# Patient Record
Sex: MALE | Race: WHITE | NOT HISPANIC OR LATINO | Employment: OTHER | URBAN - METROPOLITAN AREA
[De-identification: names, ages, dates, MRNs, and addresses within clinical notes are randomized per-mention and may not be internally consistent; named-entity substitution may affect disease eponyms.]

---

## 2017-01-12 ENCOUNTER — OFFICE VISIT - HEALTHEAST (OUTPATIENT)
Dept: VASCULAR SURGERY | Facility: CLINIC | Age: 82
End: 2017-01-12

## 2017-01-12 DIAGNOSIS — I89.0 LYMPHEDEMA: ICD-10-CM

## 2017-01-12 DIAGNOSIS — I87.303 VENOUS HYPERTENSION, CHRONIC, BILATERAL: ICD-10-CM

## 2017-01-12 ASSESSMENT — MIFFLIN-ST. JEOR: SCORE: 1656.9

## 2017-04-11 ENCOUNTER — OFFICE VISIT - HEALTHEAST (OUTPATIENT)
Dept: VASCULAR SURGERY | Facility: CLINIC | Age: 82
End: 2017-04-11

## 2017-04-11 ENCOUNTER — RECORDS - HEALTHEAST (OUTPATIENT)
Dept: ADMINISTRATIVE | Facility: OTHER | Age: 82
End: 2017-04-11

## 2017-04-11 DIAGNOSIS — I87.303 VENOUS HYPERTENSION, CHRONIC, BILATERAL: ICD-10-CM

## 2017-04-11 DIAGNOSIS — L98.411 NON-PRESSURE CHRONIC ULCER OF BUTTOCK LIMITED TO BREAKDOWN OF SKIN (H): ICD-10-CM

## 2017-04-11 DIAGNOSIS — I89.0 LYMPHEDEMA: ICD-10-CM

## 2017-04-12 ENCOUNTER — AMBULATORY - HEALTHEAST (OUTPATIENT)
Dept: SURGERY | Facility: CLINIC | Age: 82
End: 2017-04-12

## 2017-04-12 DIAGNOSIS — K40.90 RIGHT GROIN HERNIA: ICD-10-CM

## 2017-04-19 ENCOUNTER — RECORDS - HEALTHEAST (OUTPATIENT)
Dept: ADMINISTRATIVE | Facility: OTHER | Age: 82
End: 2017-04-19

## 2017-04-20 ENCOUNTER — RECORDS - HEALTHEAST (OUTPATIENT)
Dept: ADMINISTRATIVE | Facility: OTHER | Age: 82
End: 2017-04-20

## 2017-04-21 ENCOUNTER — HOSPITAL ENCOUNTER (OUTPATIENT)
Dept: CT IMAGING | Facility: HOSPITAL | Age: 82
Discharge: HOME | End: 2017-04-21
Attending: UROLOGY

## 2017-04-21 DIAGNOSIS — R31.0 GROSS HEMATURIA: ICD-10-CM

## 2017-04-25 ENCOUNTER — OFFICE VISIT - HEALTHEAST (OUTPATIENT)
Dept: VASCULAR SURGERY | Facility: CLINIC | Age: 82
End: 2017-04-25

## 2017-04-25 DIAGNOSIS — I89.0 LYMPHEDEMA: ICD-10-CM

## 2017-04-25 DIAGNOSIS — L98.411 NON-PRESSURE CHRONIC ULCER OF BUTTOCK LIMITED TO BREAKDOWN OF SKIN (H): ICD-10-CM

## 2017-04-25 DIAGNOSIS — Z79.4 TYPE 2 DIABETES MELLITUS WITHOUT COMPLICATION, WITH LONG-TERM CURRENT USE OF INSULIN (H): ICD-10-CM

## 2017-04-25 DIAGNOSIS — E11.9 TYPE 2 DIABETES MELLITUS WITHOUT COMPLICATION, WITH LONG-TERM CURRENT USE OF INSULIN (H): ICD-10-CM

## 2017-04-25 DIAGNOSIS — I87.303 VENOUS HYPERTENSION, CHRONIC, BILATERAL: ICD-10-CM

## 2017-04-26 ENCOUNTER — RECORDS - HEALTHEAST (OUTPATIENT)
Dept: ADMINISTRATIVE | Facility: OTHER | Age: 82
End: 2017-04-26

## 2017-05-02 ENCOUNTER — OFFICE VISIT - HEALTHEAST (OUTPATIENT)
Dept: SURGERY | Facility: CLINIC | Age: 82
End: 2017-05-02

## 2017-05-02 DIAGNOSIS — K40.90 UNILATERAL INGUINAL HERNIA WITHOUT OBSTRUCTION OR GANGRENE, RECURRENCE NOT SPECIFIED: ICD-10-CM

## 2017-05-02 ASSESSMENT — MIFFLIN-ST. JEOR: SCORE: 1696.81

## 2017-08-07 ENCOUNTER — OFFICE VISIT - HEALTHEAST (OUTPATIENT)
Dept: VASCULAR SURGERY | Facility: CLINIC | Age: 82
End: 2017-08-07

## 2017-08-07 DIAGNOSIS — I87.303 VENOUS HYPERTENSION, CHRONIC, BILATERAL: ICD-10-CM

## 2017-08-07 DIAGNOSIS — L97.911 LEG ULCER, RIGHT, LIMITED TO BREAKDOWN OF SKIN (H): ICD-10-CM

## 2017-08-07 DIAGNOSIS — I89.0 LYMPHEDEMA: ICD-10-CM

## 2017-08-07 DIAGNOSIS — E11.9 TYPE 2 DIABETES MELLITUS WITHOUT COMPLICATION, UNSPECIFIED LONG TERM INSULIN USE STATUS: ICD-10-CM

## 2017-08-14 ENCOUNTER — RECORDS - HEALTHEAST (OUTPATIENT)
Dept: LAB | Facility: CLINIC | Age: 82
End: 2017-08-14

## 2017-08-14 LAB
CHOLEST SERPL-MCNC: 124 MG/DL
FASTING STATUS PATIENT QL REPORTED: NORMAL
HDLC SERPL-MCNC: 46 MG/DL
LDLC SERPL CALC-MCNC: 64 MG/DL
TRIGL SERPL-MCNC: 70 MG/DL

## 2017-11-06 ENCOUNTER — OFFICE VISIT - HEALTHEAST (OUTPATIENT)
Dept: VASCULAR SURGERY | Facility: CLINIC | Age: 82
End: 2017-11-06

## 2017-11-06 DIAGNOSIS — E11.9 TYPE 2 DIABETES MELLITUS (H): ICD-10-CM

## 2017-11-06 DIAGNOSIS — D64.9 ANEMIA, UNSPECIFIED TYPE: ICD-10-CM

## 2017-11-06 DIAGNOSIS — I87.303 VENOUS HYPERTENSION, CHRONIC, BILATERAL: ICD-10-CM

## 2017-11-06 DIAGNOSIS — I89.0 LYMPHEDEMA: ICD-10-CM

## 2017-11-06 DIAGNOSIS — I87.2 VENOUS STASIS DERMATITIS OF BOTH LOWER EXTREMITIES: ICD-10-CM

## 2018-02-12 ENCOUNTER — OFFICE VISIT - HEALTHEAST (OUTPATIENT)
Dept: GERIATRICS | Facility: CLINIC | Age: 83
End: 2018-02-12

## 2018-02-12 DIAGNOSIS — A48.1 LEGIONELLA PNEUMONIA (H): ICD-10-CM

## 2018-02-12 DIAGNOSIS — E11.9 TYPE 2 DIABETES MELLITUS (H): ICD-10-CM

## 2018-02-12 DIAGNOSIS — Z51.81 ANTICOAGULATION MANAGEMENT ENCOUNTER: ICD-10-CM

## 2018-02-12 DIAGNOSIS — N39.0 URINARY TRACT INFECTION: ICD-10-CM

## 2018-02-12 DIAGNOSIS — I48.91 ATRIAL FIBRILLATION (H): ICD-10-CM

## 2018-02-12 DIAGNOSIS — R33.9 URINARY RETENTION: ICD-10-CM

## 2018-02-12 DIAGNOSIS — Z79.01 ANTICOAGULATION MANAGEMENT ENCOUNTER: ICD-10-CM

## 2018-02-12 DIAGNOSIS — J18.9 PLEURAL EFFUSION ASSOCIATED WITH PULMONARY INFECTION: ICD-10-CM

## 2018-02-12 DIAGNOSIS — N17.9 ACUTE KIDNEY INJURY (H): ICD-10-CM

## 2018-02-12 DIAGNOSIS — J91.8 PLEURAL EFFUSION ASSOCIATED WITH PULMONARY INFECTION: ICD-10-CM

## 2018-02-12 DIAGNOSIS — I50.32 CHRONIC DIASTOLIC HEART FAILURE (H): ICD-10-CM

## 2018-02-12 DIAGNOSIS — Z98.890 STATUS POST THORACENTESIS: ICD-10-CM

## 2018-02-13 ENCOUNTER — OFFICE VISIT - HEALTHEAST (OUTPATIENT)
Dept: GERIATRICS | Facility: CLINIC | Age: 83
End: 2018-02-13

## 2018-02-13 ENCOUNTER — RECORDS - HEALTHEAST (OUTPATIENT)
Dept: LAB | Facility: CLINIC | Age: 83
End: 2018-02-13

## 2018-02-13 DIAGNOSIS — R94.31 QT PROLONGATION: ICD-10-CM

## 2018-02-13 DIAGNOSIS — I48.91 ATRIAL FIBRILLATION (H): ICD-10-CM

## 2018-02-13 DIAGNOSIS — J18.9 PLEURAL EFFUSION ASSOCIATED WITH PULMONARY INFECTION: ICD-10-CM

## 2018-02-13 DIAGNOSIS — R33.9 URINARY RETENTION: ICD-10-CM

## 2018-02-13 DIAGNOSIS — I89.0 LYMPHEDEMA: ICD-10-CM

## 2018-02-13 DIAGNOSIS — Z98.890 STATUS POST THORACENTESIS: ICD-10-CM

## 2018-02-13 DIAGNOSIS — N17.9 ACUTE KIDNEY INJURY (H): ICD-10-CM

## 2018-02-13 DIAGNOSIS — J91.8 PLEURAL EFFUSION ASSOCIATED WITH PULMONARY INFECTION: ICD-10-CM

## 2018-02-13 DIAGNOSIS — A48.1 LEGIONELLA PNEUMONIA (H): ICD-10-CM

## 2018-02-13 DIAGNOSIS — Z51.81 ANTICOAGULATION MANAGEMENT ENCOUNTER: ICD-10-CM

## 2018-02-13 DIAGNOSIS — I50.32 CHRONIC DIASTOLIC HEART FAILURE (H): ICD-10-CM

## 2018-02-13 DIAGNOSIS — E11.9 TYPE 2 DIABETES MELLITUS (H): ICD-10-CM

## 2018-02-13 DIAGNOSIS — Z79.01 ANTICOAGULATION MANAGEMENT ENCOUNTER: ICD-10-CM

## 2018-02-13 LAB
ANION GAP SERPL CALCULATED.3IONS-SCNC: 5 MMOL/L (ref 5–18)
BUN SERPL-MCNC: 20 MG/DL (ref 8–28)
CALCIUM SERPL-MCNC: 8.6 MG/DL (ref 8.5–10.5)
CHLORIDE BLD-SCNC: 107 MMOL/L (ref 98–107)
CO2 SERPL-SCNC: 25 MMOL/L (ref 22–31)
CREAT SERPL-MCNC: 1 MG/DL (ref 0.7–1.3)
GFR SERPL CREATININE-BSD FRML MDRD: >60 ML/MIN/1.73M2
GLUCOSE BLD-MCNC: 189 MG/DL (ref 70–125)
POTASSIUM BLD-SCNC: 4.2 MMOL/L (ref 3.5–5)
SODIUM SERPL-SCNC: 137 MMOL/L (ref 136–145)

## 2018-02-15 ENCOUNTER — OFFICE VISIT - HEALTHEAST (OUTPATIENT)
Dept: GERIATRICS | Facility: CLINIC | Age: 83
End: 2018-02-15

## 2018-02-15 DIAGNOSIS — J91.8 PLEURAL EFFUSION ASSOCIATED WITH PULMONARY INFECTION: ICD-10-CM

## 2018-02-15 DIAGNOSIS — J18.9 PLEURAL EFFUSION ASSOCIATED WITH PULMONARY INFECTION: ICD-10-CM

## 2018-02-15 DIAGNOSIS — R94.31 QT PROLONGATION: ICD-10-CM

## 2018-02-15 DIAGNOSIS — I50.32 CHRONIC DIASTOLIC HEART FAILURE (H): ICD-10-CM

## 2018-02-15 DIAGNOSIS — Z98.890 STATUS POST THORACENTESIS: ICD-10-CM

## 2018-02-15 DIAGNOSIS — R33.9 URINARY RETENTION: ICD-10-CM

## 2018-02-15 DIAGNOSIS — A48.1 LEGIONELLA PNEUMONIA (H): ICD-10-CM

## 2018-02-15 DIAGNOSIS — Z79.01 ANTICOAGULATION MANAGEMENT ENCOUNTER: ICD-10-CM

## 2018-02-15 DIAGNOSIS — I48.91 ATRIAL FIBRILLATION (H): ICD-10-CM

## 2018-02-15 DIAGNOSIS — Z51.81 ANTICOAGULATION MANAGEMENT ENCOUNTER: ICD-10-CM

## 2018-02-16 ENCOUNTER — OFFICE VISIT - HEALTHEAST (OUTPATIENT)
Dept: GERIATRICS | Facility: CLINIC | Age: 83
End: 2018-02-16

## 2018-02-16 ENCOUNTER — RECORDS - HEALTHEAST (OUTPATIENT)
Dept: LAB | Facility: CLINIC | Age: 83
End: 2018-02-16

## 2018-02-16 DIAGNOSIS — A48.1 LEGIONELLA PNEUMONIA (H): ICD-10-CM

## 2018-02-16 DIAGNOSIS — I95.9 HYPOTENSION: ICD-10-CM

## 2018-02-16 DIAGNOSIS — R00.1 BRADYCARDIA: ICD-10-CM

## 2018-02-16 DIAGNOSIS — R06.02 SHORTNESS OF BREATH: ICD-10-CM

## 2018-02-16 DIAGNOSIS — R53.1 WEAKNESS: ICD-10-CM

## 2018-02-16 LAB
ANION GAP SERPL CALCULATED.3IONS-SCNC: 9 MMOL/L (ref 5–18)
BASOPHILS # BLD AUTO: 0 THOU/UL (ref 0–0.2)
BASOPHILS NFR BLD AUTO: 0 % (ref 0–2)
BNP SERPL-MCNC: 489 PG/ML (ref 0–93)
BUN SERPL-MCNC: 19 MG/DL (ref 8–28)
CALCIUM SERPL-MCNC: 8.5 MG/DL (ref 8.5–10.5)
CHLORIDE BLD-SCNC: 104 MMOL/L (ref 98–107)
CO2 SERPL-SCNC: 22 MMOL/L (ref 22–31)
CREAT SERPL-MCNC: 1.41 MG/DL (ref 0.7–1.3)
EOSINOPHIL # BLD AUTO: 0 THOU/UL (ref 0–0.4)
EOSINOPHIL NFR BLD AUTO: 0 % (ref 0–6)
ERYTHROCYTE [DISTWIDTH] IN BLOOD BY AUTOMATED COUNT: 14.3 % (ref 11–14.5)
GFR SERPL CREATININE-BSD FRML MDRD: 47 ML/MIN/1.73M2
GLUCOSE BLD-MCNC: 129 MG/DL (ref 70–125)
HCT VFR BLD AUTO: 43.2 % (ref 40–54)
HGB BLD-MCNC: 14 G/DL (ref 14–18)
LYMPHOCYTES # BLD AUTO: 0.7 THOU/UL (ref 0.8–4.4)
LYMPHOCYTES NFR BLD AUTO: 6 % (ref 20–40)
MCH RBC QN AUTO: 30.5 PG (ref 27–34)
MCHC RBC AUTO-ENTMCNC: 32.4 G/DL (ref 32–36)
MCV RBC AUTO: 94 FL (ref 80–100)
MONOCYTES # BLD AUTO: 0.9 THOU/UL (ref 0–0.9)
MONOCYTES NFR BLD AUTO: 8 % (ref 2–10)
NEUTROPHILS # BLD AUTO: 9.3 THOU/UL (ref 2–7.7)
NEUTROPHILS NFR BLD AUTO: 86 % (ref 50–70)
PLATELET # BLD AUTO: 266 THOU/UL (ref 140–440)
PMV BLD AUTO: 8.9 FL (ref 8.5–12.5)
POTASSIUM BLD-SCNC: 4.3 MMOL/L (ref 3.5–5)
RBC # BLD AUTO: 4.59 MILL/UL (ref 4.4–6.2)
SODIUM SERPL-SCNC: 135 MMOL/L (ref 136–145)
WBC: 10.9 THOU/UL (ref 4–11)

## 2018-02-19 ENCOUNTER — RECORDS - HEALTHEAST (OUTPATIENT)
Dept: LAB | Facility: CLINIC | Age: 83
End: 2018-02-19

## 2018-02-20 ENCOUNTER — OFFICE VISIT - HEALTHEAST (OUTPATIENT)
Dept: GERIATRICS | Facility: CLINIC | Age: 83
End: 2018-02-20

## 2018-02-20 DIAGNOSIS — E87.70 FLUID OVERLOAD: ICD-10-CM

## 2018-02-20 DIAGNOSIS — Z51.81 ANTICOAGULATION MANAGEMENT ENCOUNTER: ICD-10-CM

## 2018-02-20 DIAGNOSIS — Z79.01 ANTICOAGULATION MANAGEMENT ENCOUNTER: ICD-10-CM

## 2018-02-20 DIAGNOSIS — J91.8 PLEURAL EFFUSION ASSOCIATED WITH PULMONARY INFECTION: ICD-10-CM

## 2018-02-20 DIAGNOSIS — I48.91 ATRIAL FIBRILLATION (H): ICD-10-CM

## 2018-02-20 DIAGNOSIS — A48.1 LEGIONELLA PNEUMONIA (H): ICD-10-CM

## 2018-02-20 DIAGNOSIS — J18.9 PLEURAL EFFUSION ASSOCIATED WITH PULMONARY INFECTION: ICD-10-CM

## 2018-02-20 DIAGNOSIS — S01.01XA SCALP LACERATION: ICD-10-CM

## 2018-02-20 DIAGNOSIS — I50.32 CHRONIC DIASTOLIC HEART FAILURE (H): ICD-10-CM

## 2018-02-20 LAB
ANION GAP SERPL CALCULATED.3IONS-SCNC: 6 MMOL/L (ref 5–18)
BUN SERPL-MCNC: 16 MG/DL (ref 8–28)
CALCIUM SERPL-MCNC: 8.4 MG/DL (ref 8.5–10.5)
CHLORIDE BLD-SCNC: 108 MMOL/L (ref 98–107)
CO2 SERPL-SCNC: 24 MMOL/L (ref 22–31)
CREAT SERPL-MCNC: 1.07 MG/DL (ref 0.7–1.3)
ERYTHROCYTE [DISTWIDTH] IN BLOOD BY AUTOMATED COUNT: 14.5 % (ref 11–14.5)
GFR SERPL CREATININE-BSD FRML MDRD: >60 ML/MIN/1.73M2
GLUCOSE BLD-MCNC: 62 MG/DL (ref 70–125)
HCT VFR BLD AUTO: 37.4 % (ref 40–54)
HGB BLD-MCNC: 12.2 G/DL (ref 14–18)
MCH RBC QN AUTO: 30.7 PG (ref 27–34)
MCHC RBC AUTO-ENTMCNC: 32.6 G/DL (ref 32–36)
MCV RBC AUTO: 94 FL (ref 80–100)
PLATELET # BLD AUTO: 177 THOU/UL (ref 140–440)
PMV BLD AUTO: 9.7 FL (ref 8.5–12.5)
POTASSIUM BLD-SCNC: 4.4 MMOL/L (ref 3.5–5)
RBC # BLD AUTO: 3.97 MILL/UL (ref 4.4–6.2)
SODIUM SERPL-SCNC: 138 MMOL/L (ref 136–145)
WBC: 6.8 THOU/UL (ref 4–11)

## 2018-02-22 ENCOUNTER — OFFICE VISIT - HEALTHEAST (OUTPATIENT)
Dept: GERIATRICS | Facility: CLINIC | Age: 83
End: 2018-02-22

## 2018-02-22 ENCOUNTER — RECORDS - HEALTHEAST (OUTPATIENT)
Dept: LAB | Facility: CLINIC | Age: 83
End: 2018-02-22

## 2018-02-22 DIAGNOSIS — S01.01XA SCALP LACERATION: ICD-10-CM

## 2018-02-22 DIAGNOSIS — E87.70 FLUID OVERLOAD: ICD-10-CM

## 2018-02-22 DIAGNOSIS — A48.1 LEGIONELLA PNEUMONIA (H): ICD-10-CM

## 2018-02-22 DIAGNOSIS — J91.8 PLEURAL EFFUSION ASSOCIATED WITH PULMONARY INFECTION: ICD-10-CM

## 2018-02-22 DIAGNOSIS — J18.9 PLEURAL EFFUSION ASSOCIATED WITH PULMONARY INFECTION: ICD-10-CM

## 2018-02-22 LAB
ANION GAP SERPL CALCULATED.3IONS-SCNC: 7 MMOL/L (ref 5–18)
BNP SERPL-MCNC: 460 PG/ML (ref 0–93)
BUN SERPL-MCNC: 19 MG/DL (ref 8–28)
CALCIUM SERPL-MCNC: 8.4 MG/DL (ref 8.5–10.5)
CHLORIDE BLD-SCNC: 104 MMOL/L (ref 98–107)
CO2 SERPL-SCNC: 27 MMOL/L (ref 22–31)
CREAT SERPL-MCNC: 1.77 MG/DL (ref 0.7–1.3)
ERYTHROCYTE [DISTWIDTH] IN BLOOD BY AUTOMATED COUNT: 14.9 % (ref 11–14.5)
GFR SERPL CREATININE-BSD FRML MDRD: 36 ML/MIN/1.73M2
GLUCOSE BLD-MCNC: 137 MG/DL (ref 70–125)
HCT VFR BLD AUTO: 38.7 % (ref 40–54)
HGB BLD-MCNC: 12.4 G/DL (ref 14–18)
MCH RBC QN AUTO: 30.5 PG (ref 27–34)
MCHC RBC AUTO-ENTMCNC: 32 G/DL (ref 32–36)
MCV RBC AUTO: 95 FL (ref 80–100)
PLATELET # BLD AUTO: 178 THOU/UL (ref 140–440)
PMV BLD AUTO: 9.8 FL (ref 8.5–12.5)
POTASSIUM BLD-SCNC: 4 MMOL/L (ref 3.5–5)
RBC # BLD AUTO: 4.06 MILL/UL (ref 4.4–6.2)
SODIUM SERPL-SCNC: 138 MMOL/L (ref 136–145)
WBC: 6.6 THOU/UL (ref 4–11)

## 2018-02-26 ENCOUNTER — RECORDS - HEALTHEAST (OUTPATIENT)
Dept: LAB | Facility: CLINIC | Age: 83
End: 2018-02-26

## 2018-02-26 ENCOUNTER — OFFICE VISIT - HEALTHEAST (OUTPATIENT)
Dept: GERIATRICS | Facility: CLINIC | Age: 83
End: 2018-02-26

## 2018-02-26 DIAGNOSIS — I50.32 CHRONIC DIASTOLIC HEART FAILURE (H): ICD-10-CM

## 2018-02-26 DIAGNOSIS — I48.91 ATRIAL FIBRILLATION (H): ICD-10-CM

## 2018-02-26 DIAGNOSIS — S01.01XA SCALP LACERATION: ICD-10-CM

## 2018-02-26 DIAGNOSIS — E87.70 FLUID OVERLOAD: ICD-10-CM

## 2018-02-26 DIAGNOSIS — Z79.01 ANTICOAGULATION MANAGEMENT ENCOUNTER: ICD-10-CM

## 2018-02-26 DIAGNOSIS — Z51.81 ANTICOAGULATION MANAGEMENT ENCOUNTER: ICD-10-CM

## 2018-02-26 DIAGNOSIS — A48.1 LEGIONELLA PNEUMONIA (H): ICD-10-CM

## 2018-02-26 LAB
ANION GAP SERPL CALCULATED.3IONS-SCNC: 8 MMOL/L (ref 5–18)
BNP SERPL-MCNC: 425 PG/ML (ref 0–93)
BUN SERPL-MCNC: 15 MG/DL (ref 8–28)
CALCIUM SERPL-MCNC: 8.1 MG/DL (ref 8.5–10.5)
CHLORIDE BLD-SCNC: 102 MMOL/L (ref 98–107)
CO2 SERPL-SCNC: 29 MMOL/L (ref 22–31)
CREAT SERPL-MCNC: 1.12 MG/DL (ref 0.7–1.3)
GFR SERPL CREATININE-BSD FRML MDRD: >60 ML/MIN/1.73M2
GLUCOSE BLD-MCNC: 191 MG/DL (ref 70–125)
POTASSIUM BLD-SCNC: 3.5 MMOL/L (ref 3.5–5)
SODIUM SERPL-SCNC: 139 MMOL/L (ref 136–145)

## 2018-02-28 ENCOUNTER — OFFICE VISIT - HEALTHEAST (OUTPATIENT)
Dept: GERIATRICS | Facility: CLINIC | Age: 83
End: 2018-02-28

## 2018-02-28 ENCOUNTER — RECORDS - HEALTHEAST (OUTPATIENT)
Dept: LAB | Facility: CLINIC | Age: 83
End: 2018-02-28

## 2018-02-28 DIAGNOSIS — I50.32 CHRONIC DIASTOLIC HEART FAILURE (H): ICD-10-CM

## 2018-02-28 DIAGNOSIS — Z51.81 ANTICOAGULATION MANAGEMENT ENCOUNTER: ICD-10-CM

## 2018-02-28 DIAGNOSIS — S01.01XA SCALP LACERATION: ICD-10-CM

## 2018-02-28 DIAGNOSIS — Z79.01 ANTICOAGULATION MANAGEMENT ENCOUNTER: ICD-10-CM

## 2018-02-28 DIAGNOSIS — I48.91 ATRIAL FIBRILLATION (H): ICD-10-CM

## 2018-02-28 DIAGNOSIS — A48.1 LEGIONELLA PNEUMONIA (H): ICD-10-CM

## 2018-03-01 LAB
ANION GAP SERPL CALCULATED.3IONS-SCNC: 7 MMOL/L (ref 5–18)
BNP SERPL-MCNC: 363 PG/ML (ref 0–93)
BUN SERPL-MCNC: 11 MG/DL (ref 8–28)
CALCIUM SERPL-MCNC: 8.9 MG/DL (ref 8.5–10.5)
CHLORIDE BLD-SCNC: 98 MMOL/L (ref 98–107)
CO2 SERPL-SCNC: 35 MMOL/L (ref 22–31)
CREAT SERPL-MCNC: 0.93 MG/DL (ref 0.7–1.3)
GFR SERPL CREATININE-BSD FRML MDRD: >60 ML/MIN/1.73M2
GLUCOSE BLD-MCNC: 113 MG/DL (ref 70–125)
POTASSIUM BLD-SCNC: 3.2 MMOL/L (ref 3.5–5)
SODIUM SERPL-SCNC: 140 MMOL/L (ref 136–145)

## 2018-03-05 ENCOUNTER — RECORDS - HEALTHEAST (OUTPATIENT)
Dept: LAB | Facility: CLINIC | Age: 83
End: 2018-03-05

## 2018-03-06 ENCOUNTER — OFFICE VISIT - HEALTHEAST (OUTPATIENT)
Dept: GERIATRICS | Facility: CLINIC | Age: 83
End: 2018-03-06

## 2018-03-06 DIAGNOSIS — I89.0 LYMPHEDEMA: ICD-10-CM

## 2018-03-06 DIAGNOSIS — Z79.01 ANTICOAGULATION MANAGEMENT ENCOUNTER: ICD-10-CM

## 2018-03-06 DIAGNOSIS — S01.01XA SCALP LACERATION: ICD-10-CM

## 2018-03-06 DIAGNOSIS — I48.91 ATRIAL FIBRILLATION (H): ICD-10-CM

## 2018-03-06 DIAGNOSIS — Z51.81 ANTICOAGULATION MANAGEMENT ENCOUNTER: ICD-10-CM

## 2018-03-06 DIAGNOSIS — R94.31 QT PROLONGATION: ICD-10-CM

## 2018-03-06 DIAGNOSIS — I50.32 CHRONIC DIASTOLIC HEART FAILURE (H): ICD-10-CM

## 2018-03-06 DIAGNOSIS — A48.1 LEGIONELLA PNEUMONIA (H): ICD-10-CM

## 2018-03-06 DIAGNOSIS — E11.9 TYPE 2 DIABETES MELLITUS (H): ICD-10-CM

## 2018-03-06 LAB
ANION GAP SERPL CALCULATED.3IONS-SCNC: 7 MMOL/L (ref 5–18)
BUN SERPL-MCNC: 15 MG/DL (ref 8–28)
CALCIUM SERPL-MCNC: 8.8 MG/DL (ref 8.5–10.5)
CHLORIDE BLD-SCNC: 99 MMOL/L (ref 98–107)
CO2 SERPL-SCNC: 35 MMOL/L (ref 22–31)
CREAT SERPL-MCNC: 0.88 MG/DL (ref 0.7–1.3)
GFR SERPL CREATININE-BSD FRML MDRD: >60 ML/MIN/1.73M2
GLUCOSE BLD-MCNC: 141 MG/DL (ref 70–125)
POTASSIUM BLD-SCNC: 3.1 MMOL/L (ref 3.5–5)
SODIUM SERPL-SCNC: 141 MMOL/L (ref 136–145)

## 2018-03-06 RX ORDER — FUROSEMIDE 40 MG
80 TABLET ORAL DAILY
Status: ON HOLD | COMMUNITY
Start: 2018-03-06 | End: 2022-01-01

## 2018-03-07 ENCOUNTER — AMBULATORY - HEALTHEAST (OUTPATIENT)
Dept: GERIATRICS | Facility: CLINIC | Age: 83
End: 2018-03-07

## 2018-03-09 ENCOUNTER — RECORDS - HEALTHEAST (OUTPATIENT)
Dept: LAB | Facility: CLINIC | Age: 83
End: 2018-03-09

## 2018-03-09 LAB
ANION GAP SERPL CALCULATED.3IONS-SCNC: 8 MMOL/L (ref 5–18)
BUN SERPL-MCNC: 23 MG/DL (ref 8–28)
CALCIUM SERPL-MCNC: 9 MG/DL (ref 8.5–10.5)
CHLORIDE BLD-SCNC: 101 MMOL/L (ref 98–107)
CO2 SERPL-SCNC: 30 MMOL/L (ref 22–31)
CREAT SERPL-MCNC: 0.92 MG/DL (ref 0.7–1.3)
GFR SERPL CREATININE-BSD FRML MDRD: >60 ML/MIN/1.73M2
GLUCOSE BLD-MCNC: 164 MG/DL (ref 70–125)
POTASSIUM BLD-SCNC: 3.9 MMOL/L (ref 3.5–5)
SODIUM SERPL-SCNC: 139 MMOL/L (ref 136–145)

## 2018-05-14 ENCOUNTER — RECORDS - HEALTHEAST (OUTPATIENT)
Dept: ADMINISTRATIVE | Facility: OTHER | Age: 83
End: 2018-05-14

## 2018-05-15 ENCOUNTER — RECORDS - HEALTHEAST (OUTPATIENT)
Dept: ADMINISTRATIVE | Facility: OTHER | Age: 83
End: 2018-05-15

## 2018-05-15 ENCOUNTER — AMBULATORY - HEALTHEAST (OUTPATIENT)
Dept: VASCULAR SURGERY | Facility: CLINIC | Age: 83
End: 2018-05-15

## 2018-05-15 DIAGNOSIS — L98.499 SKIN ULCER (H): ICD-10-CM

## 2018-05-17 ENCOUNTER — OFFICE VISIT - HEALTHEAST (OUTPATIENT)
Dept: VASCULAR SURGERY | Facility: CLINIC | Age: 83
End: 2018-05-17

## 2018-05-17 DIAGNOSIS — L97.911 ULCER OF RIGHT LOWER EXTREMITY, LIMITED TO BREAKDOWN OF SKIN (H): ICD-10-CM

## 2018-05-17 DIAGNOSIS — I87.303 VENOUS HYPERTENSION, CHRONIC, BILATERAL: ICD-10-CM

## 2018-05-17 DIAGNOSIS — L97.921 ULCER OF LEFT LOWER EXTREMITY, LIMITED TO BREAKDOWN OF SKIN (H): ICD-10-CM

## 2018-05-17 DIAGNOSIS — D64.9 ANEMIA, UNSPECIFIED TYPE: ICD-10-CM

## 2018-05-17 DIAGNOSIS — I89.0 LYMPHEDEMA: ICD-10-CM

## 2018-05-17 DIAGNOSIS — E11.9 TYPE 2 DIABETES MELLITUS (H): ICD-10-CM

## 2018-05-22 ENCOUNTER — AMBULATORY - HEALTHEAST (OUTPATIENT)
Dept: VASCULAR SURGERY | Facility: CLINIC | Age: 83
End: 2018-05-22

## 2018-05-31 ENCOUNTER — OFFICE VISIT - HEALTHEAST (OUTPATIENT)
Dept: VASCULAR SURGERY | Facility: CLINIC | Age: 83
End: 2018-05-31

## 2018-05-31 DIAGNOSIS — I89.0 LYMPHEDEMA: ICD-10-CM

## 2018-05-31 DIAGNOSIS — I87.303 VENOUS HYPERTENSION, CHRONIC, BILATERAL: ICD-10-CM

## 2018-05-31 DIAGNOSIS — L97.921 ULCER OF LEFT LOWER EXTREMITY, LIMITED TO BREAKDOWN OF SKIN (H): ICD-10-CM

## 2018-05-31 DIAGNOSIS — E11.9 TYPE 2 DIABETES MELLITUS (H): ICD-10-CM

## 2018-05-31 DIAGNOSIS — L97.911 ULCER OF RIGHT LOWER EXTREMITY, LIMITED TO BREAKDOWN OF SKIN (H): ICD-10-CM

## 2018-06-08 ENCOUNTER — OFFICE VISIT - HEALTHEAST (OUTPATIENT)
Dept: VASCULAR SURGERY | Facility: CLINIC | Age: 83
End: 2018-06-08

## 2018-06-08 DIAGNOSIS — I89.0 LYMPHEDEMA: ICD-10-CM

## 2018-06-08 DIAGNOSIS — E11.9 TYPE 2 DIABETES MELLITUS (H): ICD-10-CM

## 2018-06-08 DIAGNOSIS — L97.911 ULCER OF RIGHT LOWER EXTREMITY, LIMITED TO BREAKDOWN OF SKIN (H): ICD-10-CM

## 2018-06-08 DIAGNOSIS — I87.303 VENOUS HYPERTENSION, CHRONIC, BILATERAL: ICD-10-CM

## 2018-06-08 DIAGNOSIS — L97.921 ULCER OF LEFT LOWER EXTREMITY, LIMITED TO BREAKDOWN OF SKIN (H): ICD-10-CM

## 2018-06-15 ENCOUNTER — AMBULATORY - HEALTHEAST (OUTPATIENT)
Dept: VASCULAR SURGERY | Facility: CLINIC | Age: 83
End: 2018-06-15

## 2018-06-22 ENCOUNTER — OFFICE VISIT - HEALTHEAST (OUTPATIENT)
Dept: VASCULAR SURGERY | Facility: CLINIC | Age: 83
End: 2018-06-22

## 2018-06-22 DIAGNOSIS — I87.303 VENOUS HYPERTENSION, CHRONIC, BILATERAL: ICD-10-CM

## 2018-06-22 DIAGNOSIS — D64.9 ANEMIA: ICD-10-CM

## 2018-06-22 DIAGNOSIS — I89.0 LYMPHEDEMA: ICD-10-CM

## 2018-06-22 DIAGNOSIS — L97.921 ULCER OF LEFT LOWER EXTREMITY, LIMITED TO BREAKDOWN OF SKIN (H): ICD-10-CM

## 2018-08-09 ENCOUNTER — OFFICE VISIT - HEALTHEAST (OUTPATIENT)
Dept: VASCULAR SURGERY | Facility: CLINIC | Age: 83
End: 2018-08-09

## 2018-08-09 DIAGNOSIS — L97.921 ULCER OF LEFT LOWER EXTREMITY, LIMITED TO BREAKDOWN OF SKIN (H): ICD-10-CM

## 2018-08-09 DIAGNOSIS — I89.0 LYMPHEDEMA: ICD-10-CM

## 2018-08-09 DIAGNOSIS — L97.909 LEG ULCER (H): ICD-10-CM

## 2018-08-09 DIAGNOSIS — I87.2 VENOUS STASIS DERMATITIS OF BOTH LOWER EXTREMITIES: ICD-10-CM

## 2018-08-13 ENCOUNTER — RECORDS - HEALTHEAST (OUTPATIENT)
Dept: ADMINISTRATIVE | Facility: OTHER | Age: 83
End: 2018-08-13

## 2018-08-13 ENCOUNTER — COMMUNICATION - HEALTHEAST (OUTPATIENT)
Dept: VASCULAR SURGERY | Facility: CLINIC | Age: 83
End: 2018-08-13

## 2018-08-13 ENCOUNTER — AMBULATORY - HEALTHEAST (OUTPATIENT)
Dept: VASCULAR SURGERY | Facility: CLINIC | Age: 83
End: 2018-08-13

## 2018-08-13 DIAGNOSIS — L97.921 ULCER OF LEFT LOWER EXTREMITY, LIMITED TO BREAKDOWN OF SKIN (H): ICD-10-CM

## 2018-08-15 ENCOUNTER — RECORDS - HEALTHEAST (OUTPATIENT)
Dept: LAB | Facility: CLINIC | Age: 83
End: 2018-08-15

## 2018-08-15 LAB
CHOLEST SERPL-MCNC: 99 MG/DL
FASTING STATUS PATIENT QL REPORTED: NORMAL
HDLC SERPL-MCNC: 44 MG/DL
LDLC SERPL CALC-MCNC: 40 MG/DL
TRIGL SERPL-MCNC: 74 MG/DL

## 2018-08-16 ENCOUNTER — RECORDS - HEALTHEAST (OUTPATIENT)
Dept: ADMINISTRATIVE | Facility: OTHER | Age: 83
End: 2018-08-16

## 2018-08-16 ENCOUNTER — AMBULATORY - HEALTHEAST (OUTPATIENT)
Dept: VASCULAR SURGERY | Facility: CLINIC | Age: 83
End: 2018-08-16

## 2018-08-16 DIAGNOSIS — L97.921 ULCER OF LEFT LOWER EXTREMITY, LIMITED TO BREAKDOWN OF SKIN (H): ICD-10-CM

## 2018-08-23 ENCOUNTER — OFFICE VISIT - HEALTHEAST (OUTPATIENT)
Dept: VASCULAR SURGERY | Facility: CLINIC | Age: 83
End: 2018-08-23

## 2018-08-23 DIAGNOSIS — L97.921 ULCER OF LEFT LOWER EXTREMITY, LIMITED TO BREAKDOWN OF SKIN (H): ICD-10-CM

## 2018-08-23 DIAGNOSIS — I87.2 VENOUS STASIS DERMATITIS OF BOTH LOWER EXTREMITIES: ICD-10-CM

## 2018-08-23 DIAGNOSIS — I89.0 LYMPHEDEMA: ICD-10-CM

## 2018-08-23 DIAGNOSIS — D64.9 ANEMIA, UNSPECIFIED TYPE: ICD-10-CM

## 2018-08-29 ENCOUNTER — AMBULATORY - HEALTHEAST (OUTPATIENT)
Dept: VASCULAR SURGERY | Facility: CLINIC | Age: 83
End: 2018-08-29

## 2018-09-13 ENCOUNTER — OFFICE VISIT - HEALTHEAST (OUTPATIENT)
Dept: VASCULAR SURGERY | Facility: CLINIC | Age: 83
End: 2018-09-13

## 2018-09-13 DIAGNOSIS — I89.0 LYMPHEDEMA: ICD-10-CM

## 2018-09-13 DIAGNOSIS — I87.2 VENOUS STASIS DERMATITIS OF BOTH LOWER EXTREMITIES: ICD-10-CM

## 2018-09-13 DIAGNOSIS — L97.921 ULCER OF LEFT LOWER EXTREMITY, LIMITED TO BREAKDOWN OF SKIN (H): ICD-10-CM

## 2018-09-13 DIAGNOSIS — D64.9 ANEMIA, UNSPECIFIED TYPE: ICD-10-CM

## 2018-09-24 ENCOUNTER — OFFICE VISIT - HEALTHEAST (OUTPATIENT)
Dept: VASCULAR SURGERY | Facility: CLINIC | Age: 83
End: 2018-09-24

## 2018-09-24 ENCOUNTER — AMBULATORY - HEALTHEAST (OUTPATIENT)
Dept: VASCULAR SURGERY | Facility: CLINIC | Age: 83
End: 2018-09-24

## 2018-09-24 DIAGNOSIS — L08.9 LOCAL INFECTION OF WOUND: ICD-10-CM

## 2018-09-24 DIAGNOSIS — L97.921 ULCER OF LEFT LOWER EXTREMITY, LIMITED TO BREAKDOWN OF SKIN (H): ICD-10-CM

## 2018-09-24 DIAGNOSIS — T14.8XXA LOCAL INFECTION OF WOUND: ICD-10-CM

## 2018-09-24 DIAGNOSIS — I87.2 VENOUS STASIS DERMATITIS OF BOTH LOWER EXTREMITIES: ICD-10-CM

## 2018-09-24 DIAGNOSIS — I87.303 VENOUS HYPERTENSION, CHRONIC, BILATERAL: ICD-10-CM

## 2018-09-27 ENCOUNTER — RECORDS - HEALTHEAST (OUTPATIENT)
Dept: ADMINISTRATIVE | Facility: OTHER | Age: 83
End: 2018-09-27

## 2018-09-27 ENCOUNTER — AMBULATORY - HEALTHEAST (OUTPATIENT)
Dept: VASCULAR SURGERY | Facility: CLINIC | Age: 83
End: 2018-09-27

## 2018-09-27 DIAGNOSIS — L98.499 CHRONIC SKIN ULCER (H): ICD-10-CM

## 2018-09-27 DIAGNOSIS — I87.303 VENOUS HYPERTENSION, CHRONIC, BILATERAL: ICD-10-CM

## 2018-09-28 LAB
BACTERIA SPEC CULT: ABNORMAL
GRAM STAIN RESULT: ABNORMAL
GRAM STAIN RESULT: ABNORMAL

## 2018-10-03 ENCOUNTER — COMMUNICATION - HEALTHEAST (OUTPATIENT)
Dept: VASCULAR SURGERY | Facility: CLINIC | Age: 83
End: 2018-10-03

## 2018-10-03 ENCOUNTER — AMBULATORY - HEALTHEAST (OUTPATIENT)
Dept: VASCULAR SURGERY | Facility: CLINIC | Age: 83
End: 2018-10-03

## 2018-10-03 DIAGNOSIS — T14.8XXA LOCAL INFECTION OF WOUND: ICD-10-CM

## 2018-10-03 DIAGNOSIS — L08.9 LOCAL INFECTION OF WOUND: ICD-10-CM

## 2018-10-04 ENCOUNTER — RECORDS - HEALTHEAST (OUTPATIENT)
Dept: ADMINISTRATIVE | Facility: OTHER | Age: 83
End: 2018-10-04

## 2018-10-04 ENCOUNTER — COMMUNICATION - HEALTHEAST (OUTPATIENT)
Dept: VASCULAR SURGERY | Facility: CLINIC | Age: 83
End: 2018-10-04

## 2018-10-04 ENCOUNTER — AMBULATORY - HEALTHEAST (OUTPATIENT)
Dept: VASCULAR SURGERY | Facility: CLINIC | Age: 83
End: 2018-10-04

## 2018-10-04 DIAGNOSIS — I87.303 VENOUS HYPERTENSION, CHRONIC, BILATERAL: ICD-10-CM

## 2018-10-04 DIAGNOSIS — L08.9 LOCAL INFECTION OF WOUND: ICD-10-CM

## 2018-10-04 DIAGNOSIS — L98.499 CHRONIC SKIN ULCER (H): ICD-10-CM

## 2018-10-04 DIAGNOSIS — T14.8XXA LOCAL INFECTION OF WOUND: ICD-10-CM

## 2018-10-04 ASSESSMENT — MIFFLIN-ST. JEOR: SCORE: 1724.94

## 2018-10-11 ENCOUNTER — AMBULATORY - HEALTHEAST (OUTPATIENT)
Dept: VASCULAR SURGERY | Facility: CLINIC | Age: 83
End: 2018-10-11

## 2018-10-11 DIAGNOSIS — I87.2 VENOUS STASIS DERMATITIS OF BOTH LOWER EXTREMITIES: ICD-10-CM

## 2018-10-11 DIAGNOSIS — L97.911 LEG ULCER, RIGHT, LIMITED TO BREAKDOWN OF SKIN (H): ICD-10-CM

## 2018-10-11 ASSESSMENT — MIFFLIN-ST. JEOR: SCORE: 1724.94

## 2018-10-15 ENCOUNTER — OFFICE VISIT - HEALTHEAST (OUTPATIENT)
Dept: VASCULAR SURGERY | Facility: CLINIC | Age: 83
End: 2018-10-15

## 2018-10-15 DIAGNOSIS — L97.911 LEG ULCER, RIGHT, LIMITED TO BREAKDOWN OF SKIN (H): ICD-10-CM

## 2018-10-15 DIAGNOSIS — I87.303 VENOUS HYPERTENSION, CHRONIC, BILATERAL: ICD-10-CM

## 2018-10-15 DIAGNOSIS — I87.2 VENOUS STASIS DERMATITIS OF BOTH LOWER EXTREMITIES: ICD-10-CM

## 2018-10-15 RX ORDER — ATENOLOL 50 MG/1
50 TABLET ORAL DAILY
Status: ON HOLD | COMMUNITY
Start: 2018-10-09 | End: 2022-01-01

## 2018-11-21 ENCOUNTER — RECORDS - HEALTHEAST (OUTPATIENT)
Dept: LAB | Facility: CLINIC | Age: 83
End: 2018-11-21

## 2018-11-21 LAB
ALBUMIN SERPL-MCNC: 3.4 G/DL (ref 3.5–5)
ALP SERPL-CCNC: 82 U/L (ref 45–120)
ALT SERPL W P-5'-P-CCNC: 62 U/L (ref 0–45)
ANION GAP SERPL CALCULATED.3IONS-SCNC: 8 MMOL/L (ref 5–18)
AST SERPL W P-5'-P-CCNC: 40 U/L (ref 0–40)
BILIRUB SERPL-MCNC: 1.3 MG/DL (ref 0–1)
BNP SERPL-MCNC: 285 PG/ML (ref 0–93)
BUN SERPL-MCNC: 17 MG/DL (ref 8–28)
CALCIUM SERPL-MCNC: 9.2 MG/DL (ref 8.5–10.5)
CHLORIDE BLD-SCNC: 106 MMOL/L (ref 98–107)
CO2 SERPL-SCNC: 29 MMOL/L (ref 22–31)
CREAT SERPL-MCNC: 1 MG/DL (ref 0.7–1.3)
GFR SERPL CREATININE-BSD FRML MDRD: >60 ML/MIN/1.73M2
GLUCOSE BLD-MCNC: 128 MG/DL (ref 70–125)
POTASSIUM BLD-SCNC: 4.6 MMOL/L (ref 3.5–5)
PROT SERPL-MCNC: 6.1 G/DL (ref 6–8)
SODIUM SERPL-SCNC: 143 MMOL/L (ref 136–145)

## 2018-11-28 ENCOUNTER — AMBULATORY - HEALTHEAST (OUTPATIENT)
Dept: VASCULAR SURGERY | Facility: CLINIC | Age: 83
End: 2018-11-28

## 2018-11-28 RX ORDER — WARFARIN SODIUM 2 MG/1
5 TABLET ORAL
Status: SHIPPED | COMMUNITY
Start: 2018-11-28 | End: 2022-01-01

## 2018-11-29 ENCOUNTER — RECORDS - HEALTHEAST (OUTPATIENT)
Dept: LAB | Facility: CLINIC | Age: 83
End: 2018-11-29

## 2018-11-29 ENCOUNTER — OFFICE VISIT - HEALTHEAST (OUTPATIENT)
Dept: VASCULAR SURGERY | Facility: CLINIC | Age: 83
End: 2018-11-29

## 2018-11-29 DIAGNOSIS — I89.0 LYMPHEDEMA: ICD-10-CM

## 2018-11-29 DIAGNOSIS — I87.2 VENOUS STASIS DERMATITIS OF BOTH LOWER EXTREMITIES: ICD-10-CM

## 2018-11-29 DIAGNOSIS — D64.9 ANEMIA, UNSPECIFIED TYPE: ICD-10-CM

## 2018-11-29 LAB — BNP SERPL-MCNC: 389 PG/ML (ref 0–93)

## 2018-11-29 ASSESSMENT — MIFFLIN-ST. JEOR: SCORE: 1734.01

## 2018-11-30 ENCOUNTER — AMBULATORY - HEALTHEAST (OUTPATIENT)
Dept: VASCULAR SURGERY | Facility: CLINIC | Age: 83
End: 2018-11-30

## 2018-11-30 DIAGNOSIS — A49.8 PSEUDOMONAS AERUGINOSA INFECTION: ICD-10-CM

## 2018-11-30 DIAGNOSIS — I89.0 LYMPHEDEMA: ICD-10-CM

## 2018-11-30 DIAGNOSIS — I87.2 VENOUS STASIS DERMATITIS OF BOTH LOWER EXTREMITIES: ICD-10-CM

## 2018-11-30 DIAGNOSIS — L97.921 ULCER OF LEFT LOWER EXTREMITY, LIMITED TO BREAKDOWN OF SKIN (H): ICD-10-CM

## 2018-11-30 ASSESSMENT — MIFFLIN-ST. JEOR: SCORE: 1734.01

## 2018-12-04 ENCOUNTER — AMBULATORY - HEALTHEAST (OUTPATIENT)
Dept: VASCULAR SURGERY | Facility: CLINIC | Age: 83
End: 2018-12-04

## 2018-12-06 ENCOUNTER — OFFICE VISIT - HEALTHEAST (OUTPATIENT)
Dept: VASCULAR SURGERY | Facility: CLINIC | Age: 83
End: 2018-12-06

## 2018-12-06 DIAGNOSIS — I87.2 VENOUS STASIS DERMATITIS OF BOTH LOWER EXTREMITIES: ICD-10-CM

## 2018-12-06 DIAGNOSIS — I89.0 LYMPHEDEMA: ICD-10-CM

## 2018-12-06 DIAGNOSIS — L97.921 ULCER OF LEFT LOWER EXTREMITY, LIMITED TO BREAKDOWN OF SKIN (H): ICD-10-CM

## 2018-12-13 ENCOUNTER — RECORDS - HEALTHEAST (OUTPATIENT)
Dept: ADMINISTRATIVE | Facility: OTHER | Age: 83
End: 2018-12-13

## 2018-12-13 ENCOUNTER — RECORDS - HEALTHEAST (OUTPATIENT)
Dept: LAB | Facility: CLINIC | Age: 83
End: 2018-12-13

## 2018-12-13 ENCOUNTER — OFFICE VISIT - HEALTHEAST (OUTPATIENT)
Dept: VASCULAR SURGERY | Facility: CLINIC | Age: 83
End: 2018-12-13

## 2018-12-13 DIAGNOSIS — I87.2 VENOUS STASIS DERMATITIS OF BOTH LOWER EXTREMITIES: ICD-10-CM

## 2018-12-13 DIAGNOSIS — I89.0 LYMPHEDEMA: ICD-10-CM

## 2018-12-13 DIAGNOSIS — E11.8 TYPE 2 DIABETES MELLITUS WITH COMPLICATION, UNSPECIFIED WHETHER LONG TERM INSULIN USE: ICD-10-CM

## 2018-12-13 DIAGNOSIS — L97.921 ULCER OF LEFT LOWER EXTREMITY, LIMITED TO BREAKDOWN OF SKIN (H): ICD-10-CM

## 2018-12-13 LAB — BNP SERPL-MCNC: 308 PG/ML (ref 0–93)

## 2018-12-19 ENCOUNTER — RECORDS - HEALTHEAST (OUTPATIENT)
Dept: LAB | Facility: CLINIC | Age: 83
End: 2018-12-19

## 2018-12-19 LAB
ANION GAP SERPL CALCULATED.3IONS-SCNC: 8 MMOL/L (ref 5–18)
BUN SERPL-MCNC: 21 MG/DL (ref 8–28)
CALCIUM SERPL-MCNC: 9.2 MG/DL (ref 8.5–10.5)
CHLORIDE BLD-SCNC: 104 MMOL/L (ref 98–107)
CO2 SERPL-SCNC: 29 MMOL/L (ref 22–31)
CREAT SERPL-MCNC: 1.08 MG/DL (ref 0.7–1.3)
GFR SERPL CREATININE-BSD FRML MDRD: >60 ML/MIN/1.73M2
GLUCOSE BLD-MCNC: 195 MG/DL (ref 70–125)
POTASSIUM BLD-SCNC: 4.6 MMOL/L (ref 3.5–5)
SODIUM SERPL-SCNC: 141 MMOL/L (ref 136–145)

## 2018-12-20 ENCOUNTER — AMBULATORY - HEALTHEAST (OUTPATIENT)
Dept: VASCULAR SURGERY | Facility: CLINIC | Age: 83
End: 2018-12-20

## 2018-12-20 DIAGNOSIS — I89.0 LYMPHEDEMA: ICD-10-CM

## 2018-12-20 DIAGNOSIS — I87.2 VENOUS STASIS DERMATITIS OF BOTH LOWER EXTREMITIES: ICD-10-CM

## 2018-12-20 DIAGNOSIS — L97.921 ULCER OF LEFT LOWER EXTREMITY, LIMITED TO BREAKDOWN OF SKIN (H): ICD-10-CM

## 2018-12-20 ASSESSMENT — MIFFLIN-ST. JEOR: SCORE: 1720.4

## 2018-12-27 ENCOUNTER — AMBULATORY - HEALTHEAST (OUTPATIENT)
Dept: VASCULAR SURGERY | Facility: CLINIC | Age: 83
End: 2018-12-27

## 2018-12-27 DIAGNOSIS — I89.0 LYMPHEDEMA: ICD-10-CM

## 2018-12-27 DIAGNOSIS — L97.921 ULCER OF LEFT LOWER EXTREMITY, LIMITED TO BREAKDOWN OF SKIN (H): ICD-10-CM

## 2019-01-03 ENCOUNTER — AMBULATORY - HEALTHEAST (OUTPATIENT)
Dept: VASCULAR SURGERY | Facility: CLINIC | Age: 84
End: 2019-01-03

## 2019-01-10 ENCOUNTER — OFFICE VISIT - HEALTHEAST (OUTPATIENT)
Dept: VASCULAR SURGERY | Facility: CLINIC | Age: 84
End: 2019-01-10

## 2019-01-10 DIAGNOSIS — E11.8 TYPE 2 DIABETES MELLITUS WITH COMPLICATION, UNSPECIFIED WHETHER LONG TERM INSULIN USE: ICD-10-CM

## 2019-01-10 DIAGNOSIS — I87.2 VENOUS STASIS DERMATITIS OF BOTH LOWER EXTREMITIES: ICD-10-CM

## 2019-01-10 DIAGNOSIS — L97.921 ULCER OF LEFT LOWER EXTREMITY, LIMITED TO BREAKDOWN OF SKIN (H): ICD-10-CM

## 2019-01-10 DIAGNOSIS — I89.0 LYMPHEDEMA: ICD-10-CM

## 2019-01-16 ENCOUNTER — RECORDS - HEALTHEAST (OUTPATIENT)
Dept: LAB | Facility: CLINIC | Age: 84
End: 2019-01-16

## 2019-01-16 LAB
ANION GAP SERPL CALCULATED.3IONS-SCNC: 9 MMOL/L (ref 5–18)
BUN SERPL-MCNC: 25 MG/DL (ref 8–28)
CALCIUM SERPL-MCNC: 9.4 MG/DL (ref 8.5–10.5)
CHLORIDE BLD-SCNC: 102 MMOL/L (ref 98–107)
CO2 SERPL-SCNC: 30 MMOL/L (ref 22–31)
CREAT SERPL-MCNC: 1.24 MG/DL (ref 0.7–1.3)
GFR SERPL CREATININE-BSD FRML MDRD: 55 ML/MIN/1.73M2
GLUCOSE BLD-MCNC: 210 MG/DL (ref 70–125)
POTASSIUM BLD-SCNC: 5.1 MMOL/L (ref 3.5–5)
SODIUM SERPL-SCNC: 141 MMOL/L (ref 136–145)

## 2019-02-14 ENCOUNTER — RECORDS - HEALTHEAST (OUTPATIENT)
Dept: LAB | Facility: CLINIC | Age: 84
End: 2019-02-14

## 2019-02-14 LAB — BNP SERPL-MCNC: 309 PG/ML (ref 0–93)

## 2019-03-06 ENCOUNTER — RECORDS - HEALTHEAST (OUTPATIENT)
Dept: ADMINISTRATIVE | Facility: OTHER | Age: 84
End: 2019-03-06

## 2019-03-08 ENCOUNTER — OFFICE VISIT - HEALTHEAST (OUTPATIENT)
Dept: VASCULAR SURGERY | Facility: CLINIC | Age: 84
End: 2019-03-08

## 2019-03-08 DIAGNOSIS — D64.9 ANEMIA, UNSPECIFIED TYPE: ICD-10-CM

## 2019-03-08 DIAGNOSIS — E11.8 TYPE 2 DIABETES MELLITUS WITH COMPLICATION, UNSPECIFIED WHETHER LONG TERM INSULIN USE: ICD-10-CM

## 2019-03-08 DIAGNOSIS — I87.2 VENOUS STASIS DERMATITIS OF BOTH LOWER EXTREMITIES: ICD-10-CM

## 2019-03-08 DIAGNOSIS — I87.303 VENOUS HYPERTENSION, CHRONIC, BILATERAL: ICD-10-CM

## 2019-03-08 DIAGNOSIS — L97.911 LEG ULCER, RIGHT, LIMITED TO BREAKDOWN OF SKIN (H): ICD-10-CM

## 2019-03-08 DIAGNOSIS — L97.921 ULCER OF LEFT LOWER EXTREMITY, LIMITED TO BREAKDOWN OF SKIN (H): ICD-10-CM

## 2019-03-08 DIAGNOSIS — L98.491 CHRONIC SKIN ULCER, LIMITED TO BREAKDOWN OF SKIN (H): ICD-10-CM

## 2019-03-08 DIAGNOSIS — I89.0 LYMPHEDEMA: ICD-10-CM

## 2019-03-08 ASSESSMENT — MIFFLIN-ST. JEOR: SCORE: 1711.33

## 2019-03-12 ENCOUNTER — AMBULATORY - HEALTHEAST (OUTPATIENT)
Dept: VASCULAR SURGERY | Facility: CLINIC | Age: 84
End: 2019-03-12

## 2019-03-15 ENCOUNTER — RECORDS - HEALTHEAST (OUTPATIENT)
Dept: ADMINISTRATIVE | Facility: OTHER | Age: 84
End: 2019-03-15

## 2019-03-15 ENCOUNTER — AMBULATORY - HEALTHEAST (OUTPATIENT)
Dept: VASCULAR SURGERY | Facility: CLINIC | Age: 84
End: 2019-03-15

## 2019-03-15 ASSESSMENT — MIFFLIN-ST. JEOR: SCORE: 1711.33

## 2019-03-18 ENCOUNTER — AMBULATORY - HEALTHEAST (OUTPATIENT)
Dept: VASCULAR SURGERY | Facility: CLINIC | Age: 84
End: 2019-03-18

## 2019-03-18 DIAGNOSIS — L97.921 ULCER OF LEFT LOWER EXTREMITY, LIMITED TO BREAKDOWN OF SKIN (H): ICD-10-CM

## 2019-03-18 DIAGNOSIS — L97.911 LEG ULCER, RIGHT, LIMITED TO BREAKDOWN OF SKIN (H): ICD-10-CM

## 2019-03-18 DIAGNOSIS — I87.2 VENOUS STASIS DERMATITIS OF BOTH LOWER EXTREMITIES: ICD-10-CM

## 2019-03-22 ENCOUNTER — OFFICE VISIT - HEALTHEAST (OUTPATIENT)
Dept: VASCULAR SURGERY | Facility: CLINIC | Age: 84
End: 2019-03-22

## 2019-03-22 DIAGNOSIS — E11.8 TYPE 2 DIABETES MELLITUS WITH COMPLICATION, UNSPECIFIED WHETHER LONG TERM INSULIN USE: ICD-10-CM

## 2019-03-22 DIAGNOSIS — L97.911 LEG ULCER, RIGHT, LIMITED TO BREAKDOWN OF SKIN (H): ICD-10-CM

## 2019-03-22 DIAGNOSIS — I89.0 LYMPHEDEMA: ICD-10-CM

## 2019-03-22 DIAGNOSIS — I87.2 VENOUS STASIS DERMATITIS OF BOTH LOWER EXTREMITIES: ICD-10-CM

## 2019-03-22 DIAGNOSIS — D64.9 ANEMIA, UNSPECIFIED TYPE: ICD-10-CM

## 2019-03-22 ASSESSMENT — MIFFLIN-ST. JEOR: SCORE: 1711.33

## 2019-03-26 ENCOUNTER — AMBULATORY - HEALTHEAST (OUTPATIENT)
Dept: VASCULAR SURGERY | Facility: CLINIC | Age: 84
End: 2019-03-26

## 2019-03-26 DIAGNOSIS — I87.2 VENOUS STASIS DERMATITIS OF BOTH LOWER EXTREMITIES: ICD-10-CM

## 2019-03-26 DIAGNOSIS — L97.911 LEG ULCER, RIGHT, LIMITED TO BREAKDOWN OF SKIN (H): ICD-10-CM

## 2019-03-29 ENCOUNTER — AMBULATORY - HEALTHEAST (OUTPATIENT)
Dept: VASCULAR SURGERY | Facility: CLINIC | Age: 84
End: 2019-03-29

## 2019-03-29 DIAGNOSIS — I87.303 VENOUS HYPERTENSION, CHRONIC, BILATERAL: ICD-10-CM

## 2019-03-29 DIAGNOSIS — I87.2 VENOUS STASIS DERMATITIS OF BOTH LOWER EXTREMITIES: ICD-10-CM

## 2019-03-29 RX ORDER — METOLAZONE 5 MG/1
5 TABLET ORAL DAILY
Refills: 0 | Status: SHIPPED | COMMUNITY
Start: 2019-03-13 | End: 2022-01-24

## 2019-03-29 ASSESSMENT — MIFFLIN-ST. JEOR: SCORE: 1647.83

## 2019-04-03 ENCOUNTER — RECORDS - HEALTHEAST (OUTPATIENT)
Dept: LAB | Facility: CLINIC | Age: 84
End: 2019-04-03

## 2019-04-03 LAB
ANION GAP SERPL CALCULATED.3IONS-SCNC: 10 MMOL/L (ref 5–18)
BUN SERPL-MCNC: 40 MG/DL (ref 8–28)
CALCIUM SERPL-MCNC: 8.9 MG/DL (ref 8.5–10.5)
CHLORIDE BLD-SCNC: 95 MMOL/L (ref 98–107)
CO2 SERPL-SCNC: 35 MMOL/L (ref 22–31)
CREAT SERPL-MCNC: 1.19 MG/DL (ref 0.7–1.3)
GFR SERPL CREATININE-BSD FRML MDRD: 58 ML/MIN/1.73M2
GLUCOSE BLD-MCNC: 183 MG/DL (ref 70–125)
POTASSIUM BLD-SCNC: 4 MMOL/L (ref 3.5–5)
SODIUM SERPL-SCNC: 140 MMOL/L (ref 136–145)

## 2019-04-12 ENCOUNTER — OFFICE VISIT - HEALTHEAST (OUTPATIENT)
Dept: VASCULAR SURGERY | Facility: CLINIC | Age: 84
End: 2019-04-12

## 2019-04-12 DIAGNOSIS — I87.303 VENOUS HYPERTENSION, CHRONIC, BILATERAL: ICD-10-CM

## 2019-04-12 DIAGNOSIS — I87.2 VENOUS STASIS DERMATITIS OF BOTH LOWER EXTREMITIES: ICD-10-CM

## 2019-04-12 DIAGNOSIS — B35.1 NAIL FUNGUS: ICD-10-CM

## 2019-04-12 DIAGNOSIS — E11.8 TYPE 2 DIABETES MELLITUS WITH COMPLICATION, UNSPECIFIED WHETHER LONG TERM INSULIN USE: ICD-10-CM

## 2019-04-12 DIAGNOSIS — B36.9 FUNGAL INFECTION OF SKIN: ICD-10-CM

## 2019-04-12 DIAGNOSIS — L97.911 LEG ULCER, RIGHT, LIMITED TO BREAKDOWN OF SKIN (H): ICD-10-CM

## 2019-04-12 DIAGNOSIS — D64.9 ANEMIA, UNSPECIFIED TYPE: ICD-10-CM

## 2019-04-12 DIAGNOSIS — G60.9 IDIOPATHIC PERIPHERAL NEUROPATHY: ICD-10-CM

## 2019-04-12 DIAGNOSIS — I89.0 LYMPHEDEMA: ICD-10-CM

## 2019-04-12 RX ORDER — NYSTATIN 100000 U/G
CREAM TOPICAL
Qty: 60 G | Refills: 1 | Status: SHIPPED | OUTPATIENT
Start: 2019-04-12 | End: 2022-01-01

## 2019-04-12 ASSESSMENT — MIFFLIN-ST. JEOR: SCORE: 1647.83

## 2019-04-24 ENCOUNTER — RECORDS - HEALTHEAST (OUTPATIENT)
Dept: LAB | Facility: CLINIC | Age: 84
End: 2019-04-24

## 2019-04-24 LAB
ANION GAP SERPL CALCULATED.3IONS-SCNC: 12 MMOL/L (ref 5–18)
BUN SERPL-MCNC: 22 MG/DL (ref 8–28)
CALCIUM SERPL-MCNC: 9.6 MG/DL (ref 8.5–10.5)
CHLORIDE BLD-SCNC: 100 MMOL/L (ref 98–107)
CO2 SERPL-SCNC: 29 MMOL/L (ref 22–31)
CREAT SERPL-MCNC: 1.07 MG/DL (ref 0.7–1.3)
GFR SERPL CREATININE-BSD FRML MDRD: >60 ML/MIN/1.73M2
GLUCOSE BLD-MCNC: 95 MG/DL (ref 70–125)
POTASSIUM BLD-SCNC: 3.5 MMOL/L (ref 3.5–5)
SODIUM SERPL-SCNC: 141 MMOL/L (ref 136–145)

## 2019-05-14 ENCOUNTER — OFFICE VISIT - HEALTHEAST (OUTPATIENT)
Dept: VASCULAR SURGERY | Facility: CLINIC | Age: 84
End: 2019-05-14

## 2019-05-14 DIAGNOSIS — I89.0 LYMPHEDEMA: ICD-10-CM

## 2019-05-14 DIAGNOSIS — I87.2 VENOUS STASIS DERMATITIS OF BOTH LOWER EXTREMITIES: ICD-10-CM

## 2019-05-14 DIAGNOSIS — I87.303 VENOUS HYPERTENSION, CHRONIC, BILATERAL: ICD-10-CM

## 2019-05-14 DIAGNOSIS — E11.8 TYPE 2 DIABETES MELLITUS WITH COMPLICATION, UNSPECIFIED WHETHER LONG TERM INSULIN USE: ICD-10-CM

## 2019-05-14 ASSESSMENT — MIFFLIN-ST. JEOR: SCORE: 1647.83

## 2019-05-21 ENCOUNTER — AMBULATORY - HEALTHEAST (OUTPATIENT)
Dept: VASCULAR SURGERY | Facility: CLINIC | Age: 84
End: 2019-05-21

## 2019-05-21 DIAGNOSIS — I87.303 VENOUS HYPERTENSION, CHRONIC, BILATERAL: ICD-10-CM

## 2019-05-29 ENCOUNTER — AMBULATORY - HEALTHEAST (OUTPATIENT)
Dept: VASCULAR SURGERY | Facility: CLINIC | Age: 84
End: 2019-05-29

## 2019-06-05 ENCOUNTER — AMBULATORY - HEALTHEAST (OUTPATIENT)
Dept: VASCULAR SURGERY | Facility: CLINIC | Age: 84
End: 2019-06-05

## 2019-06-12 ENCOUNTER — OFFICE VISIT - HEALTHEAST (OUTPATIENT)
Dept: VASCULAR SURGERY | Facility: CLINIC | Age: 84
End: 2019-06-12

## 2019-06-12 DIAGNOSIS — E11.8 TYPE 2 DIABETES MELLITUS WITH COMPLICATION, UNSPECIFIED WHETHER LONG TERM INSULIN USE: ICD-10-CM

## 2019-06-12 DIAGNOSIS — L97.921 ULCER OF LEFT LOWER EXTREMITY, LIMITED TO BREAKDOWN OF SKIN (H): ICD-10-CM

## 2019-06-12 DIAGNOSIS — L97.911 LEG ULCER, RIGHT, LIMITED TO BREAKDOWN OF SKIN (H): ICD-10-CM

## 2019-06-12 DIAGNOSIS — I87.2 VENOUS STASIS DERMATITIS OF BOTH LOWER EXTREMITIES: ICD-10-CM

## 2019-06-12 DIAGNOSIS — I89.0 LYMPHEDEMA: ICD-10-CM

## 2019-06-12 DIAGNOSIS — I87.303 VENOUS HYPERTENSION, CHRONIC, BILATERAL: ICD-10-CM

## 2019-06-12 DIAGNOSIS — G60.9 IDIOPATHIC PERIPHERAL NEUROPATHY: ICD-10-CM

## 2019-06-12 ASSESSMENT — MIFFLIN-ST. JEOR: SCORE: 1744.44

## 2019-07-16 ENCOUNTER — AMBULATORY - HEALTHEAST (OUTPATIENT)
Dept: VASCULAR SURGERY | Facility: CLINIC | Age: 84
End: 2019-07-16

## 2019-07-16 DIAGNOSIS — I87.303 VENOUS HYPERTENSION, CHRONIC, BILATERAL: ICD-10-CM

## 2019-07-16 DIAGNOSIS — L97.911 ULCER OF RIGHT LOWER EXTREMITY, LIMITED TO BREAKDOWN OF SKIN (H): ICD-10-CM

## 2019-07-17 ENCOUNTER — AMBULATORY - HEALTHEAST (OUTPATIENT)
Dept: VASCULAR SURGERY | Facility: CLINIC | Age: 84
End: 2019-07-17

## 2019-07-17 DIAGNOSIS — L97.911 ULCER OF RIGHT LOWER EXTREMITY, LIMITED TO BREAKDOWN OF SKIN (H): ICD-10-CM

## 2019-07-24 ENCOUNTER — AMBULATORY - HEALTHEAST (OUTPATIENT)
Dept: VASCULAR SURGERY | Facility: CLINIC | Age: 84
End: 2019-07-24

## 2019-07-24 ENCOUNTER — RECORDS - HEALTHEAST (OUTPATIENT)
Dept: ADMINISTRATIVE | Facility: OTHER | Age: 84
End: 2019-07-24

## 2019-07-24 DIAGNOSIS — L97.911 ULCER OF RIGHT LOWER EXTREMITY, LIMITED TO BREAKDOWN OF SKIN (H): ICD-10-CM

## 2019-08-08 ENCOUNTER — OFFICE VISIT - HEALTHEAST (OUTPATIENT)
Dept: VASCULAR SURGERY | Facility: CLINIC | Age: 84
End: 2019-08-08

## 2019-08-08 DIAGNOSIS — I87.2 VENOUS STASIS DERMATITIS OF BOTH LOWER EXTREMITIES: ICD-10-CM

## 2019-08-08 DIAGNOSIS — E11.8 TYPE 2 DIABETES MELLITUS WITH COMPLICATION, UNSPECIFIED WHETHER LONG TERM INSULIN USE: ICD-10-CM

## 2019-08-08 DIAGNOSIS — I87.303 VENOUS HYPERTENSION, CHRONIC, BILATERAL: ICD-10-CM

## 2019-08-08 DIAGNOSIS — G60.9 IDIOPATHIC PERIPHERAL NEUROPATHY: ICD-10-CM

## 2019-08-08 DIAGNOSIS — B35.1 ONYCHOMYCOSIS: ICD-10-CM

## 2019-08-08 DIAGNOSIS — I89.0 LYMPHEDEMA: ICD-10-CM

## 2019-08-08 ASSESSMENT — MIFFLIN-ST. JEOR: SCORE: 1729.93

## 2019-09-25 ENCOUNTER — AMBULATORY - HEALTHEAST (OUTPATIENT)
Dept: VASCULAR SURGERY | Facility: CLINIC | Age: 84
End: 2019-09-25

## 2019-09-25 DIAGNOSIS — I87.2 VENOUS STASIS DERMATITIS OF BOTH LOWER EXTREMITIES: ICD-10-CM

## 2019-09-25 DIAGNOSIS — L97.911 ULCER OF RIGHT LOWER EXTREMITY, LIMITED TO BREAKDOWN OF SKIN (H): ICD-10-CM

## 2019-09-25 DIAGNOSIS — I89.0 LYMPHEDEMA: ICD-10-CM

## 2019-09-25 ASSESSMENT — MIFFLIN-ST. JEOR: SCORE: 1740.81

## 2019-09-27 ENCOUNTER — AMBULATORY - HEALTHEAST (OUTPATIENT)
Dept: VASCULAR SURGERY | Facility: CLINIC | Age: 84
End: 2019-09-27

## 2019-09-27 DIAGNOSIS — L97.911 ULCER OF RIGHT LOWER EXTREMITY, LIMITED TO BREAKDOWN OF SKIN (H): ICD-10-CM

## 2019-10-01 ENCOUNTER — AMBULATORY - HEALTHEAST (OUTPATIENT)
Dept: VASCULAR SURGERY | Facility: CLINIC | Age: 84
End: 2019-10-01

## 2019-10-01 DIAGNOSIS — L97.921 ULCER OF LEFT LOWER EXTREMITY, LIMITED TO BREAKDOWN OF SKIN (H): ICD-10-CM

## 2019-10-01 DIAGNOSIS — I87.2 VENOUS STASIS DERMATITIS OF BOTH LOWER EXTREMITIES: ICD-10-CM

## 2019-10-01 DIAGNOSIS — L97.911 ULCER OF RIGHT LOWER EXTREMITY, LIMITED TO BREAKDOWN OF SKIN (H): ICD-10-CM

## 2019-10-04 ENCOUNTER — AMBULATORY - HEALTHEAST (OUTPATIENT)
Dept: VASCULAR SURGERY | Facility: CLINIC | Age: 84
End: 2019-10-04

## 2019-10-04 DIAGNOSIS — I87.2 VENOUS STASIS DERMATITIS OF BOTH LOWER EXTREMITIES: ICD-10-CM

## 2019-10-04 DIAGNOSIS — I87.303 VENOUS HYPERTENSION, CHRONIC, BILATERAL: ICD-10-CM

## 2019-10-04 ASSESSMENT — MIFFLIN-ST. JEOR: SCORE: 1738.55

## 2019-10-08 ENCOUNTER — OFFICE VISIT - HEALTHEAST (OUTPATIENT)
Dept: VASCULAR SURGERY | Facility: CLINIC | Age: 84
End: 2019-10-08

## 2019-10-08 DIAGNOSIS — L97.911 ULCER OF RIGHT LOWER EXTREMITY, LIMITED TO BREAKDOWN OF SKIN (H): ICD-10-CM

## 2019-10-08 DIAGNOSIS — I87.303 VENOUS HYPERTENSION, CHRONIC, BILATERAL: ICD-10-CM

## 2019-10-08 DIAGNOSIS — L97.921 ULCER OF LEFT LOWER EXTREMITY, LIMITED TO BREAKDOWN OF SKIN (H): ICD-10-CM

## 2019-10-08 DIAGNOSIS — B35.1 ONYCHOMYCOSIS: ICD-10-CM

## 2019-10-08 DIAGNOSIS — I89.0 LYMPHEDEMA: ICD-10-CM

## 2019-10-08 DIAGNOSIS — I87.2 VENOUS STASIS DERMATITIS OF BOTH LOWER EXTREMITIES: ICD-10-CM

## 2019-10-08 DIAGNOSIS — G60.9 IDIOPATHIC PERIPHERAL NEUROPATHY: ICD-10-CM

## 2019-10-08 ASSESSMENT — MIFFLIN-ST. JEOR: SCORE: 1745.8

## 2019-10-15 ENCOUNTER — COMMUNICATION - HEALTHEAST (OUTPATIENT)
Dept: VASCULAR SURGERY | Facility: CLINIC | Age: 84
End: 2019-10-15

## 2019-10-16 ENCOUNTER — AMBULATORY - HEALTHEAST (OUTPATIENT)
Dept: VASCULAR SURGERY | Facility: CLINIC | Age: 84
End: 2019-10-16

## 2019-10-16 DIAGNOSIS — L97.911 ULCER OF RIGHT LOWER EXTREMITY, LIMITED TO BREAKDOWN OF SKIN (H): ICD-10-CM

## 2019-10-16 DIAGNOSIS — I87.2 VENOUS STASIS DERMATITIS OF BOTH LOWER EXTREMITIES: ICD-10-CM

## 2019-10-23 ENCOUNTER — AMBULATORY - HEALTHEAST (OUTPATIENT)
Dept: VASCULAR SURGERY | Facility: CLINIC | Age: 84
End: 2019-10-23

## 2019-10-23 DIAGNOSIS — I89.0 LYMPHEDEMA: ICD-10-CM

## 2019-10-23 DIAGNOSIS — L97.911 ULCER OF RIGHT LOWER EXTREMITY, LIMITED TO BREAKDOWN OF SKIN (H): ICD-10-CM

## 2019-10-29 ENCOUNTER — RECORDS - HEALTHEAST (OUTPATIENT)
Dept: LAB | Facility: CLINIC | Age: 84
End: 2019-10-29

## 2019-10-29 LAB
ANION GAP SERPL CALCULATED.3IONS-SCNC: 13 MMOL/L (ref 5–18)
BUN SERPL-MCNC: 34 MG/DL (ref 8–28)
CALCIUM SERPL-MCNC: 9.4 MG/DL (ref 8.5–10.5)
CHLORIDE BLD-SCNC: 96 MMOL/L (ref 98–107)
CHOLEST SERPL-MCNC: 105 MG/DL
CHOLEST SERPL-MCNC: 105 MG/DL
CO2 SERPL-SCNC: 30 MMOL/L (ref 22–31)
CREAT SERPL-MCNC: 1.4 MG/DL (ref 0.7–1.3)
FASTING STATUS PATIENT QL REPORTED: NORMAL
FASTING STATUS PATIENT QL REPORTED: NORMAL
GFR SERPL CREATININE-BSD FRML MDRD: 48 ML/MIN/1.73M2
GLUCOSE BLD-MCNC: 194 MG/DL (ref 70–125)
HDLC SERPL-MCNC: 46 MG/DL
HDLC SERPL-MCNC: 46 MG/DL
LDLC SERPL CALC-MCNC: 46 MG/DL
LDLC SERPL CALC-MCNC: 46 MG/DL
POTASSIUM BLD-SCNC: 3.9 MMOL/L (ref 3.5–5)
SODIUM SERPL-SCNC: 139 MMOL/L (ref 136–145)
TRIGL SERPL-MCNC: 63 MG/DL
TRIGL SERPL-MCNC: 63 MG/DL

## 2019-11-20 ENCOUNTER — OFFICE VISIT - HEALTHEAST (OUTPATIENT)
Dept: VASCULAR SURGERY | Facility: CLINIC | Age: 84
End: 2019-11-20

## 2019-11-20 ENCOUNTER — COMMUNICATION - HEALTHEAST (OUTPATIENT)
Dept: VASCULAR SURGERY | Facility: CLINIC | Age: 84
End: 2019-11-20

## 2019-11-20 DIAGNOSIS — I89.0 ACQUIRED LYMPHEDEMA OF LEG: ICD-10-CM

## 2019-11-20 DIAGNOSIS — M79.89 SWELLING OF LIMB: ICD-10-CM

## 2019-11-20 DIAGNOSIS — I73.9 PAD (PERIPHERAL ARTERY DISEASE) (H): ICD-10-CM

## 2019-11-20 DIAGNOSIS — I87.2 VENOUS STASIS DERMATITIS OF BOTH LOWER EXTREMITIES: ICD-10-CM

## 2019-11-20 ASSESSMENT — MIFFLIN-ST. JEOR: SCORE: 1764.85

## 2019-11-22 ENCOUNTER — AMBULATORY - HEALTHEAST (OUTPATIENT)
Dept: VASCULAR SURGERY | Facility: CLINIC | Age: 84
End: 2019-11-22

## 2019-11-22 ENCOUNTER — COMMUNICATION - HEALTHEAST (OUTPATIENT)
Dept: VASCULAR SURGERY | Facility: CLINIC | Age: 84
End: 2019-11-22

## 2019-11-22 RX ORDER — METOLAZONE 5 MG/1
2.5 TABLET ORAL
Status: ON HOLD | COMMUNITY
Start: 2019-11-20 | End: 2022-01-01

## 2019-11-26 ENCOUNTER — OFFICE VISIT - HEALTHEAST (OUTPATIENT)
Dept: VASCULAR SURGERY | Facility: CLINIC | Age: 84
End: 2019-11-26

## 2019-11-26 DIAGNOSIS — I89.0 ACQUIRED LYMPHEDEMA OF LEG: ICD-10-CM

## 2019-11-26 DIAGNOSIS — L97.911 ULCER OF RIGHT LOWER EXTREMITY, LIMITED TO BREAKDOWN OF SKIN (H): ICD-10-CM

## 2019-11-26 DIAGNOSIS — I73.9 PAD (PERIPHERAL ARTERY DISEASE) (H): ICD-10-CM

## 2019-11-26 DIAGNOSIS — I87.2 VENOUS STASIS DERMATITIS OF BOTH LOWER EXTREMITIES: ICD-10-CM

## 2019-11-26 DIAGNOSIS — I87.303 VENOUS HYPERTENSION, CHRONIC, BILATERAL: ICD-10-CM

## 2019-11-26 DIAGNOSIS — L97.921 ULCER OF LEFT LOWER EXTREMITY, LIMITED TO BREAKDOWN OF SKIN (H): ICD-10-CM

## 2019-11-26 DIAGNOSIS — I89.0 LYMPHEDEMA: ICD-10-CM

## 2019-11-29 ENCOUNTER — AMBULATORY - HEALTHEAST (OUTPATIENT)
Dept: VASCULAR SURGERY | Facility: CLINIC | Age: 84
End: 2019-11-29

## 2019-11-29 DIAGNOSIS — L97.911 ULCER OF RIGHT LOWER EXTREMITY, LIMITED TO BREAKDOWN OF SKIN (H): ICD-10-CM

## 2019-11-29 DIAGNOSIS — L97.921 ULCER OF LEFT LOWER EXTREMITY, LIMITED TO BREAKDOWN OF SKIN (H): ICD-10-CM

## 2019-11-29 DIAGNOSIS — I87.2 VENOUS STASIS DERMATITIS OF BOTH LOWER EXTREMITIES: ICD-10-CM

## 2019-12-03 ENCOUNTER — OFFICE VISIT - HEALTHEAST (OUTPATIENT)
Dept: VASCULAR SURGERY | Facility: CLINIC | Age: 84
End: 2019-12-03

## 2019-12-03 DIAGNOSIS — E11.42 DIABETIC PERIPHERAL NEUROPATHY ASSOCIATED WITH TYPE 2 DIABETES MELLITUS (H): ICD-10-CM

## 2019-12-03 DIAGNOSIS — L90.5 SCAR CONDITION AND FIBROSIS OF SKIN: ICD-10-CM

## 2019-12-03 DIAGNOSIS — I87.2 VENOUS STASIS DERMATITIS OF BOTH LOWER EXTREMITIES: ICD-10-CM

## 2019-12-03 DIAGNOSIS — I89.0 ACQUIRED LYMPHEDEMA OF LEG: ICD-10-CM

## 2019-12-03 DIAGNOSIS — M79.89 SWELLING OF LIMB: ICD-10-CM

## 2019-12-03 DIAGNOSIS — L97.909 ULCER OF LOWER EXTREMITY, UNSPECIFIED LATERALITY, UNSPECIFIED ULCER STAGE (H): ICD-10-CM

## 2019-12-03 RX ORDER — MULTIVIT-MIN/IRON FUM/FOLIC AC 7.5 MG-4
1 TABLET ORAL DAILY
Status: SHIPPED | COMMUNITY
Start: 2019-12-03

## 2020-04-30 ENCOUNTER — COMMUNICATION - HEALTHEAST (OUTPATIENT)
Dept: VASCULAR SURGERY | Facility: CLINIC | Age: 85
End: 2020-04-30

## 2020-05-01 ENCOUNTER — OFFICE VISIT - HEALTHEAST (OUTPATIENT)
Dept: VASCULAR SURGERY | Facility: CLINIC | Age: 85
End: 2020-05-01

## 2020-05-01 DIAGNOSIS — I87.2 VENOUS STASIS DERMATITIS OF BOTH LOWER EXTREMITIES: ICD-10-CM

## 2020-05-01 DIAGNOSIS — E11.42 DIABETIC PERIPHERAL NEUROPATHY ASSOCIATED WITH TYPE 2 DIABETES MELLITUS (H): ICD-10-CM

## 2020-05-01 DIAGNOSIS — I87.303 VENOUS HYPERTENSION, CHRONIC, BILATERAL: ICD-10-CM

## 2020-05-01 DIAGNOSIS — I89.0 ACQUIRED LYMPHEDEMA OF LEG: ICD-10-CM

## 2020-05-01 DIAGNOSIS — L97.921 ULCER OF LEFT LOWER EXTREMITY, LIMITED TO BREAKDOWN OF SKIN (H): ICD-10-CM

## 2020-05-01 DIAGNOSIS — L97.911 ULCER OF RIGHT LOWER EXTREMITY, LIMITED TO BREAKDOWN OF SKIN (H): ICD-10-CM

## 2020-05-01 DIAGNOSIS — M79.89 SWELLING OF LIMB: ICD-10-CM

## 2020-05-01 DIAGNOSIS — L90.5 SCAR CONDITION AND FIBROSIS OF SKIN: ICD-10-CM

## 2020-05-04 ENCOUNTER — COMMUNICATION - HEALTHEAST (OUTPATIENT)
Dept: VASCULAR SURGERY | Facility: CLINIC | Age: 85
End: 2020-05-04

## 2020-05-22 ENCOUNTER — COMMUNICATION - HEALTHEAST (OUTPATIENT)
Dept: VASCULAR SURGERY | Facility: CLINIC | Age: 85
End: 2020-05-22

## 2020-05-22 ENCOUNTER — OFFICE VISIT - HEALTHEAST (OUTPATIENT)
Dept: VASCULAR SURGERY | Facility: CLINIC | Age: 85
End: 2020-05-22

## 2020-05-22 DIAGNOSIS — M79.89 SWELLING OF LIMB: ICD-10-CM

## 2020-05-22 DIAGNOSIS — I87.303 VENOUS HYPERTENSION, CHRONIC, BILATERAL: ICD-10-CM

## 2020-05-22 DIAGNOSIS — L97.909 ULCER OF LOWER EXTREMITY, UNSPECIFIED LATERALITY, UNSPECIFIED ULCER STAGE (H): ICD-10-CM

## 2020-05-22 DIAGNOSIS — I87.2 VENOUS STASIS DERMATITIS OF BOTH LOWER EXTREMITIES: ICD-10-CM

## 2020-05-22 DIAGNOSIS — L90.5 SCAR CONDITION AND FIBROSIS OF SKIN: ICD-10-CM

## 2020-05-22 DIAGNOSIS — E11.42 DIABETIC PERIPHERAL NEUROPATHY ASSOCIATED WITH TYPE 2 DIABETES MELLITUS (H): ICD-10-CM

## 2020-05-22 DIAGNOSIS — I89.0 ACQUIRED LYMPHEDEMA OF LEG: ICD-10-CM

## 2020-05-22 DIAGNOSIS — L97.911 ULCER OF RIGHT LOWER EXTREMITY, LIMITED TO BREAKDOWN OF SKIN (H): ICD-10-CM

## 2020-05-22 DIAGNOSIS — L97.921 ULCER OF LEFT LOWER EXTREMITY, LIMITED TO BREAKDOWN OF SKIN (H): ICD-10-CM

## 2020-06-01 ENCOUNTER — COMMUNICATION - HEALTHEAST (OUTPATIENT)
Dept: CARE COORDINATION | Facility: CLINIC | Age: 85
End: 2020-06-01

## 2020-11-23 ENCOUNTER — RECORDS - HEALTHEAST (OUTPATIENT)
Dept: LAB | Facility: CLINIC | Age: 85
End: 2020-11-23

## 2020-11-23 LAB
ANION GAP SERPL CALCULATED.3IONS-SCNC: 10 MMOL/L (ref 5–18)
BUN SERPL-MCNC: 22 MG/DL (ref 8–28)
CALCIUM SERPL-MCNC: 9.2 MG/DL (ref 8.5–10.5)
CHLORIDE BLD-SCNC: 104 MMOL/L (ref 98–107)
CHOLEST SERPL-MCNC: 94 MG/DL
CO2 SERPL-SCNC: 28 MMOL/L (ref 22–31)
CREAT SERPL-MCNC: 1.21 MG/DL (ref 0.7–1.3)
FASTING STATUS PATIENT QL REPORTED: NORMAL
GFR SERPL CREATININE-BSD FRML MDRD: 56 ML/MIN/1.73M2
GLUCOSE BLD-MCNC: 163 MG/DL (ref 70–125)
HDLC SERPL-MCNC: 44 MG/DL
LDLC SERPL CALC-MCNC: 37 MG/DL
POTASSIUM BLD-SCNC: 5.2 MMOL/L (ref 3.5–5)
SODIUM SERPL-SCNC: 142 MMOL/L (ref 136–145)
TRIGL SERPL-MCNC: 64 MG/DL

## 2020-11-30 ENCOUNTER — OFFICE VISIT - HEALTHEAST (OUTPATIENT)
Dept: VASCULAR SURGERY | Facility: CLINIC | Age: 85
End: 2020-11-30

## 2020-11-30 DIAGNOSIS — L97.909 ULCER OF LOWER EXTREMITY, UNSPECIFIED LATERALITY, UNSPECIFIED ULCER STAGE (H): ICD-10-CM

## 2020-11-30 DIAGNOSIS — I89.0 ACQUIRED LYMPHEDEMA OF LEG: ICD-10-CM

## 2020-11-30 DIAGNOSIS — M79.89 SWELLING OF LIMB: ICD-10-CM

## 2020-11-30 DIAGNOSIS — I87.2 VENOUS STASIS DERMATITIS OF BOTH LOWER EXTREMITIES: ICD-10-CM

## 2020-11-30 DIAGNOSIS — L90.5 SCAR CONDITION AND FIBROSIS OF SKIN: ICD-10-CM

## 2020-11-30 DIAGNOSIS — I87.303 VENOUS HYPERTENSION, CHRONIC, BILATERAL: ICD-10-CM

## 2020-11-30 DIAGNOSIS — L97.211 VENOUS STASIS ULCER OF RIGHT CALF LIMITED TO BREAKDOWN OF SKIN WITHOUT VARICOSE VEINS (H): ICD-10-CM

## 2020-11-30 DIAGNOSIS — L97.911 ULCER OF RIGHT LOWER EXTREMITY, LIMITED TO BREAKDOWN OF SKIN (H): ICD-10-CM

## 2020-11-30 DIAGNOSIS — L97.921 ULCER OF LEFT LOWER EXTREMITY, LIMITED TO BREAKDOWN OF SKIN (H): ICD-10-CM

## 2020-11-30 DIAGNOSIS — E11.42 DIABETIC PERIPHERAL NEUROPATHY ASSOCIATED WITH TYPE 2 DIABETES MELLITUS (H): ICD-10-CM

## 2020-11-30 DIAGNOSIS — I89.0 LYMPHEDEMA: ICD-10-CM

## 2020-11-30 DIAGNOSIS — I87.2 VENOUS STASIS ULCER OF RIGHT CALF LIMITED TO BREAKDOWN OF SKIN WITHOUT VARICOSE VEINS (H): ICD-10-CM

## 2020-12-15 ENCOUNTER — OFFICE VISIT - HEALTHEAST (OUTPATIENT)
Dept: VASCULAR SURGERY | Facility: CLINIC | Age: 85
End: 2020-12-15

## 2020-12-15 ENCOUNTER — COMMUNICATION - HEALTHEAST (OUTPATIENT)
Dept: VASCULAR SURGERY | Facility: CLINIC | Age: 85
End: 2020-12-15

## 2020-12-15 DIAGNOSIS — I89.0 LYMPHEDEMA: ICD-10-CM

## 2020-12-15 DIAGNOSIS — I89.0 ACQUIRED LYMPHEDEMA OF LEG: ICD-10-CM

## 2020-12-15 DIAGNOSIS — I87.303 VENOUS HYPERTENSION, CHRONIC, BILATERAL: ICD-10-CM

## 2020-12-15 DIAGNOSIS — M79.89 SWELLING OF LIMB: ICD-10-CM

## 2020-12-15 DIAGNOSIS — L97.909 ULCER OF LOWER EXTREMITY, UNSPECIFIED LATERALITY, UNSPECIFIED ULCER STAGE (H): ICD-10-CM

## 2020-12-15 DIAGNOSIS — L97.911 ULCER OF RIGHT LOWER EXTREMITY, LIMITED TO BREAKDOWN OF SKIN (H): ICD-10-CM

## 2020-12-15 DIAGNOSIS — I87.2 VENOUS STASIS DERMATITIS OF BOTH LOWER EXTREMITIES: ICD-10-CM

## 2020-12-15 DIAGNOSIS — L90.5 SCAR CONDITION AND FIBROSIS OF SKIN: ICD-10-CM

## 2020-12-15 DIAGNOSIS — L97.921 ULCER OF LEFT LOWER EXTREMITY, LIMITED TO BREAKDOWN OF SKIN (H): ICD-10-CM

## 2020-12-15 DIAGNOSIS — E11.42 DIABETIC PERIPHERAL NEUROPATHY ASSOCIATED WITH TYPE 2 DIABETES MELLITUS (H): ICD-10-CM

## 2020-12-30 ENCOUNTER — RECORDS - HEALTHEAST (OUTPATIENT)
Dept: LAB | Facility: CLINIC | Age: 85
End: 2020-12-30

## 2020-12-30 LAB
ANION GAP SERPL CALCULATED.3IONS-SCNC: 11 MMOL/L (ref 5–18)
BUN SERPL-MCNC: 29 MG/DL (ref 8–28)
CALCIUM SERPL-MCNC: 9.1 MG/DL (ref 8.5–10.5)
CHLORIDE BLD-SCNC: 97 MMOL/L (ref 98–107)
CO2 SERPL-SCNC: 34 MMOL/L (ref 22–31)
CREAT SERPL-MCNC: 1.22 MG/DL (ref 0.7–1.3)
GFR SERPL CREATININE-BSD FRML MDRD: 56 ML/MIN/1.73M2
GLUCOSE BLD-MCNC: 111 MG/DL (ref 70–125)
POTASSIUM BLD-SCNC: 3.7 MMOL/L (ref 3.5–5)
SODIUM SERPL-SCNC: 142 MMOL/L (ref 136–145)

## 2021-05-26 VITALS
HEART RATE: 64 BPM | SYSTOLIC BLOOD PRESSURE: 110 MMHG | TEMPERATURE: 97.7 F | RESPIRATION RATE: 20 BRPM | DIASTOLIC BLOOD PRESSURE: 60 MMHG

## 2021-05-26 VITALS
HEART RATE: 68 BPM | SYSTOLIC BLOOD PRESSURE: 100 MMHG | TEMPERATURE: 98.6 F | RESPIRATION RATE: 16 BRPM | DIASTOLIC BLOOD PRESSURE: 70 MMHG

## 2021-05-26 VITALS
TEMPERATURE: 97.6 F | HEART RATE: 68 BPM | RESPIRATION RATE: 15 BRPM | OXYGEN SATURATION: 96 % | DIASTOLIC BLOOD PRESSURE: 78 MMHG | SYSTOLIC BLOOD PRESSURE: 100 MMHG

## 2021-05-26 VITALS
SYSTOLIC BLOOD PRESSURE: 124 MMHG | RESPIRATION RATE: 20 BRPM | DIASTOLIC BLOOD PRESSURE: 72 MMHG | HEART RATE: 64 BPM | TEMPERATURE: 97.7 F

## 2021-05-26 VITALS
TEMPERATURE: 97.9 F | HEART RATE: 78 BPM | DIASTOLIC BLOOD PRESSURE: 82 MMHG | SYSTOLIC BLOOD PRESSURE: 136 MMHG | RESPIRATION RATE: 16 BRPM

## 2021-05-26 VITALS
DIASTOLIC BLOOD PRESSURE: 78 MMHG | SYSTOLIC BLOOD PRESSURE: 110 MMHG | TEMPERATURE: 98.2 F | RESPIRATION RATE: 16 BRPM | HEART RATE: 72 BPM

## 2021-05-26 VITALS — SYSTOLIC BLOOD PRESSURE: 100 MMHG | RESPIRATION RATE: 20 BRPM | HEART RATE: 64 BPM | DIASTOLIC BLOOD PRESSURE: 60 MMHG

## 2021-05-26 NOTE — PATIENT INSTRUCTIONS - HE
Apply Endoform over the wound and cover with xerform and an ABD.  Apply a 2-layer wrap     On your left leg, start wearing your velcro compression wraps everyday.

## 2021-05-26 NOTE — PROGRESS NOTES
Date of Service: 9/24/2018    Provider's initial consult visit:  5/17/2018    Chief Complaint: leg wound    History:   Patient presents to clinic in regards to his swelling and open areas on his bilateral legs.  He was last seen on 1/10/2019 by Mei Sampson during my absence because his legs blistered and he developed new ulcers.  Currently xerform and an ABD with a 2-layer wrap are being applied to his legs bilaterally.  His swelling has greatly decreased and the ulcer on his left leg has resolved.    Current Outpatient Medications   Medication Sig Dispense Refill     acetaminophen (TYLENOL) 325 MG tablet Take 2 tablets (650 mg total) by mouth every 4 (four) hours as needed.  0     atenolol (TENORMIN) 50 MG tablet Take 50 mg by mouth.       atorvastatin (LIPITOR) 10 MG tablet Take 10 mg by mouth every morning.        benzonatate (TESSALON) 100 MG capsule Take 1 capsule (100 mg total) by mouth 3 (three) times a day as needed for cough.  0     ferrous gluconate 325 mg (36 mg iron) Tab Take 325 mg by mouth daily.        furosemide (LASIX) 40 MG tablet Take 40 mg by mouth 2 (two) times a day.       glipiZIDE (GLUCOTROL XL) 5 MG 24 hr tablet Take 5 mg by mouth daily with breakfast.        LANTUS SOLOSTAR 100 unit/mL (3 mL) pen Inject 20 Units under the skin at bedtime. (Patient taking differently: Inject 24 Units under the skin at bedtime .      ) 3 mL PRN     omeprazole (PRILOSEC) 20 MG capsule Take 20 mg by mouth daily.        potassium chloride (KAYCIEL) 20 mEq/15 mL solution Take 20 mEq by mouth daily.        vitamin A-vitamin C-vit E-min (OCUVITE) Tab tablet Take 1 tablet by mouth daily.        warfarin (COUMADIN/JANTOVEN) 2 MG tablet Take 5 mg by mouth .             Current Facility-Administered Medications   Medication Dose Route Frequency Provider Last Rate Last Dose     lidocaine 2 % jelly (XYLOCAINE)   Topical PRN Autumn Burnett, CNP   1 application at 11/29/18 0818       Allergies   Allergen  Reactions     Aspirin Nausea And Vomiting     GI upset       Physical Exam:    Vitals:    03/22/19 0814   Resp: 18    Body mass index is 29.16 kg/m .    General:  89 y.o. male in no apparent distress.    Head:  Normocephalic atraumatic  Psychiatric: Cooperative.   Respiratory: unlabored breathing.  Cardiovascular-Lower extremity: Edema: none    Vasc Edema 12/20/2018 1/3/2019 1/10/2019 3/8/2019 3/18/2019   Right just above MTP 26.5 25.4 26 27 26.8   Right Ankle 26 23.2 23.5 27.5 26.5   Right Widest Calf 34.5 34.4 35.2 47 44.5   Right Thigh Up 10cm 50 49.5 49 - -   Left - just above MTP 25 25.3 25.7 26.5 24.5   Left Ankle 26 22.5 22.5 26,5 24.7   Left Widest Calf 30.4 33.2 34.5 43 39   Left Thigh Up 10cm 48.5 48.5 49 - -       Integumentary:         Left Leg Ulceration(s): Resolved    Assessment:    Images:   Study Result   BILATERAL LOWER EXTREMITY VENOUS DUPLEX ULTRASOUND WITH INSUFFICIENCY STUDY  4/8/2016 2:50 PM     INDICATIONS: Bilateral leg pain and swelling. Lower leg ulcers.     TECHNIQUE: Venous duplex ultrasound with gray-scale images, graded compression, and color-flow, Doppler, and spectral analysis. Abnormal reflux is defined as 500 msec for superficial veins and 1 sec for deep veins.  COMPARISONS: None.     FINDINGS: The right greater saphenous vein is incompetent, with abnormal sustained reflux from the saphenofemoral junction to the mid calf. This vein measures between 12 mm in diameter at saphenofemoral junction and 3 mm in the distal calf. No abnormal   reflux is detected in the right lesser saphenous vein.     The left greater saphenous vein is incompetent, with abnormal sustained reflux from the saphenofemoral junction to the distal calf. This vein measures between 9 mm in diameter at saphenofemoral junction and 5 mm in the distal calf. No abnormal reflux is   detected in the left lesser saphenous vein.     There is abnormal deep venous reflux bilaterally, involving the right common femoral and  popliteal veins and the left common femoral and superficial femoral veins. No incompetent perforating veins are identified.     There is no evidence for deep vein thrombosis. There is normal flow and compressibility in the femoral, popliteal, and posterior tibial veins.     CONCLUSIONS:   1.  Incompetent right greater saphenous vein.  2.  Incompetent left greater saphenous vein.       Labs:    The following labs were reviewed by me today.    Lab Results   Component Value Date    WBC 6.6 02/22/2018    HGB 12.4 (L) 02/22/2018    HCT 38.7 (L) 02/22/2018    MCV 95 02/22/2018     02/22/2018               Invalid input(s): EOSPC    Lab Results   Component Value Date    CREATININE 1.24 01/16/2019         Lab Results   Component Value Date    BUN 25 01/16/2019     No results found for: PREALBUMINESUFAST(LABPROT,LABALBU,albumin)@  Invalid input(s): LABALBU  No results found for: PREALBUMIN    No results found for: CRP  No results found for: SEDRATE    Lab Results   Component Value Date    HGBA1C 7.7 (H) 02/02/2018     Lab Results   Component Value Date    TSH 2.67 06/22/2016           No results found for: [id number]  Lab Results   Component Value Date     (H) 02/14/2019        1. Leg ulcer, right, limited to breakdown of skin (H)     2. Venous stasis dermatitis of both lower extremities     3. Lymphedema     4. Anemia, unspecified type     5. Type 2 diabetes mellitus with complication, unspecified whether long term insulin use (H)         A new wound was identified today: no    Plan:    1.  Debridement of the right leg ulcer was recommended.  After consent was obtained and topical 2% Xylocaine was applied under clean conditions, and using a #15 blade,the devitalized dermal tissue was debrided for a total square centimeters of 1.6.  Following debridement, there was a decrease in the nonviable tissue. The patient tolerated the procedure without any difficulty.     1.  Right leg ulcer, signs of improvement. No  signs of infection.    2.  Left leg ulcer, resolved    3.  Lymphedema, minimal edema noted today.    5.  Heart failure, per primary provider    6.  Diabetes, A1c, 7.7 on 2/2/2018.  Managed by primary    7.  Treatment:  Will discontinue the 2-layer wrap on his left leg and transition into the velcro compression wraps daily.  No bandage is needed. For the right leg, Endoform, xeroform, and an ABD will be applied to his ulcer.  A 2-layer wrap will be applied for compression.  The nurse will change twice per week in clinic.  If he develops blistering again, I will consider a Biotab venous/lymphedema pump.    8.   Patient will follow up with a nurse twice per week and me in three weeks.    BRENDON Le, CNP,  [facility]  [phone]

## 2021-05-27 ENCOUNTER — RECORDS - HEALTHEAST (OUTPATIENT)
Dept: ADMINISTRATIVE | Facility: CLINIC | Age: 86
End: 2021-05-27

## 2021-05-27 NOTE — PATIENT INSTRUCTIONS - HE
Thank you for choosing [facility] as partners in your care.  Please read the following information about your treatment.    Treatment:  Layered Compression Bandaging (Two-layer)    What is it?  The layered compression bandaging has a layer of absorbent material that will soak up drainage.     Why we do it.   This is done to treat swelling, wounds, or both.  This will in turn help circulation and healing.    How to care for your bandages.  The wraps need to be kept dry. If  the wraps become wet, remove them and call the clinic to have another wrap applied.    What to expect.  It is common for the wraps to be uncomfortable at the beginning. The first two days are usually the hardest; then they will become more comfortable.       Elevating your legs will help the discomfort. Try to elevate your legs as much as possible.    If rest and elevation does not help your discomfort, call your provider.  If your provider is not available you can remove the wrap and leave a message for further instructions.      If you have any questions, please contact [facility] at [phone].

## 2021-05-27 NOTE — PATIENT INSTRUCTIONS - HE
Apply the antifungal cream on your feet daily  x1 month    Continue to wear your velcro compression wraps daily    Someone from [facility] will call you regarding your pump

## 2021-05-27 NOTE — PROGRESS NOTES
Date of Service: 9/24/2018    Provider's initial consult visit:  5/17/2018    Chief Complaint: leg wound    History:   Patient presents to clinic in regards to his swelling and open areas on his bilateral legs.  He was last seen on 3/22/2019 when Endoform and Xeroform was started and a 2-layer wrap. His ulcers healed so he was transitioned into his velcro compression wraps at one of the nursing visits.      Current Outpatient Medications   Medication Sig Dispense Refill     acetaminophen (TYLENOL) 325 MG tablet Take 2 tablets (650 mg total) by mouth every 4 (four) hours as needed.  0     atenolol (TENORMIN) 50 MG tablet Take 50 mg by mouth.       atorvastatin (LIPITOR) 10 MG tablet Take 10 mg by mouth every morning.        benzonatate (TESSALON) 100 MG capsule Take 1 capsule (100 mg total) by mouth 3 (three) times a day as needed for cough.  0     ferrous gluconate 325 mg (36 mg iron) Tab Take 325 mg by mouth daily.        furosemide (LASIX) 40 MG tablet Take 40 mg by mouth 2 (two) times a day.       glipiZIDE (GLUCOTROL XL) 5 MG 24 hr tablet Take 5 mg by mouth daily with breakfast.        LANTUS SOLOSTAR 100 unit/mL (3 mL) pen Inject 20 Units under the skin at bedtime. (Patient taking differently: Inject 30 Units under the skin at bedtime.       ) 3 mL PRN     metOLazone (ZAROXOLYN) 5 MG tablet Take 5 mg by mouth daily.  0     omeprazole (PRILOSEC) 20 MG capsule Take 20 mg by mouth daily.        potassium chloride (KAYCIEL) 20 mEq/15 mL solution Take 20 mEq by mouth daily.        vitamin A-vitamin C-vit E-min (OCUVITE) Tab tablet Take 1 tablet by mouth daily.        warfarin (COUMADIN/JANTOVEN) 2 MG tablet Take 5 mg by mouth .             nystatin (MYCOSTATIN) cream Apply to irritated areas of skin twice a day until healed.  Do not apply to open ulcer(s). 60 g 1     Current Facility-Administered Medications   Medication Dose Route Frequency Provider Last Rate Last Dose     lidocaine 2 % jelly (XYLOCAINE)    Topical PRN Lex, Autumn Blackwood, CNP   1 application at 11/29/18 0818       Allergies   Allergen Reactions     Aspirin Nausea And Vomiting     GI upset       Physical Exam:    Vitals:    04/12/19 0806   BP: 130/78   Pulse: 76   Temp: 98.6  F (37  C)    Body mass index is 27.31 kg/m .    General:  89 y.o. male in no apparent distress.    Head:  Normocephalic atraumatic  Psychiatric: Cooperative.   Respiratory: unlabored breathing.  Cardiovascular-Lower extremity: Edema: none.  Using a monofilament, he has 0/10 sensation bilaterally.      Vasc Edema 1/3/2019 1/10/2019 3/8/2019 3/18/2019 3/29/2019   Right just above MTP 25.4 26 27 26.8 26   Right Ankle 23.2 23.5 27.5 26.5 22.8   Right Widest Calf 34.4 35.2 47 44.5 32.5   Right Thigh Up 10cm 49.5 49 - - 48.5   Left - just above MTP 25.3 25.7 26.5 24.5 25.2   Left Ankle 22.5 22.5 26,5 24.7 23.5   Left Widest Calf 33.2 34.5 43 39 32.3   Left Thigh Up 10cm 48.5 49 - - 49.5       Integumentary:         Left Leg Ulceration(s): Resolved    Assessment:    Images:   Study Result   BILATERAL LOWER EXTREMITY VENOUS DUPLEX ULTRASOUND WITH INSUFFICIENCY STUDY  4/8/2016 2:50 PM     INDICATIONS: Bilateral leg pain and swelling. Lower leg ulcers.     TECHNIQUE: Venous duplex ultrasound with gray-scale images, graded compression, and color-flow, Doppler, and spectral analysis. Abnormal reflux is defined as 500 msec for superficial veins and 1 sec for deep veins.  COMPARISONS: None.     FINDINGS: The right greater saphenous vein is incompetent, with abnormal sustained reflux from the saphenofemoral junction to the mid calf. This vein measures between 12 mm in diameter at saphenofemoral junction and 3 mm in the distal calf. No abnormal   reflux is detected in the right lesser saphenous vein.     The left greater saphenous vein is incompetent, with abnormal sustained reflux from the saphenofemoral junction to the distal calf. This vein measures between 9 mm in diameter at saphenofemoral  junction and 5 mm in the distal calf. No abnormal reflux is   detected in the left lesser saphenous vein.     There is abnormal deep venous reflux bilaterally, involving the right common femoral and popliteal veins and the left common femoral and superficial femoral veins. No incompetent perforating veins are identified.     There is no evidence for deep vein thrombosis. There is normal flow and compressibility in the femoral, popliteal, and posterior tibial veins.     CONCLUSIONS:   1.  Incompetent right greater saphenous vein.  2.  Incompetent left greater saphenous vein.       Labs:    The following labs were reviewed by me today.    Lab Results   Component Value Date    WBC 6.6 02/22/2018    HGB 12.4 (L) 02/22/2018    HCT 38.7 (L) 02/22/2018    MCV 95 02/22/2018     02/22/2018               Invalid input(s): EOSPC    Lab Results   Component Value Date    CREATININE 1.19 04/03/2019         Lab Results   Component Value Date    BUN 40 (H) 04/03/2019     No results found for: PREALBUMINESUFAST(LABPROT,LABALBU,albumin)@  Invalid input(s): LABALBU  No results found for: PREALBUMIN    No results found for: CRP  No results found for: SEDRATE    Lab Results   Component Value Date    HGBA1C 7.7 (H) 02/02/2018     Lab Results   Component Value Date    TSH 2.67 06/22/2016           No results found for: [id number]  Lab Results   Component Value Date     (H) 02/14/2019        1. Leg ulcer, right, limited to breakdown of skin (H)     2. Venous stasis dermatitis of both lower extremities     3. Venous hypertension, chronic, bilateral     4. Lymphedema  Supply - Other   5. Anemia, unspecified type     6. Type 2 diabetes mellitus with complication, unspecified whether long term insulin use (H)     7. Nail fungus     8. Fungal infection of skin  nystatin (MYCOSTATIN) cream   9. Idiopathic peripheral neuropathy         A new wound was identified today: no    Plan:    1.  Right leg ulcer, resolved    2.   Lymphedema, improved, but persists.   Exercise, leg elevation, and compression has been used longer than a month without significant improvement.     3.  Heart failure, per primary provider    4.  Diabetes, A1c, 7.7 on 2/2/2018.  Managed by primary    5.  Fungal infection.  Clinical signs on his feet.  Wrote a prescription for nystatin cream to be applied daily x1 month.    6.  Fungal nails.  Debrided 10 toe nails with nail nippers.  Patient tolerated the procedure with out difficulty.       Right Lower Extremity  Left Lower Extremity    Class A Findings - Q7      Non-Traumatic amputation of foot or integral skeletal portion thereof           Class B Findings - Q8 with 2 Class B findings      Absent PT pulse x x   Absent DP pulse  x x   Advanced Trophic Changes (Three are Required  x x   Absent or decreased hair growth  x x   Nail Changes  x x   Pigment Changes  x x   Skin Texture Changes   x x   Skin Color Changes           Class C Findings Q9 with 1 class B and 2 Class C Findings      Claudication      Temperature Changes      Edema      Paresthesias      Burning          5.  Treatment: No bandages are needed.  He will continue to wear his velcro compression wraps.  I wrote a prescription for a Biotab pump to help prevent recurrences of his cellulitis.    6.   Patient will follow up with me in one month.    Autumn Burnett, APRN, CNP,  [facility]  [phone]

## 2021-05-28 NOTE — PROGRESS NOTES
Date of Service: 9/24/2018    Provider's initial consult visit:  5/17/2018    Chief Complaint: leg wound    History:   Patient presents to clinic in regards to his swelling and open areas on his bilateral legs. He is wearing his velcro compression wraps, but his swelling has increased in his right leg.  He is not having any problems with cellulitis or ulcers.  He received the Biotab pump a few days ago, but is not sure how helpful it is at reducing the swelling.     Current Outpatient Medications   Medication Sig Dispense Refill     acetaminophen (TYLENOL) 325 MG tablet Take 2 tablets (650 mg total) by mouth every 4 (four) hours as needed.  0     atenolol (TENORMIN) 50 MG tablet Take 50 mg by mouth.       atorvastatin (LIPITOR) 10 MG tablet Take 10 mg by mouth every morning.        benzonatate (TESSALON) 100 MG capsule Take 1 capsule (100 mg total) by mouth 3 (three) times a day as needed for cough.  0     ferrous gluconate 325 mg (36 mg iron) Tab Take 325 mg by mouth daily.        furosemide (LASIX) 40 MG tablet Take 40 mg by mouth 2 (two) times a day.       glipiZIDE (GLUCOTROL XL) 5 MG 24 hr tablet Take 5 mg by mouth daily with breakfast.        LANTUS SOLOSTAR 100 unit/mL (3 mL) pen Inject 20 Units under the skin at bedtime. (Patient taking differently: Inject 30 Units under the skin at bedtime.       ) 3 mL PRN     metFORMIN (GLUCOPHAGE) 500 MG tablet Take 500 mg by mouth daily.  0     metOLazone (ZAROXOLYN) 5 MG tablet Take 5 mg by mouth daily.  0     nystatin (MYCOSTATIN) cream Apply to irritated areas of skin twice a day until healed.  Do not apply to open ulcer(s). 60 g 1     omeprazole (PRILOSEC) 20 MG capsule Take 20 mg by mouth daily.        potassium chloride (KAYCIEL) 20 mEq/15 mL solution Take 20 mEq by mouth daily.        vitamin A-vitamin C-vit E-min (OCUVITE) Tab tablet Take 1 tablet by mouth daily.        warfarin (COUMADIN/JANTOVEN) 2 MG tablet Take 5 mg by mouth .             Current  Facility-Administered Medications   Medication Dose Route Frequency Provider Last Rate Last Dose     lidocaine 2 % jelly (XYLOCAINE)   Topical PRN Lex, Autumn Blackwood, CNP   1 application at 11/29/18 0818       Allergies   Allergen Reactions     Aspirin Nausea And Vomiting     GI upset       Physical Exam:    Vitals:    05/14/19 1355   BP: 128/74   Pulse: 76   Resp: 18   Temp: 97.6  F (36.4  C)    Body mass index is 27.31 kg/m .    General:  89 y.o. male in no apparent distress.    Head:  Normocephalic atraumatic  Psychiatric: Cooperative.   Respiratory: unlabored breathing.  Cardiovascular-Lower extremity: Edema: Right leg.  Using a monofilament, he has 0/10 sensation bilaterally.      Vasc Edema 1/10/2019 3/8/2019 3/18/2019 3/29/2019 5/14/2019   Right just above MTP 26 27 26.8 26 27.2   Right Ankle 23.5 27.5 26.5 22.8 24.5   Right Widest Calf 35.2 47 44.5 32.5 42.4   Right Thigh Up 10cm 49 - - 48.5 -   Left - just above MTP 25.7 26.5 24.5 25.2 26.2   Left Ankle 22.5 26,5 24.7 23.5 25.2   Left Widest Calf 34.5 43 39 32.3 35.1   Left Thigh Up 10cm 49 - - 49.5 -       Integumentary:         Left Leg Ulceration(s): Resolved    Assessment:    Images:   Study Result   BILATERAL LOWER EXTREMITY VENOUS DUPLEX ULTRASOUND WITH INSUFFICIENCY STUDY  4/8/2016 2:50 PM     INDICATIONS: Bilateral leg pain and swelling. Lower leg ulcers.     TECHNIQUE: Venous duplex ultrasound with gray-scale images, graded compression, and color-flow, Doppler, and spectral analysis. Abnormal reflux is defined as 500 msec for superficial veins and 1 sec for deep veins.  COMPARISONS: None.     FINDINGS: The right greater saphenous vein is incompetent, with abnormal sustained reflux from the saphenofemoral junction to the mid calf. This vein measures between 12 mm in diameter at saphenofemoral junction and 3 mm in the distal calf. No abnormal   reflux is detected in the right lesser saphenous vein.     The left greater saphenous vein is  incompetent, with abnormal sustained reflux from the saphenofemoral junction to the distal calf. This vein measures between 9 mm in diameter at saphenofemoral junction and 5 mm in the distal calf. No abnormal reflux is   detected in the left lesser saphenous vein.     There is abnormal deep venous reflux bilaterally, involving the right common femoral and popliteal veins and the left common femoral and superficial femoral veins. No incompetent perforating veins are identified.     There is no evidence for deep vein thrombosis. There is normal flow and compressibility in the femoral, popliteal, and posterior tibial veins.     CONCLUSIONS:   1.  Incompetent right greater saphenous vein.  2.  Incompetent left greater saphenous vein.       Labs:    The following labs were reviewed by me today.    Lab Results   Component Value Date    WBC 6.6 02/22/2018    HGB 12.4 (L) 02/22/2018    HCT 38.7 (L) 02/22/2018    MCV 95 02/22/2018     02/22/2018               Invalid input(s): EOSPC    Lab Results   Component Value Date    CREATININE 1.07 04/24/2019         Lab Results   Component Value Date    BUN 22 04/24/2019     No results found for: PREALBUMINESUFAST(LABPROT,LABALBU,albumin)@  Invalid input(s): LABALBU  No results found for: PREALBUMIN    No results found for: CRP  No results found for: SEDRATE    Lab Results   Component Value Date    HGBA1C 7.7 (H) 02/02/2018     Lab Results   Component Value Date    TSH 2.67 06/22/2016           No results found for: [id number]  Lab Results   Component Value Date     (H) 02/14/2019        1. Venous stasis dermatitis of both lower extremities     2. Venous hypertension, chronic, bilateral     3. Lymphedema     4. Type 2 diabetes mellitus with complication, unspecified whether long term insulin use (H)         A new wound was identified today: no    Plan:    1.  Lymphedema, worse in the right leg.  He will continue to use the Biotab pump daily.    3.  Heart failure, per  primary provider    4.  Diabetes, A1c, 7.7 on 2/2/2018.  Managed by primary    5.  Treatment: A 2-layer wrap will be applied to the right leg and he will continue with the velcro compression wraps on the left leg.  He will also continue to use the pump     6.   Patient will follow up weekly with a nurse visit to change his 2-layer wrap until his swelling is down.  Then he can be transitioned into his velcro compression wraps.   I will transfer care to Mei Sampson, VLADIMIR, CNP, CWOCN following my resignation from [facility].  He will follow up with her in three to four weeks.    Autumn Burnett, APRN, CNP,  CWCN  [facility]  [phone]

## 2021-05-28 NOTE — PATIENT INSTRUCTIONS - HE
In clinic, we applied a 2-layer wrap to your right leg to help reduce the swelling.  Continue to use the Biotab pump twice per day.  You can use it with the wrap on.    Follow up with a nurse visit weekly until your swelling is reduced.  Bring your velcro compression wraps with and they can transition you into velcro compression wraps.your swelling is down.a

## 2021-05-29 NOTE — PATIENT INSTRUCTIONS - HE
Continue wearing velcro compression on left leg and 2 layer compression on right.    - Please call us if your compression wraps fall more than 1-2 inches below the bend of the knee. Call if they are too painful. Call if they get wet. If it is a weekend and the wraps fall down, are too painful, or get wet take the wraps off and put on another form of compression. Compression such as velcro wraps, compression stockings, short stretch bandages, or tubular compression. Apply one of these until you can be seen in clinic. Please call us if you have any questions [phone].    - Treatment:  Layered Compression Bandaging (2-layer)    What is it?  The layered compression bandaging has a layer of absorbent material that will soak up drainage.     Why we do it.   This is done to treat swelling, wounds, or both.  This will in turn help circulation and healing.    How to care for your bandages.  The wraps need to be kept dry. If  the wraps become wet, remove them and call the clinic to have another wrap applied.    What to expect.  It is common for the wraps to be uncomfortable at the beginning. The first two days are usually the hardest; then they will become more comfortable.       Elevating your legs will help the discomfort. Try to elevate your legs as much as possible.    If rest and elevation does not help your discomfort, call your provider.  If your provider is not available you can remove the wrap and leave a message for further instructions.    - Swelling and Compression Therapy    Swelling in the legs can be caused by many reasons. No matter what the reason, treatment usually includes some type of compression. This may be done with a support sock, dressing, ace wrap, or layered wraps.     It is important to treat the swelling for many reasons. If the swelling is not treated you may develop blisters that can lead to ulcerations. This is caused when extra fluid goes into tissue causing damage and blocking blood flow  to the tissue.     It is important that you wear your compression every day, including days that you will be seen in clinic.     Compression is often the most important part of treating leg wounds. Without controlling the swelling it is often not possible to heal wounds.     Going without compression for even brief periods of time can be damaging to your legs and your health.  Your compression should be put on first thing in the morning. Take the compression off at night only when instructed by your care provider to do so. Sometimes wearing compression 24 hours a day will be recommended.       If you are having difficulty wearing your compression it is important to notify your care provider so that other options may be reviewed.    Thank you for choosing [facility]. Please call us if you have any questions [phone].

## 2021-05-29 NOTE — PROGRESS NOTES
Nurse Visit    Chief Complaint: Patient presents to clinic for assement, and treatment of their swelling    Dressing on Arrival: 2 layer compression wrap    Allergies:   Allergies   Allergen Reactions     Aspirin Nausea And Vomiting     GI upset       Medications:   Current Outpatient Medications:      acetaminophen (TYLENOL) 325 MG tablet, Take 2 tablets (650 mg total) by mouth every 4 (four) hours as needed., Disp: , Rfl: 0     atenolol (TENORMIN) 50 MG tablet, Take 50 mg by mouth., Disp: , Rfl:      atorvastatin (LIPITOR) 10 MG tablet, Take 10 mg by mouth every morning. , Disp: , Rfl:      benzonatate (TESSALON) 100 MG capsule, Take 1 capsule (100 mg total) by mouth 3 (three) times a day as needed for cough., Disp: , Rfl: 0     ferrous gluconate 325 mg (36 mg iron) Tab, Take 325 mg by mouth daily. , Disp: , Rfl:      furosemide (LASIX) 40 MG tablet, Take 40 mg by mouth 2 (two) times a day., Disp: , Rfl:      glipiZIDE (GLUCOTROL XL) 5 MG 24 hr tablet, Take 5 mg by mouth daily with breakfast. , Disp: , Rfl:      LANTUS SOLOSTAR 100 unit/mL (3 mL) pen, Inject 20 Units under the skin at bedtime. (Patient taking differently: Inject 30 Units under the skin at bedtime.    ), Disp: 3 mL, Rfl: PRN     metFORMIN (GLUCOPHAGE) 500 MG tablet, Take 500 mg by mouth daily., Disp: , Rfl: 0     metOLazone (ZAROXOLYN) 5 MG tablet, Take 5 mg by mouth daily., Disp: , Rfl: 0     nystatin (MYCOSTATIN) cream, Apply to irritated areas of skin twice a day until healed.  Do not apply to open ulcer(s)., Disp: 60 g, Rfl: 1     omeprazole (PRILOSEC) 20 MG capsule, Take 20 mg by mouth daily. , Disp: , Rfl:      potassium chloride (KAYCIEL) 20 mEq/15 mL solution, Take 20 mEq by mouth daily. , Disp: , Rfl:      vitamin A-vitamin C-vit E-min (OCUVITE) Tab tablet, Take 1 tablet by mouth daily. , Disp: , Rfl:      warfarin (COUMADIN/JANTOVEN) 2 MG tablet, Take 5 mg by mouth .   , Disp: , Rfl:     Current Facility-Administered  Medications:      lidocaine 2 % jelly (XYLOCAINE), , Topical, PRN, O'Govind, Autumn Blackwood, CNP, 1 application at 11/29/18 0818    Vital Signs: /78 (Patient Site: Left Arm, Patient Position: Sitting, Cuff Size: Adult Regular)   Pulse 66   Temp 97.5  F (36.4  C) (Oral)       Assessment:    General:  Patient presents to clinic in no apparent distress.  Psychiatric:  Alert and oriented x3.   Lower extremity:  edema is present.    Integumentary:  Skin is uniformly warm, dry and pink.    Wound size:   Urethral Catheter 16 Fr. (Active)       VASC Wound 03/08/19 right lateral leg (Active)   Pre Size Length 2 3/8/2019 10:00 AM   Pre Size Width 4.6 3/8/2019 10:00 AM   Pre Size Depth 0.1 3/8/2019 10:00 AM   Pre Total Sq cm 9.2 3/8/2019 10:00 AM   Undermined No 3/12/2019  9:00 AM   Tunneling No 3/12/2019  9:00 AM   Description beefy red wound bed, pink periwound 3/12/2019  9:00 AM   Prodcut Used ABD Pad 3/12/2019  9:00 AM       VASC Wound 03/12/19 Left lateral superior (lower) lower leg (Active)   Pre Size Length 2.2 3/15/2019  9:00 AM   Pre Size Width 3.8 3/15/2019  9:00 AM   Pre Size Depth 0.1 3/15/2019  9:00 AM   Undermined no 3/15/2019  9:00 AM   Tunneling no 3/15/2019  9:00 AM   Description healed 4/12/2019  8:00 AM   Prodcut Used ABD Pad 3/29/2019  9:00 AM       VASC Wound 03/12/19 Left lateral superior (upper) lower leg (Active)   Pre Size Length 0.8 3/15/2019  9:00 AM   Pre Size Width 0.7 3/15/2019  9:00 AM   Pre Size Depth 0.1 3/15/2019  9:00 AM   Pre Total Sq cm 0.56 3/15/2019  9:00 AM   Undermined no 3/15/2019  9:00 AM   Tunneling no 3/15/2019  9:00 AM   Description healed 4/12/2019  8:00 AM   Prodcut Used ABD Pad 3/12/2019  9:00 AM       VASC Wound 03/12/19 Right medial lower leg (Active)   Pre Size Length 4.4 3/15/2019  9:00 AM   Pre Size Width 8 3/15/2019  9:00 AM   Pre Size Depth 0.1 3/15/2019  9:00 AM   Pre Total Sq cm 35.2 3/15/2019  9:00 AM   Undermined no 3/15/2019  9:00 AM   Tunneling no 3/15/2019   9:00 AM   Description healed 2019  8:00 AM   Prodcut Used ABD Pad;Gauze 3/15/2019  9:00 AM       VASC Wound 19 Right lateral lower leg (Active)   Pre Size Length 1 3/22/2019  8:00 AM   Pre Size Width 1.6 3/22/2019  8:00 AM   Pre Size Depth 0.1 3/22/2019  8:00 AM   Pre Total Sq cm 1.6 3/22/2019  8:00 AM   Post Size Length 0.9 3/22/2019  8:00 AM   Post Size Width 1.2 3/22/2019  8:00 AM   Undermined No 3/12/2019  9:00 AM   Tunneling No 3/12/2019  9:00 AM   Description healed 2019  8:00 AM   Prodcut Used ABD Pad 3/12/2019  9:00 AM       Pressure Ulcer Leg Left;Right (Active)       Pressure Ulcer 16 Buttocks Left Stage II (Active)       Wound 16 Non-pressure related ulcer Leg Left (Active)       Incision 16 Abdomen (Active)       Incision 16 Lap site Abdomen Mid (Active)       Incision 16 Surgical Abdomen Right (Active)      Undermining is not present.    The periwoundskin is no wound      Plan:         1. Patient will follow up in 1 week         2. Treatment provided will include irrigation and dressings to promote autolytic debridement and will be as listed below     Cleansed with: NA    Protected skin with: Remedy Skin Repair    Dressings Applied: NA    Compression Applied to the Left Leg: Velcro compression wrap    Compression Applied to the Right Le-Layer Coban    Offloading applied: Cane    Trial Products: no  Provider notified regarding concerns: no  Treatment Changes: no    Educational Barriers: none  Taught Regarding: Acitivity, Compliance and Compression  Teaching Method: Exaplanation and DC sheet     Ben Mittal LPN [id number]

## 2021-05-29 NOTE — PROGRESS NOTES
Nurse Visit    Chief Complaint: Patient presents to clinic for assement, and treatment of their and swelling    Dressing on Arrival CDI      Allergies:   Allergies   Allergen Reactions     Aspirin Nausea And Vomiting     GI upset       Medications:    Current Outpatient Medications:      acetaminophen (TYLENOL) 325 MG tablet, Take 2 tablets (650 mg total) by mouth every 4 (four) hours as needed., Disp: , Rfl: 0     atenolol (TENORMIN) 50 MG tablet, Take 50 mg by mouth., Disp: , Rfl:      atorvastatin (LIPITOR) 10 MG tablet, Take 10 mg by mouth every morning. , Disp: , Rfl:      benzonatate (TESSALON) 100 MG capsule, Take 1 capsule (100 mg total) by mouth 3 (three) times a day as needed for cough., Disp: , Rfl: 0     ferrous gluconate 325 mg (36 mg iron) Tab, Take 325 mg by mouth daily. , Disp: , Rfl:      furosemide (LASIX) 40 MG tablet, Take 40 mg by mouth 2 (two) times a day., Disp: , Rfl:      glipiZIDE (GLUCOTROL XL) 5 MG 24 hr tablet, Take 5 mg by mouth daily with breakfast. , Disp: , Rfl:      LANTUS SOLOSTAR 100 unit/mL (3 mL) pen, Inject 20 Units under the skin at bedtime. (Patient taking differently: Inject 30 Units under the skin at bedtime.    ), Disp: 3 mL, Rfl: PRN     metFORMIN (GLUCOPHAGE) 500 MG tablet, Take 500 mg by mouth daily., Disp: , Rfl: 0     metOLazone (ZAROXOLYN) 5 MG tablet, Take 5 mg by mouth daily., Disp: , Rfl: 0     nystatin (MYCOSTATIN) cream, Apply to irritated areas of skin twice a day until healed.  Do not apply to open ulcer(s)., Disp: 60 g, Rfl: 1     omeprazole (PRILOSEC) 20 MG capsule, Take 20 mg by mouth daily. , Disp: , Rfl:      potassium chloride (KAYCIEL) 20 mEq/15 mL solution, Take 20 mEq by mouth daily. , Disp: , Rfl:      vitamin A-vitamin C-vit E-min (OCUVITE) Tab tablet, Take 1 tablet by mouth daily. , Disp: , Rfl:      warfarin (COUMADIN/JANTOVEN) 2 MG tablet, Take 5 mg by mouth .   , Disp: , Rfl:     Current Facility-Administered Medications:      lidocaine 2 %  jelly (XYLOCAINE), , Topical, PRN, O'Govind, Autumn Blackwood, CNP, 1 application at 11/29/18 0818    Vital Signs: /76   Pulse (!) 56   Temp 97.5  F (36.4  C) (Oral)   Resp 20     Assessment:    General:  Patient presents to clinic in no apparent distress.  Psychiatric:  Alert and oriented x3.   Lower extremity:  edema is present.    Integumentary:  Skin is uniformly warm, dry and pink.    Wound size:   Urethral Catheter 16 Fr. (Active)       VASC Wound 03/08/19 right lateral leg (Active)   Pre Size Length 2 3/8/2019 10:00 AM   Pre Size Width 4.6 3/8/2019 10:00 AM   Pre Size Depth 0.1 3/8/2019 10:00 AM   Pre Total Sq cm 9.2 3/8/2019 10:00 AM   Undermined No 3/12/2019  9:00 AM   Tunneling No 3/12/2019  9:00 AM   Description beefy red wound bed, pink periwound 3/12/2019  9:00 AM   Prodcut Used ABD Pad 3/12/2019  9:00 AM       VASC Wound 03/12/19 Left lateral superior (lower) lower leg (Active)   Pre Size Length 2.2 3/15/2019  9:00 AM   Pre Size Width 3.8 3/15/2019  9:00 AM   Pre Size Depth 0.1 3/15/2019  9:00 AM   Undermined no 3/15/2019  9:00 AM   Tunneling no 3/15/2019  9:00 AM   Description healed 4/12/2019  8:00 AM   Prodcut Used ABD Pad 3/29/2019  9:00 AM       VASC Wound 03/12/19 Left lateral superior (upper) lower leg (Active)   Pre Size Length 0.8 3/15/2019  9:00 AM   Pre Size Width 0.7 3/15/2019  9:00 AM   Pre Size Depth 0.1 3/15/2019  9:00 AM   Pre Total Sq cm 0.56 3/15/2019  9:00 AM   Undermined no 3/15/2019  9:00 AM   Tunneling no 3/15/2019  9:00 AM   Description healed 4/12/2019  8:00 AM   Prodcut Used ABD Pad 3/12/2019  9:00 AM       VASC Wound 03/12/19 Right medial lower leg (Active)   Pre Size Length 4.4 3/15/2019  9:00 AM   Pre Size Width 8 3/15/2019  9:00 AM   Pre Size Depth 0.1 3/15/2019  9:00 AM   Pre Total Sq cm 35.2 3/15/2019  9:00 AM   Undermined no 3/15/2019  9:00 AM   Tunneling no 3/15/2019  9:00 AM   Description healed 4/12/2019  8:00 AM   Prodcut Used ABD Pad;Gauze 3/15/2019  9:00 AM        VASC Wound 03/12/19 Right lateral lower leg (Active)   Pre Size Length 1 3/22/2019  8:00 AM   Pre Size Width 1.6 3/22/2019  8:00 AM   Pre Size Depth 0.1 3/22/2019  8:00 AM   Pre Total Sq cm 1.6 3/22/2019  8:00 AM   Post Size Length 0.9 3/22/2019  8:00 AM   Post Size Width 1.2 3/22/2019  8:00 AM   Undermined No 3/12/2019  9:00 AM   Tunneling No 3/12/2019  9:00 AM   Description healed 4/12/2019  8:00 AM   Prodcut Used ABD Pad 3/12/2019  9:00 AM       Pressure Ulcer Leg Left;Right (Active)       Pressure Ulcer 06/21/16 Buttocks Left Stage II (Active)       Wound 05/07/16 Non-pressure related ulcer Leg Left (Active)       Incision 06/18/16 Abdomen (Active)       Incision 07/08/16 Lap site Abdomen Mid (Active)       Incision 07/08/16 Surgical Abdomen Right (Active)      Undermining NA.    The periwoundskin is NA      Plan:         1. Patient will follow up in in 1 weeks         2. Treatment provided  to  The right leg swelling     Cleansed with: Skin cleanser    Protected skin with: Remedy Skin Repair    Dressings Applied: NA    Compression Applied: 2-Layer Coban:Applied the inner foam layer with the foot dorsiflexed and starting atthe base of the fifth metatarsal head. Leaving the bottom of the heelexposed, proceed by winding the foam up the leg using minimaloverlap to just below the fibular head. Apply the compression layer with the foot dorsiflexed and startingat the base of the fifth metatarsal head. Apply at full stretch and proceed up the leg using 50% overlap. The bottom of the heel is covered with the compression layer. The end at the fibular head or just below the back of the knee and levelwith the top edge of the foam layer.  Gently pressed and conformed the entire surface of the system to ensurethat the two layers are firmly bound together    Offloading applied: None    Trial Products: no  Provider notified regarding concerns: no  Treatment Changes: no    Educational Barriers: hearing loss  Taught regarding:  Compression  Teaching Method: Exaplanation, Handout and Demo

## 2021-05-29 NOTE — PROGRESS NOTES
Nurse Visit    Chief Complaint: Patient presents to clinic for assement, and treatment of their bilateral lower extremity swelling (right > left)    Dressing on Arrival : velcro left leg; 2-layer right leg    Allergies:   Allergies   Allergen Reactions     Aspirin Nausea And Vomiting     GI upset       Medications:   Current Outpatient Medications:      acetaminophen (TYLENOL) 325 MG tablet, Take 2 tablets (650 mg total) by mouth every 4 (four) hours as needed., Disp: , Rfl: 0     atenolol (TENORMIN) 50 MG tablet, Take 50 mg by mouth., Disp: , Rfl:      atorvastatin (LIPITOR) 10 MG tablet, Take 10 mg by mouth every morning. , Disp: , Rfl:      benzonatate (TESSALON) 100 MG capsule, Take 1 capsule (100 mg total) by mouth 3 (three) times a day as needed for cough., Disp: , Rfl: 0     ferrous gluconate 325 mg (36 mg iron) Tab, Take 325 mg by mouth daily. , Disp: , Rfl:      furosemide (LASIX) 40 MG tablet, Take 40 mg by mouth 2 (two) times a day., Disp: , Rfl:      glipiZIDE (GLUCOTROL XL) 5 MG 24 hr tablet, Take 5 mg by mouth daily with breakfast. , Disp: , Rfl:      LANTUS SOLOSTAR 100 unit/mL (3 mL) pen, Inject 20 Units under the skin at bedtime. (Patient taking differently: Inject 30 Units under the skin at bedtime.    ), Disp: 3 mL, Rfl: PRN     metFORMIN (GLUCOPHAGE) 500 MG tablet, Take 500 mg by mouth daily., Disp: , Rfl: 0     metOLazone (ZAROXOLYN) 5 MG tablet, Take 5 mg by mouth daily., Disp: , Rfl: 0     nystatin (MYCOSTATIN) cream, Apply to irritated areas of skin twice a day until healed.  Do not apply to open ulcer(s)., Disp: 60 g, Rfl: 1     omeprazole (PRILOSEC) 20 MG capsule, Take 20 mg by mouth daily. , Disp: , Rfl:      potassium chloride (KAYCIEL) 20 mEq/15 mL solution, Take 20 mEq by mouth daily. , Disp: , Rfl:      vitamin A-vitamin C-vit E-min (OCUVITE) Tab tablet, Take 1 tablet by mouth daily. , Disp: , Rfl:      warfarin (COUMADIN/JANTOVEN) 2 MG tablet, Take 5 mg by mouth .   , Disp:  , Rfl:     Current Facility-Administered Medications:      lidocaine 2 % jelly (XYLOCAINE), , Topical, PRN, Lex, Autumn Blackwood, CNP, 1 application at 11/29/18 0818    Vital Signs: /78 (Patient Site: Left Arm)   Pulse 82   Temp 98.2  F (36.8  C) (Oral)   Resp 22       Assessment:    General:  Patient presents to clinic in no apparent distress.  Psychiatric:  Alert and oriented x3.   Lower extremity:  edema is present.    Vasc Edema 3/8/2019 3/18/2019 3/29/2019 5/14/2019 5/21/2019   Right just above MTP 27 26.8 26 27.2 26.4   Right Ankle 27.5 26.5 22.8 24.5 25.0   Right Widest Calf 47 44.5 32.5 42.4 36.0   Right Thigh Up 10cm - - 48.5 - 51.4   Left - just above MTP 26.5 24.5 25.2 26.2 26.0   Left Ankle 26,5 24.7 23.5 25.2 23.8   Left Widest Calf 43 39 32.3 35.1 35.0   Left Thigh Up 10cm - - 49.5 - 52.4     Integumentary:  Skin is uniformly warm, dry and pink.    Wound size:   Urethral Catheter 16 Fr. (Active)       VASC Wound 03/08/19 right lateral leg (Active)   Pre Size Length 2 3/8/2019 10:00 AM   Pre Size Width 4.6 3/8/2019 10:00 AM   Pre Size Depth 0.1 3/8/2019 10:00 AM   Pre Total Sq cm 9.2 3/8/2019 10:00 AM   Undermined No 3/12/2019  9:00 AM   Tunneling No 3/12/2019  9:00 AM   Description beefy red wound bed, pink periwound 3/12/2019  9:00 AM   Prodcut Used ABD Pad 3/12/2019  9:00 AM       VASC Wound 03/12/19 Left lateral superior (lower) lower leg (Active)   Pre Size Length 2.2 3/15/2019  9:00 AM   Pre Size Width 3.8 3/15/2019  9:00 AM   Pre Size Depth 0.1 3/15/2019  9:00 AM   Undermined no 3/15/2019  9:00 AM   Tunneling no 3/15/2019  9:00 AM   Description healed 4/12/2019  8:00 AM   Prodcut Used ABD Pad 3/29/2019  9:00 AM       VASC Wound 03/12/19 Left lateral superior (upper) lower leg (Active)   Pre Size Length 0.8 3/15/2019  9:00 AM   Pre Size Width 0.7 3/15/2019  9:00 AM   Pre Size Depth 0.1 3/15/2019  9:00 AM   Pre Total Sq cm 0.56 3/15/2019  9:00 AM   Undermined no 3/15/2019  9:00 AM  "  Tunneling no 3/15/2019  9:00 AM   Description healed 2019  8:00 AM   Prodcut Used ABD Pad 3/12/2019  9:00 AM       VASC Wound 19 Right medial lower leg (Active)   Pre Size Length 4.4 3/15/2019  9:00 AM   Pre Size Width 8 3/15/2019  9:00 AM   Pre Size Depth 0.1 3/15/2019  9:00 AM   Pre Total Sq cm 35.2 3/15/2019  9:00 AM   Undermined no 3/15/2019  9:00 AM   Tunneling no 3/15/2019  9:00 AM   Description healed 2019  8:00 AM   Prodcut Used ABD Pad;Gauze 3/15/2019  9:00 AM       VASC Wound 19 Right lateral lower leg (Active)   Pre Size Length 1 3/22/2019  8:00 AM   Pre Size Width 1.6 3/22/2019  8:00 AM   Pre Size Depth 0.1 3/22/2019  8:00 AM   Pre Total Sq cm 1.6 3/22/2019  8:00 AM   Post Size Length 0.9 3/22/2019  8:00 AM   Post Size Width 1.2 3/22/2019  8:00 AM   Undermined No 3/12/2019  9:00 AM   Tunneling No 3/12/2019  9:00 AM   Description healed 2019  8:00 AM   Prodcut Used ABD Pad 3/12/2019  9:00 AM       Pressure Ulcer Leg Left;Right (Active)       Pressure Ulcer 16 Buttocks Left Stage II (Active)       Wound 16 Non-pressure related ulcer Leg Left (Active)       Incision 16 Abdomen (Active)       Incision 16 Lap site Abdomen Mid (Active)       Incision 16 Surgical Abdomen Right (Active)      Undermining NA    No wounds     Plan:         1. Patient will in 1 weeks         2. Treatment provided will include irrigation and dressings to promote autolytic debridement and will be as listed below     Cleansed with: Remedy skin repair cleanser    Protected skin with: Remedy Skin Repair Cream    Dressings Applied: No dressing    Compression Applied to the Left Leg: Velcro\", Compression Stockings    Compression Applied to the Right Le-Layer Coban    Offloading applied: None    Trial Products: no  Provider notified regarding concerns: no  Treatment Changes: no    Educational Barriers: none  Taught Regarding: Acitivity, Compliance, Compression and " Dressing  Teaching Method: Exaplanation and Handout

## 2021-05-29 NOTE — PROGRESS NOTES
Follow up Vascular Visit       Date of Service:6/12/2019    Date Last Seen: 3/8/2019; 6/5/2019    Chief Complaint: BLE swelling; BLE ulcerations        Pt returns to the [facility] with regards to their BLE swelling and ulcerations. Arrives alone today. Pt is typically seen by Autumn Burnett CNP I am taking over the care of the patient. Currently using 2 layer on the right and velcro on the left; pt reports that he feels the wounds are healed; coming weekly for nurse visits. Denies fevers, chills. Sleeps in a recliner. Breathing is at baseline.       Allergies: Aspirin    Medications:   Current Outpatient Medications:      acetaminophen (TYLENOL) 325 MG tablet, Take 2 tablets (650 mg total) by mouth every 4 (four) hours as needed., Disp: , Rfl: 0     atenolol (TENORMIN) 50 MG tablet, Take 50 mg by mouth., Disp: , Rfl:      atorvastatin (LIPITOR) 10 MG tablet, Take 10 mg by mouth every morning. , Disp: , Rfl:      benzonatate (TESSALON) 100 MG capsule, Take 1 capsule (100 mg total) by mouth 3 (three) times a day as needed for cough., Disp: , Rfl: 0     ferrous gluconate 325 mg (36 mg iron) Tab, Take 325 mg by mouth daily. , Disp: , Rfl:      furosemide (LASIX) 40 MG tablet, Take 40 mg by mouth 2 (two) times a day., Disp: , Rfl:      glipiZIDE (GLUCOTROL XL) 5 MG 24 hr tablet, Take 5 mg by mouth daily with breakfast. , Disp: , Rfl:      LANTUS SOLOSTAR 100 unit/mL (3 mL) pen, Inject 20 Units under the skin at bedtime. (Patient taking differently: Inject 30 Units under the skin at bedtime.    ), Disp: 3 mL, Rfl: PRN     metFORMIN (GLUCOPHAGE) 500 MG tablet, Take 500 mg by mouth daily., Disp: , Rfl: 0     metOLazone (ZAROXOLYN) 5 MG tablet, Take 5 mg by mouth daily., Disp: , Rfl: 0     nystatin (MYCOSTATIN) cream, Apply to irritated areas of skin twice a day until healed.  Do not apply to open ulcer(s)., Disp: 60 g, Rfl: 1     omeprazole (PRILOSEC) 20 MG capsule, Take 20 mg  "by mouth daily. , Disp: , Rfl:      potassium chloride (KAYCIEL) 20 mEq/15 mL solution, Take 20 mEq by mouth daily. , Disp: , Rfl:      vitamin A-vitamin C-vit E-min (OCUVITE) Tab tablet, Take 1 tablet by mouth daily. , Disp: , Rfl:      warfarin (COUMADIN/JANTOVEN) 2 MG tablet, Take 5 mg by mouth .   , Disp: , Rfl:     Current Facility-Administered Medications:      lidocaine 2 % jelly (XYLOCAINE), , Topical, PRN, Lex, Autumn Blackwood, CNP, 1 application at 11/29/18 0818    History:   Past Medical History:   Diagnosis Date     Anemia      Coronary artery disease 9/25/2015    stents placed      Diabetes mellitus (H)      Duodenitis 5/11/2016     Dyslipidemia      Gastroduodenal ulcer 9/24/2015     GERD (gastroesophageal reflux disease)      GI bleed 07/08/2016     Gout      Hematuria      Hyperlipemia      Hypertension      Kidney stone 10/6/2015       Physical Exam:    /66   Pulse 60   Temp 97.7  F (36.5  C)   Resp 18   Ht 6' 1\" (1.854 m)   Wt (!) 228 lb 4.8 oz (103.6 kg)   BMI 30.12 kg/m      General:  Patient presents to clinic in no apparent distress.  Head: normocephalic atraumatic  Psychiatric:  Alert and oriented x3.   Respiratory: unlabored breathing; no cough  Integumentary:  Skin is uniformly warm, dry and pink.    Extremities: BLE with reduced swelling; diminished hair growth below the knee; extensive hemosiderosis; extensive scaling; noted to have intact skin underneath this; no erythema; no pain to palpation; no drainage      Circumferential volume measures:    Vasc Edema 3/18/2019 3/29/2019 5/14/2019 5/21/2019 6/12/2019   Right just above MTP 26.8 26 27.2 26.4 26.1   Right Ankle 26.5 22.8 24.5 25.0 23.5   Right Widest Calf 44.5 32.5 42.4 36.0 33   Right Thigh Up 10cm - 48.5 - 51.4 -   Left - just above MTP 24.5 25.2 26.2 26.0 26.3   Left Ankle 24.7 23.5 25.2 23.8 25.3   Left Widest Calf 39 32.3 35.1 35.0 34   Left Thigh Up 10cm - 49.5 - 52.4 -       Ulceration(s)/Wound(s):     Urethral " Catheter 16 Fr. (Active)       VASC Wound 03/08/19 right lateral leg (Active)   Pre Size Length 2 3/8/2019 10:00 AM   Pre Size Width 4.6 3/8/2019 10:00 AM   Pre Size Depth 0.1 3/8/2019 10:00 AM   Pre Total Sq cm 9.2 3/8/2019 10:00 AM   Undermined No 3/12/2019  9:00 AM   Tunneling No 3/12/2019  9:00 AM   Description beefy red wound bed, pink periwound 3/12/2019  9:00 AM   Prodcut Used ABD Pad 3/12/2019  9:00 AM       VASC Wound 03/12/19 Left lateral superior (lower) lower leg (Active)   Pre Size Length 2.2 3/15/2019  9:00 AM   Pre Size Width 3.8 3/15/2019  9:00 AM   Pre Size Depth 0.1 3/15/2019  9:00 AM   Undermined no 3/15/2019  9:00 AM   Tunneling no 3/15/2019  9:00 AM   Description healed 4/12/2019  8:00 AM   Prodcut Used ABD Pad 3/29/2019  9:00 AM       VASC Wound 03/12/19 Left lateral superior (upper) lower leg (Active)   Pre Size Length 0.8 3/15/2019  9:00 AM   Pre Size Width 0.7 3/15/2019  9:00 AM   Pre Size Depth 0.1 3/15/2019  9:00 AM   Pre Total Sq cm 0.56 3/15/2019  9:00 AM   Undermined no 3/15/2019  9:00 AM   Tunneling no 3/15/2019  9:00 AM   Description healed 4/12/2019  8:00 AM   Prodcut Used ABD Pad 3/12/2019  9:00 AM       VASC Wound 03/12/19 Right medial lower leg (Active)   Pre Size Length 4.4 3/15/2019  9:00 AM   Pre Size Width 8 3/15/2019  9:00 AM   Pre Size Depth 0.1 3/15/2019  9:00 AM   Pre Total Sq cm 35.2 3/15/2019  9:00 AM   Undermined no 3/15/2019  9:00 AM   Tunneling no 3/15/2019  9:00 AM   Description healed 4/12/2019  8:00 AM   Prodcut Used ABD Pad;Gauze 3/15/2019  9:00 AM       VASC Wound 03/12/19 Right lateral lower leg (Active)   Pre Size Length 1 3/22/2019  8:00 AM   Pre Size Width 1.6 3/22/2019  8:00 AM   Pre Size Depth 0.1 3/22/2019  8:00 AM   Pre Total Sq cm 1.6 3/22/2019  8:00 AM   Post Size Length 0.9 3/22/2019  8:00 AM   Post Size Width 1.2 3/22/2019  8:00 AM   Undermined No 3/12/2019  9:00 AM   Tunneling No 3/12/2019  9:00 AM   Description healed 4/12/2019  8:00 AM   Prodcut  Used ABD Pad 3/12/2019  9:00 AM       Pressure Ulcer Leg Left;Right (Active)       Pressure Ulcer 06/21/16 Buttocks Left Stage II (Active)       Wound 05/07/16 Non-pressure related ulcer Leg Left (Active)       Incision 06/18/16 Abdomen (Active)       Incision 07/08/16 Lap site Abdomen Mid (Active)       Incision 07/08/16 Surgical Abdomen Right (Active)        Lab Values    No results found for: SEDRATE  Lab Results   Component Value Date    CREATININE 1.07 04/24/2019     Lab Results   Component Value Date    HGBA1C 7.7 (H) 02/02/2018     Lab Results   Component Value Date    BUN 22 04/24/2019     Lab Results   Component Value Date    ALBUMIN 3.4 (L) 11/21/2018     No results found for: [id number]          Impression:  1. Venous hypertension, chronic, bilateral     2. Venous stasis dermatitis of both lower extremities     3. Lymphedema     4. Type 2 diabetes mellitus with complication, unspecified whether long term insulin use (H)     5. Leg ulcer, right, limited to breakdown of skin (H)     6. Idiopathic peripheral neuropathy     7. Ulcer of left lower extremity, limited to breakdown of skin (H)              Are any of these wounds new today: No; Location: na    Assessment/Plan:          1. Debridement: After discussion of risk factors and verbal consent was obtained 2% Lidocaine HCL jelly was applied, under clean conditions, the bilateral leg scaling was debrided using currette. Devitalized and nonviable tissue, decrease overall bacteria load, disrupt biofilm formation Total excisional debridement was 20 sq cm from the epidermis/dermis area with a depth of 0 cm.   skin intact underneath.  Measures were unchanged after debridement.         2.  Wound treatment: wound treatment will include irrigation and dressings to promote autolytic debridement which will include:healed; lotion daily; no dressing           3. Edema: will go to bilateral velcro; elevation; will see back in 3 months to ensure he is doing ok. The  compression wraps were applied today in clinic.           4. Nutrition: low sodium; focus on protein, diabetic diet           5. Offloading: na     Patient will follow up with me in 3 months for reevaluation. They were instructed to call the clinic sooner with any signs or symptoms of infection or any further questions/concerns. Answered all questions.    Mie Sampson DNP, RN, CNP, [facility]  [phone]        This note was electronically signed by Mei Sampson

## 2021-05-29 NOTE — PATIENT INSTRUCTIONS - HE
- Please call us if your compression wraps fall more than 1-2 inches below the bend of the knee. Call if they are too painful. Call if they get wet. If it is a weekend and the wraps fall down, are too painful, or get wet take the wraps off and put on another form of compression. Compression such as velcro wraps, compression stockings, short stretch bandages, or tubular compression. Apply one of these until you can be seen in clinic. Please call us if you have any questions [phone].    - Treatment:  Layered Compression Bandaging (2-layer)    What is it?  The layered compression bandaging has a layer of absorbent material that will soak up drainage.     Why we do it.   This is done to treat swelling, wounds, or both.  This will in turn help circulation and healing.    How to care for your bandages.  The wraps need to be kept dry. If  the wraps become wet, remove them and call the clinic to have another wrap applied.    What to expect.  It is common for the wraps to be uncomfortable at the beginning. The first two days are usually the hardest; then they will become more comfortable.       Elevating your legs will help the discomfort. Try to elevate your legs as much as possible.    If rest and elevation does not help your discomfort, call your provider.  If your provider is not available you can remove the wrap and leave a message for further instructions.    - Swelling and Compression Therapy    Swelling in the legs can be caused by many reasons. No matter what the reason, treatment usually includes some type of compression. This may be done with a support sock, dressing, ace wrap, or layered wraps.     It is important to treat the swelling for many reasons. If the swelling is not treated you may develop blisters that can lead to ulcerations. This is caused when extra fluid goes into tissue causing damage and blocking blood flow to the tissue.     It is important that you wear your compression every day,  including days that you will be seen in clinic.     Compression is often the most important part of treating leg wounds. Without controlling the swelling it is often not possible to heal wounds.     Going without compression for even brief periods of time can be damaging to your legs and your health.  Your compression should be put on first thing in the morning. Take the compression off at night only when instructed by your care provider to do so. Sometimes wearing compression 24 hours a day will be recommended.       If you are having difficulty wearing your compression it is important to notify your care provider so that other options may be reviewed.    Thank you for choosing [facility]. Please call us if you have any questions [phone].

## 2021-05-29 NOTE — PATIENT INSTRUCTIONS - HE
- Please call us if your compression wraps fall more than 1-2 inches below the bend of the knee. Call if they are too painful. Call if they get wet. If it is a weekend and the wraps fall down, are too painful, or get wet take the wraps off and put on another form of compression. Compression such as velcro wraps, compression stockings, short stretch bandages, or tubular compression. Apply one of these until you can be seen in clinic. Please call us if you have any questions [phone].    - Treatment:  Layered Compression Bandaging (2-layer)    What is it?  The layered compression bandaging has a layer of absorbent material that will soak up drainage.     Why we do it.   This is done to treat swelling, wounds, or both.  This will in turn help circulation and healing.    How to care for your bandages.  The wraps need to be kept dry. If  the wraps become wet, remove them and call the clinic to have another wrap applied.    What to expect.  It is common for the wraps to be uncomfortable at the beginning. The first two days are usually the hardest; then they will become more comfortable.       Elevating your legs will help the discomfort. Try to elevate your legs as much as possible.    If rest and elevation does not help your discomfort, call your provider.  If your provider is not available you can remove the wrap and leave a message for further instructions.    - Swelling and Compression Therapy    Swelling in the legs can be caused by many reasons. No matter what the reason, treatment usually includes some type of compression. This may be done with a support sock, dressing, ace wrap, or layered wraps.     It is important to treat the swelling for many reasons. If the swelling is not treated you may develop blisters that can lead to ulcerations. This is caused when extra fluid goes into tissue causing damage and blocking blood flow to the tissue.     It is important that you wear your compression every day,  including days that you will be seen in clinic.     Compression is often the most important part of treating leg wounds. Without controlling the swelling it is often not possible to heal wounds.     Going without compression for even brief periods of time can be damaging to your legs and your health.  Your compression should be put on first thing in the morning. Take the compression off at night only when instructed by your care provider to do so. Sometimes wearing compression 24 hours a day will be recommended.       If you are having difficulty wearing your compression it is important to notify your care provider so that other options may be reviewed.    Thank you for choosing [facility]. Please call us if you have any questions [phone].

## 2021-05-30 VITALS — HEIGHT: 73 IN | WEIGHT: 217.8 LBS | BODY MASS INDEX: 28.86 KG/M2

## 2021-05-30 VITALS — WEIGHT: 209 LBS | HEIGHT: 73 IN | BODY MASS INDEX: 27.7 KG/M2

## 2021-05-30 NOTE — PATIENT INSTRUCTIONS - HE
Your wound is healed! Continue to monitor area and contact clinic if area shows signs of reopening or you have other concerns related to your wound [phone].

## 2021-05-30 NOTE — PATIENT INSTRUCTIONS - HE
Today silvercel was applied in clinic to your wound. Leave that dressing in place until next week if possible. Continue to wear your velcro daily and follow up next week for a nurse visit.      Swelling and Compression Therapy    Swelling in the legs can be caused by many reasons. No matter what the reason, treatment usually includes some type of compression. This may be done with a support sock, dressing, ace wrap, or layered wraps.     It is important to treat the swelling for many reasons. If the swelling is not treated you may develop blisters that can lead to ulcerations. This is caused when extra fluid goes into tissue causing damage and blocking blood flow to the tissue.     It is important that you wear your compression every day, including days that you will be seen in clinic.     Compression is often the most important part of treating leg wounds. Without controlling the swelling it is often not possible to heal wounds.     Going without compression for even brief periods of time can be damaging to your legs and your health.  Your compression should be put on first thing in the morning. Take the compression off at night only when instructed by your care provider to do so. Sometimes wearing compression 24 hours a day will be recommended.       If you are having difficulty wearing your compression it is important to notify your care provider so that other options may be reviewed.    Please call us if you have any questions [phone].    Thank you for choosing [facility].

## 2021-05-31 ENCOUNTER — RECORDS - HEALTHEAST (OUTPATIENT)
Dept: ADMINISTRATIVE | Facility: CLINIC | Age: 86
End: 2021-05-31

## 2021-05-31 NOTE — PROGRESS NOTES
Follow up Vascular Visit       Date of Service:8/8/2019    Date Last Seen: 3/8/2019; 6/5/2019    Chief Complaint: BLE swelling; nail issues      Pt returns to the [facility] with regards to their BLE swelling and ulcerations. Arrives alone today. Reports a new blister showed up last week; this has since resolved. He is feeling well; no medication changes; no recent hospitalizations. He is wearing his velcro wraps daily. Weight stable; taking medications as prescribed; breathing at baseline. Wife is applying lotion daily. Denies fevers, chills. Sleeps in a recliner.     Allergies: Aspirin    Medications:   Current Outpatient Medications:      acetaminophen (TYLENOL) 325 MG tablet, Take 2 tablets (650 mg total) by mouth every 4 (four) hours as needed., Disp: , Rfl: 0     atenolol (TENORMIN) 50 MG tablet, Take 50 mg by mouth., Disp: , Rfl:      atorvastatin (LIPITOR) 10 MG tablet, Take 10 mg by mouth every morning. , Disp: , Rfl:      benzonatate (TESSALON) 100 MG capsule, Take 1 capsule (100 mg total) by mouth 3 (three) times a day as needed for cough., Disp: , Rfl: 0     ferrous gluconate 325 mg (36 mg iron) Tab, Take 325 mg by mouth daily. , Disp: , Rfl:      furosemide (LASIX) 40 MG tablet, Take 40 mg by mouth 2 (two) times a day., Disp: , Rfl:      glipiZIDE (GLUCOTROL XL) 5 MG 24 hr tablet, Take 5 mg by mouth daily with breakfast. , Disp: , Rfl:      LANTUS SOLOSTAR 100 unit/mL (3 mL) pen, Inject 20 Units under the skin at bedtime. (Patient taking differently: Inject 30 Units under the skin at bedtime.    ), Disp: 3 mL, Rfl: PRN     metFORMIN (GLUCOPHAGE) 500 MG tablet, Take 500 mg by mouth daily., Disp: , Rfl: 0     metOLazone (ZAROXOLYN) 5 MG tablet, Take 5 mg by mouth daily., Disp: , Rfl: 0     nystatin (MYCOSTATIN) cream, Apply to irritated areas of skin twice a day until healed.  Do not apply to open ulcer(s)., Disp: 60 g, Rfl: 1     omeprazole (PRILOSEC) 20 MG  "capsule, Take 20 mg by mouth daily. , Disp: , Rfl:      potassium chloride (KAYCIEL) 20 mEq/15 mL solution, Take 20 mEq by mouth daily. , Disp: , Rfl:      vitamin A-vitamin C-vit E-min (OCUVITE) Tab tablet, Take 1 tablet by mouth daily. , Disp: , Rfl:      warfarin (COUMADIN/JANTOVEN) 2 MG tablet, Take 5 mg by mouth .   , Disp: , Rfl:     Current Facility-Administered Medications:      lidocaine 2 % jelly (XYLOCAINE), , Topical, PRN, Lex, Autumn Blackwood, CNP, 1 application at 11/29/18 0818    History:   Past Medical History:   Diagnosis Date     Anemia      Coronary artery disease 9/25/2015    stents placed      Diabetes mellitus (H)      Duodenitis 5/11/2016     Dyslipidemia      Gastroduodenal ulcer 9/24/2015     GERD (gastroesophageal reflux disease)      GI bleed 07/08/2016     Gout      Hematuria      Hyperlipemia      Hypertension      Kidney stone 10/6/2015       Physical Exam:    /80   Pulse 60   Temp 97.7  F (36.5  C)   Resp 18   Ht 6' 1\" (1.854 m)   Wt (!) 225 lb 1.6 oz (102.1 kg)   BMI 29.70 kg/m      General:  Patient presents to clinic in no apparent distress.  Head: normocephalic atraumatic  Psychiatric:  Alert and oriented x3.   Respiratory: unlabored breathing; no cough  Integumentary:  Skin is uniformly warm, dry and pink.    Extremities: BLE with stable swelling; diminished hair growth below the knee; extensive hemosiderosis; extensive scaling; noted to have intact skin underneath this; no erythema; no pain to palpation; no drainage; nails 1-5 are elongated; thickened and yellowed.      Circumferential volume measures:    Vasc Edema 5/14/2019 5/21/2019 6/12/2019 7/16/2019 8/8/2019   Right just above MTP 27.2 26.4 26.1 28.2 28   Right Ankle 24.5 25.0 23.5 26.8 26.5   Right Widest Calf 42.4 36.0 33 36.8 25.8   Right Thigh Up 10cm - 51.4 - - -   Left - just above MTP 26.2 26.0 26.3 26.4 27   Left Ankle 25.2 23.8 25.3 26.0 24.6   Left Widest Calf 35.1 35.0 34 34.8 35.4   Left Thigh Up " 10cm - 52.4 - - -       Ulceration(s)/Wound(s):     Urethral Catheter 16 Fr. (Active)       VASC Wound 03/08/19 right lateral leg (Active)   Pre Size Length 2 3/8/2019 10:00 AM   Pre Size Width 4.6 3/8/2019 10:00 AM   Pre Size Depth 0.1 3/8/2019 10:00 AM   Pre Total Sq cm 9.2 3/8/2019 10:00 AM   Undermined No 3/12/2019  9:00 AM   Tunneling No 3/12/2019  9:00 AM   Description beefy red wound bed, pink periwound 3/12/2019  9:00 AM   Prodcut Used ABD Pad 3/12/2019  9:00 AM       VASC Wound 03/12/19 Left lateral superior (lower) lower leg (Active)   Pre Size Length 2.2 3/15/2019  9:00 AM   Pre Size Width 3.8 3/15/2019  9:00 AM   Pre Size Depth 0.1 3/15/2019  9:00 AM   Undermined no 3/15/2019  9:00 AM   Tunneling no 3/15/2019  9:00 AM   Description healed 4/12/2019  8:00 AM   Prodcut Used ABD Pad 3/29/2019  9:00 AM       VASC Wound 03/12/19 Left lateral superior (upper) lower leg (Active)   Pre Size Length 0.8 3/15/2019  9:00 AM   Pre Size Width 0.7 3/15/2019  9:00 AM   Pre Size Depth 0.1 3/15/2019  9:00 AM   Pre Total Sq cm 0.56 3/15/2019  9:00 AM   Undermined no 3/15/2019  9:00 AM   Tunneling no 3/15/2019  9:00 AM   Description healed 4/12/2019  8:00 AM   Prodcut Used ABD Pad 3/12/2019  9:00 AM       VASC Wound 03/12/19 Right medial lower leg (Active)   Pre Size Length 4.4 3/15/2019  9:00 AM   Pre Size Width 8 3/15/2019  9:00 AM   Pre Size Depth 0.1 3/15/2019  9:00 AM   Pre Total Sq cm 35.2 3/15/2019  9:00 AM   Undermined no 3/15/2019  9:00 AM   Tunneling no 3/15/2019  9:00 AM   Description healed 4/12/2019  8:00 AM   Prodcut Used ABD Pad;Gauze 3/15/2019  9:00 AM       VASC Wound 03/12/19 Right lateral lower leg (Active)   Pre Size Length 1 3/22/2019  8:00 AM   Pre Size Width 1.6 3/22/2019  8:00 AM   Pre Size Depth 0.1 3/22/2019  8:00 AM   Pre Total Sq cm 1.6 3/22/2019  8:00 AM   Post Size Length 0.9 3/22/2019  8:00 AM   Post Size Width 1.2 3/22/2019  8:00 AM   Undermined No 3/12/2019  9:00 AM   Tunneling No 3/12/2019   9:00 AM   Description healed 4/12/2019  8:00 AM   Prodcut Used ABD Pad 3/12/2019  9:00 AM       VASC Wound 07/16/19 (Active)       VASC Wound 07/16/19 Right lateral lower leg (Active)   Pre Size Length 0.8 7/16/2019  9:00 AM   Pre Size Width 0.8 7/16/2019  9:00 AM   Pre Size Depth 0.1 7/16/2019  9:00 AM   Pre Total Sq cm 0.64 7/16/2019  9:00 AM   Description healed 7/24/2019  8:00 AM       Pressure Ulcer Leg Left;Right (Active)       Pressure Ulcer 06/21/16 Buttocks Left Stage II (Active)       Wound 05/07/16 Non-pressure related ulcer Leg Left (Active)       Incision 06/18/16 Abdomen (Active)       Incision 07/08/16 Lap site Abdomen Mid (Active)       Incision 07/08/16 Surgical Abdomen Right (Active)        Lab Values    No results found for: SEDRATE  Lab Results   Component Value Date    CREATININE 1.07 04/24/2019     Lab Results   Component Value Date    HGBA1C 7.7 (H) 02/02/2018     Lab Results   Component Value Date    BUN 22 04/24/2019     Lab Results   Component Value Date    ALBUMIN 3.4 (L) 11/21/2018     No results found for: [id number]          Impression:  No diagnosis found.         Are any of these wounds new today: No; Location: na    Assessment/Plan:          1. Debridement: After discussion of risk factors and verbal consent was obtained 2% Lidocaine HCL jelly was applied, under clean conditions, the bilateral leg scaling was debrided using currette. Devitalized and nonviable tissue, decrease overall bacteria load, disrupt biofilm formation Total excisional debridement was 20 sq cm from the epidermis/dermis area with a depth of 0 cm.   skin intact underneath.  Measures were unchanged after debridement.         2.  Wound treatment: wound treatment will include irrigation and dressings to promote autolytic debridement which will include:healed; lotion daily; no dressing; sample of lotion provided           3. Edema: continue bilateral velcro; elevation; will see back in 3 months to ensure he is doing  ok. The compression wraps were applied today in clinic.           4. Nutrition: low sodium; focus on protein, diabetic diet           5. Offloading: na          6. Onychomycosis: nails 1-5 bilaterally were debrided and filed smooth; tolerated well; no complications.     Patient will follow up with me in 3 months for reevaluation. They were instructed to call the clinic sooner with any signs or symptoms of infection or any further questions/concerns. Answered all questions.    Mei Sampson DNP, RN, CNP, [facility]  [phone]        This note was electronically signed by Mei Sampson

## 2021-06-01 VITALS — BODY MASS INDEX: 26.97 KG/M2 | WEIGHT: 204.4 LBS

## 2021-06-01 VITALS — BODY MASS INDEX: 28.29 KG/M2 | WEIGHT: 214.4 LBS

## 2021-06-01 VITALS — BODY MASS INDEX: 29.63 KG/M2 | WEIGHT: 224.6 LBS

## 2021-06-01 VITALS — BODY MASS INDEX: 29.16 KG/M2 | WEIGHT: 221 LBS

## 2021-06-01 VITALS — WEIGHT: 215.8 LBS | BODY MASS INDEX: 28.47 KG/M2

## 2021-06-01 VITALS — BODY MASS INDEX: 28.76 KG/M2 | WEIGHT: 218 LBS

## 2021-06-01 NOTE — PATIENT INSTRUCTIONS - HE
- Please call us if your compression wraps fall more than 1-2 inches below the bend of the knee. Remove the wraps if you experience any shortness of breathe or notice your toes turning blue/purple and then give us a call. Call if they are too painful. Call if they get wet. If it is a weekend and the wraps fall down, are too painful, or get wet take the wraps off and put on another form of compression. Compression such as velcro wraps, compression stockings, short stretch bandages, or tubular compression. Apply one of these until you can be seen in clinic. Please call us if you have any questions [phone].    - Treatment:  Layered Compression Bandaging (2-layer)    What is it?  The layered compression bandaging has a layer of absorbent material that will soak up drainage.     Why we do it.   This is done to treat swelling, wounds, or both.  This will in turn help circulation and healing.    How to care for your bandages.  The wraps need to be kept dry. If  the wraps become wet, remove them and call the clinic to have another wrap applied.    What to expect.  It is common for the wraps to be uncomfortable at the beginning. The first two days are usually the hardest; then they will become more comfortable.       Elevating your legs will help the discomfort. Try to elevate your legs as much as possible.    If rest and elevation does not help your discomfort, call your provider.  If your provider is not available you can remove the wrap and leave a message for further instructions.    - Swelling and Compression Therapy    Swelling in the legs can be caused by many reasons. No matter what the reason, treatment usually includes some type of compression. This may be done with a support sock, dressing, ace wrap, or layered wraps.     It is important to treat the swelling for many reasons. If the swelling is not treated you may develop blisters that can lead to ulcerations. This is caused when extra fluid goes into tissue  causing damage and blocking blood flow to the tissue.     It is important that you wear your compression every day, including days that you will be seen in clinic.     Compression is often the most important part of treating leg wounds. Without controlling the swelling it is often not possible to heal wounds.     Going without compression for even brief periods of time can be damaging to your legs and your health.  Your compression should be put on first thing in the morning. Take the compression off at night only when instructed by your care provider to do so. Sometimes wearing compression 24 hours a day will be recommended.       If you are having difficulty wearing your compression it is important to notify your care provider so that other options may be reviewed.    Thank you for choosing [employer]. Please call us if you have any questions [phone].

## 2021-06-01 NOTE — PATIENT INSTRUCTIONS - HE
- Please call us if your compression wraps fall more than 1-2 inches below the bend of the knee. Remove the wraps if you experience any shortness of breathe or notice your toes turning blue/purple and then give us a call. Call if they are too painful. Call if they get wet. If it is a weekend and the wraps fall down, are too painful, or get wet take the wraps off and put on another form of compression. Compression such as velcro wraps, compression stockings, short stretch bandages, or tubular compression. Apply one of these until you can be seen in clinic. Please call us if you have any questions [phone].    - Treatment:  Layered Compression Bandaging (2-layer)    What is it?  The layered compression bandaging has a layer of absorbent material that will soak up drainage.     Why we do it.   This is done to treat swelling, wounds, or both.  This will in turn help circulation and healing.    How to care for your bandages.  The wraps need to be kept dry. If  the wraps become wet, remove them and call the clinic to have another wrap applied.    What to expect.  It is common for the wraps to be uncomfortable at the beginning. The first two days are usually the hardest; then they will become more comfortable.       Elevating your legs will help the discomfort. Try to elevate your legs as much as possible.    If rest and elevation does not help your discomfort, call your provider.  If your provider is not available you can remove the wrap and leave a message for further instructions.    - Swelling and Compression Therapy    Swelling in the legs can be caused by many reasons. No matter what the reason, treatment usually includes some type of compression. This may be done with a support sock, dressing, ace wrap, or layered wraps.     It is important to treat the swelling for many reasons. If the swelling is not treated you may develop blisters that can lead to ulcerations. This is caused when extra fluid goes into tissue  causing damage and blocking blood flow to the tissue.     It is important that you wear your compression every day, including days that you will be seen in clinic.     Compression is often the most important part of treating leg wounds. Without controlling the swelling it is often not possible to heal wounds.     Going without compression for even brief periods of time can be damaging to your legs and your health.  Your compression should be put on first thing in the morning. Take the compression off at night only when instructed by your care provider to do so. Sometimes wearing compression 24 hours a day will be recommended.       If you are having difficulty wearing your compression it is important to notify your care provider so that other options may be reviewed.    Thank you for choosing [employer]. Please call us if you have any questions [phone].

## 2021-06-02 VITALS — BODY MASS INDEX: 29.95 KG/M2 | HEIGHT: 73 IN | WEIGHT: 226 LBS

## 2021-06-02 VITALS — HEIGHT: 73 IN | BODY MASS INDEX: 29.55 KG/M2 | WEIGHT: 223 LBS

## 2021-06-02 VITALS — BODY MASS INDEX: 29.69 KG/M2 | HEIGHT: 73 IN | WEIGHT: 224 LBS

## 2021-06-02 VITALS — BODY MASS INDEX: 29.82 KG/M2 | WEIGHT: 226 LBS

## 2021-06-02 VITALS — WEIGHT: 221 LBS | BODY MASS INDEX: 29.29 KG/M2 | HEIGHT: 73 IN

## 2021-06-02 VITALS — BODY MASS INDEX: 29.29 KG/M2 | WEIGHT: 221 LBS | HEIGHT: 73 IN

## 2021-06-02 VITALS — HEIGHT: 73 IN | WEIGHT: 226 LBS | BODY MASS INDEX: 29.95 KG/M2

## 2021-06-02 VITALS — BODY MASS INDEX: 27.43 KG/M2 | WEIGHT: 207 LBS | HEIGHT: 73 IN

## 2021-06-02 VITALS — WEIGHT: 207 LBS | BODY MASS INDEX: 27.43 KG/M2 | HEIGHT: 73 IN

## 2021-06-02 VITALS — BODY MASS INDEX: 29.69 KG/M2 | WEIGHT: 224 LBS | HEIGHT: 73 IN

## 2021-06-02 NOTE — PATIENT INSTRUCTIONS - HE
- Please call us if your compression wraps fall more than 1-2 inches below the bend of the knee. Remove the wraps if you experience any shortness of breathe or notice your toes turning blue/purple and then give us a call. Call if they are too painful. Call if they get wet. If it is a weekend and the wraps fall down, are too painful, or get wet take the wraps off and put on another form of compression. Compression such as velcro wraps, compression stockings, short stretch bandages, or tubular compression. Apply one of these until you can be seen in clinic. Please call us if you have any questions [phone].    - Treatment:  Layered Compression Bandaging (2-layer)    What is it?  The layered compression bandaging has a layer of absorbent material that will soak up drainage.     Why we do it.   This is done to treat swelling, wounds, or both.  This will in turn help circulation and healing.    How to care for your bandages.  The wraps need to be kept dry. If  the wraps become wet, remove them and call the clinic to have another wrap applied.    What to expect.  It is common for the wraps to be uncomfortable at the beginning. The first two days are usually the hardest; then they will become more comfortable.       Elevating your legs will help the discomfort. Try to elevate your legs as much as possible.    If rest and elevation does not help your discomfort, call your provider.  If your provider is not available you can remove the wrap and leave a message for further instructions.    - Swelling and Compression Therapy    Swelling in the legs can be caused by many reasons. No matter what the reason, treatment usually includes some type of compression. This may be done with a support sock, dressing, ace wrap, or layered wraps.     It is important to treat the swelling for many reasons. If the swelling is not treated you may develop blisters that can lead to ulcerations. This is caused when extra fluid goes into tissue  causing damage and blocking blood flow to the tissue.     It is important that you wear your compression every day, including days that you will be seen in clinic.     Compression is often the most important part of treating leg wounds. Without controlling the swelling it is often not possible to heal wounds.     Going without compression for even brief periods of time can be damaging to your legs and your health.  Your compression should be put on first thing in the morning. Take the compression off at night only when instructed by your care provider to do so. Sometimes wearing compression 24 hours a day will be recommended.       If you are having difficulty wearing your compression it is important to notify your care provider so that other options may be reviewed.    Thank you for choosing [employer]. Please call us if you have any questions [phone].

## 2021-06-02 NOTE — PROGRESS NOTES
Compression Applied to Right  2-Layer [place]: I Applied the inner foam layer with the foot dorsiflexed and started atthe base of the fifth metatarsal head. I left the bottom of the heel exposed, and proceed by winding the foam up the leg using minimal overlap to just below the fibular head. I then applied the compression layer with the foot dorsiflexed and startingat the base of the fifth metatarsal head. I applied at full stretch and proceeded up the leg using 50% overlap. The bottom of the heel is covered with the compression layer up to the end at the fibular head just below the back of the knee and levelwith the top edge of the foam layer.  I gently pressed and conformed the entire surface of the system to ensurethat the two layers are firmly bound together       Compression Applied to Left  Tubigrip Size F

## 2021-06-02 NOTE — PATIENT INSTRUCTIONS - HE
- Please call us if your compression wraps fall more than 1-2 inches below the bend of the knee. Call if they are too painful. Call if they get wet. If it is a weekend and the wraps fall down, are too painful, or get wet take the wraps off and put on another form of compression. Compression such as velcro wraps, compression stockings, short stretch bandages, or tubular compression. Apply one of these until you can be seen in clinic. Please call us if you have any questions [phone].    - Treatment:  Layered Compression Bandaging (2-layer)    What is it?  The layered compression bandaging has a layer of absorbent material that will soak up drainage.     Why we do it.   This is done to treat swelling, wounds, or both.  This will in turn help circulation and healing.    How to care for your bandages.  The wraps need to be kept dry. If  the wraps become wet, remove them and call the clinic to have another wrap applied.    What to expect.  It is common for the wraps to be uncomfortable at the beginning. The first two days are usually the hardest; then they will become more comfortable.       Elevating your legs will help the discomfort. Try to elevate your legs as much as possible.    If rest and elevation does not help your discomfort, call your provider.  If your provider is not available you can remove the wrap and leave a message for further instructions.    - Swelling and Compression Therapy    Swelling in the legs can be caused by many reasons. No matter what the reason, treatment usually includes some type of compression. This may be done with a support sock, dressing, ace wrap, or layered wraps.     It is important to treat the swelling for many reasons. If the swelling is not treated you may develop blisters that can lead to ulcerations. This is caused when extra fluid goes into tissue causing damage and blocking blood flow to the tissue.     It is important that you wear your compression every day,  including days that you will be seen in clinic.     Compression is often the most important part of treating leg wounds. Without controlling the swelling it is often not possible to heal wounds.     Going without compression for even brief periods of time can be damaging to your legs and your health.  Your compression should be put on first thing in the morning. Take the compression off at night only when instructed by your care provider to do so. Sometimes wearing compression 24 hours a day will be recommended.       If you are having difficulty wearing your compression it is important to notify your care provider so that other options may be reviewed.    Thank you for choosing [email or web address]. Please call us if you have any questions [phone].

## 2021-06-02 NOTE — PATIENT INSTRUCTIONS - HE
Left leg  Healed  Lotion daily  We will apply double tubular compression until you can get your velcro wrap on  Ok to wear the velcro 24 hours per day      Right leg  Weekly at the clinic  Cleanse with saline  Pat dry  Lotion to intact skin  Xeroform to open areas  ABD  Rolled gauze  2 layer reg to the right leg      Take Lasix two times a day

## 2021-06-02 NOTE — TELEPHONE ENCOUNTER
Placed call to Robinson regarding missing his nurse visit this morning to have his 2-layer compression wrap changed. Left message due to no answer. Reminded Robinson that he cannot leave the wrap on for more than a week so he should call to schedule a nurse visit. Let him know we have openings tomorrow.

## 2021-06-03 VITALS — BODY MASS INDEX: 29.83 KG/M2 | WEIGHT: 225.1 LBS | HEIGHT: 73 IN

## 2021-06-03 VITALS
TEMPERATURE: 98.6 F | HEART RATE: 72 BPM | HEIGHT: 73 IN | BODY MASS INDEX: 30.09 KG/M2 | WEIGHT: 227 LBS | DIASTOLIC BLOOD PRESSURE: 60 MMHG | RESPIRATION RATE: 12 BRPM | SYSTOLIC BLOOD PRESSURE: 123 MMHG

## 2021-06-03 VITALS — BODY MASS INDEX: 30.26 KG/M2 | WEIGHT: 228.3 LBS | HEIGHT: 73 IN

## 2021-06-03 VITALS
HEART RATE: 64 BPM | RESPIRATION RATE: 16 BRPM | BODY MASS INDEX: 30.15 KG/M2 | WEIGHT: 227.5 LBS | TEMPERATURE: 97.9 F | SYSTOLIC BLOOD PRESSURE: 120 MMHG | DIASTOLIC BLOOD PRESSURE: 82 MMHG | HEIGHT: 73 IN

## 2021-06-03 VITALS
TEMPERATURE: 98.2 F | WEIGHT: 228.6 LBS | RESPIRATION RATE: 20 BRPM | SYSTOLIC BLOOD PRESSURE: 126 MMHG | DIASTOLIC BLOOD PRESSURE: 78 MMHG | HEIGHT: 73 IN | BODY MASS INDEX: 30.3 KG/M2 | HEART RATE: 60 BPM

## 2021-06-03 VITALS — WEIGHT: 207 LBS | HEIGHT: 73 IN | BODY MASS INDEX: 27.43 KG/M2

## 2021-06-03 NOTE — TELEPHONE ENCOUNTER
MD Guallpa called, his clinic has an opening at 9:40am 11/21/19 they will call him to schedule the appt.    ----- Message from Mei Sampson NP sent at 11/20/2019  8:38 AM CST -----  Regarding: weight increase and worsening swelling  Can you contact Dr. Guallpa office with cardiology and let them know that the patient weight is up 25 pounds since May and we are struggling with his leg swelling; pt is a poor historian; he does not know the medications he is taking. He has told me in the past he was just taking lasix once a day and it was ordered twice per day. Is there anyway he could be evaluated by cardiology? Or are they booked out?    LK

## 2021-06-03 NOTE — PATIENT INSTRUCTIONS - HE
We will contact your cardiologist; they will be calling you to set up appt    Continue to take lasix two times a day    Wound Care Instructions    Twice per week at the clinic we will Cleanse your bilateral legs and wound(s) with soap and water; using wash cloth to remove scaling    Pat Dry with non-sterile gauze    Apply Lotion to the intact skin surrounding your wound and other dry skin locations. Some good lotions include: Remedy Skin Repair Cream, Sarna, Vanicream or Cetaphil    Compression: 4 layer bilaterally    It is not ok to get your wound wet in the bath or shower    SEEK MEDICAL CARE IF:    You have an increase in swelling, pain, or redness around the wound.    You have an increase in the amount of pus coming from the wound.    There is a bad smell coming from the wound.    The wound appears to be worsening/enlarging    You have a fever greater than 101.5 F        It is ok to continue current wound care treatment/products for the next 2-3 days until new wound care supplies are ordered and arrive. If longer than this please contact our office at [phone].      Mei Sampson DNP, RN, CNP, [facility]  [phone]

## 2021-06-03 NOTE — PATIENT INSTRUCTIONS - HE
- Please call us if your compression wraps fall more than 1-2 inches below the bend of the knee. Call if they are too painful. Call if they get wet. If it is a weekend and the wraps fall down, are too painful, or get wet take the wraps off and put on another form of compression. Compression such as velcro wraps, compression stockings, short stretch bandages, or tubular compression. Apply one of these until you can be seen in clinic. Please call us if you have any questions [phone].    - Treatment:  Layered Compression Bandaging (4-layer)    What is it?  The layered compression bandaging has a layer of absorbent material that will soak up drainage.     Why we do it.   This is done to treat swelling, wounds, or both.  This will in turn help circulation and healing.    How to care for your bandages.  The wraps need to be kept dry. If  the wraps become wet, remove them and call the clinic to have another wrap applied.    What to expect.  It is common for the wraps to be uncomfortable at the beginning. The first two days are usually the hardest; then they will become more comfortable.       Elevating your legs will help the discomfort. Try to elevate your legs as much as possible.    If rest and elevation does not help your discomfort, call your provider.  If your provider is not available you can remove the wrap and leave a message for further instructions.    - Swelling and Compression Therapy    Swelling in the legs can be caused by many reasons. No matter what the reason, treatment usually includes some type of compression. This may be done with a support sock, dressing, ace wrap, or layered wraps.     It is important to treat the swelling for many reasons. If the swelling is not treated you may develop blisters that can lead to ulcerations. This is caused when extra fluid goes into tissue causing damage and blocking blood flow to the tissue.     It is important that you wear your compression every day,  including days that you will be seen in clinic.     Compression is often the most important part of treating leg wounds. Without controlling the swelling it is often not possible to heal wounds.     Going without compression for even brief periods of time can be damaging to your legs and your health.  Your compression should be put on first thing in the morning. Take the compression off at night only when instructed by your care provider to do so. Sometimes wearing compression 24 hours a day will be recommended.       If you are having difficulty wearing your compression it is important to notify your care provider so that other options may be reviewed.    Thank you for choosing  [facility]. Please call us if you have any questions [phone].

## 2021-06-03 NOTE — PATIENT INSTRUCTIONS - HE
Allevyn Gentle applied today to protect the around the left ankle. You can apply this until closed. Follow up as needed. Continue wearing velcro compression and taking your medications as directed by your cardiologist and primary care doctor.     Wound Care Instructions    Twice per week at the clinic we will Cleanse your bilateral legs and wound(s) with soap and water; using wash cloth to remove scaling    Pat Dry with non-sterile gauze    Apply Lotion to the intact skin surrounding your wound and other dry skin locations. Some good lotions include: Remedy Skin Repair Cream, Sarna, Vanicream or Cetaphil    Compression: velcro compression     SEEK MEDICAL CARE IF:    You have an increase in swelling, pain, or redness around the wound.    You have an increase in the amount of pus coming from the wound.    There is a bad smell coming from the wound.    The wound appears to be worsening/enlarging    You have a fever greater than 101.5 F      Sanaz Mendiola CNP, RN, APRN  [facility]  [phone]

## 2021-06-03 NOTE — TELEPHONE ENCOUNTER
Pt came in for bilatral leg rewrap. Noted new open wound on both leg. NP, corinna Sampson notified , gave order to apply silvercel into wound ,ABD pad, roll gauze then 4 layer wrap bilateral.wound cleansed with normal saline. Pt seemed to be weak,A/O x4. vitlas stable, oxygen was 95RA, then he will drop to 89 %RA. Pt denied feeling different or pain. Moving slowly , and quiet. Write updated Pt s spouse Virginia. Spouse states that he was fine this morning and ate good breakfast; but she has been noticing that he does not eat much. Writer advice Pt and spouse to see primary doctor. Writer notified Pt's primary doctor, left message with Autumn for  Dr Baldomero Johnson.

## 2021-06-03 NOTE — PATIENT INSTRUCTIONS - HE
- Please call us if your compression wraps fall more than 1-2 inches below the bend of the knee. Call if they are too painful. Call if they get wet. If it is a weekend and the wraps fall down, are too painful, or get wet take the wraps off and put on another form of compression. Compression such as velcro wraps, compression stockings, short stretch bandages, or tubular compression. Apply one of these until you can be seen in clinic. Please call us if you have any questions [phone].    - Treatment:  Layered Compression Bandaging (4-layer)    What is it?  The layered compression bandaging has a layer of absorbent material that will soak up drainage.     Why we do it.   This is done to treat swelling, wounds, or both.  This will in turn help circulation and healing.    How to care for your bandages.  The wraps need to be kept dry. If  the wraps become wet, remove them and call the clinic to have another wrap applied.    What to expect.  It is common for the wraps to be uncomfortable at the beginning. The first two days are usually the hardest; then they will become more comfortable.       Elevating your legs will help the discomfort. Try to elevate your legs as much as possible.    If rest and elevation does not help your discomfort, call your provider.  If your provider is not available you can remove the wrap and leave a message for further instructions.    - Swelling and Compression Therapy    Swelling in the legs can be caused by many reasons. No matter what the reason, treatment usually includes some type of compression. This may be done with a support sock, dressing, ace wrap, or layered wraps.     It is important to treat the swelling for many reasons. If the swelling is not treated you may develop blisters that can lead to ulcerations. This is caused when extra fluid goes into tissue causing damage and blocking blood flow to the tissue.     It is important that you wear your compression every day,  including days that you will be seen in clinic.     Compression is often the most important part of treating leg wounds. Without controlling the swelling it is often not possible to heal wounds.     Going without compression for even brief periods of time can be damaging to your legs and your health.  Your compression should be put on first thing in the morning. Take the compression off at night only when instructed by your care provider to do so. Sometimes wearing compression 24 hours a day will be recommended.       If you are having difficulty wearing your compression it is important to notify your care provider so that other options may be reviewed.    Thank you for choosing  [facility]. Please call us if you have any questions [phone].

## 2021-06-03 NOTE — PROGRESS NOTES
Follow up Vascular Visit       Date of Service:11/20/2019    Date Last Seen: 10/8/2019; 10/23/2019    Chief Complaint: BLE swelling    Pt returns to [facility] with regards to BLE swelling.  They arrive today alone. His wounds ave closed, and he was transitioned back into his velcro wraps by the nursing staff; was previously being wrapped with 4 layer. He is using tubular and velcro for compression. He is feeling well today. Denies fevers, chills. No shortness of breath. His weight has gradually increased over time, he states he has not seen his cardiologist (he reports Dr Reed)  for over one year; per chart review was seen last April. His weight has increased 25 pounds since May 2019. Unclear if he takes his Lasix regularly; he is not clear on his medications.     Allergies: Aspirin    Medications:   Current Outpatient Medications:      acetaminophen (TYLENOL) 325 MG tablet, Take 2 tablets (650 mg total) by mouth every 4 (four) hours as needed., Disp: , Rfl: 0     atenolol (TENORMIN) 50 MG tablet, Take 50 mg by mouth., Disp: , Rfl:      atorvastatin (LIPITOR) 10 MG tablet, Take 10 mg by mouth every morning. , Disp: , Rfl:      benzonatate (TESSALON) 100 MG capsule, Take 1 capsule (100 mg total) by mouth 3 (three) times a day as needed for cough., Disp: , Rfl: 0     ferrous gluconate 325 mg (36 mg iron) Tab, Take 325 mg by mouth daily. , Disp: , Rfl:      furosemide (LASIX) 40 MG tablet, Take 40 mg by mouth 2 (two) times a day., Disp: , Rfl:      glipiZIDE (GLUCOTROL XL) 5 MG 24 hr tablet, Take 5 mg by mouth daily with breakfast. , Disp: , Rfl:      LANTUS SOLOSTAR 100 unit/mL (3 mL) pen, Inject 20 Units under the skin at bedtime. (Patient taking differently: Inject 30 Units under the skin at bedtime.    ), Disp: 3 mL, Rfl: PRN     metFORMIN (GLUCOPHAGE) 500 MG tablet, Take 500 mg by mouth daily., Disp: , Rfl: 0     metOLazone (ZAROXOLYN) 5 MG tablet, Take 5 mg by mouth daily., Disp: , Rfl: 0      "nystatin (MYCOSTATIN) cream, Apply to irritated areas of skin twice a day until healed.  Do not apply to open ulcer(s)., Disp: 60 g, Rfl: 1     omeprazole (PRILOSEC) 20 MG capsule, Take 20 mg by mouth daily. , Disp: , Rfl:      potassium chloride (KAYCIEL) 20 mEq/15 mL solution, Take 20 mEq by mouth daily. , Disp: , Rfl:      vitamin A-vitamin C-vit E-min (OCUVITE) Tab tablet, Take 1 tablet by mouth daily. , Disp: , Rfl:      warfarin (COUMADIN/JANTOVEN) 2 MG tablet, Take 5 mg by mouth .   , Disp: , Rfl:     Current Facility-Administered Medications:      lidocaine 2 % jelly (XYLOCAINE), , Topical, PRN, Autumn Burnett, CNP, 1 application at 11/29/18 0818    History:   Past Medical History:   Diagnosis Date     Anemia      Coronary artery disease 9/25/2015    stents placed      Diabetes mellitus (H)      Duodenitis 5/11/2016     Dyslipidemia      Gastroduodenal ulcer 9/24/2015     GERD (gastroesophageal reflux disease)      GI bleed 07/08/2016     Gout      Hematuria      Hyperlipemia      Hypertension      Kidney stone 10/6/2015       Physical Exam:    /76   Pulse (!) 58   Temp 98  F (36.7  C)   Resp 22   Ht 6' 1\" (1.854 m)   Wt (!) 232 lb 12.8 oz (105.6 kg)   BMI 30.71 kg/m      General:  Patient presents to clinic in no apparent distress.  Head: normocephalic atraumatic  Psychiatric:  Alert and oriented x3.   Respiratory: unlabored breathing; no cough  Integumentary:  Skin is uniformly warm, dry and pink.   Extremities: BLE swelling and hemosiderin discoloration throughout  Circumferential volume measures:    Vasc Edema 9/25/2019 10/4/2019 10/8/2019 10/16/2019 11/20/2019   Right just above MTP 27 26.5 27 26.8 28.3   Right Ankle 27.4 27.5 27 27.1 26.5   Right Widest Calf 40 34.8 36.8 36.1 43.8   Right Thigh Up 10cm - - - - -   Left - just above MTP 27.2 25.5 26 27.7 28.4   Left Ankle 26.5 24.5 24 28.8 25.6   Left Widest Calf 38.0 34.2 33.7 35.4 41.5   Left Thigh Up 10cm   - - - -     Measures " increased.     Ulceration(s)/Wound(s):     Urethral Catheter 16 Fr. (Active)       Pressure Ulcer Leg Left;Right (Active)       Pressure Ulcer 06/21/16 Buttocks Left Stage II (Active)       Wound 05/07/16 Non-pressure related ulcer Leg Left (Active)       Incision 06/18/16 Abdomen (Active)       Incision 07/08/16 Lap site Abdomen Mid (Active)       Incision 07/08/16 Surgical Abdomen Right (Active)        Lab Values    No results found for: SEDRATE  Lab Results   Component Value Date    CREATININE 1.40 (H) 10/29/2019     Lab Results   Component Value Date    HGBA1C 7.7 (H) 02/02/2018     Lab Results   Component Value Date    BUN 34 (H) 10/29/2019     Lab Results   Component Value Date    ALBUMIN 3.4 (L) 11/21/2018     No results found for: [id number]      Impression:  1. Swelling of limb     2. Venous stasis dermatitis of both lower extremities     3. Acquired lymphedema of leg     4. PAD (peripheral artery disease) (H)       Are any of these wounds new today: No    Assessment/Plan:          1.   Debridement: na; Wounds are closed.  Improved. Lotion applied to the skin on the legs.           2 . Edema: We will start nurse visits and wrapping of the legs to control swelling with 4 layer. The compression wraps were applied today in clinic. Worsened.            3. Weight increase: Advised to see cardiologist / Brianna, note sent to Dr Reed regarding increase in weight and swelling.           4 . Offloading: n/a    Patient will follow up with Sanaz LARSEN in one week for reevaluation. Ok to follow up with her from her on. He will come to nurse visits for wrapping of the legs twice per week. They were instructed to call the clinic sooner with any signs or symptoms of infection or any further questions/concerns. Answered all questions.     Patient seen together with Sanaz Mendiola CNP.      I have reviewed the above note; pt was seen in conjunction with Sanaz Sampson DNP, RN, CNP, CWOCN, CFCN,  [facility]  [facility]  [phone]      This note was electronically signed by Sanaz Mendiola

## 2021-06-03 NOTE — PROGRESS NOTES
Compression Applied to Bilateral  4-Layer: LAYER 1: ORTHOPAEDIC WOOL The bandage was applied without tension in aloose spiral from base of toes to knee joint with 50% overlap.LAYER 2: LIGHT SUPPORT BANDAGE Then the next layer bandage was applied in spiral toe to knee with 50% overlap with 50% overlap.LAYER 3: LIGHT COMPRESSION BANDAGE Then the elastic conformable. compression bandage was applied at mid stretch (50%) in a figure of eight from toe to knee, with a 50% overlap.  LAYER 4: COHESIVE EXTENSIBLE BANDAGE Finally the lightweight, elastic, cohesive bandage was applied at mid stretch (50%) in a spiral with a 50 % overlap from toe to knee.

## 2021-06-03 NOTE — PATIENT INSTRUCTIONS - HE
Continue to take lasix two times a day  See your cardiologist tomorrow.     Nurse visit this Friday, then I will see you in one week.     Wound Care Instructions    Twice per week at the clinic we will Cleanse your bilateral legs and wound(s) with soap and water; using wash cloth to remove scaling. SIlvercel to wounds, then ABD pad, roll gauze and 4 layer. On Friday, if wounds have closed, ok to dicsontinue wound care, transition to only velcro and lotion application.     Pat Dry with non-sterile gauze    Apply Lotion to the intact skin surrounding your wound and other dry skin locations. Some good lotions include: Remedy Skin Repair Cream, Sarna, Vanicream or Cetaphil    Compression: 4 layer bilaterally    It is not ok to get your wound wet in the bath or shower    SEEK MEDICAL CARE IF:    You have an increase in swelling, pain, or redness around the wound.    You have an increase in the amount of pus coming from the wound.    There is a bad smell coming from the wound.    The wound appears to be worsening/enlarging    You have a fever greater than 101.5 F      It is ok to continue current wound care treatment/products for the next 2-3 days until new wound care supplies are ordered and arrive. If longer than this please contact our office at [phone].       Sanaz Mendiola CNP, RN, APRN  [facility]  [phone]

## 2021-06-04 VITALS
DIASTOLIC BLOOD PRESSURE: 76 MMHG | WEIGHT: 232.8 LBS | SYSTOLIC BLOOD PRESSURE: 116 MMHG | HEIGHT: 73 IN | BODY MASS INDEX: 30.85 KG/M2 | HEART RATE: 58 BPM | RESPIRATION RATE: 22 BRPM | TEMPERATURE: 98 F

## 2021-06-04 VITALS — BODY MASS INDEX: 28.37 KG/M2 | WEIGHT: 215 LBS

## 2021-06-04 VITALS
RESPIRATION RATE: 18 BRPM | BODY MASS INDEX: 29.12 KG/M2 | OXYGEN SATURATION: 94 % | WEIGHT: 220.7 LBS | HEART RATE: 68 BPM | SYSTOLIC BLOOD PRESSURE: 124 MMHG | TEMPERATURE: 97.7 F | DIASTOLIC BLOOD PRESSURE: 70 MMHG

## 2021-06-05 VITALS
TEMPERATURE: 97.6 F | DIASTOLIC BLOOD PRESSURE: 70 MMHG | BODY MASS INDEX: 28.1 KG/M2 | SYSTOLIC BLOOD PRESSURE: 116 MMHG | WEIGHT: 213 LBS | HEART RATE: 84 BPM

## 2021-06-05 VITALS
DIASTOLIC BLOOD PRESSURE: 74 MMHG | TEMPERATURE: 97.7 F | RESPIRATION RATE: 20 BRPM | BODY MASS INDEX: 28.5 KG/M2 | HEART RATE: 64 BPM | SYSTOLIC BLOOD PRESSURE: 110 MMHG | WEIGHT: 216 LBS

## 2021-06-07 NOTE — TELEPHONE ENCOUNTER
Called this morning to Dr Guallpa's office and left message to his nurse regarding the above patient about increasing weight in last 3 weeks, more swollen in bilaterally leg and new wound on legs. Nurse to call us back if they have any question.

## 2021-06-07 NOTE — PROGRESS NOTES
"Robinson Medel is a 89 y.o. male who is being evaluated via a billable video visit.      The patient has been notified of following:     \"This video visit will be conducted via a call between you and your physician/provider. We have found that certain health care needs can be provided without the need for an in-person physical exam.  This service lets us provide the care you need with a video conversation.  If a prescription is necessary we can send it directly to your pharmacy.  If lab work is needed we can place an order for that and you can then stop by our lab to have the test done at a later time.    Video visits are billed at different rates depending on your insurance coverage. Please reach out to your insurance provider with any questions.    If during the course of the call the physician/provider feels a video visit is not appropriate, you will not be charged for this service.\"    Patient has given verbal consent to a Video visit? Yes    Patient would like to receive their AVS by AVS Preference: Mail a copy.    Patient would like the video invitation sent by: Text to cell phone: [phone]    Will anyone else be joining your video visit? No          Additional provider notes: in chart     Video-Visit Details    Type of service:  Video Visit    Originating Location (pt. Location): Home    Distant Location (provider location):  [facility]      [facility] for [facility] Visit[facility]    Pt complaint left leg pain and ulcers. Pt s wife states that he has the wound past two weeks, she is not putting anything on beside gauze. Pt 's bilateral leg are more swollen; he usually were velcro, he has not been able to wear them due to increasing swollen both leg.    "

## 2021-06-07 NOTE — TELEPHONE ENCOUNTER
Pt wife called with concern. Pt has bilateral leg swelling and and a L ankle ulcer. She left a message that she was going to apply an ace wrap due to patients pain. LOV with Sanaz was on 12/3/19. Writer called and scheduled a VV with Mei tomorrow. Informed her to keep the velcros on. She is not applying anything to the L ankle ulcer, it is nickel size. She will email photos to the vascular 1 email today.

## 2021-06-08 NOTE — PROGRESS NOTES
Clinic Care Coordination Contact    Situation: Patient chart reviewed by care coordinator.    Background: Patient discharged from home care on 5-22.  Follow up on how he is doing since then.     Assessment: Called patient who put his wife on the phone. She is on PHI.  His wounds have healed, but she is concerned that they will return as this is an recurring problem. His legs look a lot better and he has PCP appointment on [address] 10.      He does not like staying home during COVID. She is ordering groceries delivered and has health conditions that make her at risk.      Denied need for  support at this time.      Plan/Recommendations:  No further outreach by .     Nandini Patel [facility]  Clinic Care Coordinator  [phone]

## 2021-06-08 NOTE — PROGRESS NOTES
"Robinson Medel is a 89 y.o. male who is being evaluated via a billable video visit.      The patient has been notified of following:     \"This video visit will be conducted via a call between you and your physician/provider. We have found that certain health care needs can be provided without the need for an in-person physical exam.  This service lets us provide the care you need with a video conversation.  If a prescription is necessary we can send it directly to your pharmacy.  If lab work is needed we can place an order for that and you can then stop by our lab to have the test done at a later time.    Video visits are billed at different rates depending on your insurance coverage. Please reach out to your insurance provider with any questions.    If during the course of the call the physician/provider feels a video visit is not appropriate, you will not be charged for this service.\"    Patient has given verbal consent to a Video visit? Yes    Patient would like to receive their AVS by AVS Preference: Mail a copy.    Patient would like the video invitation sent by: Text to cell phone: [phone]    Will anyone else be joining your video visit? No    Video-Visit Details    Type of service:  Video Visit    Originating Location (pt. Location): Home    Distant Location (provider location):  [facility]      Platform used for Video Visit: Chris Sandoval LPN  "

## 2021-06-08 NOTE — PROGRESS NOTES
Date of Service:1/12/2017    Chief Complaint:   Chief Complaint   Patient presents with     Follow-up       History:    Robinson presents to clinic for reevaluation of his lower legs.  He is wearing the velcro compression wraps on his right leg, but not always on his left leg.  He did purchase a [employer] Hybrid Foot Compression Stockings-like that he prefers over the compression foot piece because it helps keep the swelling down in his feet.    Current Outpatient Prescriptions   Medication Sig Dispense Refill     atenolol (TENORMIN) 25 MG tablet Take 25 mg by mouth.       atorvastatin (LIPITOR) 10 MG tablet Take 10 mg by mouth every morning.        digoxin (LANOXIN) 125 mcg tablet Take 1 tablet (125 mcg total) by mouth daily.       ferrous gluconate 325 mg (36 mg iron) Tab Take 325 mg by mouth 3 (three) times a day with meals.        furosemide (LASIX) 40 MG tablet Take 40 mg by mouth daily.       furosemide (LASIX) 40 MG tablet        glipiZIDE (GLUCOTROL) 10 MG 24 hr tablet Take 10 mg by mouth daily.       magnesium oxide (MAG-OX) 400 mg tablet Take 400 mg by mouth.       metFORMIN (GLUCOPHAGE) 500 MG tablet Take 500 mg by mouth 2 (two) times a day with meals.       metoprolol tartrate (LOPRESSOR) 25 MG tablet Take 1 tablet (25 mg total) by mouth every 6 (six) hours.  0     omeprazole (PRILOSEC) 20 MG capsule Take 20 mg by mouth daily.        senna-docusate (SENNOSIDES-DOCUSATE SODIUM) 8.6-50 mg tablet Take 1 tablet by mouth daily as needed for constipation.       sotalol (BETAPACE) 80 MG tablet Take 80 mg by mouth.       warfarin (COUMADIN) 5 MG tablet Take 5mg by mouth daily. Adjust dose based on INR results as directed. (Patient taking differently: Take 7 mg by mouth daily. Takes 5mg in conjunction with 1mg tabs. )  0     white petrolatum (VASELINE) Oint Apply 1 application topically as needed.       No current facility-administered medications for this visit.        No Known Allergies    Physical Exam:    Vitals:     01/12/17 0804   BP: 128/72   Pulse: 62   Resp: 18   Temp: 97.7  F (36.5  C)    Body mass index is 27.57 kg/(m^2).    General:  87 y.o. male in no apparent distress.    Psychiatric:  Alert and oriented x 3.  Cooperative.   Integumentary:  Skin is uniformly warm, dry and pink.  Lower extremity edema: +1  Ulcerations: There is no cyrus wound erythema, induration, maceration, denudement, fluctuance or warmth surrounding the ulcer(s).     Vasc Edema 9/15/2016 10/14/2016 11/18/2016 12/15/2016 1/12/2017   Right just above MTP 25.5 26.6 25 26 25   Right Ankle 27.1 24.0 29 26 24   Right Widest Calf 40 41.1 35.4 37 36.2   Right Thigh Up 10cm 49 - - - -   Left - just above MTP 23.6 25.0 24 24 24   Left Ankle 26.1 23.5 25 27.5 25.5   Left Widest Calf 35.9 35.0 33.5 34.5 33.5   Left Thigh Up 10cm 48 - - - -       Assessment:  1. Venous hypertension, chronic, bilateral  furosemide (LASIX) 40 MG tablet   2. Lymphedema  furosemide (LASIX) 40 MG tablet       A new wound was identified today: no.    Plan:  1.  Venous insufficiency, stable    2.   Lymphedema     3.   Treatment:   Recommended to wear his velcro compression wraps on both legs daily    4.   Patient will follow up with me in 3 months  for reevaluation.      Autumn Burnett, APRN, CNP,  CWCN  [facility]  [phone]

## 2021-06-10 NOTE — PROGRESS NOTES
Date of Service:4/11/2017    Chief Complaint:   Chief Complaint   Patient presents with     Follow-up       History:    Robinson presents to clinic for reevaluation of his lower leg swelling.  He is wearing his velcro compression wraps on his right leg and no compression on his left leg.  His left leg is not swelling.  He did have a sore on hir right ankle, but it resolved.  He is complaining of an ulcer on his coccyx.  He is putting some cream on it that he was given by his dermatologist, but it is not improving. He has had it for about a year.  It is tender when touched.      Current Outpatient Prescriptions   Medication Sig Dispense Refill     atenolol (TENORMIN) 25 MG tablet Take 25 mg by mouth.       atorvastatin (LIPITOR) 10 MG tablet Take 10 mg by mouth every morning.        digoxin (LANOXIN) 125 mcg tablet Take 1 tablet (125 mcg total) by mouth daily.       ferrous gluconate 325 mg (36 mg iron) Tab Take 325 mg by mouth 3 (three) times a day with meals.        furosemide (LASIX) 40 MG tablet Take 40 mg by mouth daily.       furosemide (LASIX) 40 MG tablet        glipiZIDE (GLUCOTROL) 10 MG 24 hr tablet Take 10 mg by mouth daily.       magnesium oxide (MAG-OX) 400 mg tablet Take 400 mg by mouth.       metFORMIN (GLUCOPHAGE) 500 MG tablet Take 500 mg by mouth 2 (two) times a day with meals.       metoprolol tartrate (LOPRESSOR) 25 MG tablet Take 1 tablet (25 mg total) by mouth every 6 (six) hours.  0     omeprazole (PRILOSEC) 20 MG capsule Take 20 mg by mouth daily.        senna-docusate (SENNOSIDES-DOCUSATE SODIUM) 8.6-50 mg tablet Take 1 tablet by mouth daily as needed for constipation.       sotalol (BETAPACE) 80 MG tablet Take 80 mg by mouth.       warfarin (COUMADIN) 5 MG tablet Take 5mg by mouth daily. Adjust dose based on INR results as directed. (Patient taking differently: Take 7 mg by mouth daily. Takes 5mg in conjunction with 1mg tabs. )  0     white petrolatum (VASELINE) Oint Apply 1 application  topically as needed.       No current facility-administered medications for this visit.        No Known Allergies    Physical Exam:    Vitals:    04/11/17 0700   BP: 118/62   Pulse: 60   Resp: 18   Temp: 98.4  F (36.9  C)    There is no height or weight on file to calculate BMI.    General:  87 y.o. male in no apparent distress.    Psychiatric:  Alert and oriented x 3.  Cooperative.   Integumentary:  Skin is uniformly warm, dry and pink.  Lower extremity edema: minimal  Ulcerations:  none     Labs:    No results found for: SEDRATE  No results found for: CRP  No components found for: CREATINE  Lab Results   Component Value Date    BUN 16 10/04/2016     Lab Results   Component Value Date    HGBA1C 6.6 (H) 07/08/2016     Lab Results   Component Value Date    ALBUMIN 2.6 (L) 07/13/2016       Vasc Edema 10/14/2016 11/18/2016 12/15/2016 1/12/2017 4/11/2017   Right just above MTP 26.6 25 26 25 25   Right Ankle 24.0 29 26 24 25   Right Widest Calf 41.1 35.4 37 36.2 36.4   Right Thigh Up 10cm - - - - -   Left - just above MTP 25.0 24 24 24 24   Left Ankle 23.5 25 27.5 25.5 23.9   Left Widest Calf 35.0 33.5 34.5 33.5 34.3   Left Thigh Up 10cm - - - - -       Assessment:  1. Venous hypertension, chronic, bilateral     2. Lymphedema     3. 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    Plan:  1.   Debridement of the buttock ulcer was recommended.  After consent was obtained and topical 2% Xylocaine was applied under clean conditions, and using a #15 blade,the devitalized dermal tissue was debrided for a total square centimeters of .009. Following debridement, there was a decrease in the nonviable tissue. The patient tolerated the procedure without any difficulty.     2.  Right Buttock Ulcer, etiology unclear, no signs of infection.     3.  Lymphedema, stable    4.  Venous insufficiency, stable    3.   Treatment:   He will  continue to wear his velcro compression wraps.  For his buttock, Tegaderm Foam Adhesive will be applied a couple of times per week.    4.   Patient will follow up with me in 2 weeks  for reevaluation.      BRENDON Le, CNP,  [facility] [place]  [phone]

## 2021-06-10 NOTE — PROGRESS NOTES
Date of Service:4/25/2017    Chief Complaint:   Chief Complaint   Patient presents with     Wound Check       History:    Robinson presents to clinic for reevaluation of his buttock ulcer.  He is stilling on a pressure relief device that he made and since then, his ulcer is improving.  He is not having any pain in the ulcer and is not putting any drainage from it.  He is not having any pain in his buttock area.  He continues to wear his compression wraps.  The right leg swells some, but not the left leg.    Current Outpatient Prescriptions   Medication Sig Dispense Refill     atenolol (TENORMIN) 25 MG tablet Take 25 mg by mouth.       atorvastatin (LIPITOR) 10 MG tablet Take 10 mg by mouth every morning.        digoxin (LANOXIN) 125 mcg tablet Take 1 tablet (125 mcg total) by mouth daily.       ferrous gluconate (FERGON) 324 MG tablet TAKE 1 TABLET BY MOUTH ONCE DAILY  1     ferrous gluconate 325 mg (36 mg iron) Tab Take 325 mg by mouth 3 (three) times a day with meals.        furosemide (LASIX) 40 MG tablet Take 40 mg by mouth daily.       furosemide (LASIX) 40 MG tablet        glipiZIDE (GLUCOTROL) 10 MG 24 hr tablet Take 10 mg by mouth daily.       magnesium oxide (MAG-OX) 400 mg tablet Take 400 mg by mouth.       metFORMIN (GLUCOPHAGE) 500 MG tablet Take 500 mg by mouth 2 (two) times a day with meals.       metoprolol tartrate (LOPRESSOR) 25 MG tablet Take 1 tablet (25 mg total) by mouth every 6 (six) hours.  0     omeprazole (PRILOSEC) 20 MG capsule Take 20 mg by mouth daily.        senna-docusate (SENNOSIDES-DOCUSATE SODIUM) 8.6-50 mg tablet Take 1 tablet by mouth daily as needed for constipation.       sotalol (BETAPACE) 80 MG tablet Take 80 mg by mouth.       warfarin (COUMADIN) 5 MG tablet Take 5mg by mouth daily. Adjust dose based on INR results as directed. (Patient taking differently: Take 7 mg by mouth daily. Takes 5mg in conjunction with 1mg tabs. )  0     white petrolatum (VASELINE) Oint Apply 1  application topically as needed.       No current facility-administered medications for this visit.        No Known Allergies    Physical Exam:    Vitals:    04/25/17 0700   BP: 130/68   Pulse: 64   Resp: 18   Temp: 98  F (36.7  C)    There is no height or weight on file to calculate BMI.    General:  87 y.o. male in no apparent distress.    Psychiatric:  Alert and oriented x 3.  Cooperative.   Integumentary:  Skin is uniformly warm, dry and pink.  Lower extremity edema: none  Ulcerations: There is no cyrus wound erythema, induration, maceration, denudement, fluctuance or warmth surrounding the ulcer(s).      Wound 04/11/17 left buttock (Active)   Pre Size Length 0.1 4/25/2017  7:00 AM   Pre Size Width 0.1 4/25/2017  7:00 AM   Pre Total Sq cm 0.01 4/25/2017  7:00 AM         Assessment:  1. 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 Type 2 diabetes mellitus without complication, with long-term current use of insulin     3. Venous hypertension, chronic, bilateral     4. Lymphedema         A new wound was identified today: no.    Plan:  1.  Buttock ulcer, resolved.  No dressings are needed.    2.  Venous hypertension, stable    3.  Lymphedema, stable     4.  Treatment:   No dressings are needed on the buttock.  He will continue to utilized the pressure relief cushion.  He will also continue with the velcro compression wraps     5.   Patient will follow up with me in three months for reevaluation.      Autumn Burnett, APRN, CNP,  [facility] [place]  [phone]

## 2021-06-10 NOTE — PROGRESS NOTES
"This is a 87 y.o. Male  who I'm asked to see by Elizabeth Alexandre MD  for evaluation of a right inguinal hernia.  The patient has noticed a bulge for about 2 months now.  There is no pain.     See chart review for PMH, Med list, allergies, FH and SH.    Review of systems is negative for full 12 point review of systems.    Physical exam:  /86 (Patient Site: Right Arm, Patient Position: Sitting, Cuff Size: Adult Regular)  Pulse 69  Ht 6' 1\" (1.854 m)  Wt 217 lb 12.8 oz (98.8 kg)  SpO2 98%  BMI 28.74 kg/m2  General:alert, appears stated age and cooperative  Lungs: chest clear, no wheezing, rales, normal symmetric air entry, Heart exam - S1, S2 normal, no murmur, no gallop, rate regular  CV: regular rate and rhythm, S1, S2 normal, no murmur, click, rub or gallop  Abdomen: Small to moderate right inguinal hernia.  Easily reducible.  Nothing palpable on the left.      Impression: Small to moderate right inguinal hernia that is completely asymptomatic to the patient other than seeing and feeling the bulge.  I reassured him that not all hernias need to be fixed.  This may get larger and if it does then we could proceed with repair but it may just stay the same size.  It is not causing him any discomfort and I told him if it stays the way it is I would not necessarily recommend repair.  He is happy to hear that.    Plan: Follow-up with me as needed if this gets any larger or more bothersome to him.  I also told him if it becomes painful at all he should give me a call.    "

## 2021-06-12 ENCOUNTER — HEALTH MAINTENANCE LETTER (OUTPATIENT)
Age: 86
End: 2021-06-12

## 2021-06-12 NOTE — PROGRESS NOTES
Date of Service:8/7/2017    Chief Complaint:   Chief Complaint   Patient presents with     Follow-up       History:    Robinson presents to clinic for reevaluation of his Venous insufficiency and lymphedema.  He is wearing his velcro compression wraps and Tubigrip.  When he is not having any edema, he wears just the Tubigrip.  He denies having any concerns with ulcers.      Current Outpatient Prescriptions   Medication Sig Dispense Refill     atenolol (TENORMIN) 25 MG tablet Take 25 mg by mouth.       atorvastatin (LIPITOR) 10 MG tablet Take 10 mg by mouth every morning.        digoxin (LANOXIN) 125 mcg tablet Take 1 tablet (125 mcg total) by mouth daily.       ferrous gluconate (FERGON) 324 MG tablet TAKE 1 TABLET BY MOUTH ONCE DAILY  1     ferrous gluconate 325 mg (36 mg iron) Tab Take 325 mg by mouth 3 (three) times a day with meals.        furosemide (LASIX) 40 MG tablet Take 40 mg by mouth daily.       furosemide (LASIX) 40 MG tablet        glipiZIDE (GLUCOTROL) 10 MG 24 hr tablet Take 10 mg by mouth daily.       magnesium oxide (MAG-OX) 400 mg tablet Take 400 mg by mouth.       metFORMIN (GLUCOPHAGE) 500 MG tablet Take 500 mg by mouth 2 (two) times a day with meals.       metoprolol tartrate (LOPRESSOR) 25 MG tablet Take 1 tablet (25 mg total) by mouth every 6 (six) hours.  0     omeprazole (PRILOSEC) 20 MG capsule Take 20 mg by mouth daily.        senna-docusate (SENNOSIDES-DOCUSATE SODIUM) 8.6-50 mg tablet Take 1 tablet by mouth daily as needed for constipation.       sotalol (BETAPACE) 80 MG tablet Take 80 mg by mouth.       warfarin (COUMADIN) 5 MG tablet Take 5mg by mouth daily. Adjust dose based on INR results as directed. (Patient taking differently: Take 7 mg by mouth daily. Takes 5mg in conjunction with 1mg tabs. )  0     white petrolatum (VASELINE) Oint Apply 1 application topically as needed.       No current facility-administered medications for this visit.        No Known Allergies    Physical Exam:     Vitals:    08/07/17 0839   BP: 122/80   Pulse: 64   Resp: 16   Temp: 98.8  F (37.1  C)    There is no height or weight on file to calculate BMI.    General:  87 y.o. male in no apparent distress.    Psychiatric:  Alert and oriented x 3.  Cooperative.   Integumentary:  Skin is uniformly warm, dry and pink.  Lower extremity edema: none  Ulcerations: none    Vasc Edema 11/18/2016 12/15/2016 1/12/2017 4/11/2017 8/7/2017   Right just above MTP 25 26 25 25 25   Right Ankle 29 26 24 25 24.3   Right Widest Calf 35.4 37 36.2 36.4 38.8   Right Thigh Up 10cm - - - - -   Left - just above MTP 24 24 24 24 24.7   Left Ankle 25 27.5 25.5 23.9 24.3   Left Widest Calf 33.5 34.5 33.5 34.3 34.7   Left Thigh Up 10cm - - - - -         Assessment:    1. Leg ulcer, right, limited to breakdown of skin     2. Venous hypertension, chronic, bilateral     3. Lymphedema     4. Type 2 diabetes mellitus without complication, unspecified long term insulin use status         A new wound was identified today: no.    Plan:  1.   Venous insufficiency, stable    2.    Lymphedema, stable     3.   Treatment:   Will continue with Tubigrip and velcro compression wraps.  He was instructed to replace his Tubigrip every two weeks.  Order information was provided for him.    4.   Patient will follow up with me in three months  for reevaluation.        Autumn Burnett, APRN, CNP,  CWCN  [facility]  [phone]

## 2021-06-13 NOTE — PROGRESS NOTES
Compression Applied to Bilateral  2-Layer [place]: I Applied the inner foam layer with the foot dorsiflexed and started atthe base of the fifth metatarsal head. I left the bottom of the heel exposed, and proceed by winding the foam up the leg using minimal overlap to just below the fibular head. I then applied the compression layer with the foot dorsiflexed and startingat the base of the fifth metatarsal head. I applied at full stretch and proceeded up the leg using 50% overlap. The bottom of the heel is covered with the compression layer up to the end at the fibular head just below the back of the knee and levelwith the top edge of the foam layer.  I gently pressed and conformed the entire surface of the system to ensurethat the two layers are firmly bound together

## 2021-06-13 NOTE — PROGRESS NOTES
Date of Service:11/6/2017    Chief Complaint:   Chief Complaint   Patient presents with     Follow-up       History:    Robinson presents to clinic for reevaluation of his Venous insufficiency and lymphedema.  He is wearing his velcro compression wraps and Tubigrip on his right leg, but just the Tubigrip on his left.  He has not replaced them since his last visit with me on 8/7/2017.   He denies having any concerns with ulcers.      Current Outpatient Prescriptions   Medication Sig Dispense Refill     atenolol (TENORMIN) 25 MG tablet Take 25 mg by mouth.       atorvastatin (LIPITOR) 10 MG tablet Take 10 mg by mouth every morning.        digoxin (LANOXIN) 125 mcg tablet Take 1 tablet (125 mcg total) by mouth daily.       ferrous gluconate (FERGON) 324 MG tablet TAKE 1 TABLET BY MOUTH ONCE DAILY  1     ferrous gluconate 325 mg (36 mg iron) Tab Take 325 mg by mouth 3 (three) times a day with meals.        furosemide (LASIX) 40 MG tablet Take 40 mg by mouth daily.       furosemide (LASIX) 40 MG tablet        glipiZIDE (GLUCOTROL) 10 MG 24 hr tablet Take 10 mg by mouth daily.       magnesium oxide (MAG-OX) 400 mg tablet Take 400 mg by mouth.       metFORMIN (GLUCOPHAGE) 500 MG tablet Take 500 mg by mouth 2 (two) times a day with meals.       metoprolol tartrate (LOPRESSOR) 25 MG tablet Take 1 tablet (25 mg total) by mouth every 6 (six) hours.  0     omeprazole (PRILOSEC) 20 MG capsule Take 20 mg by mouth daily.        senna-docusate (SENNOSIDES-DOCUSATE SODIUM) 8.6-50 mg tablet Take 1 tablet by mouth daily as needed for constipation.       sotalol (BETAPACE) 80 MG tablet Take 80 mg by mouth.       warfarin (COUMADIN) 5 MG tablet Take 5mg by mouth daily. Adjust dose based on INR results as directed. (Patient taking differently: Take 7 mg by mouth daily. Takes 5mg in conjunction with 1mg tabs. )  0     white petrolatum (VASELINE) Oint Apply 1 application topically as needed.       No current facility-administered medications  for this visit.        No Known Allergies    Physical Exam:    Vitals:    11/06/17 0700   BP: (!) 144/92   Pulse: 72   Resp: 16   Temp: 97.7  F (36.5  C)    There is no height or weight on file to calculate BMI.    General:  87 y.o. male in no apparent distress.    Psychiatric:  Alert and oriented x 3.  Cooperative.   Integumentary:  Skin is uniformly warm, dry and pink.  Lower extremity edema: none  Ulcerations: none    Vasc Edema 12/15/2016 1/12/2017 4/11/2017 8/7/2017 11/6/2017   Right just above MTP 26 25 25 25 23.9   Right Ankle 26 24 25 24.3 24.7   Right Widest Calf 37 36.2 36.4 38.8 35.6   Right Thigh Up 10cm - - - - -   Left - just above MTP 24 24 24 24.7 23.5   Left Ankle 27.5 25.5 23.9 24.3 24.4   Left Widest Calf 34.5 33.5 34.3 34.7 35.1   Left Thigh Up 10cm - - - - -         Assessment:    1. Lymphedema     2. Venous hypertension, chronic, bilateral     3. Anemia, unspecified type     4. Type 2 diabetes mellitus     5. Venous stasis dermatitis of both lower extremities         A new wound was identified today: no.    Plan:  1.   Venous insufficiency, stable    2.    Lymphedema, stable     3.   Treatment:   Will continue with Tubigrip and velcro compression wraps.  He was instructed to replace his Tubigrip every month.  He was also instructed to wear his velcro compression wraps on both legs.    4.   Patient will follow up with me in three months  for reevaluation.        Autumn Burnett, APRN, CNP,  CWCN  [facility]  [phone]

## 2021-06-13 NOTE — TELEPHONE ENCOUNTER
Misty from [facility] called to notify Mei Sampson CNP that the home care referral has been transferred to [facility]. They can be reached if needed at [phone].

## 2021-06-16 PROBLEM — I87.303 VENOUS HYPERTENSION, CHRONIC, BILATERAL: Chronic | Status: ACTIVE | Noted: 2017-08-07

## 2021-06-16 PROBLEM — I87.2 PERIPHERAL VENOUS INSUFFICIENCY: Status: ACTIVE | Noted: 2018-12-04

## 2021-06-16 PROBLEM — E78.2 MIXED HYPERLIPIDEMIA: Status: ACTIVE | Noted: 2018-12-04

## 2021-06-16 PROBLEM — J18.9 CAP (COMMUNITY ACQUIRED PNEUMONIA): Status: ACTIVE | Noted: 2018-02-01

## 2021-06-16 PROBLEM — C18.2 MALIGNANT TUMOR OF ASCENDING COLON (H): Status: ACTIVE | Noted: 2018-12-04

## 2021-06-16 PROBLEM — I73.9 PERIPHERAL VASCULAR DISEASE (H): Status: ACTIVE | Noted: 2018-12-04

## 2021-06-16 PROBLEM — R06.02 SHORTNESS OF BREATH: Status: ACTIVE | Noted: 2018-02-01

## 2021-06-16 PROBLEM — L97.921 ULCER OF LEFT LOWER EXTREMITY, LIMITED TO BREAKDOWN OF SKIN (H): Status: ACTIVE | Noted: 2018-05-17

## 2021-06-16 PROBLEM — D64.9 NORMOCYTIC NORMOCHROMIC ANEMIA: Status: ACTIVE | Noted: 2018-12-04

## 2021-06-16 NOTE — PROGRESS NOTES
Code Status:  POLST AVAILABLE  Visit Type: Problem Visit     Facility:  [facility] [[id number]]         Facility Type: SNF (Skilled Nursing Facility, TCU)    History of Present Illness: Robinson Medel is a 88 y.o. male who I am seeing today for follow up at the request the nursing staff regarding increased edema.  Pt originally  hospitalized on 2/1/18 for fatigue, SOB and cough. CXR positive for infiltrates and positive culture for Legionella pneumonia with sepsis .It did appear on x-ray that the right lower lobe effusion did appear to be chronic and was present on prior imaging.  CT scan of the abdomen and pelvis on 4/2017 did reveal this. A diagnostic thoracentesis was done on 2/4/2018 it was exudative in nature by light's criteria.  Cytology was negative for malignant emily  Influenza a and B were negative and blood cultures ×2 were obtained and no growth to date. He completed his  Levaquin. Recent increased shortness of breath and increased lower extremity edema 3-4+. I did order follow-up chest x-ray as well as stat laboratory.  Patient had been seen by cardiology on Thursday a.m. prior to my visit.  Had been restarted on his Lasix. This was discontinued during hospitalization. He was then seen by another NP and sent to ER for evaluation. He received IV lasix and was sent back to the SNF. He wife was retrieving a wheelchair for him and he got out of the car fell and suffered a large laceration to his head. He returned to the ER and received staples.     Fluid overload with increasing lower extremity edema.  Patient denies any shortness of breath or chest pain.  No cough.  Initially he was treated with Lasix 40 mg twice daily.  I did decrease this to once daily on Tuesday and added metolazone.  He is down 4 pounds.  He is receiving lymphedema therapy.  Down 30 cm per the lymphedema therapist.  He does have some chronic swelling and uses compression wraps at home.  He is followed by  [facility] vascular.  Staples removed from scalp.  Area dry and intact.     Active Ambulatory Problems     Diagnosis Date Noted     Venous stasis dermatitis of both lower extremities      CAD (coronary artery disease) 09/28/2015     Essential hypertension 09/24/2015     Gastroduodenal ulcer 09/24/2015     Peripheral blood vessel disorder 09/24/2015     Urinary bladder stone 02/10/2016     Anemia, blood loss      Persistent atrial fibrillation      Lymphedema 05/06/2016     Weakness 05/06/2016     Hypotension due to drugs 05/06/2016     Hypotension, unspecified hypotension type      Duodenitis 05/11/2016     Acute blood loss anemia 05/11/2016     Atrial fibrillation 05/11/2016     Anemia 06/15/2016     LUCY (acute kidney injury) 06/15/2016     Cecal cancer      S/P right hemicolectomy      Gastrointestinal hemorrhage with melena 07/08/2016     Blood in stool      Lactic acid acidosis      Type 2 diabetes mellitus 09/24/2015     Venous hypertension, chronic, bilateral 08/07/2017     Shortness of breath 02/01/2018     CAP (community acquired pneumonia) 02/01/2018     Legionella infection      UTI (urinary tract infection)      Resolved Ambulatory Problems     Diagnosis Date Noted     Hematuria 10/06/2015     Type 2 diabetes mellitus with hyperglycemia 09/24/2015     Gross hematuria 02/22/2016     Symptomatic anemia 05/06/2016     Elevated INR 06/15/2016     Past Medical History:   Diagnosis Date     Anemia      Coronary artery disease 9/25/2015     Diabetes mellitus      Duodenitis 5/11/2016     Dyslipidemia      Gastroduodenal ulcer 9/24/2015     GERD (gastroesophageal reflux disease)      GI bleed 07/08/2016     Gout      Hematuria      Hyperlipemia      Hypertension      Kidney stone 10/6/2015       Current Outpatient Prescriptions   Medication Sig     acetaminophen (TYLENOL) 325 MG tablet Take 2 tablets (650 mg total) by mouth every 4 (four) hours as needed.     atorvastatin (LIPITOR) 10 MG tablet Take 10 mg by  mouth every morning.      benzonatate (TESSALON) 100 MG capsule Take 1 capsule (100 mg total) by mouth 3 (three) times a day as needed for cough.     ferrous gluconate 325 mg (36 mg iron) Tab Take 325 mg by mouth daily.      glipiZIDE (GLUCOTROL XL) 5 MG 24 hr tablet Take 5 mg by mouth daily with breakfast.      Lactobacillus acidoph-L.bulgar (LACTINEX) 100 million cell packet Take 1 packet by mouth 3 (three) times a day with meals.     LANTUS SOLOSTAR 100 unit/mL (3 mL) pen Inject 20 Units under the skin at bedtime.     NOVOLOG 100 unit/mL injection Check blood sugar four (4) times daily. AC/HS     omeprazole (PRILOSEC) 20 MG capsule Take 20 mg by mouth daily.      sotalol (BETAPACE) 80 MG tablet Take 80 mg by mouth 2 (two) times a day.      vitamin A-vitamin C-vit E-min (OCUVITE) Tab tablet Take 1 tablet by mouth daily.      warfarin (COUMADIN) 2 MG tablet Take 1.5 tablets (3 mg total) by mouth daily.       No Known Allergies      Review of Systems   No fevers or chills. No headache, lightheadedness or dizziness. No SOB, chest pains or palpitations. Appetite good.   No nausea, vomiting, constipation or diarrhea. He denies any dysuria frequency burning or pain.   Chronic LE edema with increase. He does wear wraps at home.       Physical Exam   PHYSICAL EXAMINATION:  Vital signs: /79  Pulse 75  Temp 97.6  F (36.4  C)  Resp 16  Wt 214 lb 6.4 oz (97.3 kg)  SpO2 98%  BMI 28.29 kg/m2    General: Awake, Alert, oriented x3, appropriately, follows simple commands, conversant  HEENT:PERRLA, Pink conjunctiva, anicteric sclerae, moist oral mucosa. Very [place] with aides. Large laceration to scalp dry and intact with staples. Dried blood noted.   NECK: Supple, without any lymphadenopathy, or masses  CVS:  S1  S2, without murmur or gallop. Regular rate and rhythm.   LUNG: Clear to auscultation, No wheezes, rales or rhonci. No cough on exam.   BACK: No kyphosis of the thoracic spine  ABDOMEN: Soft, nontender to  palpation, with positive bowel sounds  EXTREMITIES: Moves both upper and lower extremities with generalized weakness, 3+ pedal edema, no calf tenderness. Pupura noted. PVD changes noted.   SKIN: Dry and intact.  No further weeping lower extremities.  NEUROLOGIC: Intact, pulses palpable  PSYCHIATRIC: Mild cognitive impairment.             Labs:    Recent Results (from the past 240 hour(s))   Basic Metabolic Panel   Result Value Ref Range    Sodium 138 136 - 145 mmol/L    Potassium 4.4 3.5 - 5.0 mmol/L    Chloride 108 (H) 98 - 107 mmol/L    CO2 24 22 - 31 mmol/L    Anion Gap, Calculation 6 5 - 18 mmol/L    Glucose 62 (L) 70 - 125 mg/dL    Calcium 8.4 (L) 8.5 - 10.5 mg/dL    BUN 16 8 - 28 mg/dL    Creatinine 1.07 0.70 - 1.30 mg/dL    GFR MDRD Af Amer >60 >60 mL/min/1.73m2    GFR MDRD Non Af Amer >60 >60 mL/min/1.73m2   HM2(CBC w/o Differential)   Result Value Ref Range    WBC 6.8 4.0 - 11.0 thou/uL    RBC 3.97 (L) 4.40 - 6.20 mill/uL    Hemoglobin 12.2 (L) 14.0 - 18.0 g/dL    Hematocrit 37.4 (L) 40.0 - 54.0 %    MCV 94 80 - 100 fL    MCH 30.7 27.0 - 34.0 pg    MCHC 32.6 32.0 - 36.0 g/dL    RDW 14.5 11.0 - 14.5 %    Platelets 177 140 - 440 thou/uL    MPV 9.7 8.5 - 12.5 fL   Basic Metabolic Panel   Result Value Ref Range    Sodium 138 136 - 145 mmol/L    Potassium 4.0 3.5 - 5.0 mmol/L    Chloride 104 98 - 107 mmol/L    CO2 27 22 - 31 mmol/L    Anion Gap, Calculation 7 5 - 18 mmol/L    Glucose 137 (H) 70 - 125 mg/dL    Calcium 8.4 (L) 8.5 - 10.5 mg/dL    BUN 19 8 - 28 mg/dL    Creatinine 1.77 (H) 0.70 - 1.30 mg/dL    GFR MDRD Af Amer 44 (L) >60 mL/min/1.73m2    GFR MDRD Non Af Amer 36 (L) >60 mL/min/1.73m2   BNP(B-type Natriuretic Peptide)   Result Value Ref Range     (H) 0 - 93 pg/mL   HM2(CBC w/o Differential)   Result Value Ref Range    WBC 6.6 4.0 - 11.0 thou/uL    RBC 4.06 (L) 4.40 - 6.20 mill/uL    Hemoglobin 12.4 (L) 14.0 - 18.0 g/dL    Hematocrit 38.7 (L) 40.0 - 54.0 %    MCV 95 80 - 100 fL    MCH 30.5 27.0  - 34.0 pg    MCHC 32.0 32.0 - 36.0 g/dL    RDW 14.9 (H) 11.0 - 14.5 %    Platelets 178 140 - 440 thou/uL    MPV 9.8 8.5 - 12.5 fL   Basic Metabolic Panel   Result Value Ref Range    Sodium 139 136 - 145 mmol/L    Potassium 3.5 3.5 - 5.0 mmol/L    Chloride 102 98 - 107 mmol/L    CO2 29 22 - 31 mmol/L    Anion Gap, Calculation 8 5 - 18 mmol/L    Glucose 191 (H) 70 - 125 mg/dL    Calcium 8.1 (L) 8.5 - 10.5 mg/dL    BUN 15 8 - 28 mg/dL    Creatinine 1.12 0.70 - 1.30 mg/dL    GFR MDRD Af Amer >60 >60 mL/min/1.73m2    GFR MDRD Non Af Amer >60 >60 mL/min/1.73m2   BNP(B-type Natriuretic Peptide)   Result Value Ref Range     (H) 0 - 93 pg/mL   Basic Metabolic Panel   Result Value Ref Range    Sodium 140 136 - 145 mmol/L    Potassium 3.2 (L) 3.5 - 5.0 mmol/L    Chloride 98 98 - 107 mmol/L    CO2 35 (H) 22 - 31 mmol/L    Anion Gap, Calculation 7 5 - 18 mmol/L    Glucose 113 70 - 125 mg/dL    Calcium 8.9 8.5 - 10.5 mg/dL    BUN 11 8 - 28 mg/dL    Creatinine 0.93 0.70 - 1.30 mg/dL    GFR MDRD Af Amer >60 >60 mL/min/1.73m2    GFR MDRD Non Af Amer >60 >60 mL/min/1.73m2   BNP(B-type Natriuretic Peptide)   Result Value Ref Range     (H) 0 - 93 pg/mL         Assessment/Plan:  1. Chronic diastolic heart failure     2. Scalp laceration     3. Atrial fibrillation     4. Anticoagulation management encounter     5. Legionella pneumonia       Patient with a history of chronic diastolic heart failure.  Originally admitted with Legionella pneumonia.  He is completed his antibiotic course.  No further cough.  Lung sounds are clear.  He has had some fluid overload with increased lower extremity edema.  I did increase his Lasix initially to 40 twice daily.  However change this to 40 daily and added metolazone.  Weight is trending downward.  BNP trending downward now in the 300s.  Renal function stable.  Continues on chronic anticoagulation for atrial fib.  INR reviewed.  Coumadin dose.  Is also on sotalol.  Regular rate and rhythm  on exam.  We will follow-up with laboratory next week.  Would hope to continue diuresis.  Patient continues in lymphedema therapy.        Electronically signed by: Jaimie Joe CNP

## 2021-06-16 NOTE — PROGRESS NOTES
Code Status:  POLST AVAILABLE  Visit Type: Discharge Summary     Facility:  [facility] [[id number]]         Facility Type: SNF (Skilled Nursing Facility, TCU)    History of Present Illness: Robinson Medel is a 88 y.o. male who I am seeing today for discharge from the TCU.  Pt originally  hospitalized on 2/1/18 for fatigue, SOB and cough. CXR positive for infiltrates and positive culture for Legionella pneumonia with sepsis .It did appear on x-ray that the right lower lobe effusion did appear to be chronic and was present on prior imaging.  CT scan of the abdomen and pelvis on 4/2017 did reveal this. A diagnostic thoracentesis was done on 2/4/2018 it was exudative in nature by light's criteria.  Cytology was negative for malignant emily  Influenza a and B were negative and blood cultures ×2 were obtained and no growth to date. He completed his  Levaquin. During his TCU stay I did treat him for fluid overload. Edema improved on today's exam. Weight down ~10 lbs. He denies any SOB or CP. He has continued on Lasix and Metolazone. He has been receiving lymphedema therapy at the TCU. During his TCU stay he did have a fall and sustained a large laceration to scalp. Area now healed.        Active Ambulatory Problems     Diagnosis Date Noted     Venous stasis dermatitis of both lower extremities      CAD (coronary artery disease) 09/28/2015     Essential hypertension 09/24/2015     Gastroduodenal ulcer 09/24/2015     Peripheral blood vessel disorder 09/24/2015     Urinary bladder stone 02/10/2016     Anemia, blood loss      Persistent atrial fibrillation      Lymphedema 05/06/2016     Weakness 05/06/2016     Hypotension due to drugs 05/06/2016     Hypotension, unspecified hypotension type      Duodenitis 05/11/2016     Acute blood loss anemia 05/11/2016     Atrial fibrillation 05/11/2016     Anemia 06/15/2016     LUCY (acute kidney injury) 06/15/2016     Cecal cancer      S/P right hemicolectomy       Gastrointestinal hemorrhage with melena 07/08/2016     Blood in stool      Lactic acid acidosis      Type 2 diabetes mellitus 09/24/2015     Venous hypertension, chronic, bilateral 08/07/2017     Shortness of breath 02/01/2018     CAP (community acquired pneumonia) 02/01/2018     Legionella infection      UTI (urinary tract infection)      Resolved Ambulatory Problems     Diagnosis Date Noted     Hematuria 10/06/2015     Type 2 diabetes mellitus with hyperglycemia 09/24/2015     Gross hematuria 02/22/2016     Symptomatic anemia 05/06/2016     Elevated INR 06/15/2016     Past Medical History:   Diagnosis Date     Anemia      Coronary artery disease 9/25/2015     Diabetes mellitus      Duodenitis 5/11/2016     Dyslipidemia      Gastroduodenal ulcer 9/24/2015     GERD (gastroesophageal reflux disease)      GI bleed 07/08/2016     Gout      Hematuria      Hyperlipemia      Hypertension      Kidney stone 10/6/2015     Current Outpatient Prescriptions   Medication Sig     furosemide (LASIX) 40 MG tablet Take 40 mg by mouth 2 (two) times a day.     potassium chloride (KAYCIEL) 20 mEq/15 mL solution Take 20 mEq by mouth daily.      acetaminophen (TYLENOL) 325 MG tablet Take 2 tablets (650 mg total) by mouth every 4 (four) hours as needed.     atorvastatin (LIPITOR) 10 MG tablet Take 10 mg by mouth every morning.      benzonatate (TESSALON) 100 MG capsule Take 1 capsule (100 mg total) by mouth 3 (three) times a day as needed for cough.     ferrous gluconate 325 mg (36 mg iron) Tab Take 325 mg by mouth daily.      glipiZIDE (GLUCOTROL XL) 5 MG 24 hr tablet Take 5 mg by mouth daily with breakfast.      Lactobacillus acidoph-L.bulgar (LACTINEX) 100 million cell packet Take 1 packet by mouth 3 (three) times a day with meals.     LANTUS SOLOSTAR 100 unit/mL (3 mL) pen Inject 20 Units under the skin at bedtime.     omeprazole (PRILOSEC) 20 MG capsule Take 20 mg by mouth daily.      sotalol (BETAPACE) 80 MG tablet Take 80 mg by  mouth 2 (two) times a day.      vitamin A-vitamin C-vit E-min (OCUVITE) Tab tablet Take 1 tablet by mouth daily.      warfarin (COUMADIN) 2 MG tablet Take 1.5 tablets (3 mg total) by mouth daily.         No Known Allergies      Review of Systems   No fevers or chills. No headache, lightheadedness or dizziness. No SOB, chest pains or palpitations.He denies any cough.  Appetite good.   No nausea, vomiting, constipation or diarrhea. He denies any dysuria frequency burning or pain.   Chronic LE edema. Edema improved today. He continues with lymphedema wraps. He wears compression wraps at home.       Physical Exam   PHYSICAL EXAMINATION:  Vital signs: /68  Pulse 68  Temp 97.2  F (36.2  C)  Resp 16  Wt 204 lb 6.4 oz (92.7 kg)  SpO2 98%  BMI 26.97 kg/m2    General: Awake, Alert, oriented x3, appropriately, follows simple commands, conversant  HEENT:PERRLA, Pink conjunctiva, anicteric sclerae, moist oral mucosa. Very [place] with aides. Large laceration to scalp dry and intact.   NECK: Supple, without any lymphadenopathy, or masses  CVS:  S1  S2, without murmur or gallop. Regular rate and rhythm.   LUNG: Clear to auscultation, No wheezes, rales or rhonci. No cough on exam.   BACK: No kyphosis of the thoracic spine  ABDOMEN: Soft, nontender to palpation, with positive bowel sounds  EXTREMITIES: Moves both upper and lower extremities with generalized weakness, 1-2+ pedal edema, no calf tenderness.  Lymphedema wraps to BLE.    SKIN: Dry and intact.   NEUROLOGIC: Intact, pulses palpable  PSYCHIATRIC: Mild cognitive impairment.             Labs:    Recent Results (from the past 240 hour(s))   Basic Metabolic Panel   Result Value Ref Range    Sodium 139 136 - 145 mmol/L    Potassium 3.5 3.5 - 5.0 mmol/L    Chloride 102 98 - 107 mmol/L    CO2 29 22 - 31 mmol/L    Anion Gap, Calculation 8 5 - 18 mmol/L    Glucose 191 (H) 70 - 125 mg/dL    Calcium 8.1 (L) 8.5 - 10.5 mg/dL    BUN 15 8 - 28 mg/dL    Creatinine 1.12 0.70 - 1.30  mg/dL    GFR MDRD Af Amer >60 >60 mL/min/1.73m2    GFR MDRD Non Af Amer >60 >60 mL/min/1.73m2   BNP(B-type Natriuretic Peptide)   Result Value Ref Range     (H) 0 - 93 pg/mL   Basic Metabolic Panel   Result Value Ref Range    Sodium 140 136 - 145 mmol/L    Potassium 3.2 (L) 3.5 - 5.0 mmol/L    Chloride 98 98 - 107 mmol/L    CO2 35 (H) 22 - 31 mmol/L    Anion Gap, Calculation 7 5 - 18 mmol/L    Glucose 113 70 - 125 mg/dL    Calcium 8.9 8.5 - 10.5 mg/dL    BUN 11 8 - 28 mg/dL    Creatinine 0.93 0.70 - 1.30 mg/dL    GFR MDRD Af Amer >60 >60 mL/min/1.73m2    GFR MDRD Non Af Amer >60 >60 mL/min/1.73m2   BNP(B-type Natriuretic Peptide)   Result Value Ref Range     (H) 0 - 93 pg/mL   Basic Metabolic Panel   Result Value Ref Range    Sodium 141 136 - 145 mmol/L    Potassium 3.1 (L) 3.5 - 5.0 mmol/L    Chloride 99 98 - 107 mmol/L    CO2 35 (H) 22 - 31 mmol/L    Anion Gap, Calculation 7 5 - 18 mmol/L    Glucose 141 (H) 70 - 125 mg/dL    Calcium 8.8 8.5 - 10.5 mg/dL    BUN 15 8 - 28 mg/dL    Creatinine 0.88 0.70 - 1.30 mg/dL    GFR MDRD Af Amer >60 >60 mL/min/1.73m2    GFR MDRD Non Af Amer >60 >60 mL/min/1.73m2         Assessment/Plan:  1. Chronic diastolic heart failure     2. Atrial fibrillation     3. Anticoagulation management encounter     4. Legionella pneumonia     5. Scalp laceration     6. QT prolongation     7. Type 2 diabetes mellitus     8. Lymphedema       CHF. He continues on Lasix and Metolazone. His weight is down ~10 lbs. He denies any SOB or CP. His edema is 1+ down from 3+. I will stop his Metolazone. His potassium is slightly low today at 3.1. I will give him additional 20meq of KCL today. He will need a follow up K+ check later in the week. He is on chronic anticoagulation for a-fib. He will need a follow up INR on 3/8. He has hx of chronic lymphedema. He has less edema on today's visit. He continues in lymphedema wraps. He can resume his compression wraps at home. F/u with [facility] upon discharge. He had QT prolongation this improved with repeat EKG once off Levaquin for his pneumonia. Type II DM satisfactory controlled with glipizide and Lantus. SS discontinued.     Pt will d/c home with current meds and treatment. Home PT, OT, HHA and RN. Follow up with PCP in 1 week.     DISCHARGE PLAN/FACE TO FACE:  I certify that this patient is under my care and that I, or a nurse practitioner or physician's assistant working with me, had a face-to-face encounter that meets the physician face-to-face encounter requirements with this patient.       I certify that, based on my findings, the above services are medically necessary home health services.     My clinical findings support the need for the above skilled services.     This patient is homebound because: recent Legionella Pneumonia with fluid overload.     The patient is, or has been, under my care and I have initiated the establishment of the plan of care. This patient will be followed by a physician who will periodically review the plan of care.      Total time spent for this visit was 35 minutes with >50% of time spent face to face with pt and wife reviewing discharge plans.     Electronically signed by: Jaimie Joe, CNP

## 2021-06-16 NOTE — PROGRESS NOTES
Code Status:  POLST AVAILABLE  Visit Type: Problem Visit     Facility:  [facility] [[id number]]         Facility Type: SNF (Skilled Nursing Facility, TCU)    History of Present Illness: Robinson Medel is a 88 y.o. male who I am seeing today for follow up on the TCU. Pt recently hospitalized on 2/1/18 for fatigue, SOB and cough. CXR positive for infiltrates and positive culture for Legionella pneumonia with sepsis .It did appear on x-ray that the right lower lobe effusion did appear to be chronic and was present on prior imaging.  CT scan of the abdomen and pelvis on 4/2017 did reveal this. A diagnostic thoracentesis was done on 2/4/2018 it was exudative in nature by light's criteria.  Cytology was negative for malignant emily  Influenza a and B were negative and blood cultures ×2 were obtained and no growth to date. He continues on Levaquin.  Today he reports some increased coughing.  He is not on meds currently.  Lung sounds are clear. He has had QT prolongation due to Levaquin use. QT interval trending downward. F/u EKG obtained yesterday and reviewed.  He has a repeat due today.  It was 454. It was previously 492. He was seen by cardiology and was thought benefits out weighed risks.  He was seen by cardiology yesterday.  His sotalol was discontinued.  He was started on atenolol.  Was also given Lasix.  He also continues on chronic anticoagulation. He denies any CP or palpitations. Liver enzymes were initially elevated however with holding his statin this did improve. Acute kidney injury. His Actos was stopped and he was treated with lantus, glipizide and SS. He also had urinary retention and solitario catheter was placed.  This was removed on Tuesday.  He is voiding adequately.  He denies any dysuria frequency burning or pain.  He continues in therapy.      Active Ambulatory Problems     Diagnosis Date Noted     Venous stasis dermatitis of both lower extremities      CAD (coronary artery  disease) 09/28/2015     Essential hypertension 09/24/2015     Gastroduodenal ulcer 09/24/2015     Peripheral blood vessel disorder 09/24/2015     Urinary bladder stone 02/10/2016     Anemia, blood loss      Persistent atrial fibrillation      Lymphedema 05/06/2016     Weakness 05/06/2016     Hypotension due to drugs 05/06/2016     Hypotension, unspecified hypotension type      Duodenitis 05/11/2016     Acute blood loss anemia 05/11/2016     Atrial fibrillation 05/11/2016     Anemia 06/15/2016     LUCY (acute kidney injury) 06/15/2016     Cecal cancer      S/P right hemicolectomy      Gastrointestinal hemorrhage with melena 07/08/2016     Blood in stool      Lactic acid acidosis      Type 2 diabetes mellitus 09/24/2015     Venous hypertension, chronic, bilateral 08/07/2017     Shortness of breath 02/01/2018     CAP (community acquired pneumonia) 02/01/2018     Legionella infection      UTI (urinary tract infection)      Resolved Ambulatory Problems     Diagnosis Date Noted     Hematuria 10/06/2015     Type 2 diabetes mellitus with hyperglycemia 09/24/2015     Gross hematuria 02/22/2016     Symptomatic anemia 05/06/2016     Elevated INR 06/15/2016     Past Medical History:   Diagnosis Date     Anemia      Coronary artery disease 9/25/2015     Diabetes mellitus      Duodenitis 5/11/2016     Dyslipidemia      Gastroduodenal ulcer 9/24/2015     GERD (gastroesophageal reflux disease)      GI bleed 07/08/2016     Gout      Hematuria      Hyperlipemia      Hypertension      Kidney stone 10/6/2015       Current Outpatient Prescriptions   Medication Sig     acetaminophen (TYLENOL) 325 MG tablet Take 2 tablets (650 mg total) by mouth every 4 (four) hours as needed.     atorvastatin (LIPITOR) 10 MG tablet Take 10 mg by mouth every morning.      benzonatate (TESSALON) 100 MG capsule Take 1 capsule (100 mg total) by mouth 3 (three) times a day as needed for cough.     ferrous gluconate 325 mg (36 mg iron) Tab Take 325 mg by mouth  daily.      glipiZIDE (GLUCOTROL XL) 5 MG 24 hr tablet Take 5 mg by mouth daily with breakfast.      Lactobacillus acidoph-L.bulgar (LACTINEX) 100 million cell packet Take 1 packet by mouth 3 (three) times a day with meals.     LANTUS SOLOSTAR 100 unit/mL (3 mL) pen Inject 20 Units under the skin at bedtime.     levoFLOXacin (LEVAQUIN) 750 MG tablet Take 1 tablet (750 mg total) by mouth daily for 9 days.     NOVOLOG 100 unit/mL injection Check blood sugar four (4) times daily. AC/HS     omeprazole (PRILOSEC) 20 MG capsule Take 20 mg by mouth daily.      sotalol (BETAPACE) 80 MG tablet Take 80 mg by mouth 2 (two) times a day.      vitamin A-vitamin C-vit E-min (OCUVITE) Tab tablet Take 1 tablet by mouth daily.      warfarin (COUMADIN) 2 MG tablet Take 1.5 tablets (3 mg total) by mouth daily.       No Known Allergies      Review of Systems   No fevers or chills. No headache, lightheadedness or dizziness. No SOB, chest pains or palpitations.  Today he reports some increased coughing.  Appetite is improving.  No nausea, vomiting, constipation or diarrhea.  Perea catheter has been removed.  He is voiding adequately.  He denies any dysuria frequency burning or pain.  He reports generalized fatigue and decline over some time. Chronic LE edema. He does wear wraps at home.       Physical Exam   PHYSICAL EXAMINATION:  Vital signs: Reviewed at the facility.   General: Awake, Alert, oriented x3, appropriately, follows simple commands, conversant  HEENT:PERRLA, Pink conjunctiva, anicteric sclerae, moist oral mucosa. Very [place] with aides.   NECK: Supple, without any lymphadenopathy, or masses  CVS:  S1  S2, without murmur or gallop. Regular rate and rhythm.   LUNG: Clear to auscultation, No wheezes, rales or rhonci. Occasional dry cough on exam.   BACK: No kyphosis of the thoracic spine  ABDOMEN: Soft, nontender to palpation, with positive bowel sounds  EXTREMITIES: Moves both upper and lower extremities with generalized weakness,  2+ pedal edema, no calf tenderness. VS changes to LE. Today he has his wraps on.   SKIN: Warm and dry, no rashes or erythema noted. Dry scaly skin to LE.   NEUROLOGIC: Intact, pulses palpable  PSYCHIATRIC: Mild cognitive impairment.  More talkative on today's visit.            Labs:    Recent Results (from the past 240 hour(s))   POCT Glucose   Result Value Ref Range    Glucose,  mg/dL   POCT Glucose   Result Value Ref Range    Glucose,  mg/dL   POCT Glucose   Result Value Ref Range    Glucose,  mg/dL   INR   Result Value Ref Range    INR 2.27 (H) 0.90 - 1.10   Comprehensive Metabolic Panel   Result Value Ref Range    Sodium 141 136 - 145 mmol/L    Potassium 3.6 3.5 - 5.0 mmol/L    Chloride 110 (H) 98 - 107 mmol/L    CO2 23 22 - 31 mmol/L    Anion Gap, Calculation 8 5 - 18 mmol/L    Glucose 146 (H) 70 - 125 mg/dL    BUN 32 (H) 8 - 28 mg/dL    Creatinine 0.84 0.70 - 1.30 mg/dL    GFR MDRD Af Amer >60 >60 mL/min/1.73m2    GFR MDRD Non Af Amer >60 >60 mL/min/1.73m2    Bilirubin, Total 0.6 0.0 - 1.0 mg/dL    Calcium 8.8 8.5 - 10.5 mg/dL    Protein, Total 5.5 (L) 6.0 - 8.0 g/dL    Albumin 2.0 (L) 3.5 - 5.0 g/dL    Alkaline Phosphatase 61 45 - 120 U/L    AST 72 (H) 0 - 40 U/L     (H) 0 - 45 U/L   POCT Glucose   Result Value Ref Range    Glucose,  mg/dL   ECG 12 lead MUSE   Result Value Ref Range    SYSTOLIC BLOOD PRESSURE  mmHg    DIASTOLIC BLOOD PRESSURE  mmHg    VENTRICULAR RATE 96 BPM    ATRIAL RATE 300 BPM    P-R INTERVAL  ms    QRS DURATION 104 ms    Q-T INTERVAL 390 ms    QTC CALCULATION (BEZET) 492 ms    P Axis  degrees    R AXIS -37 degrees    T AXIS 25 degrees    MUSE DIAGNOSIS       Atrial fibrillation with premature ventricular or aberrantly conducted complexes  Left axis deviation  Low voltage QRS  Inferior infarct , age undetermined  Non-specific intra-ventricular conduction delay  Abnormal ECG  When compared with ECG of 05-FEB-2018 08:06,  Inferior infarct is now  Present  Confirmed by ALEJANDRA FITCH MD LOC:[facility] [id number]) on 2/7/2018 4:13:03 PM     POCT Glucose   Result Value Ref Range    Glucose,  mg/dL   POCT Glucose   Result Value Ref Range    Glucose,  mg/dL   POCT Glucose   Result Value Ref Range    Glucose,  mg/dL   POCT Glucose   Result Value Ref Range    Glucose,  mg/dL   INR   Result Value Ref Range    INR 2.29 (H) 0.90 - 1.10   Potassium - Next AM   Result Value Ref Range    Potassium 4.2 3.5 - 5.0 mmol/L   POCT Glucose   Result Value Ref Range    Glucose,  mg/dL   POCT Glucose   Result Value Ref Range    Glucose,  mg/dL   C. Diff Toxin By PCR   Result Value Ref Range    C.Difficile Toxigenic by PCR Negative Negative   POCT Glucose   Result Value Ref Range    Glucose,  mg/dL   POCT Glucose   Result Value Ref Range    Glucose,  mg/dL   INR   Result Value Ref Range    INR 2.27 (H) 0.90 - 1.10   Comprehensive Metabolic Panel   Result Value Ref Range    Sodium 139 136 - 145 mmol/L    Potassium 4.3 3.5 - 5.0 mmol/L    Chloride 108 (H) 98 - 107 mmol/L    CO2 24 22 - 31 mmol/L    Anion Gap, Calculation 7 5 - 18 mmol/L    Glucose 133 (H) 70 - 125 mg/dL    BUN 27 8 - 28 mg/dL    Creatinine 0.95 0.70 - 1.30 mg/dL    GFR MDRD Af Amer >60 >60 mL/min/1.73m2    GFR MDRD Non Af Amer >60 >60 mL/min/1.73m2    Bilirubin, Total 0.7 0.0 - 1.0 mg/dL    Calcium 8.8 8.5 - 10.5 mg/dL    Protein, Total 5.9 (L) 6.0 - 8.0 g/dL    Albumin 2.3 (L) 3.5 - 5.0 g/dL    Alkaline Phosphatase 70 45 - 120 U/L    AST 34 0 - 40 U/L     (H) 0 - 45 U/L   HM1 (CBC with Diff)   Result Value Ref Range    WBC 10.9 4.0 - 11.0 thou/uL    RBC 4.59 4.40 - 6.20 mill/uL    Hemoglobin 13.8 (L) 14.0 - 18.0 g/dL    Hematocrit 42.1 40.0 - 54.0 %    MCV 92 80 - 100 fL    MCH 30.1 27.0 - 34.0 pg    MCHC 32.8 32.0 - 36.0 g/dL    RDW 13.8 11.0 - 14.5 %    Platelets 334 140 - 440 thou/uL    MPV 9.0 8.5 - 12.5 fL    Neutrophils % 87 (H) 50 - 70 %    Lymphocytes %  6 (L) 20 - 40 %    Monocytes % 5 2 - 10 %    Eosinophils % 2 0 - 6 %    Basophils % 0 0 - 2 %    Neutrophils Absolute 9.1 (H) 2.0 - 7.7 thou/uL    Lymphocytes Absolute 0.6 (L) 0.8 - 4.4 thou/uL    Monocytes Absolute 0.5 0.0 - 0.9 thou/uL    Eosinophils Absolute 0.2 0.0 - 0.4 thou/uL    Basophils Absolute 0.0 0.0 - 0.2 thou/uL   POCT Glucose   Result Value Ref Range    Glucose,  mg/dL   POCT Glucose   Result Value Ref Range    Glucose,  mg/dL   POCT Glucose   Result Value Ref Range    Glucose,  mg/dL   POCT Glucose   Result Value Ref Range    Glucose,  mg/dL   INR   Result Value Ref Range    INR 2.06 (H) 0.90 - 1.10   Comprehensive Metabolic Panel   Result Value Ref Range    Sodium 140 136 - 145 mmol/L    Potassium 4.2 3.5 - 5.0 mmol/L    Chloride 110 (H) 98 - 107 mmol/L    CO2 21 (L) 22 - 31 mmol/L    Anion Gap, Calculation 9 5 - 18 mmol/L    Glucose 108 70 - 125 mg/dL    BUN 30 (H) 8 - 28 mg/dL    Creatinine 0.91 0.70 - 1.30 mg/dL    GFR MDRD Af Amer >60 >60 mL/min/1.73m2    GFR MDRD Non Af Amer >60 >60 mL/min/1.73m2    Bilirubin, Total 0.7 0.0 - 1.0 mg/dL    Calcium 8.7 8.5 - 10.5 mg/dL    Protein, Total 5.8 (L) 6.0 - 8.0 g/dL    Albumin 2.3 (L) 3.5 - 5.0 g/dL    Alkaline Phosphatase 65 45 - 120 U/L    AST 28 0 - 40 U/L    ALT 87 (H) 0 - 45 U/L   POCT Glucose   Result Value Ref Range    Glucose,  mg/dL   POCT Glucose   Result Value Ref Range    Glucose,  mg/dL   Basic Metabolic Panel   Result Value Ref Range    Sodium 137 136 - 145 mmol/L    Potassium 4.2 3.5 - 5.0 mmol/L    Chloride 107 98 - 107 mmol/L    CO2 25 22 - 31 mmol/L    Anion Gap, Calculation 5 5 - 18 mmol/L    Glucose 189 (H) 70 - 125 mg/dL    Calcium 8.6 8.5 - 10.5 mg/dL    BUN 20 8 - 28 mg/dL    Creatinine 1.00 0.70 - 1.30 mg/dL    GFR MDRD Af Amer >60 >60 mL/min/1.73m2    GFR MDRD Non Af Amer >60 >60 mL/min/1.73m2         Assessment/Plan:  1. Legionella pneumonia     2. Pleural effusion associated with  pulmonary infection     3. Status post thoracentesis     4. Atrial fibrillation     5. Anticoagulation management encounter     6. QT prolongation     7. Chronic diastolic heart failure     8. Urinary retention       S/p legionella pneumonia with pleural effusion undergoing thoracentesis.  He continues on oral Levaquin.  Today he reports increased cough.  Will order nebulizer treatments.  He is afebrile.  Lung sounds are clear to auscultate.  Occasional dry cough.  He has had some increasing lower extremity edema.  He was started on Lasix by cardiology.  His sotalol was discontinued.  He was started on atenolol.  Continues on chronic anticoagulation for A. fib.  INR reviewed.  Coumadin dose.  QT prolongation.  QT interval is trending downward.  Currently 454.  He has a repeat EKG due today.  Currently asymptomatic.  Urinary retention.  He did have a Perea catheter that was removed on Tuesday.  He is voiding adequately.  Chronic extremity edema.  His wife to bring his wraps.  Will continue with those.  Follow-up laboratory on Monday including CBC BMP and BNP.      Electronically signed by: Jaimie Joe CNP

## 2021-06-16 NOTE — PROGRESS NOTES
Code Status:  POLST AVAILABLE  Visit Type: Problem Visit     Facility:  [facility] [[id number]]         Facility Type: SNF (Skilled Nursing Facility, TCU)    History of Present Illness: Robinson Medel is a 88 y.o. male who I am seeing today for follow up on the TCU. Pt originally  hospitalized on 2/1/18 for fatigue, SOB and cough. CXR positive for infiltrates and positive culture for Legionella pneumonia with sepsis .It did appear on x-ray that the right lower lobe effusion did appear to be chronic and was present on prior imaging.  CT scan of the abdomen and pelvis on 4/2017 did reveal this. A diagnostic thoracentesis was done on 2/4/2018 it was exudative in nature by light's criteria.  Cytology was negative for malignant emily  Influenza a and B were negative and blood cultures ×2 were obtained and no growth to date. He completed his  Levaquin. I was phoned on Friday a.m. in regards to increased shortness of breath and increased lower extremity edema 3-4+. I did order follow-up chest x-ray as well as stat laboratory patient had been seen by cardiology on Thursday a.m. prior to my visit.  Had been restarted on his Lasix. This was discontinued during hospitalization. He was then seen by another NP and sent to ER for evaluation. He received IV lasix and was sent back to the SNF. He wife was retrieving a wheelchair for him and he got out of the car fell and suffered a large laceration to his head. He returned to the ER and received staples.     Today nursing staff report continued LE edema with weeping. He denies any CP or SOB. Weight is trending upward. Therapy reports irritability.          Active Ambulatory Problems     Diagnosis Date Noted     Venous stasis dermatitis of both lower extremities      CAD (coronary artery disease) 09/28/2015     Essential hypertension 09/24/2015     Gastroduodenal ulcer 09/24/2015     Peripheral blood vessel disorder 09/24/2015     Urinary bladder stone  02/10/2016     Anemia, blood loss      Persistent atrial fibrillation      Lymphedema 05/06/2016     Weakness 05/06/2016     Hypotension due to drugs 05/06/2016     Hypotension, unspecified hypotension type      Duodenitis 05/11/2016     Acute blood loss anemia 05/11/2016     Atrial fibrillation 05/11/2016     Anemia 06/15/2016     LUCY (acute kidney injury) 06/15/2016     Cecal cancer      S/P right hemicolectomy      Gastrointestinal hemorrhage with melena 07/08/2016     Blood in stool      Lactic acid acidosis      Type 2 diabetes mellitus 09/24/2015     Venous hypertension, chronic, bilateral 08/07/2017     Shortness of breath 02/01/2018     CAP (community acquired pneumonia) 02/01/2018     Legionella infection      UTI (urinary tract infection)      Resolved Ambulatory Problems     Diagnosis Date Noted     Hematuria 10/06/2015     Type 2 diabetes mellitus with hyperglycemia 09/24/2015     Gross hematuria 02/22/2016     Symptomatic anemia 05/06/2016     Elevated INR 06/15/2016     Past Medical History:   Diagnosis Date     Anemia      Coronary artery disease 9/25/2015     Diabetes mellitus      Duodenitis 5/11/2016     Dyslipidemia      Gastroduodenal ulcer 9/24/2015     GERD (gastroesophageal reflux disease)      GI bleed 07/08/2016     Gout      Hematuria      Hyperlipemia      Hypertension      Kidney stone 10/6/2015       Current Outpatient Prescriptions   Medication Sig     acetaminophen (TYLENOL) 325 MG tablet Take 2 tablets (650 mg total) by mouth every 4 (four) hours as needed.     atorvastatin (LIPITOR) 10 MG tablet Take 10 mg by mouth every morning.      benzonatate (TESSALON) 100 MG capsule Take 1 capsule (100 mg total) by mouth 3 (three) times a day as needed for cough.     ferrous gluconate 325 mg (36 mg iron) Tab Take 325 mg by mouth daily.      glipiZIDE (GLUCOTROL XL) 5 MG 24 hr tablet Take 5 mg by mouth daily with breakfast.      Lactobacillus acidoph-L.bulgar (LACTINEX) 100 million cell packet  Take 1 packet by mouth 3 (three) times a day with meals.     LANTUS SOLOSTAR 100 unit/mL (3 mL) pen Inject 20 Units under the skin at bedtime.     NOVOLOG 100 unit/mL injection Check blood sugar four (4) times daily. AC/HS     omeprazole (PRILOSEC) 20 MG capsule Take 20 mg by mouth daily.      sotalol (BETAPACE) 80 MG tablet Take 80 mg by mouth 2 (two) times a day.      vitamin A-vitamin C-vit E-min (OCUVITE) Tab tablet Take 1 tablet by mouth daily.      warfarin (COUMADIN) 2 MG tablet Take 1.5 tablets (3 mg total) by mouth daily.       No Known Allergies      Review of Systems   No fevers or chills. No headache, lightheadedness or dizziness. No SOB, chest pains or palpitations. Appetite good.   No nausea, vomiting, constipation or diarrhea. He denies any dysuria frequency burning or pain.   Chronic LE edema with increase and weeping. He does wear wraps at home. He is upset about recent hospital visit.       Physical Exam   PHYSICAL EXAMINATION:  Vital signs: Reviewed at the facility.   General: Awake, Alert, oriented x3, appropriately, follows simple commands, conversant  HEENT:PERRLA, Pink conjunctiva, anicteric sclerae, moist oral mucosa. Very [place] with aides. Large laceration to scalp dry and intact with staples. Dried blood noted.   NECK: Supple, without any lymphadenopathy, or masses  CVS:  S1  S2, without murmur or gallop. Regular rate and rhythm.   LUNG: Clear to auscultation, No wheezes, rales or rhonci. No cough on exam.   BACK: No kyphosis of the thoracic spine  ABDOMEN: Soft, nontender to palpation, with positive bowel sounds  EXTREMITIES: Moves both upper and lower extremities with generalized weakness, 3-4+ pedal edema, no calf tenderness. Dressing intact to LE. Ace wraps in place. SKIN: weeping areas to LE.   NEUROLOGIC: Intact, pulses palpable  PSYCHIATRIC: Mild cognitive impairment.             Labs:    Recent Results (from the past 240 hour(s))   Basic Metabolic Panel   Result Value Ref Range     Sodium 135 (L) 136 - 145 mmol/L    Potassium 4.3 3.5 - 5.0 mmol/L    Chloride 104 98 - 107 mmol/L    CO2 22 22 - 31 mmol/L    Anion Gap, Calculation 9 5 - 18 mmol/L    Glucose 129 (H) 70 - 125 mg/dL    Calcium 8.5 8.5 - 10.5 mg/dL    BUN 19 8 - 28 mg/dL    Creatinine 1.41 (H) 0.70 - 1.30 mg/dL    GFR MDRD Af Amer 57 (L) >60 mL/min/1.73m2    GFR MDRD Non Af Amer 47 (L) >60 mL/min/1.73m2   BNP(B-type Natriuretic Peptide)   Result Value Ref Range     (H) 0 - 93 pg/mL   HM1 (CBC with Diff)   Result Value Ref Range    WBC 10.9 4.0 - 11.0 thou/uL    RBC 4.59 4.40 - 6.20 mill/uL    Hemoglobin 14.0 14.0 - 18.0 g/dL    Hematocrit 43.2 40.0 - 54.0 %    MCV 94 80 - 100 fL    MCH 30.5 27.0 - 34.0 pg    MCHC 32.4 32.0 - 36.0 g/dL    RDW 14.3 11.0 - 14.5 %    Platelets 266 140 - 440 thou/uL    MPV 8.9 8.5 - 12.5 fL    Neutrophils % 86 (H) 50 - 70 %    Lymphocytes % 6 (L) 20 - 40 %    Monocytes % 8 2 - 10 %    Eosinophils % 0 0 - 6 %    Basophils % 0 0 - 2 %    Neutrophils Absolute 9.3 (H) 2.0 - 7.7 thou/uL    Lymphocytes Absolute 0.7 (L) 0.8 - 4.4 thou/uL    Monocytes Absolute 0.9 0.0 - 0.9 thou/uL    Eosinophils Absolute 0.0 0.0 - 0.4 thou/uL    Basophils Absolute 0.0 0.0 - 0.2 thou/uL   Comprehensive Metabolic Panel   Result Value Ref Range    Sodium 139 136 - 145 mmol/L    Potassium 4.0 3.5 - 5.0 mmol/L    Chloride 106 98 - 107 mmol/L    CO2 23 22 - 31 mmol/L    Anion Gap, Calculation 10 5 - 18 mmol/L    Glucose 161 (H) 70 - 125 mg/dL    BUN 19 8 - 28 mg/dL    Creatinine 1.40 (H) 0.70 - 1.30 mg/dL    GFR MDRD Af Amer 58 (L) >60 mL/min/1.73m2    GFR MDRD Non Af Amer 48 (L) >60 mL/min/1.73m2    Bilirubin, Total 0.7 0.0 - 1.0 mg/dL    Calcium 8.8 8.5 - 10.5 mg/dL    Protein, Total 5.9 (L) 6.0 - 8.0 g/dL    Albumin 2.6 (L) 3.5 - 5.0 g/dL    Alkaline Phosphatase 78 45 - 120 U/L    AST 24 0 - 40 U/L    ALT 45 0 - 45 U/L   INR   Result Value Ref Range    INR 2.29 (H) 0.90 - 1.10   Lactic Acid   Result Value Ref Range    Lactic  Acid 1.8 0.5 - 2.2 mmol/L   Type and Screen   Result Value Ref Range    ABORh A POS     Antibody Screen Negative Negative   Troponin I   Result Value Ref Range    Troponin I 0.03 0.00 - 0.29 ng/mL   HM1 (CBC with Diff)   Result Value Ref Range    WBC 8.9 4.0 - 11.0 thou/uL    RBC 4.24 (L) 4.40 - 6.20 mill/uL    Hemoglobin 13.1 (L) 14.0 - 18.0 g/dL    Hematocrit 38.3 (L) 40.0 - 54.0 %    MCV 90 80 - 100 fL    MCH 30.9 27.0 - 34.0 pg    MCHC 34.2 32.0 - 36.0 g/dL    RDW 14.3 11.0 - 14.5 %    Platelets 230 140 - 440 thou/uL    MPV 8.8 8.5 - 12.5 fL    Neutrophils % 83 (H) 50 - 70 %    Lymphocytes % 9 (L) 20 - 40 %    Monocytes % 8 2 - 10 %    Eosinophils % 0 0 - 6 %    Basophils % 0 0 - 2 %    Neutrophils Absolute 7.3 2.0 - 7.7 thou/uL    Lymphocytes Absolute 0.8 0.8 - 4.4 thou/uL    Monocytes Absolute 0.7 0.0 - 0.9 thou/uL    Eosinophils Absolute 0.0 0.0 - 0.4 thou/uL    Basophils Absolute 0.0 0.0 - 0.2 thou/uL   ECG 12 lead nursing unit performed   Result Value Ref Range    SYSTOLIC BLOOD PRESSURE  mmHg    DIASTOLIC BLOOD PRESSURE  mmHg    VENTRICULAR RATE 88 BPM    ATRIAL RATE 277 BPM    P-R INTERVAL  ms    QRS DURATION 110 ms    Q-T INTERVAL 392 ms    QTC CALCULATION (BEZET) 474 ms    P Axis  degrees    R AXIS -39 degrees    T AXIS 24 degrees    MUSE DIAGNOSIS       Atrial fibrillation  Left axis deviation  Incomplete left bundle branch block  Abnormal ECG  When compared with ECG of 07-FEB-2018 11:39,  No significant change was found  Confirmed by GEMMA MEHTA, YOAAV LOC: ([id number]) on 2/17/2018 2:41:23 PM     Urinalysis-UC if Indicated   Result Value Ref Range    Color, UA Yellow Colorless, Yellow, Straw, Light Yellow    Clarity, UA Clear Clear    Glucose, UA Negative Negative    Bilirubin, UA Negative Negative    Ketones, UA Negative Negative, 60 mg/dL    Specific Gravity, UA 1.006 1.001 - 1.030    Blood, UA Moderate (!) Negative    pH, UA 5.0 4.5 - 8.0    Protein, UA Negative Negative mg/dL    Urobilinogen, UA  <2.0 E.U./dL <2.0 E.U./dL, 2.0 E.U./dL    Nitrite, UA Negative Negative    Leukocytes, UA Small (!) Negative    Bacteria, UA Few (!) None Seen hpf    RBC, UA 10-25 (!) None Seen, 0-2 hpf    WBC, UA 0-5 None Seen, 0-5 hpf    Squam Epithel, UA 0-5 None Seen, 0-5 lpf    Mucus, UA Few (!) None Seen lpf    Hyaline Casts, UA 0-5 0-5, None Seen lpf   Culture, Urine   Result Value Ref Range    Culture No Growth    Basic Metabolic Panel   Result Value Ref Range    Sodium 138 136 - 145 mmol/L    Potassium 4.4 3.5 - 5.0 mmol/L    Chloride 108 (H) 98 - 107 mmol/L    CO2 24 22 - 31 mmol/L    Anion Gap, Calculation 6 5 - 18 mmol/L    Glucose 62 (L) 70 - 125 mg/dL    Calcium 8.4 (L) 8.5 - 10.5 mg/dL    BUN 16 8 - 28 mg/dL    Creatinine 1.07 0.70 - 1.30 mg/dL    GFR MDRD Af Amer >60 >60 mL/min/1.73m2    GFR MDRD Non Af Amer >60 >60 mL/min/1.73m2   HM2(CBC w/o Differential)   Result Value Ref Range    WBC 6.8 4.0 - 11.0 thou/uL    RBC 3.97 (L) 4.40 - 6.20 mill/uL    Hemoglobin 12.2 (L) 14.0 - 18.0 g/dL    Hematocrit 37.4 (L) 40.0 - 54.0 %    MCV 94 80 - 100 fL    MCH 30.7 27.0 - 34.0 pg    MCHC 32.6 32.0 - 36.0 g/dL    RDW 14.5 11.0 - 14.5 %    Platelets 177 140 - 440 thou/uL    MPV 9.7 8.5 - 12.5 fL   Basic Metabolic Panel   Result Value Ref Range    Sodium 138 136 - 145 mmol/L    Potassium 4.0 3.5 - 5.0 mmol/L    Chloride 104 98 - 107 mmol/L    CO2 27 22 - 31 mmol/L    Anion Gap, Calculation 7 5 - 18 mmol/L    Glucose 137 (H) 70 - 125 mg/dL    Calcium 8.4 (L) 8.5 - 10.5 mg/dL    BUN 19 8 - 28 mg/dL    Creatinine 1.77 (H) 0.70 - 1.30 mg/dL    GFR MDRD Af Amer 44 (L) >60 mL/min/1.73m2    GFR MDRD Non Af Amer 36 (L) >60 mL/min/1.73m2   BNP(B-type Natriuretic Peptide)   Result Value Ref Range     (H) 0 - 93 pg/mL   HM2(CBC w/o Differential)   Result Value Ref Range    WBC 6.6 4.0 - 11.0 thou/uL    RBC 4.06 (L) 4.40 - 6.20 mill/uL    Hemoglobin 12.4 (L) 14.0 - 18.0 g/dL    Hematocrit 38.7 (L) 40.0 - 54.0 %    MCV 95 80 - 100 fL     MCH 30.5 27.0 - 34.0 pg    MCHC 32.0 32.0 - 36.0 g/dL    RDW 14.9 (H) 11.0 - 14.5 %    Platelets 178 140 - 440 thou/uL    MPV 9.8 8.5 - 12.5 fL         Assessment/Plan:  1. Legionella pneumonia     2. Pleural effusion associated with pulmonary infection     3. Fluid overload     4. Atrial fibrillation     5. Anticoagulation management encounter     6. Chronic diastolic heart failure     7. Scalp laceration       S/p legionella pneumonia with pleural effusion initially underwent thoracentesis. He has completed his antibiotic course. Now with fluid overload. F/u CXR showed pleural effusion. He was sent to ER and received IV lasix and sent back to facility. He continues with increasing LE edema and weight gain. He is currently not on lasix. I will resume his 40mg QD. He has weeping to his legs. We will apply topical treatment. He will need ongoing monitoring and possible increase of his lasix. He continues in therapy. He did have a fall when he returned from the ER in the parking lot and sustained a large laceration to his head. Staples in place. Continues on chronic anticoagulation. INR reviewed and Coumadin dosed. I will follow up with laboratory including CBC, BMP and BNP on Thursday.           Electronically signed by: Jaimie Joe CNP

## 2021-06-16 NOTE — PROGRESS NOTES
Code Status:  POLST AVAILABLE  Visit Type: Problem Visit     Facility:  [facility] [[id number]]         Facility Type: SNF (Skilled Nursing Facility, TCU)    History of Present Illness: Robinson Medel is a 88 y.o. male who I am seeing today for follow up on the TCU. Pt originally  hospitalized on 2/1/18 for fatigue, SOB and cough. CXR positive for infiltrates and positive culture for Legionella pneumonia with sepsis .It did appear on x-ray that the right lower lobe effusion did appear to be chronic and was present on prior imaging.  CT scan of the abdomen and pelvis on 4/2017 did reveal this. A diagnostic thoracentesis was done on 2/4/2018 it was exudative in nature by light's criteria.  Cytology was negative for malignant emily  Influenza a and B were negative and blood cultures ×2 were obtained and no growth to date. He completed his  Levaquin. I was phoned on Friday a.m. in regards to increased shortness of breath and increased lower extremity edema 3-4+. I did order follow-up chest x-ray as well as stat laboratory patient had been seen by cardiology on Thursday a.m. prior to my visit.  Had been restarted on his Lasix. This was discontinued during hospitalization. He was then seen by another NP and sent to ER for evaluation. He received IV lasix and was sent back to the SNF. He wife was retrieving a wheelchair for him and he got out of the car fell and suffered a large laceration to his head. He returned to the ER and received staples.     Pt continues with LE edema 3-4+. Denies any SOB or CP. Weeping from LE. He continues on Lasix and wraps to LE.          Active Ambulatory Problems     Diagnosis Date Noted     Venous stasis dermatitis of both lower extremities      CAD (coronary artery disease) 09/28/2015     Essential hypertension 09/24/2015     Gastroduodenal ulcer 09/24/2015     Peripheral blood vessel disorder 09/24/2015     Urinary bladder stone 02/10/2016     Anemia, blood loss       Persistent atrial fibrillation      Lymphedema 05/06/2016     Weakness 05/06/2016     Hypotension due to drugs 05/06/2016     Hypotension, unspecified hypotension type      Duodenitis 05/11/2016     Acute blood loss anemia 05/11/2016     Atrial fibrillation 05/11/2016     Anemia 06/15/2016     LUCY (acute kidney injury) 06/15/2016     Cecal cancer      S/P right hemicolectomy      Gastrointestinal hemorrhage with melena 07/08/2016     Blood in stool      Lactic acid acidosis      Type 2 diabetes mellitus 09/24/2015     Venous hypertension, chronic, bilateral 08/07/2017     Shortness of breath 02/01/2018     CAP (community acquired pneumonia) 02/01/2018     Legionella infection      UTI (urinary tract infection)      Resolved Ambulatory Problems     Diagnosis Date Noted     Hematuria 10/06/2015     Type 2 diabetes mellitus with hyperglycemia 09/24/2015     Gross hematuria 02/22/2016     Symptomatic anemia 05/06/2016     Elevated INR 06/15/2016     Past Medical History:   Diagnosis Date     Anemia      Coronary artery disease 9/25/2015     Diabetes mellitus      Duodenitis 5/11/2016     Dyslipidemia      Gastroduodenal ulcer 9/24/2015     GERD (gastroesophageal reflux disease)      GI bleed 07/08/2016     Gout      Hematuria      Hyperlipemia      Hypertension      Kidney stone 10/6/2015       Current Outpatient Prescriptions   Medication Sig     acetaminophen (TYLENOL) 325 MG tablet Take 2 tablets (650 mg total) by mouth every 4 (four) hours as needed.     atorvastatin (LIPITOR) 10 MG tablet Take 10 mg by mouth every morning.      benzonatate (TESSALON) 100 MG capsule Take 1 capsule (100 mg total) by mouth 3 (three) times a day as needed for cough.     ferrous gluconate 325 mg (36 mg iron) Tab Take 325 mg by mouth daily.      glipiZIDE (GLUCOTROL XL) 5 MG 24 hr tablet Take 5 mg by mouth daily with breakfast.      Lactobacillus acidoph-L.bulgar (LACTINEX) 100 million cell packet Take 1 packet by mouth 3 (three) times a  day with meals.     LANTUS SOLOSTAR 100 unit/mL (3 mL) pen Inject 20 Units under the skin at bedtime.     NOVOLOG 100 unit/mL injection Check blood sugar four (4) times daily. AC/HS     omeprazole (PRILOSEC) 20 MG capsule Take 20 mg by mouth daily.      sotalol (BETAPACE) 80 MG tablet Take 80 mg by mouth 2 (two) times a day.      vitamin A-vitamin C-vit E-min (OCUVITE) Tab tablet Take 1 tablet by mouth daily.      warfarin (COUMADIN) 2 MG tablet Take 1.5 tablets (3 mg total) by mouth daily.       No Known Allergies      Review of Systems   No fevers or chills. No headache, lightheadedness or dizziness. No SOB, chest pains or palpitations. Appetite good.   No nausea, vomiting, constipation or diarrhea. He denies any dysuria frequency burning or pain.   Chronic LE edema with increase and weeping. He does wear wraps at home. He is upset about recent hospital visit.       Physical Exam   PHYSICAL EXAMINATION:  Vital signs: Reviewed at the facility.   General: Awake, Alert, oriented x3, appropriately, follows simple commands, conversant  HEENT:PERRLA, Pink conjunctiva, anicteric sclerae, moist oral mucosa. Very [place] with aides. Large laceration to scalp dry and intact with staples. Dried blood noted.   NECK: Supple, without any lymphadenopathy, or masses  CVS:  S1  S2, without murmur or gallop. Regular rate and rhythm.   LUNG: Clear to auscultation, No wheezes, rales or rhonci. No cough on exam.   BACK: No kyphosis of the thoracic spine  ABDOMEN: Soft, nontender to palpation, with positive bowel sounds  EXTREMITIES: Moves both upper and lower extremities with generalized weakness, 3-4+ pedal edema, no calf tenderness. Dressing intact to LE. Ace wraps in place. SKIN: weeping areas to LE.   NEUROLOGIC: Intact, pulses palpable  PSYCHIATRIC: Mild cognitive impairment.             Labs:    Recent Results (from the past 240 hour(s))   Basic Metabolic Panel   Result Value Ref Range    Sodium 135 (L) 136 - 145 mmol/L    Potassium  4.3 3.5 - 5.0 mmol/L    Chloride 104 98 - 107 mmol/L    CO2 22 22 - 31 mmol/L    Anion Gap, Calculation 9 5 - 18 mmol/L    Glucose 129 (H) 70 - 125 mg/dL    Calcium 8.5 8.5 - 10.5 mg/dL    BUN 19 8 - 28 mg/dL    Creatinine 1.41 (H) 0.70 - 1.30 mg/dL    GFR MDRD Af Amer 57 (L) >60 mL/min/1.73m2    GFR MDRD Non Af Amer 47 (L) >60 mL/min/1.73m2   BNP(B-type Natriuretic Peptide)   Result Value Ref Range     (H) 0 - 93 pg/mL   HM1 (CBC with Diff)   Result Value Ref Range    WBC 10.9 4.0 - 11.0 thou/uL    RBC 4.59 4.40 - 6.20 mill/uL    Hemoglobin 14.0 14.0 - 18.0 g/dL    Hematocrit 43.2 40.0 - 54.0 %    MCV 94 80 - 100 fL    MCH 30.5 27.0 - 34.0 pg    MCHC 32.4 32.0 - 36.0 g/dL    RDW 14.3 11.0 - 14.5 %    Platelets 266 140 - 440 thou/uL    MPV 8.9 8.5 - 12.5 fL    Neutrophils % 86 (H) 50 - 70 %    Lymphocytes % 6 (L) 20 - 40 %    Monocytes % 8 2 - 10 %    Eosinophils % 0 0 - 6 %    Basophils % 0 0 - 2 %    Neutrophils Absolute 9.3 (H) 2.0 - 7.7 thou/uL    Lymphocytes Absolute 0.7 (L) 0.8 - 4.4 thou/uL    Monocytes Absolute 0.9 0.0 - 0.9 thou/uL    Eosinophils Absolute 0.0 0.0 - 0.4 thou/uL    Basophils Absolute 0.0 0.0 - 0.2 thou/uL   Comprehensive Metabolic Panel   Result Value Ref Range    Sodium 139 136 - 145 mmol/L    Potassium 4.0 3.5 - 5.0 mmol/L    Chloride 106 98 - 107 mmol/L    CO2 23 22 - 31 mmol/L    Anion Gap, Calculation 10 5 - 18 mmol/L    Glucose 161 (H) 70 - 125 mg/dL    BUN 19 8 - 28 mg/dL    Creatinine 1.40 (H) 0.70 - 1.30 mg/dL    GFR MDRD Af Amer 58 (L) >60 mL/min/1.73m2    GFR MDRD Non Af Amer 48 (L) >60 mL/min/1.73m2    Bilirubin, Total 0.7 0.0 - 1.0 mg/dL    Calcium 8.8 8.5 - 10.5 mg/dL    Protein, Total 5.9 (L) 6.0 - 8.0 g/dL    Albumin 2.6 (L) 3.5 - 5.0 g/dL    Alkaline Phosphatase 78 45 - 120 U/L    AST 24 0 - 40 U/L    ALT 45 0 - 45 U/L   INR   Result Value Ref Range    INR 2.29 (H) 0.90 - 1.10   Lactic Acid   Result Value Ref Range    Lactic Acid 1.8 0.5 - 2.2 mmol/L   Type and Screen    Result Value Ref Range    ABORh A POS     Antibody Screen Negative Negative   Troponin I   Result Value Ref Range    Troponin I 0.03 0.00 - 0.29 ng/mL   HM1 (CBC with Diff)   Result Value Ref Range    WBC 8.9 4.0 - 11.0 thou/uL    RBC 4.24 (L) 4.40 - 6.20 mill/uL    Hemoglobin 13.1 (L) 14.0 - 18.0 g/dL    Hematocrit 38.3 (L) 40.0 - 54.0 %    MCV 90 80 - 100 fL    MCH 30.9 27.0 - 34.0 pg    MCHC 34.2 32.0 - 36.0 g/dL    RDW 14.3 11.0 - 14.5 %    Platelets 230 140 - 440 thou/uL    MPV 8.8 8.5 - 12.5 fL    Neutrophils % 83 (H) 50 - 70 %    Lymphocytes % 9 (L) 20 - 40 %    Monocytes % 8 2 - 10 %    Eosinophils % 0 0 - 6 %    Basophils % 0 0 - 2 %    Neutrophils Absolute 7.3 2.0 - 7.7 thou/uL    Lymphocytes Absolute 0.8 0.8 - 4.4 thou/uL    Monocytes Absolute 0.7 0.0 - 0.9 thou/uL    Eosinophils Absolute 0.0 0.0 - 0.4 thou/uL    Basophils Absolute 0.0 0.0 - 0.2 thou/uL   ECG 12 lead nursing unit performed   Result Value Ref Range    SYSTOLIC BLOOD PRESSURE  mmHg    DIASTOLIC BLOOD PRESSURE  mmHg    VENTRICULAR RATE 88 BPM    ATRIAL RATE 277 BPM    P-R INTERVAL  ms    QRS DURATION 110 ms    Q-T INTERVAL 392 ms    QTC CALCULATION (BEZET) 474 ms    P Axis  degrees    R AXIS -39 degrees    T AXIS 24 degrees    MUSE DIAGNOSIS       Atrial fibrillation  Left axis deviation  Incomplete left bundle branch block  Abnormal ECG  When compared with ECG of 07-FEB-2018 11:39,  No significant change was found  Confirmed by GEMMA MEHTA, YOAAV LOC:SJ ([id number]) on 2/17/2018 2:41:23 PM     Urinalysis-UC if Indicated   Result Value Ref Range    Color, UA Yellow Colorless, Yellow, Straw, Light Yellow    Clarity, UA Clear Clear    Glucose, UA Negative Negative    Bilirubin, UA Negative Negative    Ketones, UA Negative Negative, 60 mg/dL    Specific Gravity, UA 1.006 1.001 - 1.030    Blood, UA Moderate (!) Negative    pH, UA 5.0 4.5 - 8.0    Protein, UA Negative Negative mg/dL    Urobilinogen, UA <2.0 E.U./dL <2.0 E.U./dL, 2.0 E.U./dL     Nitrite, UA Negative Negative    Leukocytes, UA Small (!) Negative    Bacteria, UA Few (!) None Seen hpf    RBC, UA 10-25 (!) None Seen, 0-2 hpf    WBC, UA 0-5 None Seen, 0-5 hpf    Squam Epithel, UA 0-5 None Seen, 0-5 lpf    Mucus, UA Few (!) None Seen lpf    Hyaline Casts, UA 0-5 0-5, None Seen lpf   Culture, Urine   Result Value Ref Range    Culture No Growth    Basic Metabolic Panel   Result Value Ref Range    Sodium 138 136 - 145 mmol/L    Potassium 4.4 3.5 - 5.0 mmol/L    Chloride 108 (H) 98 - 107 mmol/L    CO2 24 22 - 31 mmol/L    Anion Gap, Calculation 6 5 - 18 mmol/L    Glucose 62 (L) 70 - 125 mg/dL    Calcium 8.4 (L) 8.5 - 10.5 mg/dL    BUN 16 8 - 28 mg/dL    Creatinine 1.07 0.70 - 1.30 mg/dL    GFR MDRD Af Amer >60 >60 mL/min/1.73m2    GFR MDRD Non Af Amer >60 >60 mL/min/1.73m2   HM2(CBC w/o Differential)   Result Value Ref Range    WBC 6.8 4.0 - 11.0 thou/uL    RBC 3.97 (L) 4.40 - 6.20 mill/uL    Hemoglobin 12.2 (L) 14.0 - 18.0 g/dL    Hematocrit 37.4 (L) 40.0 - 54.0 %    MCV 94 80 - 100 fL    MCH 30.7 27.0 - 34.0 pg    MCHC 32.6 32.0 - 36.0 g/dL    RDW 14.5 11.0 - 14.5 %    Platelets 177 140 - 440 thou/uL    MPV 9.7 8.5 - 12.5 fL   Basic Metabolic Panel   Result Value Ref Range    Sodium 138 136 - 145 mmol/L    Potassium 4.0 3.5 - 5.0 mmol/L    Chloride 104 98 - 107 mmol/L    CO2 27 22 - 31 mmol/L    Anion Gap, Calculation 7 5 - 18 mmol/L    Glucose 137 (H) 70 - 125 mg/dL    Calcium 8.4 (L) 8.5 - 10.5 mg/dL    BUN 19 8 - 28 mg/dL    Creatinine 1.77 (H) 0.70 - 1.30 mg/dL    GFR MDRD Af Amer 44 (L) >60 mL/min/1.73m2    GFR MDRD Non Af Amer 36 (L) >60 mL/min/1.73m2   BNP(B-type Natriuretic Peptide)   Result Value Ref Range     (H) 0 - 93 pg/mL   HM2(CBC w/o Differential)   Result Value Ref Range    WBC 6.6 4.0 - 11.0 thou/uL    RBC 4.06 (L) 4.40 - 6.20 mill/uL    Hemoglobin 12.4 (L) 14.0 - 18.0 g/dL    Hematocrit 38.7 (L) 40.0 - 54.0 %    MCV 95 80 - 100 fL    MCH 30.5 27.0 - 34.0 pg    MCHC 32.0  32.0 - 36.0 g/dL    RDW 14.9 (H) 11.0 - 14.5 %    Platelets 178 140 - 440 thou/uL    MPV 9.8 8.5 - 12.5 fL         Assessment/Plan:  1. Legionella pneumonia     2. Pleural effusion associated with pulmonary infection     3. Fluid overload     4. Scalp laceration       recent Legionella pneumonia.  He has completed his antibiotics.  Fluid overload with increased lower extremity 3-4+ edema.  Chest x-ray confirmed pleural effusion.  He was started back on his Lasix at 40 mg daily.  Weight continues to trend upward.  Will increase this to 40 mg twice daily.  Normal creatinine.  We will follow-up with laboratory.  Laboratory is pending today including BMP and BNP.  Continue daily weights.  Cardiac diet with no added salt.  Scalp laceration with sutures in place.  Will find out if therapy can start lymphedema therapy.  He did have wraps previously was followed by [facility] vascular center.          Electronically signed by: Jaimie Joe, CHAVA

## 2021-06-16 NOTE — PROGRESS NOTES
Name:  Robinson Medel  :  1930  MRN: [id number]  Code Status:  FULL CODE    Visit Type: Problem Visit     Facility:  [facility] [[id number]]  Facility Type: SNF (Skilled Nursing Facility, TCU)    History of Present Illness:      This is an 88 year old male here following hospitalization for Legionella pnuemonia. Staff requested a visit today as this patient is febrile, hypotensive and bradycardic. He was seen by Cardiology yesterday and he was started on 50 mg of atenolol dly- and dc'd sotolol. Since his vital signs were compromised- primary care NP did change the order to 25 bid- and ordered CBC with diff and BMP. Staff reports his B/P and pulse has been fluctuating since admission - but seemingly worse today after the dose of atenolol. Patient was visiting with his wife today and both are confused as to how he contacted this type of bacterial pnuemonia. After reviewing information about Legionella bacteria- I explained that it can be contacted through water- and potting soil. The patient admitted he only drinks cheap bottled water-- but is willing to change to a different brand of water- I suggested Evian ( or home water filter). He and his wife are very concerned as he was told in the hospital that 40% of patients are unable to recover- and there only is about 20,000 people in the [place] in a year that do contact that type of bacteria. I reminded both of them we are certainly willing to send him back to the hospital if necessary- his wife wanted to meet his primary nurse so she would know who to talk to if symptoms worsened.  At this time patient remarks that he is not ready to be readmitted to the hospital. Staff has no other reports of any other concerns. This resident is on Levoquin 750 dly until the - and will be followed up next week by primary care NP.    Past Medical History:   Diagnosis Date     Anemia      Coronary artery disease 2015    stents placed      Diabetes  mellitus      Duodenitis 5/11/2016     Dyslipidemia      Gastroduodenal ulcer 9/24/2015     GERD (gastroesophageal reflux disease)      GI bleed 07/08/2016     Gout      Hematuria      Hyperlipemia      Hypertension      Kidney stone 10/6/2015     Past Surgical History:   Procedure Laterality Date     COLONOSCOPY N/A 6/17/2016    Procedure: COLONOSCOPY with biopsies in cecum using biopsy forcep and a transverse colon polypectomy using a hot snare;  Surgeon: Steve Angel MD;  Location: [facility];  Service:      CORONARY ANGIOPLASTY WITH STENT PLACEMENT  09/2015     CYSTOSCOPY PROSTATE W/ LASER N/A 2/9/2016    Procedure: CYSTOSCOPY TRANSURETHRAL RESECTION PROSTATE;  Surgeon: Chano Smith MD;  Location: [facility] OR;  Service:      CYSTOSCOPY W/ LITHOLAPAXY / EHL N/A 2/9/2016    Procedure: CYSTOLITHOLAPAXY ;  Surgeon: Chano Smith MD;  Location: [facility] OR;  Service:      ESOPHAGOGASTRODUODENOSCOPY N/A 5/8/2016    Procedure: ESOPHAGOGASTRODUODENOSCOPY;  Surgeon: Kieran Beltran MD;  Location: [facility] GI;  Service:      ESOPHAGOGASTRODUODENOSCOPY N/A 7/8/2016    Procedure: ESOPHAGOGASTRODUODENOSCOPY;  Surgeon: Chano Wynn MD;  Location: [facility];  Service:      KIDNEY STONE SURGERY       nose polyps       NM LAP,SURG,COLECTOMY, PARTIAL, W/ANAST N/A 6/18/2016    Procedure: LAPAROSCOPIC ASSISTED RIGHT COLECTOMY ROOM 310;  Surgeon: Radha Bueno MD;  Location: [facility];  Service: General     Family History   Problem Relation Age of Onset     Diabetes Mother      Heart disease Father      Mental illness Daughter      Depression     Social History   Substance Use Topics     Smoking status: Never Smoker     Smokeless tobacco: Never Used      Comment: only smoked a little bit during high school     Alcohol use No       Medications:  Current Outpatient Prescriptions   Medication Sig Dispense Refill     acetaminophen (TYLENOL) 325 MG tablet Take 2 tablets (650 mg  total) by mouth every 4 (four) hours as needed.  0     atorvastatin (LIPITOR) 10 MG tablet Take 10 mg by mouth every morning.        benzonatate (TESSALON) 100 MG capsule Take 1 capsule (100 mg total) by mouth 3 (three) times a day as needed for cough.  0     ferrous gluconate 325 mg (36 mg iron) Tab Take 325 mg by mouth daily.        glipiZIDE (GLUCOTROL XL) 5 MG 24 hr tablet Take 5 mg by mouth daily with breakfast.        Lactobacillus acidoph-L.bulgar (LACTINEX) 100 million cell packet Take 1 packet by mouth 3 (three) times a day with meals.  0     LANTUS SOLOSTAR 100 unit/mL (3 mL) pen Inject 20 Units under the skin at bedtime. 3 mL PRN     levoFLOXacin (LEVAQUIN) 750 MG tablet Take 1 tablet (750 mg total) by mouth daily for 9 days. 9 tablet 0     NOVOLOG 100 unit/mL injection Check blood sugar four (4) times daily. AC/HS 10 mL PRN     omeprazole (PRILOSEC) 20 MG capsule Take 20 mg by mouth daily.        sotalol (BETAPACE) 80 MG tablet Take 80 mg by mouth 2 (two) times a day.        vitamin A-vitamin C-vit E-min (OCUVITE) Tab tablet Take 1 tablet by mouth daily.        warfarin (COUMADIN) 2 MG tablet Take 1.5 tablets (3 mg total) by mouth daily.  0     No current facility-administered medications for this visit.          No Known Allergies    Vital Signs:   B/P 86/57  P  45-60  T  100.1  02 sats WNL    ROS:   Resident denies any uncontrolled pain, nausea, vomiting, dizziness headache, c/o shortness of breath (02 sats WNR), vision bowel or bladder problems, night sweats fever chills chest pain abdominal pain. No dysuria hematuria frequency urgency constipation loose stools. Swallows okay eats okay. Does admit to decreased appetite, weakness that seems to be better this afternoon. Is able to ambulate with walker. Does have nonproductive cough - intermittently. Has not slept well last few nights. Today remarks that he does not want to go back to the hospital. I reminded him and his wife to let the staff know if he  changes his mind based on symptoms. I explained that his B/P was low along with his pulse today- and primary care NP did address his B/P medication - and ordered the 50 mg of atenolol- in divided doses.  He understands he is febrile- and we won't be using tylenol as it does mask his temperature. I did encourage him to continue to drink water throughout the day- which could help to stable his B/P.    PHYSICAL EXAM:  General: This is an alert male up in his room in [facility]- visiting with his wife  HEENT:Normocephalic/atraumatic Conjunctivae without erythema nares are clear of any drainage.  Neck: No adenopathy JVD thyromegaly  Resp: Clear No rhonchi wheezing rales- right lung diminished lung sounds-  Cardio: Regular rate and rhythm No murmurs appreciated  Abdomen: Soft nontender no masses distention bowel sounds are present.  Extremities: 2-3 + lower extremity edema fair peripheral pulses-- legs wrapped with ace wrap- and not elevated today as he sits in his recliner.  Skin: Skin intact No noted skin issues  : N/A  Musculoskeletal: Age-related degenerative joint disease  Psych: oriented times 3    Labs:   Lab Requisition on 02/16/2018   Component Date Value Ref Range Status     WBC 02/16/2018 10.9  4.0 - 11.0 thou/uL Final     RBC 02/16/2018 4.59  4.40 - 6.20 mill/uL Final     Hemoglobin 02/16/2018 14.0  14.0 - 18.0 g/dL Final     Hematocrit 02/16/2018 43.2  40.0 - 54.0 % Final     MCV 02/16/2018 94  80 - 100 fL Final     MCH 02/16/2018 30.5  27.0 - 34.0 pg Final     MCHC 02/16/2018 32.4  32.0 - 36.0 g/dL Final     RDW 02/16/2018 14.3  11.0 - 14.5 % Final     Platelets 02/16/2018 266  140 - 440 thou/uL Final     MPV 02/16/2018 8.9  8.5 - 12.5 fL Final     Neutrophils % 02/16/2018 86* 50 - 70 % Final     Lymphocytes % 02/16/2018 6* 20 - 40 % Final     Monocytes % 02/16/2018 8  2 - 10 % Final     Eosinophils % 02/16/2018 0  0 - 6 % Final     Basophils % 02/16/2018 0  0 - 2 % Final     Neutrophils Absolute 02/16/2018 9.3*  2.0 - 7.7 thou/uL Final     Lymphocytes Absolute 02/16/2018 0.7* 0.8 - 4.4 thou/uL Final     Monocytes Absolute 02/16/2018 0.9  0.0 - 0.9 thou/uL Final     Eosinophils Absolute 02/16/2018 0.0  0.0 - 0.4 thou/uL Final     Basophils Absolute 02/16/2018 0.0  0.0 - 0.2 thou/uL Final   Lab Requisition on 02/13/2018   Component Date Value Ref Range Status     Sodium 02/13/2018 137  136 - 145 mmol/L Final     Potassium 02/13/2018 4.2  3.5 - 5.0 mmol/L Final     Chloride 02/13/2018 107  98 - 107 mmol/L Final     CO2 02/13/2018 25  22 - 31 mmol/L Final     Anion Gap, Calculation 02/13/2018 5  5 - 18 mmol/L Final     Glucose 02/13/2018 189* 70 - 125 mg/dL Final     Calcium 02/13/2018 8.6  8.5 - 10.5 mg/dL Final     BUN 02/13/2018 20  8 - 28 mg/dL Final     Creatinine 02/13/2018 1.00  0.70 - 1.30 mg/dL Final     GFR MDRD Af Amer 02/13/2018 >60  >60 mL/min/1.73m2 Final     GFR MDRD Non Af Amer 02/13/2018 >60  >60 mL/min/1.73m2 Final   Admission on 02/01/2018, Discharged on 02/10/2018   No results displayed because visit has over 200 results.           Diagnoses:    ICD-10-CM    1. Legionella pneumonia A48.1    2. Weakness R53.1    3. Hypotension I95.9    4. Bradycardia R00.1    5. Shortness of breath R06.02        Plan: Check vital signs QID with 02 sats, no tylenol for fever, ok to send to ER if conditions worsens. We will otherwise follow current care plan and continue to monitor. Call with changes.    Electronically signed by: Hailey Sanz, CHAVA

## 2021-06-16 NOTE — PROGRESS NOTES
[facility]      Facility:    [facility] [[id number]]  Code Status: UNKNOWN      Chief Complaint/Reason for Visit:  Chief Complaint   Patient presents with     H & P     Fatigue and shortness of breath, Legionella pneumonia with sepsis, transaminitis, persistent right lower lobe effusion.  Status post thoracentesis, type 2 diabetes, acute kidney injury, urinary retention, atrial fibrillation, coronary artery disease, chronic diastolic heart failure, Proteus UTI with urinary retention, diarrhea likely from antibiotics.       HPI:   Robinson is a 88 y.o. male who was recently admitted to the hospital on 2/1/2018 secondary to signs and symptoms of fatigue shortness of breath and cough.  He did come to the hospital and was admitted to the emergency room.  X-ray did show infiltrates and also he did a urine antigen test came back positive for Legionella pneumonia with sepsis.  Sputum culture and culture from the thoracentesis were however negative and he was placed on antibiotics and then transitioned to oral antibiotics in the form of Levaquin.  It did appear on x-ray that the right lower lobe effusion did appear to be chronic and was present on prior imaging.  CT scan of the abdomen and pelvis on 4/2017 did reveal this.  Influenza a and B were negative and blood cultures ×2 were obtained and no growth to date.    He did have transaminitis with elevation of liver function tests and this was likely secondary to his lesional Sandie and they did hold the statin this did improve and statin was restarted.  A diagnostic thoracentesis was done on 2/4/2018 it was exudative in nature by light's criteria.  Cytology was negative for malignant cells.  His Actos was stopped for his diabetes and he did continue Lantus and glipizide and sliding scale insulin.  He did have acute kidney injury and also had urinary retention with urgency and a Perea catheter was placed for voiding trial in 1 week.  His  atrial fibrillation stayed relatively stable with good rate control on sotalol and he did continue his Coumadin.  QTc interval was 457 on admission and then went down to 409.  It was 492 on repeat.  It was noted in the hospital that because of the QTc interval he was questioned that the Levaquin might not be appropriate.  Given the fact that he does have QT interval less than 500 at this time and we will continue but will monitor QTc interval if he goes over 500 than it will be stopped because of dangers of torsades to point.  Patient also had diarrhea which did improve in the hospital.  He was treated appropriately and transferred here to the TCU at [place] in stable condition.    Patient claims he is not doing very well however he has improved and this is really wiped him out he did walk with physical therapy from physical therapy down to his room and he did pretty well.  His O2 sats have been maintaining on that however he just feels wiped out.  He does feel that he has been declining for some time at this time and is concerned about the Perea catheter.  He does want this out as soon as possible we could possibly do a voiding trial here.  He is having bowel movements but they are very small and he did not do well on the stool softeners according to him.  He had multiple bouts of diarrhea and he is concerned that he does not want to go back on stool softeners.  We will continue to monitor this.    Past Medical History:  Past Medical History:   Diagnosis Date     Anemia      Coronary artery disease 9/25/2015    stents placed      Diabetes mellitus      Duodenitis 5/11/2016     Dyslipidemia      Gastroduodenal ulcer 9/24/2015     GERD (gastroesophageal reflux disease)      GI bleed 07/08/2016     Gout      Hematuria      Hyperlipemia      Hypertension      Kidney stone 10/6/2015           Surgical History:  Past Surgical History:   Procedure Laterality Date     COLONOSCOPY N/A 6/17/2016    Procedure:  COLONOSCOPY with biopsies in cecum using biopsy forcep and a transverse colon polypectomy using a hot snare;  Surgeon: Steve Angel MD;  Location: [facility];  Service:      CORONARY ANGIOPLASTY WITH STENT PLACEMENT  09/2015     CYSTOSCOPY PROSTATE W/ LASER N/A 2/9/2016    Procedure: CYSTOSCOPY TRANSURETHRAL RESECTION PROSTATE;  Surgeon: Chano Smith MD;  Location: [facility] OR;  Service:      CYSTOSCOPY W/ LITHOLAPAXY / EHL N/A 2/9/2016    Procedure: CYSTOLITHOLAPAXY ;  Surgeon: Chano Smith MD;  Location: [facility] OR;  Service:      ESOPHAGOGASTRODUODENOSCOPY N/A 5/8/2016    Procedure: ESOPHAGOGASTRODUODENOSCOPY;  Surgeon: Kieran Beltran MD;  Location: [facility];  Service:      ESOPHAGOGASTRODUODENOSCOPY N/A 7/8/2016    Procedure: ESOPHAGOGASTRODUODENOSCOPY;  Surgeon: Chano Wynn MD;  Location: [facility];  Service:      KIDNEY STONE SURGERY       nose polyps       TX LAP,SURG,COLECTOMY, PARTIAL, W/ANAST N/A 6/18/2016    Procedure: LAPAROSCOPIC ASSISTED RIGHT COLECTOMY ROOM 310;  Surgeon: Radha Bueno MD;  Location: [facility];  Service: General       Family History:   Family History   Problem Relation Age of Onset     Diabetes Mother      Heart disease Father      Mental illness Daughter      Depression       Social History:    Social History     Social History     Marital status:      Spouse name: N/A     Number of children: N/A     Years of education: N/A     Occupational History     RETIRED  Center     Social History Main Topics     Smoking status: Never Smoker     Smokeless tobacco: Never Used      Comment: only smoked a little bit during high school     Alcohol use No     Drug use: No     Sexual activity: Not Asked     Other Topics Concern     None     Social History Narrative    12/10/2015: .  Lives with his wife in a town home          Review of Systems   Constitutional:        Patient does not believe he is tolerating the catheter  well but he did walk the distance of the hallways which is likely about 120 feet.  He does deny any fevers chills nausea vomiting diarrhea change in vision hearing taste or smell weakness one side the other chest pain.  He does get short of breath with exertion but his O2 sats have stayed pretty well and he does have a Perea catheter in.  He denies any pain at this time.  Remainder the review of systems is negative.       Vitals:    02/12/18 1012   BP: (!) 128/97   Pulse: (!) 50   Resp: 16   Temp: 98.1  F (36.7  C)   SpO2: 97%       Physical Exam   Constitutional: No distress.   HENT:   Head: Normocephalic and atraumatic.   Nose: Nose normal.   Mouth/Throat: No oropharyngeal exudate.   Eyes: Right eye exhibits no discharge. Left eye exhibits no discharge. No scleral icterus.   Cardiovascular: Normal rate and regular rhythm.    Pulmonary/Chest: Effort normal.   Has diminished breath sounds on the right with some fine crackles at the right bases.  Left lung also has some slight crackles at the bases there were no rhonchi or wheezes.   Abdominal: Soft. Bowel sounds are normal. He exhibits no distension. There is no tenderness.   Musculoskeletal: He exhibits edema.   Neurological: He is alert. No cranial nerve deficit. He exhibits normal muscle tone.   Skin: Skin is warm and dry. He is not diaphoretic.   Psychiatric: He has a normal mood and affect. His behavior is normal.       Medication List:  Current Outpatient Prescriptions   Medication Sig     acetaminophen (TYLENOL) 325 MG tablet Take 2 tablets (650 mg total) by mouth every 4 (four) hours as needed.     atorvastatin (LIPITOR) 10 MG tablet Take 10 mg by mouth every morning.      benzonatate (TESSALON) 100 MG capsule Take 1 capsule (100 mg total) by mouth 3 (three) times a day as needed for cough.     ferrous gluconate 325 mg (36 mg iron) Tab Take 325 mg by mouth daily.      glipiZIDE (GLUCOTROL XL) 5 MG 24 hr tablet Take 5 mg by mouth daily with breakfast.       Lactobacillus acidoph-L.bulgar (LACTINEX) 100 million cell packet Take 1 packet by mouth 3 (three) times a day with meals.     LANTUS SOLOSTAR 100 unit/mL (3 mL) pen Inject 20 Units under the skin at bedtime.     levoFLOXacin (LEVAQUIN) 750 MG tablet Take 1 tablet (750 mg total) by mouth daily for 9 days.     NOVOLOG 100 unit/mL injection Check blood sugar four (4) times daily. AC/HS     omeprazole (PRILOSEC) 20 MG capsule Take 20 mg by mouth daily.      sotalol (BETAPACE) 80 MG tablet Take 80 mg by mouth 2 (two) times a day.      vitamin A-vitamin C-vit E-min (OCUVITE) Tab tablet Take 1 tablet by mouth daily.      warfarin (COUMADIN) 2 MG tablet Take 1.5 tablets (3 mg total) by mouth daily.       Labs: The labs are as follows on 2/10/2018 sodium was 140, potassium was 4.2, CO2 was low at 21, BUN was 33, creatinine was 0.91, GFR was greater than 60.  Liver function tests were within normal limits however early on in this hospitalization patient had transaminitis.  This seemed to resolve.      Assessment:    ICD-10-CM    1. Legionella pneumonia A48.1    2. Atrial fibrillation I48.91    3. Type 2 diabetes mellitus E11.9    4. Pleural effusion associated with pulmonary infection J18.9     J91.8    5. Status post thoracentesis Z98.890    6. Acute kidney injury N17.9    7. Chronic diastolic heart failure I50.32    8. Urinary tract infection N39.0    9. Urinary retention R33.9    10. Anticoagulation management encounter Z51.81     Z79.01        Plan: Plan at this time will check his blood sugars 4 times daily and will check his weights on a daily basis secondary CHF I will be notified of any 2 pounds weight changes and he will follow-up with urology 1 week for voiding trial we can possibly do a voiding trial here at this time.  His INR is 1.9 today so I am going to increase his Coumadin 3.5 mg daily and check the INR on Wednesday of this week that will be in 2 days.  We will check a basic metabolic profile tomorrow  secondary to acute kidney injury and an EKG will be done today to check the QTc interval which was in the 490 range in the hospital given that he is on Levaquin that he is date for QTc interval lengthening which can progress into torsades.  I will continue to monitor above medical problems and no other changes to care plan at this time.  Greater than 1 hour was spent on this admission with greater than 50% of the time dedicated to coordination of care which included discussion of care plan with patient.        Electronically signed by: Praneeth Marina DO

## 2021-06-16 NOTE — PROGRESS NOTES
Code Status:  POLST AVAILABLE  Visit Type: Problem Visit     Facility:  [facility] [[id number]]         Facility Type: SNF (Skilled Nursing Facility, TCU)    History of Present Illness: Robinson Medel is a 88 y.o. male who I am seeing today for follow up on the TCU. Pt recently hospitalized on 2/1/18 for fatigue, SOB and cough. CXR positive for infiltrates and positive culture for Legionella pneumonia with sepsis .It did appear on x-ray that the right lower lobe effusion did appear to be chronic and was present on prior imaging.  CT scan of the abdomen and pelvis on 4/2017 did reveal this. A diagnostic thoracentesis was done on 2/4/2018 it was exudative in nature by light's criteria.  Cytology was negative for malignant emily  Influenza a and B were negative and blood cultures ×2 were obtained and no growth to date. He continues on Levaquin. He reports cough improving. His wife is present on exam. He has had QT prolongation due to Levaquin use. QT interval trending downward. F/u EKG obtained yesterday and reviewed. It was 454. It was previously 492. He was seen by cardiology and was thought benefits out weighed risks. He is asymptomatic. He denies any CP or palpitations. Liver enzymes were initially elevated however with holding his statin this did improve. Acute kidney injury. His Actos was stopped and he was treated with lantus, glipizide and SS. He also had urinary retention and solitario catheter was placed. He does have underlying BPH. He is requesting his solitario be removed today. He reports discomfort with movement. He does have a follow up with urology. Atrial fib. He continues on Sotalol and Coumadin. Chronic VS with LE edema. His wife tells me he wears compression wraps and that she will bring them in the am.  He continues in therapy.      Active Ambulatory Problems     Diagnosis Date Noted     Venous stasis dermatitis of both lower extremities      CAD (coronary artery disease) 09/28/2015      Essential hypertension 09/24/2015     Gastroduodenal ulcer 09/24/2015     Peripheral blood vessel disorder 09/24/2015     Urinary bladder stone 02/10/2016     Anemia, blood loss      Persistent atrial fibrillation      Lymphedema 05/06/2016     Weakness 05/06/2016     Hypotension due to drugs 05/06/2016     Hypotension, unspecified hypotension type      Duodenitis 05/11/2016     Acute blood loss anemia 05/11/2016     Atrial fibrillation 05/11/2016     Anemia 06/15/2016     LUCY (acute kidney injury) 06/15/2016     Cecal cancer      S/P right hemicolectomy      Gastrointestinal hemorrhage with melena 07/08/2016     Blood in stool      Lactic acid acidosis      Type 2 diabetes mellitus 09/24/2015     Venous hypertension, chronic, bilateral 08/07/2017     Shortness of breath 02/01/2018     CAP (community acquired pneumonia) 02/01/2018     Legionella infection      UTI (urinary tract infection)      Resolved Ambulatory Problems     Diagnosis Date Noted     Hematuria 10/06/2015     Type 2 diabetes mellitus with hyperglycemia 09/24/2015     Gross hematuria 02/22/2016     Symptomatic anemia 05/06/2016     Elevated INR 06/15/2016     Past Medical History:   Diagnosis Date     Anemia      Coronary artery disease 9/25/2015     Diabetes mellitus      Duodenitis 5/11/2016     Dyslipidemia      Gastroduodenal ulcer 9/24/2015     GERD (gastroesophageal reflux disease)      GI bleed 07/08/2016     Gout      Hematuria      Hyperlipemia      Hypertension      Kidney stone 10/6/2015       Current Outpatient Prescriptions   Medication Sig     acetaminophen (TYLENOL) 325 MG tablet Take 2 tablets (650 mg total) by mouth every 4 (four) hours as needed.     atorvastatin (LIPITOR) 10 MG tablet Take 10 mg by mouth every morning.      benzonatate (TESSALON) 100 MG capsule Take 1 capsule (100 mg total) by mouth 3 (three) times a day as needed for cough.     ferrous gluconate 325 mg (36 mg iron) Tab Take 325 mg by mouth daily.       glipiZIDE (GLUCOTROL XL) 5 MG 24 hr tablet Take 5 mg by mouth daily with breakfast.      Lactobacillus acidoph-L.bulgar (LACTINEX) 100 million cell packet Take 1 packet by mouth 3 (three) times a day with meals.     LANTUS SOLOSTAR 100 unit/mL (3 mL) pen Inject 20 Units under the skin at bedtime.     levoFLOXacin (LEVAQUIN) 750 MG tablet Take 1 tablet (750 mg total) by mouth daily for 9 days.     NOVOLOG 100 unit/mL injection Check blood sugar four (4) times daily. AC/HS     omeprazole (PRILOSEC) 20 MG capsule Take 20 mg by mouth daily.      sotalol (BETAPACE) 80 MG tablet Take 80 mg by mouth 2 (two) times a day.      vitamin A-vitamin C-vit E-min (OCUVITE) Tab tablet Take 1 tablet by mouth daily.      warfarin (COUMADIN) 2 MG tablet Take 1.5 tablets (3 mg total) by mouth daily.       No Known Allergies      Review of Systems   No fevers or chills. No headache, lightheadedness or dizziness. No SOB, chest pains or palpitations. He reports very little cough. Appetite is poor. No nausea, vomiting, constipation or diarrhea. Reports discomfort with solitario catheter. Requesting catheter be removed. He reports generalized fatigue and decline over some time. Chronic LE edema. He does wear wraps at home.       Physical Exam   PHYSICAL EXAMINATION:  Vital signs: Reviewed at the facility.   General: Awake, Alert, oriented x3, appropriately, follows simple commands, conversant  HEENT:PERRLA, Pink conjunctiva, anicteric sclerae, moist oral mucosa. Very [place] with aides.   NECK: Supple, without any lymphadenopathy, or masses  CVS:  S1  S2, without murmur or gallop. Regular rate and rhythm.   LUNG: Clear to auscultation, No wheezes, rales or rhonci. Occasional dry cough on exam.   BACK: No kyphosis of the thoracic spine  ABDOMEN: Soft, nontender to palpation, with positive bowel sounds  EXTREMITIES: Moves both upper and lower extremities with generalized weakness, 2+ pedal edema, no calf tenderness. VS changes to LE.   SKIN: Warm and  dry, no rashes or erythema noted. Dry scaly skin to LE.   NEUROLOGIC: Intact, pulses palpable  PSYCHIATRIC: Mild cognitive impairment.             Labs:    Recent Results (from the past 240 hour(s))   POCT Glucose   Result Value Ref Range    Glucose,  mg/dL   INR   Result Value Ref Range    INR 1.47 (H) 0.90 - 1.10   HM2(CBC w/o Differential)   Result Value Ref Range    WBC 11.3 (H) 4.0 - 11.0 thou/uL    RBC 4.29 (L) 4.40 - 6.20 mill/uL    Hemoglobin 13.3 (L) 14.0 - 18.0 g/dL    Hematocrit 38.0 (L) 40.0 - 54.0 %    MCV 89 80 - 100 fL    MCH 31.0 27.0 - 34.0 pg    MCHC 35.0 32.0 - 36.0 g/dL    RDW 13.5 11.0 - 14.5 %    Platelets 189 140 - 440 thou/uL    MPV 9.6 8.5 - 12.5 fL   Comprehensive Metabolic Panel   Result Value Ref Range    Sodium 136 136 - 145 mmol/L    Potassium 3.1 (L) 3.5 - 5.0 mmol/L    Chloride 103 98 - 107 mmol/L    CO2 22 22 - 31 mmol/L    Anion Gap, Calculation 11 5 - 18 mmol/L    Glucose 223 (H) 70 - 125 mg/dL    BUN 41 (H) 8 - 28 mg/dL    Creatinine 1.07 0.70 - 1.30 mg/dL    GFR MDRD Af Amer >60 >60 mL/min/1.73m2    GFR MDRD Non Af Amer >60 >60 mL/min/1.73m2    Bilirubin, Total 1.0 0.0 - 1.0 mg/dL    Calcium 8.9 8.5 - 10.5 mg/dL    Protein, Total 6.0 6.0 - 8.0 g/dL    Albumin 2.0 (L) 3.5 - 5.0 g/dL    Alkaline Phosphatase 62 45 - 120 U/L     (H) 0 - 40 U/L     (H) 0 - 45 U/L   POCT Glucose   Result Value Ref Range    Glucose,  mg/dL   ECG 12 lead MUSE   Result Value Ref Range    SYSTOLIC BLOOD PRESSURE  mmHg    DIASTOLIC BLOOD PRESSURE  mmHg    VENTRICULAR RATE 87 BPM    ATRIAL RATE 88 BPM    P-R INTERVAL  ms    QRS DURATION 124 ms    Q-T INTERVAL 398 ms    QTC CALCULATION (BEZET) 478 ms    P Axis  degrees    R AXIS -42 degrees    T AXIS 37 degrees    MUSE DIAGNOSIS       Atrial fibrillation with premature ventricular or aberrantly conducted complexes  Left axis deviation  Abnormal ECG  When compared with ECG of 03-FEB-2018 08:32,  Premature ventricular complexes are  now Present  Confirmed by YEVGENIY HORTA MD LOC:[facility] ([id number]) on 2/5/2018 3:27:01 PM     POCT Glucose   Result Value Ref Range    Glucose,  mg/dL   POCT Glucose   Result Value Ref Range    Glucose,  mg/dL   POCT Glucose   Result Value Ref Range    Glucose,  mg/dL   INR   Result Value Ref Range    INR 1.86 (H) 0.90 - 1.10   Comprehensive Metabolic Panel   Result Value Ref Range    Sodium 138 136 - 145 mmol/L    Potassium 3.8 3.5 - 5.0 mmol/L    Chloride 105 98 - 107 mmol/L    CO2 22 22 - 31 mmol/L    Anion Gap, Calculation 11 5 - 18 mmol/L    Glucose 269 (H) 70 - 125 mg/dL    BUN 40 (H) 8 - 28 mg/dL    Creatinine 1.16 0.70 - 1.30 mg/dL    GFR MDRD Af Amer >60 >60 mL/min/1.73m2    GFR MDRD Non Af Amer 59 (L) >60 mL/min/1.73m2    Bilirubin, Total 0.9 0.0 - 1.0 mg/dL    Calcium 9.2 8.5 - 10.5 mg/dL    Protein, Total 6.4 6.0 - 8.0 g/dL    Albumin 2.2 (L) 3.5 - 5.0 g/dL    Alkaline Phosphatase 76 45 - 120 U/L     (H) 0 - 40 U/L     (H) 0 - 45 U/L   HM1 (CBC with Diff)   Result Value Ref Range    WBC 10.2 4.0 - 11.0 thou/uL    RBC 4.64 4.40 - 6.20 mill/uL    Hemoglobin 14.3 14.0 - 18.0 g/dL    Hematocrit 40.6 40.0 - 54.0 %    MCV 88 80 - 100 fL    MCH 30.8 27.0 - 34.0 pg    MCHC 35.2 32.0 - 36.0 g/dL    RDW 13.5 11.0 - 14.5 %    Platelets 271 140 - 440 thou/uL    MPV 9.6 8.5 - 12.5 fL    Neutrophils % 91 (H) 50 - 70 %    Lymphocytes % 3 (L) 20 - 40 %    Monocytes % 5 2 - 10 %    Eosinophils % 1 0 - 6 %    Basophils % 0 0 - 2 %    Neutrophils Absolute 9.2 (H) 2.0 - 7.7 thou/uL    Lymphocytes Absolute 0.4 (L) 0.8 - 4.4 thou/uL    Monocytes Absolute 0.5 0.0 - 0.9 thou/uL    Eosinophils Absolute 0.1 0.0 - 0.4 thou/uL    Basophils Absolute 0.0 0.0 - 0.2 thou/uL   POCT Glucose   Result Value Ref Range    Glucose,  mg/dL   POCT Glucose   Result Value Ref Range    Glucose,  mg/dL   POCT Glucose   Result Value Ref Range    Glucose,  mg/dL   POCT Glucose   Result Value Ref Range     Glucose,  mg/dL   INR   Result Value Ref Range    INR 2.27 (H) 0.90 - 1.10   Comprehensive Metabolic Panel   Result Value Ref Range    Sodium 141 136 - 145 mmol/L    Potassium 3.6 3.5 - 5.0 mmol/L    Chloride 110 (H) 98 - 107 mmol/L    CO2 23 22 - 31 mmol/L    Anion Gap, Calculation 8 5 - 18 mmol/L    Glucose 146 (H) 70 - 125 mg/dL    BUN 32 (H) 8 - 28 mg/dL    Creatinine 0.84 0.70 - 1.30 mg/dL    GFR MDRD Af Amer >60 >60 mL/min/1.73m2    GFR MDRD Non Af Amer >60 >60 mL/min/1.73m2    Bilirubin, Total 0.6 0.0 - 1.0 mg/dL    Calcium 8.8 8.5 - 10.5 mg/dL    Protein, Total 5.5 (L) 6.0 - 8.0 g/dL    Albumin 2.0 (L) 3.5 - 5.0 g/dL    Alkaline Phosphatase 61 45 - 120 U/L    AST 72 (H) 0 - 40 U/L     (H) 0 - 45 U/L   POCT Glucose   Result Value Ref Range    Glucose,  mg/dL   ECG 12 lead MUSE   Result Value Ref Range    SYSTOLIC BLOOD PRESSURE  mmHg    DIASTOLIC BLOOD PRESSURE  mmHg    VENTRICULAR RATE 96 BPM    ATRIAL RATE 300 BPM    P-R INTERVAL  ms    QRS DURATION 104 ms    Q-T INTERVAL 390 ms    QTC CALCULATION (BEZET) 492 ms    P Axis  degrees    R AXIS -37 degrees    T AXIS 25 degrees    MUSE DIAGNOSIS       Atrial fibrillation with premature ventricular or aberrantly conducted complexes  Left axis deviation  Low voltage QRS  Inferior infarct , age undetermined  Non-specific intra-ventricular conduction delay  Abnormal ECG  When compared with ECG of 05-FEB-2018 08:06,  Inferior infarct is now Present  Confirmed by ALEJANDRA FITCH MD LOC:[facility] ([id number]) on 2/7/2018 4:13:03 PM     POCT Glucose   Result Value Ref Range    Glucose,  mg/dL   POCT Glucose   Result Value Ref Range    Glucose,  mg/dL   POCT Glucose   Result Value Ref Range    Glucose,  mg/dL   POCT Glucose   Result Value Ref Range    Glucose,  mg/dL   INR   Result Value Ref Range    INR 2.29 (H) 0.90 - 1.10   Potassium - Next AM   Result Value Ref Range    Potassium 4.2 3.5 - 5.0 mmol/L   POCT Glucose   Result  Value Ref Range    Glucose,  mg/dL   POCT Glucose   Result Value Ref Range    Glucose,  mg/dL   C. Diff Toxin By PCR   Result Value Ref Range    C.Difficile Toxigenic by PCR Negative Negative   POCT Glucose   Result Value Ref Range    Glucose,  mg/dL   POCT Glucose   Result Value Ref Range    Glucose,  mg/dL   INR   Result Value Ref Range    INR 2.27 (H) 0.90 - 1.10   Comprehensive Metabolic Panel   Result Value Ref Range    Sodium 139 136 - 145 mmol/L    Potassium 4.3 3.5 - 5.0 mmol/L    Chloride 108 (H) 98 - 107 mmol/L    CO2 24 22 - 31 mmol/L    Anion Gap, Calculation 7 5 - 18 mmol/L    Glucose 133 (H) 70 - 125 mg/dL    BUN 27 8 - 28 mg/dL    Creatinine 0.95 0.70 - 1.30 mg/dL    GFR MDRD Af Amer >60 >60 mL/min/1.73m2    GFR MDRD Non Af Amer >60 >60 mL/min/1.73m2    Bilirubin, Total 0.7 0.0 - 1.0 mg/dL    Calcium 8.8 8.5 - 10.5 mg/dL    Protein, Total 5.9 (L) 6.0 - 8.0 g/dL    Albumin 2.3 (L) 3.5 - 5.0 g/dL    Alkaline Phosphatase 70 45 - 120 U/L    AST 34 0 - 40 U/L     (H) 0 - 45 U/L   HM1 (CBC with Diff)   Result Value Ref Range    WBC 10.9 4.0 - 11.0 thou/uL    RBC 4.59 4.40 - 6.20 mill/uL    Hemoglobin 13.8 (L) 14.0 - 18.0 g/dL    Hematocrit 42.1 40.0 - 54.0 %    MCV 92 80 - 100 fL    MCH 30.1 27.0 - 34.0 pg    MCHC 32.8 32.0 - 36.0 g/dL    RDW 13.8 11.0 - 14.5 %    Platelets 334 140 - 440 thou/uL    MPV 9.0 8.5 - 12.5 fL    Neutrophils % 87 (H) 50 - 70 %    Lymphocytes % 6 (L) 20 - 40 %    Monocytes % 5 2 - 10 %    Eosinophils % 2 0 - 6 %    Basophils % 0 0 - 2 %    Neutrophils Absolute 9.1 (H) 2.0 - 7.7 thou/uL    Lymphocytes Absolute 0.6 (L) 0.8 - 4.4 thou/uL    Monocytes Absolute 0.5 0.0 - 0.9 thou/uL    Eosinophils Absolute 0.2 0.0 - 0.4 thou/uL    Basophils Absolute 0.0 0.0 - 0.2 thou/uL   POCT Glucose   Result Value Ref Range    Glucose,  mg/dL   POCT Glucose   Result Value Ref Range    Glucose,  mg/dL   POCT Glucose   Result Value Ref Range    Glucose,   mg/dL   POCT Glucose   Result Value Ref Range    Glucose,  mg/dL   INR   Result Value Ref Range    INR 2.06 (H) 0.90 - 1.10   Comprehensive Metabolic Panel   Result Value Ref Range    Sodium 140 136 - 145 mmol/L    Potassium 4.2 3.5 - 5.0 mmol/L    Chloride 110 (H) 98 - 107 mmol/L    CO2 21 (L) 22 - 31 mmol/L    Anion Gap, Calculation 9 5 - 18 mmol/L    Glucose 108 70 - 125 mg/dL    BUN 30 (H) 8 - 28 mg/dL    Creatinine 0.91 0.70 - 1.30 mg/dL    GFR MDRD Af Amer >60 >60 mL/min/1.73m2    GFR MDRD Non Af Amer >60 >60 mL/min/1.73m2    Bilirubin, Total 0.7 0.0 - 1.0 mg/dL    Calcium 8.7 8.5 - 10.5 mg/dL    Protein, Total 5.8 (L) 6.0 - 8.0 g/dL    Albumin 2.3 (L) 3.5 - 5.0 g/dL    Alkaline Phosphatase 65 45 - 120 U/L    AST 28 0 - 40 U/L    ALT 87 (H) 0 - 45 U/L   POCT Glucose   Result Value Ref Range    Glucose,  mg/dL   POCT Glucose   Result Value Ref Range    Glucose,  mg/dL   Basic Metabolic Panel   Result Value Ref Range    Sodium 137 136 - 145 mmol/L    Potassium 4.2 3.5 - 5.0 mmol/L    Chloride 107 98 - 107 mmol/L    CO2 25 22 - 31 mmol/L    Anion Gap, Calculation 5 5 - 18 mmol/L    Glucose 189 (H) 70 - 125 mg/dL    Calcium 8.6 8.5 - 10.5 mg/dL    BUN 20 8 - 28 mg/dL    Creatinine 1.00 0.70 - 1.30 mg/dL    GFR MDRD Af Amer >60 >60 mL/min/1.73m2    GFR MDRD Non Af Amer >60 >60 mL/min/1.73m2         Assessment/Plan:  1. Legionella pneumonia     2. Pleural effusion associated with pulmonary infection     3. Status post thoracentesis     4. Atrial fibrillation     5. QT prolongation     6. Chronic diastolic heart failure     7. Anticoagulation management encounter     8. Urinary retention     9. Acute kidney injury     10. Type 2 diabetes mellitus     11. Lymphedema       S/p legionella pneumonia. He continues on Levaquin. He has underlying Atrial fib on chronic anticoagulation. INR reviewed and Coumadin dosed today. He did have QT prolongation. He is asymptomatic. Recent EKG with QT  interval at 454. I will follow up with EKG on Thursday. Urinary retention with solitario. He does have underlying BPH. I will attempt to remove his solitario. Will check PVR and straight cath prn. If voiding trial fails will replace solitario. He has a follow up with urology at the end of the week. Acute kidney injury. Resolved. Creatine 0.95. DM he continues on Lantus and SS. Will continue to monitor BS and make adjustments. Poor oral intake with very little appetite. I will have RD consult. CHF. He does have LE edema 2+. This is chronic. I will have his wife bring in his compression garments.         60 minutes spent of which greater than 50% was face to face communication with the patient and wife about above plan of care    Electronically signed by: Jaimie Joe, CNP

## 2021-06-16 NOTE — PROGRESS NOTES
Code Status:  POLST AVAILABLE  Visit Type: Problem Visit     Facility:  [facility] [[id number]]         Facility Type: SNF (Skilled Nursing Facility, TCU)    History of Present Illness: Robinson Medel is a 88 y.o. male who I am seeing today for follow up at the request the nursing staff regarding increased edema.  Pt originally  hospitalized on 2/1/18 for fatigue, SOB and cough. CXR positive for infiltrates and positive culture for Legionella pneumonia with sepsis .It did appear on x-ray that the right lower lobe effusion did appear to be chronic and was present on prior imaging.  CT scan of the abdomen and pelvis on 4/2017 did reveal this. A diagnostic thoracentesis was done on 2/4/2018 it was exudative in nature by light's criteria.  Cytology was negative for malignant emily  Influenza a and B were negative and blood cultures ×2 were obtained and no growth to date. He completed his  Levaquin. Recent increased shortness of breath and increased lower extremity edema 3-4+. I did order follow-up chest x-ray as well as stat laboratory patient had been seen by cardiology on Thursday a.m. prior to my visit.  Had been restarted on his Lasix. This was discontinued during hospitalization. He was then seen by another NP and sent to ER for evaluation. He received IV lasix and was sent back to the SNF. He wife was retrieving a wheelchair for him and he got out of the car fell and suffered a large laceration to his head. He returned to the ER and received staples.     Patient recently treated for increased lower extremity edema with increased Lasix.  He continues with 3-4+ lower extremity edema.  No further weeping.  Lesions to lower extremities have healed.  His weight is down 7.2 pounds.  His chronic lower extremity edema however much worse since recent hospitalization.  He denies any shortness of breath or chest pain.  Lung sounds are clear.  Recent BNP was 460.  At home he  was wearing compression  wraps however made his feet balloon here.  He was changed to Ace wraps.  I did ask them to initiate lymphedema therapy however this is been held secondary to the open areas.         Active Ambulatory Problems     Diagnosis Date Noted     Venous stasis dermatitis of both lower extremities      CAD (coronary artery disease) 09/28/2015     Essential hypertension 09/24/2015     Gastroduodenal ulcer 09/24/2015     Peripheral blood vessel disorder 09/24/2015     Urinary bladder stone 02/10/2016     Anemia, blood loss      Persistent atrial fibrillation      Lymphedema 05/06/2016     Weakness 05/06/2016     Hypotension due to drugs 05/06/2016     Hypotension, unspecified hypotension type      Duodenitis 05/11/2016     Acute blood loss anemia 05/11/2016     Atrial fibrillation 05/11/2016     Anemia 06/15/2016     LUCY (acute kidney injury) 06/15/2016     Cecal cancer      S/P right hemicolectomy      Gastrointestinal hemorrhage with melena 07/08/2016     Blood in stool      Lactic acid acidosis      Type 2 diabetes mellitus 09/24/2015     Venous hypertension, chronic, bilateral 08/07/2017     Shortness of breath 02/01/2018     CAP (community acquired pneumonia) 02/01/2018     Legionella infection      UTI (urinary tract infection)      Resolved Ambulatory Problems     Diagnosis Date Noted     Hematuria 10/06/2015     Type 2 diabetes mellitus with hyperglycemia 09/24/2015     Gross hematuria 02/22/2016     Symptomatic anemia 05/06/2016     Elevated INR 06/15/2016     Past Medical History:   Diagnosis Date     Anemia      Coronary artery disease 9/25/2015     Diabetes mellitus      Duodenitis 5/11/2016     Dyslipidemia      Gastroduodenal ulcer 9/24/2015     GERD (gastroesophageal reflux disease)      GI bleed 07/08/2016     Gout      Hematuria      Hyperlipemia      Hypertension      Kidney stone 10/6/2015       Current Outpatient Prescriptions   Medication Sig     acetaminophen (TYLENOL) 325 MG tablet Take 2 tablets (650 mg  total) by mouth every 4 (four) hours as needed.     atorvastatin (LIPITOR) 10 MG tablet Take 10 mg by mouth every morning.      benzonatate (TESSALON) 100 MG capsule Take 1 capsule (100 mg total) by mouth 3 (three) times a day as needed for cough.     ferrous gluconate 325 mg (36 mg iron) Tab Take 325 mg by mouth daily.      glipiZIDE (GLUCOTROL XL) 5 MG 24 hr tablet Take 5 mg by mouth daily with breakfast.      Lactobacillus acidoph-L.bulgar (LACTINEX) 100 million cell packet Take 1 packet by mouth 3 (three) times a day with meals.     LANTUS SOLOSTAR 100 unit/mL (3 mL) pen Inject 20 Units under the skin at bedtime.     NOVOLOG 100 unit/mL injection Check blood sugar four (4) times daily. AC/HS     omeprazole (PRILOSEC) 20 MG capsule Take 20 mg by mouth daily.      sotalol (BETAPACE) 80 MG tablet Take 80 mg by mouth 2 (two) times a day.      vitamin A-vitamin C-vit E-min (OCUVITE) Tab tablet Take 1 tablet by mouth daily.      warfarin (COUMADIN) 2 MG tablet Take 1.5 tablets (3 mg total) by mouth daily.       No Known Allergies      Review of Systems   No fevers or chills. No headache, lightheadedness or dizziness. No SOB, chest pains or palpitations. Appetite good.   No nausea, vomiting, constipation or diarrhea. He denies any dysuria frequency burning or pain.   Chronic LE edema with increase. He does wear wraps at home.       Physical Exam   PHYSICAL EXAMINATION:  Vital signs: /78  Pulse 92  Temp 98  F (36.7  C)  Resp 18  Wt 218 lb (98.9 kg)  SpO2 94%  BMI 28.76 kg/m2    General: Awake, Alert, oriented x3, appropriately, follows simple commands, conversant  HEENT:PERRLA, Pink conjunctiva, anicteric sclerae, moist oral mucosa. Very [place] with aides. Large laceration to scalp dry and intact with staples. Dried blood noted.   NECK: Supple, without any lymphadenopathy, or masses  CVS:  S1  S2, without murmur or gallop. Regular rate and rhythm.   LUNG: Clear to auscultation, No wheezes, rales or rhonci. No  cough on exam.   BACK: No kyphosis of the thoracic spine  ABDOMEN: Soft, nontender to palpation, with positive bowel sounds  EXTREMITIES: Moves both upper and lower extremities with generalized weakness, 3-4+ pedal edema, no calf tenderness. Pupura noted. PVD changes noted.   SKIN: Dry and intact.  No further weeping lower extremities.  NEUROLOGIC: Intact, pulses palpable  PSYCHIATRIC: Mild cognitive impairment.             Labs:    Recent Results (from the past 240 hour(s))   Comprehensive Metabolic Panel   Result Value Ref Range    Sodium 139 136 - 145 mmol/L    Potassium 4.0 3.5 - 5.0 mmol/L    Chloride 106 98 - 107 mmol/L    CO2 23 22 - 31 mmol/L    Anion Gap, Calculation 10 5 - 18 mmol/L    Glucose 161 (H) 70 - 125 mg/dL    BUN 19 8 - 28 mg/dL    Creatinine 1.40 (H) 0.70 - 1.30 mg/dL    GFR MDRD Af Amer 58 (L) >60 mL/min/1.73m2    GFR MDRD Non Af Amer 48 (L) >60 mL/min/1.73m2    Bilirubin, Total 0.7 0.0 - 1.0 mg/dL    Calcium 8.8 8.5 - 10.5 mg/dL    Protein, Total 5.9 (L) 6.0 - 8.0 g/dL    Albumin 2.6 (L) 3.5 - 5.0 g/dL    Alkaline Phosphatase 78 45 - 120 U/L    AST 24 0 - 40 U/L    ALT 45 0 - 45 U/L   INR   Result Value Ref Range    INR 2.29 (H) 0.90 - 1.10   Lactic Acid   Result Value Ref Range    Lactic Acid 1.8 0.5 - 2.2 mmol/L   Type and Screen   Result Value Ref Range    ABORh A POS     Antibody Screen Negative Negative   Troponin I   Result Value Ref Range    Troponin I 0.03 0.00 - 0.29 ng/mL   HM1 (CBC with Diff)   Result Value Ref Range    WBC 8.9 4.0 - 11.0 thou/uL    RBC 4.24 (L) 4.40 - 6.20 mill/uL    Hemoglobin 13.1 (L) 14.0 - 18.0 g/dL    Hematocrit 38.3 (L) 40.0 - 54.0 %    MCV 90 80 - 100 fL    MCH 30.9 27.0 - 34.0 pg    MCHC 34.2 32.0 - 36.0 g/dL    RDW 14.3 11.0 - 14.5 %    Platelets 230 140 - 440 thou/uL    MPV 8.8 8.5 - 12.5 fL    Neutrophils % 83 (H) 50 - 70 %    Lymphocytes % 9 (L) 20 - 40 %    Monocytes % 8 2 - 10 %    Eosinophils % 0 0 - 6 %    Basophils % 0 0 - 2 %    Neutrophils  Absolute 7.3 2.0 - 7.7 thou/uL    Lymphocytes Absolute 0.8 0.8 - 4.4 thou/uL    Monocytes Absolute 0.7 0.0 - 0.9 thou/uL    Eosinophils Absolute 0.0 0.0 - 0.4 thou/uL    Basophils Absolute 0.0 0.0 - 0.2 thou/uL   ECG 12 lead nursing unit performed   Result Value Ref Range    SYSTOLIC BLOOD PRESSURE  mmHg    DIASTOLIC BLOOD PRESSURE  mmHg    VENTRICULAR RATE 88 BPM    ATRIAL RATE 277 BPM    P-R INTERVAL  ms    QRS DURATION 110 ms    Q-T INTERVAL 392 ms    QTC CALCULATION (BEZET) 474 ms    P Axis  degrees    R AXIS -39 degrees    T AXIS 24 degrees    MUSE DIAGNOSIS       Atrial fibrillation  Left axis deviation  Incomplete left bundle branch block  Abnormal ECG  When compared with ECG of 07-FEB-2018 11:39,  No significant change was found  Confirmed by GEMMA MEHTA, JOSE LOC: ([id number]) on 2/17/2018 2:41:23 PM     Urinalysis-UC if Indicated   Result Value Ref Range    Color, UA Yellow Colorless, Yellow, Straw, Light Yellow    Clarity, UA Clear Clear    Glucose, UA Negative Negative    Bilirubin, UA Negative Negative    Ketones, UA Negative Negative, 60 mg/dL    Specific Gravity, UA 1.006 1.001 - 1.030    Blood, UA Moderate (!) Negative    pH, UA 5.0 4.5 - 8.0    Protein, UA Negative Negative mg/dL    Urobilinogen, UA <2.0 E.U./dL <2.0 E.U./dL, 2.0 E.U./dL    Nitrite, UA Negative Negative    Leukocytes, UA Small (!) Negative    Bacteria, UA Few (!) None Seen hpf    RBC, UA 10-25 (!) None Seen, 0-2 hpf    WBC, UA 0-5 None Seen, 0-5 hpf    Squam Epithel, UA 0-5 None Seen, 0-5 lpf    Mucus, UA Few (!) None Seen lpf    Hyaline Casts, UA 0-5 0-5, None Seen lpf   Culture, Urine   Result Value Ref Range    Culture No Growth    Basic Metabolic Panel   Result Value Ref Range    Sodium 138 136 - 145 mmol/L    Potassium 4.4 3.5 - 5.0 mmol/L    Chloride 108 (H) 98 - 107 mmol/L    CO2 24 22 - 31 mmol/L    Anion Gap, Calculation 6 5 - 18 mmol/L    Glucose 62 (L) 70 - 125 mg/dL    Calcium 8.4 (L) 8.5 - 10.5 mg/dL    BUN 16 8 - 28  mg/dL    Creatinine 1.07 0.70 - 1.30 mg/dL    GFR MDRD Af Amer >60 >60 mL/min/1.73m2    GFR MDRD Non Af Amer >60 >60 mL/min/1.73m2   HM2(CBC w/o Differential)   Result Value Ref Range    WBC 6.8 4.0 - 11.0 thou/uL    RBC 3.97 (L) 4.40 - 6.20 mill/uL    Hemoglobin 12.2 (L) 14.0 - 18.0 g/dL    Hematocrit 37.4 (L) 40.0 - 54.0 %    MCV 94 80 - 100 fL    MCH 30.7 27.0 - 34.0 pg    MCHC 32.6 32.0 - 36.0 g/dL    RDW 14.5 11.0 - 14.5 %    Platelets 177 140 - 440 thou/uL    MPV 9.7 8.5 - 12.5 fL   Basic Metabolic Panel   Result Value Ref Range    Sodium 138 136 - 145 mmol/L    Potassium 4.0 3.5 - 5.0 mmol/L    Chloride 104 98 - 107 mmol/L    CO2 27 22 - 31 mmol/L    Anion Gap, Calculation 7 5 - 18 mmol/L    Glucose 137 (H) 70 - 125 mg/dL    Calcium 8.4 (L) 8.5 - 10.5 mg/dL    BUN 19 8 - 28 mg/dL    Creatinine 1.77 (H) 0.70 - 1.30 mg/dL    GFR MDRD Af Amer 44 (L) >60 mL/min/1.73m2    GFR MDRD Non Af Amer 36 (L) >60 mL/min/1.73m2   BNP(B-type Natriuretic Peptide)   Result Value Ref Range     (H) 0 - 93 pg/mL   HM2(CBC w/o Differential)   Result Value Ref Range    WBC 6.6 4.0 - 11.0 thou/uL    RBC 4.06 (L) 4.40 - 6.20 mill/uL    Hemoglobin 12.4 (L) 14.0 - 18.0 g/dL    Hematocrit 38.7 (L) 40.0 - 54.0 %    MCV 95 80 - 100 fL    MCH 30.5 27.0 - 34.0 pg    MCHC 32.0 32.0 - 36.0 g/dL    RDW 14.9 (H) 11.0 - 14.5 %    Platelets 178 140 - 440 thou/uL    MPV 9.8 8.5 - 12.5 fL   Basic Metabolic Panel   Result Value Ref Range    Sodium 139 136 - 145 mmol/L    Potassium 3.5 3.5 - 5.0 mmol/L    Chloride 102 98 - 107 mmol/L    CO2 29 22 - 31 mmol/L    Anion Gap, Calculation 8 5 - 18 mmol/L    Glucose 191 (H) 70 - 125 mg/dL    Calcium 8.1 (L) 8.5 - 10.5 mg/dL    BUN 15 8 - 28 mg/dL    Creatinine 1.12 0.70 - 1.30 mg/dL    GFR MDRD Af Amer >60 >60 mL/min/1.73m2    GFR MDRD Non Af Amer >60 >60 mL/min/1.73m2   BNP(B-type Natriuretic Peptide)   Result Value Ref Range     (H) 0 - 93 pg/mL         Assessment/Plan:  1. Fluid overload      2. Chronic diastolic heart failure     3. Legionella pneumonia     4. Scalp laceration     5. Atrial fibrillation     6. Anticoagulation management encounter       Patient with fluid overload with increased lower extremity edema 3-4+.  I did increase his Lasix to 40 twice daily.  Seems to be responding well.  Down 7.2 pounds.  No further weeping in his lower extremities.  He wears compression wraps at home.  He has been an Ace wraps.  I did ask them to do lymphedema therapy.  This was held secondary to open areas to lower extremity.  They are now closed.  We will have them proceed.  Recent .  BMP unremarkable.  Will add metolazone 5 mg daily and decrease Lasix to 40 mg daily.  We will follow-up with laboratory on Thursday.  He does have underlying atrial fib.  Continues on chronic anticoagulation as well as sotalol.  Recent Holter monitor.  Cardiology reported patient continuously in atrial fib.  This may be contributing to his increased lower extremity edema as well.      Electronically signed by: Jaimie Joe, CHAVA

## 2021-06-17 NOTE — TELEPHONE ENCOUNTER
Telephone Encounter by Kristen Sandoval LPN at 5/22/2020  9:24 AM     Author: Kristen Sandoval LPN Service: -- Author Type: Licensed Nurse    Filed: 5/22/2020  9:24 AM Encounter Date: 5/22/2020 Status: Signed    : Kristen Sandoval LPN (Licensed Nurse)

## 2021-06-17 NOTE — PATIENT INSTRUCTIONS - HE
Patient Instructions by Mei Sampson NP at 6/12/2019 11:40 AM     Author: Mei Sampson NP Service: -- Author Type: Nurse Practitioner    Filed: 6/12/2019 11:49 AM Encounter Date: 6/12/2019 Status: Signed    : Mei Sampson NP (Nurse Practitioner)       Wounds are all healed    No dressings    Continue to wear your compression stockings or velcro wraps every day; put them on first thing in the morning and remove at bedtime    Replace your compression stockings every 3-4 months; these garments will lose their elasticity and become ineffective    Replace velcro wraps every 1-2 years    Elevate your legs periodically throughout the day, 30-60 minutes 1-3 times per day    Apply lotion to your legs 1-2 times per day; some good name brands are Cetaphil, Sarna, Aveeno, VaniCream    Continue to walk and exercise    If you are taking a diuretic continue to do so at the direction of your primary care provider    Make an appointment at the vascular clinic again if you have worsening swelling, need a prescription for new compression garments; and/or develop new wounds                    DO NOT STRAP VELCRO TO !  1.2.        3.4.

## 2021-06-17 NOTE — TELEPHONE ENCOUNTER
Telephone Encounter by Kristen Sandoval LPN at 5/22/2020  9:23 AM     Author: Kristen Sandoval LPN Service: -- Author Type: Licensed Nurse    Filed: 5/22/2020  9:24 AM Encounter Date: 5/22/2020 Status: Signed    : Kristen Sandoval LPN (Licensed Nurse)

## 2021-06-17 NOTE — TELEPHONE ENCOUNTER
Telephone Encounter by Alise Bonilla at 5/1/2020  8:58 AM     Author: Alise Bonilla Service: -- Author Type: Patient Access    Filed: 5/1/2020  8:59 AM Encounter Date: 4/30/2020 Status: Signed    : Alise Bonilla (Patient Access)

## 2021-06-17 NOTE — PATIENT INSTRUCTIONS - HE
Patient Instructions by Mei Sampson NP at 8/8/2019  8:00 AM     Author: Mei Sampson NP Service: -- Author Type: Nurse Practitioner    Filed: 8/8/2019  8:31 AM Encounter Date: 8/8/2019 Status: Signed    : Mei Sampson NP (Nurse Practitioner)       Wounds are all healed    No dressings    Continue to wear your compression stockings or velcro wraps every day; put them on first thing in the morning and remove at bedtime    Replace your compression stockings every 3-4 months; these garments will lose their elasticity and become ineffective    Replace velcro wraps every 1-2 years    Elevate your legs periodically throughout the day, 30-60 minutes 1-3 times per day    Apply lotion to your legs 1-2 times per day; some good name brands are Cetaphil, Sarna, Aveeno, VaniCream    Continue to walk and exercise    If you are taking a diuretic continue to do so at the direction of your primary care provider    Make an appointment at the vascular clinic again if you have worsening swelling, need a prescription for new compression garments; and/or develop new wounds                    DO NOT STRAP VELCRO TO !  1.2.        3.4.

## 2021-06-17 NOTE — TELEPHONE ENCOUNTER
Telephone Encounter by Kristen Sandoval LPN at 5/22/2020  9:18 AM     Author: Kristen Sandoval LPN Service: -- Author Type: Licensed Nurse    Filed: 5/22/2020  9:24 AM Encounter Date: 5/22/2020 Status: Signed    : Kristen Sandoval LPN (Licensed Nurse)

## 2021-06-17 NOTE — PATIENT INSTRUCTIONS - HE
"Patient Instructions by Caprice Talamantes LPN at 10/23/2019  8:00 AM     Author: Caprice Talamantes LPN Service: -- Author Type: Licensed Nurse    Filed: 10/23/2019  8:23 AM Encounter Date: 10/23/2019 Status: Addendum    : Caprice Talamantes LPN (Licensed Nurse)    Related Notes: Original Note by Caprice Talamantes LPN (Licensed Nurse) filed at 10/23/2019  8:21 AM       Swelling in the legs can be caused by many reasons. No matter what the reason, treatment usually includes some type of compression. We are prescribing some compression garments for you today. Below are some locations where they can help you get measured and fitting to ensure proper fitting. You should wear your compression socks as much as you can. Your compression should be put on first thing in the morning. You may have to adjust it a couple times throughout the day. Take the compression off at night. It is especially important to wear them with long periods of sitting/standing, long car rides or if you will be flying. Going without compression for even brief periods of time can be damaging to your legs and your health.  Compression Velcro should be replaced ever 9-12 months. They do not need to be worn at night while in bed. We can refill your sock prescription for 1 year otherwise your primary is able to refill them for you. Call us with any problems or questions. If you do a lot of standing, it is good to do calf raises to help keep the blood pumping. If you sit a lot at work, it is good to get up periodically to walk around. Elevation of the foot of your bed 4-6\" helps the blood return back to where it is needed.  Velcro compression should never hurt. If you experience any pain, immediatly remove and consult your physician. It should feel firm but comfortable. If pressure increases or decreases noticeably during wear, loosen or tighten any bands.     Velcro Compression Wraps Instructions    1) Slide leg liner or tubular compression " onto the leg before applying the velcro warp.     2) Apply the velcro wrap over the leg liner. Pull bands to tighten for compression.     3) The top of the velcro warp should start just below the knee crease and the bottom should reach just above the ankle bone.    4) Angle each band individually to achieve a snug and wrinkle-free fit. Do not tuck the bands and the velcro should never touch the skin.    5) Lastly apply the anklet sock over the velcro wrap if instructed to do so. This should be worn over the velcro wrap due to sensitivity from the ribbed edge.    6) To remove the velcro wrap, undo each band and fold it back on itself. If done properly the garment should be ready for easy application.    7) To extend the life of velcro wraps, hand wash and hang dry. You should replace your velcro wraps once a year.         DO NOT STRAP VELCRO TO LEG LINER OR TUBRIGRIP!!!  1.2.        3.4.

## 2021-06-17 NOTE — PATIENT INSTRUCTIONS - HE
Patient Instructions by Mei Sampson NP at 3/8/2019 10:40 AM     Author: Mei Sampson NP Service: -- Author Type: Nurse Practitioner    Filed: 3/8/2019 11:08 AM Encounter Date: 3/8/2019 Status: Signed    : Mei Sampson NP (Nurse Practitioner)       Wound Care Instructions    Twice per week at the clinic we will Cleanse your bilateral legs and leg wound(s) with Normal Saline or Wound Cleanser    Wash the feet and toes with soap and water using wash cloth    Pat Dry    Apply Lotion to the intact skin surrounding your wound and other dry skin locations. Some good lotions include: Remedy Skin Repair Cream or Cetaphil    Apply xeroform into/onto the wounds    Cover with ABD    Secure with roll gauze as needed    Compression: 2 layer regular bilaterally    It is not ok to get your wound wet in the bath or shower    SEEK MEDICAL CARE IF:    You have an increase in swelling, pain, or redness around the wound.    You have an increase in the amount of pus coming from the wound.    There is a bad smell coming from the wound.    The wound appears to be worsening/enlarging    You have a fever greater than 101.5 F      Mei Sampson DNP, RN, CNP, [facility]  [phone]    It is recommended that you do not get your ulcer wet when showering.  Listed below are several ways of keeping it dry when you shower.     1. Wrap it with Press and Seal plastic wrap.  It can be found in the stores where the plastic wraps or tin foil is kept.    2.  Some people take a bath and hang their leg/foot out of the tub.    3  Put your leg in a plastic bag and tape it on.         4. You can purchase a shower cover for casts at some pharmacies and through the Internet.

## 2021-06-17 NOTE — TELEPHONE ENCOUNTER
Telephone Encounter by Alise Bonilla at 4/30/2020  2:03 PM     Author: Alise Bonilla Service: -- Author Type: Patient Access    Filed: 4/30/2020  2:05 PM Encounter Date: 4/30/2020 Status: Signed    : Alise Bonilla (Patient Access)

## 2021-06-18 NOTE — PROGRESS NOTES
Date of Service:6/8/2018    Provider's initial consult visit:  5/17/2018    Chief Complaint:   Chief Complaint   Patient presents with     Edema       History:   Patient presents to clinic in regards to bilateral lower leg ulcers. He was last seen on 5/31/2018 when layers of viscopaste and 2-layer wrap was applied.  He has tolerated the 2-layer wrap without difficulty.  Also, new velcro compression wraps were ordered.  He presents to clinic today without his 2-layer wrap on.  He does have comprilan on that was applied poorly.  He is not sure what happened to his 2-layer wrap.  He denies any pain in his ulcers.    Current Outpatient Prescriptions   Medication Sig Dispense Refill     acetaminophen (TYLENOL) 325 MG tablet Take 2 tablets (650 mg total) by mouth every 4 (four) hours as needed.  0     atorvastatin (LIPITOR) 10 MG tablet Take 10 mg by mouth every morning.        benzonatate (TESSALON) 100 MG capsule Take 1 capsule (100 mg total) by mouth 3 (three) times a day as needed for cough.  0     ferrous gluconate 325 mg (36 mg iron) Tab Take 325 mg by mouth daily.        furosemide (LASIX) 40 MG tablet Take 40 mg by mouth 2 (two) times a day.       glipiZIDE (GLUCOTROL XL) 5 MG 24 hr tablet Take 5 mg by mouth daily with breakfast.        Lactobacillus acidoph-L.bulgar (LACTINEX) 100 million cell packet Take 1 packet by mouth 3 (three) times a day with meals.  0     LANTUS SOLOSTAR 100 unit/mL (3 mL) pen Inject 20 Units under the skin at bedtime. 3 mL PRN     omeprazole (PRILOSEC) 20 MG capsule Take 20 mg by mouth daily.        potassium chloride (KAYCIEL) 20 mEq/15 mL solution Take 20 mEq by mouth daily.        sotalol (BETAPACE) 80 MG tablet Take 80 mg by mouth 2 (two) times a day.        vitamin A-vitamin C-vit E-min (OCUVITE) Tab tablet Take 1 tablet by mouth daily.        warfarin (COUMADIN) 2 MG tablet Take 1.5 tablets (3 mg total) by mouth daily.  0     No current facility-administered medications for this  visit.        No Known Allergies    Physical Exam:    Vitals:    06/08/18 0739   BP: 114/62   Pulse: 100   Resp: 16   Temp: 97.5  F (36.4  C)    There is no height or weight on file to calculate BMI.    General:  88 y.o. male in no apparent distress.    Head:  Normocephalic atraumatic  Psychiatric: Cooperative.   Respiratory: unlabored breathing.  Cardiovascular-Lower extremity: Edema: +3;      Vasc Edema 8/7/2017 11/6/2017 5/17/2018 5/31/2018 6/8/2018   Right just above MTP 25 23.9 27.5 25.5 26.8   Right Ankle 24.3 24.7 28 26 27.8   Right Widest Calf 38.8 35.6 42.5 44 36.5   Right Thigh Up 10cm - - 49.5 51.5 48.2   Left - just above MTP 24.7 23.5 27 25 25.8   Left Ankle 24.3 24.4 28.7 24 26.4   Left Widest Calf 34.7 35.1 38.9 33 46.5   Left Thigh Up 10cm - - 50 53 58       Integumentary:  All ulcers are resolved.     Urethral Catheter 16 Fr. (Active)       Wound 05/17/18 left anterior ankle (Active)   Pre Size Length 0 6/8/2018  7:00 AM   Pre Size Width 0 6/8/2018  7:00 AM   Pre Size Depth 0 6/8/2018  7:00 AM   Pre Total Sq cm 1.26 5/17/2018  1:00 PM       Wound 05/17/18 left medial ankle (Active)   Pre Size Length 0 6/8/2018  7:00 AM   Pre Size Width 0 6/8/2018  7:00 AM   Pre Size Depth 0 6/8/2018  7:00 AM   Pre Total Sq cm 3 5/31/2018  2:00 PM       Pressure Ulcer Leg Left;Right (Active)       Pressure Ulcer 06/21/16 Buttocks Left Stage II (Active)       Wound 05/07/16 Non-pressure related ulcer Leg Left (Active)       Incision 06/18/16 Abdomen (Active)       Incision 07/08/16 Lap site Abdomen Mid (Active)       Incision 07/08/16 Surgical Abdomen Right (Active)       Assessment:    Images:   Study Result   BILATERAL LOWER EXTREMITY VENOUS DUPLEX ULTRASOUND WITH INSUFFICIENCY STUDY  4/8/2016 2:50 PM     INDICATIONS: Bilateral leg pain and swelling. Lower leg ulcers.     TECHNIQUE: Venous duplex ultrasound with gray-scale images, graded compression, and color-flow, Doppler, and spectral analysis. Abnormal reflux is  defined as 500 msec for superficial veins and 1 sec for deep veins.  COMPARISONS: None.     FINDINGS: The right greater saphenous vein is incompetent, with abnormal sustained reflux from the saphenofemoral junction to the mid calf. This vein measures between 12 mm in diameter at saphenofemoral junction and 3 mm in the distal calf. No abnormal   reflux is detected in the right lesser saphenous vein.     The left greater saphenous vein is incompetent, with abnormal sustained reflux from the saphenofemoral junction to the distal calf. This vein measures between 9 mm in diameter at saphenofemoral junction and 5 mm in the distal calf. No abnormal reflux is   detected in the left lesser saphenous vein.     There is abnormal deep venous reflux bilaterally, involving the right common femoral and popliteal veins and the left common femoral and superficial femoral veins. No incompetent perforating veins are identified.     There is no evidence for deep vein thrombosis. There is normal flow and compressibility in the femoral, popliteal, and posterior tibial veins.     CONCLUSIONS:   1.  Incompetent right greater saphenous vein.  2.  Incompetent left greater saphenous vein.       Labs:    The following labs were reviewed by me today.    Lab Results   Component Value Date    WBC 6.6 02/22/2018    HGB 12.4 (L) 02/22/2018    HCT 38.7 (L) 02/22/2018    MCV 95 02/22/2018     02/22/2018               Invalid input(s): EOSPC    Lab Results   Component Value Date    CREATININE 0.92 03/09/2018         Lab Results   Component Value Date    BUN 23 03/09/2018     No results found for: PREALBUMINESUFAST(LABPROT,LABALBU,albumin)@  Invalid input(s): LABALBU  No results found for: PREALBUMIN    No results found for: CRP  No results found for: SEDRATE    Lab Results   Component Value Date    HGBA1C 7.7 (H) 02/02/2018     Lab Results   Component Value Date    TSH 2.67 06/22/2016           No results found for: [id number]  Lab Results    Component Value Date     (H) 03/01/2018        1. Ulcer of left lower extremity, limited to breakdown of skin     2. Ulcer of right lower extremity, limited to breakdown of skin     3. Venous hypertension, chronic, bilateral     4. Type 2 diabetes mellitus     5. Lymphedema         A new wound was identified today: no    Plan:  1.  Left leg ulcer, no signs of infection. resolved    3.  Right leg ulcer, resolved    4.  Lymphedema, improved.     5.  Diabetes, A1c, 7.7 on 2/2/2018.  Managed by primary    6.  Treatment:   Will apply a 2-layer wrap on each leg for compression and reduction of swelling.  At his next visit with me, he will bring his velcro compression wraps    7.   Patient will follow up with a nurse in one week  For reapplication of his 2-layer wrap   compression wraps and see me in two weeks.      Autumn Burnett, APRN, CNP,  [facility]  [phone]

## 2021-06-18 NOTE — PROGRESS NOTES
Date of Service:5/31/2018    Provider's initial consult visit:  5/17/2018    Chief Complaint:   Chief Complaint   Patient presents with     Wound Check       History:   Patient presents to clinic in regards to bilateral lower leg ulcers.  The  patient was last seen by me on 11/6/2018. He was hospitalized on 2/1/18 for fatigue, SOB and cough. CXR positive for infiltrates and positive culture for Legionella pneumonia with sepsis.  He also fell in February and sustained a head laceration requiring 20 sutures.  He was in a TCU for these condition and was discharged on 3/7/2018.  Approximately a month ago, he developed ulcers on his legs bilaterally.  The lateral right leg developed a blister.  He believes that he developed on ulcer on his left leg from his velcro compression wraps. He was last seen on 5/17/2018 when layers of viscopaste and 2-layer wrap was applied.  He has tolerated the 2-layer wrap without difficulty.  He is needing new velcro compression wraps because they are over 2 years old.    Current Outpatient Prescriptions   Medication Sig Dispense Refill     acetaminophen (TYLENOL) 325 MG tablet Take 2 tablets (650 mg total) by mouth every 4 (four) hours as needed.  0     atorvastatin (LIPITOR) 10 MG tablet Take 10 mg by mouth every morning.        benzonatate (TESSALON) 100 MG capsule Take 1 capsule (100 mg total) by mouth 3 (three) times a day as needed for cough.  0     ferrous gluconate 325 mg (36 mg iron) Tab Take 325 mg by mouth daily.        furosemide (LASIX) 40 MG tablet Take 40 mg by mouth 2 (two) times a day.       glipiZIDE (GLUCOTROL XL) 5 MG 24 hr tablet Take 5 mg by mouth daily with breakfast.        Lactobacillus acidoph-L.bulgar (LACTINEX) 100 million cell packet Take 1 packet by mouth 3 (three) times a day with meals.  0     LANTUS SOLOSTAR 100 unit/mL (3 mL) pen Inject 20 Units under the skin at bedtime. 3 mL PRN     omeprazole (PRILOSEC) 20 MG capsule Take 20 mg by mouth daily.         potassium chloride (KAYCIEL) 20 mEq/15 mL solution Take 20 mEq by mouth daily.        sotalol (BETAPACE) 80 MG tablet Take 80 mg by mouth 2 (two) times a day.        vitamin A-vitamin C-vit E-min (OCUVITE) Tab tablet Take 1 tablet by mouth daily.        warfarin (COUMADIN) 2 MG tablet Take 1.5 tablets (3 mg total) by mouth daily.  0     No current facility-administered medications for this visit.        No Known Allergies    Physical Exam:    Vitals:    05/31/18 1411   BP: 130/88   Pulse: 76   Resp: 20   Temp: 98  F (36.7  C)    There is no height or weight on file to calculate BMI.    General:  88 y.o. male in no apparent distress.    Head:  Normocephalic atraumatic  Psychiatric: Cooperative.   Respiratory: unlabored breathing.  Cardiovascular-Lower extremity: Edema: +4;      Vasc Edema 4/11/2017 8/7/2017 11/6/2017 5/17/2018 5/31/2018   Right just above MTP 25 25 23.9 27.5 25.5   Right Ankle 25 24.3 24.7 28 26   Right Widest Calf 36.4 38.8 35.6 42.5 44   Right Thigh Up 10cm - - - 49.5 51.5   Left - just above MTP 24 24.7 23.5 27 25   Left Ankle 23.9 24.3 24.4 28.7 24   Left Widest Calf 34.3 34.7 35.1 38.9 33   Left Thigh Up 10cm - - - 50 53       Integumentary:      Right leg ulcer Ulceration(s):   Wound bed: Prior to debridement: 100% granular Undermining no, Tunneling no   Wound Edge:flush   Periwound:  There is no cyrus wound erythema, warmth  induration, maceration, denudement or fluctuance surrounding the ulcer(s).  Exudate: scant  serous drainage with faint odor.  Wound Shape regular     Left leg Ulceration(s):     Wound bed: 100% granular       Undermining no, Tunneling no   Wound Edge: flush   Periwound:  There is no cyrus wound erythema, induration, denudement fluctuance or warmth surrounding the ulcer(s).  There is maceration   Exudate: sanguineous            Quantity: scant  Odor:  absent   Wound Shape irregular     Urethral Catheter 16 Fr. (Active)       Wound 05/17/18 left anterior ankle (Active)   Pre  Size Length 0.8 5/31/2018  2:00 PM   Pre Size Width 1 5/31/2018  2:00 PM   Pre Size Depth 0 5/22/2018  1:00 PM   Pre Total Sq cm 1.26 5/17/2018  1:00 PM       Wound 05/17/18 left medial ankle (Active)   Pre Size Length 1 5/31/2018  2:00 PM   Pre Size Width 3 5/31/2018  2:00 PM   Pre Size Depth 0 5/31/2018  2:00 PM   Pre Total Sq cm 3 5/31/2018  2:00 PM       Wound 05/17/18 right lateral ankle (Active)   Pre Size Length 0 5/22/2018  1:00 PM   Pre Size Width 0 5/22/2018  1:00 PM   Pre Size Depth 0 5/22/2018  1:00 PM   Pre Total Sq cm 7.26 5/17/2018  1:00 PM       Pressure Ulcer Leg Left;Right (Active)       Pressure Ulcer 06/21/16 Buttocks Left Stage II (Active)       Wound 05/07/16 Non-pressure related ulcer Leg Left (Active)       Incision 06/18/16 Abdomen (Active)       Incision 07/08/16 Lap site Abdomen Mid (Active)       Incision 07/08/16 Surgical Abdomen Right (Active)       Assessment:    Images:   Study Result   BILATERAL LOWER EXTREMITY VENOUS DUPLEX ULTRASOUND WITH INSUFFICIENCY STUDY  4/8/2016 2:50 PM     INDICATIONS: Bilateral leg pain and swelling. Lower leg ulcers.     TECHNIQUE: Venous duplex ultrasound with gray-scale images, graded compression, and color-flow, Doppler, and spectral analysis. Abnormal reflux is defined as 500 msec for superficial veins and 1 sec for deep veins.  COMPARISONS: None.     FINDINGS: The right greater saphenous vein is incompetent, with abnormal sustained reflux from the saphenofemoral junction to the mid calf. This vein measures between 12 mm in diameter at saphenofemoral junction and 3 mm in the distal calf. No abnormal   reflux is detected in the right lesser saphenous vein.     The left greater saphenous vein is incompetent, with abnormal sustained reflux from the saphenofemoral junction to the distal calf. This vein measures between 9 mm in diameter at saphenofemoral junction and 5 mm in the distal calf. No abnormal reflux is   detected in the left lesser saphenous  vein.     There is abnormal deep venous reflux bilaterally, involving the right common femoral and popliteal veins and the left common femoral and superficial femoral veins. No incompetent perforating veins are identified.     There is no evidence for deep vein thrombosis. There is normal flow and compressibility in the femoral, popliteal, and posterior tibial veins.     CONCLUSIONS:   1.  Incompetent right greater saphenous vein.  2.  Incompetent left greater saphenous vein.       Labs:    The following labs were reviewed by me today.    Lab Results   Component Value Date    WBC 6.6 02/22/2018    HGB 12.4 (L) 02/22/2018    HCT 38.7 (L) 02/22/2018    MCV 95 02/22/2018     02/22/2018               Invalid input(s): EOSPC    Lab Results   Component Value Date    CREATININE 0.92 03/09/2018         Lab Results   Component Value Date    BUN 23 03/09/2018     No results found for: PREALBUMINESUFAST(LABPROT,LABALBU,albumin)@  Invalid input(s): LABALBU  No results found for: PREALBUMIN    No results found for: CRP  No results found for: SEDRATE    Lab Results   Component Value Date    HGBA1C 7.7 (H) 02/02/2018     Lab Results   Component Value Date    TSH 2.67 06/22/2016           No results found for: [id number]  Lab Results   Component Value Date     (H) 03/01/2018        1. Ulcer of left lower extremity, limited to breakdown of skin  Compression stockings   2. Ulcer of right lower extremity, limited to breakdown of skin  Compression stockings   3. Venous hypertension, chronic, bilateral  Compression stockings   4. Type 2 diabetes mellitus     5. Lymphedema         A new wound was identified today: no    Plan:  1.  Debridement of the bilateral leg ulcer was recommended.  After consent was obtained and topical 2% Xylocaine was applied under clean conditions, and using a #15 blade,the devitalized dermal tissue was debrided for a total square centimeters of 3.8.  Following debridement, there was a decrease in  the nonviable tissue. The patient tolerated the procedure without any difficulty.     2.  Left leg ulcer, no signs of infection. Improved    3.  Right leg ulcer, possibly secondary to velcro compression wraps. Improved.  A prescription for velcro compression wraps written.     4.  Lymphedema, improved.     5.  Diabetes, A1c, 7.7 on 2/2/2018.  Managed by primary    6.  Treatment:   Will apply viscopaste over the open areas and apply a 2-layer wrap for compression.    7.   Patient will follow up with me in one week  for reevaluation and transition into velcro compression wraps.        Autumn Burnett, APRN, CNP,  [facility]  [phone]

## 2021-06-18 NOTE — PROGRESS NOTES
Date of Service:6/22/2018    Provider's initial consult visit:  5/17/2018    Chief Complaint:   Chief Complaint   Patient presents with     Edema       History:   Patient presents to clinic in regards to bilateral lower leg ulcers. He was last seen on 6/8/2018 when layers of viscopaste and 2-layer wrap was applied. He was seen last week by nursing to have his dressing changed.  He Velcro compression wraps arrived and he brings them with today. He removed his 2-layer wrap a couple of days ago so he could shower.  He is using comprilan since removing them.  He denies any pain in his legs.    Current Outpatient Prescriptions   Medication Sig Dispense Refill     acetaminophen (TYLENOL) 325 MG tablet Take 2 tablets (650 mg total) by mouth every 4 (four) hours as needed.  0     atorvastatin (LIPITOR) 10 MG tablet Take 10 mg by mouth every morning.        benzonatate (TESSALON) 100 MG capsule Take 1 capsule (100 mg total) by mouth 3 (three) times a day as needed for cough.  0     ferrous gluconate 325 mg (36 mg iron) Tab Take 325 mg by mouth daily.        furosemide (LASIX) 40 MG tablet Take 40 mg by mouth 2 (two) times a day.       glipiZIDE (GLUCOTROL XL) 5 MG 24 hr tablet Take 5 mg by mouth daily with breakfast.        Lactobacillus acidoph-L.bulgar (LACTINEX) 100 million cell packet Take 1 packet by mouth 3 (three) times a day with meals.  0     LANTUS SOLOSTAR 100 unit/mL (3 mL) pen Inject 20 Units under the skin at bedtime. 3 mL PRN     omeprazole (PRILOSEC) 20 MG capsule Take 20 mg by mouth daily.        potassium chloride (KAYCIEL) 20 mEq/15 mL solution Take 20 mEq by mouth daily.        sotalol (BETAPACE) 80 MG tablet Take 80 mg by mouth 2 (two) times a day.        vitamin A-vitamin C-vit E-min (OCUVITE) Tab tablet Take 1 tablet by mouth daily.        warfarin (COUMADIN) 2 MG tablet Take 1.5 tablets (3 mg total) by mouth daily.  0     No current facility-administered medications for this visit.        No Known  Allergies    Physical Exam:    Vitals:    06/22/18 0843   BP: 130/82   Pulse: 60   Resp: 18   Temp: 97.6  F (36.4  C)    There is no height or weight on file to calculate BMI.    General:  88 y.o. male in no apparent distress.    Head:  Normocephalic atraumatic  Psychiatric: Cooperative.   Respiratory: unlabored breathing.  Cardiovascular-Lower extremity: Edema: minimal      Vasc Edema 5/17/2018 5/31/2018 6/8/2018 6/15/2018 6/22/2018   Right just above MTP 27.5 25.5 26.8 - 27.7   Right Ankle 28 26 27.8 24.5 27   Right Widest Calf 42.5 44 36.5 32.5 40.5   Right Thigh Up 10cm 49.5 51.5 48.2 - 49.5   Left - just above MTP 27 25 25.8 - 26.5   Left Ankle 28.7 24 26.4 24.5 26.2   Left Widest Calf 38.9 33 46.5 32 35.7   Left Thigh Up 10cm 50 53 58 - 49.7       Integumentary:  All ulcers are resolved.     Urethral Catheter 16 Fr. (Active)       Wound 05/17/18 left anterior ankle (Active)   Pre Size Length 0 6/8/2018  7:00 AM   Pre Size Width 0 6/8/2018  7:00 AM   Pre Size Depth 0 6/8/2018  7:00 AM   Pre Total Sq cm 1.26 5/17/2018  1:00 PM       Wound 05/17/18 left medial ankle (Active)   Pre Size Length 0 6/8/2018  7:00 AM   Pre Size Width 0 6/8/2018  7:00 AM   Pre Size Depth 0 6/8/2018  7:00 AM   Pre Total Sq cm 3 5/31/2018  2:00 PM       Pressure Ulcer Leg Left;Right (Active)       Pressure Ulcer 06/21/16 Buttocks Left Stage II (Active)       Wound 05/07/16 Non-pressure related ulcer Leg Left (Active)       Incision 06/18/16 Abdomen (Active)       Incision 07/08/16 Lap site Abdomen Mid (Active)       Incision 07/08/16 Surgical Abdomen Right (Active)       Assessment:    Images:   Study Result   BILATERAL LOWER EXTREMITY VENOUS DUPLEX ULTRASOUND WITH INSUFFICIENCY STUDY  4/8/2016 2:50 PM     INDICATIONS: Bilateral leg pain and swelling. Lower leg ulcers.     TECHNIQUE: Venous duplex ultrasound with gray-scale images, graded compression, and color-flow, Doppler, and spectral analysis. Abnormal reflux is defined as 500 msec  for superficial veins and 1 sec for deep veins.  COMPARISONS: None.     FINDINGS: The right greater saphenous vein is incompetent, with abnormal sustained reflux from the saphenofemoral junction to the mid calf. This vein measures between 12 mm in diameter at saphenofemoral junction and 3 mm in the distal calf. No abnormal   reflux is detected in the right lesser saphenous vein.     The left greater saphenous vein is incompetent, with abnormal sustained reflux from the saphenofemoral junction to the distal calf. This vein measures between 9 mm in diameter at saphenofemoral junction and 5 mm in the distal calf. No abnormal reflux is   detected in the left lesser saphenous vein.     There is abnormal deep venous reflux bilaterally, involving the right common femoral and popliteal veins and the left common femoral and superficial femoral veins. No incompetent perforating veins are identified.     There is no evidence for deep vein thrombosis. There is normal flow and compressibility in the femoral, popliteal, and posterior tibial veins.     CONCLUSIONS:   1.  Incompetent right greater saphenous vein.  2.  Incompetent left greater saphenous vein.       Labs:    The following labs were reviewed by me today.    Lab Results   Component Value Date    WBC 6.6 02/22/2018    HGB 12.4 (L) 02/22/2018    HCT 38.7 (L) 02/22/2018    MCV 95 02/22/2018     02/22/2018               Invalid input(s): EOSPC    Lab Results   Component Value Date    CREATININE 0.92 03/09/2018         Lab Results   Component Value Date    BUN 23 03/09/2018     No results found for: PREALBUMINESUFAST(LABPROT,LABALBU,albumin)@  Invalid input(s): LABALBU  No results found for: PREALBUMIN    No results found for: CRP  No results found for: SEDRATE    Lab Results   Component Value Date    HGBA1C 7.7 (H) 02/02/2018     Lab Results   Component Value Date    TSH 2.67 06/22/2016           No results found for: [id number]  Lab Results   Component Value Date      (H) 03/01/2018        1. Lymphedema  Change dressing   2. Anemia     3. Venous hypertension, chronic, bilateral         A new wound was identified today: no    Plan:  1.  Left leg ulcer, remains resolved    3.  Right leg ulcer, resolved    4.  Lymphedema, stable.     5.  Diabetes, A1c, 7.7 on 2/2/2018.  Managed by primary    6.  Treatment:   Will transition him into Velcro compression wraps.    7.   Patient will follow up with me in 7-8 weeks.    Autumn Burnett, APRN, CNP,  [facility]  [phone]

## 2021-06-18 NOTE — PATIENT INSTRUCTIONS - HE
Patient Instructions by Mei Sampson NP at 11/30/2020  1:20 PM     Author: Mei Sampson NP Service: -- Author Type: Nurse Practitioner    Filed: 11/30/2020  1:55 PM Encounter Date: 11/30/2020 Status: Signed    : Mei Sampson NP (Nurse Practitioner)       We will be ordering you new velcro wraps from [facility]; these will be delivered to your home    We will start home care again; twice per week        Wound Care Instructions    Twice per week home care will Cleanse your bilateral legs and leg wound(s) with Dilute hibiclens 30cc in 500cc NS    Pat Dry with non-sterile gauze    Apply Lotion to the intact skin surrounding your wound and other dry skin locations. Some good lotions include: Remedy Skin Repair Cream, Sarna, Vanicream or Cetaphil    Apply silvercel into/onto the wounds    Cover with gauze    Secure with non-sterile roll gauze and tape as needed; avoid adhesive directly on the skin    Compression: 4 layer bilaterally    It is not ok to get your wound wet in the bath or shower    SEEK MEDICAL CARE IF:    You have an increase in swelling, pain, or redness around the wound.    You have an increase in the amount of pus coming from the wound.    There is a bad smell coming from the wound.    The wound appears to be worsening/enlarging    You have a fever greater than 101.5 F        It is ok to continue current wound care treatment/products for the next 2-3 days until new wound care supplies are ordered and arrive. If longer than this please contact our office at [phone].      Mei Sampson DNP, RN, CNP, [facility]  [phone]      It is recommended that you do not get your ulcer wet when showering.  Listed below are several ways of keeping it dry when you shower.     1. Wrap it with Press [employer] Seal plastic wrap.  It can be found in the stores where the plastic wraps or tin foil is kept.    2.  Some people take a bath and hang their leg/foot out of the tub.    3   Put your leg in a plastic bag and tape it on.         4. You can purchase a shower cover for casts at some pharmacies and through the Internet.                      DO NOT STRAP VELCRO TO !  1.2.        3.4.

## 2021-06-18 NOTE — PATIENT INSTRUCTIONS - HE
Patient Instructions by Mei Sampson NP at 5/22/2020  9:00 AM     Author: Mei Sampson NP Service: -- Author Type: Nurse Practitioner    Filed: 5/22/2020  9:31 AM Encounter Date: 5/22/2020 Status: Addendum    : Mei Sampson NP (Nurse Practitioner)    Related Notes: Original Note by Mei Sampson NP (Nurse Practitioner) filed at 5/22/2020  9:31 AM       Call cardiology to report the 5 pound weight increase today    Wounds are healed  No dressings needed  Can stop home care    Continue lymphedema pump 1-2 times per day  Continue to wear your compression stockings or velcro wraps every day; put them on first thing in the morning and remove at bedtime    Replace your compression stockings every 3-4 months; these garments will lose their elasticity and become ineffective    Replace velcro wraps every 1-2 years    Elevate your legs periodically throughout the day, 30-60 minutes 1-3 times per day    Apply lotion to your legs 1-2 times per day; some good name brands are Cetaphil, Sarna, Aveeno, VaniCream, CeraVe    Continue to walk and exercise    If you are taking a diuretic continue to do so at the direction of your primary care provider    Make an appointment at the vascular clinic again if you have worsening swelling, need a prescription for new compression garments; and/or develop new wounds                  DO NOT STRAP VELCRO TO !  1.2.        3.4.

## 2021-06-18 NOTE — PROGRESS NOTES
Date of Service:5/17/2018    Provider's initial consult visit:  5/17/2018    Chief Complaint:   Chief Complaint   Patient presents with     Wound Check       History:   Patient presents to clinic in regards to bilateral lower leg ulcers.  The  patient was last seen by me on 11/6/2018. He was hospitalized on 2/1/18 for fatigue, SOB and cough. CXR positive for infiltrates and positive culture for Legionella pneumonia with sepsis.  He also fell in February and sustained a head laceration requiring 20 sutures.  He was in a TCU for these condition and was discharged on 3/7/2018.  Approximately a month ago, he developed ulcers on his legs bilaterally.  The lateral right leg developed a blister.  He believes that he developed on ulcer on his left leg from his velcro compression wraps. He denies pain in the ulcers and his wife is applying antibiotic ointment and a non stick pad to the ulcers.  He is wearing his velcro compression wraps daily, but his legs remain very swollen.  He states that the velcro compression wraps are well over a or two years old.     Current Outpatient Prescriptions   Medication Sig Dispense Refill     atorvastatin (LIPITOR) 10 MG tablet Take 10 mg by mouth every morning.        ferrous gluconate 325 mg (36 mg iron) Tab Take 325 mg by mouth daily.        furosemide (LASIX) 40 MG tablet Take 40 mg by mouth 2 (two) times a day.       glipiZIDE (GLUCOTROL XL) 5 MG 24 hr tablet Take 5 mg by mouth daily with breakfast.        LANTUS SOLOSTAR 100 unit/mL (3 mL) pen Inject 20 Units under the skin at bedtime. 3 mL PRN     omeprazole (PRILOSEC) 20 MG capsule Take 20 mg by mouth daily.        potassium chloride (KAYCIEL) 20 mEq/15 mL solution Take 20 mEq by mouth daily.        sotalol (BETAPACE) 80 MG tablet Take 80 mg by mouth 2 (two) times a day.        vitamin A-vitamin C-vit E-min (OCUVITE) Tab tablet Take 1 tablet by mouth daily.        warfarin (COUMADIN) 2 MG tablet Take 1.5 tablets (3 mg total) by  mouth daily.  0     acetaminophen (TYLENOL) 325 MG tablet Take 2 tablets (650 mg total) by mouth every 4 (four) hours as needed.  0     benzonatate (TESSALON) 100 MG capsule Take 1 capsule (100 mg total) by mouth 3 (three) times a day as needed for cough.  0     Lactobacillus acidoph-L.bulgar (LACTINEX) 100 million cell packet Take 1 packet by mouth 3 (three) times a day with meals.  0     No current facility-administered medications for this visit.        No Known Allergies    Physical Exam:    Vitals:    05/17/18 1350   BP: 114/60   Pulse: 82   Temp: 97.7  F (36.5  C)    There is no height or weight on file to calculate BMI.    General:  88 y.o. male in no apparent distress.    Head:  Normocephalic atraumatic  Psychiatric: Cooperative.   Respiratory: unlabored breathing.  Cardiovascular-Lower extremity: Edema: +4;      Vasc Edema 1/12/2017 4/11/2017 8/7/2017 11/6/2017 5/17/2018   Right just above MTP 25 25 25 23.9 27.5   Right Ankle 24 25 24.3 24.7 28   Right Widest Calf 36.2 36.4 38.8 35.6 42.5   Right Thigh Up 10cm - - - - 49.5   Left - just above MTP 24 24 24.7 23.5 27   Left Ankle 25.5 23.9 24.3 24.4 28.7   Left Widest Calf 33.5 34.3 34.7 35.1 38.9   Left Thigh Up 10cm - - - - 50       Integumentary:      Right leg ulcer Ulceration(s):   Wound bed: Prior to debridement: 100% loose slough Undermining no, Tunneling no   Wound Edge:flush   Periwound:  There is no cyrus wound erythema, warmth  induration, maceration, denudement or fluctuance surrounding the ulcer(s).  Exudate: minimal  serous drainage with faint odor.  Wound Shape regular     Left leg Ulceration(s):     Wound bed: %100 loose slough          Undermining no, Tunneling no   Wound Edge: flush   Periwound:  There is no cyrus wound erythema, induration, denudement fluctuance or warmth surrounding the ulcer(s).  There is maceration   Exudate: sanguineous            Quantity: moderate  Odor:  absent   Wound Shape irregular     Urethral Catheter 16 Fr.  (Active)       Wound 05/17/18 left anterior ankle (Active)   Pre Size Length 0.7 5/17/2018  1:00 PM   Pre Size Width 1.8 5/17/2018  1:00 PM   Pre Size Depth 0 5/17/2018  1:00 PM   Pre Total Sq cm 1.26 5/17/2018  1:00 PM       Wound 05/17/18 left medial ankle (Active)   Pre Size Length 1 5/17/2018  1:00 PM   Pre Size Width 4.9 5/17/2018  1:00 PM   Pre Size Depth 0 5/17/2018  1:00 PM   Pre Total Sq cm 4.9 5/17/2018  1:00 PM       Wound 05/17/18 right lateral ankle (Active)   Pre Size Length 2.2 5/17/2018  1:00 PM   Pre Size Width 3.3 5/17/2018  1:00 PM   Pre Size Depth 0 5/17/2018  1:00 PM   Pre Total Sq cm 7.26 5/17/2018  1:00 PM       Pressure Ulcer Leg Left;Right (Active)       Pressure Ulcer 06/21/16 Buttocks Left Stage II (Active)       Wound 05/07/16 Non-pressure related ulcer Leg Left (Active)       Incision 06/18/16 Abdomen (Active)       Incision 07/08/16 Lap site Abdomen Mid (Active)       Incision 07/08/16 Surgical Abdomen Right (Active)       Assessment:    Images:   Study Result   BILATERAL LOWER EXTREMITY VENOUS DUPLEX ULTRASOUND WITH INSUFFICIENCY STUDY  4/8/2016 2:50 PM     INDICATIONS: Bilateral leg pain and swelling. Lower leg ulcers.     TECHNIQUE: Venous duplex ultrasound with gray-scale images, graded compression, and color-flow, Doppler, and spectral analysis. Abnormal reflux is defined as 500 msec for superficial veins and 1 sec for deep veins.  COMPARISONS: None.     FINDINGS: The right greater saphenous vein is incompetent, with abnormal sustained reflux from the saphenofemoral junction to the mid calf. This vein measures between 12 mm in diameter at saphenofemoral junction and 3 mm in the distal calf. No abnormal   reflux is detected in the right lesser saphenous vein.     The left greater saphenous vein is incompetent, with abnormal sustained reflux from the saphenofemoral junction to the distal calf. This vein measures between 9 mm in diameter at saphenofemoral junction and 5 mm in the  distal calf. No abnormal reflux is   detected in the left lesser saphenous vein.     There is abnormal deep venous reflux bilaterally, involving the right common femoral and popliteal veins and the left common femoral and superficial femoral veins. No incompetent perforating veins are identified.     There is no evidence for deep vein thrombosis. There is normal flow and compressibility in the femoral, popliteal, and posterior tibial veins.     CONCLUSIONS:   1.  Incompetent right greater saphenous vein.  2.  Incompetent left greater saphenous vein.       Labs:    The following labs were reviewed by me today.    Lab Results   Component Value Date    WBC 6.6 02/22/2018    HGB 12.4 (L) 02/22/2018    HCT 38.7 (L) 02/22/2018    MCV 95 02/22/2018     02/22/2018               Invalid input(s): EOSPC    Lab Results   Component Value Date    CREATININE 0.92 03/09/2018         Lab Results   Component Value Date    BUN 23 03/09/2018     No results found for: PREALBUMINESUFAST(LABPROT,LABALBU,albumin)@  Invalid input(s): LABALBU  No results found for: PREALBUMIN    No results found for: CRP  No results found for: SEDRATE    Lab Results   Component Value Date    HGBA1C 7.7 (H) 02/02/2018     Lab Results   Component Value Date    TSH 2.67 06/22/2016           No results found for: LLUXVKJP94KX  Lab Results   Component Value Date     (H) 03/01/2018        1. Lymphedema     2. Anemia, unspecified type     3. Type 2 diabetes mellitus     4. Venous hypertension, chronic, bilateral     5. Ulcer of left lower extremity, limited to breakdown of skin     6. Ulcer of right lower extremity, limited to breakdown of skin         A new wound was identified today: yes,  it is located right and left leg.    Plan:  1.  Debridement of the bilateral leg ulcer was recommended.  After consent was obtained and topical 2% Xylocaine was applied under clean conditions, and using a #15 blade,the devitalized dermal tissue was debrided for a  total square centimeters of 6.15.  Following debridement, there was a decrease in the nonviable tissue. The patient tolerated the procedure without any difficulty.     2.  Left leg ulcer, no signs of infection. He will need compression to reduce the swelling for healing to occur    3.  Right leg ulcer, possibly secondary to velcro compression wraps.  He will need a new pair when his swelling is reduced.  Will need reduction of swelling for the ulcer to heal.    4.  Lymphedema, significantly worse.  Will increase the compression level. If this is ineffective, I will consider lymphedema therapy.    5.  Diabetes, A1c, 7.7 on 2/2/2018.  Managed by primary    6.  Treatment:   Will apply viscopaste over the open areas and apply a 2-layer wrap for compression to reduce the swelling.  At his next appointment, I will consider ordering a velcro compression wrap.    7.   Patient will follow up with a nurse in 3-4 days to re-wrap the 2-layer wrap because I anticipate it to fall.  He will follow up with me in one week  for reevaluation.        Autumn Burnett, APRN, CNP,  Replaced by [facility]  [phone]

## 2021-06-18 NOTE — PATIENT INSTRUCTIONS - HE
Patient Instructions by Mei Sampson NP at 5/1/2020  9:00 AM     Author: Mei Sampson NP Service: -- Author Type: Nurse Practitioner    Filed: 5/1/2020 10:05 AM Encounter Date: 5/1/2020 Status: Addendum    : Theresa Sequeira LPN (Licensed Nurse)    Related Notes: Original Note by Theresa Sequeira LPN (Licensed Nurse) filed at 5/1/2020  9:35 AM       You should expect a call from [facility] within the next 2 business days. They will want to schedule a time with you to come out to your home. If you need to reach [facility] please call them at [phone].      Today we ordered supplies for you from [email or web address]. To reorder supplies or if you have any questions about your order please call [email or web address] Customer Service at [phone]    Wound Care Instructions    Twice per week home care will Cleanse your bilateral leg wound(s) with Dilute hibiclens 30cc in 500cc NS    Pat Dry with non-sterile gauze    Apply Lotion to the intact skin surrounding your wound and other dry skin locations. Some good lotions include: Remedy Skin Repair Cream, Sarna, Vanicream or Cetaphil    Apply silvercel into/onto the wounds    Cover with ABD    Secure with non-sterile roll gauze and tape as needed; avoid adhesive directly on the skin    Compression: 4 layers bilaterally; elevation; use lymphedema pump 1-2 times per day for 30-60 minutes    It is not ok to get your wound wet in the bath or shower    SEEK MEDICAL CARE IF:    You have an increase in swelling, pain, or redness around the wound.    You have an increase in the amount of pus coming from the wound.    There is a bad smell coming from the wound.    The wound appears to be worsening/enlarging    You have a fever greater than 101.5 F        It is ok to continue current wound care treatment/products for the next 2-3 days until new wound care supplies are ordered and arrive. If longer than this please contact our office at [phone].      Mei Sampson  VLADIMIR, RN, CNP, [facility]  [phone]      It is recommended that you do not get your ulcer wet when showering.  Listed below are several ways of keeping it dry when you shower.     1. Wrap it with Press and Seal plastic wrap.  It can be found in the stores where the plastic wraps or tin foil is kept.    2.  Some people take a bath and hang their leg/foot out of the tub.    3  Put your leg in a plastic bag and tape it on.         4. You can purchase a shower cover for casts at some pharmacies and through the Internet.

## 2021-06-18 NOTE — PROGRESS NOTES
Nurse Visit      Date of Service:6/15/2018    Chief Complaint: Patient presents to clinic for evaluation of their and swelling    Dressing on Arrival :2layer compression intact      Allergies: Review of patient's allergies indicates no known allergies.    Assessment:    /72  Pulse 60  Temp 97.5  F (36.4  C)  Resp 18    General:  Patient presents to clinic in no apparent distress.  Psychiatric:  Alert and oriented x3.   Lower extremity:  edema is present.    Integumentary:  Skin is uniformly warm, dry and pink.    Wound size:   Urethral Catheter 16 Fr. (Active)       Wound 05/17/18 left anterior ankle (Active)   Pre Size Length 0 6/8/2018  7:00 AM   Pre Size Width 0 6/8/2018  7:00 AM   Pre Size Depth 0 6/8/2018  7:00 AM   Pre Total Sq cm 1.26 5/17/2018  1:00 PM       Wound 05/17/18 left medial ankle (Active)   Pre Size Length 0 6/8/2018  7:00 AM   Pre Size Width 0 6/8/2018  7:00 AM   Pre Size Depth 0 6/8/2018  7:00 AM   Pre Total Sq cm 3 5/31/2018  2:00 PM       Pressure Ulcer Leg Left;Right (Active)       Pressure Ulcer 06/21/16 Buttocks Left Stage II (Active)       Wound 05/07/16 Non-pressure related ulcer Leg Left (Active)       Incision 06/18/16 Abdomen (Active)       Incision 07/08/16 Lap site Abdomen Mid (Active)       Incision 07/08/16 Surgical Abdomen Right (Active)          Plan:         1. Patient will Follow up with RASHID Leyva on 6/22/18         2. Treatment provided: Removed compression wraps. Cleansed legs and applied barrier ointment. Applied new 2 layer compression wraps to bilateral legs.      Cleansed with: Remedy care  Protected skin with: barrier ointment  Applied: 2 layer bilateral compression  Secured with: tape and stockinette

## 2021-06-18 NOTE — PATIENT INSTRUCTIONS - HE
Patient Instructions by Mei Sampson NP at 12/15/2020  9:40 AM     Author: Mei Sampson NP Service: -- Author Type: Nurse Practitioner    Filed: 12/15/2020 10:15 AM Encounter Date: 12/15/2020 Status: Signed    : Mei Sampson NP (Nurse Practitioner)       We will be ordering you new velcro wraps from [facility]; these will be delivered to your home    We will start home care again; twice per week        Wound Care Instructions    Twice per week home care will Cleanse your bilateral legs and leg wound(s) with Dilute hibiclens 30cc in 500cc NS    Pat Dry with non-sterile gauze    Apply Lotion to the intact skin surrounding your wound and other dry skin locations. Some good lotions include: Remedy Skin Repair Cream, Sarna, Vanicream or Cetaphil    Apply silvercel into/onto the wounds    Cover with gauze    Secure with non-sterile roll gauze and tape as needed; avoid adhesive directly on the skin    Compression: 2 layer bilaterally    It is not ok to get your wound wet in the bath or shower    SEEK MEDICAL CARE IF:    You have an increase in swelling, pain, or redness around the wound.    You have an increase in the amount of pus coming from the wound.    There is a bad smell coming from the wound.    The wound appears to be worsening/enlarging    You have a fever greater than 101.5 F        It is ok to continue current wound care treatment/products for the next 2-3 days until new wound care supplies are ordered and arrive. If longer than this please contact our office at [phone].      Mei Sampson DNP, RN, CNP, [facility]  [phone]      It is recommended that you do not get your ulcer wet when showering.  Listed below are several ways of keeping it dry when you shower.     1. Wrap it with Press and Seal plastic wrap.  It can be found in the stores where the plastic wraps or tin foil is kept.    2.  Some people take a bath and hang their leg/foot out of the tub.    3   Put your leg in a plastic bag and tape it on.         4. You can purchase a shower cover for casts at some pharmacies and through the Internet.                      DO NOT STRAP VELCRO TO !  1.2.        3.4.

## 2021-06-19 NOTE — PROGRESS NOTES
Date of Service:8/9/2018    Provider's initial consult visit:  5/17/2018    Chief Complaint:   Chief Complaint   Patient presents with     Leg Swelling     7 wk f/u velcro's       History:   Patient presents to clinic in regards to bilateral lower leg ulcers. He was last seen on 622/2018 when he was transitioned into velcro compression wraps.  He is wearing them, but is having difficulty with the foot piece being to small.  He has developed a new ulcer and sizable blister from the foot compression.  He denies any pain in the ulcer.  He applied a mepilex border over it.    Current Outpatient Prescriptions   Medication Sig Dispense Refill     acetaminophen (TYLENOL) 325 MG tablet Take 2 tablets (650 mg total) by mouth every 4 (four) hours as needed.  0     atorvastatin (LIPITOR) 10 MG tablet Take 10 mg by mouth every morning.        benzonatate (TESSALON) 100 MG capsule Take 1 capsule (100 mg total) by mouth 3 (three) times a day as needed for cough.  0     ferrous gluconate 325 mg (36 mg iron) Tab Take 325 mg by mouth daily.        furosemide (LASIX) 40 MG tablet Take 40 mg by mouth 2 (two) times a day.       glipiZIDE (GLUCOTROL XL) 5 MG 24 hr tablet Take 5 mg by mouth daily with breakfast.        Lactobacillus acidoph-L.bulgar (LACTINEX) 100 million cell packet Take 1 packet by mouth 3 (three) times a day with meals.  0     LANTUS SOLOSTAR 100 unit/mL (3 mL) pen Inject 20 Units under the skin at bedtime. 3 mL PRN     omeprazole (PRILOSEC) 20 MG capsule Take 20 mg by mouth daily.        potassium chloride (KAYCIEL) 20 mEq/15 mL solution Take 20 mEq by mouth daily.        sotalol (BETAPACE) 80 MG tablet Take 80 mg by mouth 2 (two) times a day.        vitamin A-vitamin C-vit E-min (OCUVITE) Tab tablet Take 1 tablet by mouth daily.        warfarin (COUMADIN) 5 MG tablet 6 MG WED SAT, 5 MG OTHER DAYS ONCE A DAY ORALLY  3     warfarin (COUMADIN) 2 MG tablet Take 1.5 tablets (3 mg total) by mouth daily.  0     No  current facility-administered medications for this visit.        No Known Allergies    Physical Exam:    Vitals:    08/09/18 1243   BP: 132/82   Pulse: 72   Temp: 98.9  F (37.2  C)    There is no height or weight on file to calculate BMI.    General:  88 y.o. male in no apparent distress.    Head:  Normocephalic atraumatic  Psychiatric: Cooperative.   Respiratory: unlabored breathing.  Cardiovascular-Lower extremity: Edema: minimal      Vasc Edema 5/31/2018 6/8/2018 6/15/2018 6/22/2018 8/9/2018   Right just above MTP 25.5 26.8 - 27.7 27.7   Right Ankle 26 27.8 24.5 27 28.9   Right Widest Calf 44 36.5 32.5 40.5 40.5   Right Thigh Up 10cm 51.5 48.2 - 49.5 50.3   Left - just above MTP 25 25.8 - 26.5 26   Left Ankle 24 26.4 24.5 26.2 29   Left Widest Calf 33 46.5 32 35.7 38.2   Left Thigh Up 10cm 53 58 - 49.7 50.5       Integumentary:  All ulcers are resolved.     Urethral Catheter 16 Fr. (Active)       Wound 05/17/18 left anterior ankle (Active)   Pre Size Length 1.7 8/9/2018 12:00 PM   Pre Size Width 6 8/9/2018 12:00 PM   Pre Size Depth 0 8/9/2018 12:00 PM   Pre Total Sq cm 10.2 8/9/2018 12:00 PM       Wound 05/17/18 left medial ankle (Active)   Pre Size Length 2.5 8/9/2018 12:00 PM   Pre Size Width 7 8/9/2018 12:00 PM   Pre Size Depth 0 8/9/2018 12:00 PM   Pre Total Sq cm 17.5 8/9/2018 12:00 PM       Wound 08/09/18 right anterior shin (Active)   Pre Size Length 0.7 8/9/2018 12:00 PM   Pre Size Width 0.6 8/9/2018 12:00 PM   Pre Total Sq cm 0.42 8/9/2018 12:00 PM       Pressure Ulcer Leg Left;Right (Active)       Pressure Ulcer 06/21/16 Buttocks Left Stage II (Active)       Wound 05/07/16 Non-pressure related ulcer Leg Left (Active)       Incision 06/18/16 Abdomen (Active)       Incision 07/08/16 Lap site Abdomen Mid (Active)       Incision 07/08/16 Surgical Abdomen Right (Active)       Assessment:    Images:   Study Result   BILATERAL LOWER EXTREMITY VENOUS DUPLEX ULTRASOUND WITH INSUFFICIENCY STUDY  4/8/2016 2:50  PM     INDICATIONS: Bilateral leg pain and swelling. Lower leg ulcers.     TECHNIQUE: Venous duplex ultrasound with gray-scale images, graded compression, and color-flow, Doppler, and spectral analysis. Abnormal reflux is defined as 500 msec for superficial veins and 1 sec for deep veins.  COMPARISONS: None.     FINDINGS: The right greater saphenous vein is incompetent, with abnormal sustained reflux from the saphenofemoral junction to the mid calf. This vein measures between 12 mm in diameter at saphenofemoral junction and 3 mm in the distal calf. No abnormal   reflux is detected in the right lesser saphenous vein.     The left greater saphenous vein is incompetent, with abnormal sustained reflux from the saphenofemoral junction to the distal calf. This vein measures between 9 mm in diameter at saphenofemoral junction and 5 mm in the distal calf. No abnormal reflux is   detected in the left lesser saphenous vein.     There is abnormal deep venous reflux bilaterally, involving the right common femoral and popliteal veins and the left common femoral and superficial femoral veins. No incompetent perforating veins are identified.     There is no evidence for deep vein thrombosis. There is normal flow and compressibility in the femoral, popliteal, and posterior tibial veins.     CONCLUSIONS:   1.  Incompetent right greater saphenous vein.  2.  Incompetent left greater saphenous vein.       Labs:    The following labs were reviewed by me today.    Lab Results   Component Value Date    WBC 6.6 02/22/2018    HGB 12.4 (L) 02/22/2018    HCT 38.7 (L) 02/22/2018    MCV 95 02/22/2018     02/22/2018               Invalid input(s): EOSPC    Lab Results   Component Value Date    CREATININE 0.92 03/09/2018         Lab Results   Component Value Date    BUN 23 03/09/2018     No results found for: PREALBUMINESUFAST(LABPROT,LABALBU,albumin)@  Invalid input(s): LABALBU  No results found for: PREALBUMIN    No results found for:  CRP  No results found for: SEDRATE    Lab Results   Component Value Date    HGBA1C 7.7 (H) 02/02/2018     Lab Results   Component Value Date    TSH 2.67 06/22/2016           No results found for: [id number]  Lab Results   Component Value Date     (H) 03/01/2018        1. Leg ulcer (H)  Change dressing   2. Venous stasis dermatitis of both lower extremities     3. Lymphedema     4. Ulcer of left lower extremity, limited to breakdown of skin (H)         A new wound was identified today: right leg ulcer    Plan:  1.  Left leg ulcer, remains resolved    2.  Debridement of the right leg ulcer was recommended.  After consent was obtained and topical 2% Xylocaine was applied under clean conditions, and using a #15 blade,the devitalized dermal tissue was debrided for a total square centimeters of 0.42.  Following debridement, there was a decrease in the nonviable tissue. The patient tolerated the procedure without any difficulty.     3.  Right leg ulcer, new wound.  No signs of infection.    4.  Lymphedema, worse.     5.  Diabetes, A1c, 7.7 on 2/2/2018.  Managed by primary    6.  Treatment:   Will order hybrid compression foot stockings so he has a better fit for compression in his foot. Will apply layers of viscopaste over the open areas and a 2-layer wrap on both legs.  These dressings will be changed next week by a nurse in our clinic     7.   Patient will follow up with me in 2 weeks. He will bring his velcro compression wraps to his next appointment with me.    Autumn Burnett, APRN, CNP,  [facility]  [phone]

## 2021-06-20 NOTE — PROGRESS NOTES
Date of Service:9/13/2018    Provider's initial consult visit:  5/17/2018    Chief Complaint:   Chief Complaint   Patient presents with     Follow-up       History:   Patient presents to clinic in regards to bilateral lower leg ulcers. He was last seen on 8/29/2018 by nursing when he was transitioned into his velcro compression wraps.  He is wearing the foot piece, but it is causing him difficulty because it is tight on him.  He did not receive the hybrid stockings that were ordered at his last appointment.       Current Outpatient Prescriptions   Medication Sig Dispense Refill     acetaminophen (TYLENOL) 325 MG tablet Take 2 tablets (650 mg total) by mouth every 4 (four) hours as needed.  0     atorvastatin (LIPITOR) 10 MG tablet Take 10 mg by mouth every morning.        benzonatate (TESSALON) 100 MG capsule Take 1 capsule (100 mg total) by mouth 3 (three) times a day as needed for cough.  0     ferrous gluconate 325 mg (36 mg iron) Tab Take 325 mg by mouth daily.        furosemide (LASIX) 40 MG tablet Take 40 mg by mouth 2 (two) times a day.       glipiZIDE (GLUCOTROL XL) 5 MG 24 hr tablet Take 5 mg by mouth daily with breakfast.        Lactobacillus acidoph-L.bulgar (LACTINEX) 100 million cell packet Take 1 packet by mouth 3 (three) times a day with meals.  0     LANTUS SOLOSTAR 100 unit/mL (3 mL) pen Inject 20 Units under the skin at bedtime. 3 mL PRN     omeprazole (PRILOSEC) 20 MG capsule Take 20 mg by mouth daily.        potassium chloride (KAYCIEL) 20 mEq/15 mL solution Take 20 mEq by mouth daily.        sotalol (BETAPACE) 80 MG tablet Take 80 mg by mouth 2 (two) times a day.        vitamin A-vitamin C-vit E-min (OCUVITE) Tab tablet Take 1 tablet by mouth daily.        warfarin (COUMADIN) 2 MG tablet Take 1.5 tablets (3 mg total) by mouth daily.  0     warfarin (COUMADIN) 5 MG tablet 6 MG WED SAT, 5 MG OTHER DAYS ONCE A DAY ORALLY  3     No current facility-administered medications for this visit.         No Known Allergies    Physical Exam:    Vitals:    09/13/18 1015   BP: 122/84   Pulse: 74   Temp: 98.2  F (36.8  C)    There is no height or weight on file to calculate BMI.    General:  88 y.o. male in no apparent distress.    Head:  Normocephalic atraumatic  Psychiatric: Cooperative.   Respiratory: unlabored breathing.  Cardiovascular-Lower extremity: Edema: minimal      Vasc Edema 6/22/2018 8/9/2018 8/23/2018 8/29/2018 9/13/2018   Right just above MTP 27.7 27.7 26.9 26.6 25   Right Ankle 27 28.9 26.7 24.6 23.5   Right Widest Calf 40.5 40.5 45.8 37.4 40.2   Right Thigh Up 10cm 49.5 50.3 54.4 53.2 52.2   Left - just above MTP 26.5 26 26.7 26.1 24   Left Ankle 26.2 29 25.7 24.5 25.2   Left Widest Calf 35.7 38.2 38.3 35.4 36.2   Left Thigh Up 10cm 49.7 50.5 55.7 53.4 51.5       Integumentary:  All ulcers are resolved.     Urethral Catheter 16 Fr. (Active)       Wound 05/17/18 left medial ankle (Active)   Pre Size Length 0 9/13/2018 10:00 AM   Pre Size Width 0 9/13/2018 10:00 AM   Pre Size Depth 0 9/13/2018 10:00 AM   Pre Total Sq cm 0 9/13/2018 10:00 AM   Post Size Length 1 8/23/2018 10:00 AM   Post Size Width 1.5 8/23/2018 10:00 AM   Undermined n 8/29/2018  9:00 AM   Tunneling n 8/29/2018  9:00 AM   Description red wound bed 8/29/2018  9:00 AM   Prodcut Used Viscopaste 8/29/2018  9:00 AM       Pressure Ulcer Leg Left;Right (Active)       Pressure Ulcer 06/21/16 Buttocks Left Stage II (Active)       Wound 05/07/16 Non-pressure related ulcer Leg Left (Active)       Incision 06/18/16 Abdomen (Active)       Incision 07/08/16 Lap site Abdomen Mid (Active)       Incision 07/08/16 Surgical Abdomen Right (Active)       Assessment:    Images:   Study Result   BILATERAL LOWER EXTREMITY VENOUS DUPLEX ULTRASOUND WITH INSUFFICIENCY STUDY  4/8/2016 2:50 PM     INDICATIONS: Bilateral leg pain and swelling. Lower leg ulcers.     TECHNIQUE: Venous duplex ultrasound with gray-scale images, graded compression, and color-flow,  Doppler, and spectral analysis. Abnormal reflux is defined as 500 msec for superficial veins and 1 sec for deep veins.  COMPARISONS: None.     FINDINGS: The right greater saphenous vein is incompetent, with abnormal sustained reflux from the saphenofemoral junction to the mid calf. This vein measures between 12 mm in diameter at saphenofemoral junction and 3 mm in the distal calf. No abnormal   reflux is detected in the right lesser saphenous vein.     The left greater saphenous vein is incompetent, with abnormal sustained reflux from the saphenofemoral junction to the distal calf. This vein measures between 9 mm in diameter at saphenofemoral junction and 5 mm in the distal calf. No abnormal reflux is   detected in the left lesser saphenous vein.     There is abnormal deep venous reflux bilaterally, involving the right common femoral and popliteal veins and the left common femoral and superficial femoral veins. No incompetent perforating veins are identified.     There is no evidence for deep vein thrombosis. There is normal flow and compressibility in the femoral, popliteal, and posterior tibial veins.     CONCLUSIONS:   1.  Incompetent right greater saphenous vein.  2.  Incompetent left greater saphenous vein.       Labs:    The following labs were reviewed by me today.    Lab Results   Component Value Date    WBC 6.6 02/22/2018    HGB 12.4 (L) 02/22/2018    HCT 38.7 (L) 02/22/2018    MCV 95 02/22/2018     02/22/2018               Invalid input(s): EOSPC    Lab Results   Component Value Date    CREATININE 0.92 03/09/2018         Lab Results   Component Value Date    BUN 23 03/09/2018     No results found for: PREALBUMINESUFAST(LABPROT,LABALBU,albumin)@  Invalid input(s): LABALBU  No results found for: PREALBUMIN    No results found for: CRP  No results found for: SEDRATE    Lab Results   Component Value Date    HGBA1C 7.7 (H) 02/02/2018     Lab Results   Component Value Date    TSH 2.67 06/22/2016            No results found for: [id number]  Lab Results   Component Value Date     (H) 03/01/2018        1. Ulcer of left lower extremity, limited to breakdown of skin (H)     2. Venous stasis dermatitis of both lower extremities     3. Lymphedema     4. Anemia, unspecified type         A new wound was identified today: no    Plan:  1.  Right leg ulcer, resolved    2.  Left leg ulcer,  resolved    3.  Lymphedema, improved with compression, but his foot piece is to tight on the ankle and foot.  Will follow up with [facility] regarding the hybrid compression stockings, which he will wear instead of compression foot piece.    4.  Diabetes, A1c, 7.7 on 2/2/2018.  Managed by primary    5.  Treatment:   Will continue with velcro compression wraps.  See above    6.   Patient will follow up with me in 3 months.     Autumn Burnett, APRN, CNP,  [facility]  [phone]

## 2021-06-20 NOTE — PROGRESS NOTES
Nurse Visit    Chief Complaint: Patient presents to clinic for assement, and treatment of their ulcer and and swelling    Dressing on Arrival right compression had fallen down 3 inches, left intact    Allergies: No Known Allergies    Medications:   Current Outpatient Prescriptions:      acetaminophen (TYLENOL) 325 MG tablet, Take 2 tablets (650 mg total) by mouth every 4 (four) hours as needed., Disp: , Rfl: 0     atorvastatin (LIPITOR) 10 MG tablet, Take 10 mg by mouth every morning. , Disp: , Rfl:      benzonatate (TESSALON) 100 MG capsule, Take 1 capsule (100 mg total) by mouth 3 (three) times a day as needed for cough., Disp: , Rfl: 0     ferrous gluconate 325 mg (36 mg iron) Tab, Take 325 mg by mouth daily. , Disp: , Rfl:      furosemide (LASIX) 40 MG tablet, Take 40 mg by mouth 2 (two) times a day., Disp: , Rfl:      glipiZIDE (GLUCOTROL XL) 5 MG 24 hr tablet, Take 5 mg by mouth daily with breakfast. , Disp: , Rfl:      Lactobacillus acidoph-L.bulgar (LACTINEX) 100 million cell packet, Take 1 packet by mouth 3 (three) times a day with meals., Disp: , Rfl: 0     LANTUS SOLOSTAR 100 unit/mL (3 mL) pen, Inject 20 Units under the skin at bedtime., Disp: 3 mL, Rfl: PRN     omeprazole (PRILOSEC) 20 MG capsule, Take 20 mg by mouth daily. , Disp: , Rfl:      potassium chloride (KAYCIEL) 20 mEq/15 mL solution, Take 20 mEq by mouth daily. , Disp: , Rfl:      sotalol (BETAPACE) 80 MG tablet, Take 80 mg by mouth 2 (two) times a day. , Disp: , Rfl:      vitamin A-vitamin C-vit E-min (OCUVITE) Tab tablet, Take 1 tablet by mouth daily. , Disp: , Rfl:      warfarin (COUMADIN) 2 MG tablet, Take 1.5 tablets (3 mg total) by mouth daily., Disp: , Rfl: 0     warfarin (COUMADIN) 5 MG tablet, 6 MG WED SAT, 5 MG OTHER DAYS ONCE A DAY ORALLY, Disp: , Rfl: 3    Vital Signs: There were no vitals taken for this visit.      Assessment:    General:  Patient presents to clinic in no apparent distress.  Psychiatric:  Alert and  "oriented x3.   Lower extremity:  edema is present.    Integumentary:  Skin is uniformly warm, dry and pink.    Wound size:   Urethral Catheter 16 Fr. (Active)       Wound 05/17/18 left medial ankle (Active)   Pre Size Length 0.3 8/29/2018  9:00 AM   Pre Size Width 0.5 8/29/2018  9:00 AM   Pre Size Depth 0.1 8/29/2018  9:00 AM   Pre Total Sq cm 0.15 8/29/2018  9:00 AM   Post Size Length 1 8/23/2018 10:00 AM   Post Size Width 1.5 8/23/2018 10:00 AM   Undermined n 8/29/2018  9:00 AM   Tunneling n 8/29/2018  9:00 AM   Description red wound bed 8/29/2018  9:00 AM   Prodcut Used Viscopaste 8/29/2018  9:00 AM       Wound 08/09/18 right anterior shin (Active)   Pre Size Length 0.7 8/9/2018 12:00 PM   Pre Size Width 0.6 8/9/2018 12:00 PM   Pre Total Sq cm 0.42 8/9/2018 12:00 PM       Pressure Ulcer Leg Left;Right (Active)       Pressure Ulcer 06/21/16 Buttocks Left Stage II (Active)       Wound 05/07/16 Non-pressure related ulcer Leg Left (Active)       Incision 06/18/16 Abdomen (Active)       Incision 07/08/16 Lap site Abdomen Mid (Active)       Incision 07/08/16 Surgical Abdomen Right (Active)      Undermining is not present.    The periwoundskin is hemosiderin      Plan:         1. Patient will in 2 weeks         2. Treatment provided will include irrigation and dressings to promote autolytic debridement and will be as listed below     Cleansed with: Wound Cleanser    Protected skin with: Remedy Skin Repair    Dressings Applied: viscopaste, rolled gauze    Compression Applied to the Left Leg: Velcro\", Compression Stockings    Compression Applied to the Right Leg: Velcro\", Compression Stockings    Offloading applied: None    Trial Products: no  Provider notified regarding concerns: no  Treatment Changes: no    Educational Barriers: hearing loss  Taught Regarding: Acitivity, Compliance and Compression, dressing    Teaching Method: Exaplanation, Handout and Demo      "

## 2021-06-20 NOTE — PROGRESS NOTES
Date of Service: 9/24/2018    Provider's initial consult visit:  5/17/2018    Chief Complaint: leg wound      History:   Patient presents to clinic in regards to bilateral lower leg ulcers. I last saw him on 9/13/2018 when he was instructed not to use the foot piece from his velcro compression wraps because he was starting to get blisters from it.  As an alternative, he was instructed to get the hybrid stocking.  He presents to clinic today with complaints of significant pain in his ankle area with an large openng and blistering.  He is wearing a Tubigrip from ankle to knee under his velcro compression wraps.    Current Outpatient Prescriptions   Medication Sig Dispense Refill     acetaminophen (TYLENOL) 325 MG tablet Take 2 tablets (650 mg total) by mouth every 4 (four) hours as needed.  0     atorvastatin (LIPITOR) 10 MG tablet Take 10 mg by mouth every morning.        benzonatate (TESSALON) 100 MG capsule Take 1 capsule (100 mg total) by mouth 3 (three) times a day as needed for cough.  0     ferrous gluconate 325 mg (36 mg iron) Tab Take 325 mg by mouth daily.        furosemide (LASIX) 40 MG tablet Take 40 mg by mouth 2 (two) times a day.       glipiZIDE (GLUCOTROL XL) 5 MG 24 hr tablet Take 5 mg by mouth daily with breakfast.        Lactobacillus acidoph-L.bulgar (LACTINEX) 100 million cell packet Take 1 packet by mouth 3 (three) times a day with meals.  0     LANTUS SOLOSTAR 100 unit/mL (3 mL) pen Inject 20 Units under the skin at bedtime. 3 mL PRN     omeprazole (PRILOSEC) 20 MG capsule Take 20 mg by mouth daily.        potassium chloride (KAYCIEL) 20 mEq/15 mL solution Take 20 mEq by mouth daily.        sotalol (BETAPACE) 80 MG tablet Take 80 mg by mouth 2 (two) times a day.        sulfamethoxazole-trimethoprim (SEPTRA DS) 800-160 mg per tablet Take 1 tablet by mouth 2 (two) times a day for 10 days. 20 tablet 0     vitamin A-vitamin C-vit E-min (OCUVITE) Tab tablet Take 1 tablet by mouth daily.         warfarin (COUMADIN) 2 MG tablet Take 1.5 tablets (3 mg total) by mouth daily.  0     warfarin (COUMADIN) 5 MG tablet 6 MG WED SAT, 5 MG OTHER DAYS ONCE A DAY ORALLY  3     No current facility-administered medications for this visit.        No Known Allergies    Physical Exam:    Vitals:    09/24/18 1550   BP: 132/84   Pulse: 84   Temp: 98.4  F (36.9  C)    There is no height or weight on file to calculate BMI.    General:  88 y.o. male in no apparent distress.    Head:  Normocephalic atraumatic  Psychiatric: Cooperative.   Respiratory: unlabored breathing.  Cardiovascular-Lower extremity: Edema: severe in right leg.    Vasc Edema 8/9/2018 8/23/2018 8/29/2018 9/13/2018 9/24/2018   Right just above MTP 27.7 26.9 26.6 25 27.5   Right Ankle 28.9 26.7 24.6 23.5 28.9   Right Widest Calf 40.5 45.8 37.4 40.2 43.6   Right Thigh Up 10cm 50.3 54.4 53.2 52.2 -   Left - just above MTP 26 26.7 26.1 24 26.2   Left Ankle 29 25.7 24.5 25.2 25.6   Left Widest Calf 38.2 38.3 35.4 36.2 40.7   Left Thigh Up 10cm 50.5 55.7 53.4 51.5 -       Integumentary:      Right leg Ulceration(s):     Wound bed: %100 loose slough          Undermining no, Tunneling no   Wound Edge: attached   Periwound:  There is cyrus wound erythema, but no induration, maceration, denudement fluctuance or warmth surrounding the ulcer(s).  Exudate: serosanguaneous            Quantity: moderate  Odor:  absent   Wound Shape irregular        Urethral Catheter 16 Fr. (Active)       Wound 05/17/18 left medial ankle (Active)   Pre Size Length 0 9/13/2018 10:00 AM   Pre Size Width 0 9/13/2018 10:00 AM   Pre Size Depth 0 9/13/2018 10:00 AM   Pre Total Sq cm 0 9/13/2018 10:00 AM   Post Size Length 1 8/23/2018 10:00 AM   Post Size Width 1.5 8/23/2018 10:00 AM   Undermined n 8/29/2018  9:00 AM   Tunneling n 8/29/2018  9:00 AM   Description red wound bed 8/29/2018  9:00 AM   Prodcut Used Viscopaste 8/29/2018  9:00 AM       VASC Wound 09/24/18 right lateral shin (Active)   Pre Size  Length 2 9/27/2018 12:00 PM   Pre Size Width 4.3 9/27/2018 12:00 PM   Pre Size Depth 0.1 9/27/2018 12:00 PM   Pre Total Sq cm 8.6 9/27/2018 12:00 PM   Undermined n 9/24/2018  3:00 PM   Tunneling n 9/24/2018  3:00 PM   Prodcut Used Viscopaste 9/24/2018  3:00 PM       VASC Wound 09/24/18 right lower gator (Active)   Pre Size Length 0.5 9/24/2018  3:00 PM   Pre Size Width 1.2 9/24/2018  3:00 PM   Pre Size Depth 0.2 9/24/2018  3:00 PM   Pre Total Sq cm 0.6 9/24/2018  3:00 PM   Undermined n 9/24/2018  3:00 PM   Tunneling n 9/24/2018  3:00 PM       VASC Wound 09/27/18 Rt anterior lower shin (Active)   Pre Size Length 1.2 9/27/2018 12:00 PM   Pre Size Width 1.8 9/27/2018 12:00 PM   Pre Size Depth 0.1 9/27/2018 12:00 PM   Pre Total Sq cm 2.16 9/27/2018 12:00 PM       VASC Wound 09/27/18 Rt medial lower leg (Active)   Pre Size Length 1.2 9/27/2018 12:00 PM   Pre Size Width 1.3 9/27/2018 12:00 PM   Pre Size Depth 0.1 9/27/2018 12:00 PM   Pre Total Sq cm 1.56 9/27/2018 12:00 PM       Pressure Ulcer Leg Left;Right (Active)       Pressure Ulcer 06/21/16 Buttocks Left Stage II (Active)       Wound 05/07/16 Non-pressure related ulcer Leg Left (Active)       Incision 06/18/16 Abdomen (Active)       Incision 07/08/16 Lap site Abdomen Mid (Active)       Incision 07/08/16 Surgical Abdomen Right (Active)       Assessment:    Images:   Study Result   BILATERAL LOWER EXTREMITY VENOUS DUPLEX ULTRASOUND WITH INSUFFICIENCY STUDY  4/8/2016 2:50 PM     INDICATIONS: Bilateral leg pain and swelling. Lower leg ulcers.     TECHNIQUE: Venous duplex ultrasound with gray-scale images, graded compression, and color-flow, Doppler, and spectral analysis. Abnormal reflux is defined as 500 msec for superficial veins and 1 sec for deep veins.  COMPARISONS: None.     FINDINGS: The right greater saphenous vein is incompetent, with abnormal sustained reflux from the saphenofemoral junction to the mid calf. This vein measures between 12 mm in diameter at  saphenofemoral junction and 3 mm in the distal calf. No abnormal   reflux is detected in the right lesser saphenous vein.     The left greater saphenous vein is incompetent, with abnormal sustained reflux from the saphenofemoral junction to the distal calf. This vein measures between 9 mm in diameter at saphenofemoral junction and 5 mm in the distal calf. No abnormal reflux is   detected in the left lesser saphenous vein.     There is abnormal deep venous reflux bilaterally, involving the right common femoral and popliteal veins and the left common femoral and superficial femoral veins. No incompetent perforating veins are identified.     There is no evidence for deep vein thrombosis. There is normal flow and compressibility in the femoral, popliteal, and posterior tibial veins.     CONCLUSIONS:   1.  Incompetent right greater saphenous vein.  2.  Incompetent left greater saphenous vein.       Labs:    The following labs were reviewed by me today.    Lab Results   Component Value Date    WBC 6.6 02/22/2018    HGB 12.4 (L) 02/22/2018    HCT 38.7 (L) 02/22/2018    MCV 95 02/22/2018     02/22/2018               Invalid input(s): EOSPC    Lab Results   Component Value Date    CREATININE 0.92 03/09/2018         Lab Results   Component Value Date    BUN 23 03/09/2018     No results found for: PREALBUMINESUFAST(LABPROT,LABALBU,albumin)@  Invalid input(s): LABALBU  No results found for: PREALBUMIN    No results found for: CRP  No results found for: SEDRATE    Lab Results   Component Value Date    HGBA1C 7.7 (H) 02/02/2018     Lab Results   Component Value Date    TSH 2.67 06/22/2016           No results found for: [id number]  Lab Results   Component Value Date     (H) 03/01/2018        1. Local infection of wound  Culture/Gram Stain: Wound    DISCONTINUED: doxycycline (VIBRA-TABS) 100 MG tablet   2. Ulcer of left lower extremity, limited to breakdown of skin (H)     3. Venous stasis dermatitis of both lower  extremities     4. Venous hypertension, chronic, bilateral         A new wound was identified today: yes    Plan:    1.  Debridement of the right leg ulcer was recommended.  After consent was obtained and topical 2% Xylocaine was applied under clean conditions, and using a #15 blade,the devitalized dermal tissue was debrided for a total square centimeters of 12.32.  Following debridement, there was a decrease in the nonviable tissue. The patient tolerated the procedure without any difficulty.     2.  Right leg ulcer, signs of infection. Using the Pizarro Technique, I obtained an aerobic culture and sensitivity today.  I also wrote a prescription for doxycycline.     3.  Lymphedema, worse on the right leg.    4.  Diabetes, A1c, 7.7 on 2/2/2018.  Managed by primary    5.  Treatment:  Will discontinue compression wraps.  Will apply viscopaste layers over the open ulcers and apply a Tubigrip with a comprilan on top followed by another Tubigrip.  He will leave this on until 9/27 when he will follow up with a nurse visit to evaluate for infection and transition into viscopaste layers and a 2-layer wrap.  He will have weekly nurse visits until his next appointment with me.    6.   Patient will follow up with me in 3 weeks. When I will evaluate his swelling and determine if he is in need of lympedema therapy.    Autumn Burnett, APRN, CNP,  [facility]  [phone]

## 2021-06-20 NOTE — PROGRESS NOTES
Date of Service:8/23/2018    Provider's initial consult visit:  5/17/2018    Chief Complaint:   Chief Complaint   Patient presents with     Edema       History:   Patient presents to clinic in regards to bilateral lower leg ulcers. He was last seen on 8/16/2018 when a 2-layer wrap was applied secondary to a new wound.  The 2-layer wrap was applied by nursing as schedule last week and earlier this week.  He removed the 2-layer wrap this morning so he could shower.  He denies pain in his legs, but reports a weeping area on his right lateral leg where the wrap fell somewhat.    Current Outpatient Prescriptions   Medication Sig Dispense Refill     acetaminophen (TYLENOL) 325 MG tablet Take 2 tablets (650 mg total) by mouth every 4 (four) hours as needed.  0     atorvastatin (LIPITOR) 10 MG tablet Take 10 mg by mouth every morning.        benzonatate (TESSALON) 100 MG capsule Take 1 capsule (100 mg total) by mouth 3 (three) times a day as needed for cough.  0     ferrous gluconate 325 mg (36 mg iron) Tab Take 325 mg by mouth daily.        furosemide (LASIX) 40 MG tablet Take 40 mg by mouth 2 (two) times a day.       glipiZIDE (GLUCOTROL XL) 5 MG 24 hr tablet Take 5 mg by mouth daily with breakfast.        Lactobacillus acidoph-L.bulgar (LACTINEX) 100 million cell packet Take 1 packet by mouth 3 (three) times a day with meals.  0     LANTUS SOLOSTAR 100 unit/mL (3 mL) pen Inject 20 Units under the skin at bedtime. 3 mL PRN     omeprazole (PRILOSEC) 20 MG capsule Take 20 mg by mouth daily.        potassium chloride (KAYCIEL) 20 mEq/15 mL solution Take 20 mEq by mouth daily.        sotalol (BETAPACE) 80 MG tablet Take 80 mg by mouth 2 (two) times a day.        vitamin A-vitamin C-vit E-min (OCUVITE) Tab tablet Take 1 tablet by mouth daily.        warfarin (COUMADIN) 2 MG tablet Take 1.5 tablets (3 mg total) by mouth daily.  0     warfarin (COUMADIN) 5 MG tablet 6 MG WED SAT, 5 MG OTHER DAYS ONCE A DAY ORALLY  3     No  current facility-administered medications for this visit.        No Known Allergies    Physical Exam:    Vitals:    08/23/18 1026   BP: 126/74   Pulse: 100   Resp: 20   Temp: 97.5  F (36.4  C)    There is no height or weight on file to calculate BMI.    General:  88 y.o. male in no apparent distress.    Head:  Normocephalic atraumatic  Psychiatric: Cooperative.   Respiratory: unlabored breathing.  Cardiovascular-Lower extremity: Edema: minimal      Vasc Edema 6/8/2018 6/15/2018 6/22/2018 8/9/2018 8/23/2018   Right just above MTP 26.8 - 27.7 27.7 26.9   Right Ankle 27.8 24.5 27 28.9 26.7   Right Widest Calf 36.5 32.5 40.5 40.5 45.8   Right Thigh Up 10cm 48.2 - 49.5 50.3 54.4   Left - just above MTP 25.8 - 26.5 26 26.7   Left Ankle 26.4 24.5 26.2 29 25.7   Left Widest Calf 46.5 32 35.7 38.2 38.3   Left Thigh Up 10cm 58 - 49.7 50.5 55.7       Integumentary:  All ulcers are resolved.     Urethral Catheter 16 Fr. (Active)       Wound 05/17/18 left medial ankle (Active)   Pre Size Length 3.5 8/16/2018 12:00 PM   Pre Size Width 2 8/16/2018 12:00 PM   Pre Size Depth 0.1 8/16/2018 12:00 PM   Pre Total Sq cm 7 8/16/2018 12:00 PM   Post Size Length 1 8/23/2018 10:00 AM   Post Size Width 1.5 8/23/2018 10:00 AM       Wound 08/09/18 right anterior shin (Active)   Pre Size Length 0.7 8/9/2018 12:00 PM   Pre Size Width 0.6 8/9/2018 12:00 PM   Pre Total Sq cm 0.42 8/9/2018 12:00 PM       Pressure Ulcer Leg Left;Right (Active)       Pressure Ulcer 06/21/16 Buttocks Left Stage II (Active)       Wound 05/07/16 Non-pressure related ulcer Leg Left (Active)       Incision 06/18/16 Abdomen (Active)       Incision 07/08/16 Lap site Abdomen Mid (Active)       Incision 07/08/16 Surgical Abdomen Right (Active)       Assessment:    Images:   Study Result   BILATERAL LOWER EXTREMITY VENOUS DUPLEX ULTRASOUND WITH INSUFFICIENCY STUDY  4/8/2016 2:50 PM     INDICATIONS: Bilateral leg pain and swelling. Lower leg ulcers.     TECHNIQUE: Venous duplex  ultrasound with gray-scale images, graded compression, and color-flow, Doppler, and spectral analysis. Abnormal reflux is defined as 500 msec for superficial veins and 1 sec for deep veins.  COMPARISONS: None.     FINDINGS: The right greater saphenous vein is incompetent, with abnormal sustained reflux from the saphenofemoral junction to the mid calf. This vein measures between 12 mm in diameter at saphenofemoral junction and 3 mm in the distal calf. No abnormal   reflux is detected in the right lesser saphenous vein.     The left greater saphenous vein is incompetent, with abnormal sustained reflux from the saphenofemoral junction to the distal calf. This vein measures between 9 mm in diameter at saphenofemoral junction and 5 mm in the distal calf. No abnormal reflux is   detected in the left lesser saphenous vein.     There is abnormal deep venous reflux bilaterally, involving the right common femoral and popliteal veins and the left common femoral and superficial femoral veins. No incompetent perforating veins are identified.     There is no evidence for deep vein thrombosis. There is normal flow and compressibility in the femoral, popliteal, and posterior tibial veins.     CONCLUSIONS:   1.  Incompetent right greater saphenous vein.  2.  Incompetent left greater saphenous vein.       Labs:    The following labs were reviewed by me today.    Lab Results   Component Value Date    WBC 6.6 02/22/2018    HGB 12.4 (L) 02/22/2018    HCT 38.7 (L) 02/22/2018    MCV 95 02/22/2018     02/22/2018               Invalid input(s): EOSPC    Lab Results   Component Value Date    CREATININE 0.92 03/09/2018         Lab Results   Component Value Date    BUN 23 03/09/2018     No results found for: PREALBUMINESUFAST(LABPROT,LABALBU,albumin)@  Invalid input(s): LABALBU  No results found for: PREALBUMIN    No results found for: CRP  No results found for: SEDRATE    Lab Results   Component Value Date    HGBA1C 7.7 (H) 02/02/2018      Lab Results   Component Value Date    TSH 2.67 06/22/2016           No results found for: [id number]  Lab Results   Component Value Date     (H) 03/01/2018        1. Ulcer of left lower extremity, limited to breakdown of skin (H)  Change dressing   2. Venous stasis dermatitis of both lower extremities     3. Lymphedema     4. Anemia, unspecified type         A new wound was identified today: no    Plan:  1.  Right leg ulcer, slight weeping and irritated area on his right lateral leg.    2.  No debridement.    3.  Left leg ulcer, improved    4.  Lymphedema, improved with compression    5.  Diabetes, A1c, 7.7 on 2/2/2018.  Managed by primary    6.  Treatment:   Will apply viscopaste layers over the right and left areas and a 2-layer wrap for compression.  He will bring his velcro compression wraps to his nurse appointment where he will be transitioned into them.  I anticipate both areas will be dry and not need a dressing. If not, a foam adhesive can be applied and changed weekly.    7.   Patient will follow up with me in 3 weeks. He will bring his velcro compression wraps to his next appointment with me.    Autumn Burnett, APRN, CNP,  [facility]  [phone]

## 2021-06-20 NOTE — LETTER
Letter by Mei Sampson NP at      Author: Mei Sampson NP Service: -- Author Type: --    Filed:  Encounter Date: 2020 Status: (Other)       2020    [facility]  Fax: [phone] Wound Dressing Rx and Order Form  Customer Service: [phone] Order Status: New Order   Verbal: Theresa Sequeira LPN            Patient Info:  Name: Robinson Medel  : 1930  Address:   [address] [place] [address]   [place] [address]  Phone: [phone]      Insurance Info:  Primary: Payor: UCARE / Plan: ARE MEDICARE / Product Type: MEDICARE ADVANTAGE /    Secondary: N/A N/A  [id number] - ([facility])      Physician Info:   Name:  Mei Sampson NP   Dept Address/Phones:   [address] [place] [address], [address]  [facility] [address] [phone]  Fax: [phone]    Lymphedema circumferential measurements (in cm):      Wound info:  Encounter Diagnoses   Name Primary?   ? Ulcer of left lower extremity, limited to breakdown of skin (H) Yes   ? Venous stasis dermatitis of both lower extremities    ? Scar condition and fibrosis of skin    ? Acquired lymphedema of leg    ? Swelling of limb    ? Diabetic peripheral neuropathy associated with type 2 diabetes mellitus (H)    ? Venous hypertension, chronic, bilateral    ? Ulcer of right lower extremity, limited to breakdown of skin (H)      VASC Wound 19 LEFT LEG (Active)   Pre Size Length 0.5 19 1400   Pre Size Width 0.3 19 1400   Pre Size Depth 0.1 19 1400   Pre Total Sq cm 0.15 19 1400   Undermined N 19 1000   Tunneling N 19 1000   Description intact blisters 19 1000       Pressure Ulcer Leg Left;Right (Active)       Pressure Ulcer 16 Buttocks Left Stage II (Active)       Wound 16 Non-pressure related ulcer Leg Left (Active)       Incision 16 Abdomen (Active)       Incision 16 Lap site Abdomen Mid (Active)       Incision 16 Surgical Abdomen Right (Active)      Drainage: Moderate  Thickness:  Full  Duration of Need: 30  Days Supply: 30  Start Date: 5/1/2020  Starter Kit: Ancillary Kit (saline, gloves, gauze)  Qualifying wound/Debridement Yes      Dressing Type Brand Size Number of pieces Frequency of change   Primary  Normal saline   500 ml  1 bottle twice week     Hibiclens    1 bottle twice week    gauze   4x4  2 loaf twice week   Secondary  Silvercel    max twice week     ABD pad   5x8  max twice week    Roller gauze    max  twice week     4 layer wrap compression  3M 4 layer wrap    max twice week   Tape  Paper tape    rolls Twice week     Patient has been evaluated via virtual visit due to patient's high risk of james COVID-19. New measurements may not be obtained due to this.     The wound(s) that were previously prescribed dressing(s) are still active. There are new wounds.     The patient is at or near exhaustion of supplies.     The wound care instructions are : dilute Hibiclens twice week, pad dry and apply silvercel into wound , covered with ABD pad then roll gauze.   Apply bilaterally 4 layer wrap.        Note: If total out of pocket is more than $50.00 please contact the patient before processing order.     OK to forward to covered supplier.     Electronically Signed Physician: Mei Sampson NP Date: 5/1/2020

## 2021-06-21 NOTE — LETTER
Letter by Mei Sampson NP at      Author: Mei Sampson NP Service: -- Author Type: --    Filed:  Encounter Date: 2020 Status: (Other)       2020    [facility]  Fax: [phone] Wound Dressing Rx and Order Form  Customer Service: [phone] Order Status: New Order   Verbal: Cassia             Patient Info:  Name: Robinson Medel  : 1930  Address:   [address] [place] [address]   [facility] [address]  Phone: [phone]      Insurance Info:  Primary: Payor: UCARE / Plan: UCARE MEDICARE / Product Type: MEDICARE ADVANTAGE /    Secondary: N/A N/A  [id number] - ([facility])      Physician Info:   Name:  Mei Sampson NP   Dept Address/Phones:   [address] [place] [address], [address]  [facility] [address] [phone]  Fax: [phone]    Lymphedema circumferential measurements (in cm):  No data recorded  No data recorded  No data recorded  No data recorded  No data recorded  No data recorded  No data recorded  No data recorded  Right just above MTP: 29.5    Right Ankle: 29    Right Widest Calf: 48.3    Right Thigh Up 10cm: 57  Right leg length: 43.5 cm  Left - just above MTP: 28.5    Left Ankle: 30.3    Left Widest Calf: 41.3    Left Thigh Up 10cm: 52.5  Left leg length: 43.5cm     Wound info:  Encounter Diagnoses   Name Primary?   ? Ulcer of left lower extremity, limited to breakdown of skin (H) Yes   ? Venous stasis dermatitis of both lower extremities    ? Scar condition and fibrosis of skin    ? Acquired lymphedema of leg    ? Swelling of limb    ? Diabetic peripheral neuropathy associated with type 2 diabetes mellitus (H)    ? Venous hypertension, chronic, bilateral    ? Ulcer of right lower extremity, limited to breakdown of skin (H)    ? Ulcer of lower extremity, unspecified laterality, unspecified ulcer stage (H)    ? Lymphedema    ? Venous stasis ulcer of right calf limited to breakdown of skin without varicose veins (H)      VASC Wound 20  left dorsal foot (Active)   Pre Size Length 0.6 11/30/20 1300   Pre Size Width 0.5 11/30/20 1300   Pre Size Depth 0.1 11/30/20 1300   Pre Total Sq cm 0.3 11/30/20 1300       VASC Wound 11/30/20 right shin (Active)   Pre Size Length 1.3 11/30/20 1300   Pre Size Width 1 11/30/20 1300   Pre Size Depth 0.1 11/30/20 1300   Pre Total Sq cm 1.3 11/30/20 1300       VASC Wound 11/30/20 right lateral leg (Active)   Pre Size Length 2.2 11/30/20 1300   Pre Size Width 4.3 11/30/20 1300   Pre Size Depth 0.1 11/30/20 1300   Pre Total Sq cm 9.46 11/30/20 1300       Drainage: Moderate  Thickness:  Full  Duration of Need: 30  Days Supply: 30  Start Date: 11/30/2020  Starter Kit: Ancillary Kit (saline, gloves, gauze)  Qualifying wound/Debridement Yes      Dressing Type Brand Size Number of pieces Frequency of change   Primary Silvercel  4.25''x4.25'' 4 twice a week    Square gauze   4''x4'' 2 loafs  twice a week   Secondary  Sof form roll gauze   4''x75'' 10 twice a week     4 layer compression system    16 boxes  twice a week    Tape Paper tape  1'' 2 rolls  twice a week   Secondary  Saline bottle   500mL  1 bottle twice weekly    Hibiclens   4oz 1 bottle twice a week    Compreflex lite velcro compression  sigvaris  See leg measures  1 pair  Daily     Note: If total out of pocket is more than $50.00 please contact the patient before processing order.     OK to forward to covered supplier.     Electronically Signed Physician: Mei Sampson NP Date: 11/30/2020

## 2021-06-21 NOTE — PROGRESS NOTES
Date of Service: 9/24/2018    Provider's initial consult visit:  5/17/2018    Chief Complaint: leg wound      History:   Patient presents to clinic in regards to bilateral lower leg ulcers. He is having 2-layer wrap applied weekly in our clinic.  His ulcers have healed and his swelling is down.      Current Outpatient Prescriptions   Medication Sig Dispense Refill     acetaminophen (TYLENOL) 325 MG tablet Take 2 tablets (650 mg total) by mouth every 4 (four) hours as needed.  0     atenolol (TENORMIN) 50 MG tablet Take 50 mg by mouth.       atorvastatin (LIPITOR) 10 MG tablet Take 10 mg by mouth every morning.        benzonatate (TESSALON) 100 MG capsule Take 1 capsule (100 mg total) by mouth 3 (three) times a day as needed for cough.  0     ferrous gluconate 325 mg (36 mg iron) Tab Take 325 mg by mouth daily.        furosemide (LASIX) 40 MG tablet Take 40 mg by mouth 2 (two) times a day.       glipiZIDE (GLUCOTROL XL) 5 MG 24 hr tablet Take 5 mg by mouth daily with breakfast.        Lactobacillus acidoph-L.bulgar (LACTINEX) 100 million cell packet Take 1 packet by mouth 3 (three) times a day with meals.  0     LANTUS SOLOSTAR 100 unit/mL (3 mL) pen Inject 20 Units under the skin at bedtime. 3 mL PRN     omeprazole (PRILOSEC) 20 MG capsule Take 20 mg by mouth daily.        potassium chloride (KAYCIEL) 20 mEq/15 mL solution Take 20 mEq by mouth daily.        sotalol (BETAPACE) 80 MG tablet Take 80 mg by mouth 2 (two) times a day.        vitamin A-vitamin C-vit E-min (OCUVITE) Tab tablet Take 1 tablet by mouth daily.        warfarin (COUMADIN) 2 MG tablet Take 1.5 tablets (3 mg total) by mouth daily.  0     warfarin (COUMADIN) 5 MG tablet 6 MG WED SAT, 5 MG OTHER DAYS ONCE A DAY ORALLY  3     No current facility-administered medications for this visit.        No Known Allergies    Physical Exam:    Vitals:    10/15/18 0746   BP: 126/84   Pulse: 76   Temp: 97.8  F (36.6  C)    There is no height or weight on file to  calculate BMI.    General:  88 y.o. male in no apparent distress.    Head:  Normocephalic atraumatic  Psychiatric: Cooperative.   Respiratory: unlabored breathing.  Cardiovascular-Lower extremity: Edema: severe in right leg.    Vasc Edema 8/29/2018 9/13/2018 9/24/2018 10/11/2018 10/15/2018   Right just above MTP 26.6 25 27.5 26.8 26.4   Right Ankle 24.6 23.5 28.9 27.5 25.4   Right Widest Calf 37.4 40.2 43.6 38.5 35   Right Thigh Up 10cm 53.2 52.2 - - -   Left - just above MTP 26.1 24 26.2 27 25.8   Left Ankle 24.5 25.2 25.6 22.5 25   Left Widest Calf 35.4 36.2 40.7 34 33.8   Left Thigh Up 10cm 53.4 51.5 - - -       Integumentary:      Right leg Ulceration(s): Resolved    Assessment:    Images:   Study Result   BILATERAL LOWER EXTREMITY VENOUS DUPLEX ULTRASOUND WITH INSUFFICIENCY STUDY  4/8/2016 2:50 PM     INDICATIONS: Bilateral leg pain and swelling. Lower leg ulcers.     TECHNIQUE: Venous duplex ultrasound with gray-scale images, graded compression, and color-flow, Doppler, and spectral analysis. Abnormal reflux is defined as 500 msec for superficial veins and 1 sec for deep veins.  COMPARISONS: None.     FINDINGS: The right greater saphenous vein is incompetent, with abnormal sustained reflux from the saphenofemoral junction to the mid calf. This vein measures between 12 mm in diameter at saphenofemoral junction and 3 mm in the distal calf. No abnormal   reflux is detected in the right lesser saphenous vein.     The left greater saphenous vein is incompetent, with abnormal sustained reflux from the saphenofemoral junction to the distal calf. This vein measures between 9 mm in diameter at saphenofemoral junction and 5 mm in the distal calf. No abnormal reflux is   detected in the left lesser saphenous vein.     There is abnormal deep venous reflux bilaterally, involving the right common femoral and popliteal veins and the left common femoral and superficial femoral veins. No incompetent perforating veins are  identified.     There is no evidence for deep vein thrombosis. There is normal flow and compressibility in the femoral, popliteal, and posterior tibial veins.     CONCLUSIONS:   1.  Incompetent right greater saphenous vein.  2.  Incompetent left greater saphenous vein.       Labs:    The following labs were reviewed by me today.    Lab Results   Component Value Date    WBC 6.6 02/22/2018    HGB 12.4 (L) 02/22/2018    HCT 38.7 (L) 02/22/2018    MCV 95 02/22/2018     02/22/2018               Invalid input(s): EOSPC    Lab Results   Component Value Date    CREATININE 0.92 03/09/2018         Lab Results   Component Value Date    BUN 23 03/09/2018     No results found for: PREALBUMINESUFAST(LABPROT,LABALBU,albumin)@  Invalid input(s): LABALBU  No results found for: PREALBUMIN    No results found for: CRP  No results found for: SEDRATE    Lab Results   Component Value Date    HGBA1C 7.7 (H) 02/02/2018     Lab Results   Component Value Date    TSH 2.67 06/22/2016           No results found for: [id number]  Lab Results   Component Value Date     (H) 03/01/2018        1. Leg ulcer, right, limited to breakdown of skin (H)  Change dressing   2. Venous stasis dermatitis of both lower extremities     3. Venous hypertension, chronic, bilateral         A new wound was identified today: yes    Plan:    1.  Right leg ulcer, resolved.     2.  Lymphedema, swelling is down.    3.  Diabetes, A1c, 7.7 on 2/2/2018.  Managed by primary    4.  Treatment:  Will discontinue compression wraps.  Will transition into his velcro compression wraps.    5.   Patient will follow up with me in 2 months.    Autumn Burnett, APRN, CNP,  CWCN  [facility]  [phone]

## 2021-06-22 NOTE — PROGRESS NOTES
Date of Service: 9/24/2018    Provider's initial consult visit:  5/17/2018    Chief Complaint: leg wound      History:   Patient presents to clinic in regards to his swelling and open areas on his bilateral legs.  He was last seen on 11/30/2018 when viscopaste and ABD was applied over the open areas and he compression was slowly increased.  Today, his leg swelling is mostly resolved and the open area is now small.  It remains tender to touch.      Current Outpatient Medications   Medication Sig Dispense Refill     acetaminophen (TYLENOL) 325 MG tablet Take 2 tablets (650 mg total) by mouth every 4 (four) hours as needed.  0     acetic acid 0.25 % irrigation Wash wounds and surrounding skin daily with the acetic acid solution; do NOT rinse. 1000 mL 0     atenolol (TENORMIN) 50 MG tablet Take 50 mg by mouth.       atorvastatin (LIPITOR) 10 MG tablet Take 10 mg by mouth every morning.        benzonatate (TESSALON) 100 MG capsule Take 1 capsule (100 mg total) by mouth 3 (three) times a day as needed for cough.  0     ferrous gluconate 325 mg (36 mg iron) Tab Take 325 mg by mouth daily.        furosemide (LASIX) 40 MG tablet Take 40 mg by mouth 2 (two) times a day.       gentamicin (GARAMYCIN) 0.1 % ointment Apply topically to wound daily. 30 g 0     glipiZIDE (GLUCOTROL XL) 5 MG 24 hr tablet Take 5 mg by mouth daily with breakfast.        insulin aspart U-100 (NOVOLOG U-100 INSULIN ASPART) 100 unit/mL injection Inject per sliding scale four times daily.   Indications: Type 2 Diabetes       LANTUS SOLOSTAR 100 unit/mL (3 mL) pen Inject 20 Units under the skin at bedtime. (Patient taking differently: Inject 24 Units under the skin at bedtime .      ) 3 mL PRN     multivitamin with minerals tablet Take 1 tablet by mouth.       omeprazole (PRILOSEC) 20 MG capsule Take 20 mg by mouth daily.        potassium chloride (KAYCIEL) 20 mEq/15 mL solution Take 20 mEq by mouth daily.        senna-docusate (PERICOLACE) 8.6-50 mg  tablet Take 2 tablets by mouth.       vitamin A-vitamin C-vit E-min (OCUVITE) Tab tablet Take 1 tablet by mouth daily.        warfarin (COUMADIN/JANTOVEN) 2 MG tablet Take 5 mg by mouth .             Current Facility-Administered Medications   Medication Dose Route Frequency Provider Last Rate Last Dose     lidocaine 2 % jelly (XYLOCAINE)   Topical PRN Lex, Autumn Blackwood, CNP   1 application at 11/29/18 0818       No Known Allergies    Physical Exam:    Vitals:    12/06/18 1023   BP: 110/74   Pulse: 68   Resp: 20   Temp: 97.5  F (36.4  C)    There is no height or weight on file to calculate BMI.    General:  88 y.o. male in no apparent distress.    Head:  Normocephalic atraumatic  Psychiatric: Cooperative.   Respiratory: unlabored breathing.  Cardiovascular-Lower extremity: Edema: severe in legs bilaterally.    Vasc Edema 9/13/2018 9/24/2018 10/11/2018 10/15/2018 11/29/2018   Right just above MTP 25 27.5 26.8 26.4 28.2   Right Ankle 23.5 28.9 27.5 25.4 28.8   Right Widest Calf 40.2 43.6 38.5 35 46.5   Right Thigh Up 10cm 52.2 - - - -   Left - just above MTP 24 26.2 27 25.8 28.4   Left Ankle 25.2 25.6 22.5 25 28.3   Left Widest Calf 36.2 40.7 34 33.8 41.6   Left Thigh Up 10cm 51.5 - - - -       Integumentary:         Left Leg Ulceration(s):     Wound bed: %100 granulation          Undermining no, Tunneling no   Wound Edge: attached   Periwound:  There is no cyrus wound erythema, induration, maceration, denudement fluctuance or warmth surrounding the ulcer(s).  Exudate: serosanguinous          Quantity: moderate  Odor:  absent   Wound Shape irregular    Assessment:    Images:   Study Result   BILATERAL LOWER EXTREMITY VENOUS DUPLEX ULTRASOUND WITH INSUFFICIENCY STUDY  4/8/2016 2:50 PM     INDICATIONS: Bilateral leg pain and swelling. Lower leg ulcers.     TECHNIQUE: Venous duplex ultrasound with gray-scale images, graded compression, and color-flow, Doppler, and spectral analysis. Abnormal reflux is defined as 500  msec for superficial veins and 1 sec for deep veins.  COMPARISONS: None.     FINDINGS: The right greater saphenous vein is incompetent, with abnormal sustained reflux from the saphenofemoral junction to the mid calf. This vein measures between 12 mm in diameter at saphenofemoral junction and 3 mm in the distal calf. No abnormal   reflux is detected in the right lesser saphenous vein.     The left greater saphenous vein is incompetent, with abnormal sustained reflux from the saphenofemoral junction to the distal calf. This vein measures between 9 mm in diameter at saphenofemoral junction and 5 mm in the distal calf. No abnormal reflux is   detected in the left lesser saphenous vein.     There is abnormal deep venous reflux bilaterally, involving the right common femoral and popliteal veins and the left common femoral and superficial femoral veins. No incompetent perforating veins are identified.     There is no evidence for deep vein thrombosis. There is normal flow and compressibility in the femoral, popliteal, and posterior tibial veins.     CONCLUSIONS:   1.  Incompetent right greater saphenous vein.  2.  Incompetent left greater saphenous vein.       Labs:    The following labs were reviewed by me today.    Lab Results   Component Value Date    WBC 6.6 02/22/2018    HGB 12.4 (L) 02/22/2018    HCT 38.7 (L) 02/22/2018    MCV 95 02/22/2018     02/22/2018               Invalid input(s): EOSPC    Lab Results   Component Value Date    CREATININE 1.00 11/21/2018         Lab Results   Component Value Date    BUN 17 11/21/2018     No results found for: PREALBUMINESUFAST(LABPROT,LABALBU,albumin)@  Invalid input(s): LABALBU  No results found for: PREALBUMIN    No results found for: CRP  No results found for: SEDRATE    Lab Results   Component Value Date    HGBA1C 7.7 (H) 02/02/2018     Lab Results   Component Value Date    TSH 2.67 06/22/2016           No results found for: [id number]  Lab Results   Component Value  Date     (H) 11/29/2018        1. Ulcer of left lower extremity, limited to breakdown of skin (H)     2. Lymphedema     3. Venous stasis dermatitis of both lower extremities         A new wound was identified today: no    Plan:    1.  Debridement of the left leg ulcer was recommended.  After consent was obtained and topical 2% Xylocaine was applied under clean conditions, and using a #15 blade,the devitalized dermal tissue was debrided for a total square centimeters of 10. Following debridement, there was a decrease in the nonviable tissue. The patient tolerated the procedure without any difficulty.       2.  Right leg ulcer, resolved    3.  Left leg ulcer, no signs of infection.  Improved    4.  Lymphedema, improved.    5.  Heart failure, per primary provider    6.  Diabetes, A1c, 7.7 on 2/2/2018.  Managed by primary    7.  Treatment:  Will discontinue viscopaste and start Mepilex border over the open area.  He will resume his velcro compression wraps.    8.   Patient will follow up with me in three weeks    Autumn Burnett, APRN, CNP,  [facility] [place]  [phone]

## 2021-06-22 NOTE — PROGRESS NOTES
Nurse Visit    Chief Complaint: Patient presents to clinic for assement, and treatment of their ulcer and and swelling    Dressing on Arrival  2 layer wrap, one inch roll down.     Allergies:   Allergies   Allergen Reactions     Aspirin Nausea And Vomiting     GI upset       Medications:   Current Outpatient Medications:      acetaminophen (TYLENOL) 325 MG tablet, Take 2 tablets (650 mg total) by mouth every 4 (four) hours as needed., Disp: , Rfl: 0     acetic acid 0.25 % irrigation, Wash wounds and surrounding skin daily with the acetic acid solution; do NOT rinse., Disp: 1000 mL, Rfl: 0     atenolol (TENORMIN) 50 MG tablet, Take 50 mg by mouth., Disp: , Rfl:      atorvastatin (LIPITOR) 10 MG tablet, Take 10 mg by mouth every morning. , Disp: , Rfl:      benzonatate (TESSALON) 100 MG capsule, Take 1 capsule (100 mg total) by mouth 3 (three) times a day as needed for cough., Disp: , Rfl: 0     ferrous gluconate 325 mg (36 mg iron) Tab, Take 325 mg by mouth daily. , Disp: , Rfl:      furosemide (LASIX) 40 MG tablet, Take 40 mg by mouth 2 (two) times a day., Disp: , Rfl:      gentamicin (GARAMYCIN) 0.1 % ointment, Apply topically to wound daily., Disp: 30 g, Rfl: 0     glipiZIDE (GLUCOTROL XL) 5 MG 24 hr tablet, Take 5 mg by mouth daily with breakfast. , Disp: , Rfl:      insulin aspart U-100 (NOVOLOG U-100 INSULIN ASPART) 100 unit/mL injection, Inject per sliding scale four times daily.   Indications: Type 2 Diabetes, Disp: , Rfl:      LANTUS SOLOSTAR 100 unit/mL (3 mL) pen, Inject 20 Units under the skin at bedtime. (Patient taking differently: Inject 24 Units under the skin at bedtime .   ), Disp: 3 mL, Rfl: PRN     multivitamin with minerals tablet, Take 1 tablet by mouth., Disp: , Rfl:      omeprazole (PRILOSEC) 20 MG capsule, Take 20 mg by mouth daily. , Disp: , Rfl:      potassium chloride (KAYCIEL) 20 mEq/15 mL solution, Take 20 mEq by mouth daily. , Disp: , Rfl:      senna-docusate (PERICOLACE)  8.6-50 mg tablet, Take 2 tablets by mouth., Disp: , Rfl:      vitamin A-vitamin C-vit E-min (OCUVITE) Tab tablet, Take 1 tablet by mouth daily. , Disp: , Rfl:      warfarin (COUMADIN/JANTOVEN) 2 MG tablet, Take 5 mg by mouth .   , Disp: , Rfl:     Current Facility-Administered Medications:      lidocaine 2 % jelly (XYLOCAINE), , Topical, PRN, Lex, Autumn Blackwood, CNP, 1 application at 11/29/18 0818    Vital Signs: /72   Pulse 64   Temp 97.5  F (36.4  C) (Oral)   Resp 14       Assessment:    General:  Patient presents to clinic in no apparent distress.  Psychiatric:  Alert and oriented x3.   Lower extremity:  edema is present.    Integumentary:  Skin is uniformly warm, dry and pink.    Wound size:   Urethral Catheter 16 Fr. (Active)       VASC Wound 11/29/18 left lateral leg (Active)   Pre Size Length 2.5 12/6/2018 10:00 AM   Pre Size Width 4 12/6/2018 10:00 AM   Pre Size Depth 0.1 12/6/2018 10:00 AM   Pre Total Sq cm 10 12/6/2018 10:00 AM       VASC Wound 11/29/18 right medial leg (Active)   Pre Size Length 0.5 12/6/2018 10:00 AM   Pre Size Width 1 12/6/2018 10:00 AM   Pre Size Depth 0.1 12/6/2018 10:00 AM   Pre Total Sq cm 0.5 12/6/2018 10:00 AM       VASC Wound 11/29/18 right anterior leg (Active)   Pre Size Length 0.8 12/6/2018 10:00 AM   Pre Size Width 0.8 12/6/2018 10:00 AM   Pre Size Depth 0.1 12/6/2018 10:00 AM   Pre Total Sq cm 0.64 12/6/2018 10:00 AM       Pressure Ulcer Leg Left;Right (Active)       Pressure Ulcer 06/21/16 Buttocks Left Stage II (Active)       Wound 05/07/16 Non-pressure related ulcer Leg Left (Active)       Incision 06/18/16 Abdomen (Active)       Incision 07/08/16 Lap site Abdomen Mid (Active)       Incision 07/08/16 Surgical Abdomen Right (Active)      Undermining is not present.    The periwoundskin is WNL      Plan:         1. Patient will  follow up in one week.         2. Treatment provided will I nclude irrigation and dressings to promote autolytic debridement and will be  as listed below     Cleansed with: remedy skin cleanser    Protected skin with: Remedy Skin Repair    Dressings Applied: viscopaste and roll gauze.    Compression Applied to the Left Le-Layer [place]    Compression Applied to the Right Le-Layer [place]    Offloading applied: None    Trial Products: no  Provider notified regarding concerns: no  Treatment Changes: no    Educational Barriers: none  Taught Regarding: Acitivity, Compression and Dressing  Teaching Method: Exaplanation and DC sheets.

## 2021-06-22 NOTE — PROGRESS NOTES
Date of Service: 9/24/2018    Provider's initial consult visit:  5/17/2018    Chief Complaint: leg wound      History:   Patient presents to clinic in regards to his swelling and open areas on his bilateral legs.  He was last seen on 12/6/2018 when viscopaste was stopped and Mepilex with velcro compression wraps started.  He is having no difficulties, but his legs remain swollen.      Current Outpatient Medications   Medication Sig Dispense Refill     acetaminophen (TYLENOL) 325 MG tablet Take 2 tablets (650 mg total) by mouth every 4 (four) hours as needed.  0     acetic acid 0.25 % irrigation Wash wounds and surrounding skin daily with the acetic acid solution; do NOT rinse. 1000 mL 0     atenolol (TENORMIN) 50 MG tablet Take 50 mg by mouth.       atorvastatin (LIPITOR) 10 MG tablet Take 10 mg by mouth every morning.        benzonatate (TESSALON) 100 MG capsule Take 1 capsule (100 mg total) by mouth 3 (three) times a day as needed for cough.  0     ferrous gluconate 325 mg (36 mg iron) Tab Take 325 mg by mouth daily.        furosemide (LASIX) 40 MG tablet Take 40 mg by mouth 2 (two) times a day.       gentamicin (GARAMYCIN) 0.1 % ointment Apply topically to wound daily. 30 g 0     glipiZIDE (GLUCOTROL XL) 5 MG 24 hr tablet Take 5 mg by mouth daily with breakfast.        insulin aspart U-100 (NOVOLOG U-100 INSULIN ASPART) 100 unit/mL injection Inject per sliding scale four times daily.   Indications: Type 2 Diabetes       LANTUS SOLOSTAR 100 unit/mL (3 mL) pen Inject 20 Units under the skin at bedtime. (Patient taking differently: Inject 24 Units under the skin at bedtime .      ) 3 mL PRN     multivitamin with minerals tablet Take 1 tablet by mouth.       omeprazole (PRILOSEC) 20 MG capsule Take 20 mg by mouth daily.        potassium chloride (KAYCIEL) 20 mEq/15 mL solution Take 20 mEq by mouth daily.        senna-docusate (PERICOLACE) 8.6-50 mg tablet Take 2 tablets by mouth.       vitamin A-vitamin C-vit  E-min (OCUVITE) Tab tablet Take 1 tablet by mouth daily.        warfarin (COUMADIN/JANTOVEN) 2 MG tablet Take 5 mg by mouth .             Current Facility-Administered Medications   Medication Dose Route Frequency Provider Last Rate Last Dose     lidocaine 2 % jelly (XYLOCAINE)   Topical PRN Lex Autumn Blackwood, CNP   1 application at 11/29/18 0818       No Known Allergies    Physical Exam:    Vitals:    12/13/18 0842   BP: 122/82   Pulse: 80   Temp: 97.8  F (36.6  C)    There is no height or weight on file to calculate BMI.    General:  88 y.o. male in no apparent distress.    Head:  Normocephalic atraumatic  Psychiatric: Cooperative.   Respiratory: unlabored breathing.  Cardiovascular-Lower extremity: Edema: severe in legs bilaterally.    Vasc Edema 9/24/2018 10/11/2018 10/15/2018 11/29/2018 12/13/2018   Right just above MTP 27.5 26.8 26.4 28.2 27   Right Ankle 28.9 27.5 25.4 28.8 27.5   Right Widest Calf 43.6 38.5 35 46.5 39.5   Right Thigh Up 10cm - - - - -   Left - just above MTP 26.2 27 25.8 28.4 26.4   Left Ankle 25.6 22.5 25 28.3 28.4   Left Widest Calf 40.7 34 33.8 41.6 -   Left Thigh Up 10cm - - - - -       Integumentary:         Left Leg Ulceration(s):     Wound bed: %100 granulation          Undermining no, Tunneling no   Wound Edge: attached   Periwound:  There is no cyrus wound erythema, induration, maceration, denudement fluctuance or warmth surrounding the ulcer(s).  Exudate: serosanguinous          Quantity: moderate  Odor:  absent   Wound Shape irregular    Assessment:    Images:   Study Result   BILATERAL LOWER EXTREMITY VENOUS DUPLEX ULTRASOUND WITH INSUFFICIENCY STUDY  4/8/2016 2:50 PM     INDICATIONS: Bilateral leg pain and swelling. Lower leg ulcers.     TECHNIQUE: Venous duplex ultrasound with gray-scale images, graded compression, and color-flow, Doppler, and spectral analysis. Abnormal reflux is defined as 500 msec for superficial veins and 1 sec for deep veins.  COMPARISONS:  None.     FINDINGS: The right greater saphenous vein is incompetent, with abnormal sustained reflux from the saphenofemoral junction to the mid calf. This vein measures between 12 mm in diameter at saphenofemoral junction and 3 mm in the distal calf. No abnormal   reflux is detected in the right lesser saphenous vein.     The left greater saphenous vein is incompetent, with abnormal sustained reflux from the saphenofemoral junction to the distal calf. This vein measures between 9 mm in diameter at saphenofemoral junction and 5 mm in the distal calf. No abnormal reflux is   detected in the left lesser saphenous vein.     There is abnormal deep venous reflux bilaterally, involving the right common femoral and popliteal veins and the left common femoral and superficial femoral veins. No incompetent perforating veins are identified.     There is no evidence for deep vein thrombosis. There is normal flow and compressibility in the femoral, popliteal, and posterior tibial veins.     CONCLUSIONS:   1.  Incompetent right greater saphenous vein.  2.  Incompetent left greater saphenous vein.       Labs:    The following labs were reviewed by me today.    Lab Results   Component Value Date    WBC 6.6 02/22/2018    HGB 12.4 (L) 02/22/2018    HCT 38.7 (L) 02/22/2018    MCV 95 02/22/2018     02/22/2018               Invalid input(s): EOSPC    Lab Results   Component Value Date    CREATININE 1.00 11/21/2018         Lab Results   Component Value Date    BUN 17 11/21/2018     No results found for: PREALBUMINESUFAST(LABPROT,LABALBU,albumin)@  Invalid input(s): LABALBU  No results found for: PREALBUMIN    No results found for: CRP  No results found for: SEDRATE    Lab Results   Component Value Date    HGBA1C 7.7 (H) 02/02/2018     Lab Results   Component Value Date    TSH 2.67 06/22/2016           No results found for: [id number]  Lab Results   Component Value Date     (H) 11/29/2018        1. Ulcer of left lower  extremity, limited to breakdown of skin (H)     2. Lymphedema     3. Venous stasis dermatitis of both lower extremities     4. Type 2 diabetes mellitus with complication, unspecified whether long term insulin use (H)         A new wound was identified today: no    Plan:    1.  No debridement      2.  Right leg ulcer, resolved    3.  Left leg ulcer, resolved    4.  Lymphedema, worse    5.  Heart failure, per primary provider    6.  Diabetes, A1c, 7.7 on 2/2/2018.  Managed by primary    7.  Treatment:  Will restart viscopaste and an ABD over the open area.     8.   Patient will follow up with a weekly for a velcro compression wraps.  In three weeks, the nurse should measure his legs and transition him into velcro compression wraps, which he will bring with him.  He will follow up with me in one month.    Autumn Burnett, APRN, CNP,  [facility]  [phone]

## 2021-06-22 NOTE — PROGRESS NOTES
Nurse Visit    Chief Complaint: Patient presents to clinic for assement, and treatment of their swelling    Dressing on Arrival 2 layer intact    Allergies:   Allergies   Allergen Reactions     Aspirin Nausea And Vomiting     GI upset       Medications:   Current Outpatient Medications:      acetaminophen (TYLENOL) 325 MG tablet, Take 2 tablets (650 mg total) by mouth every 4 (four) hours as needed., Disp: , Rfl: 0     acetic acid 0.25 % irrigation, Wash wounds and surrounding skin daily with the acetic acid solution; do NOT rinse., Disp: 1000 mL, Rfl: 0     atenolol (TENORMIN) 50 MG tablet, Take 50 mg by mouth., Disp: , Rfl:      atorvastatin (LIPITOR) 10 MG tablet, Take 10 mg by mouth every morning. , Disp: , Rfl:      benzonatate (TESSALON) 100 MG capsule, Take 1 capsule (100 mg total) by mouth 3 (three) times a day as needed for cough., Disp: , Rfl: 0     ferrous gluconate 325 mg (36 mg iron) Tab, Take 325 mg by mouth daily. , Disp: , Rfl:      furosemide (LASIX) 40 MG tablet, Take 40 mg by mouth 2 (two) times a day., Disp: , Rfl:      gentamicin (GARAMYCIN) 0.1 % ointment, Apply topically to wound daily., Disp: 30 g, Rfl: 0     glipiZIDE (GLUCOTROL XL) 5 MG 24 hr tablet, Take 5 mg by mouth daily with breakfast. , Disp: , Rfl:      insulin aspart U-100 (NOVOLOG U-100 INSULIN ASPART) 100 unit/mL injection, Inject per sliding scale four times daily.   Indications: Type 2 Diabetes, Disp: , Rfl:      LANTUS SOLOSTAR 100 unit/mL (3 mL) pen, Inject 20 Units under the skin at bedtime. (Patient taking differently: Inject 24 Units under the skin at bedtime .   ), Disp: 3 mL, Rfl: PRN     multivitamin with minerals tablet, Take 1 tablet by mouth., Disp: , Rfl:      omeprazole (PRILOSEC) 20 MG capsule, Take 20 mg by mouth daily. , Disp: , Rfl:      potassium chloride (KAYCIEL) 20 mEq/15 mL solution, Take 20 mEq by mouth daily. , Disp: , Rfl:      senna-docusate (PERICOLACE) 8.6-50 mg tablet, Take 2 tablets by  mouth., Disp: , Rfl:      vitamin A-vitamin C-vit E-min (OCUVITE) Tab tablet, Take 1 tablet by mouth daily. , Disp: , Rfl:      warfarin (COUMADIN/JANTOVEN) 2 MG tablet, Take 5 mg by mouth .   , Disp: , Rfl:     Current Facility-Administered Medications:      lidocaine 2 % jelly (XYLOCAINE), , Topical, PRN, Lex, Autumn Blackwood, CNP, 1 application at 11/29/18 0818    Vital Signs: /84 (Patient Site: Right Arm, Patient Position: Sitting)   Pulse 68   Temp 97.8  F (36.6  C) (Oral)       Assessment:    General:  Patient presents to clinic in no apparent distress.  Psychiatric:  Alert and oriented x3.   Lower extremity:  edema slight edema in ankles present.    Integumentary:  Skin is uniformly warm, dry and pink.    Wound size:   Urethral Catheter 16 Fr. (Active)       VASC Wound 11/29/18 left lateral leg (Active)   Pre Size Length 2.5 12/6/2018 10:00 AM   Pre Size Width 4 12/6/2018 10:00 AM   Pre Size Depth 0.1 12/6/2018 10:00 AM   Pre Total Sq cm 10 12/6/2018 10:00 AM       VASC Wound 11/29/18 right medial leg (Active)   Pre Size Length 0.5 12/6/2018 10:00 AM   Pre Size Width 1 12/6/2018 10:00 AM   Pre Size Depth 0.1 12/6/2018 10:00 AM   Pre Total Sq cm 0.5 12/6/2018 10:00 AM       VASC Wound 11/29/18 right anterior leg (Active)   Pre Size Length 0.8 12/6/2018 10:00 AM   Pre Size Width 0.8 12/6/2018 10:00 AM   Pre Size Depth 0.1 12/6/2018 10:00 AM   Pre Total Sq cm 0.64 12/6/2018 10:00 AM       Pressure Ulcer Leg Left;Right (Active)       Pressure Ulcer 06/21/16 Buttocks Left Stage II (Active)       Wound 05/07/16 Non-pressure related ulcer Leg Left (Active)       Incision 06/18/16 Abdomen (Active)       Incision 07/08/16 Lap site Abdomen Mid (Active)       Incision 07/08/16 Surgical Abdomen Right (Active)      Undermining is not present.    The periwoundskin is WNL      Plan:         1. Patient will in 1 weeks         2. Treatment provided will include irrigation and dressings to promote autolytic debridement  "and will be as listed below     Cleansed with: remedy cleansing body lotion    Protected skin with: Remedy Skin Repair    Dressings Applied: none    Compression Applied to the Left Leg: Velcro\", Compression Stockings and tubigrip    Compression Applied to the Right Leg: Velcro\", Compression Stockings, and tubi      Offloading applied: none    Trial Products: no  Provider notified regarding concerns: no  Treatment Changes: no    Educational Barriers: visually impaired  Taught Regarding: Acitivity and Compression  Teaching Method: Exaplanation and Demo     Pt will follow up with provider in 1 week, educated patient  In using compression stockings or tubi  under velcro wraps.      "

## 2021-06-22 NOTE — PROGRESS NOTES
Date of Service: 9/24/2018    Provider's initial consult visit:  5/17/2018    Chief Complaint: leg wound      History:   Patient presents to clinic in regards to his swelling and open areas on his bilateral legs.  This past week his legs became swollen and he developed blisters that opened.  He reports that he is wearing his velcro compression wraps, but is not applying them because they are painful.  He states that prior to his legs swelling, he wore his velcro compression wraps on most days.  He is not applying any dressings to his legs.    Current Outpatient Medications   Medication Sig Dispense Refill     atenolol (TENORMIN) 50 MG tablet Take 50 mg by mouth.       atorvastatin (LIPITOR) 10 MG tablet Take 10 mg by mouth every morning.        ferrous gluconate 325 mg (36 mg iron) Tab Take 325 mg by mouth daily.        furosemide (LASIX) 40 MG tablet Take 40 mg by mouth 2 (two) times a day.       glipiZIDE (GLUCOTROL XL) 5 MG 24 hr tablet Take 5 mg by mouth daily with breakfast.        LANTUS SOLOSTAR 100 unit/mL (3 mL) pen Inject 20 Units under the skin at bedtime. (Patient taking differently: Inject 24 Units under the skin at bedtime .      ) 3 mL PRN     multivitamin with minerals tablet Take 1 tablet by mouth.       omeprazole (PRILOSEC) 20 MG capsule Take 20 mg by mouth daily.        potassium chloride (KAYCIEL) 20 mEq/15 mL solution Take 20 mEq by mouth daily.        warfarin (COUMADIN/JANTOVEN) 2 MG tablet Take 5 mg by mouth .             acetaminophen (TYLENOL) 325 MG tablet Take 2 tablets (650 mg total) by mouth every 4 (four) hours as needed.  0     benzonatate (TESSALON) 100 MG capsule Take 1 capsule (100 mg total) by mouth 3 (three) times a day as needed for cough.  0     insulin aspart U-100 (NOVOLOG U-100 INSULIN ASPART) 100 unit/mL injection Inject per sliding scale four times daily.   Indications: Type 2 Diabetes       senna-docusate (PERICOLACE) 8.6-50 mg tablet Take 2 tablets by mouth.        sotalol (BETAPACE) 80 MG tablet Take 80 mg by mouth.       vitamin A-vitamin C-vit E-min (OCUVITE) Tab tablet Take 1 tablet by mouth daily.        Current Facility-Administered Medications   Medication Dose Route Frequency Provider Last Rate Last Dose     lidocaine 2 % jelly (XYLOCAINE)   Topical PRN Autumn Blackwood CHAVA Burnett   1 application at 11/29/18 0818       No Known Allergies    Physical Exam:    Vitals:    11/29/18 0807   BP: 124/82   Pulse: 64   Resp: 18   Temp: 97.4  F (36.3  C)    Body mass index is 29.82 kg/m .    General:  88 y.o. male in no apparent distress.    Head:  Normocephalic atraumatic  Psychiatric: Cooperative.   Respiratory: unlabored breathing.  Cardiovascular-Lower extremity: Edema: severe in legs bilaterally.    Vasc Edema 9/13/2018 9/24/2018 10/11/2018 10/15/2018 11/29/2018   Right just above MTP 25 27.5 26.8 26.4 28.2   Right Ankle 23.5 28.9 27.5 25.4 28.8   Right Widest Calf 40.2 43.6 38.5 35 46.5   Right Thigh Up 10cm 52.2 - - - -   Left - just above MTP 24 26.2 27 25.8 28.4   Left Ankle 25.2 25.6 22.5 25 28.3   Left Widest Calf 36.2 40.7 34 33.8 41.6   Left Thigh Up 10cm 51.5 - - - -       Integumentary:      Right Leg Ulceration(s):     Wound bed: %100 granulation          Undermining no, Tunneling no   Wound Edge: attached   Periwound:  There is no cyrus wound erythema, induration, maceration, denudement fluctuance or warmth surrounding the ulcer(s).  Exudate: clear          Quantity: moderate  Odor:  absent   Wound Shape irregular       Left Leg Ulceration(s):     Wound bed: %100 granulation          Undermining no, Tunneling no   Wound Edge: attached   Periwound:  There is no cyrus wound erythema, induration, maceration, denudement fluctuance or warmth surrounding the ulcer(s).  Exudate: clear            Quantity: moderate  Odor:  absent   Wound Shape irregular    Assessment:    Images:   Study Result   BILATERAL LOWER EXTREMITY VENOUS DUPLEX ULTRASOUND WITH INSUFFICIENCY  STUDY  4/8/2016 2:50 PM     INDICATIONS: Bilateral leg pain and swelling. Lower leg ulcers.     TECHNIQUE: Venous duplex ultrasound with gray-scale images, graded compression, and color-flow, Doppler, and spectral analysis. Abnormal reflux is defined as 500 msec for superficial veins and 1 sec for deep veins.  COMPARISONS: None.     FINDINGS: The right greater saphenous vein is incompetent, with abnormal sustained reflux from the saphenofemoral junction to the mid calf. This vein measures between 12 mm in diameter at saphenofemoral junction and 3 mm in the distal calf. No abnormal   reflux is detected in the right lesser saphenous vein.     The left greater saphenous vein is incompetent, with abnormal sustained reflux from the saphenofemoral junction to the distal calf. This vein measures between 9 mm in diameter at saphenofemoral junction and 5 mm in the distal calf. No abnormal reflux is   detected in the left lesser saphenous vein.     There is abnormal deep venous reflux bilaterally, involving the right common femoral and popliteal veins and the left common femoral and superficial femoral veins. No incompetent perforating veins are identified.     There is no evidence for deep vein thrombosis. There is normal flow and compressibility in the femoral, popliteal, and posterior tibial veins.     CONCLUSIONS:   1.  Incompetent right greater saphenous vein.  2.  Incompetent left greater saphenous vein.       Labs:    The following labs were reviewed by me today.    Lab Results   Component Value Date    WBC 6.6 02/22/2018    HGB 12.4 (L) 02/22/2018    HCT 38.7 (L) 02/22/2018    MCV 95 02/22/2018     02/22/2018               Invalid input(s): EOS    Lab Results   Component Value Date    CREATININE 1.00 11/21/2018         Lab Results   Component Value Date    BUN 17 11/21/2018     No results found for: PREALBUMINESUFAST(LABPROT,LABALBU,albumin)@  Invalid input(s): LABALBU  No results found for: PREALBUMIN    No  results found for: CRP  No results found for: SEDRATE    Lab Results   Component Value Date    HGBA1C 7.7 (H) 02/02/2018     Lab Results   Component Value Date    TSH 2.67 06/22/2016           No results found for: [id number]  Lab Results   Component Value Date     (H) 11/21/2018        1. Venous stasis dermatitis of both lower extremities     2. Lymphedema     3. Anemia, unspecified type         A new wound was identified today: yes, bilateral leg ulcers    Plan:    1.  No debridement secondary to pain with touch.       2.  Right leg ulcer, no signs of infection    3.  Left leg ulcer, no signs of infection     4.  Lymphedema, significantly worse    5.  Heart failure, per primary provider, he will obtain a BNP today.    6.  Diabetes, A1c, 7.7 on 2/2/2018.  Managed by primary    7.  Treatment:  Will apply viscopaste over the open areas and cover with an ABD.  A single layer of Tubigrip will be applied.  Tomorrow, he will have a nurse change his dressings because I anticipate large amount of drainage.  Next week Tuesday, he will have a nurse visit to change his dressing and apply a double layer of Tubigrip.  At his next visit with me, I will consider a 2-layer wrap.  I am progressively increasing his compression slowly to prevent fluid overload.    8.   Patient will follow up with me in one week    BRENDON Le, CNP,  [facility]  [phone]

## 2021-06-23 NOTE — PROGRESS NOTES
Date of Service: 9/24/2018    Provider's initial consult visit:  5/17/2018    Chief Complaint: leg wound      History:   Patient presents to clinic in regards to his swelling and open areas on his bilateral legs.  He was last seen on 12/13/2018 when a 2-layer wrap was applied weekly.  He was transitioned into his velcro compression wraps last week and his swelling is remaining stable.  He continues to wearing them and is not having any difficulty.    Current Outpatient Medications   Medication Sig Dispense Refill     acetaminophen (TYLENOL) 325 MG tablet Take 2 tablets (650 mg total) by mouth every 4 (four) hours as needed.  0     atenolol (TENORMIN) 50 MG tablet Take 50 mg by mouth.       atorvastatin (LIPITOR) 10 MG tablet Take 10 mg by mouth every morning.        benzonatate (TESSALON) 100 MG capsule Take 1 capsule (100 mg total) by mouth 3 (three) times a day as needed for cough.  0     ferrous gluconate 325 mg (36 mg iron) Tab Take 325 mg by mouth daily.        furosemide (LASIX) 40 MG tablet Take 40 mg by mouth 2 (two) times a day.       glipiZIDE (GLUCOTROL XL) 5 MG 24 hr tablet Take 5 mg by mouth daily with breakfast.        insulin aspart U-100 (NOVOLOG U-100 INSULIN ASPART) 100 unit/mL injection Inject per sliding scale four times daily.   Indications: Type 2 Diabetes       LANTUS SOLOSTAR 100 unit/mL (3 mL) pen Inject 20 Units under the skin at bedtime. (Patient taking differently: Inject 24 Units under the skin at bedtime .      ) 3 mL PRN     multivitamin with minerals tablet Take 1 tablet by mouth.       omeprazole (PRILOSEC) 20 MG capsule Take 20 mg by mouth daily.        potassium chloride (KAYCIEL) 20 mEq/15 mL solution Take 20 mEq by mouth daily.        senna-docusate (PERICOLACE) 8.6-50 mg tablet Take 2 tablets by mouth.       vitamin A-vitamin C-vit E-min (OCUVITE) Tab tablet Take 1 tablet by mouth daily.        warfarin (COUMADIN/JANTOVEN) 2 MG tablet Take 5 mg by mouth .             Current  Facility-Administered Medications   Medication Dose Route Frequency Provider Last Rate Last Dose     lidocaine 2 % jelly (XYLOCAINE)   Topical PRN Lex, Autumn Blackwood, CNP   1 application at 11/29/18 0818       Allergies   Allergen Reactions     Aspirin Nausea And Vomiting     GI upset       Physical Exam:    Vitals:    01/10/19 0842   BP: 118/82   Pulse: 64   Resp: 18   Temp: 97.4  F (36.3  C)    There is no height or weight on file to calculate BMI.    General:  89 y.o. male in no apparent distress.    Head:  Normocephalic atraumatic  Psychiatric: Cooperative.   Respiratory: unlabored breathing.  Cardiovascular-Lower extremity: Edema: none    Vasc Edema 11/29/2018 12/13/2018 12/20/2018 1/3/2019 1/10/2019   Right just above MTP 28.2 27 26.5 25.4 26   Right Ankle 28.8 27.5 26 23.2 23.5   Right Widest Calf 46.5 39.5 34.5 34.4 35.2   Right Thigh Up 10cm - - 50 49.5 49   Left - just above MTP 28.4 26.4 25 25.3 25.7   Left Ankle 28.3 28.4 26 22.5 22.5   Left Widest Calf 41.6 - 30.4 33.2 34.5   Left Thigh Up 10cm - - 48.5 48.5 49       Integumentary:         Left Leg Ulceration(s): Resolved    Assessment:    Images:   Study Result   BILATERAL LOWER EXTREMITY VENOUS DUPLEX ULTRASOUND WITH INSUFFICIENCY STUDY  4/8/2016 2:50 PM     INDICATIONS: Bilateral leg pain and swelling. Lower leg ulcers.     TECHNIQUE: Venous duplex ultrasound with gray-scale images, graded compression, and color-flow, Doppler, and spectral analysis. Abnormal reflux is defined as 500 msec for superficial veins and 1 sec for deep veins.  COMPARISONS: None.     FINDINGS: The right greater saphenous vein is incompetent, with abnormal sustained reflux from the saphenofemoral junction to the mid calf. This vein measures between 12 mm in diameter at saphenofemoral junction and 3 mm in the distal calf. No abnormal   reflux is detected in the right lesser saphenous vein.     The left greater saphenous vein is incompetent, with abnormal sustained reflux from  the saphenofemoral junction to the distal calf. This vein measures between 9 mm in diameter at saphenofemoral junction and 5 mm in the distal calf. No abnormal reflux is   detected in the left lesser saphenous vein.     There is abnormal deep venous reflux bilaterally, involving the right common femoral and popliteal veins and the left common femoral and superficial femoral veins. No incompetent perforating veins are identified.     There is no evidence for deep vein thrombosis. There is normal flow and compressibility in the femoral, popliteal, and posterior tibial veins.     CONCLUSIONS:   1.  Incompetent right greater saphenous vein.  2.  Incompetent left greater saphenous vein.       Labs:    The following labs were reviewed by me today.    Lab Results   Component Value Date    WBC 6.6 02/22/2018    HGB 12.4 (L) 02/22/2018    HCT 38.7 (L) 02/22/2018    MCV 95 02/22/2018     02/22/2018               Invalid input(s): EOSPC    Lab Results   Component Value Date    CREATININE 1.08 12/19/2018         Lab Results   Component Value Date    BUN 21 12/19/2018     No results found for: PREALBUMINESUFAST(LABPROT,LABALBU,albumin)@  Invalid input(s): LABALBU  No results found for: PREALBUMIN    No results found for: CRP  No results found for: SEDRATE    Lab Results   Component Value Date    HGBA1C 7.7 (H) 02/02/2018     Lab Results   Component Value Date    TSH 2.67 06/22/2016           No results found for: [id number]  Lab Results   Component Value Date     (H) 12/13/2018        1. Lymphedema     2. Ulcer of left lower extremity, limited to breakdown of skin (H)     3. Venous stasis dermatitis of both lower extremities     4. Type 2 diabetes mellitus with complication, unspecified whether long term insulin use (H)         A new wound was identified today: no    Plan:    1.  Right leg ulcer, resolved    2.  Left leg ulcer, resolved    3.  Lymphedema, minimal edema noted today.    5.  Heart failure, per  primary provider    6.  Diabetes, A1c, 7.7 on 2/2/2018.  Managed by primary    7.  Treatment:  Will continue with the velcro compression wraps daily.  No bandage is needed.     8.   Patient will follow up with me in three months.    Autumn Burnett APRN, CNP,  [facility]  [phone]

## 2021-06-23 NOTE — PATIENT INSTRUCTIONS - HE
No bandages are needed.  Start wearing your velcro compression wraps everyday.  If you don't, the swelling will return.

## 2021-06-24 NOTE — PROGRESS NOTES
"Follow up Vascular Visit       Date of Service:3/8/2019    Date Last Seen: Visit date not found; Visit date not found    Chief Complaint: new blistering and weeping from the legs    History:   Past Medical History:   Diagnosis Date     Anemia      Coronary artery disease 9/25/2015    stents placed      Diabetes mellitus (H)      Duodenitis 5/11/2016     Dyslipidemia      Gastroduodenal ulcer 9/24/2015     GERD (gastroesophageal reflux disease)      GI bleed 07/08/2016     Gout      Hematuria      Hyperlipemia      Hypertension      Kidney stone 10/6/2015       Pt returns to the [facility] with regards to their new blistering and weeping from the legs. He arrives today with his wife. He is typically seen by Autumn Burnett CNP; at last visit he was doing well with his velcro wraps. He reports that 1 weeks ago he developed blistering to the legs with weeping; saw his pcp who started telfa and ace bandaging; the wife could no longer get the velcros on; she also feels these are too short and would like new ones. In the past he has been d/c from home care as he continues to drive. He denies fevers, chills, is noting some worsening shortness of breath; he is taking his lasix as prescribed. Weight increase of 3-5 pounds he is not sure.    Allergies: Aspirin    Physical Exam:    /70   Pulse 60   Temp 98.6  F (37  C) (Oral)   Resp 16   Ht 6' 1\" (1.854 m) Comment: per pt report  Wt 221 lb (100.2 kg) Comment: per pt report  BMI 29.16 kg/m      General:  Patient presents to clinic in no apparent distress.  Head: normocephalic atraumatic  Psychiatric:  Alert and oriented x3.   Respiratory: unlabored breathing; no cough  Integumentary:  Skin is uniformly warm, dry and pink.    Extremities: BLE with worsening swelling; there are several ruptured serum filled blisters about the legs; draining moderate serous fluid; no odor; minimal pain with debridement    Circumferential " volume measures:    Vasc Edema 12/13/2018 12/20/2018 1/3/2019 1/10/2019 3/8/2019   Right just above MTP 27 26.5 25.4 26 27   Right Ankle 27.5 26 23.2 23.5 27.5   Right Widest Calf 39.5 34.5 34.4 35.2 47   Right Thigh Up 10cm - 50 49.5 49 -   Left - just above MTP 26.4 25 25.3 25.7 26.5   Left Ankle 28.4 26 22.5 22.5 26,5   Left Widest Calf - 30.4 33.2 34.5 43   Left Thigh Up 10cm - 48.5 48.5 49 -       Ulceration(s)/Wound(s):     Urethral Catheter 16 Fr. (Active)       VASC Wound 03/08/19 right lateral leg (Active)   Pre Size Length 2 3/8/2019 10:00 AM   Pre Size Width 4.6 3/8/2019 10:00 AM   Pre Size Depth 0.1 3/8/2019 10:00 AM   Pre Total Sq cm 9.2 3/8/2019 10:00 AM       Pressure Ulcer Leg Left;Right (Active)       Pressure Ulcer 06/21/16 Buttocks Left Stage II (Active)       Wound 05/07/16 Non-pressure related ulcer Leg Left (Active)       Incision 06/18/16 Abdomen (Active)       Incision 07/08/16 Lap site Abdomen Mid (Active)       Incision 07/08/16 Surgical Abdomen Right (Active)        Lab Values    No results found for: SEDRATE  Lab Results   Component Value Date    CREATININE 1.24 01/16/2019     Lab Results   Component Value Date    HGBA1C 7.7 (H) 02/02/2018     Lab Results   Component Value Date    BUN 25 01/16/2019     Lab Results   Component Value Date    ALBUMIN 3.4 (L) 11/21/2018     No results found for: [id number]          Impression:  1. Lymphedema  Compression stockings   2. Ulcer of left lower extremity, limited to breakdown of skin (H)  Compression stockings   3. Venous stasis dermatitis of both lower extremities  Compression stockings   4. Type 2 diabetes mellitus with complication, unspecified whether long term insulin use (H)  Compression stockings   5. Anemia, unspecified type  Compression stockings   6. Leg ulcer, right, limited to breakdown of skin (H)  Compression stockings   7. Venous hypertension, chronic, bilateral  Compression stockings   8. Chronic skin ulcer, limited to breakdown  of skin (H)  Compression stockings            Are any of these wounds new today: Yes; Location: bilateral legs    Assessment/Plan:          1. Debridement: Excisional debridement of the ulcer(s) was recommended today, after consent was obtained and 2% Xylocaine was applied using a sterile curet the epidermal, dermal and down into the subcutaneous tissue was sharply debrided for a total square cm of 40. The non-viable and necrotic tissue was removed and the wounds appeared much  afterwards.             2. Edema: will need to reduce his swelling down again; will apply bilateral 2 layers; instructed to watch our for worseing s/sx of shortness of breath and go to ER if this occurs; continue lasix as prescribed by pcp; continue daily weights; needs to get better about checking these; elevate the legs periodically throughout the day. The compression wraps were applied today in clinic.           3.  Wound treatment: wound treatment will include irrigation and dressings to promote autolytic debridement which will include:xeroform; ABD; rolled gauze; change twice per week at the clinic; he declined home care           4. Nutrition: focus on protein; low sodium           5. Offloading: na     Patient will follow up with Autumn in 2 weeks for reevaluation nurse visits twice per week until then. They were instructed to call the clinic sooner with any signs or symptoms of infection or any further questions/concerns. Answered all questions.    Mei Sampson DNP, RN, CNP, [facility]  [phone]        This note was electronically signed by Mei Sampson

## 2021-06-25 NOTE — PROGRESS NOTES
"          Nurse Visit    Chief Complaint: Patient presents to clinic for assement, and treatment of their ulcer and and swelling    Dressing on Arrival xeroform, abd rolled gauze, 2 layer.    Allergies:   Allergies   Allergen Reactions     Aspirin Nausea And Vomiting     GI upset       Medications:   Current Outpatient Medications:      acetaminophen (TYLENOL) 325 MG tablet, Take 2 tablets (650 mg total) by mouth every 4 (four) hours as needed., Disp: , Rfl: 0     atenolol (TENORMIN) 50 MG tablet, Take 50 mg by mouth., Disp: , Rfl:      atorvastatin (LIPITOR) 10 MG tablet, Take 10 mg by mouth every morning. , Disp: , Rfl:      benzonatate (TESSALON) 100 MG capsule, Take 1 capsule (100 mg total) by mouth 3 (three) times a day as needed for cough., Disp: , Rfl: 0     ferrous gluconate 325 mg (36 mg iron) Tab, Take 325 mg by mouth daily. , Disp: , Rfl:      furosemide (LASIX) 40 MG tablet, Take 40 mg by mouth 2 (two) times a day., Disp: , Rfl:      glipiZIDE (GLUCOTROL XL) 5 MG 24 hr tablet, Take 5 mg by mouth daily with breakfast. , Disp: , Rfl:      LANTUS SOLOSTAR 100 unit/mL (3 mL) pen, Inject 20 Units under the skin at bedtime. (Patient taking differently: Inject 24 Units under the skin at bedtime .   ), Disp: 3 mL, Rfl: PRN     omeprazole (PRILOSEC) 20 MG capsule, Take 20 mg by mouth daily. , Disp: , Rfl:      potassium chloride (KAYCIEL) 20 mEq/15 mL solution, Take 20 mEq by mouth daily. , Disp: , Rfl:      vitamin A-vitamin C-vit E-min (OCUVITE) Tab tablet, Take 1 tablet by mouth daily. , Disp: , Rfl:      warfarin (COUMADIN/JANTOVEN) 2 MG tablet, Take 5 mg by mouth .   , Disp: , Rfl:     Current Facility-Administered Medications:      lidocaine 2 % jelly (XYLOCAINE), , Topical, PRN, Lex, Autumn Blackwood, CNP, 1 application at 11/29/18 0818    Vital Signs: /74   Pulse 62   Temp 97.6  F (36.4  C) (Oral)   Ht 6' 1\" (1.854 m)   Wt 221 lb (100.2 kg)   BMI 29.16 kg/m        Assessment:    General:  " Patient presents to clinic in no apparent distress.  Psychiatric:  Alert and oriented x3.   Lower extremity:  edema is present.    Integumentary:  Skin is uniformly warm, dry and pink.    Wound size:   Urethral Catheter 16 Fr. (Active)       VASC Wound 03/08/19 right lateral leg (Active)   Pre Size Length 2 3/8/2019 10:00 AM   Pre Size Width 4.6 3/8/2019 10:00 AM   Pre Size Depth 0.1 3/8/2019 10:00 AM   Pre Total Sq cm 9.2 3/8/2019 10:00 AM   Undermined No 3/12/2019  9:00 AM   Tunneling No 3/12/2019  9:00 AM   Description beefy red wound bed, pink periwound 3/12/2019  9:00 AM   Prodcut Used ABD Pad 3/12/2019  9:00 AM       VASC Wound 03/12/19 Left lateral inferior leg (Active)   Pre Size Length 0.9 3/15/2019  9:00 AM   Pre Size Width 2.1 3/15/2019  9:00 AM   Pre Size Depth 0.1 3/15/2019  9:00 AM   Pre Total Sq cm 1.89 3/15/2019  9:00 AM   Undermined no 3/15/2019  9:00 AM   Tunneling no 3/15/2019  9:00 AM   Description beefy red wound bed, pink periwound 3/12/2019  9:00 AM   Prodcut Used ABD Pad 3/15/2019  9:00 AM       VASC Wound 03/12/19 Left lateral superior (lower) lower leg (Active)   Pre Size Length 2.2 3/15/2019  9:00 AM   Pre Size Width 3.8 3/15/2019  9:00 AM   Pre Size Depth 0.1 3/15/2019  9:00 AM   Undermined no 3/15/2019  9:00 AM   Tunneling no 3/15/2019  9:00 AM   Description red wound bed 3/15/2019  9:00 AM   Prodcut Used ABD Pad 3/12/2019  9:00 AM       VASC Wound 03/12/19 Left lateral superior (upper) lower leg (Active)   Pre Size Length 0.8 3/15/2019  9:00 AM   Pre Size Width 0.7 3/15/2019  9:00 AM   Pre Size Depth 0.1 3/15/2019  9:00 AM   Pre Total Sq cm 0.56 3/15/2019  9:00 AM   Undermined no 3/15/2019  9:00 AM   Tunneling no 3/15/2019  9:00 AM   Description red wound bed 3/15/2019  9:00 AM   Prodcut Used ABD Pad 3/12/2019  9:00 AM       VASC Wound 03/12/19 Right medial lower leg (Active)   Pre Size Length 4.4 3/15/2019  9:00 AM   Pre Size Width 8 3/15/2019  9:00 AM   Pre Size Depth 0.1 3/15/2019   9:00 AM   Pre Total Sq cm 35.2 3/15/2019  9:00 AM   Undermined no 3/15/2019  9:00 AM   Tunneling no 3/15/2019  9:00 AM   Description red wound bed 3/15/2019  9:00 AM   Prodcut Used ABD Pad;Gauze 3/15/2019  9:00 AM       VASC Wound 19 Right lateral lower leg (Active)   Pre Size Length 3.3 3/15/2019  9:00 AM   Pre Size Width 5.4 3/15/2019  9:00 AM   Pre Size Depth 0.1 3/15/2019  9:00 AM   Pre Total Sq cm 17.82 3/15/2019  9:00 AM   Undermined No 3/12/2019  9:00 AM   Tunneling No 3/12/2019  9:00 AM   Description beefy red wound bed, pink periwound 3/12/2019  9:00 AM   Prodcut Used ABD Pad 3/12/2019  9:00 AM       Pressure Ulcer Leg Left;Right (Active)       Pressure Ulcer 16 Buttocks Left Stage II (Active)       Wound 16 Non-pressure related ulcer Leg Left (Active)       Incision 16 Abdomen (Active)       Incision 16 Lap site Abdomen Mid (Active)       Incision 16 Surgical Abdomen Right (Active)      Undermining is not present.    The periwoundskin is maceration      Plan:         1. Patient will follow up in 3 days         2. Treatment provided will include irrigation and dressings to promote autolytic debridement and will be as listed below     Cleansed with: Normal Saline and Wound Cleanser    Protected skin with: Remedy Skin Repair    Dressings Applied: Xeroform, abd, rolled gauze 2 layer    Compression Applied to the Left Le-Layer Coban    Compression Applied to the Right Le-Layer Coban    Offloading applied: cane    Trial Products: no  Provider notified regarding concerns: no  Treatment Changes: no    Educational Barriers: hearing loss and cognitive loss  Taught Regarding: Acitivity, Compliance and Compression  Teaching Method: Exaplanation and DC sheet

## 2021-06-25 NOTE — PATIENT INSTRUCTIONS - HE
- Please call us if your compression wraps fall more than 1-2 inches below the bend of the knee. Call if they are too painful. Call if they get wet. If it is a weekend and the wraps fall down, are too painful, or get wet take the wraps off and put on another form of compression. Compression such as velcro wraps, compression stockings, short stretch bandages, or tubular compression. Apply one of these until you can be seen in clinic. Please call us if you have any questions [phone].    - Treatment:  Layered Compression Bandaging (2-layer)    What is it?  The layered compression bandaging has a layer of absorbent material that will soak up drainage.     Why we do it.   This is done to treat swelling, wounds, or both.  This will in turn help circulation and healing.    How to care for your bandages.  The wraps need to be kept dry. If  the wraps become wet, remove them and call the clinic to have another wrap applied.    What to expect.  It is common for the wraps to be uncomfortable at the beginning. The first two days are usually the hardest; then they will become more comfortable.       Elevating your legs will help the discomfort. Try to elevate your legs as much as possible.    If rest and elevation does not help your discomfort, call your provider.  If your provider is not available you can remove the wrap and leave a message for further instructions.    - Swelling and Compression Therapy    Swelling in the legs can be caused by many reasons. No matter what the reason, treatment usually includes some type of compression. This may be done with a support sock, dressing, ace wrap, or layered wraps.     It is important to treat the swelling for many reasons. If the swelling is not treated you may develop blisters that can lead to ulcerations. This is caused when extra fluid goes into tissue causing damage and blocking blood flow to the tissue.     It is important that you wear your compression every day,  including days that you will be seen in clinic.     Compression is often the most important part of treating leg wounds. Without controlling the swelling it is often not possible to heal wounds.     Going without compression for even brief periods of time can be damaging to your legs and your health.  Your compression should be put on first thing in the morning. Take the compression off at night only when instructed by your care provider to do so. Sometimes wearing compression 24 hours a day will be recommended.       If you are having difficulty wearing your compression it is important to notify your care provider so that other options may be reviewed.    Thank you for choosing [facility]. Please call us if you have any questions [phone].'

## 2021-06-26 NOTE — PROGRESS NOTES
Progress Notes by Roma Almanza LPN at 10/4/2018  8:30 AM     Author: Roma Almanza LPN Service: -- Author Type: Licensed Nurse    Filed: 10/4/2018  9:46 AM Encounter Date: 10/4/2018 Status: Signed    : Roma Almanza LPN (Licensed Nurse)                     Nurse Visit    Chief Complaint: Patient presents to clinic for assement, and treatment of their ulcer and and swelling    Dressing on Arrival Viscopaste, tubi, SS, tubi    Allergies: No Known Allergies    Medications:   Current Outpatient Prescriptions:   ?  acetaminophen (TYLENOL) 325 MG tablet, Take 2 tablets (650 mg total) by mouth every 4 (four) hours as needed., Disp: , Rfl: 0  ?  atorvastatin (LIPITOR) 10 MG tablet, Take 10 mg by mouth every morning. , Disp: , Rfl:   ?  benzonatate (TESSALON) 100 MG capsule, Take 1 capsule (100 mg total) by mouth 3 (three) times a day as needed for cough., Disp: , Rfl: 0  ?  ferrous gluconate 325 mg (36 mg iron) Tab, Take 325 mg by mouth daily. , Disp: , Rfl:   ?  furosemide (LASIX) 40 MG tablet, Take 40 mg by mouth 2 (two) times a day., Disp: , Rfl:   ?  glipiZIDE (GLUCOTROL XL) 5 MG 24 hr tablet, Take 5 mg by mouth daily with breakfast. , Disp: , Rfl:   ?  Lactobacillus acidoph-L.bulgar (LACTINEX) 100 million cell packet, Take 1 packet by mouth 3 (three) times a day with meals., Disp: , Rfl: 0  ?  LANTUS SOLOSTAR 100 unit/mL (3 mL) pen, Inject 20 Units under the skin at bedtime., Disp: 3 mL, Rfl: PRN  ?  omeprazole (PRILOSEC) 20 MG capsule, Take 20 mg by mouth daily. , Disp: , Rfl:   ?  potassium chloride (KAYCIEL) 20 mEq/15 mL solution, Take 20 mEq by mouth daily. , Disp: , Rfl:   ?  sotalol (BETAPACE) 80 MG tablet, Take 80 mg by mouth 2 (two) times a day. , Disp: , Rfl:   ?  sulfamethoxazole-trimethoprim (SEPTRA DS) 800-160 mg per tablet, Take 1 tablet by mouth 2 (two) times a day for 10 days., Disp: 20 tablet, Rfl: 0  ?  vitamin A-vitamin C-vit E-min (OCUVITE) Tab tablet, Take 1  "tablet by mouth daily. , Disp: , Rfl:   ?  warfarin (COUMADIN) 2 MG tablet, Take 1.5 tablets (3 mg total) by mouth daily., Disp: , Rfl: 0  ?  warfarin (COUMADIN) 5 MG tablet, 6 MG WED SAT, 5 MG OTHER DAYS ONCE A DAY ORALLY, Disp: , Rfl: 3    Vital Signs: /76 (Patient Position: Sitting)  Pulse 68  Temp 97.8  F (36.6  C) (Oral)   Resp 18  Ht 6' 1\" (1.854 m)  Wt (!) 224 lb (101.6 kg)  BMI 29.55 kg/m2      Assessment:    General:  Patient presents to clinic in no apparent distress.  Psychiatric:  Alert and oriented x3.   Lower extremity:  edema is present.    Integumentary:  Skin is uniformly warm, dry and pink.    Wound size:   Urethral Catheter 16 Fr. (Active)       Wound 05/17/18 left medial ankle (Active)   Pre Size Length 0 9/13/2018 10:00 AM   Pre Size Width 0 9/13/2018 10:00 AM   Pre Size Depth 0 9/13/2018 10:00 AM   Pre Total Sq cm 0 9/13/2018 10:00 AM   Post Size Length 1 8/23/2018 10:00 AM   Post Size Width 1.5 8/23/2018 10:00 AM   Undermined n 8/29/2018  9:00 AM   Tunneling n 8/29/2018  9:00 AM   Description red wound bed 8/29/2018  9:00 AM   Prodcut Used Viscopaste 8/29/2018  9:00 AM       VASC Wound 09/24/18 right lateral shin (Active)   Pre Size Length 2 9/27/2018 12:00 PM   Pre Size Width 4.3 9/27/2018 12:00 PM   Pre Size Depth 0.1 9/27/2018 12:00 PM   Pre Total Sq cm 8.6 9/27/2018 12:00 PM   Undermined n 9/24/2018  3:00 PM   Tunneling n 9/24/2018  3:00 PM   Prodcut Used Viscopaste 9/24/2018  3:00 PM       VASC Wound 09/24/18 right lower gator (Active)   Pre Size Length 0.5 9/24/2018  3:00 PM   Pre Size Width 1.2 9/24/2018  3:00 PM   Pre Size Depth 0.2 9/24/2018  3:00 PM   Pre Total Sq cm 0.6 9/24/2018  3:00 PM   Undermined n 9/24/2018  3:00 PM   Tunneling n 9/24/2018  3:00 PM       VASC Wound 09/27/18 Rt anterior lower shin (Active)   Pre Size Length 1.2 10/4/2018  8:00 AM   Pre Size Width 2 10/4/2018  8:00 AM   Pre Size Depth 0.1 10/4/2018  8:00 AM   Pre Total Sq cm 2.4 10/4/2018  8:00 AM    " "   VASC Wound 09/27/18 Rt medial lower leg (Active)   Pre Size Length 1.2 9/27/2018 12:00 PM   Pre Size Width 1.3 9/27/2018 12:00 PM   Pre Size Depth 0.1 9/27/2018 12:00 PM   Pre Total Sq cm 1.56 9/27/2018 12:00 PM       Pressure Ulcer Leg Left;Right (Active)       Pressure Ulcer 06/21/16 Buttocks Left Stage II (Active)       Wound 05/07/16 Non-pressure related ulcer Leg Left (Active)       Incision 06/18/16 Abdomen (Active)       Incision 07/08/16 Lap site Abdomen Mid (Active)       Incision 07/08/16 Surgical Abdomen Right (Active)      Undermining is not present.    The periwoundskin is maceration      Plan:         1. Patient will in 1 weeks         2. Treatment provided will include irrigation and dressings to promote autolytic debridement and will be as listed below     Cleansed with: Wound Cleanser    Protected skin with: Remedy Skin Repair    Dressings Applied: Viscopaste, gauze, roll gauze    Compression Applied to the Left Leg: Velcro\", Compression Stockings    Compression Applied to the Right Leg: Tubi  and SS    Offloading applied: cane    Trial Products: no  Provider notified regarding concerns: no  Treatment Changes: no    Educational Barriers: none  Taught Regarding: Acitivity, Compliance, Compression and Dressing  Teaching Method: Exaplanation and DC sheet     Pt to  and begin ABX today. Pt to return to PCP for INR check in 3 days. Pt verbalized understanding.           "

## 2021-06-26 NOTE — PROGRESS NOTES
Progress Notes by Theresa Sequeira LPN at 11/30/2018 10:40 AM     Author: Theresa Sequeira LPN Service: -- Author Type: Licensed Nurse    Filed: 11/30/2018  5:16 PM Encounter Date: 11/30/2018 Status: Signed    : Theresa Sequeira LPN (Licensed Nurse)                                 Nurse Visit    Chief Complaint: Patient presents to clinic for assement, and treatment of their     Dressing on Arrival intact, 80 % saturated with serosanguinous    Allergies: No Known Allergies    Medications:   Current Outpatient Medications:   ?  acetaminophen (TYLENOL) 325 MG tablet, Take 2 tablets (650 mg total) by mouth every 4 (four) hours as needed., Disp: , Rfl: 0  ?  atenolol (TENORMIN) 50 MG tablet, Take 50 mg by mouth., Disp: , Rfl:   ?  atorvastatin (LIPITOR) 10 MG tablet, Take 10 mg by mouth every morning. , Disp: , Rfl:   ?  benzonatate (TESSALON) 100 MG capsule, Take 1 capsule (100 mg total) by mouth 3 (three) times a day as needed for cough., Disp: , Rfl: 0  ?  ferrous gluconate 325 mg (36 mg iron) Tab, Take 325 mg by mouth daily. , Disp: , Rfl:   ?  furosemide (LASIX) 40 MG tablet, Take 40 mg by mouth 2 (two) times a day., Disp: , Rfl:   ?  glipiZIDE (GLUCOTROL XL) 5 MG 24 hr tablet, Take 5 mg by mouth daily with breakfast. , Disp: , Rfl:   ?  insulin aspart U-100 (NOVOLOG U-100 INSULIN ASPART) 100 unit/mL injection, Inject per sliding scale four times daily.   Indications: Type 2 Diabetes, Disp: , Rfl:   ?  LANTUS SOLOSTAR 100 unit/mL (3 mL) pen, Inject 20 Units under the skin at bedtime. (Patient taking differently: Inject 24 Units under the skin at bedtime .   ), Disp: 3 mL, Rfl: PRN  ?  multivitamin with minerals tablet, Take 1 tablet by mouth., Disp: , Rfl:   ?  omeprazole (PRILOSEC) 20 MG capsule, Take 20 mg by mouth daily. , Disp: , Rfl:   ?  potassium chloride (KAYCIEL) 20 mEq/15 mL solution, Take 20 mEq by mouth daily. , Disp: , Rfl:   ?  senna-docusate (PERICOLACE) 8.6-50 mg tablet, Take 2 tablets by  "mouth., Disp: , Rfl:   ?  sotalol (BETAPACE) 80 MG tablet, Take 80 mg by mouth., Disp: , Rfl:   ?  vitamin A-vitamin C-vit E-min (OCUVITE) Tab tablet, Take 1 tablet by mouth daily. , Disp: , Rfl:   ?  warfarin (COUMADIN/JANTOVEN) 2 MG tablet, Take 5 mg by mouth .   , Disp: , Rfl:   ?  acetic acid 0.25 % irrigation, Wash wounds and surrounding skin daily with the acetic acid solution; do NOT rinse., Disp: 1000 mL, Rfl: 0  ?  gentamicin (GARAMYCIN) 0.1 % ointment, Apply topically to wound daily., Disp: 30 g, Rfl: 0    Current Facility-Administered Medications:   ?  lidocaine 2 % jelly (XYLOCAINE), , Topical, PRN, Autumn Burnett, CNP, 1 application at 11/29/18 0818    Vital Signs: /62 (Patient Site: Left Arm, Patient Position: Sitting, Cuff Size: Adult Large)   Pulse 68   Temp 97.3  F (36.3  C) (Oral)   Resp 24   Ht 6' 1\" (1.854 m)   Wt (!) 226 lb (102.5 kg)   BMI 29.82 kg/m        Assessment:    General:  Patient presents to clinic in no apparent distress.  Psychiatric:  Alert and oriented x3.   Lower extremity:  edema is present.    Integumentary:  Skin is uniformly warm, dry and pink.    Wound size:   Urethral Catheter 16 Fr. (Active)       VASC Wound 11/29/18 left lateral leg (Active)   Pre Size Length 4 11/30/2018 11:00 AM   Pre Size Width 8 11/30/2018 11:00 AM   Pre Size Depth 0.1 11/30/2018 11:00 AM   Pre Total Sq cm 32 11/30/2018 11:00 AM       VASC Wound 11/29/18 right medial leg (Active)   Pre Size Length 2.3 11/30/2018 11:00 AM   Pre Size Width 3 11/30/2018 11:00 AM   Pre Size Depth 0.1 11/29/2018  8:00 AM   Pre Total Sq cm 6.72 11/29/2018  8:00 AM       VASC Wound 11/29/18 right posterior leg (Active)   Pre Size Length 4 11/30/2018 11:00 AM   Pre Size Width 4.5 11/30/2018 11:00 AM   Pre Size Depth 0.1 11/29/2018  8:00 AM   Pre Total Sq cm 18 11/29/2018  8:00 AM       VASC Wound 11/29/18 right anterior leg (Active)   Pre Size Length 2.5 11/30/2018 11:00 AM   Pre Size Width 4.2 11/30/2018 " 11:00 AM   Pre Size Depth 0.1 11/29/2018  8:00 AM   Pre Total Sq cm 10.5 11/29/2018  8:00 AM       VASC Wound 11/29/18 left medial leg (Active)   Pre Size Length 4 11/30/2018 11:00 AM   Pre Size Width 5.5 11/30/2018 11:00 AM   Pre Size Depth 0.1 11/29/2018  8:00 AM   Pre Total Sq cm 22 11/29/2018  8:00 AM       Pressure Ulcer Leg Left;Right (Active)       Pressure Ulcer 06/21/16 Buttocks Left Stage II (Active)       Wound 05/07/16 Non-pressure related ulcer Leg Left (Active)       Incision 06/18/16 Abdomen (Active)       Incision 07/08/16 Lap site Abdomen Mid (Active)       Incision 07/08/16 Surgical Abdomen Right (Active)      Undermining is not present.    The periwoundskin is warm and red      Plan:         1. Patient will follow up in 4 days         2. Treatment provided will include irrigation and dressings to promote autolytic debridement and will be as listed below     Cleansed with: Normal Saline    Protected skin with: Remedy Skin Repair    Dressings Applied: gentamycin and vescopaste    Compression Applied to the Left Leg: Tubigrip    Compression Applied to the Right Leg: Tubigrip    Offloading applied: None    Trial Products: no  Provider notified regarding concerns: yes  Treatment Changes: yes    Educational Barriers: none  Taught Regarding: Acitivity, Compliance, Compression, Dressing and Infection  Teaching Method: Exaplanation and DC sheet      Writer updated NP, Mei Sampson, new order received to apply gentamycin into wound, then vescopaste , roll gauze  Then Tubigrip bilateral leg. Pt was in lot pain with dressing change. Warm to touch. No fever or odor. Noted greenish with drainage; sever drainage.

## 2021-06-26 NOTE — PROGRESS NOTES
Progress Notes by Roma Almanza LPN at 8/16/2018 12:40 PM     Author: Roma Almanza LPN Service: -- Author Type: Licensed Nurse    Filed: 8/16/2018  2:22 PM Encounter Date: 8/16/2018 Status: Signed    : Roma Almanza LPN (Licensed Nurse)                 Nurse Visit    Chief Complaint: Patient presents to clinic for assement, and treatment of their ulcer and and swelling    Dressing on Arrival 2 layer wraps intact    Allergies: No Known Allergies    Medications:   Current Outpatient Prescriptions:   ?  acetaminophen (TYLENOL) 325 MG tablet, Take 2 tablets (650 mg total) by mouth every 4 (four) hours as needed., Disp: , Rfl: 0  ?  atorvastatin (LIPITOR) 10 MG tablet, Take 10 mg by mouth every morning. , Disp: , Rfl:   ?  benzonatate (TESSALON) 100 MG capsule, Take 1 capsule (100 mg total) by mouth 3 (three) times a day as needed for cough., Disp: , Rfl: 0  ?  ferrous gluconate 325 mg (36 mg iron) Tab, Take 325 mg by mouth daily. , Disp: , Rfl:   ?  furosemide (LASIX) 40 MG tablet, Take 40 mg by mouth 2 (two) times a day., Disp: , Rfl:   ?  glipiZIDE (GLUCOTROL XL) 5 MG 24 hr tablet, Take 5 mg by mouth daily with breakfast. , Disp: , Rfl:   ?  Lactobacillus acidoph-L.bulgar (LACTINEX) 100 million cell packet, Take 1 packet by mouth 3 (three) times a day with meals., Disp: , Rfl: 0  ?  LANTUS SOLOSTAR 100 unit/mL (3 mL) pen, Inject 20 Units under the skin at bedtime., Disp: 3 mL, Rfl: PRN  ?  omeprazole (PRILOSEC) 20 MG capsule, Take 20 mg by mouth daily. , Disp: , Rfl:   ?  potassium chloride (KAYCIEL) 20 mEq/15 mL solution, Take 20 mEq by mouth daily. , Disp: , Rfl:   ?  sotalol (BETAPACE) 80 MG tablet, Take 80 mg by mouth 2 (two) times a day. , Disp: , Rfl:   ?  vitamin A-vitamin C-vit E-min (OCUVITE) Tab tablet, Take 1 tablet by mouth daily. , Disp: , Rfl:   ?  warfarin (COUMADIN) 2 MG tablet, Take 1.5 tablets (3 mg total) by mouth daily., Disp: , Rfl: 0  ?  warfarin (COUMADIN)  5 MG tablet, 6 MG WED SAT, 5 MG OTHER DAYS ONCE A DAY ORALLY, Disp: , Rfl: 3    Vital Signs: /72  Pulse 70  Temp 98.2  F (36.8  C) (Oral)   Resp 18      Assessment:    General:  Patient presents to clinic in no apparent distress.  Psychiatric:  Alert and oriented x3.   Lower extremity:  edema is present.    Integumentary:  Skin is uniformly warm, dry and pink.    Wound size:   Urethral Catheter 16 Fr. (Active)       Wound 18 left medial ankle (Active)   Pre Size Length 3.5 2018 12:00 PM   Pre Size Width 2 2018 12:00 PM   Pre Size Depth 0.1 2018 12:00 PM   Pre Total Sq cm 7 2018 12:00 PM       Wound 18 right anterior shin (Active)   Pre Size Length 0.7 2018 12:00 PM   Pre Size Width 0.6 2018 12:00 PM   Pre Total Sq cm 0.42 2018 12:00 PM       Pressure Ulcer Leg Left;Right (Active)       Pressure Ulcer 16 Buttocks Left Stage II (Active)       Wound 16 Non-pressure related ulcer Leg Left (Active)       Incision 16 Abdomen (Active)       Incision 16 Lap site Abdomen Mid (Active)       Incision 16 Surgical Abdomen Right (Active)      Undermining is not present.    The periwoundskin is pink c some maceration to left lateral ankle      Plan:         1. Patient will in 1 weeks         2. Treatment provided will include irrigation and dressings to promote autolytic debridement and will be as listed below     Cleansed with: Normal Saline    Protected skin with: Remedy Skin Repair    Dressings Applied: Viscopaste, gauze    Compression Applied to the Left Le-Layer Coban    Compression Applied to the Right Le-Layer Coban    Offloading applied: None    Trial Products: no  Provider notified regarding concerns: no  Treatment Changes: no    Educational Barriers: none  Taught Regarding: Acitivity, Compliance, Compression and Dressing  Teaching Method: Exaplanation and Handout

## 2021-06-26 NOTE — PROGRESS NOTES
Progress Notes by Janet Wynn LPN at 9/27/2018 12:40 PM     Author: Janet Wynn LPN Service: -- Author Type: Licensed Nurse    Filed: 9/27/2018  2:13 PM Encounter Date: 9/27/2018 Status: Signed    : Janet Wynn LPN (Licensed Nurse)                 Nurse Visit    Chief Complaint: Patient presents to clinic for assement, and treatment of their ulcer and and swelling    Dressing on Arrival: he has a layer of tubigrip, comprilan, tubigrip and viscopaste.There was an area of old blood which soaked through  All the layers.    Allergies: No Known Allergies    Medications:   Current Outpatient Prescriptions:   ?  acetaminophen (TYLENOL) 325 MG tablet, Take 2 tablets (650 mg total) by mouth every 4 (four) hours as needed., Disp: , Rfl: 0  ?  atorvastatin (LIPITOR) 10 MG tablet, Take 10 mg by mouth every morning. , Disp: , Rfl:   ?  benzonatate (TESSALON) 100 MG capsule, Take 1 capsule (100 mg total) by mouth 3 (three) times a day as needed for cough., Disp: , Rfl: 0  ?  doxycycline (VIBRA-TABS) 100 MG tablet, Take 1 tablet (100 mg total) by mouth 2 (two) times a day for 10 days., Disp: 20 tablet, Rfl: 0  ?  ferrous gluconate 325 mg (36 mg iron) Tab, Take 325 mg by mouth daily. , Disp: , Rfl:   ?  furosemide (LASIX) 40 MG tablet, Take 40 mg by mouth 2 (two) times a day., Disp: , Rfl:   ?  glipiZIDE (GLUCOTROL XL) 5 MG 24 hr tablet, Take 5 mg by mouth daily with breakfast. , Disp: , Rfl:   ?  Lactobacillus acidoph-L.bulgar (LACTINEX) 100 million cell packet, Take 1 packet by mouth 3 (three) times a day with meals., Disp: , Rfl: 0  ?  LANTUS SOLOSTAR 100 unit/mL (3 mL) pen, Inject 20 Units under the skin at bedtime., Disp: 3 mL, Rfl: PRN  ?  omeprazole (PRILOSEC) 20 MG capsule, Take 20 mg by mouth daily. , Disp: , Rfl:   ?  potassium chloride (KAYCIEL) 20 mEq/15 mL solution, Take 20 mEq by mouth daily. , Disp: , Rfl:   ?  sotalol (BETAPACE) 80 MG tablet, Take 80 mg by mouth 2 (two) times a day. , Disp: , Rfl:    ?  vitamin A-vitamin C-vit E-min (OCUVITE) Tab tablet, Take 1 tablet by mouth daily. , Disp: , Rfl:   ?  warfarin (COUMADIN) 2 MG tablet, Take 1.5 tablets (3 mg total) by mouth daily., Disp: , Rfl: 0  ?  warfarin (COUMADIN) 5 MG tablet, 6 MG WED SAT, 5 MG OTHER DAYS ONCE A DAY ORALLY, Disp: , Rfl: 3    Vital Signs: /88 (Patient Position: Sitting)  Pulse 64  Temp 98.7  F (37.1  C) (Oral)   Resp 20      Assessment:    General:  Patient presents to clinic in no apparent distress.  Psychiatric:  Alert and oriented x3.   Lower extremity:  edema is present.    Integumentary:  Skin is uniformly warm, dry and pink.    Wound size:   Urethral Catheter 16 Fr. (Active)       Wound 05/17/18 left medial ankle (Active)   Pre Size Length 0 9/13/2018 10:00 AM   Pre Size Width 0 9/13/2018 10:00 AM   Pre Size Depth 0 9/13/2018 10:00 AM   Pre Total Sq cm 0 9/13/2018 10:00 AM   Post Size Length 1 8/23/2018 10:00 AM   Post Size Width 1.5 8/23/2018 10:00 AM   Undermined n 8/29/2018  9:00 AM   Tunneling n 8/29/2018  9:00 AM   Description red wound bed 8/29/2018  9:00 AM   Prodcut Used Viscopaste 8/29/2018  9:00 AM       VASC Wound 09/24/18 right lateral shin (Active)   Pre Size Length 2 9/27/2018 12:00 PM   Pre Size Width 4.3 9/27/2018 12:00 PM   Pre Size Depth 0.1 9/27/2018 12:00 PM   Pre Total Sq cm 8.6 9/27/2018 12:00 PM   Undermined n 9/24/2018  3:00 PM   Tunneling n 9/24/2018  3:00 PM   Prodcut Used Viscopaste 9/24/2018  3:00 PM       VASC Wound 09/24/18 right lower gator (Active)   Pre Size Length 0.5 9/24/2018  3:00 PM   Pre Size Width 1.2 9/24/2018  3:00 PM   Pre Size Depth 0.2 9/24/2018  3:00 PM   Pre Total Sq cm 0.6 9/24/2018  3:00 PM   Undermined n 9/24/2018  3:00 PM   Tunneling n 9/24/2018  3:00 PM       VASC Wound 09/27/18 Rt anterior lower shin (Active)   Pre Size Length 1.2 9/27/2018 12:00 PM   Pre Size Width 1.8 9/27/2018 12:00 PM   Pre Size Depth 0.1 9/27/2018 12:00 PM   Pre Total Sq cm 2.16 9/27/2018 12:00 PM        VASC Wound 09/27/18 Rt medial lower leg (Active)   Pre Size Length 1.2 9/27/2018 12:00 PM   Pre Size Width 1.3 9/27/2018 12:00 PM   Pre Size Depth 0.1 9/27/2018 12:00 PM   Pre Total Sq cm 1.56 9/27/2018 12:00 PM       Pressure Ulcer Leg Left;Right (Active)       Pressure Ulcer 06/21/16 Buttocks Left Stage II (Active)       Wound 05/07/16 Non-pressure related ulcer Leg Left (Active)       Incision 06/18/16 Abdomen (Active)       Incision 07/08/16 Lap site Abdomen Mid (Active)       Incision 07/08/16 Surgical Abdomen Right (Active)      Undermining is not present.    The periwoundskin is erythema      Plan:         1. Patient will in 1 week.         2. Treatment provided will include irrigation and dressings to promote autolytic debridement and will be as listed below     Cleansed with: Wound Cleanser    Protected skin with: Remedy Skin Repair    Dressings Applied: viscopaste, tubigrip, comprilan, tubigrip    Compression Applied to the Left Leg: Viscopaste    Compression Applied to the Right Leg: Short Stretch and tubigrip.    Offloading applied: None    Trial Products: no  Provider notified regarding concerns: no  Treatment Changes: yes. The orders were to apply 2layer at this time. It was determined by this nurse to go with previous order to use comprilan and tubigrip, due  To the pain and swelling of patient.     Educational Barriers: none  Taught Regarding: Acitivity, Compression and Dressing  Teaching Method: Exaplanation

## 2021-06-26 NOTE — PROGRESS NOTES
Progress Notes by Birgit Thomas RN at 5/22/2018 12:40 PM     Author: Birgit Thomas RN Service: -- Author Type: Registered Nurse    Filed: 5/22/2018  2:24 PM Encounter Date: 5/22/2018 Status: Signed    : Birgit Thomas RN (Registered Nurse)       Nurse Visit      L leg      R leg      Date of Service:5/22/2018    Chief Complaint: Patient presents to clinic for evaluation of their ulcer and swelling     Dressing on Arrival trial 2 layer was intact       Allergies: Review of patient's allergies indicates no known allergies.    Assessment:    /80  Pulse 80  Resp 17    General:  Patient presents to clinic in no apparent distress.  Psychiatric:  Alert and oriented x3.   Lower extremity:  edema is present.    Integumentary:  Skin is uniformly warm, dry and pink.      Please see lymphedema measurements from 5/17/18  Wound size:   Urethral Catheter 16 Fr. (Active)       Wound 05/17/18 left anterior ankle (Active)   Pre Size Length 0 5/22/2018  1:00 PM   Pre Size Width 0 5/22/2018  1:00 PM   Pre Size Depth 0 5/22/2018  1:00 PM   Pre Total Sq cm 1.26 5/17/2018  1:00 PM       Wound 05/17/18 left medial ankle (Active)   Pre Size Length 1 5/22/2018  1:00 PM   Pre Size Width 4.5 5/22/2018  1:00 PM   Pre Size Depth 0 5/22/2018  1:00 PM   Pre Total Sq cm 4.5 5/22/2018  1:00 PM       Wound 05/17/18 right lateral ankle (Active)   Pre Size Length 0 5/22/2018  1:00 PM   Pre Size Width 0 5/22/2018  1:00 PM   Pre Size Depth 0 5/22/2018  1:00 PM   Pre Total Sq cm 7.26 5/17/2018  1:00 PM       Pressure Ulcer Leg Left;Right (Active)       Pressure Ulcer 06/21/16 Buttocks Left Stage II (Active)       Wound 05/07/16 Non-pressure related ulcer Leg Left (Active)       Incision 06/18/16 Abdomen (Active)       Incision 07/08/16 Lap site Abdomen Mid (Active)       Incision 07/08/16 Surgical Abdomen Right (Active)      Undermining is not present.    The periwoundskin is pink        Plan:         1. Patient will Follow up with  Autumn 5/21/18         2. Treatment provided: 2 layer dressing was removed. L leg viscopaste and 1st layer of 2 layer wrap stuck to the wound, saline was applied and a forceps was used to removed the dressings. R leg wound is healed. Bilateral legs still have dry/flaky skin but pt and Deborah FAJARDO both agreed they looked better than the last visit. Adaptic was used on L leg wound first to help prevent the dressing from sticking to the wound.      Cleansed with: normal saline (in wound) Dermal wound cleanser   Protected skin with: Remedy body lotion   Applied: Adaptic   Packed/filled with: NA  Covered with: viscopaste and 2 layer wrap to each leg   Secured with: tape and stockinette

## 2021-06-26 NOTE — PROGRESS NOTES
Progress Notes by Birgit Thomas RN at 12/4/2018  9:40 AM     Author: Birgit Thomas RN Service: -- Author Type: Registered Nurse    Filed: 12/4/2018 11:19 AM Encounter Date: 12/4/2018 Status: Signed    : Birgit Thomas RN (Registered Nurse)                 Nurse Visit      R leg      R posterior leg      R medial leg      R anterior leg    L leg      L lateral leg      L medial leg      Chief Complaint: Patient presents to clinic for assement, and treatment of their ulcer and and swelling    Dressing on Arrival Dressing were intact and the dermafit, 40% saturation which was red/green in color    Allergies: No Known Allergies    Medications:   Current Outpatient Medications:   ?  acetaminophen (TYLENOL) 325 MG tablet, Take 2 tablets (650 mg total) by mouth every 4 (four) hours as needed., Disp: , Rfl: 0  ?  acetic acid 0.25 % irrigation, Wash wounds and surrounding skin daily with the acetic acid solution; do NOT rinse., Disp: 1000 mL, Rfl: 0  ?  atenolol (TENORMIN) 50 MG tablet, Take 50 mg by mouth., Disp: , Rfl:   ?  atorvastatin (LIPITOR) 10 MG tablet, Take 10 mg by mouth every morning. , Disp: , Rfl:   ?  benzonatate (TESSALON) 100 MG capsule, Take 1 capsule (100 mg total) by mouth 3 (three) times a day as needed for cough., Disp: , Rfl: 0  ?  ferrous gluconate 325 mg (36 mg iron) Tab, Take 325 mg by mouth daily. , Disp: , Rfl:   ?  furosemide (LASIX) 40 MG tablet, Take 40 mg by mouth 2 (two) times a day., Disp: , Rfl:   ?  gentamicin (GARAMYCIN) 0.1 % ointment, Apply topically to wound daily., Disp: 30 g, Rfl: 0  ?  glipiZIDE (GLUCOTROL XL) 5 MG 24 hr tablet, Take 5 mg by mouth daily with breakfast. , Disp: , Rfl:   ?  insulin aspart U-100 (NOVOLOG U-100 INSULIN ASPART) 100 unit/mL injection, Inject per sliding scale four times daily.   Indications: Type 2 Diabetes, Disp: , Rfl:   ?  LANTUS SOLOSTAR 100 unit/mL (3 mL) pen, Inject 20 Units under the skin at bedtime. (Patient taking differently:  Inject 24 Units under the skin at bedtime .   ), Disp: 3 mL, Rfl: PRN  ?  multivitamin with minerals tablet, Take 1 tablet by mouth., Disp: , Rfl:   ?  omeprazole (PRILOSEC) 20 MG capsule, Take 20 mg by mouth daily. , Disp: , Rfl:   ?  potassium chloride (KAYCIEL) 20 mEq/15 mL solution, Take 20 mEq by mouth daily. , Disp: , Rfl:   ?  senna-docusate (PERICOLACE) 8.6-50 mg tablet, Take 2 tablets by mouth., Disp: , Rfl:   ?  sotalol (BETAPACE) 80 MG tablet, Take 80 mg by mouth., Disp: , Rfl:   ?  vitamin A-vitamin C-vit E-min (OCUVITE) Tab tablet, Take 1 tablet by mouth daily. , Disp: , Rfl:   ?  warfarin (COUMADIN/JANTOVEN) 2 MG tablet, Take 5 mg by mouth .   , Disp: , Rfl:     Current Facility-Administered Medications:   ?  lidocaine 2 % jelly (XYLOCAINE), , Topical, PRN, Autumn Burnett, CNP, 1 application at 11/29/18 0818    Vital Signs: /80 (Patient Site: Left Arm, Patient Position: Sitting, Cuff Size: Adult Large)   Pulse 60   Temp 98.1  F (36.7  C) (Oral)   Wt (!) 226 lb (102.5 kg)   BMI 29.82 kg/m        Assessment:    General:  Patient presents to clinic in no apparent distress.  Psychiatric:  Alert and oriented x3.   Lower extremity:  edema is present.    Integumentary:  Skin is uniformly warm, dry and pink.      Please see lymphedema measurements from 11/29/18  Wound size:   Urethral Catheter 16 Fr. (Active)       VASC Wound 11/29/18 left lateral leg (Active)   Pre Size Length 6.2 12/4/2018  9:00 AM   Pre Size Width 3.5 12/4/2018  9:00 AM   Pre Size Depth 0.1 12/4/2018  9:00 AM   Pre Total Sq cm 21.7 12/4/2018  9:00 AM       VASC Wound 11/29/18 right medial leg (Active)   Pre Size Length 4 12/4/2018  9:00 AM   Pre Size Width 5.5 12/4/2018  9:00 AM   Pre Size Depth 0.1 12/4/2018  9:00 AM   Pre Total Sq cm 22 12/4/2018  9:00 AM       VASC Wound 11/29/18 right posterior leg (Active)   Pre Size Length 3.5 12/4/2018  9:00 AM   Pre Size Width 3 12/4/2018  9:00 AM   Pre Size Depth 0.1 11/29/2018  8:00  AM   Pre Total Sq cm 10.5 12/4/2018  9:00 AM       VASC Wound 11/29/18 right anterior leg (Active)   Pre Size Length 1.2 12/4/2018  9:00 AM   Pre Size Width 0.7 12/4/2018  9:00 AM   Pre Size Depth 0.1 11/29/2018  8:00 AM   Pre Total Sq cm 0.84 12/4/2018  9:00 AM       VASC Wound 11/29/18 left medial leg (Active)   Pre Size Length 1.5 12/4/2018  9:00 AM   Pre Size Width 0.5 12/4/2018  9:00 AM   Pre Size Depth 0.1 11/29/2018  8:00 AM   Pre Total Sq cm 0.75 12/4/2018  9:00 AM       Pressure Ulcer Leg Left;Right (Active)       Pressure Ulcer 06/21/16 Buttocks Left Stage II (Active)       Wound 05/07/16 Non-pressure related ulcer Leg Left (Active)       Incision 06/18/16 Abdomen (Active)       Incision 07/08/16 Lap site Abdomen Mid (Active)       Incision 07/08/16 Surgical Abdomen Right (Active)      Undermining is not present.    The periwoundskin is erythema      Plan:         1. Patient will in 2 days with Autumn         2. Treatment provided will include irrigation and dressings to promote autolytic debridement and will be as listed below. Pt was rating his pain 8/10, L leg pain was worse than the right. There was a moderate amount of drainage from the wound sites that was red/green. The L medial wound bed was bright red. New orders by Mei Sampson last week were started today. No warmth or fever and some odor.      Cleansed with: acetic acid    Protected skin with: Remedy Skin Repair    Dressings Applied: gentamycin and viscopaste    Compression Applied to the Left Leg: Tubigrip double layer    Compression Applied to the Right Leg: Tubigrip double layer    Offloading applied: None    Trial Products: no  Provider notified regarding concerns: yes  Treatment Changes: no    Educational Barriers: hearing loss  Taught Regarding: Compliance, Compression, Disease and Dressing  Teaching Method: Exaplanation and Handout

## 2021-06-26 NOTE — PROGRESS NOTES
"Progress Notes by Roma Almanza LPN at 8/13/2018 11:00 AM     Author: Roma Almanza LPN Service: -- Author Type: Licensed Nurse    Filed: 8/13/2018 12:52 PM Encounter Date: 8/13/2018 Status: Addendum    : Roma Almanza LPN (Licensed Nurse)    Related Notes: Original Note by Roma Almanza LPN (Licensed Nurse) filed at 8/13/2018 11:49 AM                         Nurse Visit    Chief Complaint: Patient presents to clinic for assement, and treatment of their ulcer and and swelling    Dressing on Arrival RLE visco paste, 2 layer wrap had fallen 2\". Pt removed LLE 2 layer and applied silvercel and gauze to wounds.    Allergies: No Known Allergies    Medications:   Current Outpatient Prescriptions:   ?  acetaminophen (TYLENOL) 325 MG tablet, Take 2 tablets (650 mg total) by mouth every 4 (four) hours as needed., Disp: , Rfl: 0  ?  atorvastatin (LIPITOR) 10 MG tablet, Take 10 mg by mouth every morning. , Disp: , Rfl:   ?  benzonatate (TESSALON) 100 MG capsule, Take 1 capsule (100 mg total) by mouth 3 (three) times a day as needed for cough., Disp: , Rfl: 0  ?  ferrous gluconate 325 mg (36 mg iron) Tab, Take 325 mg by mouth daily. , Disp: , Rfl:   ?  furosemide (LASIX) 40 MG tablet, Take 40 mg by mouth 2 (two) times a day., Disp: , Rfl:   ?  glipiZIDE (GLUCOTROL XL) 5 MG 24 hr tablet, Take 5 mg by mouth daily with breakfast. , Disp: , Rfl:   ?  Lactobacillus acidoph-L.bulgar (LACTINEX) 100 million cell packet, Take 1 packet by mouth 3 (three) times a day with meals., Disp: , Rfl: 0  ?  LANTUS SOLOSTAR 100 unit/mL (3 mL) pen, Inject 20 Units under the skin at bedtime., Disp: 3 mL, Rfl: PRN  ?  omeprazole (PRILOSEC) 20 MG capsule, Take 20 mg by mouth daily. , Disp: , Rfl:   ?  potassium chloride (KAYCIEL) 20 mEq/15 mL solution, Take 20 mEq by mouth daily. , Disp: , Rfl:   ?  sotalol (BETAPACE) 80 MG tablet, Take 80 mg by mouth 2 (two) times a day. , Disp: , Rfl:   ?  vitamin " A-vitamin C-vit E-min (OCUVITE) Tab tablet, Take 1 tablet by mouth daily. , Disp: , Rfl:   ?  warfarin (COUMADIN) 2 MG tablet, Take 1.5 tablets (3 mg total) by mouth daily., Disp: , Rfl: 0  ?  warfarin (COUMADIN) 5 MG tablet, 6 MG WED SAT, 5 MG OTHER DAYS ONCE A DAY ORALLY, Disp: , Rfl: 3    Vital Signs: /78  Pulse 72  Temp 98  F (36.7  C) (Oral)   Resp 18      Assessment:    General:  Patient presents to clinic in no apparent distress.  Psychiatric:  Alert and oriented x3.   Lower extremity:  edema is present.    Integumentary:  Skin is uniformly warm, dry and pink.    Wound size:   Urethral Catheter 16 Fr. (Active)       Wound 18 left medial ankle (Active)   Pre Size Length 2.5 2018 12:00 PM   Pre Size Width 7 2018 12:00 PM   Pre Size Depth 0 2018 12:00 PM   Pre Total Sq cm 17.5 2018 12:00 PM       Wound 18 right anterior shin (Active)   Pre Size Length 0.7 2018 12:00 PM   Pre Size Width 0.6 2018 12:00 PM   Pre Total Sq cm 0.42 2018 12:00 PM       Pressure Ulcer Leg Left;Right (Active)       Pressure Ulcer 16 Buttocks Left Stage II (Active)       Wound 16 Non-pressure related ulcer Leg Left (Active)       Incision 16 Abdomen (Active)       Incision 16 Lap site Abdomen Mid (Active)       Incision 16 Surgical Abdomen Right (Active)      Undermining is not present.    The periwoundskin is erythema      Plan:         1. Patient will in 3 days         2. Treatment provided will include irrigation and dressings to promote autolytic debridement and will be as listed below     Cleansed with: Normal Saline    Protected skin with: Remedy Skin Repair    Dressings Applied: Viscopaste, 2 layer wrap    Compression Applied to the Left Le-Layer [place]    Compression Applied to the Right Le-Layer [place]    Offloading applied: Cane    Trial Products: no  Provider notified regarding concerns: yes  Treatment Changes: no    Educational Barriers:  none  Taught Regarding: Acitivity, Compliance, Compression and Dressing  Teaching Method: Exaplanation and Handout

## 2021-06-26 NOTE — PROGRESS NOTES
"Progress Notes by Caprice Talamantes LPN at 10/11/2018  8:30 AM     Author: Caprice Talamantes LPN Service: -- Author Type: Licensed Nurse    Filed: 10/11/2018 12:19 PM Encounter Date: 10/11/2018 Status: Signed    : Caprice Talamantes LPN (Licensed Nurse)       Nurse Visit            Chief Complaint: Patient presents to clinic for assessment and treatment of their ulcer and and swelling    Dressing on Arrival: viscopaste to right leg wound and dermafit with short stretch. Left leg dermafit with velcro compression.    Patient came in today for dressing change and 2 layer re wrap per order from Autumn Burnett CNP. Patient rated pain 0 out of 10. Removed dressings and cleansed skin with Remedy skin cleanser and cleaned wound bed with normal saline. Applied lotion to intact skin.  Applied viscopaste to wound and covered with ABD. Applied 2 layer compression wrap to bilateral legs. Patient tolerated dressing change well. Office visit note from 9/24/18 stated, \"viscopaste layers over the open ulcers and apply a Tubigrip with a comprilan on top followed by another Tubigrip. He will leave this on until 9/27 when he will follow up with a nurse visit to evaluate for infection and transition into viscopaste layers and a 2-layer wrap.\" Last updated order from 8/23/18 stated, \"For your legs, we applied layers of viscopaste over the open area on both.  We applied 2-layer wraps to both legs.\"       Allergies: No Known Allergies    Medications:   Current Outpatient Prescriptions:   ?  acetaminophen (TYLENOL) 325 MG tablet, Take 2 tablets (650 mg total) by mouth every 4 (four) hours as needed., Disp: , Rfl: 0  ?  atorvastatin (LIPITOR) 10 MG tablet, Take 10 mg by mouth every morning. , Disp: , Rfl:   ?  benzonatate (TESSALON) 100 MG capsule, Take 1 capsule (100 mg total) by mouth 3 (three) times a day as needed for cough., Disp: , Rfl: 0  ?  ferrous gluconate 325 mg (36 mg iron) Tab, Take 325 mg by mouth daily. , Disp: , " "Rfl:   ?  furosemide (LASIX) 40 MG tablet, Take 40 mg by mouth 2 (two) times a day., Disp: , Rfl:   ?  glipiZIDE (GLUCOTROL XL) 5 MG 24 hr tablet, Take 5 mg by mouth daily with breakfast. , Disp: , Rfl:   ?  Lactobacillus acidoph-L.bulgar (LACTINEX) 100 million cell packet, Take 1 packet by mouth 3 (three) times a day with meals., Disp: , Rfl: 0  ?  LANTUS SOLOSTAR 100 unit/mL (3 mL) pen, Inject 20 Units under the skin at bedtime., Disp: 3 mL, Rfl: PRN  ?  omeprazole (PRILOSEC) 20 MG capsule, Take 20 mg by mouth daily. , Disp: , Rfl:   ?  potassium chloride (KAYCIEL) 20 mEq/15 mL solution, Take 20 mEq by mouth daily. , Disp: , Rfl:   ?  sotalol (BETAPACE) 80 MG tablet, Take 80 mg by mouth 2 (two) times a day. , Disp: , Rfl:   ?  sulfamethoxazole-trimethoprim (SEPTRA DS) 800-160 mg per tablet, Take 1 tablet by mouth 2 (two) times a day for 10 days., Disp: 20 tablet, Rfl: 0  ?  vitamin A-vitamin C-vit E-min (OCUVITE) Tab tablet, Take 1 tablet by mouth daily. , Disp: , Rfl:   ?  warfarin (COUMADIN) 2 MG tablet, Take 1.5 tablets (3 mg total) by mouth daily., Disp: , Rfl: 0  ?  warfarin (COUMADIN) 5 MG tablet, 6 MG WED SAT, 5 MG OTHER DAYS ONCE A DAY ORALLY, Disp: , Rfl: 3    Vital Signs: /80  Pulse 64  Temp 97.8  F (36.6  C) (Oral)   Resp 16  Ht 6' 1\" (1.854 m) Comment: per pt report  Wt (!) 224 lb (101.6 kg) Comment: per pt report  BMI 29.55 kg/m2      Assessment:    General:  Patient presents to clinic in no apparent distress.  Psychiatric:  Alert and oriented .   Lower extremity:  edema is present.    Integumentary:  Skin is uniformly warm, dry and pink.    Wound size:   Urethral Catheter 16 Fr. (Active)       Wound 05/17/18 left medial ankle (Active)   Pre Size Length 0 10/11/2018  8:00 AM   Pre Size Width 0 10/11/2018  8:00 AM   Pre Size Depth 0 10/11/2018  8:00 AM   Pre Total Sq cm 0 10/11/2018  8:00 AM   Post Size Length 1 8/23/2018 10:00 AM   Post Size Width 1.5 8/23/2018 10:00 AM   Undermined n " 8/29/2018  9:00 AM   Tunneling n 8/29/2018  9:00 AM   Description red wound bed 8/29/2018  9:00 AM   Prodcut Used Viscopaste 8/29/2018  9:00 AM       VASC Wound 09/24/18 right lateral shin (Active)   Pre Size Length 0 10/11/2018  8:00 AM   Pre Size Width 0 10/11/2018  8:00 AM   Pre Size Depth 0 10/11/2018  8:00 AM   Pre Total Sq cm 0 10/11/2018  8:00 AM   Undermined n 9/24/2018  3:00 PM   Tunneling n 9/24/2018  3:00 PM   Prodcut Used Viscopaste 9/24/2018  3:00 PM       VASC Wound 09/24/18 right lower gator (Active)   Pre Size Length 0 10/11/2018  8:00 AM   Pre Size Width 0 10/11/2018  8:00 AM   Pre Size Depth 0 10/11/2018  8:00 AM   Pre Total Sq cm 0 10/11/2018  8:00 AM   Undermined n 9/24/2018  3:00 PM   Tunneling n 9/24/2018  3:00 PM       VASC Wound 09/27/18 Rt anterior lower shin (Active)   Pre Size Length 0.2 10/11/2018  8:00 AM   Pre Size Width 0.2 10/11/2018  8:00 AM   Pre Size Depth 0.1 10/11/2018  8:00 AM   Pre Total Sq cm 0.04 10/11/2018  8:00 AM       VASC Wound 09/27/18 Rt medial lower leg (Active)   Pre Size Length 0 10/11/2018  8:00 AM   Pre Size Width 0 10/11/2018  8:00 AM   Pre Size Depth 0 10/11/2018  8:00 AM   Pre Total Sq cm 0 10/11/2018  8:00 AM       Pressure Ulcer Leg Left;Right (Active)       Pressure Ulcer 06/21/16 Buttocks Left Stage II (Active)       Wound 05/07/16 Non-pressure related ulcer Leg Left (Active)       Incision 06/18/16 Abdomen (Active)       Incision 07/08/16 Lap site Abdomen Mid (Active)       Incision 07/08/16 Surgical Abdomen Right (Active)      Undermining is not present.    The periwoundskin is hemosiderin staining      Vasc Edema 8/23/2018 8/29/2018 9/13/2018 9/24/2018 10/11/2018   Right just above MTP 26.9 26.6 25 27.5 26.8   Right Ankle 26.7 24.6 23.5 28.9 27.5   Right Widest Calf 45.8 37.4 40.2 43.6 38.5   Right Thigh Up 10cm 54.4 53.2 52.2 - -   Left - just above MTP 26.7 26.1 24 26.2 27   Left Ankle 25.7 24.5 25.2 25.6 22.5   Left Widest Calf 38.3 35.4 36.2 40.7 34    Left Thigh Up 10cm 55.7 53.4 51.5 - -       Plan:         1. Patient will follow up on Monday with NP         2. Treatment provided will include irrigation and dressings to promote autolytic debridement and will be as listed below     Cleansed with: Normal Saline    Protected skin with: Remedy Skin Repair    Dressings Applied: viscopaste    Compression Applied to the Left Le-Layer Coban    Compression Applied to the Right Le-Layer Coban    Offloading applied: None    Trial Products: no  Provider notified regarding concerns: no  Treatment Changes: yes (per note from )    Educational Barriers: none  Taught Regarding: Acitivity, Compliance, Compression, Dressing, Hygiene and Pain control  Teaching Method: Exaplanation and DC sheet

## 2021-06-27 NOTE — PROGRESS NOTES
Progress Notes by Puja Grace RN at 7/16/2019  8:40 AM     Author: Puja Grace RN Service: -- Author Type: Registered Nurse    Filed: 7/16/2019 11:26 AM Encounter Date: 7/16/2019 Status: Signed    : Puja Grace RN (Registered Nurse)           RIght lateral lower leg 7/16              Nurse Visit    Chief Complaint: Patient presents to clinic for assement, and treatment of their right lateral leg ulcer and and swelling     Progress Note: Robinson comes to clinic today to have a new wound evaluated that is located on his right lateral lower leg. He states he first noticed it last week and thinks it started as a fluid filled blister. Today the wound is clean and beefy red in appearance. There is no warmth, erythema or pain at site or leg. Patient reports he is consistently wearing his velcro compression. Old tubigrip had quarter sized amount of bloody drainage from new wound. Applied silvercel and ABD pad and patient will follow up next week for nurse visit. Also scheduled follow up with Mei Sampson CNP for 3 weeks from now. Patient unable to schedule any of the open appointment slots until then. Discussed signs and symptoms of infection and let him know he should call clinic if he has any symptoms of infection. Will send photo to Mei Sampson CNP for wound care order recommendation.    Dressing on Arrival : no dressing; velcro compression bilaterally    Allergies:   Allergies   Allergen Reactions   ? Aspirin Nausea And Vomiting     GI upset       Medications:   Current Outpatient Medications:   ?  acetaminophen (TYLENOL) 325 MG tablet, Take 2 tablets (650 mg total) by mouth every 4 (four) hours as needed., Disp: , Rfl: 0  ?  atenolol (TENORMIN) 50 MG tablet, Take 50 mg by mouth., Disp: , Rfl:   ?  atorvastatin (LIPITOR) 10 MG tablet, Take 10 mg by mouth every morning. , Disp: , Rfl:   ?  benzonatate (TESSALON) 100 MG capsule, Take 1 capsule (100 mg total) by mouth 3 (three) times a day as needed for  cough., Disp: , Rfl: 0  ?  ferrous gluconate 325 mg (36 mg iron) Tab, Take 325 mg by mouth daily. , Disp: , Rfl:   ?  furosemide (LASIX) 40 MG tablet, Take 40 mg by mouth 2 (two) times a day., Disp: , Rfl:   ?  glipiZIDE (GLUCOTROL XL) 5 MG 24 hr tablet, Take 5 mg by mouth daily with breakfast. , Disp: , Rfl:   ?  LANTUS SOLOSTAR 100 unit/mL (3 mL) pen, Inject 20 Units under the skin at bedtime. (Patient taking differently: Inject 30 Units under the skin at bedtime.    ), Disp: 3 mL, Rfl: PRN  ?  metFORMIN (GLUCOPHAGE) 500 MG tablet, Take 500 mg by mouth daily., Disp: , Rfl: 0  ?  metOLazone (ZAROXOLYN) 5 MG tablet, Take 5 mg by mouth daily., Disp: , Rfl: 0  ?  nystatin (MYCOSTATIN) cream, Apply to irritated areas of skin twice a day until healed.  Do not apply to open ulcer(s)., Disp: 60 g, Rfl: 1  ?  omeprazole (PRILOSEC) 20 MG capsule, Take 20 mg by mouth daily. , Disp: , Rfl:   ?  potassium chloride (KAYCIEL) 20 mEq/15 mL solution, Take 20 mEq by mouth daily. , Disp: , Rfl:   ?  vitamin A-vitamin C-vit E-min (OCUVITE) Tab tablet, Take 1 tablet by mouth daily. , Disp: , Rfl:   ?  warfarin (COUMADIN/JANTOVEN) 2 MG tablet, Take 5 mg by mouth .   , Disp: , Rfl:     Current Facility-Administered Medications:   ?  lidocaine 2 % jelly (XYLOCAINE), , Topical, PRN, Lex, Autumn Blackwood, CNP, 1 application at 11/29/18 0818    Vital Signs: /62 (Patient Site: Left Arm, Patient Position: Semi-brwon, Cuff Size: Adult Regular)   Pulse 78   Temp 97.8  F (36.6  C) (Oral)   Resp 18       Assessment:    General:  Patient presents to clinic in no apparent distress.  Psychiatric:  Alert and oriented x3.   Lower extremity:  edema is present.    Integumentary:  Skin is uniformly warm, dry and pink.    Wound size:   Urethral Catheter 16 Fr. (Active)       VASC Wound 03/08/19 right lateral leg (Active)   Pre Size Length 2 3/8/2019 10:00 AM   Pre Size Width 4.6 3/8/2019 10:00 AM   Pre Size Depth 0.1 3/8/2019 10:00 AM   Pre  Total Sq cm 9.2 3/8/2019 10:00 AM   Undermined No 3/12/2019  9:00 AM   Tunneling No 3/12/2019  9:00 AM   Description beefy red wound bed, pink periwound 3/12/2019  9:00 AM   Prodcut Used ABD Pad 3/12/2019  9:00 AM       VASC Wound 03/12/19 Left lateral superior (lower) lower leg (Active)   Pre Size Length 2.2 3/15/2019  9:00 AM   Pre Size Width 3.8 3/15/2019  9:00 AM   Pre Size Depth 0.1 3/15/2019  9:00 AM   Undermined no 3/15/2019  9:00 AM   Tunneling no 3/15/2019  9:00 AM   Description healed 4/12/2019  8:00 AM   Prodcut Used ABD Pad 3/29/2019  9:00 AM       VASC Wound 03/12/19 Left lateral superior (upper) lower leg (Active)   Pre Size Length 0.8 3/15/2019  9:00 AM   Pre Size Width 0.7 3/15/2019  9:00 AM   Pre Size Depth 0.1 3/15/2019  9:00 AM   Pre Total Sq cm 0.56 3/15/2019  9:00 AM   Undermined no 3/15/2019  9:00 AM   Tunneling no 3/15/2019  9:00 AM   Description healed 4/12/2019  8:00 AM   Prodcut Used ABD Pad 3/12/2019  9:00 AM       VASC Wound 03/12/19 Right medial lower leg (Active)   Pre Size Length 4.4 3/15/2019  9:00 AM   Pre Size Width 8 3/15/2019  9:00 AM   Pre Size Depth 0.1 3/15/2019  9:00 AM   Pre Total Sq cm 35.2 3/15/2019  9:00 AM   Undermined no 3/15/2019  9:00 AM   Tunneling no 3/15/2019  9:00 AM   Description healed 4/12/2019  8:00 AM   Prodcut Used ABD Pad;Gauze 3/15/2019  9:00 AM       VASC Wound 03/12/19 Right lateral lower leg (Active)   Pre Size Length 1 3/22/2019  8:00 AM   Pre Size Width 1.6 3/22/2019  8:00 AM   Pre Size Depth 0.1 3/22/2019  8:00 AM   Pre Total Sq cm 1.6 3/22/2019  8:00 AM   Post Size Length 0.9 3/22/2019  8:00 AM   Post Size Width 1.2 3/22/2019  8:00 AM   Undermined No 3/12/2019  9:00 AM   Tunneling No 3/12/2019  9:00 AM   Description healed 4/12/2019  8:00 AM   Prodcut Used ABD Pad 3/12/2019  9:00 AM       VASC Wound 07/16/19 (Active)       VASC Wound 07/16/19 Right lateral lower leg (Active)   Pre Size Length 0.8 7/16/2019  9:00 AM   Pre Size Width 0.8 7/16/2019   "9:00 AM   Pre Size Depth 0.1 7/16/2019  9:00 AM   Pre Total Sq cm 0.64 7/16/2019  9:00 AM       Pressure Ulcer Leg Left;Right (Active)       Pressure Ulcer 06/21/16 Buttocks Left Stage II (Active)       Wound 05/07/16 Non-pressure related ulcer Leg Left (Active)       Incision 06/18/16 Abdomen (Active)       Incision 07/08/16 Lap site Abdomen Mid (Active)       Incision 07/08/16 Surgical Abdomen Right (Active)      Undermining is not present.    The periwoundskin is WNL     Plan:         1. Patient will in 1 weeke         2. Treatment provided will include irrigation and dressings to promote autolytic debridement and will be as listed below     Cleansed with: Normal Saline    Protected skin with: Remedy Skin Repair    Dressings Applied: Silver alginate/ABD    Compression Applied to the Left Leg: Velcro\", Compression Stockings    Compression Applied to the Right Leg: Velcro\", Compression Stockings    Offloading applied: None    Trial Products: no  Provider notified regarding concerns: yes  Treatment Changes: yes    Educational Barriers: none  Taught Regarding: Activity, Compliance, Compression and Dressing  Teaching Method: Explanation and Handout           "

## 2021-06-27 NOTE — PROGRESS NOTES
Progress Notes by Theresa Sequeira LPN at 3/29/2019  9:20 AM     Author: Theresa Sequeira LPN Service: -- Author Type: Licensed Nurse    Filed: 3/29/2019 12:04 PM Encounter Date: 3/29/2019 Status: Signed    : Theresa Sequeira LPN (Licensed Nurse)                 Nurse Visit    Chief Complaint: Patient presents to clinic for assement, and treatment of their ulcer has healed    Dressing on Arrival 2 layer    Allergies:   Allergies   Allergen Reactions   ? Aspirin Nausea And Vomiting     GI upset       Medications:   Current Outpatient Medications:   ?  acetaminophen (TYLENOL) 325 MG tablet, Take 2 tablets (650 mg total) by mouth every 4 (four) hours as needed., Disp: , Rfl: 0  ?  atenolol (TENORMIN) 50 MG tablet, Take 50 mg by mouth., Disp: , Rfl:   ?  atorvastatin (LIPITOR) 10 MG tablet, Take 10 mg by mouth every morning. , Disp: , Rfl:   ?  ferrous gluconate 325 mg (36 mg iron) Tab, Take 325 mg by mouth daily. , Disp: , Rfl:   ?  furosemide (LASIX) 40 MG tablet, Take 40 mg by mouth 2 (two) times a day., Disp: , Rfl:   ?  glipiZIDE (GLUCOTROL XL) 5 MG 24 hr tablet, Take 5 mg by mouth daily with breakfast. , Disp: , Rfl:   ?  LANTUS SOLOSTAR 100 unit/mL (3 mL) pen, Inject 20 Units under the skin at bedtime. (Patient taking differently: Inject 30 Units under the skin at bedtime.    ), Disp: 3 mL, Rfl: PRN  ?  omeprazole (PRILOSEC) 20 MG capsule, Take 20 mg by mouth daily. , Disp: , Rfl:   ?  potassium chloride (KAYCIEL) 20 mEq/15 mL solution, Take 20 mEq by mouth daily. , Disp: , Rfl:   ?  vitamin A-vitamin C-vit E-min (OCUVITE) Tab tablet, Take 1 tablet by mouth daily. , Disp: , Rfl:   ?  warfarin (COUMADIN/JANTOVEN) 2 MG tablet, Take 5 mg by mouth .   , Disp: , Rfl:   ?  benzonatate (TESSALON) 100 MG capsule, Take 1 capsule (100 mg total) by mouth 3 (three) times a day as needed for cough., Disp: , Rfl: 0  ?  metOLazone (ZAROXOLYN) 5 MG tablet, Take 5 mg by mouth daily., Disp: , Rfl: 0    Current  "Facility-Administered Medications:   ?  lidocaine 2 % jelly (XYLOCAINE), , Topical, PRN, Lex, Autumn Blackwood, CNP, 1 application at 11/29/18 0818    Vital Signs: /60 (Patient Site: Left Arm, Patient Position: Sitting, Cuff Size: Adult Regular)   Pulse 68   Temp 98.5  F (36.9  C) (Axillary)   Resp 24   Ht 6' 1\" (1.854 m)   Wt 207 lb (93.9 kg)   BMI 27.31 kg/m        Assessment:    General:  Patient presents to clinic in no apparent distress.  Psychiatric:  Alert and oriented x3.   Lower extremity:  edema is present.    Integumentary:  Skin is uniformly warm, dry and pink.    Wound size:   Urethral Catheter 16 Fr. (Active)       VASC Wound 03/08/19 right lateral leg (Active)   Pre Size Length 2 3/8/2019 10:00 AM   Pre Size Width 4.6 3/8/2019 10:00 AM   Pre Size Depth 0.1 3/8/2019 10:00 AM   Pre Total Sq cm 9.2 3/8/2019 10:00 AM   Undermined No 3/12/2019  9:00 AM   Tunneling No 3/12/2019  9:00 AM   Description beefy red wound bed, pink periwound 3/12/2019  9:00 AM   Prodcut Used ABD Pad 3/12/2019  9:00 AM       VASC Wound 03/12/19 Left lateral superior (lower) lower leg (Active)   Pre Size Length 2.2 3/15/2019  9:00 AM   Pre Size Width 3.8 3/15/2019  9:00 AM   Pre Size Depth 0.1 3/15/2019  9:00 AM   Undermined no 3/15/2019  9:00 AM   Tunneling no 3/15/2019  9:00 AM   Description healed 3/29/2019  9:00 AM   Prodcut Used ABD Pad 3/29/2019  9:00 AM       VASC Wound 03/12/19 Left lateral superior (upper) lower leg (Active)   Pre Size Length 0.8 3/15/2019  9:00 AM   Pre Size Width 0.7 3/15/2019  9:00 AM   Pre Size Depth 0.1 3/15/2019  9:00 AM   Pre Total Sq cm 0.56 3/15/2019  9:00 AM   Undermined no 3/15/2019  9:00 AM   Tunneling no 3/15/2019  9:00 AM   Description red wound bed 3/15/2019  9:00 AM   Prodcut Used ABD Pad 3/12/2019  9:00 AM       VASC Wound 03/12/19 Right medial lower leg (Active)   Pre Size Length 4.4 3/15/2019  9:00 AM   Pre Size Width 8 3/15/2019  9:00 AM   Pre Size Depth 0.1 3/15/2019  9:00 " "AM   Pre Total Sq cm 35.2 3/15/2019  9:00 AM   Undermined no 3/15/2019  9:00 AM   Tunneling no 3/15/2019  9:00 AM   Description red wound bed 3/15/2019  9:00 AM   Prodcut Used ABD Pad;Gauze 3/15/2019  9:00 AM       VASC Wound 03/12/19 Right lateral lower leg (Active)   Pre Size Length 1 3/22/2019  8:00 AM   Pre Size Width 1.6 3/22/2019  8:00 AM   Pre Size Depth 0.1 3/22/2019  8:00 AM   Pre Total Sq cm 1.6 3/22/2019  8:00 AM   Post Size Length 0.9 3/22/2019  8:00 AM   Post Size Width 1.2 3/22/2019  8:00 AM   Undermined No 3/12/2019  9:00 AM   Tunneling No 3/12/2019  9:00 AM   Description beefy red wound bed, pink periwound 3/12/2019  9:00 AM   Prodcut Used ABD Pad 3/12/2019  9:00 AM       Pressure Ulcer Leg Left;Right (Active)       Pressure Ulcer 06/21/16 Buttocks Left Stage II (Active)       Wound 05/07/16 Non-pressure related ulcer Leg Left (Active)       Incision 06/18/16 Abdomen (Active)       Incision 07/08/16 Lap site Abdomen Mid (Active)       Incision 07/08/16 Surgical Abdomen Right (Active)      Undermining is not present.    The periwoundskin is WNL      Plan:         1. Patient will in 2 weeks with NP 4/12/19         2. Treatment provided will include irrigation and dressings to promote autolytic debridement and will be as listed below     Cleansed with: Normal Saline    Protected skin with: Remedy Skin Repair    Dressings Applied: NA    Compression Applied to the Left Leg: Velcro\", Compression Stockings    Compression Applied to the Right Leg: Velcro\", Compression Stockings    Offloading applied: None    Trial Products: no  Provider notified regarding concerns: no  Treatment Changes: yes    Educational Barriers: none  Taught Regarding: Compression  Teaching Method: Exaplanation and DC sheet      Pt appeared so weak during nurse visit, vitlas were stable, and Pt's wife states that he has been so weak for a week, and he saw his primary doctor yestarday. Pt was on Zaroxolyn past few weeks, and made him so " weak. Nurse advice Pt s wife to update his primary doctor and if it continues to go to ER; encourage for intake fluids and solid food. Pt was A/O x4. No complaining of pain or discomfort; just weak. Pt was transferred to [facility], and NP, Autumn Faust was updated.

## 2021-06-27 NOTE — PROGRESS NOTES
"Progress Notes by Puja Grace RN at 3/26/2019  8:00 AM     Author: Puja Grace RN Service: -- Author Type: Registered Nurse    Filed: 3/26/2019  9:45 AM Encounter Date: 3/26/2019 Status: Signed    : Puja Grace RN (Registered Nurse)           Right anterior lower leg 3/26              Nurse Visit    Chief Complaint: Patient presents to clinic for assement, and treatment of their right lower leg ulcer and and swelling    Dressing on Arrival: 2-layer (right) ; Velcro and tubular compression (left)    Progress Note: Patient arrives in clinic today for 2-layer dressing change to right leg. Robinson states he has \"0\" pain in legs but has not been feeling well the last couple of days. He states he was constipated and then ate some prunes and it was like turning on a faucet. Vital signs are stable. He also reports that he did not sleep at all last night. Legs were cleansed and dressings applied per orders. He will follow up Friday for a nurse visit.    Allergies:   Allergies   Allergen Reactions   ? Aspirin Nausea And Vomiting     GI upset       Medications:   Current Outpatient Medications:   ?  acetaminophen (TYLENOL) 325 MG tablet, Take 2 tablets (650 mg total) by mouth every 4 (four) hours as needed., Disp: , Rfl: 0  ?  atenolol (TENORMIN) 50 MG tablet, Take 50 mg by mouth., Disp: , Rfl:   ?  atorvastatin (LIPITOR) 10 MG tablet, Take 10 mg by mouth every morning. , Disp: , Rfl:   ?  benzonatate (TESSALON) 100 MG capsule, Take 1 capsule (100 mg total) by mouth 3 (three) times a day as needed for cough., Disp: , Rfl: 0  ?  ferrous gluconate 325 mg (36 mg iron) Tab, Take 325 mg by mouth daily. , Disp: , Rfl:   ?  furosemide (LASIX) 40 MG tablet, Take 40 mg by mouth 2 (two) times a day., Disp: , Rfl:   ?  glipiZIDE (GLUCOTROL XL) 5 MG 24 hr tablet, Take 5 mg by mouth daily with breakfast. , Disp: , Rfl:   ?  LANTUS SOLOSTAR 100 unit/mL (3 mL) pen, Inject 20 Units under the skin at bedtime. (Patient taking " differently: Inject 24 Units under the skin at bedtime .   ), Disp: 3 mL, Rfl: PRN  ?  omeprazole (PRILOSEC) 20 MG capsule, Take 20 mg by mouth daily. , Disp: , Rfl:   ?  potassium chloride (KAYCIEL) 20 mEq/15 mL solution, Take 20 mEq by mouth daily. , Disp: , Rfl:   ?  vitamin A-vitamin C-vit E-min (OCUVITE) Tab tablet, Take 1 tablet by mouth daily. , Disp: , Rfl:   ?  warfarin (COUMADIN/JANTOVEN) 2 MG tablet, Take 5 mg by mouth .   , Disp: , Rfl:     Current Facility-Administered Medications:   ?  lidocaine 2 % jelly (XYLOCAINE), , Topical, PRN, Autumn Burnett, CNP, 1 application at 11/29/18 0818    Vital Signs: /78 (Patient Site: Left Arm, Patient Position: Sitting, Cuff Size: Adult Regular)   Pulse 78   Temp 98.6  F (37  C) (Oral)   Resp 20       Assessment:    General:  Patient presents to clinic in no apparent distress.  Psychiatric:  Alert and oriented x3.   Lower extremity:  edema is present.    Integumentary:  Skin is uniformly warm, dry and pink.    Wound size:   Urethral Catheter 16 Fr. (Active)       VASC Wound 03/08/19 right lateral leg (Active)   Pre Size Length 2 3/8/2019 10:00 AM   Pre Size Width 4.6 3/8/2019 10:00 AM   Pre Size Depth 0.1 3/8/2019 10:00 AM   Pre Total Sq cm 9.2 3/8/2019 10:00 AM   Undermined No 3/12/2019  9:00 AM   Tunneling No 3/12/2019  9:00 AM   Description beefy red wound bed, pink periwound 3/12/2019  9:00 AM   Prodcut Used ABD Pad 3/12/2019  9:00 AM       VASC Wound 03/12/19 Left lateral superior (lower) lower leg (Active)   Pre Size Length 2.2 3/15/2019  9:00 AM   Pre Size Width 3.8 3/15/2019  9:00 AM   Pre Size Depth 0.1 3/15/2019  9:00 AM   Undermined no 3/15/2019  9:00 AM   Tunneling no 3/15/2019  9:00 AM   Description red wound bed 3/15/2019  9:00 AM   Prodcut Used ABD Pad 3/12/2019  9:00 AM       VASC Wound 03/12/19 Left lateral superior (upper) lower leg (Active)   Pre Size Length 0.8 3/15/2019  9:00 AM   Pre Size Width 0.7 3/15/2019  9:00 AM   Pre Size  Depth 0.1 3/15/2019  9:00 AM   Pre Total Sq cm 0.56 3/15/2019  9:00 AM   Undermined no 3/15/2019  9:00 AM   Tunneling no 3/15/2019  9:00 AM   Description red wound bed 3/15/2019  9:00 AM   Prodcut Used ABD Pad 3/12/2019  9:00 AM       VASC Wound 19 Right medial lower leg (Active)   Pre Size Length 4.4 3/15/2019  9:00 AM   Pre Size Width 8 3/15/2019  9:00 AM   Pre Size Depth 0.1 3/15/2019  9:00 AM   Pre Total Sq cm 35.2 3/15/2019  9:00 AM   Undermined no 3/15/2019  9:00 AM   Tunneling no 3/15/2019  9:00 AM   Description red wound bed 3/15/2019  9:00 AM   Prodcut Used ABD Pad;Gauze 3/15/2019  9:00 AM       VASC Wound 19 Right lateral lower leg (Active)   Pre Size Length 1 3/22/2019  8:00 AM   Pre Size Width 1.6 3/22/2019  8:00 AM   Pre Size Depth 0.1 3/22/2019  8:00 AM   Pre Total Sq cm 1.6 3/22/2019  8:00 AM   Post Size Length 0.9 3/22/2019  8:00 AM   Post Size Width 1.2 3/22/2019  8:00 AM   Undermined No 3/12/2019  9:00 AM   Tunneling No 3/12/2019  9:00 AM   Description beefy red wound bed, pink periwound 3/12/2019  9:00 AM   Prodcut Used ABD Pad 3/12/2019  9:00 AM       Pressure Ulcer Leg Left;Right (Active)       Pressure Ulcer 16 Buttocks Left Stage II (Active)       Wound 16 Non-pressure related ulcer Leg Left (Active)       Incision 16 Abdomen (Active)       Incision 16 Lap site Abdomen Mid (Active)       Incision 16 Surgical Abdomen Right (Active)      Undermining is not present.    The periwoundskin is WNL      Plan:         1. Patient will in 2 days         2. Treatment provided will include irrigation and dressings to promote autolytic debridement and will be as listed below     Cleansed with: Normal Saline/Remedy skin repair cleanser    Protected skin with: Remedy skin repair cream    Dressings Applied: Collagen, xeroform, ABD    Compression Applied to the Left Leg: Tubigrip/velcro compression    Compression Applied to the Right Le-Layer Coban    Offloading  applied: None    Trial Products: no  Provider notified regarding concerns: no  Treatment Changes: no    Educational Barriers: hearing loss  Taught Regarding: Acitivity, Compliance, Compression and Dressing  Teaching Method: Exaplanation and Handout

## 2021-06-27 NOTE — PROGRESS NOTES
Progress Notes by Ben Mittal LPN at 3/12/2019  8:50 AM     Author: Ben Mittal LPN Service: -- Author Type: Licensed Nurse    Filed: 3/12/2019 12:52 PM Encounter Date: 3/12/2019 Status: Signed    : Ben Mittal LPN (Licensed Nurse)                 Nurse Visit    Chief Complaint: Patient presents to clinic for assement, and treatment of their ulcer and and swelling    Dressing on Arrival: xeroform, ABD pads, roll gauze, 2 layer compression wrap bilateral.    Allergies:   Allergies   Allergen Reactions   ? Aspirin Nausea And Vomiting     GI upset       Medications:   Current Outpatient Medications:   ?  acetaminophen (TYLENOL) 325 MG tablet, Take 2 tablets (650 mg total) by mouth every 4 (four) hours as needed., Disp: , Rfl: 0  ?  atenolol (TENORMIN) 50 MG tablet, Take 50 mg by mouth., Disp: , Rfl:   ?  atorvastatin (LIPITOR) 10 MG tablet, Take 10 mg by mouth every morning. , Disp: , Rfl:   ?  benzonatate (TESSALON) 100 MG capsule, Take 1 capsule (100 mg total) by mouth 3 (three) times a day as needed for cough., Disp: , Rfl: 0  ?  ferrous gluconate 325 mg (36 mg iron) Tab, Take 325 mg by mouth daily. , Disp: , Rfl:   ?  furosemide (LASIX) 40 MG tablet, Take 40 mg by mouth 2 (two) times a day., Disp: , Rfl:   ?  glipiZIDE (GLUCOTROL XL) 5 MG 24 hr tablet, Take 5 mg by mouth daily with breakfast. , Disp: , Rfl:   ?  LANTUS SOLOSTAR 100 unit/mL (3 mL) pen, Inject 20 Units under the skin at bedtime. (Patient taking differently: Inject 24 Units under the skin at bedtime .   ), Disp: 3 mL, Rfl: PRN  ?  omeprazole (PRILOSEC) 20 MG capsule, Take 20 mg by mouth daily. , Disp: , Rfl:   ?  potassium chloride (KAYCIEL) 20 mEq/15 mL solution, Take 20 mEq by mouth daily. , Disp: , Rfl:   ?  vitamin A-vitamin C-vit E-min (OCUVITE) Tab tablet, Take 1 tablet by mouth daily. , Disp: , Rfl:   ?  warfarin (COUMADIN/JANTOVEN) 2 MG tablet, Take 5 mg by mouth .   , Disp: , Rfl:     Current  Facility-Administered Medications:   ?  lidocaine 2 % jelly (XYLOCAINE), , Topical, PRN, Lex, Autumn Blackwood, CNP, 1 application at 11/29/18 0818    Vital Signs: /84 (Patient Site: Left Arm, Patient Position: Sitting, Cuff Size: Adult Regular)   Pulse 80   Temp 97.4  F (36.3  C) (Oral)   Resp 18       Assessment:    General:  Patient presents to clinic in no apparent distress.  Psychiatric:  Alert and oriented x3.   Lower extremity:  edema is present.    Integumentary:  Skin is uniformly warm, dry and pink.    Wound size:   Urethral Catheter 16 Fr. (Active)       VASC Wound 03/08/19 right lateral leg (Active)   Pre Size Length 2 3/8/2019 10:00 AM   Pre Size Width 4.6 3/8/2019 10:00 AM   Pre Size Depth 0.1 3/8/2019 10:00 AM   Pre Total Sq cm 9.2 3/8/2019 10:00 AM   Undermined No 3/12/2019  9:00 AM   Tunneling No 3/12/2019  9:00 AM   Description beefy red wound bed, pink periwound 3/12/2019  9:00 AM   Prodcut Used ABD Pad 3/12/2019  9:00 AM       VASC Wound 03/12/19 Left lateral inferior leg (Active)   Pre Size Length 0.9 3/12/2019  9:00 AM   Pre Size Width 2.1 3/12/2019  9:00 AM   Pre Size Depth 0.1 3/12/2019  9:00 AM   Undermined No 3/12/2019  9:00 AM   Tunneling No 3/12/2019  9:00 AM   Description beefy red wound bed, pink periwound 3/12/2019  9:00 AM   Prodcut Used ABD Pad 3/12/2019  9:00 AM       VASC Wound 03/12/19 Left lateral superior (lower) lower leg (Active)   Pre Size Length 2.9 3/12/2019  9:00 AM   Pre Size Width 4.5 3/12/2019  9:00 AM   Pre Size Depth 0.1 3/12/2019  9:00 AM   Undermined No 3/12/2019  9:00 AM   Tunneling No 3/12/2019  9:00 AM   Description beefy red wound bed, pink cyrus wound 3/12/2019  9:00 AM   Prodcut Used ABD Pad 3/12/2019  9:00 AM       VASC Wound 03/12/19 Left lateral superior (upper) lower leg (Active)   Pre Size Length 1.2 3/12/2019  9:00 AM   Pre Size Width 0.5 3/12/2019  9:00 AM   Pre Size Depth 0.1 3/12/2019  9:00 AM   Undermined No 3/12/2019  9:00 AM   Tunneling No  3/12/2019  9:00 AM   Description beefy red wound bed, pink periwound 3/12/2019  9:00 AM   Prodcut Used ABD Pad 3/12/2019  9:00 AM       VASC Wound 19 Right medial lower leg (Active)   Pre Size Length 4.8 3/12/2019  9:00 AM   Pre Size Width 9 3/12/2019  9:00 AM   Pre Size Depth 0.1 3/12/2019  9:00 AM   Undermined No 3/12/2019  9:00 AM   Tunneling No 3/12/2019  9:00 AM   Description beefy red wound bed, pink periwound 3/12/2019  9:00 AM   Prodcut Used ABD Pad 3/12/2019  9:00 AM       VASC Wound 19 Right lateral lower leg (Active)   Pre Size Length 3.1 3/12/2019  9:00 AM   Pre Size Width 6.2 3/12/2019  9:00 AM   Pre Size Depth 0.1 3/12/2019  9:00 AM   Undermined No 3/12/2019  9:00 AM   Tunneling No 3/12/2019  9:00 AM   Description beefy red wound bed, pink periwound 3/12/2019  9:00 AM   Prodcut Used ABD Pad 3/12/2019  9:00 AM       Pressure Ulcer Leg Left;Right (Active)       Pressure Ulcer 16 Buttocks Left Stage II (Active)       Wound 16 Non-pressure related ulcer Leg Left (Active)       Incision 16 Abdomen (Active)       Incision 16 Lap site Abdomen Mid (Active)       Incision 16 Surgical Abdomen Right (Active)      Undermining is not present.    The periwoundskin is hemosiderin, pink, and maceration      Plan:         1. Patient will follow up in 3 days         2. Treatment provided will include irrigation and dressings to promote autolytic debridement and will be as listed below     Cleansed with: Normal Saline    Protected skin with: Remedy Skin Repair    Dressings Applied: xeroform, ABD pad, and roll gauze.    Compression Applied to the Left Le-Layer Coban    Compression Applied to the Right Le-Layer Coban    Offloading applied: None    Trial Products: no  Provider notified regarding concerns: no  Treatment Changes: no    Educational Barriers: none  Taught Regarding: Acitivity, Compliance, Compression and Dressing  Teaching Method: Exaplanation                        Ben Mittal LPN 03/12/19 1250

## 2021-06-27 NOTE — PROGRESS NOTES
Progress Notes by Caprice Talamantes LPN at 3/18/2019 11:20 AM     Author: Caprice Talamantes LPN Service: -- Author Type: Licensed Nurse    Filed: 3/18/2019  3:11 PM Encounter Date: 3/18/2019 Status: Signed    : Caprice Talamantes LPN (Licensed Nurse)       Nurse Visit    L  Left lateral leg      Left medial leg      Right lateral leg      Chief Complaint: Patient presents to clinic for assessment and treatment of their ulcer and and swelling    Dressing on Arrival: 2 layer bilaterally, ABD pad and xeroform to weeping areas    Patient came in today for dressing change and 2 layer re-wrap per order from Autumn Burnett. Patient rated pain 2 out of 10 in right leg. Removed dressings and cleansed skin with Remedy skin cleanser and cleansed wound bed with normal saline. Applied lotion to intact skin.  Applied xeroform to weeping areas and covered with ABD pad. Re-wrapped 2 layer compression wrap to bilateral legs. Patient tolerated dressing change well.     Allergies:   Allergies   Allergen Reactions   ? Aspirin Nausea And Vomiting     GI upset       Medications:   Current Outpatient Medications:   ?  acetaminophen (TYLENOL) 325 MG tablet, Take 2 tablets (650 mg total) by mouth every 4 (four) hours as needed., Disp: , Rfl: 0  ?  atenolol (TENORMIN) 50 MG tablet, Take 50 mg by mouth., Disp: , Rfl:   ?  atorvastatin (LIPITOR) 10 MG tablet, Take 10 mg by mouth every morning. , Disp: , Rfl:   ?  benzonatate (TESSALON) 100 MG capsule, Take 1 capsule (100 mg total) by mouth 3 (three) times a day as needed for cough., Disp: , Rfl: 0  ?  ferrous gluconate 325 mg (36 mg iron) Tab, Take 325 mg by mouth daily. , Disp: , Rfl:   ?  furosemide (LASIX) 40 MG tablet, Take 40 mg by mouth 2 (two) times a day., Disp: , Rfl:   ?  glipiZIDE (GLUCOTROL XL) 5 MG 24 hr tablet, Take 5 mg by mouth daily with breakfast. , Disp: , Rfl:   ?  LANTUS SOLOSTAR 100 unit/mL (3 mL) pen, Inject 20 Units under the skin at bedtime. (Patient taking  differently: Inject 24 Units under the skin at bedtime .   ), Disp: 3 mL, Rfl: PRN  ?  omeprazole (PRILOSEC) 20 MG capsule, Take 20 mg by mouth daily. , Disp: , Rfl:   ?  potassium chloride (KAYCIEL) 20 mEq/15 mL solution, Take 20 mEq by mouth daily. , Disp: , Rfl:   ?  vitamin A-vitamin C-vit E-min (OCUVITE) Tab tablet, Take 1 tablet by mouth daily. , Disp: , Rfl:   ?  warfarin (COUMADIN/JANTOVEN) 2 MG tablet, Take 5 mg by mouth .   , Disp: , Rfl:     Current Facility-Administered Medications:   ?  lidocaine 2 % jelly (XYLOCAINE), , Topical, PRN, Autumn Burnett, CNP, 1 application at 11/29/18 0818    Vital Signs: /72   Pulse 64   Temp 97.4  F (36.3  C) (Oral)   Resp 16       Assessment:    General:  Patient presents to clinic in no apparent distress.  Psychiatric:  Alert and oriented .   Lower extremity:  edema is present.    Integumentary:  Skin is uniformly warm, dry and pink.    Wound size:   Urethral Catheter 16 Fr. (Active)       VASC Wound 03/08/19 right lateral leg (Active)   Pre Size Length 2 3/8/2019 10:00 AM   Pre Size Width 4.6 3/8/2019 10:00 AM   Pre Size Depth 0.1 3/8/2019 10:00 AM   Pre Total Sq cm 9.2 3/8/2019 10:00 AM   Undermined No 3/12/2019  9:00 AM   Tunneling No 3/12/2019  9:00 AM   Description beefy red wound bed, pink periwound 3/12/2019  9:00 AM   Prodcut Used ABD Pad 3/12/2019  9:00 AM       VASC Wound 03/12/19 Left lateral inferior leg (Active)   Pre Size Length 0.9 3/15/2019  9:00 AM   Pre Size Width 2.1 3/15/2019  9:00 AM   Pre Size Depth 0.1 3/15/2019  9:00 AM   Pre Total Sq cm 1.89 3/15/2019  9:00 AM   Undermined no 3/15/2019  9:00 AM   Tunneling no 3/15/2019  9:00 AM   Description beefy red wound bed, pink periwound 3/12/2019  9:00 AM   Prodcut Used ABD Pad 3/15/2019  9:00 AM       VASC Wound 03/12/19 Left lateral superior (lower) lower leg (Active)   Pre Size Length 2.2 3/15/2019  9:00 AM   Pre Size Width 3.8 3/15/2019  9:00 AM   Pre Size Depth 0.1 3/15/2019  9:00 AM    Undermined no 3/15/2019  9:00 AM   Tunneling no 3/15/2019  9:00 AM   Description red wound bed 3/15/2019  9:00 AM   Prodcut Used ABD Pad 3/12/2019  9:00 AM       VASC Wound 03/12/19 Left lateral superior (upper) lower leg (Active)   Pre Size Length 0.8 3/15/2019  9:00 AM   Pre Size Width 0.7 3/15/2019  9:00 AM   Pre Size Depth 0.1 3/15/2019  9:00 AM   Pre Total Sq cm 0.56 3/15/2019  9:00 AM   Undermined no 3/15/2019  9:00 AM   Tunneling no 3/15/2019  9:00 AM   Description red wound bed 3/15/2019  9:00 AM   Prodcut Used ABD Pad 3/12/2019  9:00 AM       VASC Wound 03/12/19 Right medial lower leg (Active)   Pre Size Length 4.4 3/15/2019  9:00 AM   Pre Size Width 8 3/15/2019  9:00 AM   Pre Size Depth 0.1 3/15/2019  9:00 AM   Pre Total Sq cm 35.2 3/15/2019  9:00 AM   Undermined no 3/15/2019  9:00 AM   Tunneling no 3/15/2019  9:00 AM   Description red wound bed 3/15/2019  9:00 AM   Prodcut Used ABD Pad;Gauze 3/15/2019  9:00 AM       VASC Wound 03/12/19 Right lateral lower leg (Active)   Pre Size Length 3.3 3/15/2019  9:00 AM   Pre Size Width 5.4 3/15/2019  9:00 AM   Pre Size Depth 0.1 3/15/2019  9:00 AM   Pre Total Sq cm 17.82 3/15/2019  9:00 AM   Undermined No 3/12/2019  9:00 AM   Tunneling No 3/12/2019  9:00 AM   Description beefy red wound bed, pink periwound 3/12/2019  9:00 AM   Prodcut Used ABD Pad 3/12/2019  9:00 AM       Pressure Ulcer Leg Left;Right (Active)       Pressure Ulcer 06/21/16 Buttocks Left Stage II (Active)       Wound 05/07/16 Non-pressure related ulcer Leg Left (Active)       Incision 06/18/16 Abdomen (Active)       Incision 07/08/16 Lap site Abdomen Mid (Active)       Incision 07/08/16 Surgical Abdomen Right (Active)      Undermining is not present.    The periwoundskin is pink      Vasc Edema 12/20/2018 1/3/2019 1/10/2019 3/8/2019 3/18/2019   Right just above MTP 26.5 25.4 26 27 26.8   Right Ankle 26 23.2 23.5 27.5 26.5   Right Widest Calf 34.5 34.4 35.2 47 44.5   Right Thigh Up 10cm 50 49.5 49 -  -   Left - just above MTP 25 25.3 25.7 26.5 24.5   Left Ankle 26 22.5 22.5 26,5 24.7   Left Widest Calf 30.4 33.2 34.5 43 39   Left Thigh Up 10cm 48.5 48.5 49 - -       Plan:         1. Patient will follow up on 3/22/19         2. Treatment provided will include irrigation and dressings to promote autolytic debridement and will be as listed below     Cleansed with: Normal Saline    Protected skin with: Remedy Skin Repair    Dressings Applied: xeroform and ABD pad    Compression Applied to the Left Le-Layer Coban    Compression Applied to the Right Le-Layer Coban    Offloading applied: None    Trial Products: no  Provider notified regarding concerns: no  Treatment Changes: no    Educational Barriers: none  Taught Regarding: Acitivity, Compliance, Compression and Dressing  Teaching Method: Exaplanation and DC sheet

## 2021-06-27 NOTE — PROGRESS NOTES
"Progress Notes by Diaz Mulligan RN at 12/20/2018  9:20 AM     Author: Diaz Mulligan RN Service: -- Author Type: Registered Nurse    Filed: 12/20/2018 10:10 AM Encounter Date: 12/20/2018 Status: Signed    : Diaz Mulligan RN (Registered Nurse)               Bilateral leg swelling and flaking skin improving.   Nurse Visit      Date of Service:12/20/2018    Chief Complaint: Patient presents to clinic for evaluation of their ulcer and and swelling    Dressing on Arrival bilateral 2 layers/viscopaste. Stayed in place      Allergies: Patient has no known allergies.    Assessment:    /70   Pulse 72   Temp 98  F (36.7  C) (Oral)   Resp 12   Ht 6' 1\" (1.854 m)   Wt (!) 223 lb (101.2 kg)   BMI 29.42 kg/m      General:  Patient presents to clinic in no apparent distress.  Psychiatric:  Alert and oriented x3.   Lower extremity:  edema is and is not present.    Integumentary:  Skin is uniformly warm, dry and pink.    Wound size:   Urethral Catheter 16 Fr. (Active)       VASC Wound 11/29/18 left lateral leg (Active)   Pre Size Length 2.5 12/6/2018 10:00 AM   Pre Size Width 4 12/6/2018 10:00 AM   Pre Size Depth 0.1 12/6/2018 10:00 AM   Pre Total Sq cm 10 12/6/2018 10:00 AM       VASC Wound 11/29/18 right medial leg (Active)   Pre Size Length 0.5 12/6/2018 10:00 AM   Pre Size Width 1 12/6/2018 10:00 AM   Pre Size Depth 0.1 12/6/2018 10:00 AM   Pre Total Sq cm 0.5 12/6/2018 10:00 AM       VASC Wound 11/29/18 right anterior leg (Active)   Pre Size Length 0.8 12/6/2018 10:00 AM   Pre Size Width 0.8 12/6/2018 10:00 AM   Pre Size Depth 0.1 12/6/2018 10:00 AM   Pre Total Sq cm 0.64 12/6/2018 10:00 AM       Pressure Ulcer Leg Left;Right (Active)       Pressure Ulcer 06/21/16 Buttocks Left Stage II (Active)       Wound 05/07/16 Non-pressure related ulcer Leg Left (Active)       Incision 06/18/16 Abdomen (Active)       Incision 07/08/16 Lap site Abdomen Mid (Active)       Incision 07/08/16 Surgical Abdomen Right " (Active)      Undermining none.    The periwoundskin is WNL, denudement, erythema, induration, maceration and or warmth      Plan:         1. Patient will Follow up Thursday [place]         2. Treatment provided will include irrigation and dressings to promote autolytic debridement and will be viscopaste     Cleansed with: Clinical Care/Charles  Protected skin with: lotion  Applied: foam  Packed/filled with: viscopaste  Covered with: 2-layer compression bandage  Secured with: tape/nylon    Vasc Edema 10/11/2018 10/15/2018 11/29/2018 12/13/2018 12/20/2018   Right just above MTP 26.8 26.4 28.2 27 26.5   Right Ankle 27.5 25.4 28.8 27.5 26   Right Widest Calf 38.5 35 46.5 39.5 34.5   Right Thigh Up 10cm - - - - 50   Left - just above MTP 27 25.8 28.4 26.4 25   Left Ankle 22.5 25 28.3 28.4 26   Left Widest Calf 34 33.8 41.6 - 30.4   Left Thigh Up 10cm - - - - 48.5

## 2021-06-27 NOTE — PROGRESS NOTES
Progress Notes by Roma Almanza LPN at 7/24/2019  8:00 AM     Author: Roma Almanza LPN Service: -- Author Type: Licensed Nurse    Filed: 7/24/2019  8:27 AM Encounter Date: 7/24/2019 Status: Signed    : Roma Almanza LPN (Licensed Nurse)                         Nurse Visit    Chief Complaint: Patient presents to clinic for assement, and treatment of their ulcer and and swelling    Dressing on Arrival Silvercel, ABD, roll gauze    Allergies:   Allergies   Allergen Reactions   ? Aspirin Nausea And Vomiting     GI upset       Medications:   Current Outpatient Medications:   ?  acetaminophen (TYLENOL) 325 MG tablet, Take 2 tablets (650 mg total) by mouth every 4 (four) hours as needed., Disp: , Rfl: 0  ?  atenolol (TENORMIN) 50 MG tablet, Take 50 mg by mouth., Disp: , Rfl:   ?  atorvastatin (LIPITOR) 10 MG tablet, Take 10 mg by mouth every morning. , Disp: , Rfl:   ?  benzonatate (TESSALON) 100 MG capsule, Take 1 capsule (100 mg total) by mouth 3 (three) times a day as needed for cough., Disp: , Rfl: 0  ?  ferrous gluconate 325 mg (36 mg iron) Tab, Take 325 mg by mouth daily. , Disp: , Rfl:   ?  furosemide (LASIX) 40 MG tablet, Take 40 mg by mouth 2 (two) times a day., Disp: , Rfl:   ?  glipiZIDE (GLUCOTROL XL) 5 MG 24 hr tablet, Take 5 mg by mouth daily with breakfast. , Disp: , Rfl:   ?  LANTUS SOLOSTAR 100 unit/mL (3 mL) pen, Inject 20 Units under the skin at bedtime. (Patient taking differently: Inject 30 Units under the skin at bedtime.    ), Disp: 3 mL, Rfl: PRN  ?  metFORMIN (GLUCOPHAGE) 500 MG tablet, Take 500 mg by mouth daily., Disp: , Rfl: 0  ?  metOLazone (ZAROXOLYN) 5 MG tablet, Take 5 mg by mouth daily., Disp: , Rfl: 0  ?  nystatin (MYCOSTATIN) cream, Apply to irritated areas of skin twice a day until healed.  Do not apply to open ulcer(s)., Disp: 60 g, Rfl: 1  ?  omeprazole (PRILOSEC) 20 MG capsule, Take 20 mg by mouth daily. , Disp: , Rfl:   ?  potassium chloride  (PAPITOIEL) 20 mEq/15 mL solution, Take 20 mEq by mouth daily. , Disp: , Rfl:   ?  vitamin A-vitamin C-vit E-min (OCUVITE) Tab tablet, Take 1 tablet by mouth daily. , Disp: , Rfl:   ?  warfarin (COUMADIN/JANTOVEN) 2 MG tablet, Take 5 mg by mouth .   , Disp: , Rfl:     Current Facility-Administered Medications:   ?  lidocaine 2 % jelly (XYLOCAINE), , Topical, PRN, Lex, Autumn Blackwood, CNP, 1 application at 11/29/18 0818    Vital Signs: /78   Pulse 60   Temp 97.7  F (36.5  C)   Resp 18       Assessment:    General:  Patient presents to clinic in no apparent distress.  Psychiatric:  Alert and oriented x3.   Lower extremity:  edema is present.    Integumentary:  Skin is uniformly warm, dry and pink.    Wound size:   Urethral Catheter 16 Fr. (Active)       VASC Wound 03/08/19 right lateral leg (Active)   Pre Size Length 2 3/8/2019 10:00 AM   Pre Size Width 4.6 3/8/2019 10:00 AM   Pre Size Depth 0.1 3/8/2019 10:00 AM   Pre Total Sq cm 9.2 3/8/2019 10:00 AM   Undermined No 3/12/2019  9:00 AM   Tunneling No 3/12/2019  9:00 AM   Description beefy red wound bed, pink periwound 3/12/2019  9:00 AM   Prodcut Used ABD Pad 3/12/2019  9:00 AM       VASC Wound 03/12/19 Left lateral superior (lower) lower leg (Active)   Pre Size Length 2.2 3/15/2019  9:00 AM   Pre Size Width 3.8 3/15/2019  9:00 AM   Pre Size Depth 0.1 3/15/2019  9:00 AM   Undermined no 3/15/2019  9:00 AM   Tunneling no 3/15/2019  9:00 AM   Description healed 4/12/2019  8:00 AM   Prodcut Used ABD Pad 3/29/2019  9:00 AM       VASC Wound 03/12/19 Left lateral superior (upper) lower leg (Active)   Pre Size Length 0.8 3/15/2019  9:00 AM   Pre Size Width 0.7 3/15/2019  9:00 AM   Pre Size Depth 0.1 3/15/2019  9:00 AM   Pre Total Sq cm 0.56 3/15/2019  9:00 AM   Undermined no 3/15/2019  9:00 AM   Tunneling no 3/15/2019  9:00 AM   Description healed 4/12/2019  8:00 AM   Prodcut Used ABD Pad 3/12/2019  9:00 AM       VASC Wound 03/12/19 Right medial lower leg (Active)  "  Pre Size Length 4.4 3/15/2019  9:00 AM   Pre Size Width 8 3/15/2019  9:00 AM   Pre Size Depth 0.1 3/15/2019  9:00 AM   Pre Total Sq cm 35.2 3/15/2019  9:00 AM   Undermined no 3/15/2019  9:00 AM   Tunneling no 3/15/2019  9:00 AM   Description healed 4/12/2019  8:00 AM   Prodcut Used ABD Pad;Gauze 3/15/2019  9:00 AM       VASC Wound 03/12/19 Right lateral lower leg (Active)   Pre Size Length 1 3/22/2019  8:00 AM   Pre Size Width 1.6 3/22/2019  8:00 AM   Pre Size Depth 0.1 3/22/2019  8:00 AM   Pre Total Sq cm 1.6 3/22/2019  8:00 AM   Post Size Length 0.9 3/22/2019  8:00 AM   Post Size Width 1.2 3/22/2019  8:00 AM   Undermined No 3/12/2019  9:00 AM   Tunneling No 3/12/2019  9:00 AM   Description healed 4/12/2019  8:00 AM   Prodcut Used ABD Pad 3/12/2019  9:00 AM       VASC Wound 07/16/19 (Active)       VASC Wound 07/16/19 Right lateral lower leg (Active)   Pre Size Length 0.8 7/16/2019  9:00 AM   Pre Size Width 0.8 7/16/2019  9:00 AM   Pre Size Depth 0.1 7/16/2019  9:00 AM   Pre Total Sq cm 0.64 7/16/2019  9:00 AM   Description healed 7/24/2019  8:00 AM       Pressure Ulcer Leg Left;Right (Active)       Pressure Ulcer 06/21/16 Buttocks Left Stage II (Active)       Wound 05/07/16 Non-pressure related ulcer Leg Left (Active)       Incision 06/18/16 Abdomen (Active)       Incision 07/08/16 Lap site Abdomen Mid (Active)       Incision 07/08/16 Surgical Abdomen Right (Active)      Undermining is not present.    The periwoundskin is WNL      Plan:         1. Patient will f/u with [facility] in 2 weeks         2. Treatment provided will include irrigation and dressings to promote autolytic debridement and will be as listed below     Cleansed with: Normal Saline    Protected skin with: Remedy Skin Repair    Dressings Applied: No drsg applied; wound healed    Compression Applied to the Left Leg: Velcro\", Compression Stockings    Compression Applied to the Right Leg: Velcro\", Compression Stockings    Offloading applied: None    Trial " Products: no  Provider notified regarding concerns: no  Treatment Changes: no    Educational Barriers: none  Taught Regarding: Activity, Compliance and Compression  Teaching Method: DC sheet and explanation

## 2021-06-28 NOTE — PROGRESS NOTES
Progress Notes by Kristen Sandoval LPN at 9/27/2019  9:20 AM     Author: Kristen Sandoval LPN Service: -- Author Type: Licensed Nurse    Filed: 9/27/2019 10:02 AM Encounter Date: 9/27/2019 Status: Signed    : Kristen Sandoval LPN (Licensed Nurse)                             Nurse Visit    Chief Complaint: Patient presents to clinic for assement, and treatment of their ulcer and and swelling    Dressing on Arrival bilateral 2-layer dry and intact upon arrival     Allergies:   Allergies   Allergen Reactions   ? Aspirin Nausea And Vomiting     GI upset       Medications:   Current Outpatient Medications:   ?  acetaminophen (TYLENOL) 325 MG tablet, Take 2 tablets (650 mg total) by mouth every 4 (four) hours as needed., Disp: , Rfl: 0  ?  atenolol (TENORMIN) 50 MG tablet, Take 50 mg by mouth., Disp: , Rfl:   ?  atorvastatin (LIPITOR) 10 MG tablet, Take 10 mg by mouth every morning. , Disp: , Rfl:   ?  benzonatate (TESSALON) 100 MG capsule, Take 1 capsule (100 mg total) by mouth 3 (three) times a day as needed for cough., Disp: , Rfl: 0  ?  ferrous gluconate 325 mg (36 mg iron) Tab, Take 325 mg by mouth daily. , Disp: , Rfl:   ?  furosemide (LASIX) 40 MG tablet, Take 40 mg by mouth 2 (two) times a day., Disp: , Rfl:   ?  glipiZIDE (GLUCOTROL XL) 5 MG 24 hr tablet, Take 5 mg by mouth daily with breakfast. , Disp: , Rfl:   ?  LANTUS SOLOSTAR 100 unit/mL (3 mL) pen, Inject 20 Units under the skin at bedtime. (Patient taking differently: Inject 30 Units under the skin at bedtime.    ), Disp: 3 mL, Rfl: PRN  ?  metFORMIN (GLUCOPHAGE) 500 MG tablet, Take 500 mg by mouth daily., Disp: , Rfl: 0  ?  metOLazone (ZAROXOLYN) 5 MG tablet, Take 5 mg by mouth daily., Disp: , Rfl: 0  ?  nystatin (MYCOSTATIN) cream, Apply to irritated areas of skin twice a day until healed.  Do not apply to open ulcer(s)., Disp: 60 g, Rfl: 1  ?  omeprazole (PRILOSEC) 20 MG capsule, Take 20 mg by mouth daily. , Disp: , Rfl:   ?  potassium  chloride (KAYCIEL) 20 mEq/15 mL solution, Take 20 mEq by mouth daily. , Disp: , Rfl:   ?  vitamin A-vitamin C-vit E-min (OCUVITE) Tab tablet, Take 1 tablet by mouth daily. , Disp: , Rfl:   ?  warfarin (COUMADIN/JANTOVEN) 2 MG tablet, Take 5 mg by mouth .   , Disp: , Rfl:     Current Facility-Administered Medications:   ?  lidocaine 2 % jelly (XYLOCAINE), , Topical, PRN, Lex, Autumn Blackwood, CNP, 1 application at 11/29/18 0818    Vital Signs: /82   Pulse 78   Temp 97.9  F (36.6  C)   Resp 16       Assessment:    General:  Patient presents to clinic in no apparent distress.  Psychiatric:  Alert and oriented x3.   Lower extremity:  edema is present.    Integumentary:  Skin is uniformly warm, dry and pink.    Wound size:   Urethral Catheter 16 Fr. (Active)       VASC Wound 09/25/19 left lateral ankle -  (Active)   Description blister 9/27/2019  9:00 AM       VASC Wound 09/25/19 right medial ankle (Active)   Pre Size Length 4.5 9/27/2019  9:00 AM   Pre Size Width 6.2 9/27/2019  9:00 AM   Pre Size Depth 0.1 9/27/2019  9:00 AM       VASC Wound 09/25/19 right lateral leg superior (Active)   Pre Size Length 2.4 9/27/2019  9:00 AM   Pre Size Width 5.5 9/27/2019  9:00 AM   Pre Size Depth 0.1 9/27/2019  9:00 AM       VASC Wound 09/25/19 right lateral ankle inferior (Active)   Pre Size Length 2.2 9/27/2019  9:00 AM   Pre Size Width 3.5 9/27/2019  9:00 AM   Pre Size Depth 0.1 9/27/2019  9:00 AM       Pressure Ulcer Leg Left;Right (Active)       Pressure Ulcer 06/21/16 Buttocks Left Stage II (Active)       Wound 05/07/16 Non-pressure related ulcer Leg Left (Active)       Incision 06/18/16 Abdomen (Active)       Incision 07/08/16 Lap site Abdomen Mid (Active)       Incision 07/08/16 Surgical Abdomen Right (Active)      Undermining is not present.    The periwoundskin is WNL      Plan:         1. Patient will follow up in 4  days         2. Treatment provided will include irrigation and dressings to promote autolytic  debridement and will be as listed below     Cleansed with: Normal Saline    Protected skin with: Remedy Skin Repair    Dressings Applied: xeroform, ABD pad, and roll gauze     Compression Applied to the Left Le-Layer Coban    Compression Applied to the Right Le-Layer Coban    Offloading applied: None    Trial Products: no  Provider notified regarding concerns: no  Treatment Changes: no    Educational Barriers: none  Taught Regarding: Activity, Compliance, Compression and Dressing  Teaching Method: Explanation and DC sheet       Compression Applied to Bilateral  2-Layer Coban: I Applied the inner foam layer with the foot dorsiflexed and started atthe base of the fifth metatarsal head. I left the bottom of the heel exposed, and proceed by winding the foam up the leg using minimal overlap to just below the fibular head. I then applied the compression layer with the foot dorsiflexed and startingat the base of the fifth metatarsal head. I applied at full stretch and proceeded up the leg using 50% overlap. The bottom of the heel is covered with the compression layer up to the end at the fibular head just below the back of the knee and levelwith the top edge of the foam layer.  I gently pressed and conformed the entire surface of the system to ensurethat the two layers are firmly bound together

## 2021-06-28 NOTE — PROGRESS NOTES
Progress Notes by Theresa Sequeira LPN at 10/4/2019  8:00 AM     Author: Theresa Sequeira LPN Service: -- Author Type: Licensed Nurse    Filed: 10/4/2019  9:03 AM Encounter Date: 10/4/2019 Status: Signed    : Theresa Sequeira LPN (Licensed Nurse)                             Nurse Visit    Chief Complaint: Patient presents to clinic for assement, and treatment of their ulcer and and swelling    Dressing on Arrival 2 layer wrap intact and xeroform into wound ,covered with ABD pad    Allergies:   Allergies   Allergen Reactions   ? Aspirin Nausea And Vomiting     GI upset       Medications:   Current Outpatient Medications:   ?  acetaminophen (TYLENOL) 325 MG tablet, Take 2 tablets (650 mg total) by mouth every 4 (four) hours as needed., Disp: , Rfl: 0  ?  atenolol (TENORMIN) 50 MG tablet, Take 50 mg by mouth., Disp: , Rfl:   ?  atorvastatin (LIPITOR) 10 MG tablet, Take 10 mg by mouth every morning. , Disp: , Rfl:   ?  benzonatate (TESSALON) 100 MG capsule, Take 1 capsule (100 mg total) by mouth 3 (three) times a day as needed for cough., Disp: , Rfl: 0  ?  ferrous gluconate 325 mg (36 mg iron) Tab, Take 325 mg by mouth daily. , Disp: , Rfl:   ?  furosemide (LASIX) 40 MG tablet, Take 40 mg by mouth 2 (two) times a day., Disp: , Rfl:   ?  glipiZIDE (GLUCOTROL XL) 5 MG 24 hr tablet, Take 5 mg by mouth daily with breakfast. , Disp: , Rfl:   ?  LANTUS SOLOSTAR 100 unit/mL (3 mL) pen, Inject 20 Units under the skin at bedtime. (Patient taking differently: Inject 30 Units under the skin at bedtime.    ), Disp: 3 mL, Rfl: PRN  ?  metFORMIN (GLUCOPHAGE) 500 MG tablet, Take 500 mg by mouth daily., Disp: , Rfl: 0  ?  metOLazone (ZAROXOLYN) 5 MG tablet, Take 5 mg by mouth daily., Disp: , Rfl: 0  ?  nystatin (MYCOSTATIN) cream, Apply to irritated areas of skin twice a day until healed.  Do not apply to open ulcer(s)., Disp: 60 g, Rfl: 1  ?  omeprazole (PRILOSEC) 20 MG capsule, Take 20 mg by mouth daily. , Disp: , Rfl:   ?   "potassium chloride (KAYCIEL) 20 mEq/15 mL solution, Take 20 mEq by mouth daily. , Disp: , Rfl:   ?  vitamin A-vitamin C-vit E-min (OCUVITE) Tab tablet, Take 1 tablet by mouth daily. , Disp: , Rfl:   ?  warfarin (COUMADIN/JANTOVEN) 2 MG tablet, Take 5 mg by mouth .   , Disp: , Rfl:     Current Facility-Administered Medications:   ?  lidocaine 2 % jelly (XYLOCAINE), , Topical, PRN, Lex, Autumn Blackwood, CNP, 1 application at 11/29/18 0818    Vital Signs: /60 (Patient Site: Left Arm, Patient Position: Sitting, Cuff Size: Adult Regular)   Pulse 72   Temp 98.6  F (37  C) (Oral)   Resp 12   Ht 6' 1\" (1.854 m)   Wt (!) 227 lb (103 kg)   BMI 29.95 kg/m        Assessment:    General:  Patient presents to clinic in no apparent distress.  Psychiatric:  Alert and oriented x3.   Lower extremity:  edema is present.    Integumentary:  Skin is uniformly warm, dry and pink.    Wound size:   Urethral Catheter 16 Fr. (Active)       VASC Wound 09/25/19 left lateral ankle -  (Active)   Description healed 10/4/2019  8:00 AM       VASC Wound 09/25/19 right medial ankle (Active)   Pre Size Length 3 10/4/2019  8:00 AM   Pre Size Width 2 10/4/2019  8:00 AM   Pre Size Depth 0.1 10/4/2019  8:00 AM   Pre Total Sq cm 6 10/4/2019  8:00 AM   Undermined no 10/4/2019  8:00 AM   Tunneling no 10/4/2019  8:00 AM   Description red 10/4/2019  8:00 AM       VASC Wound 09/25/19 right lateral leg superior (Active)   Pre Size Length 1.5 10/4/2019  8:00 AM   Pre Size Width 3.2 10/4/2019  8:00 AM   Pre Size Depth 0.1 10/4/2019  8:00 AM   Pre Total Sq cm 4.8 10/4/2019  8:00 AM       VASC Wound 09/25/19 right lateral ankle inferior (Active)   Pre Size Length 0.7 10/4/2019  8:00 AM   Pre Size Width 1.2 10/4/2019  8:00 AM   Pre Size Depth 0.1 10/4/2019  8:00 AM   Pre Total Sq cm 0.84 10/4/2019  8:00 AM       Pressure Ulcer Leg Left;Right (Active)       Pressure Ulcer 06/21/16 Buttocks Left Stage II (Active)       Wound 05/07/16 Non-pressure related " ulcer Leg Left (Active)       Incision 16 Abdomen (Active)       Incision 16 Lap site Abdomen Mid (Active)       Incision 16 Surgical Abdomen Right (Active)      Undermining is not present.    The periwoundskin is WNL      Plan:         1. Patient will follow up in 4 days         2. Treatment provided will include irrigation and dressings to promote autolytic debridement and will be as listed below     Cleansed with: Normal Saline    Protected skin with: Remedy Skin Repair    Dressings Applied: xeroform, and ABD pad    Compression Applied to the Left Le-Layer Coban    Compression Applied to the Right Le-Layer Coban    Offloading applied: None    Trial Products: no  Provider notified regarding concerns: no  Treatment Changes: no    Educational Barriers: hearing loss  Taught Regarding: Activity, Compliance, Compression and Dressing  Teaching Method: Explanation and DC sheet       Compression Applied to Bilateral  2-Layer Coban: I Applied the inner foam layer with the foot dorsiflexed and started atthe base of the fifth metatarsal head. I left the bottom of the heel exposed, and proceed by winding the foam up the leg using minimal overlap to just below the fibular head. I then applied the compression layer with the foot dorsiflexed and startingat the base of the fifth metatarsal head. I applied at full stretch and proceeded up the leg using 50% overlap. The bottom of the heel is covered with the compression layer up to the end at the fibular head just below the back of the knee and levelwith the top edge of the foam layer.  I gently pressed and conformed the entire surface of the system to ensurethat the two layers are firmly bound together        Pt came in for dressing change for bilateral leg ulcers. Wrap was intact and clean. Noted moderated serosanguinous drainage; no odor or s/s of infection. Swollen in both leg has decrease, and R leg still still more above and below the knee.. Pt  denied pain or other discomfort. Pt was encourage to keep his leg elevate while in bed and chair. Pt will follow up in 4 days with provider.

## 2021-06-28 NOTE — PROGRESS NOTES
Progress Notes by Theresa Sequeira LPN at 11/22/2019 10:40 AM     Author: Theresa Sequeira LPN Service: -- Author Type: Licensed Nurse    Filed: 11/22/2019 12:00 PM Encounter Date: 11/22/2019 Status: Signed    : Theresa Sequeira LPN (Licensed Nurse)       Pt came in for bilatral leg rewrap. Noted new open wound on both leg. NP, corinna Sampson notified , gave order to apply silvercel into wound ,ABD pad, roll gauze then 4 layer wrap bilateral.wound cleansed with normal saline. Pt seemed to be weak,A/O x4. vitlas stable, oxygen was 95RA, then he will drop to 89 %RA. Pt denied feeling different or pain. Moving slowly , and quiet. Write updated Pt s spouse Virginia. Spouse states that he was fine this morning and ate good breakfast; but she has been noticing that he does not eat much. Writer advice Pt and spouse to see primary doctor. Writer notified Pt's primary doctor, left message with Autumn to notify Dr Alexandre and his team.                          Nurse Visit    Chief Complaint: Patient presents to clinic for assement, and treatment of their ulcer and and swelling    Dressing on Arrival 4 layer wrap intact and clean    Allergies:   Allergies   Allergen Reactions   ? Aspirin Nausea And Vomiting     GI upset       Medications:   Current Outpatient Medications:   ?  acetaminophen (TYLENOL) 325 MG tablet, Take 2 tablets (650 mg total) by mouth every 4 (four) hours as needed., Disp: , Rfl: 0  ?  atenolol (TENORMIN) 50 MG tablet, Take 50 mg by mouth., Disp: , Rfl:   ?  atorvastatin (LIPITOR) 10 MG tablet, Take 10 mg by mouth every morning. , Disp: , Rfl:   ?  benzonatate (TESSALON) 100 MG capsule, Take 1 capsule (100 mg total) by mouth 3 (three) times a day as needed for cough., Disp: , Rfl: 0  ?  ferrous gluconate 325 mg (36 mg iron) Tab, Take 325 mg by mouth daily. , Disp: , Rfl:   ?  furosemide (LASIX) 40 MG tablet, Take 40 mg by mouth 2 (two) times a day., Disp: , Rfl:   ?  glipiZIDE (GLUCOTROL XL) 5 MG 24 hr  tablet, Take 5 mg by mouth daily with breakfast. , Disp: , Rfl:   ?  LANTUS SOLOSTAR 100 unit/mL (3 mL) pen, Inject 20 Units under the skin at bedtime. (Patient taking differently: Inject 30 Units under the skin at bedtime.    ), Disp: 3 mL, Rfl: PRN  ?  metFORMIN (GLUCOPHAGE) 500 MG tablet, Take 500 mg by mouth daily., Disp: , Rfl: 0  ?  metOLazone (ZAROXOLYN) 5 MG tablet, Take 5 mg by mouth daily., Disp: , Rfl: 0  ?  metOLazone (ZAROXOLYN) 5 MG tablet, Patient to take 1 tablet every other day until down 10 lbs then go to take it 2 times q week per Dr Guallpa, Disp: , Rfl:   ?  nystatin (MYCOSTATIN) cream, Apply to irritated areas of skin twice a day until healed.  Do not apply to open ulcer(s)., Disp: 60 g, Rfl: 1  ?  omeprazole (PRILOSEC) 20 MG capsule, Take 20 mg by mouth daily. , Disp: , Rfl:   ?  potassium chloride (KAYCIEL) 20 mEq/15 mL solution, Take 20 mEq by mouth daily. , Disp: , Rfl:   ?  vitamin A-vitamin C-vit E-min (OCUVITE) Tab tablet, Take 1 tablet by mouth daily. , Disp: , Rfl:   ?  warfarin (COUMADIN/JANTOVEN) 2 MG tablet, Take 5 mg by mouth .   , Disp: , Rfl:     Current Facility-Administered Medications:   ?  lidocaine 2 % jelly (XYLOCAINE), , Topical, PRN, Lex, Autumn Blackwood, CNP, 1 application at 11/29/18 0818    Vital Signs: /78   Pulse 68   Temp 97.6  F (36.4  C) (Oral)   Resp 15   SpO2 96% Comment: RA      Assessment:    General:  Patient presents to clinic in no apparent distress.  Psychiatric:  Alert and oriented x3.   Lower extremity:  edema is present.    Integumentary:  Skin is uniformly warm, dry and pink.    Wound size:   Urethral Catheter 16 Fr. (Active)       VASC Wound 11/22/19 left lower lateral leg (Active)   Pre Size Length 3 11/22/2019 10:00 AM   Pre Size Width 2 11/22/2019 10:00 AM   Pre Size Depth 0.1 11/22/2019 10:00 AM   Pre Total Sq cm 6 11/22/2019 10:00 AM       VASC Wound 11/22/19 right lower lateral leg (Active)   Pre Size Length 0.7 11/22/2019 10:00 AM    Pre Size Width 3 2019 10:00 AM   Pre Size Depth 0.1 2019 10:00 AM   Pre Total Sq cm 2.1 2019 10:00 AM       Pressure Ulcer Leg Left;Right (Active)       Pressure Ulcer 16 Buttocks Left Stage II (Active)       Wound 16 Non-pressure related ulcer Leg Left (Active)       Incision 16 Abdomen (Active)       Incision 16 Lap site Abdomen Mid (Active)       Incision 16 Surgical Abdomen Right (Active)      Undermining is not present  The periwoundskin is WNL      Plan:         1. Patient will follow up in 4 days with NP         2. Treatment provided will include irrigation and dressings to promote autolytic debridement and will be as listed below     Cleansed with: Normal Saline    Protected skin with: sween moisturizer    Dressings Applied: ABd pad and Silver alginate    Compression Applied to the Left Le-Layer    Compression Applied to the Right Le-Layer    Offloading applied: cane    Trial Products: no  Provider notified regarding concerns: yes  Treatment Changes: yes    Educational Barriers: none  Taught Regarding: Activity, Compression and Dressing  Teaching Method: Explanation and DC sheet                  Compression Applied to Bilateral  4-Layer: LAYER 1: ORTHOPAEDIC WOOL The bandage was applied without tension in aloose spiral from base of toes to knee joint with 50% overlap.LAYER 2: LIGHT SUPPORT BANDAGE Then the next layer bandage was applied in spiral toe to knee with 50% overlap with 50% overlap.LAYER 3: LIGHT COMPRESSION BANDAGE Then the elastic conformable. compression bandage was applied at mid stretch (50%) in a figure of eight from toe to knee, with a 50% overlap.  LAYER 4: COHESIVE EXTENSIBLE BANDAGE Finally the lightweight, elastic, cohesive bandage was applied at mid stretch (50%) in a spiral with a 50 % overlap from toe to knee.

## 2021-06-28 NOTE — PROGRESS NOTES
"Progress Notes by Chuckie Herring RN at 11/29/2019 10:40 AM     Author: Chuckie Herring RN Service: -- Author Type: Registered Nurse    Filed: 11/29/2019 11:09 AM Encounter Date: 11/29/2019 Status: Signed    : Chuckie Herring RN (Registered Nurse)         Wraps upon arrival    Bilateral legs after removing wraps.     Blisters noted on left leg              Nurse Visit    Chief Complaint: Patient presents to clinic for assement, and treatment of their ulcer and and swelling    Dressing on Arrival : silvercel to wounds, 4layer legs wraps bilateral legs. Left leg wrap rolled half way down shin because it leg wrap was hurting him. Right leg wrap had fallen about 3inches down. Wounds healed today but he has new intact blisters noted on left leg where wrap was rolled down to. Patient also forgot to bring in his velcro wraps. States he accidentally \"left it on kitchen counter\". Discussed with Mei Sampson, will apply silvercel and abd on blisters and wrap both legs with 4layer compression. Pt sees RASHID Yo in 4 days.     Allergies:   Allergies   Allergen Reactions   ? Aspirin Nausea And Vomiting     GI upset       Medications:   Current Outpatient Medications:   ?  acetaminophen (TYLENOL) 325 MG tablet, Take 2 tablets (650 mg total) by mouth every 4 (four) hours as needed., Disp: , Rfl: 0  ?  atenolol (TENORMIN) 50 MG tablet, Take 50 mg by mouth., Disp: , Rfl:   ?  atorvastatin (LIPITOR) 10 MG tablet, Take 10 mg by mouth every morning. , Disp: , Rfl:   ?  benzonatate (TESSALON) 100 MG capsule, Take 1 capsule (100 mg total) by mouth 3 (three) times a day as needed for cough., Disp: , Rfl: 0  ?  ferrous gluconate 325 mg (36 mg iron) Tab, Take 325 mg by mouth daily. , Disp: , Rfl:   ?  furosemide (LASIX) 40 MG tablet, Take 40 mg by mouth 2 (two) times a day., Disp: , Rfl:   ?  glipiZIDE (GLUCOTROL XL) 5 MG 24 hr tablet, Take 5 mg by mouth daily with breakfast. , Disp: , Rfl:   ?  LANTUS SOLOSTAR 100 unit/mL (3 mL) pen, Inject 20 " Units under the skin at bedtime. (Patient taking differently: Inject 30 Units under the skin at bedtime.    ), Disp: 3 mL, Rfl: PRN  ?  metFORMIN (GLUCOPHAGE) 500 MG tablet, Take 500 mg by mouth daily., Disp: , Rfl: 0  ?  metOLazone (ZAROXOLYN) 5 MG tablet, Take 5 mg by mouth daily., Disp: , Rfl: 0  ?  metOLazone (ZAROXOLYN) 5 MG tablet, Patient to take 1 tablet every other day until down 10 lbs then go to take it 2 times q week per Dr Guallpa, Disp: , Rfl:   ?  nystatin (MYCOSTATIN) cream, Apply to irritated areas of skin twice a day until healed.  Do not apply to open ulcer(s)., Disp: 60 g, Rfl: 1  ?  omeprazole (PRILOSEC) 20 MG capsule, Take 20 mg by mouth daily. , Disp: , Rfl:   ?  potassium chloride (KAYCIEL) 20 mEq/15 mL solution, Take 20 mEq by mouth daily. , Disp: , Rfl:   ?  vitamin A-vitamin C-vit E-min (OCUVITE) Tab tablet, Take 1 tablet by mouth daily. , Disp: , Rfl:   ?  warfarin (COUMADIN/JANTOVEN) 2 MG tablet, Take 5 mg by mouth .   , Disp: , Rfl:     Current Facility-Administered Medications:   ?  lidocaine 2 % jelly (XYLOCAINE), , Topical, PRN, Lex, Autumn Blackwood, CNP, 1 application at 11/29/18 0818    Vital Signs: /60   Pulse 64   Resp 20       Assessment:    General:  Patient presents to clinic in no apparent distress.  Psychiatric:  Alert and oriented x3.   Lower extremity:  edema is present.    Integumentary:  Skin is uniformly warm, dry and pink.    Wound size:   Urethral Catheter 16 Fr. (Active)       [facility]ASC Wound 11/29/19 LEFT LEG (Active)   Pre Size Length 2 11/29/2019 10:00 AM   Pre Size Width 6.5 11/29/2019 10:00 AM   Undermined N 11/29/2019 10:00 AM   Tunneling N 11/29/2019 10:00 AM   Description intact blisters 11/29/2019 10:00 AM       Pressure Ulcer Leg Left;Right (Active)       Pressure Ulcer 06/21/16 Buttocks Left Stage II (Active)       Wound 05/07/16 Non-pressure related ulcer Leg Left (Active)       Incision 06/18/16 Abdomen (Active)       Incision 07/08/16 Lap site Abdomen  Mid (Active)       Incision 16 Surgical Abdomen Right (Active)      Undermining is not present.    The periwoundskin is WNL      Plan:         1. Patient will f/u in 4 days with San[facility].          2. Treatment provided will include irrigation and dressings to promote autolytic debridement and will be as listed below     Cleansed with: soap and water.     Protected skin with: Remedy Skin Repair    Dressings Applied: silvercel and abd to blisters.     Compression Applied to the Left Le-Layer    Compression Applied to the Right Le-Layer    Offloading applied: None    Trial Products: no  Provider notified regarding concerns: Yes, see above note.   Treatment Changes: no    Educational Barriers: hearing loss and functional limitation  Taught Regarding: Activity, Compliance, Compression and Dressing  Teaching Method: Explanation, Handout and Demo

## 2021-06-28 NOTE — PROGRESS NOTES
Progress Notes by Mei Sampson NP at 10/8/2019  7:40 AM     Author: eMi Sampson NP Service: -- Author Type: Nurse Practitioner    Filed: 10/8/2019 12:48 PM Encounter Date: 10/8/2019 Status: Signed    : Mei Sampson NP (Nurse Practitioner)       Follow up Vascular Visit       Date of Service:10/8/2019    Date Last Seen: 8/8/2019; 10/4/2019    Chief Complaint: new blistering        Pt returns to the [facility] with regards to their new blistering to the legs. Arrives today alone. Reports that the blisters started 3 weeks ago. He could not get into be seen immediately so he started nurse visits; they are applying xeroform and 2 layers; this is going well. His weight is stabilized at 217-220; however looking back he was 207 back in May.  He is taking 40mg lasix once a day it is prescribed two times a day; however he states he is going to the bathroom too much. He is having some labored breathing with exertion; but this resolves when he sits to rest. He denies fevers chills. No pain in the legs.      Allergies: Aspirin    Medications:   Current Outpatient Medications:   ?  acetaminophen (TYLENOL) 325 MG tablet, Take 2 tablets (650 mg total) by mouth every 4 (four) hours as needed., Disp: , Rfl: 0  ?  atenolol (TENORMIN) 50 MG tablet, Take 50 mg by mouth., Disp: , Rfl:   ?  atorvastatin (LIPITOR) 10 MG tablet, Take 10 mg by mouth every morning. , Disp: , Rfl:   ?  benzonatate (TESSALON) 100 MG capsule, Take 1 capsule (100 mg total) by mouth 3 (three) times a day as needed for cough., Disp: , Rfl: 0  ?  ferrous gluconate 325 mg (36 mg iron) Tab, Take 325 mg by mouth daily. , Disp: , Rfl:   ?  furosemide (LASIX) 40 MG tablet, Take 40 mg by mouth 2 (two) times a day., Disp: , Rfl:   ?  glipiZIDE (GLUCOTROL XL) 5 MG 24 hr tablet, Take 5 mg by mouth daily with breakfast. , Disp: , Rfl:   ?  LANTUS SOLOSTAR 100 unit/mL (3 mL) pen, Inject 20 Units under the skin at  "bedtime. (Patient taking differently: Inject 30 Units under the skin at bedtime.    ), Disp: 3 mL, Rfl: PRN  ?  metFORMIN (GLUCOPHAGE) 500 MG tablet, Take 500 mg by mouth daily., Disp: , Rfl: 0  ?  metOLazone (ZAROXOLYN) 5 MG tablet, Take 5 mg by mouth daily., Disp: , Rfl: 0  ?  nystatin (MYCOSTATIN) cream, Apply to irritated areas of skin twice a day until healed.  Do not apply to open ulcer(s)., Disp: 60 g, Rfl: 1  ?  omeprazole (PRILOSEC) 20 MG capsule, Take 20 mg by mouth daily. , Disp: , Rfl:   ?  potassium chloride (KAYCIEL) 20 mEq/15 mL solution, Take 20 mEq by mouth daily. , Disp: , Rfl:   ?  vitamin A-vitamin C-vit E-min (OCUVITE) Tab tablet, Take 1 tablet by mouth daily. , Disp: , Rfl:   ?  warfarin (COUMADIN/JANTOVEN) 2 MG tablet, Take 5 mg by mouth .   , Disp: , Rfl:     Current Facility-Administered Medications:   ?  lidocaine 2 % jelly (XYLOCAINE), , Topical, PRN, Lex, Autumn Blackwood, CNP, 1 application at 11/29/18 0818    History:   Past Medical History:   Diagnosis Date   ? Anemia    ? Coronary artery disease 9/25/2015    stents placed    ? Diabetes mellitus (H)    ? Duodenitis 5/11/2016   ? Dyslipidemia    ? Gastroduodenal ulcer 9/24/2015   ? GERD (gastroesophageal reflux disease)    ? GI bleed 07/08/2016   ? Gout    ? Hematuria    ? Hyperlipemia    ? Hypertension    ? Kidney stone 10/6/2015       Physical Exam:    /78   Pulse 60   Temp 98.2  F (36.8  C)   Resp 20   Ht 6' 1\" (1.854 m)   Wt (!) 228 lb 9.6 oz (103.7 kg)   BMI 30.16 kg/m      General:  Patient presents to clinic in no apparent distress.  Head: normocephalic atraumatic  Psychiatric:  Alert and oriented x3.   Respiratory: unlabored breathing without exertion; no cough; LS clear throughout  Cardiac: irregular rate and rhythm   Integumentary:  Skin is uniformly warm, dry and pink.    Wound #1 Location: right leg lateral leg  Size: 1x2.4W x 0.1depth.  No sinus tract present, Wound base: slough; peeling skin; unroofed blister  " noundermining present. Wound is full thickness. There is moderate drainage. Periwound: no denudement, erythema, induration, maceration or warmth.      Wound #2 Location: right leg medial leg  Size:0.5x0.6x0.1depth.  No sinus tract present, Wound base: slough; peeling skin; unroofed blister  noundermining present. Wound is full thickness. There is moderate drainage. Periwound: no denudement, erythema, induration, maceration or warmth.     Left leg is healed; nicely reduced swelling    Nails: several nails are elongated; thickened and yellowed; several are eroded and brittle    Circumferential volume measures:    [facility] Edema 7/16/2019 8/8/2019 9/25/2019 10/4/2019 10/8/2019   Right just above MTP 28.2 28 27 26.5 27   Right Ankle 26.8 26.5 27.4 27.5 27   Right Widest Calf 36.8 35.8 40 34.8 36.8   Right Thigh Up 10cm - - - - -   Left - just above MTP 26.4 27 27.2 25.5 26   Left Ankle 26.0 24.6 26.5 24.5 24   Left Widest Calf 34.8 35.4 38.0 34.2 33.7   Left Thigh Up 10cm - -   - -       Ulceration(s)/Wound(s):     Urethral Catheter 16 Fr. (Active)       [facility] Wound 09/25/19 left lateral ankle -  (Active)   Description healed 10/4/2019  8:00 AM       [facility] Wound 09/25/19 right medial ankle (Active)   Pre Size Length 0.5 10/8/2019  7:00 AM   Pre Size Width 0.4 10/8/2019  7:00 AM   Pre Size Depth 0.1 10/8/2019  7:00 AM   Pre Total Sq cm 0.2 10/8/2019  7:00 AM   Undermined no 10/4/2019  8:00 AM   Tunneling no 10/4/2019  8:00 AM   Description red 10/4/2019  8:00 AM       [facility] Wound 09/25/19 right lateral leg superior (Active)   Pre Size Length 1.5 10/4/2019  8:00 AM   Pre Size Width 3.2 10/4/2019  8:00 AM   Pre Size Depth 0.1 10/4/2019  8:00 AM   Pre Total Sq cm 4.8 10/4/2019  8:00 AM       [facility]ASC Wound 09/25/19 right lateral ankle inferior (Active)   Pre Size Length 1 10/8/2019  7:00 AM   Pre Size Width 2.4 10/8/2019  7:00 AM   Pre Size Depth 0.1 10/8/2019  7:00 AM   Pre Total Sq cm 2.4 10/8/2019  7:00 AM       Pressure Ulcer Leg  Left;Right (Active)       Pressure Ulcer 06/21/16 Buttocks Left Stage II (Active)       Wound 05/07/16 Non-pressure related ulcer Leg Left (Active)       Incision 06/18/16 Abdomen (Active)       Incision 07/08/16 Lap site Abdomen Mid (Active)       Incision 07/08/16 Surgical Abdomen Right (Active)        Lab Values    No results found for: SEDRATE  Lab Results   Component Value Date    CREATININE 1.07 04/24/2019     Lab Results   Component Value Date    HGBA1C 7.7 (H) 02/02/2018     Lab Results   Component Value Date    BUN 22 04/24/2019     Lab Results   Component Value Date    ALBUMIN 3.4 (L) 11/21/2018     No results found for: [id number]          Impression:  1. Venous hypertension, chronic, bilateral     2. Venous stasis dermatitis of both lower extremities     3. Ulcer of right lower extremity, limited to breakdown of skin (H)     4. Ulcer of left lower extremity, limited to breakdown of skin (H)     5. Lymphedema     6. Idiopathic peripheral neuropathy     7. Onychomycosis       10/4/19       10/4/019            Are any of these wounds new today: No; Location: na    Assessment/Plan:          1. Debridement: After discussion of risk factors and verbal consent was obtained 2% Lidocaine HCL jelly was applied, under clean conditions, the right leg ulceration(s) were debrided using currette. Devitalized and nonviable tissue, along with any fibrin and slough, was removed to improve granulation tissue formation, stimulate wound healing, decrease overall bacteria load, disrupt biofilm formation and decrease edge senescence.  Total excisional debridement was 2.6 sq cm from the epidermis/dermis area and into the subcutaneous tissue with a depth of 0.1 cm.   Ulcers were improved afterwards and .  Measures were as noted on the flow sheet.           2.  Wound treatment: wound treatment will include irrigation and dressings to promote autolytic debridement which will include:left leg is healed; lotion daily; for  the right leg ulcer continue xeroform; abd; rolled gauze           3. Edema: left leg healed; will go to velcro wrap; for the right leg continue 2 layer; change weekly. The compression wraps were applied today in clinic. Encouraged pt to take his lasix as prescribed two times a day instead of daily               Compression Applied: 2-Layer [place]:Applied the inner foam layer with the foot dorsiflexed and starting atthe base of the fifth metatarsal head. Leaving the bottom of the heelexposed, proceed by winding the foam up the leg using minimaloverlap to just below the fibular head. Apply the compression layer with the foot dorsiflexed and startingat the base of the fifth metatarsal head. Apply at full stretch and proceed up the leg using 50% overlap. The bottom of the heel is covered with the compression layer. The end at the fibular head or just below the back of the knee and levelwith the top edge of the foam layer.  Gently pressed and conformed the entire surface of the system to ensurethat the two layers are firmly bound together           4. Nutrition: low sodium; focus on protein; diabetic diet           5. Offloading: na          6. Onychomycosis: a total of 6 nails were debrided; tolerated well, no complications     Patient will follow up with me in 1 weeks for reevaluation. They were instructed to call the clinic sooner with any signs or symptoms of infection or any further questions/concerns. Answered all questions.    Mei Sampson DNP, RN, CNP, [facility]  [facility]  [phone]        This note was electronically signed by Mei Sampson

## 2021-06-28 NOTE — PROGRESS NOTES
Progress Notes by Caprice Talamantes LPN at 10/23/2019  8:00 AM     Author: Caprice Talamantes LPN Service: -- Author Type: Licensed Nurse    Filed: 10/23/2019  9:22 AM Encounter Date: 10/23/2019 Status: Signed    : Caprice Talamantes LPN (Licensed Nurse)       Nurse Visit      Right medial leg      Right anterior leg      Right lateral leg    Chief Complaint: Patient presents to clinic for assessment and treatment of their ulcer and and swelling     Dressing on Arrival: 2 layer on right leg, ABD pad, xeroform, double tubular compression on left leg     Patient came in today for dressing change and 2 layer rewrap per order from Mei Sampson. Patient rated pain 0 out of 10. Removed dressings and cleansed skin with soap. Applied lotion to intact skin. Wounds are healed and applied single tubular compression to bilateral legs followed by velcro compression (patient did not come with liner for velcro). Patient tolerated dressing change well. Spoke with Mei Sampson 10/22/19 and she said she did not need to see the patient for wound cares if they are healed. He will f/u in November for nail care or prn in wounds reopen.    Allergies:   Allergies   Allergen Reactions   ? Aspirin Nausea And Vomiting     GI upset       Medications:   Current Outpatient Medications:   ?  acetaminophen (TYLENOL) 325 MG tablet, Take 2 tablets (650 mg total) by mouth every 4 (four) hours as needed., Disp: , Rfl: 0  ?  atenolol (TENORMIN) 50 MG tablet, Take 50 mg by mouth., Disp: , Rfl:   ?  atorvastatin (LIPITOR) 10 MG tablet, Take 10 mg by mouth every morning. , Disp: , Rfl:   ?  benzonatate (TESSALON) 100 MG capsule, Take 1 capsule (100 mg total) by mouth 3 (three) times a day as needed for cough., Disp: , Rfl: 0  ?  ferrous gluconate 325 mg (36 mg iron) Tab, Take 325 mg by mouth daily. , Disp: , Rfl:   ?  furosemide (LASIX) 40 MG tablet, Take 40 mg by mouth 2 (two) times a day., Disp: , Rfl:   ?  glipiZIDE (GLUCOTROL XL) 5 MG 24 hr  tablet, Take 5 mg by mouth daily with breakfast. , Disp: , Rfl:   ?  LANTUS SOLOSTAR 100 unit/mL (3 mL) pen, Inject 20 Units under the skin at bedtime. (Patient taking differently: Inject 30 Units under the skin at bedtime.    ), Disp: 3 mL, Rfl: PRN  ?  metFORMIN (GLUCOPHAGE) 500 MG tablet, Take 500 mg by mouth daily., Disp: , Rfl: 0  ?  metOLazone (ZAROXOLYN) 5 MG tablet, Take 5 mg by mouth daily., Disp: , Rfl: 0  ?  nystatin (MYCOSTATIN) cream, Apply to irritated areas of skin twice a day until healed.  Do not apply to open ulcer(s)., Disp: 60 g, Rfl: 1  ?  omeprazole (PRILOSEC) 20 MG capsule, Take 20 mg by mouth daily. , Disp: , Rfl:   ?  potassium chloride (KAYCIEL) 20 mEq/15 mL solution, Take 20 mEq by mouth daily. , Disp: , Rfl:   ?  vitamin A-vitamin C-vit E-min (OCUVITE) Tab tablet, Take 1 tablet by mouth daily. , Disp: , Rfl:   ?  warfarin (COUMADIN/JANTOVEN) 2 MG tablet, Take 5 mg by mouth .   , Disp: , Rfl:     Current Facility-Administered Medications:   ?  lidocaine 2 % jelly (XYLOCAINE), , Topical, PRN, Autumn Burnett, CNP, 1 application at 11/29/18 0818    Vital Signs: /78   Pulse 72   Temp 98.2  F (36.8  C) (Oral)   Resp 16       Assessment:    General:  Patient presents to clinic in no apparent distress.  Psychiatric:  Alert and oriented .   Lower extremity:  edema is present.    Integumentary:  Skin is uniformly warm, dry and pink.    Wound size:   Urethral Catheter 16 Fr. (Active)       [facility] Wound 09/25/19 left lateral ankle -  (Active)   Description healed 10/4/2019  8:00 AM       [facility] Wound 09/25/19 right medial ankle (Active)   Pre Size Length 0 10/16/2019 10:00 AM   Pre Size Width 0 10/16/2019 10:00 AM   Pre Size Depth 0 10/16/2019 10:00 AM   Pre Total Sq cm 0 10/16/2019 10:00 AM   Undermined no 10/4/2019  8:00 AM   Tunneling no 10/4/2019  8:00 AM   Description red 10/4/2019  8:00 AM       [facility] Wound 09/25/19 right lateral leg superior (Active)   Pre Size Length 0 10/16/2019  10:00 AM   Pre Size Width 0 10/16/2019 10:00 AM   Pre Size Depth 0 10/16/2019 10:00 AM   Pre Total Sq cm 0 10/16/2019 10:00 AM       [facility] Wound 09/25/19 right lateral ankle inferior (Active)   Pre Size Length 0.5 10/16/2019 10:00 AM   Pre Size Width 0.5 10/16/2019 10:00 AM   Pre Size Depth 0.1 10/16/2019 10:00 AM   Pre Total Sq cm 0.25 10/16/2019 10:00 AM       Pressure Ulcer Leg Left;Right (Active)       Pressure Ulcer 06/21/16 Buttocks Left Stage II (Active)       Wound 05/07/16 Non-pressure related ulcer Leg Left (Active)       Incision 06/18/16 Abdomen (Active)       Incision 07/08/16 Lap site Abdomen Mid (Active)       Incision 07/08/16 Surgical Abdomen Right (Active)      Undermining is not present.    The periwoundskin is WNL      Plan:         1. Patient will follow up in November for nail care         2. Treatment provided will include irrigation and dressings to promote autolytic debridement and will be as listed below     Cleansed with: Soap     Protected skin with: Remedy Skin Repair     Dressings Applied: NA    Compression Applied to the Left Leg: Single tubular compression from base of toes to bend of knee followed by velcro compression    Compression Applied to the Right Leg:  Single tubular compression from base of toes to bend of knee followed by velcro compression      Offloading applied: None  Trial Products: no  Provider notified regarding concerns: no  Treatment Changes: no     Educational Barriers: none  Taught Regarding: Activity, Compliance, Compression, Dressing and Hygiene  Teaching Method: Explanation and DC sheet

## 2021-06-28 NOTE — PROGRESS NOTES
Progress Notes by Roma Almanza LPN at 10/16/2019 10:00 AM     Author: Roma Almanza LPN Service: -- Author Type: Licensed Nurse    Filed: 10/16/2019 10:50 AM Encounter Date: 10/16/2019 Status: Signed    : Roma Almanza LPN (Licensed Nurse)                   Compression Applied to Bilateral   Rt le-Layer Coban: I Applied the inner foam layer with the foot dorsiflexed and started atthe base of the fifth metatarsal head. I left the bottom of the heel exposed, and proceed by winding the foam up the leg using minimal overlap to just below the fibular head. I then applied the compression layer with the foot dorsiflexed and startingat the base of the fifth metatarsal head. I applied at full stretch and proceeded up the leg using 50% overlap. The bottom of the heel is covered with the compression layer up to the end at the fibular head just below the back of the knee and levelwith the top edge of the foam layer.  I gently pressed and conformed the entire surface of the system to ensurethat the two layers are firmly bound together   Lt leg double tubular compression.              Nurse Visit    Chief Complaint: Patient presents to clinic for assement, and treatment of their ulcer and and swelling. Pt toenail fell off after patient stubbed it a few days ago. Toe wound wound MAGALY.    Dressing on Arrival 2 layer to RLE CDI. Xeroform and ABD covering ulcer. Double tubular compression to LLE.    Allergies:   Allergies   Allergen Reactions   ? Aspirin Nausea And Vomiting     GI upset       Medications:   Current Outpatient Medications:   ?  acetaminophen (TYLENOL) 325 MG tablet, Take 2 tablets (650 mg total) by mouth every 4 (four) hours as needed., Disp: , Rfl: 0  ?  atenolol (TENORMIN) 50 MG tablet, Take 50 mg by mouth., Disp: , Rfl:   ?  atorvastatin (LIPITOR) 10 MG tablet, Take 10 mg by mouth every morning. , Disp: , Rfl:   ?  benzonatate (TESSALON) 100 MG capsule, Take 1 capsule  (100 mg total) by mouth 3 (three) times a day as needed for cough., Disp: , Rfl: 0  ?  ferrous gluconate 325 mg (36 mg iron) Tab, Take 325 mg by mouth daily. , Disp: , Rfl:   ?  furosemide (LASIX) 40 MG tablet, Take 40 mg by mouth 2 (two) times a day., Disp: , Rfl:   ?  glipiZIDE (GLUCOTROL XL) 5 MG 24 hr tablet, Take 5 mg by mouth daily with breakfast. , Disp: , Rfl:   ?  LANTUS SOLOSTAR 100 unit/mL (3 mL) pen, Inject 20 Units under the skin at bedtime. (Patient taking differently: Inject 30 Units under the skin at bedtime.    ), Disp: 3 mL, Rfl: PRN  ?  metFORMIN (GLUCOPHAGE) 500 MG tablet, Take 500 mg by mouth daily., Disp: , Rfl: 0  ?  metOLazone (ZAROXOLYN) 5 MG tablet, Take 5 mg by mouth daily., Disp: , Rfl: 0  ?  nystatin (MYCOSTATIN) cream, Apply to irritated areas of skin twice a day until healed.  Do not apply to open ulcer(s)., Disp: 60 g, Rfl: 1  ?  omeprazole (PRILOSEC) 20 MG capsule, Take 20 mg by mouth daily. , Disp: , Rfl:   ?  potassium chloride (KAYCIEL) 20 mEq/15 mL solution, Take 20 mEq by mouth daily. , Disp: , Rfl:   ?  vitamin A-vitamin C-vit E-min (OCUVITE) Tab tablet, Take 1 tablet by mouth daily. , Disp: , Rfl:   ?  warfarin (COUMADIN/JANTOVEN) 2 MG tablet, Take 5 mg by mouth .   , Disp: , Rfl:     Current Facility-Administered Medications:   ?  lidocaine 2 % jelly (XYLOCAINE), , Topical, PRN, Lex, Autumn Blackwood, CNP, 1 application at 11/29/18 0818    Vital Signs: /72   Pulse 64   Temp 97.7  F (36.5  C)   Resp 20       Assessment:    General:  Patient presents to clinic in no apparent distress.  Psychiatric:  Alert and oriented x3.   Lower extremity:  edema is present.    Integumentary:  Skin is uniformly warm, dry and pink.    Wound size:   Urethral Catheter 16 Fr. (Active)       VASC Wound 09/25/19 left lateral ankle -  (Active)   Description healed 10/4/2019  8:00 AM       VASC Wound 09/25/19 right medial ankle (Active)   Pre Size Length 0 10/16/2019 10:00 AM   Pre Size Width  0 10/16/2019 10:00 AM   Pre Size Depth 0 10/16/2019 10:00 AM   Pre Total Sq cm 0 10/16/2019 10:00 AM   Undermined no 10/4/2019  8:00 AM   Tunneling no 10/4/2019  8:00 AM   Description red 10/4/2019  8:00 AM       VASC Wound 19 right lateral leg superior (Active)   Pre Size Length 0 10/16/2019 10:00 AM   Pre Size Width 0 10/16/2019 10:00 AM   Pre Size Depth 0 10/16/2019 10:00 AM   Pre Total Sq cm 0 10/16/2019 10:00 AM       VASC Wound 19 right lateral ankle inferior (Active)   Pre Size Length 0.5 10/16/2019 10:00 AM   Pre Size Width 0.5 10/16/2019 10:00 AM   Pre Size Depth 0.1 10/16/2019 10:00 AM   Pre Total Sq cm 0.25 10/16/2019 10:00 AM       Pressure Ulcer Leg Left;Right (Active)       Pressure Ulcer 16 Buttocks Left Stage II (Active)       Wound 16 Non-pressure related ulcer Leg Left (Active)       Incision 16 Abdomen (Active)       Incision 16 Lap site Abdomen Mid (Active)       Incision 16 Surgical Abdomen Right (Active)      Undermining is not present.    The periwoundskin is WNL      Plan:         1. Patient will f/u in 1 week.         2. Treatment provided will include irrigation and dressings to promote autolytic debridement and will be as listed below     Cleansed with: Normal Saline    Protected skin with: Remedy Skin Repair    Dressings Applied: xeroform to Rt shin. Silvercel to Rt 3rd toe.    Compression Applied to the Left Leg: Double tubular compression.    Compression Applied to the Right Le-Layer Coban    Offloading applied: None    Trial Products: no  Provider notified regarding concerns: yes; LK notified of new toe wound.    Treatment Changes: yes; Silvercel, gauze and flex netting to Rt 3rd toe. Pt wife will assist with daily dressing changes to toe wound.    Educational Barriers: none  Taught Regarding: Activity, Compliance, Compression and Dressing  Teaching Method: Explanation and DC sheet

## 2021-06-28 NOTE — PROGRESS NOTES
Progress Notes by Sanaz Mendiola CNP at 11/26/2019  8:00 AM     Author: Sanaz Mendiola CNP Service: -- Author Type: Nurse Practitioner    Filed: 11/26/2019  9:43 AM Encounter Date: 11/26/2019 Status: Signed    : Sanaz Mendiola CNP (Nurse Practitioner)       Follow up Vascular Visit       Date of Service:11/26/2019    Date Last Seen: 11/22/2019    Chief Complaint: BLE swelling and ulcers    Pt returns to [facility] with regards to their BLE swelling and ulcers. Patient is new to me, he has previously been seen by Mei Sampson CNP. He presented to nurse visit on 11/22 and some of the areas were opened again. Mei Sampson CNP ordered wound care silvercel, ABD pad and roll gauze, then 4 layer for compression, done at nurse visits. Patient reports he was seen by family practice and is scheduled to see cardiologist tomorrow. He started taking his Lasix two pills in the morning. He is feeling well today. Denies fevers, chills. No shortness of breath.     History:   Past Medical History:   Diagnosis Date   ? Anemia    ? Coronary artery disease 9/25/2015    stents placed    ? Diabetes mellitus (H)    ? Duodenitis 5/11/2016   ? Dyslipidemia    ? Gastroduodenal ulcer 9/24/2015   ? GERD (gastroesophageal reflux disease)    ? GI bleed 07/08/2016   ? Gout    ? Hematuria    ? Hyperlipemia    ? Hypertension    ? Kidney stone 10/6/2015       Medications:   Current Outpatient Medications:   ?  acetaminophen (TYLENOL) 325 MG tablet, Take 2 tablets (650 mg total) by mouth every 4 (four) hours as needed., Disp: , Rfl: 0  ?  atenolol (TENORMIN) 50 MG tablet, Take 50 mg by mouth., Disp: , Rfl:   ?  atorvastatin (LIPITOR) 10 MG tablet, Take 10 mg by mouth every morning. , Disp: , Rfl:   ?  benzonatate (TESSALON) 100 MG capsule, Take 1 capsule (100 mg total) by mouth 3 (three) times a day as needed for cough., Disp: , Rfl: 0  ?  ferrous gluconate 325 mg (36 mg iron)  Tab, Take 325 mg by mouth daily. , Disp: , Rfl:   ?  furosemide (LASIX) 40 MG tablet, Take 40 mg by mouth 2 (two) times a day., Disp: , Rfl:   ?  glipiZIDE (GLUCOTROL XL) 5 MG 24 hr tablet, Take 5 mg by mouth daily with breakfast. , Disp: , Rfl:   ?  LANTUS SOLOSTAR 100 unit/mL (3 mL) pen, Inject 20 Units under the skin at bedtime. (Patient taking differently: Inject 30 Units under the skin at bedtime.    ), Disp: 3 mL, Rfl: PRN  ?  metFORMIN (GLUCOPHAGE) 500 MG tablet, Take 500 mg by mouth daily., Disp: , Rfl: 0  ?  metOLazone (ZAROXOLYN) 5 MG tablet, Take 5 mg by mouth daily., Disp: , Rfl: 0  ?  metOLazone (ZAROXOLYN) 5 MG tablet, Patient to take 1 tablet every other day until down 10 lbs then go to take it 2 times q week per Dr Guallpa, Disp: , Rfl:   ?  nystatin (MYCOSTATIN) cream, Apply to irritated areas of skin twice a day until healed.  Do not apply to open ulcer(s)., Disp: 60 g, Rfl: 1  ?  omeprazole (PRILOSEC) 20 MG capsule, Take 20 mg by mouth daily. , Disp: , Rfl:   ?  potassium chloride (KAYCIEL) 20 mEq/15 mL solution, Take 20 mEq by mouth daily. , Disp: , Rfl:   ?  vitamin A-vitamin C-vit E-min (OCUVITE) Tab tablet, Take 1 tablet by mouth daily. , Disp: , Rfl:   ?  warfarin (COUMADIN/JANTOVEN) 2 MG tablet, Take 5 mg by mouth .   , Disp: , Rfl:     Current Facility-Administered Medications:   ?  lidocaine 2 % jelly (XYLOCAINE), , Topical, PRN, Autumn Burnett, CNP, 1 application at 11/29/18 0818    Allergies: Aspirin    Review of Systems:  Review of systems is otherwise negative, except as noted above.      Lab Values    No results found for: SEDRATE  Lab Results   Component Value Date    CREATININE 1.40 (H) 10/29/2019     Lab Results   Component Value Date    HGBA1C 7.7 (H) 02/02/2018     Lab Results   Component Value Date    BUN 34 (H) 10/29/2019     Lab Results   Component Value Date    ALBUMIN 3.4 (L) 11/21/2018     No results found for: [id number]    Physical Exam:    /70 (Patient Site:  Left Arm, Patient Position: Sitting, Cuff Size: Adult Regular)   Pulse 68   Temp 97.7  F (36.5  C) (Oral)   Resp 18   Wt 220 lb 11.2 oz (100.1 kg)   SpO2 94%   BMI 29.12 kg/m      General:  Patient presents to clinic in no apparent distress.  Head: normocephalic atraumatic  Psychiatric:  Alert and oriented x3.   Respiratory: unlabored breathing; no cough. Lungs clear  Integumentary:  Skin is uniformly warm, dry and pink.    Wound #1 Location: Left lower lateral leg  Size: 1.2L x 0.5W x 0.1depth.  No sinus tract present, Wound base: Red, viable  No undermining present. Wound is superficial thickness. There is moderate drainage. Periwound: no denudement, erythema, induration, maceration or warmth.    Wound#2 Right lower lateral leg: Size: 07L x 3Wx0.1cm depth. No sinus tract present, Wound base: Red, viable  No undermining present. Wound is superficial thickness. There is moderate drainage. Periwound: no denudement, erythema, induration, maceration or warmth.      Circumferential volume measures:    Vasc Edema 10/8/2019 10/16/2019 11/20/2019 11/22/2019 11/26/2019   Right just above MTP 27 26.8 28.3 27.7 25.8   Right Ankle 27 27.1 26.5 25.9 24.7   Right Widest Calf 36.8 36.1 43.8 37.5 34.5   Right Thigh Up 10cm - - - 56.8 -   Left - just above MTP 26 27.7 28.4 26.5 26.4   Left Ankle 24 28.8 25.6 24.1 25.5   Left Widest Calf 33.7 35.4 41.5 37.2 33.5   Left Thigh Up 10cm - - - 53.5 -     Ulceration(s)/Wound(s):     Urethral Catheter 16 Fr. (Active)       VASC Wound 11/22/19 left lower lateral leg (Active)   Pre Size Length 1.2 11/26/2019  8:00 AM   Pre Size Width 0.5 11/26/2019  8:00 AM   Pre Size Depth 0.1 11/26/2019  8:00 AM   Pre Total Sq cm 0.6 11/26/2019  8:00 AM       VASC Wound 11/22/19 right lower lateral leg (Active)   Pre Size Length 0.7 11/22/2019 10:00 AM   Pre Size Width 3 11/22/2019 10:00 AM   Pre Size Depth 0.1 11/22/2019 10:00 AM   Pre Total Sq cm 2.1 11/22/2019 10:00 AM   Description scab  11/26/2019  8:00 AM       Pressure Ulcer Leg Left;Right (Active)       Pressure Ulcer 06/21/16 Buttocks Left Stage II (Active)       Wound 05/07/16 Non-pressure related ulcer Leg Left (Active)       Incision 06/18/16 Abdomen (Active)       Incision 07/08/16 Lap site Abdomen Mid (Active)       Incision 07/08/16 Surgical Abdomen Right (Active)        Are any of these wounds new today: Yes; Location: BIlateral    Impression:  1. Venous stasis dermatitis of both lower extremities     2. Acquired lymphedema of leg     3. Ulcer of right lower extremity, limited to breakdown of skin (H)     4. Ulcer of left lower extremity, limited to breakdown of skin (H)     5. Lymphedema     6. Venous hypertension, chronic, bilateral     7. PAD (peripheral artery disease) (H)               Assessment/Plan:          1. Debridement: After discussion of risk factors and verbal consent was obtained, under clean conditions, the BLE ulceration(s) were manually debrided . Devitalized and nonviable tissue, along with any fibrin and slough, was removed to improve granulation tissue formation, stimulate wound healing, decrease overall bacteria load, disrupt biofilm formation and decrease edge senescence.  Total excisional debridement was 2.7 sq cm from the epidermis/dermis area with a depth of 0.1 cm.   Ulcers were improved afterwards and .  Measures were as noted on the flow sheet.       2.  Wound treatment: wound treatment will include irrigation and dressings to promote autolytic debridement which will include: Wound Status: Stable. He will also see cardiology tomorrow. We will schedule nurse visit for this Friday, and I will see Robinson in one week.            3. Edema: Measures improved The compression wraps were applied today in clinic. Swelling status:  Stable            4. Nutrition: Continue to focus on protein            5. Offloading: N /a     Patient will follow up with me in 1 weeks for reevaluation. They were instructed to  call the clinic sooner with any signs or symptoms of infection or any further questions/concerns. Answered all questions.    Sanaz Mendiola CNP, RN, APRN  [facility]  [phone]          This note was electronically signed by Sanaz Mendiola

## 2021-06-28 NOTE — PROGRESS NOTES
Progress Notes by Caprice Talamantes LPN at 10/1/2019  8:00 AM     Author: Caprice Talamantes LPN Service: -- Author Type: Licensed Nurse    Filed: 10/1/2019  9:43 AM Encounter Date: 10/1/2019 Status: Signed    : Caprice Talamantes LPN (Licensed Nurse)       Nurse Visit      Bilateral legs      Right lateral leg      Right medial leg    Chief Complaint: Patient presents to clinic for assessment and treatment of their ulcer and and swelling    Dressing on Arrival: 2 layer bilaterally, ABD pad, xeroform    Patient came in today for dressing change and 2 layer rewrap per order from Mei Sampson. Patient rated pain 2 out of 10. Removed dressings and cleansed skin with soap and cleaned wound bed with normal saline. Applied lotion to intact skin.  Applied xeroform to wound and covered with ABD pad. Rewrapped 2 layer compression wrap to bilateral legs. Patient tolerated dressing change well.     Allergies:   Allergies   Allergen Reactions   ? Aspirin Nausea And Vomiting     GI upset       Medications:   Current Outpatient Medications:   ?  acetaminophen (TYLENOL) 325 MG tablet, Take 2 tablets (650 mg total) by mouth every 4 (four) hours as needed., Disp: , Rfl: 0  ?  atenolol (TENORMIN) 50 MG tablet, Take 50 mg by mouth., Disp: , Rfl:   ?  atorvastatin (LIPITOR) 10 MG tablet, Take 10 mg by mouth every morning. , Disp: , Rfl:   ?  benzonatate (TESSALON) 100 MG capsule, Take 1 capsule (100 mg total) by mouth 3 (three) times a day as needed for cough., Disp: , Rfl: 0  ?  ferrous gluconate 325 mg (36 mg iron) Tab, Take 325 mg by mouth daily. , Disp: , Rfl:   ?  furosemide (LASIX) 40 MG tablet, Take 40 mg by mouth 2 (two) times a day., Disp: , Rfl:   ?  glipiZIDE (GLUCOTROL XL) 5 MG 24 hr tablet, Take 5 mg by mouth daily with breakfast. , Disp: , Rfl:   ?  LANTUS SOLOSTAR 100 unit/mL (3 mL) pen, Inject 20 Units under the skin at bedtime. (Patient taking differently: Inject 30 Units under the skin at bedtime.    ),  Disp: 3 mL, Rfl: PRN  ?  metFORMIN (GLUCOPHAGE) 500 MG tablet, Take 500 mg by mouth daily., Disp: , Rfl: 0  ?  metOLazone (ZAROXOLYN) 5 MG tablet, Take 5 mg by mouth daily., Disp: , Rfl: 0  ?  nystatin (MYCOSTATIN) cream, Apply to irritated areas of skin twice a day until healed.  Do not apply to open ulcer(s)., Disp: 60 g, Rfl: 1  ?  omeprazole (PRILOSEC) 20 MG capsule, Take 20 mg by mouth daily. , Disp: , Rfl:   ?  potassium chloride (KAYCIEL) 20 mEq/15 mL solution, Take 20 mEq by mouth daily. , Disp: , Rfl:   ?  vitamin A-vitamin C-vit E-min (OCUVITE) Tab tablet, Take 1 tablet by mouth daily. , Disp: , Rfl:   ?  warfarin (COUMADIN/JANTOVEN) 2 MG tablet, Take 5 mg by mouth .   , Disp: , Rfl:     Current Facility-Administered Medications:   ?  lidocaine 2 % jelly (XYLOCAINE), , Topical, PRN, Autumn Burnett, CNP, 1 application at 11/29/18 0818    Vital Signs: /70   Pulse 68   Temp 98.6  F (37  C) (Oral)   Resp 16       Assessment:    General:  Patient presents to clinic in no apparent distress.  Psychiatric:  Alert and oriented .   Lower extremity:  edema is present.    Integumentary:  Skin is uniformly warm, dry and pink.    Wound size:   Urethral Catheter 16 Fr. (Active)       VASC Wound 09/25/19 left lateral ankle -  (Active)   Description blister 9/27/2019  9:00 AM       VASC Wound 09/25/19 right medial ankle (Active)   Pre Size Length 4.5 9/27/2019  9:00 AM   Pre Size Width 6.2 9/27/2019  9:00 AM   Pre Size Depth 0.1 9/27/2019  9:00 AM       VASC Wound 09/25/19 right lateral leg superior (Active)   Pre Size Length 2.4 9/27/2019  9:00 AM   Pre Size Width 5.5 9/27/2019  9:00 AM   Pre Size Depth 0.1 9/27/2019  9:00 AM       VASC Wound 09/25/19 right lateral ankle inferior (Active)   Pre Size Length 2.2 9/27/2019  9:00 AM   Pre Size Width 3.5 9/27/2019  9:00 AM   Pre Size Depth 0.1 9/27/2019  9:00 AM       Pressure Ulcer Leg Left;Right (Active)       Pressure Ulcer 06/21/16 Buttocks Left Stage II  (Active)       Wound 16 Non-pressure related ulcer Leg Left (Active)       Incision 16 Abdomen (Active)       Incision 16 Lap site Abdomen Mid (Active)       Incision 16 Surgical Abdomen Right (Active)      Undermining is not present.    The periwoundskin is pink and macerated    Vasc Edema 2019   Right just above MTP 26.4 26.1 28.2 28 27   Right Ankle 25.0 23.5 26.8 26.5 27.4   Right Widest Calf 36.0 33 36.8 35.8 40   Right Thigh Up 10cm 51.4 - - - -   Left - just above MTP 26.0 26.3 26.4 27 27.2   Left Ankle 23.8 25.3 26.0 24.6 26.5   Left Widest Calf 35.0 34 34.8 35.4 38.0   Left Thigh Up 10cm 52.4 - - -         Plan:         1. Patient will follow up on 10/4/19         2. Treatment provided will include irrigation and dressings to promote autolytic debridement and will be as listed below     Cleansed with: Normal Saline    Protected skin with: Remedy Skin Repair    Dressings Applied: xeroform and ABD pad    Compression Applied to the Left Le-Layer Coban      2-Layer Coban:Applied the inner foam layer with the foot dorsiflexed and starting atthe base of the fifth metatarsal head. Leaving the bottom of the heel exposed, proceed by winding the foam up the leg using minimaloverlap to just below the fibular head. Apply the compression layer with the foot dorsiflexed and startingat the base of the fifth metatarsal head. Apply at full stretch and proceed up the leg using 50% overlap. The bottom of the heel is covered with the compression layer. The end at the fibular head or just below the back of the knee and levelwith the top edge of the foam layer.  Gently pressed and conformed the entire surface of the system to ensurethat the two layers are firmly bound together    Compression Applied to the Right Le-Layer Coban    2-Layer Coban:Applied the inner foam layer with the foot dorsiflexed and starting atthe base of the fifth metatarsal head.  Leaving the bottom of the heel exposed, proceed by winding the foam up the leg using minimaloverlap to just below the fibular head. Apply the compression layer with the foot dorsiflexed and startingat the base of the fifth metatarsal head. Apply at full stretch and proceed up the leg using 50% overlap. The bottom of the heel is covered with the compression layer. The end at the fibular head or just below the back of the knee and levelwith the top edge of the foam layer.  Gently pressed and conformed the entire surface of the system to ensurethat the two layers are firmly bound together    Offloading applied: None  Trial Products: no  Provider notified regarding concerns: no  Treatment Changes: no    Educational Barriers: none  Taught Regarding: Activity, Compliance, Compression, Dressing and Hygiene  Teaching Method: Explanation and DC sheet

## 2021-06-28 NOTE — PROGRESS NOTES
Progress Notes by Caprice Talamantes LPN at 9/25/2019  9:20 AM     Author: Caprice Talamantes LPN Service: -- Author Type: Licensed Nurse    Filed: 9/25/2019  9:52 AM Encounter Date: 9/25/2019 Status: Signed    : Caprice Talamantes LPN (Licensed Nurse)       Nurse Visit      Bilateral Legs      Right lateral leg      Right medial ankle      Left lateral ankle    Chief Complaint: Patient presents to clinic for assessment and treatment of their ulcer and and swelling    Dressing on Arrival: nothing    Patient came in today with newly opened areas on right leg and new blister on left leg. Patient usually see Mei Sampson CNP but she did not have availability to see the patient today, so she okayed for a nurse visit. Patient did not have any compression or dressings on. Patient stated he does wear his compression daily, but did not put them on today. He stated his opened areas have been like that for the last 2 days any has not been using any dressings to cover the opened areas. Advised patient for future that he should have them covered and not open to air. New dressing change and 2 layer rewrap ordered from Mei Sampson CNP and continue with nurse visits until he can be seen by her 10/8/19 (scheduled today). Weight taken today and weight is up 2.5 lbs since 8/8/19 (if weight is up 5 lbs was told to contact PCP per Mei). Patient rated pain 2 out of 10. Cleansed wound bed with NS. Applied lotion to intact skin.  Applied xeoform to wound and covered with an ABD pad. Rewrapped 2 layer compression wrap to bilateral legs. Patient tolerated dressing change well.     Allergies:   Allergies   Allergen Reactions   ? Aspirin Nausea And Vomiting     GI upset       Medications:   Current Outpatient Medications:   ?  acetaminophen (TYLENOL) 325 MG tablet, Take 2 tablets (650 mg total) by mouth every 4 (four) hours as needed., Disp: , Rfl: 0  ?  atenolol (TENORMIN) 50 MG tablet, Take 50 mg by mouth., Disp: , Rfl:   ?   "atorvastatin (LIPITOR) 10 MG tablet, Take 10 mg by mouth every morning. , Disp: , Rfl:   ?  benzonatate (TESSALON) 100 MG capsule, Take 1 capsule (100 mg total) by mouth 3 (three) times a day as needed for cough., Disp: , Rfl: 0  ?  ferrous gluconate 325 mg (36 mg iron) Tab, Take 325 mg by mouth daily. , Disp: , Rfl:   ?  furosemide (LASIX) 40 MG tablet, Take 40 mg by mouth 2 (two) times a day., Disp: , Rfl:   ?  glipiZIDE (GLUCOTROL XL) 5 MG 24 hr tablet, Take 5 mg by mouth daily with breakfast. , Disp: , Rfl:   ?  LANTUS SOLOSTAR 100 unit/mL (3 mL) pen, Inject 20 Units under the skin at bedtime. (Patient taking differently: Inject 30 Units under the skin at bedtime.    ), Disp: 3 mL, Rfl: PRN  ?  metFORMIN (GLUCOPHAGE) 500 MG tablet, Take 500 mg by mouth daily., Disp: , Rfl: 0  ?  metOLazone (ZAROXOLYN) 5 MG tablet, Take 5 mg by mouth daily., Disp: , Rfl: 0  ?  nystatin (MYCOSTATIN) cream, Apply to irritated areas of skin twice a day until healed.  Do not apply to open ulcer(s)., Disp: 60 g, Rfl: 1  ?  omeprazole (PRILOSEC) 20 MG capsule, Take 20 mg by mouth daily. , Disp: , Rfl:   ?  potassium chloride (KAYCIEL) 20 mEq/15 mL solution, Take 20 mEq by mouth daily. , Disp: , Rfl:   ?  vitamin A-vitamin C-vit E-min (OCUVITE) Tab tablet, Take 1 tablet by mouth daily. , Disp: , Rfl:   ?  warfarin (COUMADIN/JANTOVEN) 2 MG tablet, Take 5 mg by mouth .   , Disp: , Rfl:     Current Facility-Administered Medications:   ?  lidocaine 2 % jelly (XYLOCAINE), , Topical, PRN, Lex, Autumn Blackwood, CNP, 1 application at 11/29/18 0818    Vital Signs: /82   Pulse 64   Temp 97.9  F (36.6  C) (Oral)   Resp 16   Ht 6' 1\" (1.854 m)   Wt (!) 227 lb 8 oz (103.2 kg)   BMI 30.02 kg/m        Assessment:    General:  Patient presents to clinic in no apparent distress.  Psychiatric:  Alert and oriented .   Lower extremity:  edema is present.    Integumentary:  Skin is uniformly warm, dry and pink.    Wound size:   Urethral Catheter " 16 Fr. (Active)       VASC Wound 19 left lateral ankle -  (Active)   Description BLISTER 2019  9:00 AM       VASC Wound 19 right medial ankle (Active)   Pre Size Length 4.8 2019  9:00 AM   Pre Size Width 6.5 2019  9:00 AM   Pre Size Depth 0.1 2019  9:00 AM       VASC Wound 19 right lateral leg superior (Active)   Pre Size Length 3 2019  9:00 AM   Pre Size Width 5.5 2019  9:00 AM   Pre Size Depth 0.1 2019  9:00 AM       VASC Wound 19 right lateral ankle inferior (Active)   Pre Size Length 2.4 2019  9:00 AM   Pre Size Width 3.8 2019  9:00 AM   Pre Size Depth 0.1 2019  9:00 AM       Pressure Ulcer Leg Left;Right (Active)       Pressure Ulcer 16 Buttocks Left Stage II (Active)       Wound 16 Non-pressure related ulcer Leg Left (Active)       Incision 16 Abdomen (Active)       Incision 16 Lap site Abdomen Mid (Active)       Incision 16 Surgical Abdomen Right (Active)      Undermining is not present.    The periwoundskin is pink      Vasc Edema 2019   Right just above MTP 26.4 26.1 28.2 28 27   Right Ankle 25.0 23.5 26.8 26.5 27.4   Right Widest Calf 36.0 33 36.8 35.8 40   Right Thigh Up 10cm 51.4 - - - -   Left - just above MTP 26.0 26.3 26.4 27 27.2   Left Ankle 23.8 25.3 26.0 24.6 26.5   Left Widest Calf 35.0 34 34.8 35.4 38.0   Left Thigh Up 10cm 52.4 - - -         Plan:         1. Patient will follow up on 19         2. Treatment provided will include irrigation and dressings to promote autolytic debridement and will be as listed below     Cleansed with: Normal Saline    Protected skin with: Remedy Skin Repair     Dressings Applied: xeroform and ABD pads    Compression Applied to the Left Le-Layer Coban    2-Layer Coban:Applied the inner foam layer with the foot dorsiflexed and starting atthe base of the fifth metatarsal head. Leaving the bottom of the heel  exposed, proceed by winding the foam up the leg using minimaloverlap to just below the fibular head. Apply the compression layer with the foot dorsiflexed and startingat the base of the fifth metatarsal head. Apply at full stretch and proceed up the leg using 50% overlap. The bottom of the heel is covered with the compression layer. The end at the fibular head or just below the back of the knee and levelwith the top edge of the foam layer.  Gently pressed and conformed the entire surface of the system to ensurethat the two layers are firmly bound together    Compression Applied to the Right Le-Layer Coban     2-Layer Coban:Applied the inner foam layer with the foot dorsiflexed and starting atthe base of the fifth metatarsal head. Leaving the bottom of the heel exposed, proceed by winding the foam up the leg using minimaloverlap to just below the fibular head. Apply the compression layer with the foot dorsiflexed and startingat the base of the fifth metatarsal head. Apply at full stretch and proceed up the leg using 50% overlap. The bottom of the heel is covered with the compression layer. The end at the fibular head or just below the back of the knee and levelwith the top edge of the foam layer.  Gently pressed and conformed the entire surface of the system to ensurethat the two layers are firmly bound together      Offloading applied: None    Trial Products: no  Provider notified regarding concerns: yes  Treatment Changes: yes    Educational Barriers: hearing loss  Taught Regarding: Activity, Compliance, Compression and Dressing  Teaching Method: Explanation and DC sheet

## 2021-06-28 NOTE — PROGRESS NOTES
Progress Notes by Sanaz Mendiola CNP at 12/3/2019  2:00 PM     Author: Sanaz Mendiola CNP Service: -- Author Type: Nurse Practitioner    Filed: 12/3/2019  3:13 PM Encounter Date: 12/3/2019 Status: Signed    : Sanaz Mendiola CNP (Nurse Practitioner)       Follow up Vascular Visit       Date of Service:12/3/2019    Date Last Seen: 11/26/2019; 11/29/2019    Chief Complaint:  BLE swelling and ulcers    Pt returns to [facility] with regards to their BLE swelling and ulcers. They arrive today alone. Previous wound care includes: silvercel, ABD pad and roll gauze, then 4 layer for compression, done at nurse visits. He brings his velcro with today. He is feeling well today. He has been seen by family practice and cardiology.  He was changed to 5mg of Metolazone, and scheduled for echocardiogram. He continues to take Lasix 80mg in the morning, and feeling well on this regimen. Denies fevers, chills. No shortness of breath.     History:   Past Medical History:   Diagnosis Date   ? Anemia    ? Coronary artery disease 9/25/2015    stents placed    ? Diabetes mellitus (H)    ? Duodenitis 5/11/2016   ? Dyslipidemia    ? Gastroduodenal ulcer 9/24/2015   ? GERD (gastroesophageal reflux disease)    ? GI bleed 07/08/2016   ? Gout    ? Hematuria    ? Hyperlipemia    ? Hypertension    ? Kidney stone 10/6/2015       Medications:   Current Outpatient Medications:   ?  acetaminophen (TYLENOL) 325 MG tablet, Take 2 tablets (650 mg total) by mouth every 4 (four) hours as needed., Disp: , Rfl: 0  ?  atenolol (TENORMIN) 50 MG tablet, Take 50 mg by mouth., Disp: , Rfl:   ?  atorvastatin (LIPITOR) 10 MG tablet, Take 10 mg by mouth every morning. , Disp: , Rfl:   ?  benzonatate (TESSALON) 100 MG capsule, Take 1 capsule (100 mg total) by mouth 3 (three) times a day as needed for cough., Disp: , Rfl: 0  ?  ferrous gluconate 325 mg (36 mg iron) Tab, Take 325 mg by mouth daily. ,  Disp: , Rfl:   ?  furosemide (LASIX) 40 MG tablet, Take 40 mg by mouth 2 (two) times a day., Disp: , Rfl:   ?  glipiZIDE (GLUCOTROL XL) 5 MG 24 hr tablet, Take 5 mg by mouth daily with breakfast. , Disp: , Rfl:   ?  LANTUS SOLOSTAR 100 unit/mL (3 mL) pen, Inject 20 Units under the skin at bedtime. (Patient taking differently: Inject 30 Units under the skin at bedtime.    ), Disp: 3 mL, Rfl: PRN  ?  metFORMIN (GLUCOPHAGE) 500 MG tablet, Take 500 mg by mouth daily., Disp: , Rfl: 0  ?  metOLazone (ZAROXOLYN) 5 MG tablet, Take 5 mg by mouth daily., Disp: , Rfl: 0  ?  metOLazone (ZAROXOLYN) 5 MG tablet, Patient to take 1 tablet every other day until down 10 lbs then go to take it 2 times q week per Dr Guallpa, Disp: , Rfl:   ?  multivitamin with minerals tablet, Take 1 tablet by mouth., Disp: , Rfl:   ?  nystatin (MYCOSTATIN) cream, Apply to irritated areas of skin twice a day until healed.  Do not apply to open ulcer(s)., Disp: 60 g, Rfl: 1  ?  omeprazole (PRILOSEC) 20 MG capsule, Take 20 mg by mouth daily. , Disp: , Rfl:   ?  potassium chloride (KAYCIEL) 20 mEq/15 mL solution, Take 20 mEq by mouth daily. , Disp: , Rfl:   ?  vitamin A-vitamin C-vit E-min (OCUVITE) Tab tablet, Take 1 tablet by mouth daily. , Disp: , Rfl:   ?  warfarin (COUMADIN/JANTOVEN) 2 MG tablet, Take 5 mg by mouth .   , Disp: , Rfl:     Current Facility-Administered Medications:   ?  lidocaine 2 % jelly (XYLOCAINE), , Topical, PRN, Autumn Burnett, CNP, 1 application at 11/29/18 0818    Allergies: Aspirin    Review of Systems:  Review of systems is otherwise negative, except as noted above.      Lab Values    No results found for: SEDRATE  Lab Results   Component Value Date    CREATININE 1.40 (H) 10/29/2019     Lab Results   Component Value Date    HGBA1C 7.7 (H) 02/02/2018     Lab Results   Component Value Date    BUN 34 (H) 10/29/2019     Lab Results   Component Value Date    ALBUMIN 3.4 (L) 11/21/2018     No results found for:  [id number]    Physical Exam:    /60 (Patient Site: Left Arm, Patient Position: Sitting, Cuff Size: Adult Regular)   Pulse 64   Temp 97.7  F (36.5  C) (Oral)   Resp 20     General:  Patient presents to clinic in no apparent distress.  Head: normocephalic atraumatic  Psychiatric:  Alert and oriented x3.   Respiratory: unlabored breathing; no cough  Integumentary:  Skin is uniformly warm, dry and pink.    Wound #1 Location: BLE closed wounds, one small opening 0.2 x 0.2 0.1 cmdepth.  No sinus tract present, Wound base: red, viable tissue, No undermining present. Wound is partial thickness. There is clear drainage. Periwound: no denudement, erythema, induration, maceration or warmth.      Circumferential volume measures:    Vasc Edema 11/20/2019 11/22/2019 11/26/2019 11/29/2019 12/3/2019   Right just above MTP 28.3 27.7 25.8 26.4 25.5   Right Ankle 26.5 25.9 24.7 24.3 24.2   Right Widest Calf 43.8 37.5 34.5 46 35   Right Thigh Up 10cm - 56.8 - - -   Left - just above MTP 28.4 26.5 26.4 26.5 25.8   Left Ankle 25.6 24.1 25.5 23.5 24   Left Widest Calf 41.5 37.2 33.5 40.5 36   Left Thigh Up 10cm - 53.5 - - -       Ulceration(s)/Wound(s):     Urethral Catheter 16 Fr. (Active)       VASC Wound 11/29/19 LEFT LEG (Active)   Pre Size Length 0.5 12/3/2019  2:00 PM   Pre Size Width 0.3 12/3/2019  2:00 PM   Pre Size Depth 0.1 12/3/2019  2:00 PM   Pre Total Sq cm 0.15 12/3/2019  2:00 PM   Undermined N 11/29/2019 10:00 AM   Tunneling N 11/29/2019 10:00 AM   Description intact blisters 11/29/2019 10:00 AM       Pressure Ulcer Leg Left;Right (Active)       Pressure Ulcer 06/21/16 Buttocks Left Stage II (Active)       Wound 05/07/16 Non-pressure related ulcer Leg Left (Active)       Incision 06/18/16 Abdomen (Active)       Incision 07/08/16 Lap site Abdomen Mid (Active)       Incision 07/08/16 Surgical Abdomen Right (Active)                    Are any of these wounds new today: No;     Impression:  1. Venous stasis  dermatitis of both lower extremities     2. Acquired lymphedema of leg     3. Ulcer of lower extremity, unspecified laterality, unspecified ulcer stage (H)     4. Swelling of limb     5. Scar condition and fibrosis of skin     6. Diabetic peripheral neuropathy associated with type 2 diabetes mellitus (H)         Assessment/Plan:          1. Debridement: After discussion of risk factors and verbal consent was obtained 2% Lidocaine HCL jelly was applied, under clean conditions, the BLE ulceration(s) were debrided using manual debridement with wash cloth. Devitalized and nonviable tissue, along with any fibrin and slough, was removed to improve granulation tissue formation, stimulate wound healing, decrease overall bacteria load, disrupt biofilm formation and decrease edge senescence.    Ulcers were improved afterwards and .       2.  Wound treatment: wound treatment will include irrigation and dressings to promote autolytic debridement which will include: Allevyn Gentle for 1-2 weeks on the small open area. Wound Status: Improved            3. Edema: Velcro compression applied today. Patient states his wife will continue to apply this. The compression wraps were applied today in clinic. Swelling status:  Improved            4. Nutrition: Focus on protein           5. Offloading: N/A     Patient will follow up with me as needed for reevaluation. They were instructed to call the clinic sooner with any signs or symptoms of infection or any further questions/concerns. Answered all questions.     Sanaz Mendiola CNP, RN, APRN  [facility]  [phone]            This note was electronically signed by Sanaz Mendiola

## 2021-06-29 NOTE — PROGRESS NOTES
Progress Notes by Mei Sampson NP at 5/22/2020  9:00 AM     Author: Mei Sampson NP Service: -- Author Type: Nurse Practitioner    Filed: 5/22/2020  9:36 AM Encounter Date: 5/22/2020 Status: Signed    : Mei Sampson NP (Nurse Practitioner)       Follow up Vascular Visit       Date of Service:5/22/2020    Date Last Seen: 4/30/2020; 5/4/2020    Chief Complaint: BLE swelling; BLE ulcers        Pt returns to [facility] with regards to their BLE swelling; BLE ulcers; this visit is being conducted virtually (telephone or video visit) due to the Covid-19 pandemic.  They arrive/on the phone today with wife Virginia. They are currently using silvercel, ABD, rolled gauze to the wounds. This is being done by [facility] twice per week. They feel the wounds are now healed They are using 4 layers for compression; he has now been transitioned back into the velcro wraps. They are feeling well today. Denies fevers, chills. No shortness of breath. Last visit he was up 7 pounds sent message to cardiology for consideration of diuretic adjustment this was done; current dose is lasix 40mg every other day; 80mg lasix on the off days; metolazone 5mg M and F; he went from 220 down to 205; but has slowly been increasing again and now at 215 today; they have not reported this increase to cardiology yet. He is using his lymphedema pump.    Allergies: Aspirin    Medications:   Current Outpatient Medications:   ?  acetaminophen (TYLENOL) 325 MG tablet, Take 2 tablets (650 mg total) by mouth every 4 (four) hours as needed., Disp: , Rfl: 0  ?  atenolol (TENORMIN) 50 MG tablet, Take 50 mg by mouth., Disp: , Rfl:   ?  atorvastatin (LIPITOR) 10 MG tablet, Take 10 mg by mouth every morning. , Disp: , Rfl:   ?  benzonatate (TESSALON) 100 MG capsule, Take 1 capsule (100 mg total) by mouth 3 (three) times a day as needed for cough., Disp: , Rfl: 0  ?  ferrous gluconate 325 mg (36 mg iron) Tab, Take 325 mg by mouth daily. ,  Disp: , Rfl:   ?  furosemide (LASIX) 40 MG tablet, Take 40 mg by mouth 2 (two) times a day., Disp: , Rfl:   ?  glipiZIDE (GLUCOTROL XL) 5 MG 24 hr tablet, Take 5 mg by mouth daily with breakfast. , Disp: , Rfl:   ?  LANTUS SOLOSTAR 100 unit/mL (3 mL) pen, Inject 20 Units under the skin at bedtime. (Patient taking differently: Inject 30 Units under the skin at bedtime.    ), Disp: 3 mL, Rfl: PRN  ?  metFORMIN (GLUCOPHAGE) 500 MG tablet, Take 500 mg by mouth daily., Disp: , Rfl: 0  ?  metOLazone (ZAROXOLYN) 5 MG tablet, Take 5 mg by mouth daily., Disp: , Rfl: 0  ?  metOLazone (ZAROXOLYN) 5 MG tablet, Patient to take 1 tablet every other day until down 10 lbs then go to take it 2 times q week per Dr Guallpa, Disp: , Rfl:   ?  multivitamin with minerals tablet, Take 1 tablet by mouth., Disp: , Rfl:   ?  nystatin (MYCOSTATIN) cream, Apply to irritated areas of skin twice a day until healed.  Do not apply to open ulcer(s)., Disp: 60 g, Rfl: 1  ?  omeprazole (PRILOSEC) 20 MG capsule, Take 20 mg by mouth daily. , Disp: , Rfl:   ?  potassium chloride (KAYCIEL) 20 mEq/15 mL solution, Take 20 mEq by mouth daily. , Disp: , Rfl:   ?  vitamin A-vitamin C-vit E-min (OCUVITE) Tab tablet, Take 1 tablet by mouth daily. , Disp: , Rfl:   ?  warfarin (COUMADIN/JANTOVEN) 2 MG tablet, Take 5 mg by mouth .   , Disp: , Rfl:     Current Facility-Administered Medications:   ?  lidocaine 2 % jelly (XYLOCAINE), , Topical, PRN, Autumn Burnett, CNP, 1 application at 11/29/18 0818    History:   Past Medical History:   Diagnosis Date   ? Anemia    ? Coronary artery disease 9/25/2015    stents placed    ? Diabetes mellitus (H)    ? Duodenitis 5/11/2016   ? Dyslipidemia    ? Gastroduodenal ulcer 9/24/2015   ? GERD (gastroesophageal reflux disease)    ? GI bleed 07/08/2016   ? Gout    ? Hematuria    ? Hyperlipemia    ? Hypertension    ? Kidney stone 10/6/2015       Physical Exam:    There were no vitals taken for this visit.    General:  Patient  presents in no apparent distress.  Psychiatric:  Alert and oriented x3.   Respiratory: unlabored breathing; no cough, able to speak in full sentences without becoming breathless  Integumentary:  Skin is uniformly warm, dry and pink.    Extremities: swelling stable; skin intact; extensive discoloration of the skin; hemosiderosis; well moisturized    Circumferential volume measures:    Vasc Edema 11/20/2019 11/22/2019 11/26/2019 11/29/2019 12/3/2019   Right just above MTP 28.3 27.7 25.8 26.4 25.5   Right Ankle 26.5 25.9 24.7 24.3 24.2   Right Widest Calf 43.8 37.5 34.5 46 35   Right Thigh Up 10cm - 56.8 - - -   Left - just above MTP 28.4 26.5 26.4 26.5 25.8   Left Ankle 25.6 24.1 25.5 23.5 24   Left Widest Calf 41.5 37.2 33.5 40.5 36   Left Thigh Up 10cm - 53.5 - - -       Ulceration(s)/Wound(s):     Urethral Catheter 16 Fr. (Active)       VASC Wound 11/29/19 LEFT LEG (Active)   Pre Size Length 0.5 12/03/19 1400   Pre Size Width 0.3 12/03/19 1400   Pre Size Depth 0.1 12/03/19 1400   Pre Total Sq cm 0.15 12/03/19 1400   Undermined N 11/29/19 1000   Tunneling N 11/29/19 1000   Description intact blisters 11/29/19 1000       Pressure Ulcer Leg Left;Right (Active)       Pressure Ulcer 06/21/16 Buttocks Left Stage II (Active)       Wound 05/07/16 Non-pressure related ulcer Leg Left (Active)       Incision 06/18/16 Abdomen (Active)       Incision 07/08/16 Lap site Abdomen Mid (Active)       Incision 07/08/16 Surgical Abdomen Right (Active)        Lab Values    No results found for: SEDRATE  Lab Results   Component Value Date    CREATININE 1.35 (H) 01/22/2020     Lab Results   Component Value Date    HGBA1C 7.7 (H) 02/02/2018     Lab Results   Component Value Date    BUN 29 (H) 01/22/2020     Lab Results   Component Value Date    ALBUMIN 3.4 (L) 11/21/2018     No results found for: [id number]          Impression:  1. Ulcer of left lower extremity, limited to breakdown of skin (H)     2. Venous stasis dermatitis of both  lower extremities     3. Scar condition and fibrosis of skin     4. Acquired lymphedema of leg     5. Swelling of limb     6. Diabetic peripheral neuropathy associated with type 2 diabetes mellitus (H)     7. Venous hypertension, chronic, bilateral     8. Ulcer of right lower extremity, limited to breakdown of skin (H)     9. Ulcer of lower extremity, unspecified laterality, unspecified ulcer stage (H)         5/22/2020 right leg      5/22/2020 LLE        Are any of these wounds new today: No; Location:     Assessment/Plan:          1. Debridement: na     2.  Wound treatment: wound treatment will include irrigation and dressings to promote autolytic debridement which will include:wounds are healed; no dressings; lotion daily; ok for home care to d/c Improved            3. Edema: continue lymphedema pump 1-2 times per day; elevation; diuretics; they were instructed to call cardiology to report weight increase; continue velro wraps. Stable            4. Nutrition: diabetic low sodium diet           5. Offloading: na     Patient will follow up with me PRN for reevaluation. They were instructed to call the clinic sooner with any signs or symptoms of infection or any further questions/concerns. Answered all questions.          Time spent on the phone/video chat 10 minutes    Mei Sampson DNP, RN, CNP, CWOCN, CFCN, CLT  [facility]   [phone]    Type of service: Video Visit     Video Start and End Time : [id number]    Originating Location (pt. Location): pt home    Distant Location (provider location): [facility]     Mode of Communication: Video, Doximity    This note was electronically signed by Mei Sampson

## 2021-06-29 NOTE — PROGRESS NOTES
Progress Notes by Mei Sampson NP at 5/1/2020  9:00 AM     Author: Mei Sampson NP Service: -- Author Type: Nurse Practitioner    Filed: 5/4/2020  8:27 AM Encounter Date: 5/1/2020 Status: Signed    : Mei Sampson NP (Nurse Practitioner)       Follow up Vascular Visit       Date of Service:5/1/2020    Date Last Seen: 4/30/2020; 4/30/2020    Chief Complaint:  New leg ulcers; worsening swelling        Pt returns to [facility] with regards to their new right leg ulcer and worsening swelling; this visit is being conducted virtually (telephone or video visit) due to the Covid-19 pandemic.  They arrive/on the phone today with wife Virginia. The wound started 1 week ago on both legs. They are currently using ABD pads to the wounds. This is being done by wife every 2-3 days. They are using velcro wraps and tubular compression for the liners for compression; he typically wears these 24 hours per day; occasionally will remove at bedtime. He is using his lymphedema pump 1 time per day. They are feeling well today. Denies fevers, chills. No shortness of breath. FS have been running low 200s; checks a few days per week at bedtime. He is taking 40 mg of lasix daily and 5mg of metolazone Monday and Friday. His doses were reduced a few months ago after he became dehydrated from taking 80mg lasix daily and 5mg Metolazone daily. INR has been stable. His weight has gone up 7 pounds 4/7/2020. He weighs himself weekly. He denies shortness of breath.     Allergies: Aspirin    Medications:   Current Outpatient Medications:   ?  acetaminophen (TYLENOL) 325 MG tablet, Take 2 tablets (650 mg total) by mouth every 4 (four) hours as needed., Disp: , Rfl: 0  ?  atenolol (TENORMIN) 50 MG tablet, Take 50 mg by mouth., Disp: , Rfl:   ?  atorvastatin (LIPITOR) 10 MG tablet, Take 10 mg by mouth every morning. , Disp: , Rfl:   ?  benzonatate (TESSALON) 100 MG capsule, Take 1 capsule (100 mg total) by mouth 3  (three) times a day as needed for cough., Disp: , Rfl: 0  ?  ferrous gluconate 325 mg (36 mg iron) Tab, Take 325 mg by mouth daily. , Disp: , Rfl:   ?  furosemide (LASIX) 40 MG tablet, Take 40 mg by mouth 2 (two) times a day., Disp: , Rfl:   ?  glipiZIDE (GLUCOTROL XL) 5 MG 24 hr tablet, Take 5 mg by mouth daily with breakfast. , Disp: , Rfl:   ?  LANTUS SOLOSTAR 100 unit/mL (3 mL) pen, Inject 20 Units under the skin at bedtime. (Patient taking differently: Inject 30 Units under the skin at bedtime.    ), Disp: 3 mL, Rfl: PRN  ?  metFORMIN (GLUCOPHAGE) 500 MG tablet, Take 500 mg by mouth daily., Disp: , Rfl: 0  ?  metOLazone (ZAROXOLYN) 5 MG tablet, Take 5 mg by mouth daily., Disp: , Rfl: 0  ?  metOLazone (ZAROXOLYN) 5 MG tablet, Patient to take 1 tablet every other day until down 10 lbs then go to take it 2 times q week per Dr Guallpa, Disp: , Rfl:   ?  multivitamin with minerals tablet, Take 1 tablet by mouth., Disp: , Rfl:   ?  nystatin (MYCOSTATIN) cream, Apply to irritated areas of skin twice a day until healed.  Do not apply to open ulcer(s)., Disp: 60 g, Rfl: 1  ?  omeprazole (PRILOSEC) 20 MG capsule, Take 20 mg by mouth daily. , Disp: , Rfl:   ?  potassium chloride (KAYCIEL) 20 mEq/15 mL solution, Take 20 mEq by mouth daily. , Disp: , Rfl:   ?  vitamin A-vitamin C-vit E-min (OCUVITE) Tab tablet, Take 1 tablet by mouth daily. , Disp: , Rfl:   ?  warfarin (COUMADIN/JANTOVEN) 2 MG tablet, Take 5 mg by mouth .   , Disp: , Rfl:     Current Facility-Administered Medications:   ?  lidocaine 2 % jelly (XYLOCAINE), , Topical, PRN, Lex, Autumn Blackwood, CNP, 1 application at 11/29/18 0818    History:   Past Medical History:   Diagnosis Date   ? Anemia    ? Coronary artery disease 9/25/2015    stents placed    ? Diabetes mellitus (H)    ? Duodenitis 5/11/2016   ? Dyslipidemia    ? Gastroduodenal ulcer 9/24/2015   ? GERD (gastroesophageal reflux disease)    ? GI bleed 07/08/2016   ? Gout    ? Hematuria    ? Hyperlipemia   "  ? Hypertension    ? Kidney stone 10/6/2015       Physical Exam:    There were no vitals taken for this visit.    General:  Patient presents in no apparent distress.  Psychiatric:  Alert and oriented x3.   Respiratory: unlabored breathing; no cough, able to speak in full sentences without becoming breathless  Integumentary:  Skin is uniformly warm, dry and pink.    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 (Active)       VASC Wound 11/29/19 LEFT LEG (Active)   Pre Size Length 0.5 12/03/19 1400   Pre Size Width 0.3 12/03/19 1400   Pre Size Depth 0.1 12/03/19 1400   Pre Total Sq cm 0.15 12/03/19 1400   Undermined N 11/29/19 1000   Tunneling N 11/29/19 1000   Description intact blisters 11/29/19 1000       Pressure Ulcer Leg Left;Right (Active)       Pressure Ulcer 06/21/16 Buttocks Left Stage II (Active)       Wound 05/07/16 Non-pressure related ulcer Leg Left (Active)       Incision 06/18/16 Abdomen (Active)       Incision 07/08/16 Lap site Abdomen Mid (Active)       Incision 07/08/16 Surgical Abdomen Right (Active)        Lab Values    No results found for: SEDRATE  Lab Results   Component Value Date    CREATININE 1.35 (H) 01/22/2020     Lab Results   Component Value Date    HGBA1C 7.7 (H) 02/02/2018     Lab " Results   Component Value Date    BUN 29 (H) 01/22/2020     Lab Results   Component Value Date    ALBUMIN 3.4 (L) 11/21/2018     No results found for: [id number]          Impression:  No diagnosis found.              5/1/2020 BLE      Are any of these wounds new today: Yes; Location: right leg    Assessment/Plan:          1. Debridement: na     2.  Wound treatment: wound treatment will include irrigation and dressings to promote autolytic debridement which will include:we will order home care; silvercel; ABD; rolled gauze; change twice per week Worsened new wounds           3. Edema: weight up 7 pounds over the month; diuretics had to be titrated down recently due to hydration status; I will send message to his cardiologist to look into his case and see if increasing for a short period again would be prudent; will have home care wrap the legs with 4 layers bilaterally I encouraged elevation; I encouraged use of lymphedema pump 1-2 times per day. Worsened            4. Nutrition: low sodium; diabetic diet           5. Offloading: na     Patient will follow up with me in 3 weeks for reevaluation. They were instructed to call the clinic sooner with any signs or symptoms of infection or any further questions/concerns. Answered all questions.          Time spent on the phone/video chat 14 minutes    Mei Sampson DNP, RN, CNP, CWOCN, CFCN, CLT  [facility]   [phone]    Type of service: Video Visit     Video Start and End Time : [phone]    Originating Location (pt. Location): pt home    Distant Location (provider location): [facility]     Mode of Communication: Video, Doximity    This note was electronically signed by Mei Sampson

## 2021-06-30 NOTE — PROGRESS NOTES
Progress Notes by Mei Sampson NP at 11/30/2020  1:20 PM     Author: Mei Sampson NP Service: -- Author Type: Nurse Practitioner    Filed: 11/30/2020  3:18 PM Encounter Date: 11/30/2020 Status: Signed    : Mei Sampson NP (Nurse Practitioner)                   Follow up Vascular Visit       Date of Service:11/30/2020    Date Last Seen: Visit date not found; Visit date not found    Chief Complaint: BLE swelling; left foot ulcer; medial right leg ulcer; lateral right leg ulcer all new        Pt returns to [facility] with regards to their BLE swelling; left foot ulcer; medial right leg ulcer; lateral right leg ulcer all new; started about 3 weeks ago.  Has had ongoing issues with swelling and ulcers on/off for several years.  They arrive today with wife Virginia. They are currently using gauze to the wounds. This is being done by wife every other day. They are using velcro on the left and nothing on the right for compression; she can no longer get the velcro on the right. They are feeling well today. Denies fevers, chills. No shortness of breath. He sleeps in a recliner chair with legs slightly elevated; legs are down dependent most hours of the day. He works closely with cardiology on his heart failure; they adjust his diuretic medications frequently. His weight has been stable at 220 for 2 weeks; wife feels he does better when weight is at 214. Denies pain in the legs    Allergies: Aspirin    Medications:   Current Outpatient Medications:   ?  acetaminophen (TYLENOL) 325 MG tablet, Take 2 tablets (650 mg total) by mouth every 4 (four) hours as needed., Disp: , Rfl: 0  ?  atenolol (TENORMIN) 50 MG tablet, Take 50 mg by mouth., Disp: , Rfl:   ?  atorvastatin (LIPITOR) 10 MG tablet, Take 10 mg by mouth every morning. , Disp: , Rfl:   ?  ferrous gluconate 325 mg (36 mg iron) Tab, Take 325 mg by mouth daily. , Disp: , Rfl:   ?  furosemide (LASIX) 40 MG tablet, Take 40 mg by mouth 2  (two) times a day., Disp: , Rfl:   ?  glipiZIDE (GLUCOTROL XL) 5 MG 24 hr tablet, Take 5 mg by mouth daily with breakfast. , Disp: , Rfl:   ?  LANTUS SOLOSTAR 100 unit/mL (3 mL) pen, Inject 20 Units under the skin at bedtime. (Patient taking differently: Inject 30 Units under the skin at bedtime.    ), Disp: 3 mL, Rfl: PRN  ?  metFORMIN (GLUCOPHAGE) 500 MG tablet, Take 500 mg by mouth daily., Disp: , Rfl: 0  ?  metOLazone (ZAROXOLYN) 5 MG tablet, Take 5 mg by mouth daily., Disp: , Rfl: 0  ?  metOLazone (ZAROXOLYN) 5 MG tablet, Patient to take 1 tablet every other day until down 10 lbs then go to take it 2 times q week per Dr Guallpa, Disp: , Rfl:   ?  multivitamin with minerals tablet, Take 1 tablet by mouth., Disp: , Rfl:   ?  nystatin (MYCOSTATIN) cream, Apply to irritated areas of skin twice a day until healed.  Do not apply to open ulcer(s)., Disp: 60 g, Rfl: 1  ?  omeprazole (PRILOSEC) 20 MG capsule, Take 20 mg by mouth daily. , Disp: , Rfl:   ?  potassium chloride (KAYCIEL) 20 mEq/15 mL solution, Take 20 mEq by mouth daily. , Disp: , Rfl:   ?  vitamin A-vitamin C-vit E-min (OCUVITE) Tab tablet, Take 1 tablet by mouth daily. , Disp: , Rfl:   ?  warfarin (COUMADIN/JANTOVEN) 2 MG tablet, Take 5 mg by mouth .   , Disp: , Rfl:     Current Facility-Administered Medications:   ?  lidocaine 2 % jelly (XYLOCAINE), , Topical, PRN, Lex, Autumn Blackwood, CNP, 1 application at 11/29/18 0818    History:   Past Medical History:   Diagnosis Date   ? Anemia    ? Coronary artery disease 9/25/2015    stents placed    ? Diabetes mellitus (H)    ? Duodenitis 5/11/2016   ? Dyslipidemia    ? Gastroduodenal ulcer 9/24/2015   ? GERD (gastroesophageal reflux disease)    ? GI bleed 07/08/2016   ? Gout    ? Hematuria    ? Hyperlipemia    ? Hypertension    ? Kidney stone 10/6/2015       Physical Exam:    /70   Pulse 84   Temp 97.6  F (36.4  C) (Oral)   Wt 213 lb (96.6 kg)   BMI 28.10 kg/m      General:  Patient presents to clinic  in no apparent distress.  Head: normocephalic atraumatic  Psychiatric:  Alert and oriented x3.   Respiratory: unlabored breathing; no cough  Integumentary:  Skin is uniformly warm, dry and pink.    Wound #1 Location: left foot dorsum  Size: 0.6L x 0.5W x 0.1depth.  No sinus tract present, Wound base: slough  No undermining present. Wound is full thickness. There is moderate drainage. Periwound: no denudement, erythema, induration, maceration or warmth.      Wound #2 Location: right shin  Size: 1.3x1x 0.1depth.  No sinus tract present, Wound base: slough  No undermining present. Wound is full thickness. There is moderate drainage. Periwound: no denudement, erythema, induration, maceration or warmth.     Wound #3 Location: right lateral leg  Size: 2.2x4.3x x 0.1depth.  No sinus tract present, Wound base: slough  No undermining present. Wound is full thickness. There is moderate drainage. Periwound: no denudement, erythema, induration, maceration or warmth.     Circumferential volume measures:    Vasc Edema 11/22/2019 11/26/2019 11/29/2019 12/3/2019 11/30/2020   Right just above MTP 27.7 25.8 26.4 25.5 29.5   Right Ankle 25.9 24.7 24.3 24.2 29   Right Widest Calf 37.5 34.5 46 35 48.3   Right Thigh Up 10cm 56.8 - - - 57   Left - just above MTP 26.5 26.4 26.5 25.8 28.5   Left Ankle 24.1 25.5 23.5 24 30.3   Left Widest Calf 37.2 33.5 40.5 36 41.3   Left Thigh Up 10cm 53.5 - - - 52.5       Ulceration(s)/Wound(s):     Urethral Catheter 16 Fr. (Active)       VASC Wound 11/30/20 left dorsal foot (Active)   Pre Size Length 0.6 11/30/20 1300   Pre Size Width 0.5 11/30/20 1300   Pre Size Depth 0.1 11/30/20 1300   Pre Total Sq cm 0.3 11/30/20 1300       VASC Wound 11/30/20 right shin (Active)   Pre Size Length 1.3 11/30/20 1300   Pre Size Width 1 11/30/20 1300   Pre Size Depth 0.1 11/30/20 1300   Pre Total Sq cm 1.3 11/30/20 1300       VASC Wound 11/30/20 right lateral leg (Active)   Pre Size Length 2.2 11/30/20 1300   Pre Size  Width 4.3 11/30/20 1300   Pre Size Depth 0.1 11/30/20 1300   Pre Total Sq cm 9.46 11/30/20 1300       Pressure Ulcer Leg Left;Right (Active)       Pressure Ulcer 06/21/16 Buttocks Left Stage II (Active)       Wound 05/07/16 Non-pressure related ulcer Leg Left (Active)       Incision 06/18/16 Abdomen (Active)       Incision 07/08/16 Lap site Abdomen Mid (Active)       Incision 07/08/16 Surgical Abdomen Right (Active)        Lab Values    No results found for: SEDRATE  Lab Results   Component Value Date    CREATININE 1.21 11/23/2020     Lab Results   Component Value Date    HGBA1C 7.7 (H) 02/02/2018     Lab Results   Component Value Date    BUN 22 11/23/2020     Lab Results   Component Value Date    ALBUMIN 3.4 (L) 11/21/2018     No results found for: [id number]          Impression:  1. Ulcer of left lower extremity, limited to breakdown of skin (H)  Compression stockings   2. Venous stasis dermatitis of both lower extremities  Compression stockings   3. Scar condition and fibrosis of skin  Compression stockings   4. Acquired lymphedema of leg  Compression stockings   5. Swelling of limb  Compression stockings   6. Diabetic peripheral neuropathy associated with type 2 diabetes mellitus (H)  Compression stockings   7. Venous hypertension, chronic, bilateral  Compression stockings   8. Ulcer of right lower extremity, limited to breakdown of skin (H)  Compression stockings   9. Ulcer of lower extremity, unspecified laterality, unspecified ulcer stage (H)  Compression stockings   10. Lymphedema  Compression stockings   11. Venous stasis ulcer of right calf limited to breakdown of skin without varicose veins (H)  Compression stockings     11/30/2020 BLE edema     11/30/2020 right lateral leg    11/30/2020 right shin    11/30/2020 left foot            Are any of these wounds new today: Yes; Location: BLE    Assessment/Plan:          1. Debridement: After discussion of risk factors and verbal consent was obtained 2%  Lidocaine HCL jelly was applied, under clean conditions, the BLE ulceration(s) were debrided using currette. Devitalized and nonviable tissue, along with any fibrin and slough, was removed to improve granulation tissue formation, stimulate wound healing, decrease overall bacteria load, disrupt biofilm formation and decrease edge senescence.  Total excisional debridement was 11.06 sq cm from the epidermis/dermis area and into the subcutaneous tissue with a depth of 0.1 cm.   Ulcers were improved afterwards and .  Measures were unchanged after debridement.       2.  Wound treatment: wound treatment will include irrigation and dressings to promote autolytic debridement which will include:will start home care; change twice per week; cleanse wounds with dilute hibiclens; silvercel to wounds; abd; rolled gauze; Worsened all new wounds           3. Edema: swelling is worse; will need to reduce down with 4 layers will order new velcro wraps through [facility]; once wounds healed and swelling down can go back into these; continue to work with cardiology re: diuretics; monitor weight closely; we spoke about elevating more he is very resistive to this. The compression wraps were applied today in clinic. Stable            4. Nutrition: low sodium diet           5. Offloading: na     Patient will follow up with me in 4 weeks for reevaluation. They were instructed to call the clinic sooner with any signs or symptoms of infection or any further questions/concerns. Answered all questions.          Mei Sampson DNP, RN, CNP, CWOCN, CFCN, CLT  [facility]   [phone]        This note was electronically signed by Mei Sampson

## 2021-06-30 NOTE — PROGRESS NOTES
Progress Notes by Mei Sampson NP at 12/15/2020  9:40 AM     Author: Mei Sampson NP Service: -- Author Type: Nurse Practitioner    Filed: 12/15/2020 10:29 AM Encounter Date: 12/15/2020 Status: Signed    : Mei Sampson NP (Nurse Practitioner)                   Follow up Vascular Visit       Date of Service:12/15/2020    Date Last Seen: 11/30/2020; 11/30/2020    Chief Complaint: BLE swelling and ulcers        Pt returns to [facility] with regards to their BLE swelling and ulcers.  They arrive today with wife. We ordered home care last visit they report they were never contacted by home care; they did not call us to report. We contacted [facility] to report weight increase; they briefly increased his metolazone; weight went down 12 pounds; and the metolazone was again reduced to twice per week. His weight is steadily increasing again. We put him in 4 layers last visit; the wife removed these and then either stopped dressing the legs and used either velcro wraps or no compression; arrives today with nothing on the legs. She is not putting lotion on the legs as she was fearful this would get into the wounds. They are feeling well today. Denies fevers, chills. No shortness of breath.     Allergies: Aspirin    Medications:   Current Outpatient Medications:   ?  atenolol (TENORMIN) 50 MG tablet, Take 50 mg by mouth., Disp: , Rfl:   ?  atorvastatin (LIPITOR) 10 MG tablet, Take 10 mg by mouth every morning. , Disp: , Rfl:   ?  ferrous gluconate 325 mg (36 mg iron) Tab, Take 325 mg by mouth daily. , Disp: , Rfl:   ?  furosemide (LASIX) 40 MG tablet, Take 40 mg by mouth 2 (two) times a day., Disp: , Rfl:   ?  glipiZIDE (GLUCOTROL XL) 5 MG 24 hr tablet, Take 10 mg by mouth daily with breakfast. , Disp: , Rfl:   ?  LANTUS SOLOSTAR 100 unit/mL (3 mL) pen, Inject 20 Units under the skin at bedtime. (Patient taking differently: Inject 30 Units under the skin at bedtime.    ), Disp: 3 mL, Rfl:  PRN  ?  metFORMIN (GLUCOPHAGE) 500 MG tablet, Take 500 mg by mouth daily., Disp: , Rfl: 0  ?  metOLazone (ZAROXOLYN) 5 MG tablet, Take 5 mg by mouth daily., Disp: , Rfl: 0  ?  metOLazone (ZAROXOLYN) 5 MG tablet, Patient to take 1 tablet every other day until down 10 lbs then go to take it 2 times q week per Dr Guallpa, Disp: , Rfl:   ?  multivitamin with minerals tablet, Take 1 tablet by mouth., Disp: , Rfl:   ?  potassium chloride (KAYCIEL) 20 mEq/15 mL solution, Take 20 mEq by mouth daily. , Disp: , Rfl:   ?  warfarin (COUMADIN/JANTOVEN) 2 MG tablet, Take 5 mg by mouth .   , Disp: , Rfl:   ?  acetaminophen (TYLENOL) 325 MG tablet, Take 2 tablets (650 mg total) by mouth every 4 (four) hours as needed., Disp: , Rfl: 0  ?  nystatin (MYCOSTATIN) cream, Apply to irritated areas of skin twice a day until healed.  Do not apply to open ulcer(s)., Disp: 60 g, Rfl: 1  ?  omeprazole (PRILOSEC) 20 MG capsule, Take 20 mg by mouth daily. , Disp: , Rfl:   ?  vitamin A-vitamin C-vit E-min (OCUVITE) Tab tablet, Take 1 tablet by mouth daily. , Disp: , Rfl:     Current Facility-Administered Medications:   ?  lidocaine 2 % jelly (XYLOCAINE), , Topical, PRN, Lex, Autumn Blackwood, CNP, 1 application at 11/29/18 0818    History:   Past Medical History:   Diagnosis Date   ? Anemia    ? Coronary artery disease 9/25/2015    stents placed    ? Diabetes mellitus (H)    ? Duodenitis 5/11/2016   ? Dyslipidemia    ? Gastroduodenal ulcer 9/24/2015   ? GERD (gastroesophageal reflux disease)    ? GI bleed 07/08/2016   ? Gout    ? Hematuria    ? Hyperlipemia    ? Hypertension    ? Kidney stone 10/6/2015       Physical Exam:    /74   Pulse 64   Temp 97.7  F (36.5  C)   Resp 20   Wt 216 lb (98 kg)   BMI 28.50 kg/m      General:  Patient presents to clinic in no apparent distress.  Head: normocephalic atraumatic  Psychiatric:  Alert and oriented x3.   Respiratory: unlabored breathing; no cough  Integumentary:  Skin is uniformly warm, dry and  pink.    Extremities: swelling is improved; there are scattered full and partial thickness ulcers to the legs; new blister to the right medial leg; no active weeping from the legs; the skin is extremely dry    Circumferential volume measures:    Vasc Edema 11/26/2019 11/29/2019 12/3/2019 11/30/2020 12/15/2020   Right just above MTP 25.8 26.4 25.5 29.5 28.3   Right Ankle 24.7 24.3 24.2 29 28.5   Right Widest Calf 34.5 46 35 48.3 40.8   Right Thigh Up 10cm - - - 57 -   Left - just above MTP 26.4 26.5 25.8 28.5 27.5   Left Ankle 25.5 23.5 24 30.3 28.2   Left Widest Calf 33.5 40.5 36 41.3 38   Left Thigh Up 10cm - - - 52.5 -       Ulceration(s)/Wound(s):     Urethral Catheter 16 Fr. (Active)       VASC Wound 11/30/20 left dorsal foot (Active)   Pre Size Length 0.6 11/30/20 1300   Pre Size Width 0.5 11/30/20 1300   Pre Size Depth 0.1 11/30/20 1300   Pre Total Sq cm 0.3 11/30/20 1300   Description scab 12/15/20 0900       VASC Wound 12/15/20 Rt medial ankle (Active)   Pre Size Length 0.8 12/15/20 0900   Pre Size Width 0.6 12/15/20 0900   Pre Size Depth 0.1 12/15/20 0900   Pre Total Sq cm 0.48 12/15/20 0900       VASC Wound 12/15/20 Lt medial leg proximal (Active)   Pre Size Length 1.8 12/15/20 0900   Pre Size Width 1 12/15/20 0900   Pre Size Depth 0.1 12/15/20 0900   Pre Total Sq cm 1.8 12/15/20 0900       VASC Wound 12/15/20 Lt medial leg distal (Active)   Pre Size Length 0.8 12/15/20 0900   Pre Size Width 0.7 12/15/20 0900   Pre Size Depth 0.1 12/15/20 0900   Pre Total Sq cm 0.56 12/15/20 0900       Pressure Ulcer Leg Left;Right (Active)       Pressure Ulcer 06/21/16 Buttocks Left Stage II (Active)       Wound 05/07/16 Non-pressure related ulcer Leg Left (Active)       Incision 06/18/16 Abdomen (Active)       Incision 07/08/16 Lap site Abdomen Mid (Active)       Incision 07/08/16 Surgical Abdomen Right (Active)        Lab Values    No results found for: SEDRATE  Lab Results   Component Value Date    CREATININE 1.21  11/23/2020     Lab Results   Component Value Date    HGBA1C 7.7 (H) 02/02/2018     Lab Results   Component Value Date    BUN 22 11/23/2020     Lab Results   Component Value Date    ALBUMIN 3.4 (L) 11/21/2018     No results found for: [id number]          Impression:  1. Ulcer of left lower extremity, limited to breakdown of skin (H)     2. Venous stasis dermatitis of both lower extremities     3. Scar condition and fibrosis of skin     4. Acquired lymphedema of leg     5. Swelling of limb     6. Diabetic peripheral neuropathy associated with type 2 diabetes mellitus (H)     7. Venous hypertension, chronic, bilateral     8. Ulcer of right lower extremity, limited to breakdown of skin (H)     9. Ulcer of lower extremity, unspecified laterality, unspecified ulcer stage (H)     10. Lymphedema         12/15/2020 left medial ankle       12/15/2020 right medial ankle    12/15/2020 BLE          11/30/2020 BLE        11/30/2020 right leg    11/30/2020 right shin      11/30/2020 left foot            Are any of these wounds new today: No; Location: na    Assessment/Plan:          1. Debridement: After discussion of risk factors and verbal consent was obtained 2% Lidocaine HCL jelly was applied, under clean conditions, the bilateral leg ulceration(s) were debrided using currette. Devitalized and nonviable tissue, along with any fibrin and slough, was removed to improve granulation tissue formation, stimulate wound healing, decrease overall bacteria load, disrupt biofilm formation and decrease edge senescence.  Total excisional debridement was less than 10 sq cm from the epidermis/dermis area and into the subcutaneous tissue with a depth of 0.1 cm.   Ulcers were improved afterwards and .  Measures were unchanged after debridement.       2.  Wound treatment: wound treatment will include irrigation and dressings to promote autolytic debridement which will include:home care did not start as previously ordered; unsure what  happened; they did change their phone number; I checked that this was correct in the computer; will reorder again; twice per week; wash legs with soap and water; lotion to intact skin; NS to cleanse the wound; silvercel to wound; rolled gauzeStable            3. Edema: swelling is down due to the increase briefly with the metolazone; now back to regular dose; will continue to monitor his weights at home; weight is increasing however he was weighed with his jacket on today; will wrap with 2 layers; once healed back to velcros; encouraged elevation. The compression wraps were applied today in clinic. Improved            4. Nutrition: spoke about low sodium diet; diabetic diet             5. Offloading: na     Patient will follow up with me in 4 weeks for reevaluation. They were instructed to call the clinic sooner with any signs or symptoms of infection or any further questions/concerns. Answered all questions.          Mei Sampson DNP, RN, CNP, CWOCN, CFCN, CLT  [facility]   [phone]        This note was electronically signed by Mei Sampson

## 2021-07-03 NOTE — ADDENDUM NOTE
Addendum Note by Britney Rizzo CNP at 12/6/2018 10:00 AM     Author: Britney Rizzo CNP Service: -- Author Type: Nurse Practitioner    Filed: 12/12/2018 10:41 AM Encounter Date: 12/6/2018 Status: Signed    : Britney Rizzo CNP (Nurse Practitioner)    Addended by: BRITNEY RIZZO on: 12/12/2018 10:41 AM        Modules accepted: Level of Service

## 2021-07-03 NOTE — ADDENDUM NOTE
Addendum Note by Marybel Simmons RN at 5/14/2019  1:40 PM     Author: Marybel Simmons RN Service: -- Author Type: Registered Nurse    Filed: 5/14/2019  3:21 PM Encounter Date: 5/14/2019 Status: Signed    : Marybel Simmons RN (Registered Nurse)    Addended by: MARYBEL SIMMONS on: 5/14/2019 03:21 PM        Modules accepted: Orders

## 2021-07-03 NOTE — ADDENDUM NOTE
Addendum Note by Edu Sandoval LPN at 5/22/2020  9:00 AM     Author: Edu Sandoval LPN Service: -- Author Type: Licensed Nurse    Filed: 5/22/2020  9:48 AM Encounter Date: 5/22/2020 Status: Signed    : Edu Sandoval LPN (Licensed Nurse)    Addended by: EDU MENESES on: 5/22/2020 09:48 AM        Modules accepted: Orders

## 2021-07-14 ENCOUNTER — LAB REQUISITION (OUTPATIENT)
Dept: LAB | Facility: HOSPITAL | Age: 86
End: 2021-07-14
Payer: COMMERCIAL

## 2021-07-14 DIAGNOSIS — E11.9 TYPE 2 DIABETES MELLITUS WITHOUT COMPLICATIONS (H): ICD-10-CM

## 2021-07-14 LAB — HBA1C MFR BLD: 7.7 %

## 2021-07-14 PROCEDURE — 83036 HEMOGLOBIN GLYCOSYLATED A1C: CPT | Mod: ORL | Performed by: FAMILY MEDICINE

## 2021-07-14 PROCEDURE — 36415 COLL VENOUS BLD VENIPUNCTURE: CPT | Mod: ORL | Performed by: FAMILY MEDICINE

## 2021-08-02 PROBLEM — I87.8 VENOUS STASIS: Status: ACTIVE | Noted: 2019-11-27

## 2021-08-05 ENCOUNTER — OFFICE VISIT (OUTPATIENT)
Dept: VASCULAR SURGERY | Facility: CLINIC | Age: 86
End: 2021-08-05
Attending: NURSE PRACTITIONER
Payer: COMMERCIAL

## 2021-08-05 VITALS
DIASTOLIC BLOOD PRESSURE: 68 MMHG | TEMPERATURE: 97.6 F | BODY MASS INDEX: 28.89 KG/M2 | HEART RATE: 60 BPM | HEIGHT: 73 IN | RESPIRATION RATE: 16 BRPM | WEIGHT: 218 LBS | SYSTOLIC BLOOD PRESSURE: 116 MMHG

## 2021-08-05 DIAGNOSIS — I87.303 VENOUS HYPERTENSION, CHRONIC, BILATERAL: ICD-10-CM

## 2021-08-05 DIAGNOSIS — Z79.01 LONG TERM CURRENT USE OF ANTICOAGULANT THERAPY: ICD-10-CM

## 2021-08-05 DIAGNOSIS — L97.519 BILATERAL GREAT TOES ULCERS (H): ICD-10-CM

## 2021-08-05 DIAGNOSIS — L97.921 ULCER OF LEFT LOWER EXTREMITY, LIMITED TO BREAKDOWN OF SKIN (H): Primary | ICD-10-CM

## 2021-08-05 DIAGNOSIS — L97.911 ULCER OF RIGHT LOWER EXTREMITY, LIMITED TO BREAKDOWN OF SKIN (H): ICD-10-CM

## 2021-08-05 DIAGNOSIS — L97.529 BILATERAL GREAT TOES ULCERS (H): ICD-10-CM

## 2021-08-05 DIAGNOSIS — N18.31 STAGE 3A CHRONIC KIDNEY DISEASE (H): ICD-10-CM

## 2021-08-05 PROCEDURE — G0463 HOSPITAL OUTPT CLINIC VISIT: HCPCS

## 2021-08-05 PROCEDURE — 99213 OFFICE O/P EST LOW 20 MIN: CPT | Performed by: NURSE PRACTITIONER

## 2021-08-05 ASSESSMENT — MIFFLIN-ST. JEOR: SCORE: 1697.72

## 2021-08-05 ASSESSMENT — PAIN SCALES - GENERAL: PAINLEVEL: NO PAIN (0)

## 2021-08-05 NOTE — LETTER
2021      [facility]   Fax: [phone] Wound Dressing Rx and Order Form  Customer Service: [phone] Order Status: New   Verbal: Deborah GREWAL CMA          Patient Info:  Name: Robinson Medel  : 1930  Address: [address] [place], [place] [id number]      Insurance Info:  INSURER: Payor: [facility] / Plan: [employer] MEDICARE NON [employer] / Product Type: HMO /   Patient: Robinson Medel  Policy ID#:  [id number]  : 1930    Physician Info:   Name: Geno Onofre CNP   Dept Address/Phones:   [address] [place] [address], [facility] [address] [address] [phone]  Fax: [phone]      Diagnosis:  Impression:   Encounter Diagnoses   Name Primary?     Ulcer of left lower extremity, limited to breakdown of skin (H) Yes     Ulcer of right lower extremity, limited to breakdown of skin (H)      Bilateral great toes ulcers (H)      Venous hypertension, chronic, bilateral      Stage 3a chronic kidney disease      Long term current use of anticoagulant therapy        Lymphedema circumferential measurements (in cm):      Circumferential Measures (cm)  Right just above MTP: 28.8  Right Ankle: 28.5  Right Widest Calf: 45.5  Right Knee to Ankle: 44  Left - just above MTP: 28.5  Left Ankle: 27.5  Left Widest Calf: 46  Left Knee to Ankle: 44       Wound info:    VASC Wound Left hallux (Active)   Pre Size Length 0.5 21 0800   Pre Size Width 0.5 21 0800   Pre Size Depth 0.1 21 0800   Pre Total Sq cm 0.25 21 0800       VASC Wound Right hallux (Active)   Pre Size Length 0.5 21 0800   Pre Size Width 2 21 0800   Pre Size Depth 0.1 21 0800   Pre Total Sq cm 1 21 0800       VASC Wound Right 2nd toe (Active)   Pre Size Length 0.3 21 0800   Pre Size Width 0.3 21 0800   Pre Size Depth 0.1 21 0800   Pre Total Sq cm 0.09 21 0800       VASC Wound Right lateral leg (Active)   Pre Size  "Length 4 08/05/21 0800   Pre Size Width 3.5 08/05/21 0800   Pre Size Depth 0.1 08/05/21 0800       VASC Wound Left lateral leg (Active)   Pre Size Length 17 08/05/21 0800   Pre Size Width 15 08/05/21 0800   Pre Size Depth 0.1 08/05/21 0800         Drainage: Heavy  Thickness:  partial  Duration of Need: 60 days  Days Supply: 30 days  Start Date: 8/5/2021  Starter Kit:Ancillary  Qualifying wound/Debridement: yes     Dressing Type Brand Size Number of pieces Frequency of change   Primary Silver alginate Silvercell 11cm x 11cm 60 3 times weekly    UNNA Boot Viscopaste PB7 3\" x 10yd 24 3 times weekly    ABD pad  5\"x9\" 60 3 times weekly    Conforming stretch gauze  4\" wide 36 3 times weekly    Cohesive Bandage Coban 4\" x 4 yd 24 3 times weekly    Bordered Gauze Medipore +pad 5cm x 7cm 24 3 times weekly     Note: If total out of pocket is more than $50.00 please contact the patient before processing order.     OK to forward to covered supplier.     Electronically Signed Physician: YUMI MONTES                         Date: 8/5/2021      "

## 2021-08-05 NOTE — PATIENT INSTRUCTIONS
"Home Care Referral for PT/OT Lymphedema therapy Evaluation & Treatment, re: BLE.     Wound Care Instructions  BILATERAL LOWER LEG:  Cleanse with wound wash or bottled water. Use non-sterile 4x4 gauze to gently remove any loosening tissue or debris and pat dry with non-sterile gauze.     Primary Dressing: Apply Silvercel over open ulcerations.     Secondary dressing: Cover with Medline Unna Z in a spiral fashion from toes to knee; be sure to keep looser at the top. Cover weeping areas with ABD pads.Secure with 4\" rolled gauze and 4\" coban.    Change 3x weekly.     BILATERAL GREAT TOES:  Cleanse with wound wash or bottled water. Use non-sterile 4x4 gauze to gently remove any loosening tissue or debris and pat dry with non-sterile gauze.     Primary Dressing: Apply Silvercel over open ulcerations.     Secondary dressing: Cover bordered gauze dressing.    Change 3x weekly.     Activity:  -Sleep as flat as tolerated in electric lift chair.   -Ankles should be higher than knees to promote reduction of edema.  -Twice daily (1 hour between normal meal times) lay flat on couch or bed to reduce edema.   -Perform ankle circles and pumps while lying flat to bring fluid up out of legs.  -Walk as much as tolerated, this is good for edema.    Diet:  -Avoid salt and sugar as this retains water and will worsen edema.    Bathing:  -You may shower with waterproof dressing cover.  -Do not soak ulcers.  -Do not leave open to air.       SEEK MEDICAL CARE IF:    You have an increase in swelling, pain, or redness around the wound.    You have an increase in the amount of pus coming from the wound.    There is a bad smell coming from the wound.    The wound appears to be worsening/enlarging    You have a fever greater than 101.5 F      It is ok to continue current wound care treatment/products for the next 2-3 days until new wound care supplies are ordered and arrive. If longer than this please contact our office at [phone].    Geno " GLENYS Onofre, MSN, CNP, CWOCN-AP  [facility]  [phone]

## 2021-08-05 NOTE — PROGRESS NOTES
[facility] - [place]     FOLLOW UP NOTE      Date of Service: 8/5/2021    Date Last Seen: Visit date not found; Visit date not found    Chief Complaint: Bilateral lower leg and great toe ulcers    Pt returns to [facility] with regards to the above listed concern(s).  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    Today the patient reports feeling well; Denies fevers, chills. No shortness of breath. The patient is accompanied by his wife. They have not been seen by Mei Sampson, CHAVA, CWOCN, since December 2020; they are unsure if the ulcers every fully healed. They did receive velcro compression ordered in December and patient wife states they used it for a time but it shrank and is too short for his legs. Patient sleeps in electric lift chair with head elevated due to back pain, weakness, and urinary incontinence. He has tried to use incontinence garments and urinal without strong success. Pain is rated 0 out of 10. Current ordered dressings include:  Silver alginate and two of four 4-layer compression wraps are in place; this the patient had cut the tops down due to proximal swelling.  This is being done by home care directed by PCP since December 2020.      Patient is scheduled for veinous closure at [facility] with Dr. Palm. I highly encourage this if he is to have a change of improving the lower leg ulcers and swelling.       Allergies: Aspirin      Medications:   Current Outpatient Medications:      acetaminophen (TYLENOL) 325 MG tablet, [ACETAMINOPHEN (TYLENOL) 325 MG TABLET] Take 2 tablets (650 mg total) by mouth every 4 (four) hours as needed., Disp: , Rfl: 0     atenolol (TENORMIN) 50 MG tablet, [ATENOLOL  (TENORMIN) 50 MG TABLET] Take 50 mg by mouth., Disp: , Rfl:      atorvastatin (LIPITOR) 10 MG tablet, [ATORVASTATIN (LIPITOR) 10 MG TABLET] Take 10 mg by mouth every morning. , Disp: , Rfl:      furosemide (LASIX) 40 MG tablet, [FUROSEMIDE (LASIX) 40 MG TABLET] Take 40 mg by mouth 2 (two) times a day., Disp: , Rfl:      glipiZIDE (GLUCOTROL XL) 5 MG 24 hr tablet, [GLIPIZIDE (GLUCOTROL XL) 5 MG 24 HR TABLET] Take 10 mg by mouth daily with breakfast. , Disp: , Rfl:      LANTUS SOLOSTAR 100 unit/mL (3 mL) pen, [LANTUS SOLOSTAR 100 UNIT/ML (3 ML) PEN] Inject 20 Units under the skin at bedtime., Disp: 3 mL, Rfl: PRN     MEDICATION CANNOT BE REORDERED - PLEASE MANUALLY REORDER AND DISCONTINUE THE OLD ORDER, [FERROUS GLUCONATE 325 MG (36 MG IRON) TAB] Take 325 mg by mouth daily. , Disp: , Rfl:      metFORMIN (GLUCOPHAGE) 500 MG tablet, [METFORMIN (GLUCOPHAGE) 500 MG TABLET] Take 500 mg by mouth daily., Disp: , Rfl: 0     metOLazone (ZAROXOLYN) 5 MG tablet, [METOLAZONE (ZAROXOLYN) 5 MG TABLET] Patient to take 1 tablet every other day until down 10 lbs then go to take it 2 times q week per Dr Guallpa, Disp: , Rfl:      metOLazone (ZAROXOLYN) 5 MG tablet, [METOLAZONE (ZAROXOLYN) 5 MG TABLET] Take 5 mg by mouth daily., Disp: , Rfl: 0     multivitamin with minerals tablet, [MULTIVITAMIN WITH MINERALS TABLET] Take 1 tablet by mouth., Disp: , Rfl:      nystatin (MYCOSTATIN) cream, [NYSTATIN (MYCOSTATIN) CREAM] Apply to irritated areas of skin twice a day until healed.  Do not apply to open ulcer(s)., Disp: 60 g, Rfl: 1     omeprazole (PRILOSEC) 20 MG capsule, [OMEPRAZOLE (PRILOSEC) 20 MG CAPSULE] Take 20 mg by mouth daily. , Disp: , Rfl:      potassium chloride (KAYCIEL) 20 mEq/15 mL solution, [POTASSIUM CHLORIDE (KAYCIEL) 20 MEQ/15 ML SOLUTION] Take 20 mEq by mouth daily. , Disp: , Rfl:      vitamin A-vitamin C-vit E-min (OCUVITE) Tab tablet, [VITAMIN A-VITAMIN C-VIT E-MIN (OCUVITE) TAB TABLET] Take 1 tablet by mouth daily. ,  "Disp: , Rfl:      warfarin (COUMADIN/JANTOVEN) 2 MG tablet, [WARFARIN (COUMADIN/JANTOVEN) 2 MG TABLET] Take 5 mg by mouth .      , Disp: , Rfl:       History:   Past Medical History:   Diagnosis Date     Anemia      Coronary artery disease 9/25/2015    stents placed      Diabetes mellitus (H)      Duodenitis 5/11/2016     Dyslipidemia      Gastroduodenal ulcer 9/24/2015     GERD (gastroesophageal reflux disease)      GI bleed 07/08/2016     Gout      Hematuria      Hyperlipemia      Hypertension      Kidney stone 10/6/2015         Physical Exam:    /68   Pulse 60   Temp 97.6  F (36.4  C) (Oral)   Resp 16   Ht 6' 1\" (1.854 m)   Wt 218 lb (98.9 kg)   BMI 28.76 kg/m    Weight is 218 lbs 0 oz          Body mass index is 28.76 kg/m .    General:  Patient presents to clinic in no apparent distress.  Head: normocephalic atraumatic  Psychiatric:  Alert and oriented x3.   Respiratory: unlabored breathing; no cough  Integumentary:  Skin is uniformly warm, dry and pink.    Peripheral Vascular:  4+ pitting edema of the entire Bilateral Lower Extremities including positive Stemmer's signs, bilaterally.     Circumferential volume measures:         Ulceration(s)/Wound(s):   Wound/Ulcer location: GREAT TOES, RIGHT AND LEFT   Size: Right: 0.5 cm L x 2.0 cm W x 0.1 cm D; Left: 0.5 cm L x 0.5 cm W x 0.1 cm D.  Edges: Attached  Undermining: none  Base: pink  Tissues: dermis/nail  Thickness: partial  Exudate Type: serous  Exudate Amount: large/copious  Alysha-Wound/Ulcer: maceration of the right nail bed  Odor: none    Wound/Ulcer location: LOWER LEGS, RIGHT AND LEFT   Size: Right lateral: cm 4.0 L x 3.5 cm W x 0.1 cm D; Left lateral: 17.0 cm L x 15.0 cm W x 0.1 cm D   Edges:   Undermining: Attached, diffuse  Base: pink  Tissues: dermis  Thickness: partial  Exudate Type: serous  Exudate Amount: copious  Alysha-Wound/Ulcer: scattered skin breakdown; reactive hyperemia  Odor: none       Laboratory/Diagnostics " "studes:    Component      Latest Ref Rng & Units 12/30/2020 7/14/2021   Sodium      136 - 145 mmol/L 142    Potassium      3.5 - 5.0 mmol/L 3.7    Chloride      98 - 107 mmol/L 97 (L)    Carbon Dioxide      22 - 31 mmol/L 34 (H)    Anion Gap      5 - 18 mmol/L 11    Glucose      70 - 125 mg/dL 111    Calcium      8.5 - 10.5 mg/dL 9.1    Urea Nitrogen      8 - 28 mg/dL 29 (H)    Creatinine      0.70 - 1.30 mg/dL 1.22    GFR Estimate If Black      >60 mL/min/1.73m2 >60    GFR Estimate      >60 mL/min/1.73m2 56 (L)    Hemoglobin A1C      <=5.6 %  7.7 (H)     No components found for: SEDRATE  No results found for: CREATININE  No results found for: HGBA1C  Lab Results   Component Value Date    BUN 29 (H) 12/30/2020     Lab Results   Component Value Date    ALBUMIN 3.4 (L) 11/21/2018     No components found for: [id number]      Diagnoses:  1. Ulcer of left lower extremity, limited to breakdown of skin (H)    2. Ulcer of right lower extremity, limited to breakdown of skin (H)    3. Bilateral great toes ulcers (H)    4. Venous hypertension, chronic, bilateral    5. Stage 3a chronic kidney disease    6. Long term current use of anticoagulant therapy           Photo:  8/5/2021  Left lower leg    Right lower leg    Right hallux    Left hallux    Bilateral Lower Extremities         Are any of these wounds new today: No; Location: na.      Assessment/Plan:  1. Procedure: NA    2. Treatment: wound treatment will include irrigation and dressings to promote autolytic debridement which will include: primary dressing of silver alginate with secondary dressings of Medline Unna Z boot in a spiral fashion, ABD pads, 4\" rolled gauze and Coban, 3x weekly.    3. Edema: Discussed effective elevation to reduce edema. Will order home health lymphedema therapy. Will need to be measured for new compression, velcro when edema is controlled.     4. Offloading: NA    5. Nutrition: NA    6. Diagnostics: NA    7. Medications: NA    8. Referrals: " NA    9. Education: See above. Education provided regarding the above plan of care. Patient verbalizes understanding and all questions answered to their satisfaction.        Impression:  Robinson Medel is a 89 year old patient with ulcers of the right and left lower legs as well as the right and left great toes due to uncontrolled edema from venous insufficiency and secondary lymphedema secondary to prostatectomy and venous insufficiency. Healing will be complicated by type 2 diabetes mellitus without hyperglycemia, stage 3A chronic kidney disease, urinary incontinence, antithrombotic therapy, and inability to elevate lower legs effectively.    Today I will continue the silver alginate for autolytic debridement and antimicrobial prophylaxis. I will convert compression to Unna boot instead of 4 layer. The patient is removing portions of the 4 layer rendering it ineffective. I will also order lymphedema therapy to help mobilize interstitial lymphatic fluid back to the central vascular system for renal excretion. I highly encouraged the patient to complete the venous closure procedures to help reduce edema.     Patient may benefit from a urostomy due to uncontrolled urinary incontinence. This may make diuretic therapy more effective and help the patient's quality of life.       Follow Up:  Patient will follow up with me in 4 weeks for reevaluation. They were instructed to call the clinic sooner with any signs or symptoms of infection or any further questions/concerns. Answered all questions.      Geno Onofre, MSN, CNP, CWOCN-AP  [facility]  [phone]

## 2021-08-26 ENCOUNTER — OFFICE VISIT (OUTPATIENT)
Dept: VASCULAR SURGERY | Facility: CLINIC | Age: 86
End: 2021-08-26
Attending: NURSE PRACTITIONER
Payer: COMMERCIAL

## 2021-08-26 VITALS
BODY MASS INDEX: 29.29 KG/M2 | RESPIRATION RATE: 16 BRPM | WEIGHT: 221 LBS | HEART RATE: 68 BPM | TEMPERATURE: 98.2 F | DIASTOLIC BLOOD PRESSURE: 76 MMHG | SYSTOLIC BLOOD PRESSURE: 120 MMHG | HEIGHT: 73 IN

## 2021-08-26 DIAGNOSIS — L97.921 ULCER OF LEFT LOWER EXTREMITY, LIMITED TO BREAKDOWN OF SKIN (H): Primary | ICD-10-CM

## 2021-08-26 DIAGNOSIS — I89.0 ACQUIRED LYMPHEDEMA OF LEG: ICD-10-CM

## 2021-08-26 DIAGNOSIS — M79.89 SWELLING OF LIMB: ICD-10-CM

## 2021-08-26 DIAGNOSIS — L97.911 ULCER OF RIGHT LOWER EXTREMITY, LIMITED TO BREAKDOWN OF SKIN (H): ICD-10-CM

## 2021-08-26 DIAGNOSIS — L97.519 BILATERAL GREAT TOES ULCERS (H): ICD-10-CM

## 2021-08-26 DIAGNOSIS — L97.529 BILATERAL GREAT TOES ULCERS (H): ICD-10-CM

## 2021-08-26 DIAGNOSIS — I87.303 VENOUS HYPERTENSION, CHRONIC, BILATERAL: ICD-10-CM

## 2021-08-26 DIAGNOSIS — L90.5 SCAR CONDITION AND FIBROSIS OF SKIN: ICD-10-CM

## 2021-08-26 DIAGNOSIS — Z79.01 LONG TERM CURRENT USE OF ANTICOAGULANT THERAPY: ICD-10-CM

## 2021-08-26 DIAGNOSIS — N18.31 STAGE 3A CHRONIC KIDNEY DISEASE (H): ICD-10-CM

## 2021-08-26 PROCEDURE — G0463 HOSPITAL OUTPT CLINIC VISIT: HCPCS

## 2021-08-26 PROCEDURE — 99213 OFFICE O/P EST LOW 20 MIN: CPT | Performed by: NURSE PRACTITIONER

## 2021-08-26 ASSESSMENT — PAIN SCALES - GENERAL: PAINLEVEL: NO PAIN (0)

## 2021-08-26 ASSESSMENT — MIFFLIN-ST. JEOR: SCORE: 1711.33

## 2021-08-26 NOTE — PATIENT INSTRUCTIONS
"Home Care Referral for PT/OT Lymphedema therapy Evaluation & Treatment, re: BLE.     Wound Care Instructions  BILATERAL LOWER LEG:  Cleanse with wound wash or bottled water. Use non-sterile 4x4 gauze to gently remove any loosening tissue or debris and pat dry with non-sterile gauze.     Primary Dressing: Apply Silvercel over open ulcerations.     Secondary dressing: Cover with Medline Unna Z in a spiral fashion from toes to knee; be sure to keep looser at the top. Cover weeping areas with ABD pads.Secure with 4\" rolled gauze and 4\" coban.    Change 3x weekly.     BILATERAL GREAT TOES:  Cleanse with wound wash or bottled water. Use non-sterile 4x4 gauze to gently remove any loosening tissue or debris and pat dry with non-sterile gauze.     Primary Dressing: Apply Silvercel over open ulcerations.     Secondary dressing: Cover bordered gauze dressing.    Change 3x weekly.     Activity:  -Sleep as flat as tolerated in electric lift chair.   -Ankles should be higher than knees to promote reduction of edema.  -Twice daily (1 hour between normal meal times) lay flat on couch or bed to reduce edema.   -Perform ankle circles and pumps while lying flat to bring fluid up out of legs.  -Walk as much as tolerated, this is good for edema.    Diet:  -Avoid salt and sugar as this retains water and will worsen edema.    Bathing:  -You may shower with waterproof dressing cover.  -Do not soak ulcers.  -Do not leave open to air.       SEEK MEDICAL CARE IF:    You have an increase in swelling, pain, or redness around the wound.    You have an increase in the amount of pus coming from the wound.    There is a bad smell coming from the wound.    The wound appears to be worsening/enlarging    You have a fever greater than 101.5 F      It is ok to continue current wound care treatment/products for the next 2-3 days until new wound care supplies are ordered and arrive. If longer than this please contact our office at [phone].    Geno " GLENYS Onofre, MSN, CNP, CWOCN-AP  [facility]  [phone]

## 2021-08-26 NOTE — PROGRESS NOTES
[facility] - [place]     FOLLOW UP NOTE      Date of Service: 8/26/2021    Date Last Seen: 8/5/2021; 8/5/2021    Chief Complaint: Bilateral lower leg and great toe ulcers    Pt returns to [facility] with regards to the above listed concern(s).  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    Today the patient reports feeling well; Denies fevers, chills. No shortness of breath. Pain is rated 0 out of 10. The patient arrives today with wife from home. Current ordered dressings include:  Silver alginate and Unna boot compression.  This is being done by home health. Patient presents with 4-layer compression as he has venous closure on the left leg earlier this week CDI. He is scheduled for a right lower leg closure in the near future.       Allergies: Aspirin      Medications:   Current Outpatient Medications:      acetaminophen (TYLENOL) 325 MG tablet, [ACETAMINOPHEN (TYLENOL) 325 MG TABLET] Take 2 tablets (650 mg total) by mouth every 4 (four) hours as needed., Disp: , Rfl: 0     atenolol (TENORMIN) 50 MG tablet, [ATENOLOL (TENORMIN) 50 MG TABLET] Take 50 mg by mouth., Disp: , Rfl:      atorvastatin (LIPITOR) 10 MG tablet, [ATORVASTATIN (LIPITOR) 10 MG TABLET] Take 10 mg by mouth every morning. , Disp: , Rfl:      furosemide (LASIX) 40 MG tablet, [FUROSEMIDE (LASIX) 40 MG TABLET] Take 40 mg by mouth 2 (two) times a day., Disp: , Rfl:      glipiZIDE (GLUCOTROL XL) 5 MG 24 hr tablet, [GLIPIZIDE (GLUCOTROL XL) 5 MG 24 HR TABLET] Take 10 mg by mouth daily with breakfast. , Disp: , Rfl:      LANTUS SOLOSTAR 100 unit/mL (3 mL) pen, [LANTUS SOLOSTAR 100 UNIT/ML (3 ML) PEN] Inject 20 Units under the skin at bedtime.,  Disp: 3 mL, Rfl: PRN     MEDICATION CANNOT BE REORDERED - PLEASE MANUALLY REORDER AND DISCONTINUE THE OLD ORDER, [FERROUS GLUCONATE 325 MG (36 MG IRON) TAB] Take 325 mg by mouth daily. , Disp: , Rfl:      metFORMIN (GLUCOPHAGE) 500 MG tablet, [METFORMIN (GLUCOPHAGE) 500 MG TABLET] Take 500 mg by mouth daily., Disp: , Rfl: 0     metOLazone (ZAROXOLYN) 5 MG tablet, [METOLAZONE (ZAROXOLYN) 5 MG TABLET] Patient to take 1 tablet every other day until down 10 lbs then go to take it 2 times q week per Dr Guallpa, Disp: , Rfl:      metOLazone (ZAROXOLYN) 5 MG tablet, [METOLAZONE (ZAROXOLYN) 5 MG TABLET] Take 5 mg by mouth daily., Disp: , Rfl: 0     multivitamin with minerals tablet, [MULTIVITAMIN WITH MINERALS TABLET] Take 1 tablet by mouth., Disp: , Rfl:      nystatin (MYCOSTATIN) cream, [NYSTATIN (MYCOSTATIN) CREAM] Apply to irritated areas of skin twice a day until healed.  Do not apply to open ulcer(s)., Disp: 60 g, Rfl: 1     omeprazole (PRILOSEC) 20 MG capsule, [OMEPRAZOLE (PRILOSEC) 20 MG CAPSULE] Take 20 mg by mouth daily. , Disp: , Rfl:      potassium chloride (KAYCIEL) 20 mEq/15 mL solution, [POTASSIUM CHLORIDE (KAYCIEL) 20 MEQ/15 ML SOLUTION] Take 20 mEq by mouth daily. , Disp: , Rfl:      vitamin A-vitamin C-vit E-min (OCUVITE) Tab tablet, [VITAMIN A-VITAMIN C-VIT E-MIN (OCUVITE) TAB TABLET] Take 1 tablet by mouth daily. , Disp: , Rfl:      warfarin (COUMADIN/JANTOVEN) 2 MG tablet, [WARFARIN (COUMADIN/JANTOVEN) 2 MG TABLET] Take 5 mg by mouth .      , Disp: , Rfl:       History:   Past Medical History:   Diagnosis Date     Anemia      Coronary artery disease 9/25/2015    stents placed      Diabetes mellitus (H)      Duodenitis 5/11/2016     Dyslipidemia      Gastroduodenal ulcer 9/24/2015     GERD (gastroesophageal reflux disease)      GI bleed 07/08/2016     Gout      Hematuria      Hyperlipemia      Hypertension      Kidney stone 10/6/2015         Physical Exam:    There were no vitals taken for this  visit.  Weight is 0 lbs 0 oz          There is no height or weight on file to calculate BMI.    General:  Patient presents to clinic in no apparent distress.  Head: normocephalic atraumatic  Psychiatric:  Alert and oriented x3.   Respiratory: unlabored breathing; no cough  Integumentary:  Skin is uniformly warm, dry and pink.    Peripheral Vascular:  Minimal edema of the left lower leg, Plus 2 pitting edema of the right lower leg.    Circumferential volume measures:         Ulceration(s)/Wound(s):   Wound/Ulcer location: GREAT TOES, RIGHT AND LEFT   Size: Right: 0.9 cm L x 1.0 cm W x 0.1 cm D; Left: 0.2 cm L x 0.4 cm W x 0.1 cm D.  Edges: Attached  Undermining: none  Base: pink  Tissues: dermis/nail  Thickness: partial  Exudate Type: serous  Exudate Amount: small to medium  Alysha-Wound/Ulcer: maceration resolved  Odor: none    Wound/Ulcer location: RIGHT 2ND TOE   Size:  0.4 cm L x 0.4 cm W x 0.1 cm D.  Edges: Attached  Undermining: none  Base: pink  Tissues: dermis/nail  Thickness: partial  Exudate Type: serous  Exudate Amount: small to medium  Alysha-Wound/Ulcer: maceration resolved  Odor: none     Wound/Ulcer location: LOWER LEGS, RIGHT AND LEFT   Size: Right lateral: cm 5.5 L x 5.5 cm W x 0.1 cm D; Left lateral: 1.0 cm L x 3.0 cm W x 0.1 cm D   Edges:   Undermining: Attached, diffuse  Base: pink  Tissues: dermis  Thickness: partial  Exudate Type: serous  Exudate Amount: moderate  Alysha-Wound/Ulcer: no maceration  Odor: none       Laboratory/Diagnostics studes:    No components found for: SEDRATE  No results found for: CREATININE  No results found for: HGBA1C  Lab Results   Component Value Date    BUN 29 (H) 12/30/2020     Lab Results   Component Value Date    ALBUMIN 3.4 (L) 11/21/2018     No components found for: [id number]      Diagnoses:  1. Ulcer of left lower extremity, limited to breakdown of skin (H)    2. Ulcer of right lower extremity, limited to breakdown of skin (H)    3. Bilateral great toes ulcers (H)  "   4. Venous hypertension, chronic, bilateral    5. Stage 3a chronic kidney disease    6. Long term current use of anticoagulant therapy    7. Scar condition and fibrosis of skin    8. Acquired lymphedema of leg    9. Swelling of limb           Photo:  8/26/2021  Bilateral lower leg    Right lower leg    Left lower leg    Right toe    Left toe        Are any of these wounds new today: No; Location: NA.      Assessment/Plan:  1. Procedure: NA    2. Treatment: wound treatment will include irrigation and dressings to promote autolytic debridement which will include: continue primary dressing of silver alginate with secondary dressings of Medline Unna Z boot in a spiral fashion, ABD pads, 4\" rolled gauze and Coban, 3x weekly.    3. Edema: Discussed effective elevation to reduce edema. Will order home health lymphedema therapy. Will need to be measured for new compression, velcro when edema is controlled.     4. Offloading: NA    5. Nutrition: NA    6. Diagnostics: NA    7. Medications: NA    8. Referrals: NA    9. Education: See above. Education provided regarding the plan of care, as well as ordered labs and diagnostics. Patient verbalizes understanding and all questions answered to their satisfaction.        Impression:  Robinson Medel is a 89 year old patient with ulcers of the right and left lower legs as well as the right and left great toes due to uncontrolled edema from venous insufficiency and secondary lymphedema secondary to prostatectomy and venous insufficiency. Healing will be complicated by type 2 diabetes mellitus without hyperglycemia, stage 3A chronic kidney disease, urinary incontinence, antithrombotic therapy, and inability to elevate lower legs effectively.     Today, the right and left great toes and left lower leg ulcers are smaller; the right lower leg ulcer is larger. He had the venous closure on the left leg, but not yet on the right. This is scheduled. Swelling on the left lower leg is " noticeably improved compared to the right lower leg.  I will continue the silver alginate for autolytic debridement and antimicrobial prophylaxis. I will continue compression to Unna boot instead of 4 layer a the patient is removing portions of the 4 layer rendering it ineffective. Lymphedema therapy has been ordered to help mobilize interstitial lymphatic fluid back to the central vascular system for renal excretion.      Patient may benefit from a urostomy due to uncontrolled urinary incontinence. This may make diuretic therapy more effective and help the patient's quality of life.       Follow Up:  Patient will follow up with me in 2-3 weeks for reevaluation. They were instructed to call the clinic sooner with any signs or symptoms of infection or any further questions/concerns. Answered all questions.      Geno Onofre, MSN, CNP, CWOCN-AP  [facility]  [phone]

## 2021-10-02 ENCOUNTER — HEALTH MAINTENANCE LETTER (OUTPATIENT)
Age: 86
End: 2021-10-02

## 2021-10-15 ENCOUNTER — LAB REQUISITION (OUTPATIENT)
Dept: LAB | Facility: CLINIC | Age: 86
End: 2021-10-15

## 2021-10-15 ENCOUNTER — TRANSFERRED RECORDS (OUTPATIENT)
Dept: HEALTH INFORMATION MANAGEMENT | Facility: CLINIC | Age: 86
End: 2021-10-15
Payer: MEDICARE

## 2021-10-15 DIAGNOSIS — I73.9 PERIPHERAL VASCULAR DISEASE, UNSPECIFIED (H): ICD-10-CM

## 2021-10-15 PROCEDURE — 87077 CULTURE AEROBIC IDENTIFY: CPT | Performed by: FAMILY MEDICINE

## 2021-10-20 LAB
BACTERIA WND CULT: ABNORMAL

## 2021-10-25 ENCOUNTER — TELEPHONE (OUTPATIENT)
Dept: VASCULAR SURGERY | Facility: CLINIC | Age: 86
End: 2021-10-25

## 2021-10-25 NOTE — TELEPHONE ENCOUNTER
Called Lisa back at Dr. Alexandre's office.    Dr. Alexandre has never seen or treated the patient's wound. The wound culture done on 10/15 was done by home care.    Dr. Alexandre asking for Mei Sampson to review and treat with antibiotics if necessary, and to give direction to the home care team on wound care and necessary follow up in clinic.

## 2021-10-25 NOTE — TELEPHONE ENCOUNTER
Updated Lisa at [facility] that Mei Sampson has not seen patient since 2020, and Geno Onofre is no longer with our clinic and last saw the patient 2 months ago, so patient will need to be seen in our clinic first.     Called Robinson's wife and set up an appointment to see Mei Sampson.

## 2021-10-25 NOTE — TELEPHONE ENCOUNTER
Dr. Baldomero Gonzalez care cordinator at [facility] is calling because they sent us the patient wound cultures on 10/18/ & 10/22, they haven't heard back from us regarding treatment plan.  HC had orders this culture and is was dropped off at [facility].  Patient was last seen with ROSY in Aug and prior to that seen with YEYO.  Please call Lisa back at [phone] with an update.

## 2021-10-26 ENCOUNTER — OFFICE VISIT (OUTPATIENT)
Dept: VASCULAR SURGERY | Facility: CLINIC | Age: 86
End: 2021-10-26
Attending: NURSE PRACTITIONER
Payer: COMMERCIAL

## 2021-10-26 VITALS
HEART RATE: 50 BPM | RESPIRATION RATE: 18 BRPM | SYSTOLIC BLOOD PRESSURE: 118 MMHG | TEMPERATURE: 97.8 F | DIASTOLIC BLOOD PRESSURE: 58 MMHG

## 2021-10-26 DIAGNOSIS — I87.2 VENOUS STASIS DERMATITIS OF BOTH LOWER EXTREMITIES: ICD-10-CM

## 2021-10-26 DIAGNOSIS — L97.522 CHRONIC TOE ULCER, LEFT, WITH FAT LAYER EXPOSED (H): ICD-10-CM

## 2021-10-26 DIAGNOSIS — I87.303 VENOUS HYPERTENSION, CHRONIC, BILATERAL: ICD-10-CM

## 2021-10-26 DIAGNOSIS — L97.921 ULCER OF LEFT LOWER EXTREMITY, LIMITED TO BREAKDOWN OF SKIN (H): Primary | ICD-10-CM

## 2021-10-26 DIAGNOSIS — E11.42 DIABETIC PERIPHERAL NEUROPATHY ASSOCIATED WITH TYPE 2 DIABETES MELLITUS (H): ICD-10-CM

## 2021-10-26 DIAGNOSIS — Z79.01 LONG TERM CURRENT USE OF ANTICOAGULANT THERAPY: ICD-10-CM

## 2021-10-26 DIAGNOSIS — N18.31 STAGE 3A CHRONIC KIDNEY DISEASE (H): ICD-10-CM

## 2021-10-26 DIAGNOSIS — I89.0 ACQUIRED LYMPHEDEMA OF LEG: ICD-10-CM

## 2021-10-26 DIAGNOSIS — L97.911 ULCER OF RIGHT LOWER EXTREMITY, LIMITED TO BREAKDOWN OF SKIN (H): ICD-10-CM

## 2021-10-26 DIAGNOSIS — M79.89 SWELLING OF LIMB: ICD-10-CM

## 2021-10-26 DIAGNOSIS — L90.5 SCAR CONDITION AND FIBROSIS OF SKIN: ICD-10-CM

## 2021-10-26 PROBLEM — I50.9 HEART FAILURE (H): Status: ACTIVE | Noted: 2021-10-26

## 2021-10-26 PROBLEM — K59.01 SLOW TRANSIT CONSTIPATION: Status: ACTIVE | Noted: 2021-10-26

## 2021-10-26 PROBLEM — H25.9 AGE-RELATED CATARACT: Status: ACTIVE | Noted: 2021-10-26

## 2021-10-26 PROBLEM — R41.3 AMNESIA: Status: ACTIVE | Noted: 2021-10-26

## 2021-10-26 PROBLEM — I05.9 MITRAL VALVE DISORDER: Status: ACTIVE | Noted: 2018-12-04

## 2021-10-26 PROBLEM — I87.309 VENOUS HYPERTENSION: Status: ACTIVE | Noted: 2018-12-04

## 2021-10-26 PROBLEM — K59.04 CHRONIC IDIOPATHIC CONSTIPATION: Status: ACTIVE | Noted: 2021-10-26

## 2021-10-26 PROCEDURE — 11042 DBRDMT SUBQ TIS 1ST 20SQCM/<: CPT | Performed by: NURSE PRACTITIONER

## 2021-10-26 PROCEDURE — 11045 DBRDMT SUBQ TISS EACH ADDL: CPT | Performed by: NURSE PRACTITIONER

## 2021-10-26 PROCEDURE — 11044 DBRDMT BONE 1ST 20 SQ CM/<: CPT | Performed by: NURSE PRACTITIONER

## 2021-10-26 RX ORDER — GENTAMICIN SULFATE 1 MG/G
OINTMENT TOPICAL
Qty: 30 G | Refills: 3 | Status: ON HOLD | OUTPATIENT
Start: 2021-10-26 | End: 2022-01-01

## 2021-10-26 RX ORDER — LIDOCAINE HYDROCHLORIDE 20 MG/ML
JELLY TOPICAL CONTINUOUS PRN
Status: DISCONTINUED | OUTPATIENT
Start: 2021-10-26 | End: 2022-01-01

## 2021-10-26 ASSESSMENT — PAIN SCALES - GENERAL: PAINLEVEL: NO PAIN (0)

## 2021-10-26 NOTE — PROGRESS NOTES
"            Follow up Vascular Visit       Date of Service:10/26/21      Chief Complaint: BLE edema; weeping and ulcers; left hallux nail removal non-healing; chronic      Pt returns to [facility] with regards to their  BLE edema; weeping and ulcers; left hallux nail removal non-healing; chronic.  They arrive today with wife.  They have been lost to follow up; was last seen by Geno Onofre CNP 8/2021. They are currently using silvercel to the wounds. Home care had contacted the pcp recently about concern for infection due to increase in green drainage; a culture was done; I am not able to review the results. The pcp wanted Vascular clinic to address; since the patient had not been seen for so long I did not feel comfortable doing this and asked them to come in today. Wound cares are currently being done by [facility] 2-3 times per week. They are using unna boot and coban for compression; the patient will most times remove portions of the compression as he feels they are too tight. He sleeping in bed or recliner elevates his legs. Taking diuretics \"I'm pissing my pants every day\" he is very frustrated with this.  They are feeling well today. Denies fevers, chills. No shortness of breath. Pt had a left venous closure done by Dr. Palm at [facility] 3 months ago and then the right 1 month ago. 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    Allergies:   Allergies   Allergen Reactions     Aspirin Nausea and Vomiting     GI upset       Medications:   Current Outpatient Medications:      acetaminophen (TYLENOL) 325 MG tablet, [ACETAMINOPHEN (TYLENOL) 325 MG TABLET] Take 2 tablets (650 mg total) by mouth every 4 (four) hours as needed., Disp: " , Rfl: 0     atenolol (TENORMIN) 50 MG tablet, [ATENOLOL (TENORMIN) 50 MG TABLET] Take 50 mg by mouth., Disp: , Rfl:      atorvastatin (LIPITOR) 10 MG tablet, [ATORVASTATIN (LIPITOR) 10 MG TABLET] Take 10 mg by mouth every morning. , Disp: , Rfl:      FERROUS GLUCONATE PO, Take 324 mg by mouth, Disp: , Rfl:      furosemide (LASIX) 40 MG tablet, [FUROSEMIDE (LASIX) 40 MG TABLET] Take 40 mg by mouth 2 (two) times a day., Disp: , Rfl:      glipiZIDE (GLUCOTROL XL) 5 MG 24 hr tablet, [GLIPIZIDE (GLUCOTROL XL) 5 MG 24 HR TABLET] Take 10 mg by mouth daily with breakfast. , Disp: , Rfl:      LANTUS SOLOSTAR 100 unit/mL (3 mL) pen, [LANTUS SOLOSTAR 100 UNIT/ML (3 ML) PEN] Inject 20 Units under the skin at bedtime. (Patient taking differently: Inject 34 Units Subcutaneous At Bedtime ), Disp: 3 mL, Rfl: PRN     metFORMIN (GLUCOPHAGE) 500 MG tablet, [METFORMIN (GLUCOPHAGE) 500 MG TABLET] Take 500 mg by mouth daily., Disp: , Rfl: 0     metOLazone (ZAROXOLYN) 5 MG tablet, [METOLAZONE (ZAROXOLYN) 5 MG TABLET] Patient to take 1 tablet every other day until down 10 lbs then go to take it 2 times q week per Dr Guallpa, Disp: , Rfl:      metOLazone (ZAROXOLYN) 5 MG tablet, [METOLAZONE (ZAROXOLYN) 5 MG TABLET] Take 5 mg by mouth daily., Disp: , Rfl: 0     multivitamin with minerals tablet, [MULTIVITAMIN WITH MINERALS TABLET] Take 1 tablet by mouth., Disp: , Rfl:      nystatin (MYCOSTATIN) cream, [NYSTATIN (MYCOSTATIN) CREAM] Apply to irritated areas of skin twice a day until healed.  Do not apply to open ulcer(s). (Patient not taking: Reported on 8/26/2021), Disp: 60 g, Rfl: 1     omeprazole (PRILOSEC) 20 MG capsule, [OMEPRAZOLE (PRILOSEC) 20 MG CAPSULE] Take 20 mg by mouth daily. , Disp: , Rfl:      potassium chloride (KAYCIEL) 20 mEq/15 mL solution, [POTASSIUM CHLORIDE (KAYCIEL) 20 MEQ/15 ML SOLUTION] Take 20 mEq by mouth daily. , Disp: , Rfl:      vitamin A-vitamin C-vit E-min (OCUVITE) Tab tablet, [VITAMIN A-VITAMIN C-VIT E-MIN  (OCUVITE) TAB TABLET] Take 1 tablet by mouth daily. , Disp: , Rfl:      warfarin (COUMADIN/JANTOVEN) 2 MG tablet, [WARFARIN (COUMADIN/JANTOVEN) 2 MG TABLET] Take 5 mg by mouth .      , Disp: , Rfl:     Current Facility-Administered Medications:      lidocaine (XYLOCAINE) 2 % external gel, , Topical, Continuous PRN, Mei Sampson, NP    History:   Past Medical History:   Diagnosis Date     Anemia      Coronary artery disease 9/25/2015    stents placed      Diabetes mellitus (H)      Duodenitis 5/11/2016     Dyslipidemia      Gastroduodenal ulcer 9/24/2015     GERD (gastroesophageal reflux disease)      GI bleed 07/08/2016     Gout      Hematuria      Hyperlipemia      Hypertension      Kidney stone 10/6/2015       Physical Exam:    /58   Pulse 50   Temp 97.8  F (36.6  C) (Oral)   Resp 18     General:  Patient presents to clinic in no apparent distress.  Head: normocephalic atraumatic  Psychiatric:  Alert and oriented x3.   Respiratory: unlabored breathing; no cough  Integumentary:  Skin is uniformly warm, dry and pink.    Wound #1 Location: left hallux (where nail was removed)  Size: 0.3L x 0.5W x 0.1depth.  No sinus tract present, Wound base: slough  No undermining present. Wound is full thickness. There is moderate drainage. Periwound: no denudement, erythema, induration, maceration or warmth.    Wound #2 Location: left shin  Size:[id number] cm depth.  No sinus tract present, Wound base: weeping  No undermining present. Wound is partial thickness. There is moderate drainage. Periwound: no denudement, erythema, induration, maceration or warmth.    Wound #3 Location: left calf  Size:1x2x0.1 cm depth.  No sinus tract present, Wound base: slough  No undermining present. Wound is full thickness. There is moderate drainage. Periwound: no denudement, erythema, induration, maceration or warmth.      Wound #4 Location: left calf  Size:1x2x0.1 cm depth.  No sinus tract present, Wound base: slough  No undermining  present. Wound is full thickness. There is moderate drainage. Periwound: no denudement, erythema, induration, maceration or warmth.          VASC Wound Left hallux (Active)   Pre Size Length 0.3 10/26/21 1100   Pre Size Width 0.5 10/26/21 1100   Pre Size Depth 0.1 10/26/21 1100   Pre Total Sq cm 0.15 10/26/21 1100   Number of days: 82       VASC Wound Right hallux (Active)   Number of days: 82       VASC Wound Right 2nd toe (Active)   Number of days: 82       VASC Wound Right lateral leg (Active)   Number of days: 82       VASC Wound Left lateral leg (Active)   Number of days: 82       VASC Wound Left Anterior Shin (Active)   Pre Size Length 13 10/26/21 1100   Pre Size Width 10 10/26/21 1100   Pre Size Depth 0.1 10/26/21 1100   Pre Total Sq cm 130 10/26/21 1100   Description weeping 10/26/21 1100   Number of days: 0       VASC Wound Left Posterior Calf (Active)   Pre Size Length 1 10/26/21 1100   Pre Size Width 2 10/26/21 1100   Pre Size Depth 0.1 10/26/21 1100   Pre Total Sq cm 2 10/26/21 1100   Number of days: 0       VASC Wound Left Posterior Medial Calf (Active)   Pre Size Length 1 10/26/21 1100   Pre Size Width 2 10/26/21 1100   Pre Size Depth 0.1 10/26/21 1100   Pre Total Sq cm 2 10/26/21 1100   Number of days: 0            Circumferential volume measures:      Circumferential Measures 11/30/2020 12/15/2020 8/5/2021 8/26/2021 10/26/2021   Right just above MTP 29.5 28.3 28.8 28.3 28.3   Right Ankle 29 28.5 28.5 28.5 26   Right Widest Calf 48.3 40.8 45.5 50.5 42.5   Right Thigh Up 10cm 57 - - - -   Right Knee to Ankle 43.5 - 44 - 42   Left - just above MTP 28.5 27.5 28.5 27.5 29.2   Left Ankle 30.3 28.2 27.5 26.8 27.5   Left Widest Calf 41.3 38 46 44.5 46.5   Left Thigh Up 10cm 52.5 - - - -   Left Knee to Ankle 43.5 - 44 - 42       Labs:    I personally reviewed the following lab results today and those on care everywhere, if indicated     No results found for: CRP   No results found for: SED   Last Renal  Panel:  Sodium   Date Value Ref Range Status   12/30/2020 142 136 - 145 mmol/L Final     Potassium   Date Value Ref Range Status   12/30/2020 3.7 3.5 - 5.0 mmol/L Final     Chloride   Date Value Ref Range Status   12/30/2020 97 (L) 98 - 107 mmol/L Final     Carbon Dioxide (CO2)   Date Value Ref Range Status   12/30/2020 34 (H) 22 - 31 mmol/L Final     Anion Gap   Date Value Ref Range Status   12/30/2020 11 5 - 18 mmol/L Final     Glucose   Date Value Ref Range Status   12/30/2020 111 70 - 125 mg/dL Final     Urea Nitrogen   Date Value Ref Range Status   12/30/2020 29 (H) 8 - 28 mg/dL Final     Creatinine   Date Value Ref Range Status   12/30/2020 1.22 0.70 - 1.30 mg/dL Final     GFR Estimate   Date Value Ref Range Status   12/30/2020 56 (L) >60 mL/min/1.73m2 Final     Calcium   Date Value Ref Range Status   12/30/2020 9.1 8.5 - 10.5 mg/dL Final     Albumin   Date Value Ref Range Status   11/21/2018 3.4 (L) 3.5 - 5.0 g/dL Final      Lab Results   Component Value Date    WBC 7.0 01/22/2020     Lab Results   Component Value Date    RBC 4.58 01/22/2020     Lab Results   Component Value Date    HGB 14.5 01/22/2020     Lab Results   Component Value Date    HCT 43.2 01/22/2020     No components found for: MCT  Lab Results   Component Value Date    MCV 94 01/22/2020     Lab Results   Component Value Date    MCH 31.7 01/22/2020     Lab Results   Component Value Date    MCHC 33.6 01/22/2020     Lab Results   Component Value Date    RDW 14.7 01/22/2020     Lab Results   Component Value Date     01/22/2020      Lab Results   Component Value Date    A1C 7.7 07/14/2021    A1C 7.7 02/02/2018    A1C 6.6 07/08/2016    A1C 6.8 02/09/2016    A1C 6.8 10/06/2015      No results found for: TSH   No results found for: VITDT                Impression:  Encounter Diagnoses   Name Primary?     Ulcer of left lower extremity, limited to breakdown of skin (H) Yes     Ulcer of right lower extremity, limited to breakdown of skin (H)       Venous hypertension, chronic, bilateral      Stage 3a chronic kidney disease (H)      Long term current use of anticoagulant therapy      Scar condition and fibrosis of skin      Acquired lymphedema of leg      Diabetic peripheral neuropathy associated with type 2 diabetes mellitus (H)      Venous stasis dermatitis of both lower extremities      Swelling of limb      Chronic toe ulcer, left, with fat layer exposed (H)                                       Are any of these wounds new today: No; Location: na    Assessment/Plan:          1. Debridement: After discussion of risk factors and verbal consent was obtained 2% Lidocaine HCL jelly was applied, under clean conditions, the BLE ulceration(s) were debrided using currette. Devitalized and nonviable tissue, along with any fibrin and slough, was removed to improve granulation tissue formation, stimulate wound healing, decrease overall bacteria load, disrupt biofilm formation and decrease edge senescence.  Total excisional debridement was 134.15 sq cm from the epidermis/dermis area and into the subcutaneous tissue with a depth of 0.1 cm.   Ulcers were improved afterwards and .  Measures were unchanged after debridement.       2.  Wound treatment: wound treatment will include irrigation and dressings to promote autolytic debridement which will include:will continue with home care; will add gentamycin ointment; will continue with silvercel; ABD; rolled gauze Stable            3. Edema: he does not tolerate compression and he has severe edema. Could consider ABIs pt does not really want additional work up; will apply light tubular compression; and short stretch; instructed to not cut off but inform wife if they are too tight and she can loosen; continue to elevate; low sodium diet. The compression wraps were applied today in clinic. Stable            4. Nutrition: low sodium diabetic diet           5. Offloading: na     Patient will follow up with me in 4 weeks  for reevaluation. They were instructed to call the clinic sooner with any signs or symptoms of infection or any further questions/concerns. Answered all questions.          Mei Sampson DNP, RN, CNP, CWOCN, CFCN, CLT  [facility]   [phone]        This note was electronically signed by Mei Sampson NP

## 2021-10-26 NOTE — PROGRESS NOTES
Compression Applied to Bilateral  Tubigrip Size G    Compression Applied to Bilateral  Short Stretch

## 2021-10-26 NOTE — PATIENT INSTRUCTIONS
"Sent prescription for gentamycin ointment to your pharmacy    Continue home care      Wound Care Instructions    2-3 times per week , Cleanse your BLE wound(s) with Dilute hibiclens 30cc in 500cc NS.    Pat Dry with non-sterile gauze    Apply Lotion to the intact skin surrounding your wound and other dry skin locations. Some good lotions include: Remedy Skin Repair Cream, Sarna, Vanicream or Cetaphil    Primary Dressing: Apply gentamycin ointment to the silvercel and then into/onto the wounds    Secondary dressing: Cover with ABD    Secure with non-sterile roll gauze (4\" x 75\" roll) and tape (1\" roll tape) as needed; avoid adhesive directly on the skin    Compression: tubular compression size G and short stretch; if this becomes too tight tell your wife and she can loose; DO NOT cut these off    It is not ok to get your wound wet in the bath or shower    SEEK MEDICAL CARE IF:    You have an increase in swelling, pain, or redness around the wound.    You have an increase in the amount of pus coming from the wound.    There is a bad smell coming from the wound.    The wound appears to be worsening/enlarging    You have a fever greater than 101.5 F      It is ok to continue current wound care treatment/products for the next 2-3 days until new wound care supplies are ordered and arrive. If longer than this please contact our office at [phone].    "

## 2021-11-29 ENCOUNTER — OFFICE VISIT (OUTPATIENT)
Dept: VASCULAR SURGERY | Facility: CLINIC | Age: 86
End: 2021-11-29
Attending: NURSE PRACTITIONER
Payer: COMMERCIAL

## 2021-11-29 VITALS
WEIGHT: 211.9 LBS | BODY MASS INDEX: 27.96 KG/M2 | HEART RATE: 72 BPM | SYSTOLIC BLOOD PRESSURE: 110 MMHG | DIASTOLIC BLOOD PRESSURE: 68 MMHG | TEMPERATURE: 97 F | RESPIRATION RATE: 20 BRPM

## 2021-11-29 DIAGNOSIS — M79.89 SWELLING OF LIMB: ICD-10-CM

## 2021-11-29 DIAGNOSIS — I87.2 VENOUS STASIS DERMATITIS OF BOTH LOWER EXTREMITIES: ICD-10-CM

## 2021-11-29 DIAGNOSIS — Z79.01 LONG TERM CURRENT USE OF ANTICOAGULANT THERAPY: ICD-10-CM

## 2021-11-29 DIAGNOSIS — I87.303 VENOUS HYPERTENSION, CHRONIC, BILATERAL: ICD-10-CM

## 2021-11-29 DIAGNOSIS — L90.5 SCAR CONDITION AND FIBROSIS OF SKIN: ICD-10-CM

## 2021-11-29 DIAGNOSIS — L89.312 STAGE II PRESSURE ULCER OF RIGHT BUTTOCK (H): ICD-10-CM

## 2021-11-29 DIAGNOSIS — L97.921 ULCER OF LEFT LOWER EXTREMITY, LIMITED TO BREAKDOWN OF SKIN (H): Primary | ICD-10-CM

## 2021-11-29 DIAGNOSIS — I89.0 ACQUIRED LYMPHEDEMA OF LEG: ICD-10-CM

## 2021-11-29 DIAGNOSIS — N18.31 STAGE 3A CHRONIC KIDNEY DISEASE (H): ICD-10-CM

## 2021-11-29 DIAGNOSIS — E11.42 DIABETIC PERIPHERAL NEUROPATHY ASSOCIATED WITH TYPE 2 DIABETES MELLITUS (H): ICD-10-CM

## 2021-11-29 PROCEDURE — 11042 DBRDMT SUBQ TIS 1ST 20SQCM/<: CPT | Performed by: NURSE PRACTITIONER

## 2021-11-29 PROCEDURE — 250N000009 HC RX 250: Performed by: NURSE PRACTITIONER

## 2021-11-29 PROCEDURE — G0463 HOSPITAL OUTPT CLINIC VISIT: HCPCS | Mod: 25

## 2021-11-29 RX ADMIN — LIDOCAINE HYDROCHLORIDE: 20 JELLY TOPICAL at 10:56

## 2021-11-29 ASSESSMENT — PAIN SCALES - GENERAL: PAINLEVEL: NO PAIN (0)

## 2021-11-29 NOTE — PATIENT INSTRUCTIONS
"  Continue home care      Wound Care Instructions    2-3 times per week , Cleanse your BLE and left foot/toe wound(s) with Dilute hibiclens 30cc in 500cc NS.    Pat Dry with non-sterile gauze    Apply Lotion to the intact skin surrounding your wound and other dry skin locations. Some good lotions include: Remedy Skin Repair Cream, Sarna, Vanicream or Cetaphil    Primary Dressing: Apply gentamycin ointment to the silvercel and then into/onto the wounds    Secondary dressing: Cover with ABD or gauze    Secure with non-sterile roll gauze (4\" x 75\" roll) and tape (1\" roll tape) as needed; avoid adhesive directly on the skin    Compression: tubular compression size G bilaterally    Elevate the legs    Get up and walk once an hour during the day    Use a cushion 4\" foam to sit on     Check for incontinence every 1-2 hours    It is not ok to get your wound wet in the bath or shower    Apply triad paste to the buttocks wound 2-3 times per day    Triad Paste apply to the wound 1-2 times per day  Do not need to scrub off in between applications as this can cause additional trauma to the wound and surrounding skin  Just gently cleanse and apply additional product on top          SEEK MEDICAL CARE IF:    You have an increase in swelling, pain, or redness around the wound.    You have an increase in the amount of pus coming from the wound.    There is a bad smell coming from the wound.    The wound appears to be worsening/enlarging    You have a fever greater than 101.5 F      It is ok to continue current wound care treatment/products for the next 2-3 days until new wound care supplies are ordered and arrive. If longer than this please contact our office at [phone].    "

## 2021-11-29 NOTE — PROGRESS NOTES
Follow up Vascular Visit       Date of Service:11/29/21      Chief Complaint: BLE edema; LLE wounds; chronic      Pt returns to [facility] with regards to their BLE edema; left lower extremity wounds.  They arrive today with spouse. They are currently using silvercel to the wounds noted that these had shifted off the wounds This is being done by home care 3 days per week. They are using single layer tubular compression for compression; these were noted to be scrunched down and incorrectly worn. They are feeling well today. Denies fevers, chills. No shortness of breath.   Pt had a left venous closure done by Dr. Palm at [facility] 4 months ago and then the right 2 months ago. 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    Allergies:   Allergies   Allergen Reactions     Aspirin Nausea and Vomiting     GI upset       Medications:   Current Outpatient Medications:      acetaminophen (TYLENOL) 325 MG tablet, [ACETAMINOPHEN (TYLENOL) 325 MG TABLET] Take 2 tablets (650 mg total) by mouth every 4 (four) hours as needed., Disp: , Rfl: 0     atenolol (TENORMIN) 50 MG tablet, [ATENOLOL (TENORMIN) 50 MG TABLET] Take 50 mg by mouth., Disp: , Rfl:      atorvastatin (LIPITOR) 10 MG tablet, [ATORVASTATIN (LIPITOR) 10 MG TABLET] Take 10 mg by mouth every morning. , Disp: , Rfl:      FERROUS GLUCONATE PO, Take 324 mg by mouth, Disp: , Rfl:      furosemide (LASIX) 40 MG tablet, [FUROSEMIDE (LASIX) 40 MG TABLET] Take 40 mg by mouth 2 (two) times a day., Disp: , Rfl:      gentamicin (GARAMYCIN) 0.1 % external ointment, Apply topically every 72 hours Apply 5 grams every 72 hours to the leg ulcers, Disp: 30 g, Rfl: 3     glipiZIDE (GLUCOTROL XL) 5 MG 24  hr tablet, [GLIPIZIDE (GLUCOTROL XL) 5 MG 24 HR TABLET] Take 10 mg by mouth daily with breakfast. , Disp: , Rfl:      LANTUS SOLOSTAR 100 unit/mL (3 mL) pen, [LANTUS SOLOSTAR 100 UNIT/ML (3 ML) PEN] Inject 20 Units under the skin at bedtime. (Patient taking differently: Inject 34 Units Subcutaneous At Bedtime ), Disp: 3 mL, Rfl: PRN     metFORMIN (GLUCOPHAGE) 500 MG tablet, [METFORMIN (GLUCOPHAGE) 500 MG TABLET] Take 500 mg by mouth daily., Disp: , Rfl: 0     metOLazone (ZAROXOLYN) 5 MG tablet, 2x per week, Disp: , Rfl:      metOLazone (ZAROXOLYN) 5 MG tablet, [METOLAZONE (ZAROXOLYN) 5 MG TABLET] Take 5 mg by mouth daily., Disp: , Rfl: 0     multivitamin with minerals tablet, [MULTIVITAMIN WITH MINERALS TABLET] Take 1 tablet by mouth., Disp: , Rfl:      nystatin (MYCOSTATIN) cream, [NYSTATIN (MYCOSTATIN) CREAM] Apply to irritated areas of skin twice a day until healed.  Do not apply to open ulcer(s)., Disp: 60 g, Rfl: 1     omeprazole (PRILOSEC) 20 MG capsule, [OMEPRAZOLE (PRILOSEC) 20 MG CAPSULE] Take 20 mg by mouth daily. , Disp: , Rfl:      potassium chloride (KAYCIEL) 20 mEq/15 mL solution, [POTASSIUM CHLORIDE (KAYCIEL) 20 MEQ/15 ML SOLUTION] Take 20 mEq by mouth daily. , Disp: , Rfl:      vitamin A-vitamin C-vit E-min (OCUVITE) Tab tablet, [VITAMIN A-VITAMIN C-VIT E-MIN (OCUVITE) TAB TABLET] Take 1 tablet by mouth daily. , Disp: , Rfl:      warfarin (COUMADIN/JANTOVEN) 2 MG tablet, [WARFARIN (COUMADIN/JANTOVEN) 2 MG TABLET] Take 5 mg by mouth .      , Disp: , Rfl:     Current Facility-Administered Medications:      lidocaine (XYLOCAINE) 2 % external gel, , Topical, Continuous PRN, Mei Sampson, NP, New Bag at 11/29/21 1056    History:   Past Medical History:   Diagnosis Date     Anemia      Coronary artery disease 9/25/2015    stents placed      Diabetes mellitus (H)      Duodenitis 5/11/2016     Dyslipidemia      Gastroduodenal ulcer 9/24/2015     GERD (gastroesophageal reflux disease)      GI bleed  07/08/2016     Gout      Hematuria      Hyperlipemia      Hypertension      Kidney stone 10/6/2015       Physical Exam:    /68   Pulse 72   Temp 97  F (36.1  C)   Resp 20   Wt 211 lb 14.4 oz (96.1 kg)   BMI 27.96 kg/m      General:  Patient presents to clinic in no apparent distress.  Head: normocephalic atraumatic  Psychiatric:  Alert and oriented x3.   Respiratory: unlabored breathing; no cough  Integumentary:  Skin is uniformly warm, dry and pink.    Wound #1 Location: left hallux  Size: 0.6L x 0.5W x 0.1depth.  No sinus tract present, Wound base: slough  No undermining present. Wound is full thickness. There is moderate bloody drainage. Periwound: no denudement, erythema, induration, maceration or warmth.      Wound #2 Location: left dorsal foot Size: 0.5x0.8x 0.1depth.  No sinus tract present, Wound base: slough  No undermining present. Wound is full thickness. There is moderate bloody drainage. Periwound: no denudement, erythema, induration, maceration or warmth.      Wound #3 Location:buttocks  Size: 1x0.2x0.1cm depth.  No sinus tract present, Wound base: slough  No undermining present. Wound is full thickness. There is moderate bloody drainage. Periwound: no denudement, erythema, induration, maceration or warmth.      VASC Wound Left hallux (Active)   Pre Size Length 0.6 11/29/21 1000   Pre Size Width 0.5 11/29/21 1000   Pre Size Depth 0.1 11/29/21 1000   Pre Total Sq cm 0.3 11/29/21 1000   Number of days: 116       VASC Wound Right hallux (Active)   Number of days: 116       VASC Wound Right 2nd toe (Active)   Number of days: 116       VASC Wound Right lateral leg (Active)   Number of days: 116       VASC Wound Left lateral leg (Active)   Number of days: 116       VASC Wound Left Anterior Shin (Active)   Pre Size Length 0 11/29/21 1000   Pre Size Width 0 11/29/21 1000   Pre Size Depth 0 11/29/21 1000   Number of days: 34       VASC Wound Left Posterior Calf (Active)   Pre Size Length 0 11/29/21  1000   Pre Size Width 0 11/29/21 1000   Pre Size Depth 0 11/29/21 1000   Number of days: 34       VASC Wound Left Posterior Medial Calf (Active)   Pre Size Length 0 11/29/21 1000   Pre Size Width 0 11/29/21 1000   Pre Size Depth 0 11/29/21 1000   Number of days: 34       VASC Wound Lt dorsal foot (Active)   Pre Size Length 0.5 11/29/21 1000   Pre Size Width 0.8 11/29/21 1000   Pre Size Depth 0.1 11/29/21 1000   Pre Total Sq cm 0.4 11/29/21 1000   Number of days: 0       VASC Wound pressure ulcer right buttocks (Active)   Pre Size Length 1 11/29/21 1100   Pre Size Width 0.2 11/29/21 1100   Pre Size Depth 0.1 11/29/21 1100   Pre Total Sq cm 0.02 11/29/21 1100   Number of days: 0            Circumferential volume measures:      Circumferential Measures 12/15/2020 8/5/2021 8/26/2021 10/26/2021 11/29/2021   Right just above MTP 28.3 28.8 28.3 28.3 -   Right Ankle 28.5 28.5 28.5 26 28.6   Right Widest Calf 40.8 45.5 50.5 42.5 27.3   Right Thigh Up 10cm - - - - 37.5   Right Knee to Ankle - 44 - 42 -   Left - just above MTP 27.5 28.5 27.5 29.2 27.4   Left Ankle 28.2 27.5 26.8 27.5 25   Left Widest Calf 38 46 44.5 46.5 40.6   Left Thigh Up 10cm - - - - -   Left Knee to Ankle - 44 - 42 40       Labs:    I personally reviewed the following lab results today and those on care everywhere    No results found for: CRP   No results found for: SED   Last Renal Panel:  Sodium   Date Value Ref Range Status   12/30/2020 142 136 - 145 mmol/L Final     Potassium   Date Value Ref Range Status   12/30/2020 3.7 3.5 - 5.0 mmol/L Final     Chloride   Date Value Ref Range Status   12/30/2020 97 (L) 98 - 107 mmol/L Final     Carbon Dioxide (CO2)   Date Value Ref Range Status   12/30/2020 34 (H) 22 - 31 mmol/L Final     Anion Gap   Date Value Ref Range Status   12/30/2020 11 5 - 18 mmol/L Final     Glucose   Date Value Ref Range Status   12/30/2020 111 70 - 125 mg/dL Final     Urea Nitrogen   Date Value Ref Range Status   12/30/2020 29 (H) 8 -  28 mg/dL Final     Creatinine   Date Value Ref Range Status   12/30/2020 1.22 0.70 - 1.30 mg/dL Final     GFR Estimate   Date Value Ref Range Status   12/30/2020 56 (L) >60 mL/min/1.73m2 Final     Calcium   Date Value Ref Range Status   12/30/2020 9.1 8.5 - 10.5 mg/dL Final     Albumin   Date Value Ref Range Status   11/21/2018 3.4 (L) 3.5 - 5.0 g/dL Final      Lab Results   Component Value Date    WBC 7.0 01/22/2020     Lab Results   Component Value Date    RBC 4.58 01/22/2020     Lab Results   Component Value Date    HGB 14.5 01/22/2020     Lab Results   Component Value Date    HCT 43.2 01/22/2020     No components found for: MCT  Lab Results   Component Value Date    MCV 94 01/22/2020     Lab Results   Component Value Date    MCH 31.7 01/22/2020     Lab Results   Component Value Date    MCHC 33.6 01/22/2020     Lab Results   Component Value Date    RDW 14.7 01/22/2020     Lab Results   Component Value Date     01/22/2020      Lab Results   Component Value Date    A1C 7.7 07/14/2021    A1C 7.7 02/02/2018    A1C 6.6 07/08/2016    A1C 6.8 02/09/2016    A1C 6.8 10/06/2015      No results found for: TSH   No results found for: VITDT                Impression:  Encounter Diagnoses   Name Primary?     Ulcer of left lower extremity, limited to breakdown of skin (H) Yes     Venous hypertension, chronic, bilateral      Stage 3a chronic kidney disease (H)      Long term current use of anticoagulant therapy      Scar condition and fibrosis of skin      Acquired lymphedema of leg      Diabetic peripheral neuropathy associated with type 2 diabetes mellitus (H)      Venous stasis dermatitis of both lower extremities      Swelling of limb      Stage II pressure ulcer of right buttock (H)                            Are any of these wounds new today: Yes; Location: buttocks    Assessment/Plan:          1. Debridement: After discussion of risk factors and verbal consent was obtained 2% Lidocaine HCL jelly was applied, under  "clean conditions, the left foot  ulceration(s) were debrided using currette. Devitalized and nonviable tissue, along with any fibrin and slough, was removed to improve granulation tissue formation, stimulate wound healing, decrease overall bacteria load, disrupt biofilm formation and decrease edge senescence.  Total excisional debridement was 0.7 sq cm from the epidermis/dermis area and into the subcutaneous tissue with a depth of 0.1 cm.   Ulcers were improved afterwards and .  Measures were unchanged after debridement.       2.  Wound treatment: wound treatment will include irrigation and dressings to promote autolytic debridement which will include:will continue with home care 2-3 times per week; wash the legs and wounds with dilute hibiclens; then lotion to intact skin; then gentamycin ointment to the wounds; silvercel; gauze; rolled gauze; for the buttocks triad paste 2-3 times per day improved leg wounds new buttocks wound           3. Edema: doing well with diuretics; elevation; low sodium diet; he did not tolerate the short stretch but is doing fine with the tubular compression; will continue with this life long. The compression wraps were applied today in clinic. Stable            4. Nutrition: low sodium diet           5. Offloading: we spoke about the cause of the buttocks wounds being unrelieved pressure and poor circulation to the skin over the bony prominence; recommend that he get up every 1 hour and walk; he has several cushions at home; I discouraged the use of \"donut\" type cushions as these have shown to make pressure injuries worse.     Patient will follow up with me in 4 weeks for reevaluation. They were instructed to call the clinic sooner with any signs or symptoms of infection or any further questions/concerns. Answered all questions.          Mei Sampson DNP, RN, CNP, CWOCN, CFCN, CLT  [facility]   [phone]        This note was electronically signed by Mei" Adrián, NP

## 2021-12-27 ENCOUNTER — OFFICE VISIT (OUTPATIENT)
Dept: VASCULAR SURGERY | Facility: CLINIC | Age: 86
End: 2021-12-27
Attending: NURSE PRACTITIONER
Payer: COMMERCIAL

## 2021-12-27 VITALS — TEMPERATURE: 97.8 F | WEIGHT: 211 LBS | HEART RATE: 72 BPM | BODY MASS INDEX: 27.84 KG/M2

## 2021-12-27 DIAGNOSIS — M79.89 SWELLING OF LIMB: ICD-10-CM

## 2021-12-27 DIAGNOSIS — Z79.01 LONG TERM CURRENT USE OF ANTICOAGULANT THERAPY: ICD-10-CM

## 2021-12-27 DIAGNOSIS — I89.0 ACQUIRED LYMPHEDEMA OF LEG: ICD-10-CM

## 2021-12-27 DIAGNOSIS — N18.31 STAGE 3A CHRONIC KIDNEY DISEASE (H): ICD-10-CM

## 2021-12-27 DIAGNOSIS — L90.5 SCAR CONDITION AND FIBROSIS OF SKIN: ICD-10-CM

## 2021-12-27 DIAGNOSIS — L97.921 ULCER OF LEFT LOWER EXTREMITY, LIMITED TO BREAKDOWN OF SKIN (H): Primary | ICD-10-CM

## 2021-12-27 DIAGNOSIS — I87.2 VENOUS STASIS DERMATITIS OF BOTH LOWER EXTREMITIES: ICD-10-CM

## 2021-12-27 DIAGNOSIS — I87.303 VENOUS HYPERTENSION, CHRONIC, BILATERAL: ICD-10-CM

## 2021-12-27 DIAGNOSIS — E11.42 DIABETIC PERIPHERAL NEUROPATHY ASSOCIATED WITH TYPE 2 DIABETES MELLITUS (H): ICD-10-CM

## 2021-12-27 PROCEDURE — 11042 DBRDMT SUBQ TIS 1ST 20SQCM/<: CPT | Performed by: NURSE PRACTITIONER

## 2021-12-27 PROCEDURE — 11044 DBRDMT BONE 1ST 20 SQ CM/<: CPT | Performed by: NURSE PRACTITIONER

## 2021-12-27 PROCEDURE — 11045 DBRDMT SUBQ TISS EACH ADDL: CPT | Performed by: NURSE PRACTITIONER

## 2021-12-27 PROCEDURE — G0463 HOSPITAL OUTPT CLINIC VISIT: HCPCS | Mod: 25

## 2021-12-27 ASSESSMENT — PAIN SCALES - GENERAL: PAINLEVEL: NO PAIN (0)

## 2021-12-27 NOTE — PATIENT INSTRUCTIONS
"  Continue home care      Wound Care Instructions    2-3 times per week , Cleanse your BLE and left foot/toe wound(s) with Dilute hibiclens 30cc in 500cc NS.    Pat Dry with non-sterile gauze    Apply Lotion to the intact skin surrounding your wound and other dry skin locations. Some good lotions include: Remedy Skin Repair Cream, Sarna, Vanicream or Cetaphil    Primary Dressing: Apply gentamycin ointment to the silvercel and then into/onto the wounds    Secondary dressing: Cover with ABD or gauze    Secure with non-sterile roll gauze (4\" x 75\" roll) and tape (1\" roll tape) as needed; avoid adhesive directly on the skin    Compression: tubular compression size G bilaterally    Elevate the legs    Get up and walk once an hour during the day    Use a cushion 4\" foam to sit on     Check for incontinence every 1-2 hours    It is not ok to get your wound wet in the bath or shower    Apply triad paste to the buttocks wound 2-3 times per day    Triad Paste apply to the wound 1-2 times per day  Do not need to scrub off in between applications as this can cause additional trauma to the wound and surrounding skin  Just gently cleanse and apply additional product on top          SEEK MEDICAL CARE IF:    You have an increase in swelling, pain, or redness around the wound.    You have an increase in the amount of pus coming from the wound.    There is a bad smell coming from the wound.    The wound appears to be worsening/enlarging    You have a fever greater than 101.5 F      It is ok to continue current wound care treatment/products for the next 2-3 days until new wound care supplies are ordered and arrive. If longer than this please contact our office at [phone].         "

## 2021-12-27 NOTE — PROGRESS NOTES
Follow up Vascular Visit       Date of Service:12/27/21      Chief Complaint: BLE swelling; blistering; weeping; pressure ulcer to buttocks; chronic      Pt returns to [facility] with regards to their BLE swelling; blistering and weeping; and pressure ulcer to the buttocks.  They arrive today with wife. They are currently using silvercel; gauze; rolled gauze to the wounds. He is supposed to be using triad paste to the buttocks they had forgotten about this; lost the tube of paste.  This is being done by home care 2-3 times per week. They are using nothing for compression; supposed to have tubular compression; he does not like these. They are feeling well today. Denies fevers, chills. No shortness of breath. His wife purchased him a cushion to use while seated he likes this. He is incontinent. He missed a few metalazone doses as it was not available to order; he has this again. Taking lasix 40mg twice per day; metolazone twice per week.  Pt had a left venous closure done by Dr. Palm at [facility] 5 months ago and then the right 3 months ago. 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       Allergies:   Allergies   Allergen Reactions     Aspirin Nausea and Vomiting     GI upset       Medications:   Current Outpatient Medications:      acetaminophen (TYLENOL) 325 MG tablet, [ACETAMINOPHEN (TYLENOL) 325 MG TABLET] Take 2 tablets (650 mg total) by mouth every 4 (four) hours as needed., Disp: , Rfl: 0     atenolol (TENORMIN) 50 MG tablet, [ATENOLOL (TENORMIN) 50 MG TABLET] Take 50 mg by mouth., Disp: , Rfl:      atorvastatin (LIPITOR) 10 MG tablet, [ATORVASTATIN (LIPITOR) 10 MG TABLET] Take 10 mg by mouth every morning. , Disp: , Rfl:       FERROUS GLUCONATE PO, Take 324 mg by mouth, Disp: , Rfl:      furosemide (LASIX) 40 MG tablet, [FUROSEMIDE (LASIX) 40 MG TABLET] Take 40 mg by mouth 2 (two) times a day., Disp: , Rfl:      gentamicin (GARAMYCIN) 0.1 % external ointment, Apply topically every 72 hours Apply 5 grams every 72 hours to the leg ulcers, Disp: 30 g, Rfl: 3     glipiZIDE (GLUCOTROL XL) 5 MG 24 hr tablet, [GLIPIZIDE (GLUCOTROL XL) 5 MG 24 HR TABLET] Take 10 mg by mouth daily with breakfast. , Disp: , Rfl:      LANTUS SOLOSTAR 100 unit/mL (3 mL) pen, [LANTUS SOLOSTAR 100 UNIT/ML (3 ML) PEN] Inject 20 Units under the skin at bedtime. (Patient taking differently: Inject 34 Units Subcutaneous At Bedtime ), Disp: 3 mL, Rfl: PRN     metFORMIN (GLUCOPHAGE) 500 MG tablet, [METFORMIN (GLUCOPHAGE) 500 MG TABLET] Take 500 mg by mouth daily., Disp: , Rfl: 0     metOLazone (ZAROXOLYN) 5 MG tablet, 2x per week, Disp: , Rfl:      metOLazone (ZAROXOLYN) 5 MG tablet, [METOLAZONE (ZAROXOLYN) 5 MG TABLET] Take 5 mg by mouth daily., Disp: , Rfl: 0     multivitamin with minerals tablet, [MULTIVITAMIN WITH MINERALS TABLET] Take 1 tablet by mouth., Disp: , Rfl:      nystatin (MYCOSTATIN) cream, [NYSTATIN (MYCOSTATIN) CREAM] Apply to irritated areas of skin twice a day until healed.  Do not apply to open ulcer(s)., Disp: 60 g, Rfl: 1     omeprazole (PRILOSEC) 20 MG capsule, [OMEPRAZOLE (PRILOSEC) 20 MG CAPSULE] Take 20 mg by mouth daily. , Disp: , Rfl:      potassium chloride (KAYCIEL) 20 mEq/15 mL solution, [POTASSIUM CHLORIDE (KAYCIEL) 20 MEQ/15 ML SOLUTION] Take 20 mEq by mouth daily. , Disp: , Rfl:      vitamin A-vitamin C-vit E-min (OCUVITE) Tab tablet, [VITAMIN A-VITAMIN C-VIT E-MIN (OCUVITE) TAB TABLET] Take 1 tablet by mouth daily. , Disp: , Rfl:      warfarin (COUMADIN/JANTOVEN) 2 MG tablet, [WARFARIN (COUMADIN/JANTOVEN) 2 MG TABLET] Take 5 mg by mouth .      , Disp: , Rfl:     Current Facility-Administered Medications:      lidocaine (XYLOCAINE) 2 %  external gel, , Topical, Continuous PRN, Mei Sampson, NP, New Bag at 11/29/21 1056    History:   Past Medical History:   Diagnosis Date     Anemia      Coronary artery disease 9/25/2015    stents placed      Diabetes mellitus (H)      Duodenitis 5/11/2016     Dyslipidemia      Gastroduodenal ulcer 9/24/2015     GERD (gastroesophageal reflux disease)      GI bleed 07/08/2016     Gout      Hematuria      Hyperlipemia      Hypertension      Kidney stone 10/6/2015       Physical Exam:    Pulse 72   Temp 97.8  F (36.6  C)   Wt 211 lb (95.7 kg)   BMI 27.84 kg/m      General:  Patient presents to clinic in no apparent distress.  Head: normocephalic atraumatic  Psychiatric:  Alert and oriented x3.   Respiratory: unlabored breathing; no cough  Integumentary:  Skin is uniformly warm, dry and pink.    Wound #1 Location: left hallux  Size: 3.8L x 3.3W x 0.1depth.  No sinus tract present, Wound base: slough  No undermining present. Wound is full thickness. There is moderate drainage. Periwound: no denudement, erythema, induration, maceration or warmth.      Swelling is worse bilaterally    There are scatted full and partial thickness ulcers and areas of eschar to both legs; see measures below    There is a deflated serum filled blister to the right dorsal foot    Wound 2 location buttocks 0.8x0.4x0.1cm; no surrounding erythema; wound bed is red; viable; minimally tender        VASC Wound Left hallux (Active)   Pre Size Length 3.8 12/27/21 1000   Pre Size Width 3.3 12/27/21 1000   Pre Size Depth 0.1 12/27/21 1000   Pre Total Sq cm 12.54 12/27/21 1000   Number of days: 144       VASC Wound Right hallux (Active)   Number of days: 144       VASC Wound Right 2nd toe (Active)   Number of days: 144       VASC Wound Right lateral leg (Active)   Number of days: 144       VASC Wound Left lateral leg (Active)   Number of days: 144       VASC Wound Left Anterior Shin (Active)   Pre Size Length 3.3 12/27/21 1000   Pre Size Width 4.5  12/27/21 1000   Pre Size Depth 0.1 12/27/21 1000   Pre Total Sq cm 15.75 12/27/21 1000   Number of days: 62       VASC Wound Left Posterior Calf (Active)   Pre Size Length 0 11/29/21 1000   Pre Size Width 0 11/29/21 1000   Pre Size Depth 0 11/29/21 1000   Number of days: 62       VASC Wound Left Posterior Medial Calf (Active)   Pre Size Length 0 11/29/21 1000   Pre Size Width 0 11/29/21 1000   Pre Size Depth 0 11/29/21 1000   Number of days: 62       VASC Wound Lt dorsal foot (Active)   Pre Size Length 0.5 11/29/21 1000   Pre Size Width 0.8 11/29/21 1000   Pre Size Depth 0.1 11/29/21 1000   Pre Total Sq cm 0.4 11/29/21 1000   Number of days: 28       VASC Wound pressure ulcer right buttocks (Active)   Pre Size Length 0.8 12/27/21 1000   Pre Size Width 0.4 12/27/21 1000   Pre Size Depth 0.1 12/27/21 1000   Pre Total Sq cm 0.32 12/27/21 1000   Number of days: 28       VASC Wound right dorsal foot (Active)   Pre Size Length 6.7 12/27/21 1000   Pre Size Width 8.5 12/27/21 1000   Pre Size Depth 0.1 12/27/21 1000   Pre Total Sq cm 56.95 12/27/21 1000   Description blistered 12/27/21 1000   Number of days: 0       VASC Wound left shin (Active)   Pre Size Length 0.5 12/27/21 1000   Pre Size Width 0.7 12/27/21 1000   Pre Size Depth 0.1 12/27/21 1000   Pre Total Sq cm 0.35 12/27/21 1000   Number of days: 0            Circumferential volume measures:      Circumferential Measures 8/5/2021 8/26/2021 10/26/2021 11/29/2021 12/27/2021   Right just above MTP 28.8 28.3 28.3 - 29.8   Right Ankle 28.5 28.5 26 28.6 29.2   Right Widest Calf 45.5 50.5 42.5 27.3 43.5   Right Thigh Up 10cm - - - 37.5 -   Right Knee to Ankle 44 - 42 - -   Left - just above MTP 28.5 27.5 29.2 27.4 29.3   Left Ankle 27.5 26.8 27.5 25 30.4   Left Widest Calf 46 44.5 46.5 40.6 42   Left Thigh Up 10cm - - - - -   Left Knee to Ankle 44 - 42 40 -       Labs:    I personally reviewed the following lab results today and those on care everywhere    No results found  for: CRP   No results found for: SED   Last Renal Panel:  Sodium   Date Value Ref Range Status   12/30/2020 142 136 - 145 mmol/L Final     Potassium   Date Value Ref Range Status   12/30/2020 3.7 3.5 - 5.0 mmol/L Final     Chloride   Date Value Ref Range Status   12/30/2020 97 (L) 98 - 107 mmol/L Final     Carbon Dioxide (CO2)   Date Value Ref Range Status   12/30/2020 34 (H) 22 - 31 mmol/L Final     Anion Gap   Date Value Ref Range Status   12/30/2020 11 5 - 18 mmol/L Final     Glucose   Date Value Ref Range Status   12/30/2020 111 70 - 125 mg/dL Final     Urea Nitrogen   Date Value Ref Range Status   12/30/2020 29 (H) 8 - 28 mg/dL Final     Creatinine   Date Value Ref Range Status   12/30/2020 1.22 0.70 - 1.30 mg/dL Final     GFR Estimate   Date Value Ref Range Status   12/30/2020 56 (L) >60 mL/min/1.73m2 Final     Calcium   Date Value Ref Range Status   12/30/2020 9.1 8.5 - 10.5 mg/dL Final     Albumin   Date Value Ref Range Status   11/21/2018 3.4 (L) 3.5 - 5.0 g/dL Final      Lab Results   Component Value Date    WBC 7.0 01/22/2020     Lab Results   Component Value Date    RBC 4.58 01/22/2020     Lab Results   Component Value Date    HGB 14.5 01/22/2020     Lab Results   Component Value Date    HCT 43.2 01/22/2020     No components found for: MCT  Lab Results   Component Value Date    MCV 94 01/22/2020     Lab Results   Component Value Date    MCH 31.7 01/22/2020     Lab Results   Component Value Date    MCHC 33.6 01/22/2020     Lab Results   Component Value Date    RDW 14.7 01/22/2020     Lab Results   Component Value Date     01/22/2020      Lab Results   Component Value Date    A1C 7.7 07/14/2021    A1C 7.7 02/02/2018    A1C 6.6 07/08/2016    A1C 6.8 02/09/2016    A1C 6.8 10/06/2015      No results found for: TSH   No results found for: VITDT                Impression:  Encounter Diagnoses   Name Primary?     Ulcer of left lower extremity, limited to breakdown of skin (H) Yes     Venous hypertension,  chronic, bilateral      Stage 3a chronic kidney disease (H)      Long term current use of anticoagulant therapy      Scar condition and fibrosis of skin      Acquired lymphedema of leg      Diabetic peripheral neuropathy associated with type 2 diabetes mellitus (H)      Venous stasis dermatitis of both lower extremities      Swelling of limb                                        Are any of these wounds new today: Yes; Location: BLE and toes    Assessment/Plan:          1. Debridement: After discussion of risk factors and verbal consent was obtained 2% Lidocaine HCL jelly was applied, under clean conditions, the BLE ulceration(s) were debrided using currette. Devitalized and nonviable tissue, along with any fibrin and slough, was removed to improve granulation tissue formation, stimulate wound healing, decrease overall bacteria load, disrupt biofilm formation and decrease edge senescence.  Total excisional debridement was 28.64 sq cm from the epidermis/dermis area and into the subcutaneous tissue with a depth of 0.1 cm.   Ulcers were improved afterwards and .  Measures were unchanged after debridement. The deflated blister on the right dorsal foot was preserved.        2.  Wound treatment: wound treatment will include irrigation and dressings to promote autolytic debridement which will include:wounds are new and worse; he was essentially healed at last visit to his legs; now with new wounds due to missing metalozone doses and inadequate compression; will continue home care; no s/sx of infection today; will not obtain culture; no indication for antibiotics; will continue with silvercel; ABD; rolled gauze; change 2-3 times per week; for the buttocks reminded them of the triad paste; they will look for this at home due to incontinence not able to dress the buttocks; apply triad TID Worsened            3. Edema: inadequately managed; recent ablation done bilaterally by [facility]; will not wear tubular compression; will  try wrapping with rolled gauze and then short stretch; instructed the wife to loosen the short stretch as needed throughout the day; encouraged elevation of the legs; encouraged taking diuretics as prescribed. The compression wraps were applied today in clinic. worse           4. Nutrition: focus on low sodium diabetic diet           5. Offloading: has new cushion which he likes; continue to try and reposition every 20-30 minutes     Patient will follow up with me in 4 weeks for reevaluation. They were instructed to call the clinic sooner with any signs or symptoms of infection or any further questions/concerns. Answered all questions.          Mei Sampson DNP, RN, CNP, CWOCN, CFCN, CLT  [facility]   [phone]        This note was electronically signed by Mei Sampson NP

## 2021-12-27 NOTE — LETTER
[facility]    [facility]           Fax: [phone]            Customer Service: [phone]    Wound Dressing Rx and Order Form  Order Status: re-order  Verbal: Kathryn  Date: 2021     Robinson Medel  Gender: male  : 1930  [place] [address]  [phone] (home)     Medical Record: [id number]  Primary Care Provider: Elizabeth Alexandre      ICD-10-CM    1. Ulcer of left lower extremity, limited to breakdown of skin (H)  L97.921 DEBRIDE SKIN/SUBQ TISSUE     AL DEBRIDEMENT,SUB-Q TISSUE, EA ADDL 20 SQ CM     Wound care   2. Venous hypertension, chronic, bilateral  I87.303 DEBRIDE SKIN/SUBQ TISSUE     AL DEBRIDEMENT,SUB-Q TISSUE, EA ADDL 20 SQ CM     Wound care   3. Stage 3a chronic kidney disease (H)  N18.31 DEBRIDE SKIN/SUBQ TISSUE     AL DEBRIDEMENT,SUB-Q TISSUE, EA ADDL 20 SQ CM     Wound care   4. Long term current use of anticoagulant therapy  Z79.01 DEBRIDE SKIN/SUBQ TISSUE     AL DEBRIDEMENT,SUB-Q TISSUE, EA ADDL 20 SQ CM     Wound care   5. Scar condition and fibrosis of skin  L90.5 DEBRIDE SKIN/SUBQ TISSUE     AL DEBRIDEMENT,SUB-Q TISSUE, EA ADDL 20 SQ CM     Wound care   6. Acquired lymphedema of leg  I89.0 DEBRIDE SKIN/SUBQ TISSUE     AL DEBRIDEMENT,SUB-Q TISSUE, EA ADDL 20 SQ CM     Wound care   7. Diabetic peripheral neuropathy associated with type 2 diabetes mellitus (H)  E11.42 DEBRIDE SKIN/SUBQ TISSUE     AL DEBRIDEMENT,SUB-Q TISSUE, EA ADDL 20 SQ CM     Wound care   8. Venous stasis dermatitis of both lower extremities  I87.2 DEBRIDE SKIN/SUBQ TISSUE     AL DEBRIDEMENT,SUB-Q TISSUE, EA ADDL 20 SQ CM     Wound care   9. Swelling of limb  M79.89 DEBRIDE SKIN/SUBQ TISSUE     AL DEBRIDEMENT,SUB-Q TISSUE, EA ADDL 20 SQ CM     Wound care         Insurance Info:  INSURER: Payor: [facility] / Plan: [facility] MEDICARE / Product Type: HMO /   Policy ID#:  [id number]    Physician Info:   Name:  PHYLICIA FORMAN     Dept Address/Phones:   [address]  [place], [address]  [facility] [address] [phone]  Fax: [phone]    Lymphedema circumferential measurements (in cm):  Circumferential Measures 8/5/2021 8/26/2021 10/26/2021 11/29/2021 12/27/2021   Right just above MTP 28.8 28.3 28.3 - 29.8   Right Ankle 28.5 28.5 26 28.6 29.2   Right Widest Calf 45.5 50.5 42.5 27.3 43.5   Right Thigh Up 10cm - - - 37.5 -   Right Knee to Ankle 44 - 42 - -   Left - just above MTP 28.5 27.5 29.2 27.4 29.3   Left Ankle 27.5 26.8 27.5 25 30.4   Left Widest Calf 46 44.5 46.5 40.6 42   Left Thigh Up 10cm - - - - -   Left Knee to Ankle 44 - 42 40 -         Wound info:  VASC Wound Left hallux (Active)   Pre Size Length 3.8 12/27/21 1000   Pre Size Width 3.3 12/27/21 1000   Pre Size Depth 0.1 12/27/21 1000   Pre Total Sq cm 12.54 12/27/21 1000   Number of days: 144       VASC Wound Right hallux (Active)   Number of days: 144       VASC Wound Right 2nd toe (Active)   Number of days: 144       VASC Wound Right lateral leg (Active)   Number of days: 144       VASC Wound Left lateral leg (Active)   Number of days: 144       VASC Wound Left Anterior Shin (Active)   Pre Size Length 3.3 12/27/21 1000   Pre Size Width 4.5 12/27/21 1000   Pre Size Depth 0.1 12/27/21 1000   Pre Total Sq cm 15.75 12/27/21 1000   Number of days: 62       VASC Wound Left Posterior Calf (Active)   Pre Size Length 0 11/29/21 1000   Pre Size Width 0 11/29/21 1000   Pre Size Depth 0 11/29/21 1000   Number of days: 62       VASC Wound Left Posterior Medial Calf (Active)   Pre Size Length 0 11/29/21 1000   Pre Size Width 0 11/29/21 1000   Pre Size Depth 0 11/29/21 1000   Number of days: 62       VASC Wound Lt dorsal foot (Active)   Pre Size Length 0.5 11/29/21 1000   Pre Size Width 0.8 11/29/21 1000   Pre Size Depth 0.1 11/29/21 1000   Pre Total Sq cm 0.4 11/29/21 1000   Number of days: 28       VASC Wound pressure ulcer right buttocks (Active)   Pre Size Length 0.8 12/27/21 1000   Pre Size Width 0.4 12/27/21 1000    Pre Size Depth 0.1 12/27/21 1000   Pre Total Sq cm 0.32 12/27/21 1000   Number of days: 28       VASC Wound right dorsal foot (Active)   Pre Size Length 6.7 12/27/21 1000   Pre Size Width 8.5 12/27/21 1000   Pre Size Depth 0.1 12/27/21 1000   Pre Total Sq cm 56.95 12/27/21 1000   Description blistered 12/27/21 1000   Number of days: 0       VASC Wound left shin (Active)   Pre Size Length 0.5 12/27/21 1000   Pre Size Width 0.7 12/27/21 1000   Pre Size Depth 0.1 12/27/21 1000   Pre Total Sq cm 0.35 12/27/21 1000   Number of days: 0        Drainage: light  Thickness:  full  Duration of Need: 30 DAYS  Days Supply: 30 DAYS  Start Date: 12/27/21  Starter Kit: none  Qualifying wound/Debridement: Yes      Dressing Type Brand Size Number of pieces Frequency of change    Primary Triad paste  6 oz 1 tube 2-3 times DAILY and as needed       Note: If total out of pocket is more than $50.00 please contact the patient before processing order.     OK to forward to covered supplier.      Apply triad paste (6oz tube) to the buttocks wound 2-3 times per day    Triad Paste apply to the wound 2-3 times per day and as needed  Do not need to scrub off in between applications as this can cause additional trauma to the wound and surrounding skin  Just gently cleanse and apply additional product on top    Electronically Signed Physician:  PHYLICIA FORMAN             Date: December 27, 2021

## 2022-01-01 ENCOUNTER — LAB REQUISITION (OUTPATIENT)
Dept: LAB | Facility: CLINIC | Age: 87
End: 2022-01-01
Payer: COMMERCIAL

## 2022-01-01 ENCOUNTER — LAB REQUISITION (OUTPATIENT)
Dept: LAB | Facility: CLINIC | Age: 87
End: 2022-01-01

## 2022-01-01 ENCOUNTER — TELEPHONE (OUTPATIENT)
Dept: GERIATRICS | Facility: CLINIC | Age: 87
End: 2022-01-01

## 2022-01-01 ENCOUNTER — APPOINTMENT (OUTPATIENT)
Dept: PHYSICAL THERAPY | Facility: HOSPITAL | Age: 87
DRG: 291 | End: 2022-01-01
Payer: COMMERCIAL

## 2022-01-01 ENCOUNTER — APPOINTMENT (OUTPATIENT)
Dept: OCCUPATIONAL THERAPY | Facility: HOSPITAL | Age: 87
DRG: 291 | End: 2022-01-01
Payer: COMMERCIAL

## 2022-01-01 ENCOUNTER — TRANSITIONAL CARE UNIT VISIT (OUTPATIENT)
Dept: GERIATRICS | Facility: CLINIC | Age: 87
End: 2022-01-01

## 2022-01-01 ENCOUNTER — TELEPHONE (OUTPATIENT)
Dept: VASCULAR SURGERY | Facility: CLINIC | Age: 87
End: 2022-01-01
Payer: COMMERCIAL

## 2022-01-01 ENCOUNTER — HEALTH MAINTENANCE LETTER (OUTPATIENT)
Age: 87
End: 2022-01-01

## 2022-01-01 ENCOUNTER — HOSPITAL ENCOUNTER (INPATIENT)
Facility: HOSPITAL | Age: 87
LOS: 7 days | Discharge: NURSING, REHAB OR LONG-TERM CARE | DRG: 291 | End: 2022-04-07
Attending: EMERGENCY MEDICINE | Admitting: INTERNAL MEDICINE
Payer: COMMERCIAL

## 2022-01-01 ENCOUNTER — TELEPHONE (OUTPATIENT)
Dept: CARDIOLOGY | Facility: CLINIC | Age: 87
End: 2022-01-01
Payer: COMMERCIAL

## 2022-01-01 ENCOUNTER — APPOINTMENT (OUTPATIENT)
Dept: RADIOLOGY | Facility: HOSPITAL | Age: 87
DRG: 291 | End: 2022-01-01
Payer: COMMERCIAL

## 2022-01-01 ENCOUNTER — OFFICE VISIT (OUTPATIENT)
Dept: PHARMACY | Facility: PHYSICIAN GROUP | Age: 87
End: 2022-01-01
Payer: COMMERCIAL

## 2022-01-01 ENCOUNTER — DOCUMENTATION ONLY (OUTPATIENT)
Dept: ANTICOAGULATION | Facility: CLINIC | Age: 87
End: 2022-01-01
Payer: COMMERCIAL

## 2022-01-01 ENCOUNTER — APPOINTMENT (OUTPATIENT)
Dept: OCCUPATIONAL THERAPY | Facility: HOSPITAL | Age: 87
DRG: 291 | End: 2022-01-01
Attending: INTERNAL MEDICINE
Payer: COMMERCIAL

## 2022-01-01 ENCOUNTER — ANTICOAGULATION THERAPY VISIT (OUTPATIENT)
Dept: ANTICOAGULATION | Facility: CLINIC | Age: 87
End: 2022-01-01

## 2022-01-01 ENCOUNTER — OFFICE VISIT (OUTPATIENT)
Dept: GERIATRICS | Facility: CLINIC | Age: 87
End: 2022-01-01
Payer: COMMERCIAL

## 2022-01-01 ENCOUNTER — HOSPITAL ENCOUNTER (OUTPATIENT)
Facility: HOSPITAL | Age: 87
Setting detail: OBSERVATION
Discharge: NURSING, REHAB OR LONG-TERM CARE | End: 2022-05-23
Attending: EMERGENCY MEDICINE | Admitting: EMERGENCY MEDICINE
Payer: COMMERCIAL

## 2022-01-01 ENCOUNTER — TRANSITIONAL CARE UNIT VISIT (OUTPATIENT)
Dept: GERIATRICS | Facility: CLINIC | Age: 87
End: 2022-01-01
Payer: COMMERCIAL

## 2022-01-01 ENCOUNTER — ANTICOAGULATION THERAPY VISIT (OUTPATIENT)
Dept: ANTICOAGULATION | Facility: CLINIC | Age: 87
End: 2022-01-01
Payer: COMMERCIAL

## 2022-01-01 ENCOUNTER — APPOINTMENT (OUTPATIENT)
Dept: OCCUPATIONAL THERAPY | Facility: HOSPITAL | Age: 87
End: 2022-01-01
Attending: INTERNAL MEDICINE
Payer: COMMERCIAL

## 2022-01-01 ENCOUNTER — APPOINTMENT (OUTPATIENT)
Dept: PHYSICAL THERAPY | Facility: HOSPITAL | Age: 87
DRG: 291 | End: 2022-01-01
Attending: INTERNAL MEDICINE
Payer: COMMERCIAL

## 2022-01-01 ENCOUNTER — TRANSFERRED RECORDS (OUTPATIENT)
Dept: HEALTH INFORMATION MANAGEMENT | Facility: CLINIC | Age: 87
End: 2022-01-01

## 2022-01-01 ENCOUNTER — APPOINTMENT (OUTPATIENT)
Dept: PHYSICAL THERAPY | Facility: HOSPITAL | Age: 87
End: 2022-01-01
Payer: COMMERCIAL

## 2022-01-01 ENCOUNTER — APPOINTMENT (OUTPATIENT)
Dept: PHYSICAL THERAPY | Facility: HOSPITAL | Age: 87
End: 2022-01-01
Attending: INTERNAL MEDICINE
Payer: COMMERCIAL

## 2022-01-01 ENCOUNTER — APPOINTMENT (OUTPATIENT)
Dept: OCCUPATIONAL THERAPY | Facility: HOSPITAL | Age: 87
End: 2022-01-01
Payer: COMMERCIAL

## 2022-01-01 ENCOUNTER — OFFICE VISIT (OUTPATIENT)
Dept: VASCULAR SURGERY | Facility: CLINIC | Age: 87
End: 2022-01-01
Attending: NURSE PRACTITIONER
Payer: COMMERCIAL

## 2022-01-01 ENCOUNTER — APPOINTMENT (OUTPATIENT)
Dept: CT IMAGING | Facility: HOSPITAL | Age: 87
End: 2022-01-01
Attending: EMERGENCY MEDICINE
Payer: COMMERCIAL

## 2022-01-01 ENCOUNTER — TRANSFERRED RECORDS (OUTPATIENT)
Dept: HEALTH INFORMATION MANAGEMENT | Facility: CLINIC | Age: 87
End: 2022-01-01
Payer: COMMERCIAL

## 2022-01-01 ENCOUNTER — HOSPITAL ENCOUNTER (EMERGENCY)
Facility: HOSPITAL | Age: 87
Discharge: HOME | End: 2022-05-03
Attending: EMERGENCY MEDICINE | Admitting: EMERGENCY MEDICINE
Payer: COMMERCIAL

## 2022-01-01 ENCOUNTER — APPOINTMENT (OUTPATIENT)
Dept: RADIOLOGY | Facility: HOSPITAL | Age: 87
End: 2022-01-01
Attending: EMERGENCY MEDICINE
Payer: COMMERCIAL

## 2022-01-01 ENCOUNTER — APPOINTMENT (OUTPATIENT)
Dept: CARDIOLOGY | Facility: HOSPITAL | Age: 87
DRG: 291 | End: 2022-01-01
Attending: INTERNAL MEDICINE
Payer: COMMERCIAL

## 2022-01-01 ENCOUNTER — DISCHARGE SUMMARY NURSING HOME (OUTPATIENT)
Dept: GERIATRICS | Facility: CLINIC | Age: 87
End: 2022-01-01

## 2022-01-01 ENCOUNTER — OFFICE VISIT (OUTPATIENT)
Dept: CARDIOLOGY | Facility: CLINIC | Age: 87
End: 2022-01-01

## 2022-01-01 VITALS
SYSTOLIC BLOOD PRESSURE: 107 MMHG | HEART RATE: 74 BPM | DIASTOLIC BLOOD PRESSURE: 71 MMHG | WEIGHT: 182.2 LBS | TEMPERATURE: 97.3 F | BODY MASS INDEX: 24.04 KG/M2 | OXYGEN SATURATION: 95 % | RESPIRATION RATE: 16 BRPM

## 2022-01-01 VITALS
SYSTOLIC BLOOD PRESSURE: 91 MMHG | OXYGEN SATURATION: 99 % | BODY MASS INDEX: 25.98 KG/M2 | TEMPERATURE: 97.8 F | DIASTOLIC BLOOD PRESSURE: 57 MMHG | HEIGHT: 73 IN | HEART RATE: 77 BPM | WEIGHT: 196 LBS | RESPIRATION RATE: 18 BRPM

## 2022-01-01 VITALS
BODY MASS INDEX: 26.06 KG/M2 | WEIGHT: 196.6 LBS | HEIGHT: 73 IN | SYSTOLIC BLOOD PRESSURE: 108 MMHG | RESPIRATION RATE: 16 BRPM | TEMPERATURE: 96.8 F | HEART RATE: 75 BPM | DIASTOLIC BLOOD PRESSURE: 69 MMHG | OXYGEN SATURATION: 96 %

## 2022-01-01 VITALS
HEIGHT: 72 IN | TEMPERATURE: 98.5 F | SYSTOLIC BLOOD PRESSURE: 124 MMHG | DIASTOLIC BLOOD PRESSURE: 56 MMHG | RESPIRATION RATE: 24 BRPM | OXYGEN SATURATION: 94 % | BODY MASS INDEX: 27.82 KG/M2 | WEIGHT: 205.4 LBS | HEART RATE: 72 BPM

## 2022-01-01 VITALS
SYSTOLIC BLOOD PRESSURE: 103 MMHG | BODY MASS INDEX: 26.29 KG/M2 | OXYGEN SATURATION: 99 % | TEMPERATURE: 97.8 F | DIASTOLIC BLOOD PRESSURE: 65 MMHG | HEART RATE: 71 BPM | RESPIRATION RATE: 14 BRPM | HEIGHT: 73 IN | WEIGHT: 198.4 LBS

## 2022-01-01 VITALS
OXYGEN SATURATION: 95 % | WEIGHT: 195 LBS | DIASTOLIC BLOOD PRESSURE: 62 MMHG | HEART RATE: 82 BPM | SYSTOLIC BLOOD PRESSURE: 96 MMHG | RESPIRATION RATE: 18 BRPM | TEMPERATURE: 98 F | HEIGHT: 73 IN | BODY MASS INDEX: 25.84 KG/M2

## 2022-01-01 VITALS
BODY MASS INDEX: 26.06 KG/M2 | TEMPERATURE: 98.6 F | HEIGHT: 73 IN | WEIGHT: 196.6 LBS | OXYGEN SATURATION: 96 % | HEART RATE: 101 BPM | SYSTOLIC BLOOD PRESSURE: 118 MMHG | DIASTOLIC BLOOD PRESSURE: 80 MMHG | RESPIRATION RATE: 21 BRPM

## 2022-01-01 VITALS
HEART RATE: 77 BPM | SYSTOLIC BLOOD PRESSURE: 133 MMHG | RESPIRATION RATE: 16 BRPM | WEIGHT: 193.6 LBS | OXYGEN SATURATION: 95 % | BODY MASS INDEX: 25.66 KG/M2 | TEMPERATURE: 97.3 F | HEIGHT: 73 IN | DIASTOLIC BLOOD PRESSURE: 81 MMHG

## 2022-01-01 VITALS
OXYGEN SATURATION: 97 % | DIASTOLIC BLOOD PRESSURE: 63 MMHG | SYSTOLIC BLOOD PRESSURE: 92 MMHG | TEMPERATURE: 96.1 F | HEART RATE: 76 BPM | RESPIRATION RATE: 24 BRPM | WEIGHT: 196 LBS | BODY MASS INDEX: 25.86 KG/M2

## 2022-01-01 VITALS
BODY MASS INDEX: 26.45 KG/M2 | RESPIRATION RATE: 16 BRPM | HEIGHT: 73 IN | HEART RATE: 100 BPM | SYSTOLIC BLOOD PRESSURE: 102 MMHG | DIASTOLIC BLOOD PRESSURE: 50 MMHG | WEIGHT: 199.6 LBS

## 2022-01-01 VITALS
HEIGHT: 73 IN | DIASTOLIC BLOOD PRESSURE: 79 MMHG | RESPIRATION RATE: 16 BRPM | TEMPERATURE: 97.2 F | SYSTOLIC BLOOD PRESSURE: 117 MMHG | BODY MASS INDEX: 26.81 KG/M2 | OXYGEN SATURATION: 97 % | WEIGHT: 202.3 LBS | HEART RATE: 84 BPM

## 2022-01-01 VITALS
TEMPERATURE: 97.8 F | HEART RATE: 56 BPM | BODY MASS INDEX: 24.57 KG/M2 | DIASTOLIC BLOOD PRESSURE: 87 MMHG | OXYGEN SATURATION: 97 % | WEIGHT: 185.4 LBS | HEIGHT: 73 IN | RESPIRATION RATE: 16 BRPM | SYSTOLIC BLOOD PRESSURE: 130 MMHG

## 2022-01-01 VITALS
TEMPERATURE: 98 F | RESPIRATION RATE: 16 BRPM | SYSTOLIC BLOOD PRESSURE: 110 MMHG | HEART RATE: 60 BPM | DIASTOLIC BLOOD PRESSURE: 66 MMHG

## 2022-01-01 VITALS
BODY MASS INDEX: 26.64 KG/M2 | HEIGHT: 73 IN | SYSTOLIC BLOOD PRESSURE: 121 MMHG | WEIGHT: 201 LBS | TEMPERATURE: 96.8 F | OXYGEN SATURATION: 94 % | DIASTOLIC BLOOD PRESSURE: 70 MMHG | RESPIRATION RATE: 16 BRPM | HEART RATE: 74 BPM

## 2022-01-01 VITALS
OXYGEN SATURATION: 96 % | BODY MASS INDEX: 26.14 KG/M2 | DIASTOLIC BLOOD PRESSURE: 75 MMHG | HEART RATE: 66 BPM | HEIGHT: 73 IN | RESPIRATION RATE: 12 BRPM | WEIGHT: 197.2 LBS | SYSTOLIC BLOOD PRESSURE: 120 MMHG | TEMPERATURE: 97 F

## 2022-01-01 VITALS
OXYGEN SATURATION: 95 % | SYSTOLIC BLOOD PRESSURE: 103 MMHG | HEART RATE: 70 BPM | WEIGHT: 183.3 LBS | TEMPERATURE: 98.1 F | BODY MASS INDEX: 24.29 KG/M2 | RESPIRATION RATE: 16 BRPM | HEIGHT: 73 IN | DIASTOLIC BLOOD PRESSURE: 61 MMHG

## 2022-01-01 VITALS
DIASTOLIC BLOOD PRESSURE: 70 MMHG | HEART RATE: 58 BPM | WEIGHT: 215.7 LBS | SYSTOLIC BLOOD PRESSURE: 108 MMHG | BODY MASS INDEX: 28.46 KG/M2 | TEMPERATURE: 97.7 F | RESPIRATION RATE: 22 BRPM

## 2022-01-01 DIAGNOSIS — I50.33 ACUTE ON CHRONIC HEART FAILURE WITH PRESERVED EJECTION FRACTION (H): Primary | ICD-10-CM

## 2022-01-01 DIAGNOSIS — I48.0 PAROXYSMAL ATRIAL FIBRILLATION (H): Primary | ICD-10-CM

## 2022-01-01 DIAGNOSIS — E11.42 DIABETIC PERIPHERAL NEUROPATHY ASSOCIATED WITH TYPE 2 DIABETES MELLITUS (H): ICD-10-CM

## 2022-01-01 DIAGNOSIS — I95.1 ORTHOSTATIC HYPOTENSION: ICD-10-CM

## 2022-01-01 DIAGNOSIS — Z79.4 TYPE 2 DIABETES MELLITUS WITH OTHER SPECIFIED COMPLICATION, WITH LONG-TERM CURRENT USE OF INSULIN (H): ICD-10-CM

## 2022-01-01 DIAGNOSIS — I89.0 ACQUIRED LYMPHEDEMA OF LEG: ICD-10-CM

## 2022-01-01 DIAGNOSIS — I10 ESSENTIAL HYPERTENSION: ICD-10-CM

## 2022-01-01 DIAGNOSIS — I87.303 VENOUS HYPERTENSION, CHRONIC, BILATERAL: ICD-10-CM

## 2022-01-01 DIAGNOSIS — Z79.4 TYPE 2 DIABETES MELLITUS WITHOUT COMPLICATION, WITH LONG-TERM CURRENT USE OF INSULIN (H): ICD-10-CM

## 2022-01-01 DIAGNOSIS — I48.0 PAROXYSMAL ATRIAL FIBRILLATION (H): ICD-10-CM

## 2022-01-01 DIAGNOSIS — K59.00 CONSTIPATION, UNSPECIFIED CONSTIPATION TYPE: ICD-10-CM

## 2022-01-01 DIAGNOSIS — I50.33 ACUTE ON CHRONIC DIASTOLIC CONGESTIVE HEART FAILURE (H): ICD-10-CM

## 2022-01-01 DIAGNOSIS — I95.0 IDIOPATHIC HYPOTENSION: Primary | ICD-10-CM

## 2022-01-01 DIAGNOSIS — I48.91 UNSPECIFIED ATRIAL FIBRILLATION (H): ICD-10-CM

## 2022-01-01 DIAGNOSIS — I87.323 CHRONIC VENOUS HYPERTENSION (IDIOPATHIC) WITH INFLAMMATION OF BILATERAL LOWER EXTREMITY: ICD-10-CM

## 2022-01-01 DIAGNOSIS — L97.519 ULCERS OF BOTH GREAT TOES (H): ICD-10-CM

## 2022-01-01 DIAGNOSIS — I50.9 HEART FAILURE (H): Primary | ICD-10-CM

## 2022-01-01 DIAGNOSIS — N18.31 STAGE 3A CHRONIC KIDNEY DISEASE (H): ICD-10-CM

## 2022-01-01 DIAGNOSIS — N17.9 ACUTE KIDNEY INJURY SUPERIMPOSED ON CHRONIC KIDNEY DISEASE (H): ICD-10-CM

## 2022-01-01 DIAGNOSIS — I50.32 CHRONIC HEART FAILURE WITH PRESERVED EJECTION FRACTION (H): ICD-10-CM

## 2022-01-01 DIAGNOSIS — I50.33 ACUTE ON CHRONIC DIASTOLIC HEART FAILURE (H): ICD-10-CM

## 2022-01-01 DIAGNOSIS — N18.31 CHRONIC KIDNEY DISEASE, STAGE 3A (H): Primary | ICD-10-CM

## 2022-01-01 DIAGNOSIS — L97.529 ULCERS OF BOTH GREAT TOES (H): ICD-10-CM

## 2022-01-01 DIAGNOSIS — E11.9 TYPE 2 DIABETES MELLITUS WITHOUT COMPLICATION, UNSPECIFIED WHETHER LONG TERM INSULIN USE (H): ICD-10-CM

## 2022-01-01 DIAGNOSIS — E87.20 LACTIC ACIDOSIS: ICD-10-CM

## 2022-01-01 DIAGNOSIS — Z79.4 TYPE 2 DIABETES MELLITUS WITHOUT COMPLICATION, WITH LONG-TERM CURRENT USE OF INSULIN (H): Primary | ICD-10-CM

## 2022-01-01 DIAGNOSIS — L97.921 ULCER OF LEFT LOWER EXTREMITY, LIMITED TO BREAKDOWN OF SKIN (H): Primary | ICD-10-CM

## 2022-01-01 DIAGNOSIS — G47.00 INSOMNIA, UNSPECIFIED TYPE: ICD-10-CM

## 2022-01-01 DIAGNOSIS — N18.9 ACUTE KIDNEY INJURY SUPERIMPOSED ON CHRONIC KIDNEY DISEASE (H): ICD-10-CM

## 2022-01-01 DIAGNOSIS — E11.9 TYPE 2 DIABETES MELLITUS WITHOUT COMPLICATION, WITH LONG-TERM CURRENT USE OF INSULIN (H): ICD-10-CM

## 2022-01-01 DIAGNOSIS — I50.33 ACUTE ON CHRONIC DIASTOLIC (CONGESTIVE) HEART FAILURE (H): ICD-10-CM

## 2022-01-01 DIAGNOSIS — L97.911 ULCER OF RIGHT LOWER EXTREMITY, LIMITED TO BREAKDOWN OF SKIN (H): ICD-10-CM

## 2022-01-01 DIAGNOSIS — I87.2 VENOUS STASIS DERMATITIS OF BOTH LOWER EXTREMITIES: ICD-10-CM

## 2022-01-01 DIAGNOSIS — E87.5 HYPERKALEMIA: ICD-10-CM

## 2022-01-01 DIAGNOSIS — E11.69 TYPE 2 DIABETES MELLITUS WITH OTHER SPECIFIED COMPLICATION, WITH LONG-TERM CURRENT USE OF INSULIN (H): ICD-10-CM

## 2022-01-01 DIAGNOSIS — L97.901 ULCER OF LOWER EXTREMITY, LIMITED TO BREAKDOWN OF SKIN, UNSPECIFIED LATERALITY (H): ICD-10-CM

## 2022-01-01 DIAGNOSIS — E86.0 DEHYDRATION: ICD-10-CM

## 2022-01-01 DIAGNOSIS — E11.9 TYPE 2 DIABETES MELLITUS WITHOUT COMPLICATION, WITH LONG-TERM CURRENT USE OF INSULIN (H): Primary | ICD-10-CM

## 2022-01-01 DIAGNOSIS — K21.00 GASTROESOPHAGEAL REFLUX DISEASE WITH ESOPHAGITIS, UNSPECIFIED WHETHER HEMORRHAGE: ICD-10-CM

## 2022-01-01 DIAGNOSIS — I48.91 ATRIAL FIBRILLATION, UNSPECIFIED TYPE (H): ICD-10-CM

## 2022-01-01 DIAGNOSIS — L89.322 PRESSURE ULCER OF LEFT BUTTOCK, STAGE 2 (H): ICD-10-CM

## 2022-01-01 DIAGNOSIS — Z79.01 LONG TERM CURRENT USE OF ANTICOAGULANT THERAPY: ICD-10-CM

## 2022-01-01 DIAGNOSIS — L90.5 SCAR CONDITION AND FIBROSIS OF SKIN: ICD-10-CM

## 2022-01-01 DIAGNOSIS — I25.10 ATHEROSCLEROTIC HEART DISEASE OF NATIVE CORONARY ARTERY WITHOUT ANGINA PECTORIS: ICD-10-CM

## 2022-01-01 DIAGNOSIS — A41.9 SEVERE SEPSIS (H): ICD-10-CM

## 2022-01-01 DIAGNOSIS — L89.312 STAGE II PRESSURE ULCER OF RIGHT BUTTOCK (H): ICD-10-CM

## 2022-01-01 DIAGNOSIS — E78.5 HYPERLIPIDEMIA LDL GOAL <70: ICD-10-CM

## 2022-01-01 DIAGNOSIS — R65.20 SEVERE SEPSIS (H): ICD-10-CM

## 2022-01-01 DIAGNOSIS — I10 BENIGN ESSENTIAL HYPERTENSION: Primary | ICD-10-CM

## 2022-01-01 DIAGNOSIS — I73.9 PERIPHERAL VASCULAR DISEASE, UNSPECIFIED (H): ICD-10-CM

## 2022-01-01 DIAGNOSIS — I83.009 VARICOSE VEINS OF UNSPECIFIED LOWER EXTREMITY WITH ULCER OF UNSPECIFIED SITE (CODE) (H): ICD-10-CM

## 2022-01-01 DIAGNOSIS — I10 ESSENTIAL (PRIMARY) HYPERTENSION: ICD-10-CM

## 2022-01-01 DIAGNOSIS — I50.33 ACUTE ON CHRONIC HEART FAILURE WITH PRESERVED EJECTION FRACTION (H): ICD-10-CM

## 2022-01-01 DIAGNOSIS — D64.9 ANEMIA, UNSPECIFIED TYPE: ICD-10-CM

## 2022-01-01 DIAGNOSIS — D64.9 ANEMIA, UNSPECIFIED: ICD-10-CM

## 2022-01-01 DIAGNOSIS — I73.9 PVD (PERIPHERAL VASCULAR DISEASE) (H): ICD-10-CM

## 2022-01-01 DIAGNOSIS — E87.6 HYPOKALEMIA: ICD-10-CM

## 2022-01-01 DIAGNOSIS — Z78.9 TAKES DIETARY SUPPLEMENTS: ICD-10-CM

## 2022-01-01 DIAGNOSIS — I95.9 HYPOTENSION, UNSPECIFIED HYPOTENSION TYPE: ICD-10-CM

## 2022-01-01 DIAGNOSIS — N17.9 ACUTE RENAL FAILURE, UNSPECIFIED ACUTE RENAL FAILURE TYPE (H): ICD-10-CM

## 2022-01-01 DIAGNOSIS — Z79.01 ANTICOAGULATED ON COUMADIN: ICD-10-CM

## 2022-01-01 DIAGNOSIS — M79.89 SWELLING OF LIMB: ICD-10-CM

## 2022-01-01 DIAGNOSIS — I50.32 CHRONIC DIASTOLIC (CONGESTIVE) HEART FAILURE (H): ICD-10-CM

## 2022-01-01 DIAGNOSIS — I50.9 ACUTE ON CHRONIC CONGESTIVE HEART FAILURE, UNSPECIFIED HEART FAILURE TYPE (H): ICD-10-CM

## 2022-01-01 DIAGNOSIS — R60.9 EDEMA, UNSPECIFIED TYPE: ICD-10-CM

## 2022-01-01 DIAGNOSIS — I48.20 CHRONIC ATRIAL FIBRILLATION (H): ICD-10-CM

## 2022-01-01 LAB
ABO/RH(D): NORMAL
ALBUMIN SERPL-MCNC: 3.9 G/DL (ref 3.5–5)
ALBUMIN UR-MCNC: 20 MG/DL
ALBUMIN UR-MCNC: NEGATIVE MG/DL
ALP SERPL-CCNC: 88 U/L (ref 45–120)
ALT SERPL W P-5'-P-CCNC: 30 U/L (ref 0–45)
ANION GAP SERPL CALCULATED.3IONS-SCNC: 10 MMOL/L (ref 5–18)
ANION GAP SERPL CALCULATED.3IONS-SCNC: 11 MMOL/L (ref 5–18)
ANION GAP SERPL CALCULATED.3IONS-SCNC: 11 MMOL/L (ref 7–15)
ANION GAP SERPL CALCULATED.3IONS-SCNC: 12 MMOL/L (ref 5–18)
ANION GAP SERPL CALCULATED.3IONS-SCNC: 13 MMOL/L (ref 5–18)
ANION GAP SERPL CALCULATED.3IONS-SCNC: 14 MMOL/L (ref 5–18)
ANION GAP SERPL CALCULATED.3IONS-SCNC: 14 MMOL/L (ref 7–15)
ANION GAP SERPL CALCULATED.3IONS-SCNC: 7 MMOL/L (ref 5–18)
ANION GAP SERPL CALCULATED.3IONS-SCNC: 8 MMOL/L (ref 5–18)
ANION GAP SERPL CALCULATED.3IONS-SCNC: 9 MMOL/L (ref 5–18)
ANION GAP SERPL CALCULATED.3IONS-SCNC: 9 MMOL/L (ref 5–18)
ANTIBODY SCREEN: NEGATIVE
APPEARANCE UR: ABNORMAL
APPEARANCE UR: CLEAR
APTT PPP: 45 SECONDS (ref 22–38)
AST SERPL W P-5'-P-CCNC: 25 U/L (ref 0–40)
ATRIAL RATE - MUSE: 52 BPM
ATRIAL RATE - MUSE: 96 BPM
BACTERIA BLD CULT: NO GROWTH
BACTERIA BLD CULT: NO GROWTH
BACTERIA WND CULT: NO GROWTH
BASOPHILS # BLD AUTO: 0 10E3/UL (ref 0–0.2)
BASOPHILS # BLD AUTO: 0 10E3/UL (ref 0–0.2)
BASOPHILS NFR BLD AUTO: 0 %
BASOPHILS NFR BLD AUTO: 0 %
BILIRUB SERPL-MCNC: 0.8 MG/DL (ref 0–1)
BILIRUB UR QL STRIP: NEGATIVE
BILIRUB UR QL STRIP: NEGATIVE
BNP SERPL-MCNC: 286 PG/ML (ref 0–93)
BNP SERPL-MCNC: 298 PG/ML (ref 0–93)
BNP SERPL-MCNC: 341 PG/ML (ref 0–93)
BNP SERPL-MCNC: 433 PG/ML (ref 0–93)
BNP SERPL-MCNC: 460 PG/ML (ref 0–93)
BNP SERPL-MCNC: 507 PG/ML (ref 0–93)
BNP SERPL-MCNC: 712 PG/ML (ref 0–93)
BUN SERPL-MCNC: 21 MG/DL (ref 8–28)
BUN SERPL-MCNC: 22 MG/DL (ref 8–28)
BUN SERPL-MCNC: 22 MG/DL (ref 8–28)
BUN SERPL-MCNC: 23 MG/DL (ref 8–28)
BUN SERPL-MCNC: 24 MG/DL (ref 8–28)
BUN SERPL-MCNC: 26.8 MG/DL (ref 8–23)
BUN SERPL-MCNC: 29 MG/DL (ref 8–28)
BUN SERPL-MCNC: 30 MG/DL (ref 8–28)
BUN SERPL-MCNC: 30 MG/DL (ref 8–28)
BUN SERPL-MCNC: 31 MG/DL (ref 8–28)
BUN SERPL-MCNC: 33 MG/DL (ref 8–28)
BUN SERPL-MCNC: 35 MG/DL (ref 8–28)
BUN SERPL-MCNC: 36.6 MG/DL (ref 8–23)
BUN SERPL-MCNC: 37 MG/DL (ref 8–28)
BUN SERPL-MCNC: 37 MG/DL (ref 8–28)
BUN SERPL-MCNC: 38 MG/DL (ref 8–28)
BUN SERPL-MCNC: 45 MG/DL (ref 8–28)
BUN SERPL-MCNC: 55 MG/DL (ref 8–28)
BUN SERPL-MCNC: 71 MG/DL (ref 8–28)
BUN SERPL-MCNC: 76 MG/DL (ref 8–28)
BUN SERPL-MCNC: 76 MG/DL (ref 8–28)
BUN SERPL-MCNC: 81 MG/DL (ref 8–28)
CALCIUM SERPL-MCNC: 10 MG/DL (ref 8.5–10.5)
CALCIUM SERPL-MCNC: 10.1 MG/DL (ref 8.5–10.5)
CALCIUM SERPL-MCNC: 8.6 MG/DL (ref 8.5–10.5)
CALCIUM SERPL-MCNC: 8.6 MG/DL (ref 8.5–10.5)
CALCIUM SERPL-MCNC: 8.7 MG/DL (ref 8.5–10.5)
CALCIUM SERPL-MCNC: 8.8 MG/DL (ref 8.5–10.5)
CALCIUM SERPL-MCNC: 8.9 MG/DL (ref 8.5–10.5)
CALCIUM SERPL-MCNC: 9 MG/DL (ref 8.2–9.6)
CALCIUM SERPL-MCNC: 9 MG/DL (ref 8.5–10.5)
CALCIUM SERPL-MCNC: 9 MG/DL (ref 8.5–10.5)
CALCIUM SERPL-MCNC: 9.1 MG/DL (ref 8.5–10.5)
CALCIUM SERPL-MCNC: 9.2 MG/DL (ref 8.5–10.5)
CALCIUM SERPL-MCNC: 9.3 MG/DL (ref 8.5–10.5)
CALCIUM SERPL-MCNC: 9.4 MG/DL (ref 8.5–10.5)
CALCIUM SERPL-MCNC: 9.5 MG/DL (ref 8.5–10.5)
CALCIUM SERPL-MCNC: 9.6 MG/DL (ref 8.5–10.5)
CALCIUM SERPL-MCNC: 9.7 MG/DL (ref 8.5–10.5)
CALCIUM SERPL-MCNC: 9.9 MG/DL (ref 8.2–9.6)
CHLORIDE BLD-SCNC: 100 MMOL/L (ref 98–107)
CHLORIDE BLD-SCNC: 101 MMOL/L (ref 98–107)
CHLORIDE BLD-SCNC: 102 MMOL/L (ref 98–107)
CHLORIDE BLD-SCNC: 102 MMOL/L (ref 98–107)
CHLORIDE BLD-SCNC: 103 MMOL/L (ref 98–107)
CHLORIDE BLD-SCNC: 93 MMOL/L (ref 98–107)
CHLORIDE BLD-SCNC: 94 MMOL/L (ref 98–107)
CHLORIDE BLD-SCNC: 95 MMOL/L (ref 98–107)
CHLORIDE BLD-SCNC: 96 MMOL/L (ref 98–107)
CHLORIDE BLD-SCNC: 96 MMOL/L (ref 98–107)
CHLORIDE BLD-SCNC: 97 MMOL/L (ref 98–107)
CHLORIDE BLD-SCNC: 97 MMOL/L (ref 98–107)
CHLORIDE BLD-SCNC: 98 MMOL/L (ref 98–107)
CHLORIDE BLD-SCNC: 99 MMOL/L (ref 98–107)
CHLORIDE SERPL-SCNC: 96 MMOL/L (ref 98–107)
CHLORIDE SERPL-SCNC: 96 MMOL/L (ref 98–107)
CO2 SERPL-SCNC: 24 MMOL/L (ref 22–31)
CO2 SERPL-SCNC: 25 MMOL/L (ref 22–31)
CO2 SERPL-SCNC: 26 MMOL/L (ref 22–31)
CO2 SERPL-SCNC: 26 MMOL/L (ref 22–31)
CO2 SERPL-SCNC: 27 MMOL/L (ref 22–31)
CO2 SERPL-SCNC: 28 MMOL/L (ref 22–31)
CO2 SERPL-SCNC: 28 MMOL/L (ref 22–31)
CO2 SERPL-SCNC: 29 MMOL/L (ref 22–31)
CO2 SERPL-SCNC: 30 MMOL/L (ref 22–31)
CO2 SERPL-SCNC: 30 MMOL/L (ref 22–31)
CO2 SERPL-SCNC: 31 MMOL/L (ref 22–31)
CO2 SERPL-SCNC: 32 MMOL/L (ref 22–31)
COLOR UR AUTO: ABNORMAL
COLOR UR AUTO: NORMAL
CREAT SERPL-MCNC: 1.14 MG/DL (ref 0.67–1.17)
CREAT SERPL-MCNC: 1.14 MG/DL (ref 0.7–1.3)
CREAT SERPL-MCNC: 1.15 MG/DL (ref 0.67–1.17)
CREAT SERPL-MCNC: 1.15 MG/DL (ref 0.7–1.3)
CREAT SERPL-MCNC: 1.16 MG/DL (ref 0.7–1.3)
CREAT SERPL-MCNC: 1.19 MG/DL (ref 0.7–1.3)
CREAT SERPL-MCNC: 1.21 MG/DL (ref 0.7–1.3)
CREAT SERPL-MCNC: 1.22 MG/DL (ref 0.7–1.3)
CREAT SERPL-MCNC: 1.22 MG/DL (ref 0.7–1.3)
CREAT SERPL-MCNC: 1.23 MG/DL (ref 0.7–1.3)
CREAT SERPL-MCNC: 1.24 MG/DL (ref 0.7–1.3)
CREAT SERPL-MCNC: 1.24 MG/DL (ref 0.7–1.3)
CREAT SERPL-MCNC: 1.26 MG/DL (ref 0.7–1.3)
CREAT SERPL-MCNC: 1.27 MG/DL (ref 0.7–1.3)
CREAT SERPL-MCNC: 1.29 MG/DL (ref 0.7–1.3)
CREAT SERPL-MCNC: 1.33 MG/DL (ref 0.7–1.3)
CREAT SERPL-MCNC: 1.35 MG/DL (ref 0.7–1.3)
CREAT SERPL-MCNC: 1.38 MG/DL (ref 0.7–1.3)
CREAT SERPL-MCNC: 1.4 MG/DL (ref 0.7–1.3)
CREAT SERPL-MCNC: 1.51 MG/DL (ref 0.7–1.3)
CREAT SERPL-MCNC: 1.63 MG/DL (ref 0.7–1.3)
CREAT SERPL-MCNC: 1.63 MG/DL (ref 0.7–1.3)
CREAT SERPL-MCNC: 1.71 MG/DL (ref 0.7–1.3)
CREAT SERPL-MCNC: 1.83 MG/DL (ref 0.7–1.3)
CREAT SERPL-MCNC: 2.14 MG/DL (ref 0.7–1.3)
DEPRECATED HCO3 PLAS-SCNC: 25 MMOL/L (ref 22–29)
DEPRECATED HCO3 PLAS-SCNC: 26 MMOL/L (ref 22–29)
DIASTOLIC BLOOD PRESSURE - MUSE: NORMAL MMHG
DIASTOLIC BLOOD PRESSURE - MUSE: NORMAL MMHG
EOSINOPHIL # BLD AUTO: 0.1 10E3/UL (ref 0–0.7)
EOSINOPHIL # BLD AUTO: 0.2 10E3/UL (ref 0–0.7)
EOSINOPHIL NFR BLD AUTO: 1 %
EOSINOPHIL NFR BLD AUTO: 3 %
ERYTHROCYTE [DISTWIDTH] IN BLOOD BY AUTOMATED COUNT: 15 % (ref 10–15)
ERYTHROCYTE [DISTWIDTH] IN BLOOD BY AUTOMATED COUNT: 15.4 % (ref 10–15)
ERYTHROCYTE [DISTWIDTH] IN BLOOD BY AUTOMATED COUNT: 15.9 % (ref 10–15)
ERYTHROCYTE [DISTWIDTH] IN BLOOD BY AUTOMATED COUNT: 17.7 % (ref 10–15)
GFR SERPL CREATININE-BSD FRML MDRD: 28 ML/MIN/1.73M2
GFR SERPL CREATININE-BSD FRML MDRD: 34 ML/MIN/1.73M2
GFR SERPL CREATININE-BSD FRML MDRD: 37 ML/MIN/1.73M2
GFR SERPL CREATININE-BSD FRML MDRD: 39 ML/MIN/1.73M2
GFR SERPL CREATININE-BSD FRML MDRD: 39 ML/MIN/1.73M2
GFR SERPL CREATININE-BSD FRML MDRD: 43 ML/MIN/1.73M2
GFR SERPL CREATININE-BSD FRML MDRD: 47 ML/MIN/1.73M2
GFR SERPL CREATININE-BSD FRML MDRD: 48 ML/MIN/1.73M2
GFR SERPL CREATININE-BSD FRML MDRD: 49 ML/MIN/1.73M2
GFR SERPL CREATININE-BSD FRML MDRD: 50 ML/MIN/1.73M2
GFR SERPL CREATININE-BSD FRML MDRD: 52 ML/MIN/1.73M2
GFR SERPL CREATININE-BSD FRML MDRD: 53 ML/MIN/1.73M2
GFR SERPL CREATININE-BSD FRML MDRD: 54 ML/MIN/1.73M2
GFR SERPL CREATININE-BSD FRML MDRD: 55 ML/MIN/1.73M2
GFR SERPL CREATININE-BSD FRML MDRD: 56 ML/MIN/1.73M2
GFR SERPL CREATININE-BSD FRML MDRD: 57 ML/MIN/1.73M2
GFR SERPL CREATININE-BSD FRML MDRD: 59 ML/MIN/1.73M2
GFR SERPL CREATININE-BSD FRML MDRD: 60 ML/MIN/1.73M2
GLUCOSE BLD-MCNC: 114 MG/DL (ref 70–125)
GLUCOSE BLD-MCNC: 116 MG/DL (ref 70–125)
GLUCOSE BLD-MCNC: 121 MG/DL (ref 70–125)
GLUCOSE BLD-MCNC: 123 MG/DL (ref 70–125)
GLUCOSE BLD-MCNC: 126 MG/DL (ref 70–125)
GLUCOSE BLD-MCNC: 130 MG/DL (ref 70–125)
GLUCOSE BLD-MCNC: 133 MG/DL (ref 70–125)
GLUCOSE BLD-MCNC: 142 MG/DL (ref 70–125)
GLUCOSE BLD-MCNC: 151 MG/DL (ref 70–125)
GLUCOSE BLD-MCNC: 155 MG/DL (ref 70–125)
GLUCOSE BLD-MCNC: 167 MG/DL (ref 70–125)
GLUCOSE BLD-MCNC: 167 MG/DL (ref 70–125)
GLUCOSE BLD-MCNC: 182 MG/DL (ref 70–125)
GLUCOSE BLD-MCNC: 184 MG/DL (ref 70–125)
GLUCOSE BLD-MCNC: 202 MG/DL (ref 70–125)
GLUCOSE BLD-MCNC: 208 MG/DL (ref 70–125)
GLUCOSE BLD-MCNC: 217 MG/DL (ref 70–125)
GLUCOSE BLD-MCNC: 223 MG/DL (ref 70–125)
GLUCOSE BLD-MCNC: 64 MG/DL (ref 70–125)
GLUCOSE BLD-MCNC: 69 MG/DL (ref 70–125)
GLUCOSE BLD-MCNC: 78 MG/DL (ref 70–125)
GLUCOSE BLD-MCNC: 82 MG/DL (ref 70–125)
GLUCOSE BLDC GLUCOMTR-MCNC: 103 MG/DL (ref 70–99)
GLUCOSE BLDC GLUCOMTR-MCNC: 111 MG/DL (ref 70–99)
GLUCOSE BLDC GLUCOMTR-MCNC: 112 MG/DL (ref 70–99)
GLUCOSE BLDC GLUCOMTR-MCNC: 113 MG/DL (ref 70–99)
GLUCOSE BLDC GLUCOMTR-MCNC: 119 MG/DL (ref 70–99)
GLUCOSE BLDC GLUCOMTR-MCNC: 119 MG/DL (ref 70–99)
GLUCOSE BLDC GLUCOMTR-MCNC: 122 MG/DL (ref 70–99)
GLUCOSE BLDC GLUCOMTR-MCNC: 122 MG/DL (ref 70–99)
GLUCOSE BLDC GLUCOMTR-MCNC: 125 MG/DL (ref 70–99)
GLUCOSE BLDC GLUCOMTR-MCNC: 127 MG/DL (ref 70–99)
GLUCOSE BLDC GLUCOMTR-MCNC: 129 MG/DL (ref 70–99)
GLUCOSE BLDC GLUCOMTR-MCNC: 133 MG/DL (ref 70–99)
GLUCOSE BLDC GLUCOMTR-MCNC: 137 MG/DL (ref 70–99)
GLUCOSE BLDC GLUCOMTR-MCNC: 138 MG/DL (ref 70–99)
GLUCOSE BLDC GLUCOMTR-MCNC: 143 MG/DL (ref 70–99)
GLUCOSE BLDC GLUCOMTR-MCNC: 144 MG/DL (ref 70–99)
GLUCOSE BLDC GLUCOMTR-MCNC: 146 MG/DL (ref 70–99)
GLUCOSE BLDC GLUCOMTR-MCNC: 149 MG/DL (ref 70–99)
GLUCOSE BLDC GLUCOMTR-MCNC: 150 MG/DL (ref 70–99)
GLUCOSE BLDC GLUCOMTR-MCNC: 150 MG/DL (ref 70–99)
GLUCOSE BLDC GLUCOMTR-MCNC: 153 MG/DL (ref 70–99)
GLUCOSE BLDC GLUCOMTR-MCNC: 159 MG/DL (ref 70–99)
GLUCOSE BLDC GLUCOMTR-MCNC: 164 MG/DL (ref 70–99)
GLUCOSE BLDC GLUCOMTR-MCNC: 165 MG/DL (ref 70–99)
GLUCOSE BLDC GLUCOMTR-MCNC: 166 MG/DL (ref 70–99)
GLUCOSE BLDC GLUCOMTR-MCNC: 167 MG/DL (ref 70–99)
GLUCOSE BLDC GLUCOMTR-MCNC: 167 MG/DL (ref 70–99)
GLUCOSE BLDC GLUCOMTR-MCNC: 168 MG/DL (ref 70–99)
GLUCOSE BLDC GLUCOMTR-MCNC: 170 MG/DL (ref 70–99)
GLUCOSE BLDC GLUCOMTR-MCNC: 171 MG/DL (ref 70–99)
GLUCOSE BLDC GLUCOMTR-MCNC: 177 MG/DL (ref 70–99)
GLUCOSE BLDC GLUCOMTR-MCNC: 179 MG/DL (ref 70–99)
GLUCOSE BLDC GLUCOMTR-MCNC: 188 MG/DL (ref 70–99)
GLUCOSE BLDC GLUCOMTR-MCNC: 201 MG/DL (ref 70–99)
GLUCOSE BLDC GLUCOMTR-MCNC: 203 MG/DL (ref 70–99)
GLUCOSE BLDC GLUCOMTR-MCNC: 217 MG/DL (ref 70–99)
GLUCOSE BLDC GLUCOMTR-MCNC: 218 MG/DL (ref 70–99)
GLUCOSE BLDC GLUCOMTR-MCNC: 239 MG/DL (ref 70–99)
GLUCOSE BLDC GLUCOMTR-MCNC: 248 MG/DL (ref 70–99)
GLUCOSE BLDC GLUCOMTR-MCNC: 250 MG/DL (ref 70–99)
GLUCOSE BLDC GLUCOMTR-MCNC: 70 MG/DL (ref 70–99)
GLUCOSE BLDC GLUCOMTR-MCNC: 75 MG/DL (ref 70–99)
GLUCOSE BLDC GLUCOMTR-MCNC: 80 MG/DL (ref 70–99)
GLUCOSE BLDC GLUCOMTR-MCNC: 84 MG/DL (ref 70–99)
GLUCOSE BLDC GLUCOMTR-MCNC: 85 MG/DL (ref 70–99)
GLUCOSE BLDC GLUCOMTR-MCNC: 90 MG/DL (ref 70–99)
GLUCOSE BLDC GLUCOMTR-MCNC: 91 MG/DL (ref 70–99)
GLUCOSE BLDC GLUCOMTR-MCNC: 91 MG/DL (ref 70–99)
GLUCOSE BLDC GLUCOMTR-MCNC: 92 MG/DL (ref 70–99)
GLUCOSE BLDC GLUCOMTR-MCNC: 98 MG/DL (ref 70–99)
GLUCOSE SERPL-MCNC: 193 MG/DL (ref 70–99)
GLUCOSE SERPL-MCNC: 90 MG/DL (ref 70–99)
GLUCOSE UR STRIP-MCNC: NEGATIVE MG/DL
GLUCOSE UR STRIP-MCNC: NEGATIVE MG/DL
GRAM STAIN RESULT: NORMAL
GRAM STAIN RESULT: NORMAL
HBA1C MFR BLD: 6.5 % (ref 4.2–6.1)
HBA1C MFR BLD: 7.4 %
HBV SURFACE AG SERPL QL IA: NONREACTIVE
HCT VFR BLD AUTO: 34.8 % (ref 40–53)
HCT VFR BLD AUTO: 38.1 % (ref 40–53)
HCT VFR BLD AUTO: 40.2 % (ref 40–53)
HCT VFR BLD AUTO: 40.5 % (ref 40–53)
HCV RNA SERPL NAA+PROBE-ACNC: NOT DETECTED IU/ML
HGB BLD-MCNC: 11.3 G/DL (ref 13.3–17.7)
HGB BLD-MCNC: 11.9 G/DL (ref 13.3–17.7)
HGB BLD-MCNC: 13 G/DL (ref 13.3–17.7)
HGB BLD-MCNC: 13.3 G/DL (ref 13.3–17.7)
HGB BLD-MCNC: 13.8 G/DL (ref 13.3–17.7)
HGB UR QL STRIP: ABNORMAL
HGB UR QL STRIP: NEGATIVE
HIV 1+2 AB+HIV1 P24 AG SERPL QL IA: NEGATIVE
HIV 1+2 AB+HIV1P24 AG SERPLBLD IA.RAPID: NON REACTIVE
HIV 1+2 AB+HIV1P24 AG SERPLBLD IA.RAPID: NON REACTIVE
HIV INTERPRETATION: NORMAL
HOLD SPECIMEN: NORMAL
HYALINE CASTS: 1 /LPF
IMM GRANULOCYTES # BLD: 0 10E3/UL
IMM GRANULOCYTES # BLD: 0 10E3/UL
IMM GRANULOCYTES NFR BLD: 0 %
IMM GRANULOCYTES NFR BLD: 1 %
INR (EXTERNAL): 2.1 (ref 0.9–1.1)
INR (EXTERNAL): 2.3 (ref 0.9–1.1)
INR (EXTERNAL): 2.3 (ref 0.9–1.1)
INR (EXTERNAL): 2.6 (ref 0.9–1.1)
INR (EXTERNAL): 3.1 (ref 0.9–1.1)
INR (EXTERNAL): 3.5 (ref 0.9–1.1)
INR (EXTERNAL): 3.8 (ref 0.9–1.1)
INR PPP: 1.8 (ref 0.9–1.15)
INR PPP: 1.81 (ref 0.9–1.15)
INR PPP: 1.89 (ref 0.9–1.15)
INR PPP: 1.95 (ref 0.85–1.15)
INR PPP: 1.99 (ref 0.85–1.15)
INR PPP: 2.04 (ref 0.85–1.15)
INR PPP: 2.08 (ref 0.9–1.15)
INR PPP: 2.1 (ref 0.85–1.15)
INR PPP: 2.14 (ref 0.85–1.15)
INR PPP: 2.21 (ref 0.85–1.15)
INR PPP: 2.26 (ref 0.85–1.15)
INR PPP: 2.34 (ref 0.9–1.15)
INR PPP: 2.85 (ref 0.85–1.15)
INR PPP: 2.87 (ref 0.9–1.15)
INR PPP: 2.94 (ref 0.85–1.15)
INR PPP: 2.94 (ref 0.9–1.15)
INTERPRETATION ECG - MUSE: NORMAL
INTERPRETATION ECG - MUSE: NORMAL
KETONES UR STRIP-MCNC: NEGATIVE MG/DL
KETONES UR STRIP-MCNC: NEGATIVE MG/DL
LACTATE SERPL-SCNC: 2 MMOL/L (ref 0.7–2)
LACTATE SERPL-SCNC: 2 MMOL/L (ref 0.7–2)
LACTATE SERPL-SCNC: 2.3 MMOL/L (ref 0.7–2)
LACTATE SERPL-SCNC: 2.4 MMOL/L (ref 0.7–2)
LEUKOCYTE ESTERASE UR QL STRIP: ABNORMAL
LEUKOCYTE ESTERASE UR QL STRIP: NEGATIVE
LYMPHOCYTES # BLD AUTO: 0.5 10E3/UL (ref 0.8–5.3)
LYMPHOCYTES # BLD AUTO: 0.5 10E3/UL (ref 0.8–5.3)
LYMPHOCYTES NFR BLD AUTO: 8 %
LYMPHOCYTES NFR BLD AUTO: 8 %
MAGNESIUM SERPL-MCNC: 1.6 MG/DL (ref 1.8–2.6)
MAGNESIUM SERPL-MCNC: 1.7 MG/DL (ref 1.8–2.6)
MAGNESIUM SERPL-MCNC: 1.7 MG/DL (ref 1.8–2.6)
MAGNESIUM SERPL-MCNC: 1.8 MG/DL (ref 1.8–2.6)
MAGNESIUM SERPL-MCNC: 2 MG/DL (ref 1.8–2.6)
MCH RBC QN AUTO: 30.2 PG (ref 26.5–33)
MCH RBC QN AUTO: 30.3 PG (ref 26.5–33)
MCH RBC QN AUTO: 30.9 PG (ref 26.5–33)
MCH RBC QN AUTO: 31 PG (ref 26.5–33)
MCHC RBC AUTO-ENTMCNC: 32.1 G/DL (ref 31.5–36.5)
MCHC RBC AUTO-ENTMCNC: 32.5 G/DL (ref 31.5–36.5)
MCHC RBC AUTO-ENTMCNC: 34.3 G/DL (ref 31.5–36.5)
MCHC RBC AUTO-ENTMCNC: 34.9 G/DL (ref 31.5–36.5)
MCV RBC AUTO: 88 FL (ref 78–100)
MCV RBC AUTO: 89 FL (ref 78–100)
MCV RBC AUTO: 94 FL (ref 78–100)
MCV RBC AUTO: 95 FL (ref 78–100)
MONOCYTES # BLD AUTO: 0.4 10E3/UL (ref 0–1.3)
MONOCYTES # BLD AUTO: 0.6 10E3/UL (ref 0–1.3)
MONOCYTES NFR BLD AUTO: 7 %
MONOCYTES NFR BLD AUTO: 9 %
NEUTROPHILS # BLD AUTO: 5.4 10E3/UL (ref 1.6–8.3)
NEUTROPHILS # BLD AUTO: 5.6 10E3/UL (ref 1.6–8.3)
NEUTROPHILS NFR BLD AUTO: 79 %
NEUTROPHILS NFR BLD AUTO: 84 %
NITRATE UR QL: NEGATIVE
NITRATE UR QL: NEGATIVE
NRBC # BLD AUTO: 0 10E3/UL
NRBC # BLD AUTO: 0 10E3/UL
NRBC BLD AUTO-RTO: 0 /100
NRBC BLD AUTO-RTO: 0 /100
P AXIS - MUSE: NORMAL DEGREES
P AXIS - MUSE: NORMAL DEGREES
PH UR STRIP: 5.5 [PH] (ref 5–7)
PH UR STRIP: 6.5 [PH] (ref 5–7)
PLATELET # BLD AUTO: 165 10E3/UL (ref 150–450)
PLATELET # BLD AUTO: 167 10E3/UL (ref 150–450)
PLATELET # BLD AUTO: 189 10E3/UL (ref 150–450)
PLATELET # BLD AUTO: 204 10E3/UL (ref 150–450)
POTASSIUM BLD-SCNC: 3.3 MMOL/L (ref 3.5–5)
POTASSIUM BLD-SCNC: 3.4 MMOL/L (ref 3.5–5)
POTASSIUM BLD-SCNC: 3.5 MMOL/L (ref 3.5–5)
POTASSIUM BLD-SCNC: 3.5 MMOL/L (ref 3.5–5)
POTASSIUM BLD-SCNC: 3.6 MMOL/L (ref 3.5–5)
POTASSIUM BLD-SCNC: 3.8 MMOL/L (ref 3.5–5)
POTASSIUM BLD-SCNC: 3.8 MMOL/L (ref 3.5–5)
POTASSIUM BLD-SCNC: 3.9 MMOL/L (ref 3.5–5)
POTASSIUM BLD-SCNC: 4 MMOL/L (ref 3.5–5)
POTASSIUM BLD-SCNC: 4.1 MMOL/L (ref 3.5–5)
POTASSIUM BLD-SCNC: 4.2 MMOL/L (ref 3.5–5)
POTASSIUM BLD-SCNC: 4.4 MMOL/L (ref 3.5–5)
POTASSIUM BLD-SCNC: 4.4 MMOL/L (ref 3.5–5)
POTASSIUM BLD-SCNC: 4.5 MMOL/L (ref 3.5–5)
POTASSIUM BLD-SCNC: 4.7 MMOL/L (ref 3.5–5)
POTASSIUM BLD-SCNC: 4.7 MMOL/L (ref 3.5–5)
POTASSIUM BLD-SCNC: 4.8 MMOL/L (ref 3.5–5)
POTASSIUM BLD-SCNC: 4.9 MMOL/L (ref 3.5–5)
POTASSIUM BLD-SCNC: 5.6 MMOL/L (ref 3.5–5)
POTASSIUM SERPL-SCNC: 4.6 MMOL/L (ref 3.4–5.3)
POTASSIUM SERPL-SCNC: 5.7 MMOL/L (ref 3.4–5.3)
PR INTERVAL - MUSE: NORMAL MS
PR INTERVAL - MUSE: NORMAL MS
PROT SERPL-MCNC: 7.8 G/DL (ref 6–8)
QRS DURATION - MUSE: 116 MS
QRS DURATION - MUSE: 120 MS
QT - MUSE: 360 MS
QT - MUSE: 386 MS
QTC - MUSE: 440 MS
QTC - MUSE: 461 MS
R AXIS - MUSE: -72 DEGREES
R AXIS - MUSE: -82 DEGREES
RBC # BLD AUTO: 3.65 10E6/UL (ref 4.4–5.9)
RBC # BLD AUTO: 4.29 10E6/UL (ref 4.4–5.9)
RBC # BLD AUTO: 4.3 10E6/UL (ref 4.4–5.9)
RBC # BLD AUTO: 4.57 10E6/UL (ref 4.4–5.9)
RBC URINE: 1 /HPF
RBC URINE: >182 /HPF
SARS-COV-2 RNA RESP QL NAA+PROBE: NEGATIVE
SODIUM SERPL-SCNC: 133 MMOL/L (ref 136–145)
SODIUM SERPL-SCNC: 134 MMOL/L (ref 136–145)
SODIUM SERPL-SCNC: 134 MMOL/L (ref 136–145)
SODIUM SERPL-SCNC: 135 MMOL/L (ref 136–145)
SODIUM SERPL-SCNC: 135 MMOL/L (ref 136–145)
SODIUM SERPL-SCNC: 136 MMOL/L (ref 136–145)
SODIUM SERPL-SCNC: 137 MMOL/L (ref 136–145)
SODIUM SERPL-SCNC: 138 MMOL/L (ref 136–145)
SODIUM SERPL-SCNC: 139 MMOL/L (ref 136–145)
SODIUM SERPL-SCNC: 140 MMOL/L (ref 136–145)
SODIUM SERPL-SCNC: 141 MMOL/L (ref 136–145)
SODIUM SERPL-SCNC: 142 MMOL/L (ref 136–145)
SODIUM SERPL-SCNC: 142 MMOL/L (ref 136–145)
SP GR UR STRIP: 1.01 (ref 1–1.03)
SP GR UR STRIP: 1.01 (ref 1–1.03)
SPECIMEN EXPIRATION DATE: NORMAL
SYSTOLIC BLOOD PRESSURE - MUSE: NORMAL MMHG
SYSTOLIC BLOOD PRESSURE - MUSE: NORMAL MMHG
T AXIS - MUSE: 71 DEGREES
T AXIS - MUSE: 92 DEGREES
TROPONIN I SERPL-MCNC: 0.1 NG/ML (ref 0–0.29)
TROPONIN I SERPL-MCNC: 0.14 NG/ML (ref 0–0.29)
TROPONIN I SERPL-MCNC: 0.16 NG/ML (ref 0–0.29)
UROBILINOGEN UR STRIP-MCNC: <2 MG/DL
UROBILINOGEN UR STRIP-MCNC: <2 MG/DL
VENTRICULAR RATE- MUSE: 86 BPM
VENTRICULAR RATE- MUSE: 90 BPM
WBC # BLD AUTO: 5.1 10E3/UL (ref 4–11)
WBC # BLD AUTO: 5.6 10E3/UL (ref 4–11)
WBC # BLD AUTO: 6.4 10E3/UL (ref 4–11)
WBC # BLD AUTO: 6.9 10E3/UL (ref 4–11)
WBC URINE: 1 /HPF
WBC URINE: 6 /HPF

## 2022-01-01 PROCEDURE — 250N000013 HC RX MED GY IP 250 OP 250 PS 637: Performed by: INTERNAL MEDICINE

## 2022-01-01 PROCEDURE — 85610 PROTHROMBIN TIME: CPT | Performed by: FAMILY MEDICINE

## 2022-01-01 PROCEDURE — 258N000003 HC RX IP 258 OP 636: Performed by: EMERGENCY MEDICINE

## 2022-01-01 PROCEDURE — 36415 COLL VENOUS BLD VENIPUNCTURE: CPT | Mod: ORL | Performed by: FAMILY MEDICINE

## 2022-01-01 PROCEDURE — 36415 COLL VENOUS BLD VENIPUNCTURE: CPT | Performed by: INTERNAL MEDICINE

## 2022-01-01 PROCEDURE — 82310 ASSAY OF CALCIUM: CPT | Performed by: FAMILY MEDICINE

## 2022-01-01 PROCEDURE — 250N000011 HC RX IP 250 OP 636: Performed by: INTERNAL MEDICINE

## 2022-01-01 PROCEDURE — 87635 SARS-COV-2 COVID-19 AMP PRB: CPT | Performed by: INTERNAL MEDICINE

## 2022-01-01 PROCEDURE — 96365 THER/PROPH/DIAG IV INF INIT: CPT

## 2022-01-01 PROCEDURE — G0378 HOSPITAL OBSERVATION PER HR: HCPCS

## 2022-01-01 PROCEDURE — 99309 SBSQ NF CARE MODERATE MDM 30: CPT | Performed by: NURSE PRACTITIONER

## 2022-01-01 PROCEDURE — 250N000012 HC RX MED GY IP 250 OP 636 PS 637: Performed by: EMERGENCY MEDICINE

## 2022-01-01 PROCEDURE — 83735 ASSAY OF MAGNESIUM: CPT | Performed by: INTERNAL MEDICINE

## 2022-01-01 PROCEDURE — 80048 BASIC METABOLIC PNL TOTAL CA: CPT | Mod: ORL | Performed by: FAMILY MEDICINE

## 2022-01-01 PROCEDURE — 96375 TX/PRO/DX INJ NEW DRUG ADDON: CPT

## 2022-01-01 PROCEDURE — 93005 ELECTROCARDIOGRAM TRACING: CPT | Performed by: EMERGENCY MEDICINE

## 2022-01-01 PROCEDURE — 87205 SMEAR GRAM STAIN: CPT | Performed by: PHYSICIAN ASSISTANT

## 2022-01-01 PROCEDURE — P9604 ONE-WAY ALLOW PRORATED TRIP: HCPCS | Mod: ORL

## 2022-01-01 PROCEDURE — 99285 EMERGENCY DEPT VISIT HI MDM: CPT | Mod: 25

## 2022-01-01 PROCEDURE — 81001 URINALYSIS AUTO W/SCOPE: CPT | Performed by: INTERNAL MEDICINE

## 2022-01-01 PROCEDURE — C9803 HOPD COVID-19 SPEC COLLECT: HCPCS

## 2022-01-01 PROCEDURE — 258N000003 HC RX IP 258 OP 636: Performed by: INTERNAL MEDICINE

## 2022-01-01 PROCEDURE — 97116 GAIT TRAINING THERAPY: CPT | Mod: GP

## 2022-01-01 PROCEDURE — 85610 PROTHROMBIN TIME: CPT | Performed by: INTERNAL MEDICINE

## 2022-01-01 PROCEDURE — 84484 ASSAY OF TROPONIN QUANT: CPT | Performed by: EMERGENCY MEDICINE

## 2022-01-01 PROCEDURE — 96372 THER/PROPH/DIAG INJ SC/IM: CPT

## 2022-01-01 PROCEDURE — 82962 GLUCOSE BLOOD TEST: CPT

## 2022-01-01 PROCEDURE — 71045 X-RAY EXAM CHEST 1 VIEW: CPT

## 2022-01-01 PROCEDURE — 96372 THER/PROPH/DIAG INJ SC/IM: CPT | Performed by: INTERNAL MEDICINE

## 2022-01-01 PROCEDURE — U0005 INFEC AGEN DETEC AMPLI PROBE: HCPCS | Performed by: PHYSICIAN ASSISTANT

## 2022-01-01 PROCEDURE — 99305 1ST NF CARE MODERATE MDM 35: CPT | Performed by: FAMILY MEDICINE

## 2022-01-01 PROCEDURE — 82310 ASSAY OF CALCIUM: CPT | Performed by: EMERGENCY MEDICINE

## 2022-01-01 PROCEDURE — 99310 SBSQ NF CARE HIGH MDM 45: CPT | Performed by: NURSE PRACTITIONER

## 2022-01-01 PROCEDURE — 99316 NF DSCHRG MGMT 30 MIN+: CPT | Performed by: FAMILY MEDICINE

## 2022-01-01 PROCEDURE — 36415 COLL VENOUS BLD VENIPUNCTURE: CPT | Mod: ORL | Performed by: NURSE PRACTITIONER

## 2022-01-01 PROCEDURE — 97166 OT EVAL MOD COMPLEX 45 MIN: CPT | Mod: GO

## 2022-01-01 PROCEDURE — 82374 ASSAY BLOOD CARBON DIOXIDE: CPT | Performed by: EMERGENCY MEDICINE

## 2022-01-01 PROCEDURE — 11042 DBRDMT SUBQ TIS 1ST 20SQCM/<: CPT | Performed by: NURSE PRACTITIONER

## 2022-01-01 PROCEDURE — 250N000012 HC RX MED GY IP 250 OP 636 PS 637: Performed by: INTERNAL MEDICINE

## 2022-01-01 PROCEDURE — 80048 BASIC METABOLIC PNL TOTAL CA: CPT | Performed by: FAMILY MEDICINE

## 2022-01-01 PROCEDURE — 80048 BASIC METABOLIC PNL TOTAL CA: CPT | Performed by: INTERNAL MEDICINE

## 2022-01-01 PROCEDURE — 94762 N-INVAS EAR/PLS OXIMTRY CONT: CPT

## 2022-01-01 PROCEDURE — 99232 SBSQ HOSP IP/OBS MODERATE 35: CPT | Performed by: INTERNAL MEDICINE

## 2022-01-01 PROCEDURE — 36415 COLL VENOUS BLD VENIPUNCTURE: CPT | Performed by: EMERGENCY MEDICINE

## 2022-01-01 PROCEDURE — 96366 THER/PROPH/DIAG IV INF ADDON: CPT

## 2022-01-01 PROCEDURE — 83880 ASSAY OF NATRIURETIC PEPTIDE: CPT | Mod: ORL | Performed by: NURSE PRACTITIONER

## 2022-01-01 PROCEDURE — 80048 BASIC METABOLIC PNL TOTAL CA: CPT | Mod: ORL | Performed by: NURSE PRACTITIONER

## 2022-01-01 PROCEDURE — 97110 THERAPEUTIC EXERCISES: CPT | Mod: GO

## 2022-01-01 PROCEDURE — 82962 GLUCOSE BLOOD TEST: CPT | Mod: 91

## 2022-01-01 PROCEDURE — 85610 PROTHROMBIN TIME: CPT | Performed by: PHYSICIAN ASSISTANT

## 2022-01-01 PROCEDURE — 97535 SELF CARE MNGMENT TRAINING: CPT | Mod: GO

## 2022-01-01 PROCEDURE — 83880 ASSAY OF NATRIURETIC PEPTIDE: CPT | Performed by: EMERGENCY MEDICINE

## 2022-01-01 PROCEDURE — 210N000001 HC R&B IMCU HEART CARE

## 2022-01-01 PROCEDURE — 99607 MTMS BY PHARM ADDL 15 MIN: CPT | Performed by: PHARMACIST

## 2022-01-01 PROCEDURE — 83880 ASSAY OF NATRIURETIC PEPTIDE: CPT | Performed by: PHYSICIAN ASSISTANT

## 2022-01-01 PROCEDURE — 80048 BASIC METABOLIC PNL TOTAL CA: CPT | Performed by: PHYSICIAN ASSISTANT

## 2022-01-01 PROCEDURE — 84132 ASSAY OF SERUM POTASSIUM: CPT | Performed by: INTERNAL MEDICINE

## 2022-01-01 PROCEDURE — 99239 HOSP IP/OBS DSCHRG MGMT >30: CPT | Performed by: INTERNAL MEDICINE

## 2022-01-01 PROCEDURE — 93306 TTE W/DOPPLER COMPLETE: CPT

## 2022-01-01 PROCEDURE — 83880 ASSAY OF NATRIURETIC PEPTIDE: CPT | Mod: ORL | Performed by: FAMILY MEDICINE

## 2022-01-01 PROCEDURE — 36415 COLL VENOUS BLD VENIPUNCTURE: CPT | Performed by: FAMILY MEDICINE

## 2022-01-01 PROCEDURE — P9604 ONE-WAY ALLOW PRORATED TRIP: HCPCS | Performed by: FAMILY MEDICINE

## 2022-01-01 PROCEDURE — 97165 OT EVAL LOW COMPLEX 30 MIN: CPT | Mod: GO

## 2022-01-01 PROCEDURE — 97162 PT EVAL MOD COMPLEX 30 MIN: CPT | Mod: GP

## 2022-01-01 PROCEDURE — 85018 HEMOGLOBIN: CPT | Performed by: INTERNAL MEDICINE

## 2022-01-01 PROCEDURE — 96361 HYDRATE IV INFUSION ADD-ON: CPT

## 2022-01-01 PROCEDURE — 85027 COMPLETE CBC AUTOMATED: CPT | Mod: ORL

## 2022-01-01 PROCEDURE — 70450 CT HEAD/BRAIN W/O DYE: CPT

## 2022-01-01 PROCEDURE — P9047 ALBUMIN (HUMAN), 25%, 50ML: HCPCS | Performed by: INTERNAL MEDICINE

## 2022-01-01 PROCEDURE — 83605 ASSAY OF LACTIC ACID: CPT | Performed by: INTERNAL MEDICINE

## 2022-01-01 PROCEDURE — 99204 OFFICE O/P NEW MOD 45 MIN: CPT | Performed by: NURSE PRACTITIONER

## 2022-01-01 PROCEDURE — 85048 AUTOMATED LEUKOCYTE COUNT: CPT | Performed by: EMERGENCY MEDICINE

## 2022-01-01 PROCEDURE — 97110 THERAPEUTIC EXERCISES: CPT | Mod: GP

## 2022-01-01 PROCEDURE — 97530 THERAPEUTIC ACTIVITIES: CPT | Mod: GP

## 2022-01-01 PROCEDURE — 51702 INSERT TEMP BLADDER CATH: CPT

## 2022-01-01 PROCEDURE — 87635 SARS-COV-2 COVID-19 AMP PRB: CPT | Performed by: EMERGENCY MEDICINE

## 2022-01-01 PROCEDURE — 250N000011 HC RX IP 250 OP 636: Performed by: PHYSICIAN ASSISTANT

## 2022-01-01 PROCEDURE — 93306 TTE W/DOPPLER COMPLETE: CPT | Mod: 26 | Performed by: INTERNAL MEDICINE

## 2022-01-01 PROCEDURE — 85730 THROMBOPLASTIN TIME PARTIAL: CPT | Performed by: PHYSICIAN ASSISTANT

## 2022-01-01 PROCEDURE — 99223 1ST HOSP IP/OBS HIGH 75: CPT | Performed by: INTERNAL MEDICINE

## 2022-01-01 PROCEDURE — 99225 PR SUBSEQUENT OBSERVATION CARE,LEVEL II: CPT | Performed by: INTERNAL MEDICINE

## 2022-01-01 PROCEDURE — 120N000004 HC R&B MS OVERFLOW

## 2022-01-01 PROCEDURE — 81001 URINALYSIS AUTO W/SCOPE: CPT | Performed by: EMERGENCY MEDICINE

## 2022-01-01 PROCEDURE — P9604 ONE-WAY ALLOW PRORATED TRIP: HCPCS | Mod: ORL | Performed by: NURSE PRACTITIONER

## 2022-01-01 PROCEDURE — 82310 ASSAY OF CALCIUM: CPT | Performed by: INTERNAL MEDICINE

## 2022-01-01 PROCEDURE — 99284 EMERGENCY DEPT VISIT MOD MDM: CPT | Mod: 25

## 2022-01-01 PROCEDURE — 99217 PR OBSERVATION CARE DISCHARGE: CPT | Performed by: FAMILY MEDICINE

## 2022-01-01 PROCEDURE — 85610 PROTHROMBIN TIME: CPT | Performed by: EMERGENCY MEDICINE

## 2022-01-01 PROCEDURE — 250N000013 HC RX MED GY IP 250 OP 250 PS 637: Performed by: EMERGENCY MEDICINE

## 2022-01-01 PROCEDURE — 120N000001 HC R&B MED SURG/OB

## 2022-01-01 PROCEDURE — 72125 CT NECK SPINE W/O DYE: CPT

## 2022-01-01 PROCEDURE — 80053 COMPREHEN METABOLIC PANEL: CPT | Performed by: EMERGENCY MEDICINE

## 2022-01-01 PROCEDURE — 71046 X-RAY EXAM CHEST 2 VIEWS: CPT

## 2022-01-01 PROCEDURE — G0463 HOSPITAL OUTPT CLINIC VISIT: HCPCS

## 2022-01-01 PROCEDURE — 86901 BLOOD TYPING SEROLOGIC RH(D): CPT | Performed by: EMERGENCY MEDICINE

## 2022-01-01 PROCEDURE — G0463 HOSPITAL OUTPT CLINIC VISIT: HCPCS | Mod: 25

## 2022-01-01 PROCEDURE — 83036 HEMOGLOBIN GLYCOSYLATED A1C: CPT | Performed by: INTERNAL MEDICINE

## 2022-01-01 PROCEDURE — 99220 PR INITIAL OBSERVATION CARE,LEVEL III: CPT | Performed by: INTERNAL MEDICINE

## 2022-01-01 PROCEDURE — 85014 HEMATOCRIT: CPT | Performed by: EMERGENCY MEDICINE

## 2022-01-01 PROCEDURE — 84484 ASSAY OF TROPONIN QUANT: CPT | Performed by: PHYSICIAN ASSISTANT

## 2022-01-01 PROCEDURE — 83735 ASSAY OF MAGNESIUM: CPT | Performed by: EMERGENCY MEDICINE

## 2022-01-01 PROCEDURE — 258N000001 HC RX 258: Performed by: EMERGENCY MEDICINE

## 2022-01-01 PROCEDURE — 99223 1ST HOSP IP/OBS HIGH 75: CPT | Mod: 25 | Performed by: INTERNAL MEDICINE

## 2022-01-01 PROCEDURE — 84132 ASSAY OF SERUM POTASSIUM: CPT | Performed by: EMERGENCY MEDICINE

## 2022-01-01 PROCEDURE — P9603 ONE-WAY ALLOW PRORATED MILES: HCPCS | Mod: ORL | Performed by: FAMILY MEDICINE

## 2022-01-01 PROCEDURE — 250N000011 HC RX IP 250 OP 636: Performed by: EMERGENCY MEDICINE

## 2022-01-01 PROCEDURE — P9604 ONE-WAY ALLOW PRORATED TRIP: HCPCS | Mod: ORL | Performed by: FAMILY MEDICINE

## 2022-01-01 PROCEDURE — 999N000157 HC STATISTIC RCP TIME EA 10 MIN

## 2022-01-01 PROCEDURE — 99233 SBSQ HOSP IP/OBS HIGH 50: CPT | Performed by: INTERNAL MEDICINE

## 2022-01-01 PROCEDURE — 82565 ASSAY OF CREATININE: CPT | Performed by: INTERNAL MEDICINE

## 2022-01-01 PROCEDURE — 99316 NF DSCHRG MGMT 30 MIN+: CPT | Performed by: NURSE PRACTITIONER

## 2022-01-01 PROCEDURE — P9604 ONE-WAY ALLOW PRORATED TRIP: HCPCS | Performed by: INTERNAL MEDICINE

## 2022-01-01 PROCEDURE — 87040 BLOOD CULTURE FOR BACTERIA: CPT | Performed by: EMERGENCY MEDICINE

## 2022-01-01 PROCEDURE — 250N000009 HC RX 250: Performed by: EMERGENCY MEDICINE

## 2022-01-01 PROCEDURE — 99605 MTMS BY PHARM NP 15 MIN: CPT | Performed by: PHARMACIST

## 2022-01-01 PROCEDURE — 83605 ASSAY OF LACTIC ACID: CPT | Performed by: EMERGENCY MEDICINE

## 2022-01-01 PROCEDURE — 84484 ASSAY OF TROPONIN QUANT: CPT | Mod: 91 | Performed by: EMERGENCY MEDICINE

## 2022-01-01 PROCEDURE — 85025 COMPLETE CBC W/AUTO DIFF WBC: CPT | Performed by: EMERGENCY MEDICINE

## 2022-01-01 PROCEDURE — 85027 COMPLETE CBC AUTOMATED: CPT | Performed by: PHYSICIAN ASSISTANT

## 2022-01-01 PROCEDURE — 96376 TX/PRO/DX INJ SAME DRUG ADON: CPT

## 2022-01-01 PROCEDURE — 36415 COLL VENOUS BLD VENIPUNCTURE: CPT | Mod: ORL

## 2022-01-01 PROCEDURE — 96360 HYDRATION IV INFUSION INIT: CPT

## 2022-01-01 RX ORDER — BUMETANIDE 0.5 MG/1
0.5 TABLET ORAL DAILY
Status: DISCONTINUED | OUTPATIENT
Start: 2022-01-01 | End: 2022-01-01 | Stop reason: HOSPADM

## 2022-01-01 RX ORDER — WARFARIN SODIUM 2 MG/1
2 TABLET ORAL
Status: COMPLETED | OUTPATIENT
Start: 2022-01-01 | End: 2022-01-01

## 2022-01-01 RX ORDER — ONDANSETRON 2 MG/ML
4 INJECTION INTRAMUSCULAR; INTRAVENOUS EVERY 6 HOURS PRN
Status: DISCONTINUED | OUTPATIENT
Start: 2022-01-01 | End: 2022-01-01 | Stop reason: HOSPADM

## 2022-01-01 RX ORDER — AMOXICILLIN 250 MG
1 CAPSULE ORAL 2 TIMES DAILY PRN
Status: DISCONTINUED | OUTPATIENT
Start: 2022-01-01 | End: 2022-01-01

## 2022-01-01 RX ORDER — SPIRONOLACTONE 25 MG/1
25 TABLET ORAL DAILY
COMMUNITY

## 2022-01-01 RX ORDER — ACETAMINOPHEN 650 MG/1
650 SUPPOSITORY RECTAL EVERY 6 HOURS PRN
Status: DISCONTINUED | OUTPATIENT
Start: 2022-01-01 | End: 2022-01-01 | Stop reason: HOSPADM

## 2022-01-01 RX ORDER — POTASSIUM CHLORIDE 1500 MG/1
40 TABLET, EXTENDED RELEASE ORAL ONCE
Status: COMPLETED | OUTPATIENT
Start: 2022-01-01 | End: 2022-01-01

## 2022-01-01 RX ORDER — POTASSIUM CHLORIDE 1500 MG/1
20 TABLET, EXTENDED RELEASE ORAL ONCE
Status: COMPLETED | OUTPATIENT
Start: 2022-01-01 | End: 2022-01-01

## 2022-01-01 RX ORDER — SPIRONOLACTONE 25 MG/1
25 TABLET ORAL DAILY
Refills: 0 | Status: ON HOLD
Start: 2022-01-01 | End: 2022-01-01

## 2022-01-01 RX ORDER — GLIPIZIDE 5 MG/1
5 TABLET, FILM COATED, EXTENDED RELEASE ORAL
Status: DISCONTINUED | OUTPATIENT
Start: 2022-01-01 | End: 2022-01-01 | Stop reason: HOSPADM

## 2022-01-01 RX ORDER — SODIUM CHLORIDE 9 MG/ML
INJECTION, SOLUTION INTRAVENOUS CONTINUOUS
Status: DISCONTINUED | OUTPATIENT
Start: 2022-01-01 | End: 2022-01-01

## 2022-01-01 RX ORDER — MAGNESIUM HYDROXIDE/ALUMINUM HYDROXICE/SIMETHICONE 120; 1200; 1200 MG/30ML; MG/30ML; MG/30ML
30 SUSPENSION ORAL EVERY 4 HOURS PRN
Status: DISCONTINUED | OUTPATIENT
Start: 2022-01-01 | End: 2022-01-01 | Stop reason: HOSPADM

## 2022-01-01 RX ORDER — CEFTRIAXONE 1 G/1
1 INJECTION, POWDER, FOR SOLUTION INTRAMUSCULAR; INTRAVENOUS EVERY 24 HOURS
Status: COMPLETED | OUTPATIENT
Start: 2022-01-01 | End: 2022-01-01

## 2022-01-01 RX ORDER — CARVEDILOL 3.12 MG/1
6.25 TABLET ORAL 2 TIMES DAILY WITH MEALS
Status: DISCONTINUED | OUTPATIENT
Start: 2022-01-01 | End: 2022-01-01 | Stop reason: HOSPADM

## 2022-01-01 RX ORDER — TRAZODONE HYDROCHLORIDE 50 MG/1
25 TABLET, FILM COATED ORAL
Refills: 0
Start: 2022-01-01 | End: 2022-01-01

## 2022-01-01 RX ORDER — BUMETANIDE 2 MG/1
4 TABLET ORAL DAILY
Refills: 0 | Status: ON HOLD
Start: 2022-01-01 | End: 2022-01-01

## 2022-01-01 RX ORDER — DEXTROSE MONOHYDRATE 25 G/50ML
25 INJECTION, SOLUTION INTRAVENOUS ONCE
Status: COMPLETED | OUTPATIENT
Start: 2022-01-01 | End: 2022-01-01

## 2022-01-01 RX ORDER — ONDANSETRON 4 MG/1
4 TABLET, ORALLY DISINTEGRATING ORAL EVERY 6 HOURS PRN
Status: DISCONTINUED | OUTPATIENT
Start: 2022-01-01 | End: 2022-01-01 | Stop reason: HOSPADM

## 2022-01-01 RX ORDER — WARFARIN SODIUM 3 MG/1
6 TABLET ORAL
Status: COMPLETED | OUTPATIENT
Start: 2022-01-01 | End: 2022-01-01

## 2022-01-01 RX ORDER — LIDOCAINE 40 MG/G
CREAM TOPICAL
Status: DISCONTINUED | OUTPATIENT
Start: 2022-01-01 | End: 2022-01-01 | Stop reason: HOSPADM

## 2022-01-01 RX ORDER — LISINOPRIL 2.5 MG/1
2.5 TABLET ORAL DAILY
Status: DISCONTINUED | OUTPATIENT
Start: 2022-01-01 | End: 2022-01-01 | Stop reason: HOSPADM

## 2022-01-01 RX ORDER — WARFARIN SODIUM 2 MG/1
4 TABLET ORAL
Status: COMPLETED | OUTPATIENT
Start: 2022-01-01 | End: 2022-01-01

## 2022-01-01 RX ORDER — PANTOPRAZOLE SODIUM 40 MG/1
40 TABLET, DELAYED RELEASE ORAL
Status: DISCONTINUED | OUTPATIENT
Start: 2022-01-01 | End: 2022-01-01 | Stop reason: HOSPADM

## 2022-01-01 RX ORDER — LISINOPRIL 2.5 MG/1
2.5 TABLET ORAL DAILY
Refills: 0 | Status: ON HOLD
Start: 2022-01-01 | End: 2022-01-01

## 2022-01-01 RX ORDER — NICOTINE POLACRILEX 4 MG
15-30 LOZENGE BUCCAL
Status: DISCONTINUED | OUTPATIENT
Start: 2022-01-01 | End: 2022-01-01 | Stop reason: HOSPADM

## 2022-01-01 RX ORDER — AMOXICILLIN 250 MG
1 CAPSULE ORAL 2 TIMES DAILY
Status: DISCONTINUED | OUTPATIENT
Start: 2022-01-01 | End: 2022-01-01 | Stop reason: HOSPADM

## 2022-01-01 RX ORDER — BUMETANIDE 1 MG/1
2 TABLET ORAL DAILY
Status: DISCONTINUED | OUTPATIENT
Start: 2022-01-01 | End: 2022-01-01

## 2022-01-01 RX ORDER — DEXTROSE MONOHYDRATE 25 G/50ML
25-50 INJECTION, SOLUTION INTRAVENOUS
Status: DISCONTINUED | OUTPATIENT
Start: 2022-01-01 | End: 2022-01-01

## 2022-01-01 RX ORDER — MAGNESIUM SULFATE HEPTAHYDRATE 40 MG/ML
2 INJECTION, SOLUTION INTRAVENOUS ONCE
Status: COMPLETED | OUTPATIENT
Start: 2022-01-01 | End: 2022-01-01

## 2022-01-01 RX ORDER — AMOXICILLIN 250 MG
1 CAPSULE ORAL 2 TIMES DAILY
Refills: 0
Start: 2022-01-01

## 2022-01-01 RX ORDER — INSULIN ASPART 100 [IU]/ML
INJECTION, SOLUTION INTRAVENOUS; SUBCUTANEOUS
Qty: 15 ML | Refills: 0 | Status: SHIPPED | OUTPATIENT
Start: 2022-01-01

## 2022-01-01 RX ORDER — GLIPIZIDE 5 MG/1
5 TABLET, FILM COATED, EXTENDED RELEASE ORAL DAILY
Status: ON HOLD
Start: 2022-01-01 | End: 2022-01-01

## 2022-01-01 RX ORDER — METOLAZONE 5 MG/1
2.5 TABLET ORAL DAILY PRN
COMMUNITY

## 2022-01-01 RX ORDER — DEXTROSE MONOHYDRATE 25 G/50ML
25-50 INJECTION, SOLUTION INTRAVENOUS
Status: DISCONTINUED | OUTPATIENT
Start: 2022-01-01 | End: 2022-01-01 | Stop reason: HOSPADM

## 2022-01-01 RX ORDER — DIAPER,BRIEF,INFANT-TODD,DISP
1 EACH MISCELLANEOUS DAILY
COMMUNITY
Start: 2022-01-01

## 2022-01-01 RX ORDER — ACETAMINOPHEN 325 MG/1
650 TABLET ORAL EVERY 6 HOURS PRN
Status: DISCONTINUED | OUTPATIENT
Start: 2022-01-01 | End: 2022-01-01 | Stop reason: HOSPADM

## 2022-01-01 RX ORDER — AMOXICILLIN 250 MG
1 CAPSULE ORAL 2 TIMES DAILY PRN
Status: DISCONTINUED | OUTPATIENT
Start: 2022-01-01 | End: 2022-01-01 | Stop reason: HOSPADM

## 2022-01-01 RX ORDER — BUMETANIDE 0.5 MG/1
1 TABLET ORAL DAILY
Start: 2022-01-01 | End: 2022-01-01

## 2022-01-01 RX ORDER — AMOXICILLIN 250 MG
2 CAPSULE ORAL 2 TIMES DAILY PRN
Status: DISCONTINUED | OUTPATIENT
Start: 2022-01-01 | End: 2022-01-01 | Stop reason: HOSPADM

## 2022-01-01 RX ORDER — ATORVASTATIN CALCIUM 10 MG/1
10 TABLET, FILM COATED ORAL AT BEDTIME
Status: DISCONTINUED | OUTPATIENT
Start: 2022-01-01 | End: 2022-01-01 | Stop reason: HOSPADM

## 2022-01-01 RX ORDER — WARFARIN SODIUM 5 MG/1
TABLET ORAL
Refills: 0
Start: 2022-01-01

## 2022-01-01 RX ORDER — MULTIPLE VITAMINS W/ MINERALS TAB 9MG-400MCG
1 TAB ORAL DAILY
Status: DISCONTINUED | OUTPATIENT
Start: 2022-01-01 | End: 2022-01-01 | Stop reason: HOSPADM

## 2022-01-01 RX ORDER — WARFARIN SODIUM 5 MG/1
5-6 TABLET ORAL SEE ADMIN INSTRUCTIONS
Status: ON HOLD | COMMUNITY
End: 2022-01-01

## 2022-01-01 RX ORDER — LANOLIN ALCOHOL/MO/W.PET/CERES
3 CREAM (GRAM) TOPICAL
Status: DISCONTINUED | OUTPATIENT
Start: 2022-01-01 | End: 2022-01-01 | Stop reason: HOSPADM

## 2022-01-01 RX ORDER — WARFARIN SODIUM 2 MG/1
4 TABLET ORAL
Status: DISCONTINUED | OUTPATIENT
Start: 2022-01-01 | End: 2022-01-01 | Stop reason: HOSPADM

## 2022-01-01 RX ORDER — CARVEDILOL 3.12 MG/1
3.12 TABLET ORAL 2 TIMES DAILY WITH MEALS
Status: DISCONTINUED | OUTPATIENT
Start: 2022-01-01 | End: 2022-01-01 | Stop reason: HOSPADM

## 2022-01-01 RX ORDER — POTASSIUM CHLORIDE 750 MG/1
10 TABLET, EXTENDED RELEASE ORAL ONCE
Status: COMPLETED | OUTPATIENT
Start: 2022-01-01 | End: 2022-01-01

## 2022-01-01 RX ORDER — ALBUMIN (HUMAN) 12.5 G/50ML
50 SOLUTION INTRAVENOUS ONCE
Status: COMPLETED | OUTPATIENT
Start: 2022-01-01 | End: 2022-01-01

## 2022-01-01 RX ORDER — CARVEDILOL 3.12 MG/1
3.12 TABLET ORAL 2 TIMES DAILY WITH MEALS
COMMUNITY

## 2022-01-01 RX ORDER — MINERAL OIL/HYDROPHIL PETROLAT
OINTMENT (GRAM) TOPICAL DAILY
Status: DISCONTINUED | OUTPATIENT
Start: 2022-01-01 | End: 2022-01-01 | Stop reason: HOSPADM

## 2022-01-01 RX ORDER — FERROUS GLUCONATE 324(37.5)
1 TABLET ORAL DAILY
Status: DISCONTINUED | OUTPATIENT
Start: 2022-01-01 | End: 2022-01-01 | Stop reason: HOSPADM

## 2022-01-01 RX ORDER — NICOTINE POLACRILEX 4 MG
15-30 LOZENGE BUCCAL
Status: DISCONTINUED | OUTPATIENT
Start: 2022-01-01 | End: 2022-01-01

## 2022-01-01 RX ORDER — CEPHALEXIN 500 MG/1
500 CAPSULE ORAL 2 TIMES DAILY
Status: ON HOLD | COMMUNITY
Start: 2022-01-01 | End: 2022-01-01

## 2022-01-01 RX ORDER — WARFARIN SODIUM 5 MG/1
5 TABLET ORAL
Status: COMPLETED | OUTPATIENT
Start: 2022-01-01 | End: 2022-01-01

## 2022-01-01 RX ORDER — WARFARIN SODIUM 2 MG/1
4 TABLET ORAL
Status: DISCONTINUED | OUTPATIENT
Start: 2022-01-01 | End: 2022-01-01

## 2022-01-01 RX ORDER — WARFARIN SODIUM 5 MG/1
5 TABLET ORAL
Status: DISCONTINUED | OUTPATIENT
Start: 2022-01-01 | End: 2022-01-01

## 2022-01-01 RX ORDER — BUMETANIDE 2 MG/1
4 TABLET ORAL DAILY
Status: DISCONTINUED | OUTPATIENT
Start: 2022-01-01 | End: 2022-01-01 | Stop reason: HOSPADM

## 2022-01-01 RX ORDER — PIPERACILLIN SODIUM, TAZOBACTAM SODIUM 3; .375 G/15ML; G/15ML
3.38 INJECTION, POWDER, LYOPHILIZED, FOR SOLUTION INTRAVENOUS ONCE
Status: DISCONTINUED | OUTPATIENT
Start: 2022-01-01 | End: 2022-01-01

## 2022-01-01 RX ORDER — POTASSIUM CHLORIDE 1500 MG/1
20 TABLET, EXTENDED RELEASE ORAL DAILY
COMMUNITY
Start: 2022-01-01

## 2022-01-01 RX ORDER — ATENOLOL 25 MG/1
50 TABLET ORAL DAILY
Status: DISCONTINUED | OUTPATIENT
Start: 2022-01-01 | End: 2022-01-01

## 2022-01-01 RX ORDER — FUROSEMIDE 10 MG/ML
120 INJECTION INTRAMUSCULAR; INTRAVENOUS ONCE
Status: COMPLETED | OUTPATIENT
Start: 2022-01-01 | End: 2022-01-01

## 2022-01-01 RX ORDER — PIPERACILLIN SODIUM, TAZOBACTAM SODIUM 3; .375 G/15ML; G/15ML
3.38 INJECTION, POWDER, LYOPHILIZED, FOR SOLUTION INTRAVENOUS ONCE
Status: COMPLETED | OUTPATIENT
Start: 2022-01-01 | End: 2022-01-01

## 2022-01-01 RX ORDER — FUROSEMIDE 10 MG/ML
80 INJECTION INTRAMUSCULAR; INTRAVENOUS EVERY 8 HOURS
Status: DISCONTINUED | OUTPATIENT
Start: 2022-01-01 | End: 2022-01-01

## 2022-01-01 RX ORDER — GLIPIZIDE 5 MG/1
5 TABLET, FILM COATED, EXTENDED RELEASE ORAL DAILY
COMMUNITY
Start: 2022-01-01

## 2022-01-01 RX ORDER — BISACODYL 10 MG
10 SUPPOSITORY, RECTAL RECTAL DAILY PRN
Status: DISCONTINUED | OUTPATIENT
Start: 2022-01-01 | End: 2022-01-01 | Stop reason: HOSPADM

## 2022-01-01 RX ORDER — SPIRONOLACTONE 25 MG/1
25 TABLET ORAL DAILY
Status: DISCONTINUED | OUTPATIENT
Start: 2022-01-01 | End: 2022-01-01 | Stop reason: HOSPADM

## 2022-01-01 RX ORDER — BUMETANIDE 1 MG/1
1 TABLET ORAL DAILY
COMMUNITY
Start: 2022-01-01

## 2022-01-01 RX ORDER — GLIPIZIDE 2.5 MG/1
5 TABLET, EXTENDED RELEASE ORAL DAILY
Start: 2022-01-01 | End: 2022-01-01

## 2022-01-01 RX ORDER — LISINOPRIL 5 MG/1
5 TABLET ORAL DAILY
Status: DISCONTINUED | OUTPATIENT
Start: 2022-01-01 | End: 2022-01-01

## 2022-01-01 RX ORDER — GENTAMICIN SULFATE 1 MG/G
OINTMENT TOPICAL
Status: DISCONTINUED | OUTPATIENT
Start: 2022-01-01 | End: 2022-01-01 | Stop reason: HOSPADM

## 2022-01-01 RX ORDER — CARVEDILOL 6.25 MG/1
6.25 TABLET ORAL 2 TIMES DAILY WITH MEALS
Qty: 60 TABLET | Refills: 0 | Status: SHIPPED | OUTPATIENT
Start: 2022-01-01 | End: 2022-01-01

## 2022-01-01 RX ORDER — CHLOROTHIAZIDE SODIUM 500 MG/1
500 INJECTION INTRAVENOUS ONCE
Status: COMPLETED | OUTPATIENT
Start: 2022-01-01 | End: 2022-01-01

## 2022-01-01 RX ADMIN — INSULIN ASPART 3 UNITS: 100 INJECTION, SOLUTION INTRAVENOUS; SUBCUTANEOUS at 08:46

## 2022-01-01 RX ADMIN — METFORMIN HYDROCHLORIDE 500 MG: 500 TABLET ORAL at 17:55

## 2022-01-01 RX ADMIN — WARFARIN SODIUM 2 MG: 2 TABLET ORAL at 17:17

## 2022-01-01 RX ADMIN — ATORVASTATIN CALCIUM 10 MG: 10 TABLET, FILM COATED ORAL at 21:14

## 2022-01-01 RX ADMIN — INSULIN ASPART 6 UNITS: 100 INJECTION, SOLUTION INTRAVENOUS; SUBCUTANEOUS at 07:56

## 2022-01-01 RX ADMIN — MICONAZOLE NITRATE: 20 POWDER TOPICAL at 09:15

## 2022-01-01 RX ADMIN — GENTAMICIN SULFATE: 1 OINTMENT TOPICAL at 08:50

## 2022-01-01 RX ADMIN — POTASSIUM CHLORIDE 20 MEQ: 1500 TABLET, EXTENDED RELEASE ORAL at 08:16

## 2022-01-01 RX ADMIN — DOCUSATE SODIUM 50 MG AND SENNOSIDES 8.6 MG 1 TABLET: 8.6; 5 TABLET, FILM COATED ORAL at 21:36

## 2022-01-01 RX ADMIN — INSULIN ASPART 1 UNITS: 100 INJECTION, SOLUTION INTRAVENOUS; SUBCUTANEOUS at 16:38

## 2022-01-01 RX ADMIN — CARVEDILOL 6.25 MG: 3.12 TABLET, FILM COATED ORAL at 16:37

## 2022-01-01 RX ADMIN — CEFTRIAXONE SODIUM 1 G: 1 INJECTION, POWDER, FOR SOLUTION INTRAMUSCULAR; INTRAVENOUS at 14:41

## 2022-01-01 RX ADMIN — DOCUSATE SODIUM 50 MG AND SENNOSIDES 8.6 MG 1 TABLET: 8.6; 5 TABLET, FILM COATED ORAL at 08:08

## 2022-01-01 RX ADMIN — METFORMIN HYDROCHLORIDE 500 MG: 500 TABLET ORAL at 08:24

## 2022-01-01 RX ADMIN — FUROSEMIDE 80 MG: 10 INJECTION, SOLUTION INTRAVENOUS at 15:20

## 2022-01-01 RX ADMIN — INSULIN GLARGINE 15 UNITS: 100 INJECTION, SOLUTION SUBCUTANEOUS at 21:36

## 2022-01-01 RX ADMIN — POTASSIUM CHLORIDE 10 MEQ: 750 TABLET, EXTENDED RELEASE ORAL at 10:05

## 2022-01-01 RX ADMIN — PANTOPRAZOLE SODIUM 40 MG: 40 TABLET, DELAYED RELEASE ORAL at 06:49

## 2022-01-01 RX ADMIN — MICONAZOLE NITRATE: 20 POWDER TOPICAL at 20:51

## 2022-01-01 RX ADMIN — SPIRONOLACTONE 25 MG: 25 TABLET, FILM COATED ORAL at 08:50

## 2022-01-01 RX ADMIN — INSULIN ASPART 8 UNITS: 100 INJECTION, SOLUTION INTRAVENOUS; SUBCUTANEOUS at 18:36

## 2022-01-01 RX ADMIN — DOCUSATE SODIUM 50 MG AND SENNOSIDES 8.6 MG 1 TABLET: 8.6; 5 TABLET, FILM COATED ORAL at 22:16

## 2022-01-01 RX ADMIN — MICONAZOLE NITRATE: 20 POWDER TOPICAL at 08:03

## 2022-01-01 RX ADMIN — CARVEDILOL 6.25 MG: 3.12 TABLET, FILM COATED ORAL at 17:08

## 2022-01-01 RX ADMIN — CARVEDILOL 3.12 MG: 3.12 TABLET, FILM COATED ORAL at 08:16

## 2022-01-01 RX ADMIN — PANTOPRAZOLE SODIUM 40 MG: 40 TABLET, DELAYED RELEASE ORAL at 06:31

## 2022-01-01 RX ADMIN — MICONAZOLE NITRATE: 20 POWDER TOPICAL at 23:04

## 2022-01-01 RX ADMIN — SPIRONOLACTONE 25 MG: 25 TABLET, FILM COATED ORAL at 08:03

## 2022-01-01 RX ADMIN — BUMETANIDE 2 MG: 1 TABLET ORAL at 08:16

## 2022-01-01 RX ADMIN — MICONAZOLE NITRATE: 20 POWDER TOPICAL at 10:06

## 2022-01-01 RX ADMIN — Medication 1 TABLET: at 08:02

## 2022-01-01 RX ADMIN — INSULIN ASPART 6 UNITS: 100 INJECTION, SOLUTION INTRAVENOUS; SUBCUTANEOUS at 17:52

## 2022-01-01 RX ADMIN — INSULIN GLARGINE 15 UNITS: 100 INJECTION, SOLUTION SUBCUTANEOUS at 21:22

## 2022-01-01 RX ADMIN — WARFARIN SODIUM 6 MG: 3 TABLET ORAL at 17:26

## 2022-01-01 RX ADMIN — INSULIN ASPART 1 UNITS: 100 INJECTION, SOLUTION INTRAVENOUS; SUBCUTANEOUS at 18:29

## 2022-01-01 RX ADMIN — WARFARIN SODIUM 4 MG: 2 TABLET ORAL at 17:41

## 2022-01-01 RX ADMIN — ALBUMIN HUMAN 50 G: 0.25 SOLUTION INTRAVENOUS at 06:52

## 2022-01-01 RX ADMIN — WHITE PETROLATUM: 1.75 OINTMENT TOPICAL at 08:03

## 2022-01-01 RX ADMIN — Medication 1 TABLET: at 10:05

## 2022-01-01 RX ADMIN — FUROSEMIDE 80 MG: 10 INJECTION, SOLUTION INTRAVENOUS at 14:47

## 2022-01-01 RX ADMIN — FUROSEMIDE 80 MG: 10 INJECTION, SOLUTION INTRAVENOUS at 06:34

## 2022-01-01 RX ADMIN — DEXTROSE MONOHYDRATE 300 ML: 100 INJECTION, SOLUTION INTRAVENOUS at 17:08

## 2022-01-01 RX ADMIN — Medication 1 MG: at 21:14

## 2022-01-01 RX ADMIN — Medication 1 TABLET: at 08:08

## 2022-01-01 RX ADMIN — SPIRONOLACTONE 25 MG: 25 TABLET, FILM COATED ORAL at 10:05

## 2022-01-01 RX ADMIN — CEFTRIAXONE SODIUM 1 G: 1 INJECTION, POWDER, FOR SOLUTION INTRAMUSCULAR; INTRAVENOUS at 14:13

## 2022-01-01 RX ADMIN — FUROSEMIDE 80 MG: 10 INJECTION, SOLUTION INTRAVENOUS at 23:02

## 2022-01-01 RX ADMIN — FUROSEMIDE 80 MG: 10 INJECTION, SOLUTION INTRAVENOUS at 14:42

## 2022-01-01 RX ADMIN — SODIUM CHLORIDE: 9 INJECTION, SOLUTION INTRAVENOUS at 03:41

## 2022-01-01 RX ADMIN — INSULIN ASPART 1 UNITS: 100 INJECTION, SOLUTION INTRAVENOUS; SUBCUTANEOUS at 13:27

## 2022-01-01 RX ADMIN — CHLOROTHIAZIDE SODIUM 500 MG: 500 INJECTION, POWDER, LYOPHILIZED, FOR SOLUTION INTRAVENOUS at 08:04

## 2022-01-01 RX ADMIN — BUMETANIDE 0.5 MG: 0.5 TABLET ORAL at 08:16

## 2022-01-01 RX ADMIN — ATORVASTATIN CALCIUM 10 MG: 10 TABLET, FILM COATED ORAL at 21:30

## 2022-01-01 RX ADMIN — MICONAZOLE NITRATE: 20 POWDER TOPICAL at 21:29

## 2022-01-01 RX ADMIN — SPIRONOLACTONE 25 MG: 25 TABLET, FILM COATED ORAL at 09:31

## 2022-01-01 RX ADMIN — LISINOPRIL 5 MG: 5 TABLET ORAL at 10:02

## 2022-01-01 RX ADMIN — SODIUM CHLORIDE 250 ML: 9 INJECTION, SOLUTION INTRAVENOUS at 08:37

## 2022-01-01 RX ADMIN — METFORMIN HYDROCHLORIDE 500 MG: 500 TABLET ORAL at 16:36

## 2022-01-01 RX ADMIN — DOCUSATE SODIUM 50 MG AND SENNOSIDES 8.6 MG 1 TABLET: 8.6; 5 TABLET, FILM COATED ORAL at 08:16

## 2022-01-01 RX ADMIN — MAGNESIUM SULFATE IN WATER 2 G: 40 INJECTION, SOLUTION INTRAVENOUS at 09:31

## 2022-01-01 RX ADMIN — GLIPIZIDE 5 MG: 5 TABLET, EXTENDED RELEASE ORAL at 08:02

## 2022-01-01 RX ADMIN — INSULIN ASPART 7 UNITS: 100 INJECTION, SOLUTION INTRAVENOUS; SUBCUTANEOUS at 18:06

## 2022-01-01 RX ADMIN — Medication 324 MG: at 08:17

## 2022-01-01 RX ADMIN — INSULIN ASPART 1 UNITS: 100 INJECTION, SOLUTION INTRAVENOUS; SUBCUTANEOUS at 14:37

## 2022-01-01 RX ADMIN — SODIUM CHLORIDE 2000 ML: 9 INJECTION, SOLUTION INTRAVENOUS at 15:43

## 2022-01-01 RX ADMIN — GLIPIZIDE 5 MG: 5 TABLET, EXTENDED RELEASE ORAL at 08:25

## 2022-01-01 RX ADMIN — DOCUSATE SODIUM 50 MG AND SENNOSIDES 8.6 MG 1 TABLET: 8.6; 5 TABLET, FILM COATED ORAL at 21:20

## 2022-01-01 RX ADMIN — ATORVASTATIN CALCIUM 10 MG: 10 TABLET, FILM COATED ORAL at 22:29

## 2022-01-01 RX ADMIN — BUMETANIDE 4 MG: 2 TABLET ORAL at 08:01

## 2022-01-01 RX ADMIN — DEXTROSE MONOHYDRATE 25 G: 25 INJECTION, SOLUTION INTRAVENOUS at 16:58

## 2022-01-01 RX ADMIN — INSULIN ASPART 4 UNITS: 100 INJECTION, SOLUTION INTRAVENOUS; SUBCUTANEOUS at 14:38

## 2022-01-01 RX ADMIN — CEFTRIAXONE SODIUM 1 G: 1 INJECTION, POWDER, FOR SOLUTION INTRAMUSCULAR; INTRAVENOUS at 15:21

## 2022-01-01 RX ADMIN — POTASSIUM CHLORIDE 40 MEQ: 1500 TABLET, EXTENDED RELEASE ORAL at 01:44

## 2022-01-01 RX ADMIN — BUMETANIDE 4 MG: 2 TABLET ORAL at 08:22

## 2022-01-01 RX ADMIN — GLIPIZIDE 5 MG: 5 TABLET, EXTENDED RELEASE ORAL at 08:16

## 2022-01-01 RX ADMIN — INSULIN ASPART 1 UNITS: 100 INJECTION, SOLUTION INTRAVENOUS; SUBCUTANEOUS at 08:38

## 2022-01-01 RX ADMIN — INSULIN ASPART 8 UNITS: 100 INJECTION, SOLUTION INTRAVENOUS; SUBCUTANEOUS at 09:51

## 2022-01-01 RX ADMIN — MICONAZOLE NITRATE: 20 POWDER TOPICAL at 22:32

## 2022-01-01 RX ADMIN — WARFARIN SODIUM 4 MG: 2 TABLET ORAL at 17:35

## 2022-01-01 RX ADMIN — MICONAZOLE NITRATE: 20 POWDER TOPICAL at 21:23

## 2022-01-01 RX ADMIN — DOCUSATE SODIUM 50 MG AND SENNOSIDES 8.6 MG 1 TABLET: 8.6; 5 TABLET, FILM COATED ORAL at 08:18

## 2022-01-01 RX ADMIN — INSULIN ASPART 5 UNITS: 100 INJECTION, SOLUTION INTRAVENOUS; SUBCUTANEOUS at 18:30

## 2022-01-01 RX ADMIN — ACETAMINOPHEN 650 MG: 325 TABLET ORAL at 14:40

## 2022-01-01 RX ADMIN — METFORMIN HYDROCHLORIDE 500 MG: 500 TABLET ORAL at 08:16

## 2022-01-01 RX ADMIN — INSULIN ASPART 1 UNITS: 100 INJECTION, SOLUTION INTRAVENOUS; SUBCUTANEOUS at 17:27

## 2022-01-01 RX ADMIN — BUMETANIDE 0.5 MG: 0.5 TABLET ORAL at 12:43

## 2022-01-01 RX ADMIN — FUROSEMIDE 80 MG: 10 INJECTION, SOLUTION INTRAVENOUS at 06:41

## 2022-01-01 RX ADMIN — FUROSEMIDE 80 MG: 10 INJECTION, SOLUTION INTRAVENOUS at 22:28

## 2022-01-01 RX ADMIN — SODIUM CHLORIDE 8.89 UNITS: 9 INJECTION, SOLUTION INTRAVENOUS at 16:57

## 2022-01-01 RX ADMIN — INSULIN ASPART 5 UNITS: 100 INJECTION, SOLUTION INTRAVENOUS; SUBCUTANEOUS at 13:14

## 2022-01-01 RX ADMIN — INSULIN ASPART 5 UNITS: 100 INJECTION, SOLUTION INTRAVENOUS; SUBCUTANEOUS at 14:04

## 2022-01-01 RX ADMIN — INSULIN ASPART 2 UNITS: 100 INJECTION, SOLUTION INTRAVENOUS; SUBCUTANEOUS at 17:29

## 2022-01-01 RX ADMIN — WARFARIN SODIUM 5 MG: 5 TABLET ORAL at 17:53

## 2022-01-01 RX ADMIN — ATORVASTATIN CALCIUM 10 MG: 10 TABLET, FILM COATED ORAL at 21:20

## 2022-01-01 RX ADMIN — LISINOPRIL 5 MG: 5 TABLET ORAL at 17:29

## 2022-01-01 RX ADMIN — SPIRONOLACTONE 25 MG: 25 TABLET, FILM COATED ORAL at 08:39

## 2022-01-01 RX ADMIN — Medication 324 MG: at 08:18

## 2022-01-01 RX ADMIN — Medication 1 TABLET: at 08:17

## 2022-01-01 RX ADMIN — MICONAZOLE NITRATE: 20 POWDER TOPICAL at 21:30

## 2022-01-01 RX ADMIN — WARFARIN SODIUM 4 MG: 2 TABLET ORAL at 16:12

## 2022-01-01 RX ADMIN — ATORVASTATIN CALCIUM 10 MG: 10 TABLET, FILM COATED ORAL at 22:16

## 2022-01-01 RX ADMIN — CARVEDILOL 6.25 MG: 3.12 TABLET, FILM COATED ORAL at 08:17

## 2022-01-01 RX ADMIN — INSULIN ASPART 8 UNITS: 100 INJECTION, SOLUTION INTRAVENOUS; SUBCUTANEOUS at 08:05

## 2022-01-01 RX ADMIN — WHITE PETROLATUM: 1.75 OINTMENT TOPICAL at 10:06

## 2022-01-01 RX ADMIN — GENTAMICIN SULFATE: 1 OINTMENT TOPICAL at 08:17

## 2022-01-01 RX ADMIN — INSULIN ASPART 1 UNITS: 100 INJECTION, SOLUTION INTRAVENOUS; SUBCUTANEOUS at 11:39

## 2022-01-01 RX ADMIN — PANTOPRAZOLE SODIUM 40 MG: 40 TABLET, DELAYED RELEASE ORAL at 06:36

## 2022-01-01 RX ADMIN — ALBUMIN HUMAN 50 G: 0.25 SOLUTION INTRAVENOUS at 06:09

## 2022-01-01 RX ADMIN — CARVEDILOL 6.25 MG: 3.12 TABLET, FILM COATED ORAL at 08:39

## 2022-01-01 RX ADMIN — MICONAZOLE NITRATE: 20 POWDER TOPICAL at 08:25

## 2022-01-01 RX ADMIN — PIPERACILLIN AND TAZOBACTAM 3.38 G: 3; .375 INJECTION, POWDER, LYOPHILIZED, FOR SOLUTION INTRAVENOUS at 15:47

## 2022-01-01 RX ADMIN — CEFTRIAXONE SODIUM 1 G: 1 INJECTION, POWDER, FOR SOLUTION INTRAMUSCULAR; INTRAVENOUS at 14:48

## 2022-01-01 RX ADMIN — SPIRONOLACTONE 25 MG: 25 TABLET, FILM COATED ORAL at 08:25

## 2022-01-01 RX ADMIN — CARVEDILOL 6.25 MG: 3.12 TABLET, FILM COATED ORAL at 17:27

## 2022-01-01 RX ADMIN — ATORVASTATIN CALCIUM 10 MG: 10 TABLET, FILM COATED ORAL at 21:23

## 2022-01-01 RX ADMIN — FUROSEMIDE 80 MG: 10 INJECTION, SOLUTION INTRAVENOUS at 22:51

## 2022-01-01 RX ADMIN — Medication 1 TABLET: at 08:50

## 2022-01-01 RX ADMIN — CARVEDILOL 6.25 MG: 3.12 TABLET, FILM COATED ORAL at 08:01

## 2022-01-01 RX ADMIN — WARFARIN SODIUM 6 MG: 3 TABLET ORAL at 16:37

## 2022-01-01 RX ADMIN — INSULIN ASPART 3 UNITS: 100 INJECTION, SOLUTION INTRAVENOUS; SUBCUTANEOUS at 17:38

## 2022-01-01 RX ADMIN — INSULIN ASPART 2 UNITS: 100 INJECTION, SOLUTION INTRAVENOUS; SUBCUTANEOUS at 11:15

## 2022-01-01 RX ADMIN — Medication 1 TABLET: at 08:16

## 2022-01-01 RX ADMIN — LISINOPRIL 2.5 MG: 2.5 TABLET ORAL at 08:01

## 2022-01-01 RX ADMIN — POTASSIUM CHLORIDE 40 MEQ: 1500 TABLET, EXTENDED RELEASE ORAL at 18:52

## 2022-01-01 RX ADMIN — PANTOPRAZOLE SODIUM 40 MG: 40 TABLET, DELAYED RELEASE ORAL at 06:51

## 2022-01-01 RX ADMIN — CARVEDILOL 6.25 MG: 3.12 TABLET, FILM COATED ORAL at 08:23

## 2022-01-01 RX ADMIN — ATORVASTATIN CALCIUM 10 MG: 10 TABLET, FILM COATED ORAL at 22:48

## 2022-01-01 RX ADMIN — INSULIN ASPART 8 UNITS: 100 INJECTION, SOLUTION INTRAVENOUS; SUBCUTANEOUS at 13:28

## 2022-01-01 RX ADMIN — Medication 1 TABLET: at 08:01

## 2022-01-01 RX ADMIN — INSULIN ASPART 4 UNITS: 100 INJECTION, SOLUTION INTRAVENOUS; SUBCUTANEOUS at 19:10

## 2022-01-01 RX ADMIN — FUROSEMIDE 80 MG: 10 INJECTION, SOLUTION INTRAVENOUS at 22:31

## 2022-01-01 RX ADMIN — METFORMIN HYDROCHLORIDE 500 MG: 500 TABLET ORAL at 17:29

## 2022-01-01 RX ADMIN — INSULIN ASPART 6 UNITS: 100 INJECTION, SOLUTION INTRAVENOUS; SUBCUTANEOUS at 08:01

## 2022-01-01 RX ADMIN — INSULIN ASPART 8 UNITS: 100 INJECTION, SOLUTION INTRAVENOUS; SUBCUTANEOUS at 08:32

## 2022-01-01 RX ADMIN — Medication 1 TABLET: at 08:42

## 2022-01-01 RX ADMIN — CEFTRIAXONE SODIUM 1 G: 1 INJECTION, POWDER, FOR SOLUTION INTRAMUSCULAR; INTRAVENOUS at 14:06

## 2022-01-01 RX ADMIN — LISINOPRIL 5 MG: 5 TABLET ORAL at 08:24

## 2022-01-01 RX ADMIN — INSULIN ASPART 8 UNITS: 100 INJECTION, SOLUTION INTRAVENOUS; SUBCUTANEOUS at 08:46

## 2022-01-01 RX ADMIN — INSULIN GLARGINE 34 UNITS: 100 INJECTION, SOLUTION SUBCUTANEOUS at 21:15

## 2022-01-01 RX ADMIN — SODIUM CHLORIDE: 9 INJECTION, SOLUTION INTRAVENOUS at 19:34

## 2022-01-01 RX ADMIN — ATORVASTATIN CALCIUM 10 MG: 10 TABLET, FILM COATED ORAL at 21:36

## 2022-01-01 RX ADMIN — FUROSEMIDE 80 MG: 10 INJECTION, SOLUTION INTRAVENOUS at 06:30

## 2022-01-01 RX ADMIN — INSULIN ASPART 3 UNITS: 100 INJECTION, SOLUTION INTRAVENOUS; SUBCUTANEOUS at 13:24

## 2022-01-01 RX ADMIN — WARFARIN SODIUM 6 MG: 3 TABLET ORAL at 16:38

## 2022-01-01 RX ADMIN — Medication 324 MG: at 08:07

## 2022-01-01 RX ADMIN — CARVEDILOL 3.12 MG: 3.12 TABLET, FILM COATED ORAL at 08:17

## 2022-01-01 RX ADMIN — WARFARIN SODIUM 4 MG: 2 TABLET ORAL at 18:21

## 2022-01-01 RX ADMIN — CARVEDILOL 6.25 MG: 3.12 TABLET, FILM COATED ORAL at 10:08

## 2022-01-01 RX ADMIN — Medication 1 TABLET: at 08:24

## 2022-01-01 RX ADMIN — GLIPIZIDE 5 MG: 5 TABLET, EXTENDED RELEASE ORAL at 08:01

## 2022-01-01 RX ADMIN — WARFARIN SODIUM 4 MG: 2 TABLET ORAL at 21:21

## 2022-01-01 RX ADMIN — LISINOPRIL 5 MG: 5 TABLET ORAL at 08:15

## 2022-01-01 RX ADMIN — PANTOPRAZOLE SODIUM 40 MG: 40 TABLET, DELAYED RELEASE ORAL at 08:03

## 2022-01-01 RX ADMIN — SPIRONOLACTONE 25 MG: 25 TABLET, FILM COATED ORAL at 08:16

## 2022-01-01 RX ADMIN — FUROSEMIDE 80 MG: 10 INJECTION, SOLUTION INTRAVENOUS at 21:13

## 2022-01-01 RX ADMIN — PANTOPRAZOLE SODIUM 40 MG: 40 TABLET, DELAYED RELEASE ORAL at 06:39

## 2022-01-01 RX ADMIN — PANTOPRAZOLE SODIUM 40 MG: 40 TABLET, DELAYED RELEASE ORAL at 06:37

## 2022-01-01 RX ADMIN — MICONAZOLE NITRATE: 20 POWDER TOPICAL at 22:48

## 2022-01-01 RX ADMIN — METFORMIN HYDROCHLORIDE 500 MG: 500 TABLET ORAL at 08:01

## 2022-01-01 RX ADMIN — METFORMIN HYDROCHLORIDE 500 MG: 500 TABLET ORAL at 08:02

## 2022-01-01 RX ADMIN — FUROSEMIDE 80 MG: 10 INJECTION, SOLUTION INTRAVENOUS at 14:06

## 2022-01-01 RX ADMIN — MICONAZOLE NITRATE: 20 POWDER TOPICAL at 08:39

## 2022-01-01 RX ADMIN — INSULIN ASPART 8 UNITS: 100 INJECTION, SOLUTION INTRAVENOUS; SUBCUTANEOUS at 18:31

## 2022-01-01 RX ADMIN — INSULIN ASPART 6 UNITS: 100 INJECTION, SOLUTION INTRAVENOUS; SUBCUTANEOUS at 16:42

## 2022-01-01 RX ADMIN — METFORMIN HYDROCHLORIDE 500 MG: 500 TABLET ORAL at 17:08

## 2022-01-01 RX ADMIN — CEFTRIAXONE SODIUM 1 G: 1 INJECTION, POWDER, FOR SOLUTION INTRAMUSCULAR; INTRAVENOUS at 14:00

## 2022-01-01 RX ADMIN — POTASSIUM CHLORIDE 10 MEQ: 750 TABLET, EXTENDED RELEASE ORAL at 15:20

## 2022-01-01 RX ADMIN — WHITE PETROLATUM: 1.75 OINTMENT TOPICAL at 09:15

## 2022-01-01 RX ADMIN — PANTOPRAZOLE SODIUM 40 MG: 40 TABLET, DELAYED RELEASE ORAL at 06:34

## 2022-01-01 RX ADMIN — INSULIN ASPART 1 UNITS: 100 INJECTION, SOLUTION INTRAVENOUS; SUBCUTANEOUS at 17:07

## 2022-01-01 RX ADMIN — Medication 1 TABLET: at 08:15

## 2022-01-01 RX ADMIN — ATORVASTATIN CALCIUM 10 MG: 10 TABLET, FILM COATED ORAL at 23:02

## 2022-01-01 RX ADMIN — FUROSEMIDE 80 MG: 10 INJECTION, SOLUTION INTRAVENOUS at 06:37

## 2022-01-01 RX ADMIN — ATORVASTATIN CALCIUM 10 MG: 10 TABLET, FILM COATED ORAL at 20:50

## 2022-01-01 RX ADMIN — INSULIN ASPART 6 UNITS: 100 INJECTION, SOLUTION INTRAVENOUS; SUBCUTANEOUS at 13:26

## 2022-01-01 RX ADMIN — SODIUM CHLORIDE 500 ML: 9 INJECTION, SOLUTION INTRAVENOUS at 15:32

## 2022-01-01 RX ADMIN — WARFARIN SODIUM 7.5 MG: 5 TABLET ORAL at 17:10

## 2022-01-01 RX ADMIN — WARFARIN SODIUM 5 MG: 5 TABLET ORAL at 17:07

## 2022-01-01 RX ADMIN — SODIUM BICARBONATE 50 MEQ: 84 INJECTION, SOLUTION INTRAVENOUS at 17:01

## 2022-01-01 RX ADMIN — CARVEDILOL 3.12 MG: 3.12 TABLET, FILM COATED ORAL at 17:41

## 2022-01-01 RX ADMIN — CARVEDILOL 3.12 MG: 3.12 TABLET, FILM COATED ORAL at 16:11

## 2022-01-01 RX ADMIN — METFORMIN HYDROCHLORIDE 500 MG: 500 TABLET ORAL at 17:26

## 2022-01-01 RX ADMIN — INSULIN ASPART 5 UNITS: 100 INJECTION, SOLUTION INTRAVENOUS; SUBCUTANEOUS at 12:23

## 2022-01-01 RX ADMIN — INSULIN GLARGINE 15 UNITS: 100 INJECTION, SOLUTION SUBCUTANEOUS at 22:16

## 2022-01-01 RX ADMIN — FUROSEMIDE 120 MG: 10 INJECTION, SOLUTION INTRAMUSCULAR; INTRAVENOUS at 13:58

## 2022-01-01 RX ADMIN — SODIUM CHLORIDE 500 ML: 9 INJECTION, SOLUTION INTRAVENOUS at 17:13

## 2022-01-01 RX ADMIN — PANTOPRAZOLE SODIUM 40 MG: 40 TABLET, DELAYED RELEASE ORAL at 06:45

## 2022-01-01 RX ADMIN — ACETAMINOPHEN 650 MG: 325 TABLET ORAL at 03:46

## 2022-01-01 RX ADMIN — INSULIN ASPART 2 UNITS: 100 INJECTION, SOLUTION INTRAVENOUS; SUBCUTANEOUS at 16:53

## 2022-01-01 ASSESSMENT — ACTIVITIES OF DAILY LIVING (ADL)
ADLS_ACUITY_SCORE: 9
ADLS_ACUITY_SCORE: 6
DOING_ERRANDS_INDEPENDENTLY_DIFFICULTY: NO
ADLS_ACUITY_SCORE: 9
DRESSING/BATHING_DIFFICULTY: NO
EQUIPMENT_CURRENTLY_USED_AT_HOME: WALKER, ROLLING
ADLS_ACUITY_SCORE: 11
ADLS_ACUITY_SCORE: 9
CHANGE_IN_FUNCTIONAL_STATUS_SINCE_ONSET_OF_CURRENT_ILLNESS/INJURY: NO
ADLS_ACUITY_SCORE: 11
ADLS_ACUITY_SCORE: 10
ADLS_ACUITY_SCORE: 9
FALL_HISTORY_WITHIN_LAST_SIX_MONTHS: NO
ADLS_ACUITY_SCORE: 9
ADLS_ACUITY_SCORE: 6
ADLS_ACUITY_SCORE: 10
ADLS_ACUITY_SCORE: 9
ADLS_ACUITY_SCORE: 3
ADLS_ACUITY_SCORE: 11
ADLS_ACUITY_SCORE: 11
ADLS_ACUITY_SCORE: 9
ADLS_ACUITY_SCORE: 7
ADLS_ACUITY_SCORE: 9
ADLS_ACUITY_SCORE: 9
ADLS_ACUITY_SCORE: 7
ADLS_ACUITY_SCORE: 9
ADLS_ACUITY_SCORE: 11
ADLS_ACUITY_SCORE: 7
ADLS_ACUITY_SCORE: 9
DIFFICULTY_EATING/SWALLOWING: NO
ADLS_ACUITY_SCORE: 9
ADLS_ACUITY_SCORE: 11
ADLS_ACUITY_SCORE: 9
ADLS_ACUITY_SCORE: 9
ADLS_ACUITY_SCORE: 7
ADLS_ACUITY_SCORE: 12
ADLS_ACUITY_SCORE: 11
ADLS_ACUITY_SCORE: 9
ADLS_ACUITY_SCORE: 9
ADLS_ACUITY_SCORE: 10
ADLS_ACUITY_SCORE: 9
ADLS_ACUITY_SCORE: 11
ADLS_ACUITY_SCORE: 9
ADLS_ACUITY_SCORE: 11
ADLS_ACUITY_SCORE: 7
ADLS_ACUITY_SCORE: 6
ADLS_ACUITY_SCORE: 12
ADLS_ACUITY_SCORE: 7
ADLS_ACUITY_SCORE: 10
ADLS_ACUITY_SCORE: 11
ADLS_ACUITY_SCORE: 9
DEPENDENT_IADLS:: CLEANING;COOKING;LAUNDRY;SHOPPING;MEAL PREPARATION;TRANSPORTATION
ADLS_ACUITY_SCORE: 9
ADLS_ACUITY_SCORE: 10
ADLS_ACUITY_SCORE: 9
ADLS_ACUITY_SCORE: 10
ADLS_ACUITY_SCORE: 7
CONCENTRATING,_REMEMBERING_OR_MAKING_DECISIONS_DIFFICULTY: NO
ADLS_ACUITY_SCORE: 6
ADLS_ACUITY_SCORE: 9
ADLS_ACUITY_SCORE: 9
ADLS_ACUITY_SCORE: 11
ADLS_ACUITY_SCORE: 9
ADLS_ACUITY_SCORE: 12
ADLS_ACUITY_SCORE: 10
ADLS_ACUITY_SCORE: 9
ADLS_ACUITY_SCORE: 10
ADLS_ACUITY_SCORE: 10
ADLS_ACUITY_SCORE: 11
ADLS_ACUITY_SCORE: 9
ADLS_ACUITY_SCORE: 10
ADLS_ACUITY_SCORE: 10
ADLS_ACUITY_SCORE: 9
ADLS_ACUITY_SCORE: 9
ADLS_ACUITY_SCORE: 11
ADLS_ACUITY_SCORE: 11
WEAR_GLASSES_OR_BLIND: NO
ADLS_ACUITY_SCORE: 10
ADLS_ACUITY_SCORE: 9
ADLS_ACUITY_SCORE: 11
ADLS_ACUITY_SCORE: 7
ADLS_ACUITY_SCORE: 9
ADLS_ACUITY_SCORE: 7
ADLS_ACUITY_SCORE: 9
TOILETING_ISSUES: NO
ADLS_ACUITY_SCORE: 10
ADLS_ACUITY_SCORE: 11
ADLS_ACUITY_SCORE: 11
ADLS_ACUITY_SCORE: 9
ADLS_ACUITY_SCORE: 10
ADLS_ACUITY_SCORE: 9
ADLS_ACUITY_SCORE: 12
ADLS_ACUITY_SCORE: 9
ADLS_ACUITY_SCORE: 11
ADLS_ACUITY_SCORE: 9
DEPENDENT_IADLS:: CLEANING;COOKING;LAUNDRY;SHOPPING;MEAL PREPARATION;TRANSPORTATION
ADLS_ACUITY_SCORE: 9
ADLS_ACUITY_SCORE: 10
ADLS_ACUITY_SCORE: 11
ADLS_ACUITY_SCORE: 11
ADLS_ACUITY_SCORE: 6
ADLS_ACUITY_SCORE: 9
ADLS_ACUITY_SCORE: 10
ADLS_ACUITY_SCORE: 11
ADLS_ACUITY_SCORE: 7
ADLS_ACUITY_SCORE: 9
ADLS_ACUITY_SCORE: 6
ADLS_ACUITY_SCORE: 11
ADLS_ACUITY_SCORE: 9
ADLS_ACUITY_SCORE: 11
ADLS_ACUITY_SCORE: 9
ADLS_ACUITY_SCORE: 11
ADLS_ACUITY_SCORE: 9
ADLS_ACUITY_SCORE: 9
ADLS_ACUITY_SCORE: 11
ADLS_ACUITY_SCORE: 9
ADLS_ACUITY_SCORE: 9
ADLS_ACUITY_SCORE: 10
ADLS_ACUITY_SCORE: 9
ADLS_ACUITY_SCORE: 11
ADLS_ACUITY_SCORE: 7
ADLS_ACUITY_SCORE: 6
ADLS_ACUITY_SCORE: 9
ADLS_ACUITY_SCORE: 6
WALKING_OR_CLIMBING_STAIRS_DIFFICULTY: NO
ADLS_ACUITY_SCORE: 7
ADLS_ACUITY_SCORE: 11
ADLS_ACUITY_SCORE: 9

## 2022-01-01 ASSESSMENT — ENCOUNTER SYMPTOMS
CHILLS: 0
SHORTNESS OF BREATH: 1
LIGHT-HEADEDNESS: 0
WHEEZING: 1
FEVER: 0
BACK PAIN: 0
DIZZINESS: 0
WOUND: 1

## 2022-01-01 ASSESSMENT — PAIN SCALES - GENERAL
PAINLEVEL: NO PAIN (0)
PAINLEVEL: NO PAIN (0)

## 2022-01-22 ENCOUNTER — HEALTH MAINTENANCE LETTER (OUTPATIENT)
Age: 87
End: 2022-01-22

## 2022-01-24 ENCOUNTER — OFFICE VISIT (OUTPATIENT)
Dept: VASCULAR SURGERY | Facility: CLINIC | Age: 87
End: 2022-01-24
Attending: NURSE PRACTITIONER
Payer: COMMERCIAL

## 2022-01-24 VITALS
WEIGHT: 211 LBS | HEART RATE: 72 BPM | DIASTOLIC BLOOD PRESSURE: 70 MMHG | SYSTOLIC BLOOD PRESSURE: 122 MMHG | BODY MASS INDEX: 27.84 KG/M2 | TEMPERATURE: 97.8 F

## 2022-01-24 DIAGNOSIS — I87.2 VENOUS STASIS DERMATITIS OF BOTH LOWER EXTREMITIES: ICD-10-CM

## 2022-01-24 DIAGNOSIS — I89.0 ACQUIRED LYMPHEDEMA OF LEG: ICD-10-CM

## 2022-01-24 DIAGNOSIS — L90.5 SCAR CONDITION AND FIBROSIS OF SKIN: ICD-10-CM

## 2022-01-24 DIAGNOSIS — I87.303 VENOUS HYPERTENSION, CHRONIC, BILATERAL: ICD-10-CM

## 2022-01-24 DIAGNOSIS — L89.312 STAGE II PRESSURE ULCER OF RIGHT BUTTOCK (H): ICD-10-CM

## 2022-01-24 DIAGNOSIS — Z79.01 LONG TERM CURRENT USE OF ANTICOAGULANT THERAPY: ICD-10-CM

## 2022-01-24 DIAGNOSIS — N18.31 STAGE 3A CHRONIC KIDNEY DISEASE (H): ICD-10-CM

## 2022-01-24 DIAGNOSIS — L97.921 ULCER OF LEFT LOWER EXTREMITY, LIMITED TO BREAKDOWN OF SKIN (H): Primary | ICD-10-CM

## 2022-01-24 DIAGNOSIS — E11.42 DIABETIC PERIPHERAL NEUROPATHY ASSOCIATED WITH TYPE 2 DIABETES MELLITUS (H): ICD-10-CM

## 2022-01-24 PROCEDURE — 11042 DBRDMT SUBQ TIS 1ST 20SQCM/<: CPT | Performed by: NURSE PRACTITIONER

## 2022-01-24 PROCEDURE — 11044 DBRDMT BONE 1ST 20 SQ CM/<: CPT | Performed by: NURSE PRACTITIONER

## 2022-01-24 PROCEDURE — 11045 DBRDMT SUBQ TISS EACH ADDL: CPT | Performed by: NURSE PRACTITIONER

## 2022-01-24 ASSESSMENT — PAIN SCALES - GENERAL
PAINLEVEL: NO PAIN (0)
PAINLEVEL: NO PAIN (0)

## 2022-01-24 NOTE — PROGRESS NOTES
Follow up Vascular Visit       Date of Service:01/24/22      Chief Complaint: BLE swelling; blistering; weeping; pressure ulcer to buttocks; chronic      Pt returns to [facility] with regards to their BLE swelling; blistering; weeping; pressure ulcer to buttocks; chronic.  They arrive today with spouse. They are currently using silvercel; gauze; rolled gauze to the wounds on the legs and triad paste to the buttocks. This is being done by home care 2-3 times per week. They are using tubular compression and short stretch for compression. They are feeling well today. Denies fevers, chills. No shortness of breath. His wife purchased him a cushion to use while seated he likes this. He is incontinent. Wife states he is not repositioning and not getting off the wound.  Taking lasix 40mg twice per day; metolazone twice per week.  Pt had a left venous closure done by Dr. Palm at [facility] 6 months ago and then the right 4 months ago. 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    Allergies:   Allergies   Allergen Reactions     Aspirin Nausea and Vomiting     GI upset       Medications:   Current Outpatient Medications:      acetaminophen (TYLENOL) 325 MG tablet, [ACETAMINOPHEN (TYLENOL) 325 MG TABLET] Take 2 tablets (650 mg total) by mouth every 4 (four) hours as needed., Disp: , Rfl: 0     atenolol (TENORMIN) 50 MG tablet, [ATENOLOL (TENORMIN) 50 MG TABLET] Take 50 mg by mouth., Disp: , Rfl:      atorvastatin (LIPITOR) 10 MG tablet, [ATORVASTATIN (LIPITOR) 10 MG TABLET] Take 10 mg by mouth every morning. , Disp: , Rfl:      FERROUS GLUCONATE PO, Take 324 mg by mouth, Disp: , Rfl:      furosemide (LASIX) 40 MG tablet, [FUROSEMIDE (LASIX)  40 MG TABLET] Take 40 mg by mouth 2 (two) times a day., Disp: , Rfl:      gentamicin (GARAMYCIN) 0.1 % external ointment, Apply topically every 72 hours Apply 5 grams every 72 hours to the leg ulcers, Disp: 30 g, Rfl: 3     glipiZIDE (GLUCOTROL XL) 5 MG 24 hr tablet, [GLIPIZIDE (GLUCOTROL XL) 5 MG 24 HR TABLET] Take 10 mg by mouth daily with breakfast. , Disp: , Rfl:      LANTUS SOLOSTAR 100 unit/mL (3 mL) pen, [LANTUS SOLOSTAR 100 UNIT/ML (3 ML) PEN] Inject 20 Units under the skin at bedtime. (Patient taking differently: Inject 34 Units Subcutaneous At Bedtime ), Disp: 3 mL, Rfl: PRN     metFORMIN (GLUCOPHAGE) 500 MG tablet, [METFORMIN (GLUCOPHAGE) 500 MG TABLET] Take 500 mg by mouth daily., Disp: , Rfl: 0     metOLazone (ZAROXOLYN) 5 MG tablet, 2x per week, Disp: , Rfl:      multivitamin with minerals tablet, [MULTIVITAMIN WITH MINERALS TABLET] Take 1 tablet by mouth., Disp: , Rfl:      nystatin (MYCOSTATIN) cream, [NYSTATIN (MYCOSTATIN) CREAM] Apply to irritated areas of skin twice a day until healed.  Do not apply to open ulcer(s)., Disp: 60 g, Rfl: 1     omeprazole (PRILOSEC) 20 MG capsule, [OMEPRAZOLE (PRILOSEC) 20 MG CAPSULE] Take 20 mg by mouth daily. , Disp: , Rfl:      potassium chloride (KAYCIEL) 20 mEq/15 mL solution, [POTASSIUM CHLORIDE (KAYCIEL) 20 MEQ/15 ML SOLUTION] Take 20 mEq by mouth daily. , Disp: , Rfl:      warfarin (COUMADIN/JANTOVEN) 2 MG tablet, [WARFARIN (COUMADIN/JANTOVEN) 2 MG TABLET] Take 5 mg by mouth .      , Disp: , Rfl:     Current Facility-Administered Medications:      lidocaine (XYLOCAINE) 2 % external gel, , Topical, Continuous PRN, Mei Sampson NP, New Bag at 11/29/21 1056    History:   Past Medical History:   Diagnosis Date     Anemia      Coronary artery disease 9/25/2015    stents placed      Diabetes mellitus (H)      Duodenitis 5/11/2016     Dyslipidemia      Gastroduodenal ulcer 9/24/2015     GERD (gastroesophageal reflux disease)      GI bleed 07/08/2016     Gout       Hematuria      Hyperlipemia      Hypertension      Kidney stone 10/6/2015       Physical Exam:    /70   Pulse 72   Temp 97.8  F (36.6  C)   Wt 211 lb (95.7 kg)   BMI 27.84 kg/m      General:  Patient presents to clinic in no apparent distress.  Head: normocephalic atraumatic  Psychiatric:  Alert and oriented x1; slightly agitated today.   Respiratory: unlabored breathing; no cough  Integumentary:  Skin is uniformly warm, dry and pink.    Wound #1 Location: left hallux  Size: 0.3L x 0.2W x 0.1depth.  No sinus tract present, Wound base: slough and eschar  No undermining present. Wound is full thickness. There is moderate drainage. Periwound: no denudement, erythema, induration, maceration or warmth.      Wound #2 Location: left anterior shin  Size: 1.1x1.7x 0.1depth.  No sinus tract present, Wound base: slough and eschar  No undermining present. Wound is full thickness. There is moderate drainage. Periwound: no denudement, erythema, induration, maceration or warmth.      Wound #3 Location:pressure ulcer right buttock Size: 0.4x0.4x 0.2depth.  No sinus tract present, Wound base: slough and eschar  No undermining present. Wound is full thickness. There is moderate drainage. Periwound: no denudement, erythema, induration, maceration or warmth.      Wound #4 Location: right dorsal foot  Size: 1.5x1.2x 0.1depth.  No sinus tract present, Wound base: slough and eschar  No undermining present. Wound is full thickness. There is moderate drainage. Periwound: no denudement, erythema, induration, maceration or warmth.      Wound #5 Location: left medial thigh Size 3.5x0.8 x 0.1depth.  No sinus tract present, Wound base: slough and eschar  No undermining present. Wound is full thickness. There is moderate drainage. Periwound: no denudement, erythema, induration, maceration or warmth.      Wound #6 Location: left 3rd toe Size: 0.5x0.6 x 0.1depth.  No sinus tract present, Wound base: slough and eschar  No undermining  present. Wound is full thickness. There is moderate drainage. Periwound: no denudement, erythema, induration, maceration or warmth.      Scattered areas of excoriation and eschar to the bilateral knees and thighs    VASC Wound Left hallux (Active)   Pre Size Length 0.3 01/24/22 1000   Pre Size Width 0.2 01/24/22 1000   Pre Size Depth 0.1 01/24/22 1000   Pre Total Sq cm 0.06 01/24/22 1000   Number of days: 172   VASC Wound Left Anterior Shin (Active)   Pre Size Length 1.1 01/24/22 1000   Pre Size Width 1.7 01/24/22 1000   Pre Size Depth 0.1 01/24/22 1000   Pre Total Sq cm 15.75 12/27/21 1000   Number of days: 90   VASC Wound pressure ulcer right buttocks (Active)   Pre Size Length 0.8 12/27/21 1000   Pre Size Width 0.4 12/27/21 1000   Pre Size Depth 0.1 12/27/21 1000   Pre Total Sq cm 0.32 12/27/21 1000   Number of days: 56       VASC Wound right dorsal foot (Active)   Pre Size Length 1.5 01/24/22 1000   Pre Size Width 1.2 01/24/22 1000   Pre Size Depth 0.1 01/24/22 1000   Pre Total Sq cm 1.8 01/24/22 1000   Description blistered 12/27/21 1000   Number of days: 28       VASC Wound left medial thigh  (Active)   Pre Size Length 3.5 01/24/22 1000   Pre Size Width 0.8 01/24/22 1000   Pre Size Depth 0.1 01/24/22 1000   Pre Total Sq cm 2.8 01/24/22 1000   Number of days: 0       VASC Wound left third toe (Active)   Pre Size Length 0.5 01/24/22 1000   Pre Size Width 0.6 01/24/22 1000   Pre Size Depth 0.2 01/24/22 1000   Pre Total Sq cm 0.3 01/24/22 1000   Number of days: 0            Circumferential volume measures:      Circumferential Measures 8/26/2021 10/26/2021 11/29/2021 12/27/2021 1/24/2022   Right just above MTP 28.3 28.3 - 29.8 28.1   Right Ankle 28.5 26 28.6 29.2 29.2   Right Widest Calf 50.5 42.5 27.3 43.5 45.1   Right Thigh Up 10cm - - 37.5 - -   Right Knee to Ankle - 42 - - -   Left - just above MTP 27.5 29.2 27.4 29.3 26.4   Left Ankle 26.8 27.5 25 30.4 25   Left Widest Calf 44.5 46.5 40.6 42 36.2   Left Thigh  Up 10cm - - - - -   Left Knee to Ankle - 42 40 - -       Labs:    I personally reviewed the following lab results today and those on care everywhere    No results found for: CRP   No results found for: SED   Last Renal Panel:  Sodium   Date Value Ref Range Status   12/30/2020 142 136 - 145 mmol/L Final     Potassium   Date Value Ref Range Status   12/30/2020 3.7 3.5 - 5.0 mmol/L Final     Chloride   Date Value Ref Range Status   12/30/2020 97 (L) 98 - 107 mmol/L Final     Carbon Dioxide (CO2)   Date Value Ref Range Status   12/30/2020 34 (H) 22 - 31 mmol/L Final     Anion Gap   Date Value Ref Range Status   12/30/2020 11 5 - 18 mmol/L Final     Glucose   Date Value Ref Range Status   12/30/2020 111 70 - 125 mg/dL Final     Urea Nitrogen   Date Value Ref Range Status   12/30/2020 29 (H) 8 - 28 mg/dL Final     Creatinine   Date Value Ref Range Status   12/30/2020 1.22 0.70 - 1.30 mg/dL Final     GFR Estimate   Date Value Ref Range Status   12/30/2020 56 (L) >60 mL/min/1.73m2 Final     Calcium   Date Value Ref Range Status   12/30/2020 9.1 8.5 - 10.5 mg/dL Final     Albumin   Date Value Ref Range Status   11/21/2018 3.4 (L) 3.5 - 5.0 g/dL Final      Lab Results   Component Value Date    WBC 7.0 01/22/2020     Lab Results   Component Value Date    RBC 4.58 01/22/2020     Lab Results   Component Value Date    HGB 14.5 01/22/2020     Lab Results   Component Value Date    HCT 43.2 01/22/2020     No components found for: MCT  Lab Results   Component Value Date    MCV 94 01/22/2020     Lab Results   Component Value Date    MCH 31.7 01/22/2020     Lab Results   Component Value Date    MCHC 33.6 01/22/2020     Lab Results   Component Value Date    RDW 14.7 01/22/2020     Lab Results   Component Value Date     01/22/2020      Lab Results   Component Value Date    A1C 7.7 07/14/2021    A1C 7.7 02/02/2018    A1C 6.6 07/08/2016    A1C 6.8 02/09/2016    A1C 6.8 10/06/2015      No results found for: TSH   No results found  for: MINOO                Impression:  Encounter Diagnoses   Name Primary?     Ulcer of left lower extremity, limited to breakdown of skin (H) Yes     Venous hypertension, chronic, bilateral      Stage 3a chronic kidney disease (H)      Long term current use of anticoagulant therapy      Scar condition and fibrosis of skin      Acquired lymphedema of leg      Diabetic peripheral neuropathy associated with type 2 diabetes mellitus (H)      Stage II pressure ulcer of right buttock (H)      Venous stasis dermatitis of both lower extremities                                            Are any of these wounds new today: No; Location: na    Assessment/Plan:          1. Debridement: After discussion of risk factors and verbal consent was obtained 2% Lidocaine HCL jelly was applied, under clean conditions, the BLE  ulceration(s) were debrided using currette. Devitalized and nonviable tissue, along with any fibrin and slough, was removed to improve granulation tissue formation, stimulate wound healing, decrease overall bacteria load, disrupt biofilm formation and decrease edge senescence.  Total excisional debridement was 20.71 sq cm from the epidermis/dermis area and into the subcutaneous tissue with a depth of 0.1 cm.   Ulcers were improved afterwards and .  Measures were unchanged after debridement.       2.  Wound treatment: wound treatment will include irrigation and dressings to promote autolytic debridement which will include:will continue with home care; will continue with triad paste to the buttocks due to incontinence dressings not appropriate in this location; pt is not offloading wound prognosis is poor; for the legs will continue with silvercel; gauze; rolled guaze; change twice per week Stable            3. Edema: will continue to encourage elevation; and tubular compression with short stretch; re-wrap twice per week. The compression wraps were applied today in clinic. Stable            4. Nutrition:  focus on low sodium diet           5. Offloading: has cushion; but does not move; does not reposition; wife reminds patient and he still will not reposition; educated the patient and wife that the wound will not heal if he does not offload and will continue to progress; they understand     Patient will follow up with me in 4-6 weeks for reevaluation. They were instructed to call the clinic sooner with any signs or symptoms of infection or any further questions/concerns. Answered all questions.          Mei Sampson DNP, RN, CNP, CWOCN, CFCN, CLT  [facility]   [phone]        This note was electronically signed by Mei Sampson NP

## 2022-01-24 NOTE — PATIENT INSTRUCTIONS
"  Continue home care      Wound Care Instructions    2-3 times per week , Cleanse your BLE and left foot/toe wound(s) with Dilute hibiclens 30cc in 500cc NS.    Pat Dry with non-sterile gauze    Apply Lotion to the intact skin surrounding your wound and other dry skin locations. Some good lotions include: Remedy Skin Repair Cream, Sarna, Vanicream or Cetaphil    Primary Dressing: Apply gentamycin ointment to the silvercel and then into/onto the wounds    Secondary dressing: Cover with ABD or gauze    Secure with non-sterile roll gauze (4\" x 75\" roll) and tape (1\" roll tape) as needed; avoid adhesive directly on the skin    Compression: tubular compression size G bilaterally    Elevate the legs    Get up and walk once an hour during the day    Use a cushion 4\" foam to sit on     Check for incontinence every 1-2 hours    It is not ok to get your wound wet in the bath or shower    Apply triad paste to the buttocks wound 2-3 times per day    Triad Paste apply to the wound 1-2 times per day  Do not need to scrub off in between applications as this can cause additional trauma to the wound and surrounding skin  Just gently cleanse and apply additional product on top          SEEK MEDICAL CARE IF:    You have an increase in swelling, pain, or redness around the wound.    You have an increase in the amount of pus coming from the wound.    There is a bad smell coming from the wound.    The wound appears to be worsening/enlarging    You have a fever greater than 101.5 F      It is ok to continue current wound care treatment/products for the next 2-3 days until new wound care supplies are ordered and arrive. If longer than this please contact our office at [phone].         "

## 2022-03-07 NOTE — PATIENT INSTRUCTIONS
"  Continue home care    Lasix 80mg daily    Metolazone 5mg Monday and Thursdays; can add another one on Wednesday as needed for increased weight; difficulty breathing; or worsening swelling in the legs      Wound Care Instructions    3 times per week , Cleanse your BLE and left foot/toe wound(s) with Dilute hibiclens 30cc in 500cc NS.    Pat Dry with non-sterile gauze    Apply Lotion to the intact skin surrounding your wound and other dry skin locations. Some good lotions include: Remedy Skin Repair Cream, Sarna, Vanicream or Cetaphil    Primary Dressing: Apply gentamycin ointment to the silvercel and then into/onto the wounds    Secondary dressing: Cover with ABD or gauze    Secure with non-sterile roll gauze (4\" x 75\" roll) and tape (1\" roll tape) as needed; avoid adhesive directly on the skin    Compression: tubular compression size G bilaterally    Elevate the legs    Get up and walk once an hour during the day    Use a cushion 4\" foam to sit on     Check for incontinence every 1-2 hours    It is not ok to get your wound wet in the bath or shower    Apply triad paste to the buttocks wound 2-3 times per day    Triad Paste apply to the wound 1-2 times per day  Do not need to scrub off in between applications as this can cause additional trauma to the wound and surrounding skin  Just gently cleanse and apply additional product on top          SEEK MEDICAL CARE IF:    You have an increase in swelling, pain, or redness around the wound.    You have an increase in the amount of pus coming from the wound.    There is a bad smell coming from the wound.    The wound appears to be worsening/enlarging    You have a fever greater than 101.5 F      It is ok to continue current wound care treatment/products for the next 2-3 days until new wound care supplies are ordered and arrive. If longer than this please contact our office at [phone].         "

## 2022-03-07 NOTE — PROGRESS NOTES
Follow up Vascular Visit       Date of Service:03/07/22      Chief Complaint: BLE swelling; blistering; weeping; pressure ulcer to buttocks; chronic      Pt returns to [facility] with regards to their  BLE swelling; blistering; weeping; pressure ulcer to buttocks; chronic.  They arrive today with spouse. They are currently using silvercel; abd; rolled gauze to the wounds. This is being done by home care 2-3 days per week. They are using tubular compression with short stretch for compression; he arrives today with just single layer tubular compression. They are feeling well today. Denies fevers, chills. No shortness of breath.  His wife purchased him a cushion to use while seated he likes this. He is incontinent. Wife states he is not repositioning and not getting off the wound.  Taking lasix 40mg twice per day; metolazone twice per week.  Pt had a left venous closure done by Dr. Palm at [facility] 7 months ago and then the right 5 months ago. 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 He was seen by cardiology taking 80 mg of lasix daily also increased the metolazone to an extra dose on Wednesday as needed however otherwise just ordered to take Monday and Friday.  The wife states the bottle states to take metolazone twice per day; told her several times that this is not what the cardiologist ordered and could potentially kill him.    Allergies:   Allergies   Allergen Reactions     Aspirin Nausea and Vomiting     GI upset       Medications:   Current Outpatient Medications:      acetaminophen (TYLENOL) 325 MG tablet, [ACETAMINOPHEN (TYLENOL) 325 MG TABLET] Take 2 tablets (650 mg total) by mouth every 4 (four) hours as needed.,  Disp: , Rfl: 0     atenolol (TENORMIN) 50 MG tablet, [ATENOLOL (TENORMIN) 50 MG TABLET] Take 50 mg by mouth., Disp: , Rfl:      atorvastatin (LIPITOR) 10 MG tablet, [ATORVASTATIN (LIPITOR) 10 MG TABLET] Take 10 mg by mouth every morning. , Disp: , Rfl:      FERROUS GLUCONATE PO, Take 324 mg by mouth, Disp: , Rfl:      furosemide (LASIX) 40 MG tablet, [FUROSEMIDE (LASIX) 40 MG TABLET] Take 40 mg by mouth 2 (two) times a day., Disp: , Rfl:      gentamicin (GARAMYCIN) 0.1 % external ointment, Apply topically every 72 hours Apply 5 grams every 72 hours to the leg ulcers, Disp: 30 g, Rfl: 3     glipiZIDE (GLUCOTROL XL) 5 MG 24 hr tablet, [GLIPIZIDE (GLUCOTROL XL) 5 MG 24 HR TABLET] Take 10 mg by mouth daily with breakfast. , Disp: , Rfl:      LANTUS SOLOSTAR 100 unit/mL (3 mL) pen, [LANTUS SOLOSTAR 100 UNIT/ML (3 ML) PEN] Inject 20 Units under the skin at bedtime. (Patient taking differently: Inject 34 Units Subcutaneous At Bedtime ), Disp: 3 mL, Rfl: PRN     metFORMIN (GLUCOPHAGE) 500 MG tablet, [METFORMIN (GLUCOPHAGE) 500 MG TABLET] Take 500 mg by mouth daily., Disp: , Rfl: 0     metOLazone (ZAROXOLYN) 5 MG tablet, daily 2x per week, Disp: , Rfl:      multivitamin with minerals tablet, [MULTIVITAMIN WITH MINERALS TABLET] Take 1 tablet by mouth., Disp: , Rfl:      nystatin (MYCOSTATIN) cream, [NYSTATIN (MYCOSTATIN) CREAM] Apply to irritated areas of skin twice a day until healed.  Do not apply to open ulcer(s)., Disp: 60 g, Rfl: 1     omeprazole (PRILOSEC) 20 MG capsule, [OMEPRAZOLE (PRILOSEC) 20 MG CAPSULE] Take 20 mg by mouth daily. , Disp: , Rfl:      potassium chloride (KAYCIEL) 20 mEq/15 mL solution, [POTASSIUM CHLORIDE (KAYCIEL) 20 MEQ/15 ML SOLUTION] Take 20 mEq by mouth daily. , Disp: , Rfl:      warfarin (COUMADIN/JANTOVEN) 2 MG tablet, [WARFARIN (COUMADIN/JANTOVEN) 2 MG TABLET] Take 5 mg by mouth .      , Disp: , Rfl:     Current Facility-Administered Medications:      lidocaine (XYLOCAINE) 2 % external gel, ,  Topical, Continuous PRN, Mei Sampson, NP, New Bag at 11/29/21 1056    History:   Past Medical History:   Diagnosis Date     Anemia      Coronary artery disease 9/25/2015    stents placed      Diabetes mellitus (H)      Duodenitis 5/11/2016     Dyslipidemia      Gastroduodenal ulcer 9/24/2015     GERD (gastroesophageal reflux disease)      GI bleed 07/08/2016     Gout      Hematuria      Hyperlipemia      Hypertension      Kidney stone 10/6/2015       Physical Exam:    Resp 18   Wt 215 lb 11.2 oz (97.8 kg)   BMI 28.46 kg/m      General:  Patient presents to clinic in no apparent distress.  Head: normocephalic atraumatic  Psychiatric:  Alert and oriented x3. Minimally talkative today  Respiratory: unlabored breathing; no cough  Integumentary:  Skin is uniformly warm, dry and pink.    Wound #1 Location: right lateral leg  Size: 0.8L x 0.8W x 0.1depth.  No sinus tract present, Wound base: slough  No undermining present. Wound is full thickness. There is  moderate drainage. Periwound: no denudement, erythema, induration, maceration or warmth.      Wound #2 Location: left lateral leg  Size:1.9x1.5W x 0.1depth.  No sinus tract present, Wound base: slough  No undermining present. Wound is full thickness. There is  moderate drainage. Periwound: no denudement, erythema, induration, maceration or warmth.      There is a dry blood blister on the tip of the right hallux    Swelling is stable    No erythema; no pain to the legs      VASC Wound Left hallux (Active)   Number of days: 214       VASC Wound Right hallux (Active)   Number of days: 214       VASC Wound Right 2nd toe (Active)   Number of days: 214       VASC Wound Right lateral leg (Active)   Pre Size Length 0.8 03/07/22 1000   Pre Size Width 0.8 03/07/22 1000   Pre Size Depth 0.1 03/07/22 1000   Number of days: 214       VASC Wound Left lateral leg (Active)   Pre Size Length 1.9 03/07/22 1000   Pre Size Width 1.5 03/07/22 1000   Pre Size Depth 0.1 03/07/22 1000    Pre Total Sq cm 2.85 03/07/22 1000   Number of days: 214       VASC Wound Left Anterior Shin (Active)   Number of days: 132       VASC Wound Left Posterior Calf (Active)   Number of days: 132       VASC Wound Left Posterior Medial Calf (Active)   Number of days: 132       VASC Wound Lt dorsal foot (Active)   Number of days: 98       VASC Wound pressure ulcer right buttocks (Active)   Number of days: 98       VASC Wound right dorsal foot (Active)   Number of days: 70       VASC Wound left shin (Active)   Number of days: 70       VASC Wound left medial thigh  (Active)   Number of days: 42       VASC Wound left third toe (Active)   Number of days: 42            Circumferential volume measures:      Circumferential Measures 10/26/2021 11/29/2021 12/27/2021 1/24/2022 3/7/2022   Right just above MTP 28.3 - 29.8 28.1 30   Right Ankle 26 28.6 29.2 29.2 28.1   Right Widest Calf 42.5 27.3 43.5 45.1 39   Right Thigh Up 10cm - 37.5 - - -   Right Knee to Ankle 42 - - - -   Left - just above MTP 29.2 27.4 29.3 26.4 29.2   Left Ankle 27.5 25 30.4 25 26.8   Left Widest Calf 46.5 40.6 42 36.2 36.8   Left Thigh Up 10cm - - - - -   Left Knee to Ankle 42 40 - - -       Labs:    I personally reviewed the following lab results today and those on care everywhere    No results found for: CRP   No results found for: SED   Last Renal Panel:  Sodium   Date Value Ref Range Status   12/30/2020 142 136 - 145 mmol/L Final     Potassium   Date Value Ref Range Status   12/30/2020 3.7 3.5 - 5.0 mmol/L Final     Chloride   Date Value Ref Range Status   12/30/2020 97 (L) 98 - 107 mmol/L Final     Carbon Dioxide (CO2)   Date Value Ref Range Status   12/30/2020 34 (H) 22 - 31 mmol/L Final     Anion Gap   Date Value Ref Range Status   12/30/2020 11 5 - 18 mmol/L Final     Glucose   Date Value Ref Range Status   12/30/2020 111 70 - 125 mg/dL Final     Urea Nitrogen   Date Value Ref Range Status   12/30/2020 29 (H) 8 - 28 mg/dL Final     Creatinine    Date Value Ref Range Status   12/30/2020 1.22 0.70 - 1.30 mg/dL Final     GFR Estimate   Date Value Ref Range Status   12/30/2020 56 (L) >60 mL/min/1.73m2 Final     Calcium   Date Value Ref Range Status   12/30/2020 9.1 8.5 - 10.5 mg/dL Final     Albumin   Date Value Ref Range Status   11/21/2018 3.4 (L) 3.5 - 5.0 g/dL Final      Lab Results   Component Value Date    WBC 7.0 01/22/2020     Lab Results   Component Value Date    RBC 4.58 01/22/2020     Lab Results   Component Value Date    HGB 14.5 01/22/2020     Lab Results   Component Value Date    HCT 43.2 01/22/2020     No components found for: MCT  Lab Results   Component Value Date    MCV 94 01/22/2020     Lab Results   Component Value Date    MCH 31.7 01/22/2020     Lab Results   Component Value Date    MCHC 33.6 01/22/2020     Lab Results   Component Value Date    RDW 14.7 01/22/2020     Lab Results   Component Value Date     01/22/2020      Lab Results   Component Value Date    A1C 7.7 07/14/2021    A1C 7.7 02/02/2018    A1C 6.6 07/08/2016    A1C 6.8 02/09/2016    A1C 6.8 10/06/2015      No results found for: TSH   No results found for: VITDT                Impression:  Encounter Diagnoses   Name Primary?     Ulcer of left lower extremity, limited to breakdown of skin (H) Yes     Venous hypertension, chronic, bilateral      Stage 3a chronic kidney disease (H)      Long term current use of anticoagulant therapy      Scar condition and fibrosis of skin      Acquired lymphedema of leg      Diabetic peripheral neuropathy associated with type 2 diabetes mellitus (H)      Stage II pressure ulcer of right buttock (H)      Venous stasis dermatitis of both lower extremities      Swelling of limb                                Are any of these wounds new today: No; Location: na    Assessment/Plan:          1. Debridement: After discussion of risk factors and verbal consent was obtained 2% Lidocaine HCL jelly was applied, under clean conditions, the BLE  ulceration(s) were debrided using currette. Devitalized and nonviable tissue, along with any fibrin and slough, was removed to improve granulation tissue formation, stimulate wound healing, decrease overall bacteria load, disrupt biofilm formation and decrease edge senescence.  Total excisional debridement was 3.49 sq cm from the epidermis/dermis area and into the subcutaneous tissue with a depth of 0.1 cm.   Ulcers were improved afterwards and .  Measures were unchanged after debridement.       2.  Wound treatment: wound treatment will include irrigation and dressings to promote autolytic debridement which will include:will continue home care 3 days per week; silvercel to the wounds; abd; rolled gauze;  If for some reason the patient is not able to get their dressing(s) changed as outlined above (due to illness, lack of supplies, lack of help) please do the following: remove old, soiled dressings; wash the wounds with saline; pat dry; apply ABD pad or other absorbant pad and secure with rolled gauze; avoid tape directly on your skin; patient instructed to call the clinic as soon as possible to let us know what the current issues are in receiving wound care. Stable            3. Edema: educated wife on the correct dosing of the metolazone Monday and Thursday only; ok to add Wednesday dose with increase weight; increased edema; worsening shortness of breath; she understood; also continue lasix 80mg daily. We will continue tubular compression and short stretch; however pt is frequently removing this or declining to wear this. Encouraged elevation; pt is very resistive to this. The compression wraps were applied today in clinic.     If a 2 layer or 4 layer compression wrap is being used; these are safe to have on for ONLY 7 days. If for some reason the patient is not able to get the wrap(s) changed (due to illness; lack of supplies, lack of help, lack of transportation) please do the following: unwrap the old 2  or 4 layer compression wrap; avoid using scissors as you could cut your skin and cause wounds; use tubular compression when available. Call to reschedule your home care or clinic visit appointment as soon as possible.  Stable         4. Nutrition: focus on low sodium diet; continue daily weight to monitor for water weight gain           5. Offloading: na     Patient will follow up with me in 4-6 weeks for reevaluation. They were instructed to call the clinic sooner with any signs or symptoms of infection or any further questions/concerns. Answered all questions.          Mei Sampson DNP, RN, CNP, CWOCN, CFCN, CLT  [facility]   [phone]        This note was electronically signed by Mei Sampson NP

## 2022-03-31 PROBLEM — E87.6 HYPOKALEMIA: Status: ACTIVE | Noted: 2022-01-01

## 2022-03-31 PROBLEM — H91.93 BILATERAL HEARING LOSS: Status: ACTIVE | Noted: 2021-10-26

## 2022-03-31 PROBLEM — N18.31 CHRONIC KIDNEY DISEASE, STAGE 3A (H): Status: ACTIVE | Noted: 2022-01-01

## 2022-03-31 PROBLEM — I50.9 ACUTE ON CHRONIC CONGESTIVE HEART FAILURE, UNSPECIFIED HEART FAILURE TYPE (H): Status: ACTIVE | Noted: 2022-01-01

## 2022-03-31 PROBLEM — K21.00 GASTROESOPHAGEAL REFLUX DISEASE WITH ESOPHAGITIS: Status: ACTIVE | Noted: 2018-12-04

## 2022-03-31 PROBLEM — I48.91 ATRIAL FIBRILLATION, UNSPECIFIED TYPE (H): Status: ACTIVE | Noted: 2022-01-01

## 2022-03-31 PROBLEM — I50.33 ACUTE ON CHRONIC DIASTOLIC CONGESTIVE HEART FAILURE (H): Status: ACTIVE | Noted: 2022-01-01

## 2022-03-31 PROBLEM — Z79.01 ANTICOAGULATED ON COUMADIN: Status: ACTIVE | Noted: 2022-01-01

## 2022-03-31 NOTE — CONSULTS
"[facility] team is asked to see Robinson Medel in consultation to evaluate \"CHF\".      Assessment:     1. Heart failure with preserved ejection fraction an 82-year-old gentleman with remote history of coronary artery disease, admitted with dominant right heart failure symptoms.  Etiology of marked right heart failure uncertain, but given history suggestive of sleep disordered breathing.  We will check overnight oximetry, as well as echocardiogram  2. Anasarca.  We will begin aggressive IV diuretic regimen with IV furosemide, likely switching to torsemide or Bumex prior to hospital discharge with potentially better absorption.  3. Lower extremity ulcerations, somewhat chronic but in a diabetic high risk for infection.  They benefit from wound care team involvement.  4. Chronic atrial fibrillation.  Anticoagulated with warfarin prior to admission       Plan:     1. Furosemide 80 mg IV every 8 hours.  Add Zaroxolyn only if IV diuretics fail to bring about extensive diuresis.  2. Monitor electrolytes, magnesium on a daily basis  3. Check overnight oximetry supplement oxygen as warranted  4. Continue other home medications.  5. Instruction for patient and wife on low-sodium diet  6. Echocardiogram  7. Monitor for inappropriate tachycardia or bradycardia that may be contributing to his symptoms.     Current History:     Robinson Medel is a very pleasant 89-year-old gentleman with a remote history of coronary artery disease status post stent placement 2015 to the LAD and circumflex vessels.  He also has a history of chronic atrial fibrillation, diabetes mellitus, and hypertension.  He has had persistent lower extremity edema for several years, treated for heart failure with preserved ejection fraction.  He last saw Dr. Harrington with [facility] a week ago with progressive lower extremity edema.  At that time he was advised hospitalization for aggressive diuresis but declined.  Over " the last week his symptoms have progressed and today his wife was able to convince him to come to the emergency room for further evaluation.    He has had chronic orthopnea, sleeping in his recliner for at least a couple of years.  His lower extremity edema has been intermittent.  He admits to episodic dietary indiscretions though his wife tries to limit sodium intake.  He has not experienced any chest pain, but does have significant exertional dyspnea which is his baseline for recent months.  He denies foot pain with ambulation but does very little walking in recent months given the lower extremity edema, and ulcerations and wounds on his toes.  He does have home care come in and wrap his legs 3 days a week, but an increase in his metolazone last week did not bring about any improvement in his urine output or reduction in edema.    He has no awareness of palpitations except when he tries to get up to go to the bathroom in the night.  He has problems with urinary incontinence since his prostate surgery treatment a few years back.  He has had no syncope.  Wife does report he is very sleepy during the daytime.  He falls asleep easily, but no snoring or apnea has been detected.  He has never had a sleep study.    Past Medical History:       Past Medical History:   Diagnosis Date     Anemia      Coronary artery disease 9/25/2015    stents placed      Diabetes mellitus (H)      Duodenitis 5/11/2016     Dyslipidemia      Gastroduodenal ulcer 9/24/2015     GERD (gastroesophageal reflux disease)      GI bleed 07/08/2016     Gout      Hematuria      Hyperlipemia      Hypertension      Kidney stone 10/6/2015     Sleep history: Poorly restorative, sleeps in his recliner for years.  Daytime sleepiness  Past Surgical History:     Past Surgical History:   Procedure Laterality Date     ANGIOPLASTY  09/2015     COLONOSCOPY N/A 6/17/2016    Procedure: COLONOSCOPY with biopsies in cecum using biopsy forcep and a transverse colon  polypectomy using a hot snare;  Surgeon: Steve Angel MD;  Location: [facility];  Service:      CYSTOSCOPY PROSTATE W/ LASER N/A 2/9/2016    Procedure: CYSTOSCOPY TRANSURETHRAL RESECTION PROSTATE;  Surgeon: Chano Smith MD;  Location: [facility];  Service:      CYSTOSCOPY W/ LITHOLAPAXY / EHL N/A 2/9/2016    Procedure: CYSTOLITHOLAPAXY ;  Surgeon: Chano Smith MD;  Location: [facility] OR;  Service:      ESOPHAGOSCOPY, GASTROSCOPY, DUODENOSCOPY (EGD), COMBINED N/A 5/8/2016    Procedure: ESOPHAGOGASTRODUODENOSCOPY;  Surgeon: Kieran Beltran MD;  Location: [facility];  Service:      ESOPHAGOSCOPY, GASTROSCOPY, DUODENOSCOPY (EGD), COMBINED N/A 7/8/2016    Procedure: ESOPHAGOGASTRODUODENOSCOPY;  Surgeon: Chano Wynn MD;  Location: [facility];  Service:      KIDNEY STONE SURGERY       OTHER SURGICAL HISTORY      nose polyps     IA LAP,SURG,COLECTOMY, PARTIAL, W/ANAST N/A 6/18/2016    Procedure: LAPAROSCOPIC ASSISTED RIGHT COLECTOMY ROOM 310;  Surgeon: Radha Bueno MD;  Location: [facility];  Service: General       Family History:     Family History   Problem Relation Age of Onset     Diabetes Mother      Heart Disease Father      Mental Illness Daughter         Depression     Family history reviewed and is not pertinent to the chief complaint or presenting problem    Social History:    reports that he has never smoked. He has never used smokeless tobacco. He reports that he does not drink alcohol and does not use drugs.  Exercise history: Dyspneic with minimal walking in recent months with lower extremity edema.    Meds:     Current Outpatient Medications   Medication Sig Dispense Refill     acetaminophen (TYLENOL) 325 MG tablet [ACETAMINOPHEN (TYLENOL) 325 MG TABLET] Take 2 tablets (650 mg total) by mouth every 4 (four) hours as needed.  0     atenolol (TENORMIN) 50 MG tablet [ATENOLOL (TENORMIN) 50 MG TABLET] Take 50 mg by mouth.       atorvastatin (LIPITOR) 10 MG  tablet [ATORVASTATIN (LIPITOR) 10 MG TABLET] Take 10 mg by mouth every morning.        FERROUS GLUCONATE PO Take 324 mg by mouth       furosemide (LASIX) 40 MG tablet [FUROSEMIDE (LASIX) 40 MG TABLET] Take 40 mg by mouth 2 (two) times a day.       gentamicin (GARAMYCIN) 0.1 % external ointment Apply topically every 72 hours Apply 5 grams every 72 hours to the leg ulcers 30 g 3     glipiZIDE (GLUCOTROL XL) 5 MG 24 hr tablet [GLIPIZIDE (GLUCOTROL XL) 5 MG 24 HR TABLET] Take 10 mg by mouth daily with breakfast.        LANTUS SOLOSTAR 100 unit/mL (3 mL) pen [LANTUS SOLOSTAR 100 UNIT/ML (3 ML) PEN] Inject 20 Units under the skin at bedtime. (Patient taking differently: Inject 34 Units Subcutaneous At Bedtime ) 3 mL PRN     metFORMIN (GLUCOPHAGE) 500 MG tablet [METFORMIN (GLUCOPHAGE) 500 MG TABLET] Take 500 mg by mouth daily.  0     metOLazone (ZAROXOLYN) 5 MG tablet daily 2x per week       multivitamin with minerals tablet [MULTIVITAMIN WITH MINERALS TABLET] Take 1 tablet by mouth.       nystatin (MYCOSTATIN) cream [NYSTATIN (MYCOSTATIN) CREAM] Apply to irritated areas of skin twice a day until healed.  Do not apply to open ulcer(s). 60 g 1     omeprazole (PRILOSEC) 20 MG capsule [OMEPRAZOLE (PRILOSEC) 20 MG CAPSULE] Take 20 mg by mouth daily.        potassium chloride (KAYCIEL) 20 mEq/15 mL solution [POTASSIUM CHLORIDE (KAYCIEL) 20 MEQ/15 ML SOLUTION] Take 20 mEq by mouth daily.        warfarin (COUMADIN/JANTOVEN) 2 MG tablet [WARFARIN (COUMADIN/JANTOVEN) 2 MG TABLET] Take 5 mg by mouth .               Allergies:   Aspirin    Review of Systems:   A 12 point comprehensive review of systems was negative except as noted in the current history.    Objective:      Physical Exam  Wt Readings from Last 3 Encounters:   03/31/22 98 kg (216 lb)   03/07/22 97.8 kg (215 lb 11.2 oz)   01/24/22 95.7 kg (211 lb)     Body mass index is 28.5 kg/m .  /71   Pulse 62   Temp 98.7  F (37.1  C) (Oral)   Resp 24   Wt 98 kg (216 lb)    SpO2 97%   BMI 28.50 kg/m      General Appearance:  No apparent distress.  Oriented x3.  Joking, circuitous historian.  HEENT: No scleral icterus; the mucous membranes were pink and moist.  Conjunctiva bilaterally injected  Neck:  No cervical bruits, jugular venous distention, or thyromegaly.  Chest:The spine was straight. The chest was symmetric.  Lungs:  Respirations unlabored; the lungs are clear to auscultation.  Cardiovascular:   Normal point of maximal impulse. Auscultation reveals irregularly irregular first and second heart sounds with 2/6 holosystolic murmur best at the cardiac apex radiating to the axilla.  No gallop appreciated.  Carotid, radial, and dorsalis pedal pulses are intact and symmetric.  Abdomen: Rounded.  Soft.  No organomegaly, masses, bruits, or tenderness. Bowels sounds are present.  No significant scrotal edema  Extremities: 4+ edema into the upper thighs and presacral region.  Extensive ulceration of the left greater than right calves, with chronic stasis changes.  Skin:  No xanthelasma.  Neurologic: Mood and affect are appropriate. Speech is fluent, in English and in Thai.      EKG (personally reviewed):  afib 60 bpm. LAD, ? Ant mi    Imaging   CXR pending    EXAM: XR CHEST 2 VIEWS PA AND LATERAL   LOCATION: [facility]   DATE/TIME: 3/23/2022 2:05 PM  INDICATION: Acute On Chronic Diastolic (congestive) Heart Failure (hc)   COMPARISON: None.  IMPRESSION: Cardiac enlargement. Mild pulmonary venous congestion. Small bilateral pleural effusions with bibasilar atelectasis. Findings are those of volume overload/congestive heart failure.      Cardiac diagnostics    Echo 11/2020 [facility]:  1.  Normal left ventricular size with normal systolic performance with   visually estimated ejection fraction of 55% to 60%.   2.  No regional wall motion abnormalities are noted.   3.  At least mild right ventricular enlargement and preserved systolic   performance.   4.  Severe  bi-atrial enlargement.   5.  Senile aortic valve sclerosis without stenosis with trace   insufficiency.   6.  Mild mitral valve regurgitation.   7.  Mild tricuspid valve regurgitation with elevated right heart   pressures, at least in the mid 40s mmHg.   8.  Enlarged vena cava with reduced respiratory variation, consistent with   volume overload.   9.  Moderate to severe bilateral pleural effusions.   10. Moderate LVH.           Lab Review     Lab Results   Component Value Date    HGB 13.0 03/31/2022     Lab Results   Component Value Date     03/31/2022     Lab Results   Component Value Date    POTASSIUM 3.6 03/31/2022     Lab Results   Component Value Date    BUN 30 03/31/2022     Lab Results   Component Value Date    CR 1.26 03/31/2022     Lab Results   Component Value Date    CHOL 94 11/23/2020    HDL 44 11/23/2020    LDL 37 11/23/2020    LDL 34 07/12/2016    TRIG 64 11/23/2020           Jaime Warren MD  [facility]  3/31/2022    Note created using Dragon voice recognition software.  Sound alike errors may have escaped editing.    Clinically Significant Risk Factors Present on Admission               # Coagulation Defect: home medication list includes an anticoagulant medication

## 2022-03-31 NOTE — H&P
[facility]    History and Physical - Hospitalist Service       Date of Admission:  3/31/2022    Assessment & Plan   Chief Complaint   Progressive lower extremity edema and fatigue    History is obtained from the patient, electronic health record and patient's spouse    History of Present Illness   89 year old male with a pertinent history of CHF, diabetes mellitus type II, PVD, hyperlipidemia, paroxysmal Afib, anemia, HTN, and cecal cancer s/p right hemicolectomy who presents to the ED by wheelchair with his wife for evaluation of leg swelling and shortness of breath.      Per the patient's wife, he has had increased leg swelling and redness for the past week, and is 20 pounds above his target weight. He has had increased shortness of breath with exertion and is wheezing at baseline. He has wrappings to his legs to help with the swelling. The wrappings and dressings to his legs were last changed yesterday. He was started on antibiotics in clinic day prior to admission for wounds on his legs. He takes 80 mg Lasix daily and 2.5 mg metolazone three times per week.     Per chart review, the patient had an appointment with his cardiologist on 3/23/22 for follow-up of his heart failure and lower extremity edema. His Lasix dose was recently increased. He reported initial weight loss of 5 pounds, but at his appointment he was back up to 224 pounds. He laos had new redness and blisters to his legs. He endorses shortness of breath with exertion. Exam notable for 3+ pitting edema to bilateral legs and decreased breath sounds at lung bases. The patient is 20 pounds volume overloaded on exam. He declined hospitalization, wanting to diurese at home. Recommended continue 80 mg Lasix daily, restart metolazone 2.5 mg Monday, Wednesday, Friday, and repeat labs in 7-10 days. Goal weight of 205 pounds.    Principal Problem:    Acute on chronic diastolic congestive heart failure: Elevated pulmonary pressures  noted on echo with normal wall motion abnormality.  Initiate IV diuresis and consult cardiology.  Overnight oximetry ordered as may have undiagnosed sleep apnea  Active Problems:    Venous stasis dermatitis of both lower extremities: Diuresis, lymphedema wraps    Buttocks wound and lower extremity wounds: Consult wound care team.    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    Bilateral hearing loss: Patient comprehension good is speaking loudly and clearly    Chronic kidney disease, stage 3a: Stable at this time    Review of Systems    The 10 point Review of Systems is negative other than noted in the HPI or here.    Code Status:  Full Code      Diet: Orders Placed This Encounter      Combination Diet Moderate Consistent Carb (60 g CHO per Meal) Diet; 2 gm NA Diet; No Caffeine for 24 hours (Once tests completed, may have caffeine), Low Saturated Fat Diet      DVT prophylaxis:    Medical:  warfarin    Mechanical:  PCD's    Perea Catheter: Present  Central Lines/Port-a-cath: Not present  Drains: Not present    Disposition Plan   Expected discharge: 04/03/2022   recommended to Pending once Adequate diuresis achieved.    The patient's care was discussed with the Bedside Nurse, Patient and Patient's Family.    Romaine Finch MD  [facility]  Securely message with the Vocera Web Console (learn more here)  Text page via [employer] Paging/Directory         Clinically Significant Risk Factors Present on Admission         _____________________________________________________________________    Medical History  I have reviewed this patient's medical history and updated it with pertinent information if needed.  Past Medical History:   Diagnosis Date     Anemia      Coronary artery disease 9/25/2015    stents placed      Diabetes mellitus (H)      Duodenitis 5/11/2016     Dyslipidemia      Gastroduodenal ulcer 9/24/2015     GERD (gastroesophageal reflux disease)      GI bleed 07/08/2016     Gout      Hematuria      Hyperlipemia      Hypertension      Kidney stone 10/6/2015       Surgical History   I have reviewed this patient's surgical history and updated it with pertinent information if needed.  Past Surgical History:   Procedure Laterality Date     ANGIOPLASTY  09/2015     COLONOSCOPY N/A 6/17/2016    Procedure: COLONOSCOPY with biopsies in cecum using biopsy forcep and a transverse colon polypectomy using a hot snare;  Surgeon: Steve Angel MD;  Location: [facility];  Service:      CYSTOSCOPY PROSTATE W/ LASER N/A 2/9/2016    Procedure: CYSTOSCOPY TRANSURETHRAL RESECTION PROSTATE;  Surgeon: Chano Smith MD;  Location: [facility];  Service:      CYSTOSCOPY W/ LITHOLAPAXY / EHL N/A 2/9/2016    Procedure: CYSTOLITHOLAPAXY ;  Surgeon: Chano Smith MD;  Location: [facility];  Service:      ESOPHAGOSCOPY, GASTROSCOPY, DUODENOSCOPY (EGD), COMBINED N/A 5/8/2016    Procedure: ESOPHAGOGASTRODUODENOSCOPY;  Surgeon: Kieran Beltran MD;  Location: [facility];  Service:      ESOPHAGOSCOPY, GASTROSCOPY, DUODENOSCOPY (EGD), COMBINED N/A 7/8/2016    Procedure: ESOPHAGOGASTRODUODENOSCOPY;  Surgeon: Chano Wynn MD;  Location: [facility];  Service:      KIDNEY STONE SURGERY       OTHER SURGICAL HISTORY      nose polyps     KY LAP,SURG,COLECTOMY, PARTIAL, W/ANAST N/A 6/18/2016    Procedure: LAPAROSCOPIC ASSISTED RIGHT COLECTOMY ROOM 310;  Surgeon: Radha Bueno MD;  Location: [facility];   Service: General       Social History   I have reviewed this patient's social history and updated it with pertinent information if needed.  Social History     Tobacco Use     Smoking status: Never Smoker     Smokeless tobacco: Never Used     Tobacco comment: only smoked a little bit during high school   Substance Use Topics     Alcohol use: No     Drug use: No       Family History   I have reviewed this patient's family history and updated it with pertinent information if needed.  Family History   Problem Relation Age of Onset     Diabetes Mother      Heart Disease Father      Mental Illness Daughter         Depression       Prior to Admission Medications   No current facility-administered medications on file prior to encounter.  atenolol (TENORMIN) 50 MG tablet, Take 50 mg by mouth daily   atorvastatin (LIPITOR) 10 MG tablet, Take 10 mg by mouth At Bedtime   cephALEXin (KEFLEX) 500 MG capsule, Take 500 mg by mouth 2 times daily For 7 days starting 3/31/22.  [employer] IRON 240 (27 Fe) MG TABS, Take 1 tablet by mouth daily  furosemide (LASIX) 40 MG tablet, Take 80 mg by mouth daily   gentamicin (GARAMYCIN) 0.1 % external ointment, Apply topically every 72 hours Apply 5 grams every 72 hours to the leg ulcers  glipiZIDE (GLUCOTROL XL) 5 MG 24 hr tablet, Take 5 mg by mouth 2 times daily (with meals)   insulin glargine (LANTUS PEN) 100 UNIT/ML pen, Inject 34 Units Subcutaneous At Bedtime  metFORMIN (GLUCOPHAGE) 500 MG tablet, Take 500 mg by mouth 2 times daily (with meals)   metOLazone (ZAROXOLYN) 5 MG tablet, Take 2.5 mg by mouth three times a week Monday, Wednesday, Friday  multivitamin with minerals tablet, Take 1 tablet by mouth daily   omeprazole (PRILOSEC) 20 MG capsule, [OMEPRAZOLE (PRILOSEC) 20 MG CAPSULE] Take 20 mg by mouth daily.   potassium chloride ER (KLOR-CON M) 20 MEQ CR tablet, Take 20 mEq by mouth 2 times daily (with meals)  warfarin ANTICOAGULANT (COUMADIN) 5 MG tablet, Take 5-6 mg by mouth See Admin  Instructions Take 4 mg on 3/30, then 6 mg Tuesday and Thursday, 5 mg all other days of the week.        Allergies      Allergies   Allergen Reactions     Aspirin Nausea and Vomiting     GI upset       Physical Exam   Vital signs:  Temp: 97.4  F (36.3  C) Temp src: Oral BP: 135/63 Pulse: 59   Resp: 18 SpO2: 95 % O2 Device: None (Room air)   Height: 182.9 cm (6') Weight: 100 kg (220 lb 6.4 oz)  Estimated body mass index is 29.89 kg/m  as calculated from the following:    Height as of this encounter: 1.829 m (6').    Weight as of this encounter: 100 kg (220 lb 6.4 oz).    Constitutional: awake, alert, cooperative, no apparent distress, and appears stated age  ENT: normocepalic, without obvious abnormality, atramatic  Respiratory: Decreased breath sounds at bases  Cardiovascular: normal apical pulses  and normal S1 and S2  GI: normal bowel sounds, soft and non-distended  Skin: Candidal rash noted in groin folds bilaterally  Neurologic: Mental Status Exam:  Level of Alertness:   awake  Orientation:   person, place, time  Memory:   normal  Motor Exam:  moves all extremities well and symmetrically  Sensory: Decreased sensation in lower extremities and feet, decreased hearing    Data   Data reviewed today: I reviewed all medications, new labs and imaging results over the last 24 hours.  Recent Labs   Lab 03/31/22  2107 03/31/22  1653 03/31/22  1648 03/31/22  1348 03/31/22  1200 03/28/22  1625   WBC  --   --   --   --  5.6  --    HGB  --   --   --   --  13.0*  --    MCV  --   --   --   --  94  --    PLT  --   --   --   --  204  --    INR  --   --   --   --  2.87*  --    NA  --   --   --   --  139 142   POTASSIUM  --   --  3.4*  --  3.6 3.9   CHLORIDE  --   --   --   --  100 99   CO2  --   --   --   --  28 29   BUN  --   --   --   --  30* 31*   CR  --   --   --   --  1.26 1.40*   ANIONGAP  --   --   --   --  11 14   TY  --   --   --   --  9.1 9.4   * 203*  --  167* 223* 182*     Recent Results (from the past 24  hour(s))   Chest XR,  PA & LAT    Narrative    EXAM: XR CHEST 2 VW  LOCATION: [facility]  DATE/TIME: 3/31/2022 1:44 PM    INDICATION: CHF  COMPARISON: 2022      Impression    IMPRESSION: Heart is moderately enlarged, unchanged. Small bilateral effusions which appears similar to prior examination. Mild pulmonary venous congestion which is increased when compared to prior examination. Findings suggestive of mild congestive   heart failure.   Echocardiogram Complete    Narrative    [id number]  [id number]  [id number]  [id number]^MAVIS^LARRY^[place] [address]     Name: JUSTIN MEYER  MRN: [id number]  : 1930  Study Date: 2022 02:00 PM  Age: 89 yrs  Gender: Male  Patient Location: [facility]  Reason For Study: Heart Failure  Ordering Physician: LARRY GAMBLE  Performed By: [facility]     BSA: 2.2 m2  Height: 72 in  Weight: 216 lb  HR: 68  BP: 113/69 mmHg  ______________________________________________________________________________  Procedure  Complete Portable Echo Adult.  ______________________________________________________________________________  Interpretation Summary     1. Normal left ventricular size and systolic performance with a visually  estimated ejection fraction of 50-55%.  2. There is moderate concentric increase in left ventricular wall thickness.  3. There is trace aortic insufficiency.  4. There is mild-moderate right ventricular enlargement with mildly reduced  right ventricular systolic performance.  5. There is moderate-severe biatrial enlargement.  6. Right ventricular systolic pressure relative to right atrial pressure is  moderately increased. The pulmonary artery pressure is estimated to be 60-65  mmHg plus right atrial pressure. In addition, the IVC appears dilated with  decreased phasic variation in caval diameter consistent with elevated right  atrial  pressure.  ______________________________________________________________________________  Left ventricle:  Normal left ventricular size and systolic performance with a visually  estimated ejection fraction of 50-55%. There is normal regional wall motion.  There is moderate concentric increase in left ventricular wall thickness.     Assessment of LV Diastolic Function: Patient appears to be in atrial  fibrillation which limits assessment of diastolic filling.     Right ventricle:  There is mild-moderate right ventricular enlargement with mildly reduced right  ventricular systolic performance.     Left atrium:  There is moderate to severe left atrial enlargement.     Right atrium:  There is moderate to severe right atrial enlargement.     IVC:  The IVC appears dilated with decreased phasic variation in caval diameter  consistent with elevated right atrial pressure.     Aortic valve:  The aortic valve is comprised of three cusps. There is no significant aortic  stenosis. There is trace aortic insufficiency.     Mitral valve:  The mitral valve appears morphologically normal. There is mild mitral  insufficiency.     Tricuspid valve:  The tricuspid valve is grossly morphologically normal. There is mild tricuspid  insufficiency.     Pulmonic valve:  The pulmonic valve is grossly morphologically normal. There is mild pulmonic  insufficiency.     Thoracic aorta:  The aortic root and proximal ascending aorta are of normal dimension.     Pericardium:  There is a very small, barely detectable, posterior pericardial effusion.  Incidental note is made of a pleural effusion.  ______________________________________________________________________________  ______________________________________________________________________________  MMode/2D Measurements & Calculations  IVSd: 2.2 cm  LVIDd: 3.7 cm  LVIDs: 2.6 cm  LVPWd: 1.7 cm  FS: 28.8 %  LV mass(C)d: 327.6 grams  LV mass(C)dI: 148.8 grams/m2  Ao root diam: 3.6 cm  LA dimension:  4.9 cm  asc Aorta Diam: 3.2 cm  LA/Ao: 1.4  LVOT diam: 2.3 cm  LVOT area: 4.0 cm2  LA Volume Indexed (AL/bp): 49.6 ml/m2     RWT: 0.94     Doppler Measurements & Calculations  MV E max jonh: 93.1 cm/sec  MV dec slope: 539.8 cm/sec2  MV dec time: 0.17 sec  Ao V2 max: 118.2 cm/sec  Ao max P.0 mmHg  Ao V2 mean: 74.7 cm/sec  Ao mean P.6 mmHg  Ao V2 VTI: 21.6 cm  MICHAEL(I,D): 2.7 cm2  MICHAEL(V,D): 2.5 cm2  LV V1 max P.1 mmHg  LV V1 max: 72.8 cm/sec  LV V1 VTI: 14.5 cm  SV(LVOT): 58.5 ml  SI(LVOT): 26.6 ml/m2  PA V2 max: 66.4 cm/sec  PA max P.8 mmHg  PA acc time: 0.06 sec  PI end-d jonh: 89.9 cm/sec  TR max jonh: 398.2 cm/sec  TR max P.4 mmHg  AV Jonh Ratio (DI): 0.62  MICHAEL Index (cm2/m2): 1.2  E/E' av.7  Lateral E/e': 14.0  Medial E/e': 19.4     ______________________________________________________________________________  Report approved by: Yeison Zhong 2022 03:18 PM

## 2022-03-31 NOTE — ED PROVIDER NOTES
IMildred have reviewed the documentation, personally taken the patient's history, performed an exam and agree with the physical finds, diagnosis and management plan.    HPI:  Robinson Medel is a 89 year old male who presents for evaluation of leg swelling and shortness of breath. Per the patient's wife, he has had increased leg swelling and redness for the past week, and is 20 pounds above his target weight. He has had increased shortness of breath with exertion and is wheezing at baseline. He has wrappings to his legs to help with the swelling. The wrappings and dressings to his legs were last changed yesterday. He was started on antibiotics yesterday for wounds to his legs. He currently takes 80 mg Lasix daily and 2.5 mg metolazone three times per week.    I, Nieves Guillen, am serving as a scribe to document services personally performed by Dr. Collazo, based on my observations and the provider's statements to me. I, Mildred Collazo MD, attest that Nieves Guillen is acting in a scribe capacity, has observed my performance of the services and has documented them in accordance with my direction.     Physical Exam:    Cantankerous elderly male, No acute distress. Heart is regular rate. Breath sounds are decreased in the bases, No respiratory distress. Abdomen rotund. 1-2+ lower extremity edema.     MDM: CHF exacerbation, pulmonary edema, symptomatic anemia, arrhythmia, ACS    ED Course/workup:   12:33 PM Carole Aj PA-C staffed patient with me. I agree with their assessment and plan of management, and I will see the patient.  2:29 PM I met with the patient to introduce myself, gather additional history, perform my initial exam, and discuss the plan.     ED Course as of 03/31/22 1454   Thu Mar 31, 2022   1231 INR(!): 2.87   1245 BNP(!): 712  Up from 309       EKG:  EKG individually read and interpreted by myself:  Performed at: 12:14    Impression: Atrial fibrillation with a competing junctional  pacemaker. Left axis deviation. Possible anterior infarct, age undetermined. Abnormal EKG.     Rate: 64 bpm  Rhythm: atrial fibrillation with a competing junctional pacemaker  Axis: -82  QRS Interval: 118 ms  QTc Interval: 472 ms  ST Changes: none significant  Comparison: When compared with EKG of 2/16/2018, No significant change was found      Final Diagnosis:     ICD-10-CM    1. Acute on chronic congestive heart failure, unspecified heart failure type (H)  I50.9    2. Atrial fibrillation, unspecified type (H)  I48.91    3. Anticoagulated on Coumadin  Z79.01            I personally saw the patient and performed a substantive portion of the visit including all aspects of the medical decision making.     MD Zay Machado Zabrina N, MD  03/31/22 1459

## 2022-03-31 NOTE — ED TRIAGE NOTES
Despite change in meds about 2 weeks ago, BLE swelling is not getting any better. Weight gain over the past week.

## 2022-03-31 NOTE — CONSULTS
"Care Management Initial Consult    General Information  Assessment completed with: Patient, Spouse or significant other, Robinson and shadi Arreola  Type of CM/SW Visit: Initial Assessment    Primary Care Provider verified and updated as needed: Yes   Readmission within the last 30 days: no previous admission in last 30 days      Reason for Consult: discharge planning  Advance Care Planning: Advance Care Planning Reviewed: no concerns identified, other (see comments) (\"We have never done one\")          Communication Assessment  Patient's communication style: spoken language (English or Bilingual)             Cognitive  Cognitive/Neuro/Behavioral:                        Living Environment:   People in home: spouse, child(yamilka), adult  Elba Bowers wife, Wendy daughter  Current living Arrangements: house (\"town house all 1 level\")      Able to return to prior arrangements: yes (hopefully can continue at home with home care)       Family/Social Support:  Care provided by: self, spouse/significant other, homecare agency  Provides care for: no one, unable/limited ability to care for self  Marital Status:   Wife, Children, Other (specify) (Home Care)  [place]       Description of Support System: Supportive, Involved    Support Assessment: Adequate family and caregiver support, Adequate social supports, Patient communicates needs well met    Current Resources:   Patient receiving home care services: Yes  Skilled Home Care Services: Skilled Nursing (\"[facility] RN\")  Community Resources: Home Care (\"[facility]\")  Equipment currently used at home: walker, rolling, cane, straight (\"has 2 walkers at home and 4 canes\")  Supplies currently used at home: Wound Care Supplies, Hearing Aid Batteries, Diabetic Supplies    Employment/Financial:  Employment Status: retired     Employment/ Comments: \"no  benefits\"  Financial Concerns:     Referral to Financial Worker: No   " "    Lifestyle & Psychosocial Needs:  Social Determinants of Health     Tobacco Use: Low Risk      Smoking Tobacco Use: Never Smoker     Smokeless Tobacco Use: Never Used   Alcohol Use: Not on file   Financial Resource Strain: Not on file   Food Insecurity: Not on file   Transportation Needs: Not on file   Physical Activity: Not on file   Stress: Not on file   Social Connections: Not on file   Intimate Partner Violence: Not on file   Depression: Not on file   Housing Stability: Not on file       Functional Status:  Prior to admission patient needed assistance:   Dependent ADLs:: Ambulation-walker, Ambulation-cane  Dependent IADLs:: Cleaning, Cooking, Laundry, Shopping, Meal Preparation, Transportation (\"wife and daughter help\")       Mental Health Status:          Chemical Dependency Status:                Values/Beliefs:  Spiritual, Cultural Beliefs, Yazidism Practices, Values that affect care:                 Additional Information:  Robinson lives in a 1 level town house with his wife and adult daughter. He is independent with some ADLs and wife and daughter help with others. He gets help with \"housekeeping, laundry, meal prep, shopping, and transportation\".    He uses a walker or cane for mobility. He has \"2 walkers and 4 canes at home he can use\".     [facility] RN comes 3 times a week for wound cares and wrapping of his legs. I called to verify he is open with them and they are aware of his admission.    Wife to transport at discharge.    Iwona Retana RN      "

## 2022-03-31 NOTE — ED NOTES
[facility] ED Handoff Report    ED Chief Complaint: bilateral leg wounds     ED Diagnosis:  (I50.9) Acute on chronic congestive heart failure, unspecified heart failure type (H)  Comment:   Plan:     (I48.91) Atrial fibrillation, unspecified type (H)  Comment:   Plan:     (Z79.01) Anticoagulated on Coumadin  Comment:   Plan:        PMH:    Past Medical History:   Diagnosis Date     Anemia      Coronary artery disease 9/25/2015    stents placed      Diabetes mellitus (H)      Duodenitis 5/11/2016     Dyslipidemia      Gastroduodenal ulcer 9/24/2015     GERD (gastroesophageal reflux disease)      GI bleed 07/08/2016     Gout      Hematuria      Hyperlipemia      Hypertension      Kidney stone 10/6/2015        Code Status:  No Order     Falls Risk: Yes Band: Applied    Current Living Situation/Residence: lives with a significant other     Elimination Status: Continent: indwelling catheter     Activity Level: SBA    Patients Preferred Language:  English     Needed: No    Vital Signs:  /69   Pulse 59   Temp 98.7  F (37.1  C) (Oral)   Resp 17   Wt 98 kg (216 lb)   SpO2 96%   BMI 28.50 kg/m       Cardiac Rhythm: a-fib    Pain Score:    Is the Patient Confused:  No    Last Food or Drink: 03/31/22 at 1430    Focused Assessment:  Pt came in to ED today with bilateral leg wounds. pts wounds as follows   Left great toe - 1 cm x 2 cm nonblanchable. Skin sloughing off  Left lower leg, sloughing skin 5 cm x 5 cm medial, lateral 3 cm x 8 cm   Knee down to foot, skin red warm and blanchable. Edema noted     Right leg 2cm x 2cm anterior shin sloughing  2cm x 2cm medial sloughing  Knee to foot skin and warm. Edema noted  Several scabs noted.   Legs are wrapped with abd pads, Kerlix and ace bandage  Perea in place.     Pt reports he is 7 days a week incontinent. Underwear was saturated with urine on arrival in ED. Pt has groin redness with some skin sloughing bilateral. Pt reports all wounds have been  present for >6 months and states that his home care nurses are aware.  Pt denies chest pain pain, diff breathing, sob, n/v.    Tests Performed: Done: Labs and Imaging    Treatments Provided:  Meds, wound care    Family Dynamics/Concerns: No    Family Updated On Visitor Policy: Yes    Plan of Care Communicated to Family: Yes    Who Was Updated about Plan of Care: Pt and wife    Belongings Checklist Done and Signed by Patient: Yes    Medications sent with patient: insulin    Covid: asymptomatic , waiting on result    Additional Information: Pt hard of hearing. Has his hearing aids in. At times can be difficult to get him to answer questions seriously. Likes to joke with staff.    RN: Dashawn Jalloh   3/31/2022 3:00 PM

## 2022-03-31 NOTE — PLAN OF CARE
"  Problem: Plan of Care - These are the overarching goals to be used throughout the patient stay.    Goal: Plan of Care Review/Shift Note  Description: The Plan of Care Review/Shift note should be completed every shift.  The Outcome Evaluation is a brief statement about your assessment that the patient is improving, declining, or no change.  This information will be displayed automatically on your shift note.  3/31/2022 1859 by Lucrecia Abreu RN  Outcome: Ongoing, Progressing  3/31/2022 1858 by Lucrecia Abreu RN  Outcome: Ongoing, Progressing  Goal: Patient-Specific Goal (Individualized)  Description: You can add care plan individualizations to a care plan. Examples of Individualization might be:  \"Parent requests to be called daily at 9am for status\", \"I have a hard time hearing out of my right ear\", or \"Do not touch me to wake me up as it startles me\".  3/31/2022 1859 by Lucrecia Abreu RN  Outcome: Ongoing, Progressing  3/31/2022 1858 by Lucrecia Abreu RN  Outcome: Ongoing, Progressing  Goal: Absence of Hospital-Acquired Illness or Injury  3/31/2022 1859 by Lucrecia Abreu RN  Outcome: Ongoing, Progressing  3/31/2022 1858 by Lucrecia Abreu RN  Outcome: Ongoing, Progressing  Goal: Optimal Comfort and Wellbeing  3/31/2022 1859 by Lucrecia Abreu RN  Outcome: Ongoing, Progressing  3/31/2022 1858 by Lucrecia Abreu RN  Outcome: Ongoing, Progressing  Goal: Readiness for Transition of Care  3/31/2022 1859 by Lucrecia Abreu RN  Outcome: Ongoing, Progressing  3/31/2022 1858 by Lucrecia Abreu RN  Outcome: Ongoing, Progressing  Intervention: Mutually Develop Transition Plan  Recent Flowsheet Documentation  Taken 3/31/2022 1600 by Lucrecia Abreu RN  Equipment Currently Used at Home: ( he has 2 walkers and canes) walker, rolling     Problem: Risk for Delirium  Goal: Optimal Coping  3/31/2022 1859 by Lucrecia Abreu, RN  Outcome: Ongoing, Progressing  3/31/2022 " 1858 by Lucrecia Abreu RN  Outcome: Ongoing, Progressing  Goal: Improved Behavioral Control  3/31/2022 1859 by Lucrecia Abreu RN  Outcome: Ongoing, Progressing  3/31/2022 1858 by Lucrecia Abreu RN  Outcome: Ongoing, Progressing  Goal: Improved Attention and Thought Clarity  3/31/2022 1859 by Lucrecia Abreu RN  Outcome: Ongoing, Progressing  3/31/2022 1858 by Lucrecia Abreu RN  Outcome: Ongoing, Progressing  Goal: Improved Sleep  3/31/2022 1859 by Lucrecia Abreu RN  Outcome: Ongoing, Progressing  3/31/2022 1858 by Lucrecia Abreu RN  Outcome: Ongoing, Progressing     Problem: Fluid Volume Excess  Goal: Fluid Balance  Outcome: Ongoing, Progressing  VS stable. Pt admited on the floor  around 1600 .  Tele was sinus  bradycardia and 1 degree  AVB. He denies any chest pain.SOB during activity during direct care.   Oxygen saturation maintained well on RA.  Congested cough noted.. LS diminished.  Perea intact and hematuria noted and MD was at bed side and  He aware it.   Skin is fragile and denuded,  reposition every 2hrs.

## 2022-03-31 NOTE — PHARMACY-ANTICOAGULATION SERVICE
Clinical Pharmacy - Warfarin Dosing Consult     Pharmacy has been consulted to manage this patient s warfarin therapy.  Indication: Atrial Fibrillation  Therapy Goal: INR 2-3  Provider/Team: [facility] [place]  Warfarin Prior to Admission: Yes  Warfarin PTA Regimen: 4 mg 3/30, then 6 mg Tuesday & Thursday, 5 mg all other days  Significant drug interactions: none currently  Recent documented change in oral intake/nutrition: Unknown  Dose Comments: INR is on the upper end of goal range, give a slightly lower dose today    INR   Date Value Ref Range Status   03/31/2022 2.87 (H) 0.90 - 1.15 Final   01/22/2020 2.79 (H) 0.90 - 1.10 Final       Recommend warfarin 4 mg today.  Pharmacy will monitor Robinson Medel daily and order warfarin doses to achieve specified goal.      Please contact pharmacy as soon as possible if the warfarin needs to be held for a procedure or if the warfarin goals change.

## 2022-03-31 NOTE — ED NOTES
Left great toe - 1 cm x 2 cm nonblanchable. Skin sloughing off  Left lower leg, sloughing skin 5 cm x 5 cm medial, lateral 3 cm x 8 cm   Knee down to foot, skin red and blanchable. Edema     Right leg 2cm x 2cm anterior shin sloughing  2cm x 2cm medial sloughing  Several scabs noted.

## 2022-03-31 NOTE — ED PROVIDER NOTES
Emergency Department Encounter   NAME: Robinson Medel ; AGE: 89 year old male ; YOB: 1930 ; MRN: [id number] ; PCP: Elizabeth Alexandre   ED PROVIDER: Saranya Aj PA-C    Evaluation Date & Time:   3/31/2022 12:01 PM    CHIEF COMPLAINT:  Leg Swelling      Impression and Plan   MDM:     Patient has a known history of CHF, and was in his cardiology office 8 days ago showing evidence of CHF exacerbation.  I offered and encouraged admission at that time, however he ultimately declined and preferred to attempt as an outpatient.  They increase his Lasix and he is currently taking 80 mg/day as well as using metolazone 0.5 tablet 3 days a week.  Despite this, his legs have gradually increased in size, wife has noticed new redness as well as increasing shortness of breath.  Here in the emergency department, he is afebrile and vitally stable.  He is in no acute distress, though does have some increased respiratory effort with crackles in his mid and lower lung bases.  He has leg dressings in place which were removed and he has significant +2 bilateral pitting edema.  Presentation today is most consistent with acute CHF exacerbation.  He also has venous stasis changes and some chronic wounds as well as some significant erythema and warmth to the legs bilaterally.  No crepitus or evidence of necrosis.  Exam is concerning for a superimposed cellulitis.  He was started on antibiotics yesterday and has only had 1 pill.  IV Zosyn ordered. Chest x-ray, labs, EKG ordered to further evaluate.  I discussed the case with cardiology, who has no further recommendations at this time will see patient during his inpatient admission.  Patient was given a one-time dose of 120 mg of IV Lasix for further diuresis.  Perea placed for strict ins and outs.  I discussed the case with Dr. Finch who has accepted the patient for admission.  Patient and wife are comfortable with the plan.    BNP returned at 712 chest x-ray showed small  bilateral effusions and mild pulmonary venous congestion consistent with CHF exacerbation.  Hyperglycemic though no evidence of DKA or HHS.  Baseline anemia unchanged.  EKG showed known atrial fibrillation with left axis deviation.  No acute ischemic changes and troponin is negative.      ED COURSE:  12:22 PM I met and introduced myself to the patient. I gathered initial history and performed my physical exam. We discussed plan for initial workup.   12:33 PM Staffed the patient with Dr. Collazo.  12:43 PM Patient's case discussed with Dr. Warren, cardiology. He will come and assess the patient.  12:47 PM Call placed to admitting physician.  1:16 PM I spoke with the hospitalist, Dr. Finch. We discussed the patient's case, and they agree to admit the patient.    At the conclusion of the encounter I discussed the results of all the tests and the disposition. The questions were answered. The patient or family acknowledged understanding and was agreeable with the care plan.    FINAL IMPRESSION:    ICD-10-CM    1. Acute on chronic congestive heart failure, unspecified heart failure type (H)  I50.9    2. Atrial fibrillation, unspecified type (H)  I48.91    3. Anticoagulated on Coumadin  Z79.01          MEDICATIONS GIVEN IN THE EMERGENCY DEPARTMENT:  Medications   insulin glargine (LANTUS PEN) injection 34 Units (has no administration in time range)   insulin aspart (NovoLOG) injection (RAPID ACTING) (4 Units Subcutaneous Given 3/31/22 1438)   insulin aspart (NovoLOG) injection (RAPID ACTING) (1 Units Subcutaneous Given 3/31/22 1437)   insulin aspart (NovoLOG) injection (RAPID ACTING) (has no administration in time range)   furosemide (LASIX) injection 80 mg (has no administration in time range)   acetaminophen (TYLENOL) tablet 650 mg (has no administration in time range)     Or   acetaminophen (TYLENOL) Suppository 650 mg (has no administration in time range)   cefTRIAXone (ROCEPHIN) 1 g vial to attach to  mL bag for  ADULTS or NS 50 mL bag for PEDS (1 g Intravenous New Bag 3/31/22 1400)   furosemide (LASIX) injection 120 mg (120 mg Intravenous Given 3/31/22 1358)         NEW PRESCRIPTIONS STARTED AT TODAY'S ED VISIT:  New Prescriptions    No medications on file         HPI   Patient information was obtained from: Patient's wife   Use of Intrepreter: N/A      Robinson Medel is a 89 year old male with a pertinent history of CHF, diabetes mellitus type II, PVD, hyperlipidemia, paroxysmal Afib, anemia, HTN, and cecal cancer s/p right hemicolectomy who presents to the ED by wheelchair with his wife for evaluation of leg swelling and shortness of breath.     Per the patient's wife, he has had increased leg swelling and redness for the past week, and is 20 pounds above his target weight. He has had increased shortness of breath with exertion and is wheezing at baseline. He has wrappings to his legs to help with the swelling. The wrappings and dressings to his legs were last changed yesterday. He was started on antibiotics yesterday for wounds to his legs. He currently takes 80 mg Lasix daily and 2.5 mg metolazone three times per week.    Per chart review, the patient had an appointment with his cardiologist on 3/23/22 for follow-up of his heart failure and lower extremity edema. His Lasix dose was recently increased. He reported initial weight loss of 5 pounds, but at his appointment he was back up to 224 pounds. He laos had new redness and blisters to his legs. He endorses shortness of breath with exertion. Exam notable for 3+ pitting edema to bilateral legs and decreased breath sounds at lung bases. The patient is 20 pounds volume overloaded on exam. He declined hospitalization, wanting to diurese at home. Recommended continue 80 mg Lasix daily, restart metolazone 2.5 mg Monday, Wednesday, Friday, and repeat labs in 7-10 days. Goal weight of 205 pounds.      REVIEW OF SYSTEMS:  Review of Systems   Constitutional: Negative for  chills and fever.   Respiratory: Positive for shortness of breath and wheezing.    Cardiovascular: Positive for leg swelling. Negative for chest pain.   Musculoskeletal: Negative for back pain.   Skin: Positive for wound (bilateral legs).   Neurological: Negative for dizziness, syncope and light-headedness.   All other systems reviewed and are negative.        Medical History     Past Medical History:   Diagnosis Date     Anemia      Coronary artery disease 9/25/2015     Diabetes mellitus (H)      Duodenitis 5/11/2016     Dyslipidemia      Gastroduodenal ulcer 9/24/2015     GERD (gastroesophageal reflux disease)      GI bleed 07/08/2016     Gout      Hematuria      Hyperlipemia      Hypertension      Kidney stone 10/6/2015       Past Surgical History:   Procedure Laterality Date     ANGIOPLASTY  09/2015     COLONOSCOPY N/A 6/17/2016    Procedure: COLONOSCOPY with biopsies in cecum using biopsy forcep and a transverse colon polypectomy using a hot snare;  Surgeon: Steve Angel MD;  Location: [facility];  Service:      CYSTOSCOPY PROSTATE W/ LASER N/A 2/9/2016    Procedure: CYSTOSCOPY TRANSURETHRAL RESECTION PROSTATE;  Surgeon: Chano Smith MD;  Location: [facility] [place];  Service:      CYSTOSCOPY W/ LITHOLAPAXY / EHL N/A 2/9/2016    Procedure: CYSTOLITHOLAPAXY ;  Surgeon: Chano Smith MD;  Location: [facility] [place];  Service:      ESOPHAGOSCOPY, GASTROSCOPY, DUODENOSCOPY (EGD), COMBINED N/A 5/8/2016    Procedure: ESOPHAGOGASTRODUODENOSCOPY;  Surgeon: Kieran Beltran MD;  Location: [facility];  Service:      ESOPHAGOSCOPY, GASTROSCOPY, DUODENOSCOPY (EGD), COMBINED N/A 7/8/2016    Procedure: ESOPHAGOGASTRODUODENOSCOPY;  Surgeon: Chano Wynn MD;  Location: [facility];  Service:      KIDNEY STONE SURGERY       OTHER SURGICAL HISTORY      nose polyps     VT LAP,SURG,COLECTOMY, PARTIAL, W/ANAST N/A 6/18/2016    Procedure: LAPAROSCOPIC ASSISTED RIGHT COLECTOMY ROOM 310;  Surgeon: Radha  BABAR Bueno MD;  Location: [facility] OR;  Service: General       Family History   Problem Relation Age of Onset     Diabetes Mother      Heart Disease Father      Mental Illness Daughter         Depression       Social History     Tobacco Use     Smoking status: Never Smoker     Smokeless tobacco: Never Used     Tobacco comment: only smoked a little bit during high school   Substance Use Topics     Alcohol use: No     Drug use: No       atenolol (TENORMIN) 50 MG tablet  atorvastatin (LIPITOR) 10 MG tablet  cephALEXin (KEFLEX) 500 MG capsule  [employer] IRON 240 (27 Fe) MG TABS  furosemide (LASIX) 40 MG tablet  gentamicin (GARAMYCIN) 0.1 % external ointment  glipiZIDE (GLUCOTROL XL) 5 MG 24 hr tablet  insulin glargine (LANTUS PEN) 100 UNIT/ML pen  metFORMIN (GLUCOPHAGE) 500 MG tablet  metOLazone (ZAROXOLYN) 5 MG tablet  multivitamin with minerals tablet  omeprazole (PRILOSEC) 20 MG capsule  potassium chloride ER (KLOR-CON M) 20 MEQ CR tablet  warfarin ANTICOAGULANT (COUMADIN) 5 MG tablet          Physical Exam     First Vitals:  Patient Vitals for the past 24 hrs:   BP Temp Temp src Pulse Resp SpO2 Weight   03/31/22 1430 -- -- -- 59 -- 96 % --   03/31/22 1400 -- -- -- 57 -- 96 % --   03/31/22 1330 -- -- -- 62 17 97 % --   03/31/22 1300 113/69 -- -- 58 13 97 % --   03/31/22 1230 125/76 -- -- 62 15 95 % --   03/31/22 1150 -- 98.7  F (37.1  C) Oral -- -- -- --   03/31/22 1148 127/71 -- -- 62 24 97 % 98 kg (216 lb)         PHYSICAL EXAM:   Physical Exam  Vitals and nursing note reviewed.   Constitutional:       General: He is not in acute distress.     Appearance: Normal appearance. He is not toxic-appearing.   HENT:      Head: Normocephalic.      Mouth/Throat:      Mouth: Mucous membranes are moist.   Eyes:      Conjunctiva/sclera: Conjunctivae normal.   Cardiovascular:      Rate and Rhythm: Normal rate and regular rhythm.      Pulses: Normal pulses.      Heart sounds: Normal heart sounds.   Pulmonary:      Effort: No  respiratory distress.   Abdominal:      General: Abdomen is flat. There is no distension.      Palpations: Abdomen is soft.      Tenderness: There is no abdominal tenderness. There is no guarding or rebound.      Comments: No swelling of the abdomen.   Musculoskeletal:      Comments: +2 bilateral pitting edema with significant weeping of the bilateral lower extremities.  Venous stasis changes with open ulcerations.  Significant erythema to bilateral calves that is warm to the touch.  No purulent drainage.  No crepitus or necrosis of skin.   Skin:     General: Skin is warm and dry.   Neurological:      Mental Status: He is alert.             Results     LAB:  All pertinent labs reviewed and interpreted  Labs Ordered and Resulted from Time of ED Arrival to Time of ED Departure   B-TYPE NATRIURETIC PEPTIDE ([facility] ONLY) - Abnormal       Result Value     (*)    BASIC METABOLIC PANEL - Abnormal    Sodium 139      Potassium 3.6      Chloride 100      Carbon Dioxide (CO2) 28      Anion Gap 11      Urea Nitrogen 30 (*)     Creatinine 1.26      Calcium 9.1      Glucose 223 (*)     GFR Estimate 54 (*)    CBC WITH PLATELETS - Abnormal    WBC Count 5.6      RBC Count 4.29 (*)     Hemoglobin 13.0 (*)     Hematocrit 40.5      MCV 94      MCH 30.3      MCHC 32.1      RDW 15.4 (*)     Platelet Count 204     INR - Abnormal    INR 2.87 (*)    PARTIAL THROMBOPLASTIN TIME - Abnormal    aPTT 45 (*)    GLUCOSE BY METER - Abnormal    GLUCOSE BY METER POCT 167 (*)    TROPONIN I - Normal    Troponin I 0.10     COVID-19 VIRUS (CORONAVIRUS) BY PCR       RADIOLOGY:  Echocardiogram Complete   Final Result      Chest XR,  PA & LAT   Final Result   IMPRESSION: Heart is moderately enlarged, unchanged. Small bilateral effusions which appears similar to prior examination. Mild pulmonary venous congestion which is increased when compared to prior examination. Findings suggestive of mild congestive    heart failure.            ECG:  Performed  at: 12:14    Impression: Atrial fibrillation with a competing junctional pacemaker. Left axis deviation. Possible anterior infarct, age undetermined. Abnormal ECG. When compared with ECG of 2/16/18, no significant change was found.    Rate: 64 bpm  Rhythm: Atrial fibrillation with a competing junctional pacemaker  Axis: -82  GA Interval: N/A  QRS Interval: 118 ms  QTc Interval: 472 ms  ST Changes: None  Comparison: When compared with ECG of 2/16/18, no significant change was found.    EKG results reviewed and interpreted by Dr. Collazo ED MD.       IIbrahima, am serving as a scribe to document services personally performed by Saranya Aj PA-C, based on my observation and the provider's statements to me. Saranya DE LUNA PA-C attest that Ibrahima Treadwell is acting in a scribe capacity, has observed my performance of the services and has documented them in accordance with my direction.       Saranya Aj PA-C   Emergency Medicine   [facility] EMERGENCY DEPARTMENT     Saranya Aj PA-C  03/31/22 9824

## 2022-03-31 NOTE — PHARMACY-ADMISSION MEDICATION HISTORY
Pharmacy Note - Admission Medication History    Pertinent Provider Information: N/A     ______________________________________________________________________    Prior To Admission (PTA) med list completed and updated in EMR.       Current Facility-Administered Medications for the 3/31/22 encounter (Hospital Encounter)   Medication     lidocaine (XYLOCAINE) 2 % external gel     PTA Med List   Medication Sig Note Last Dose     atenolol (TENORMIN) 50 MG tablet Take 50 mg by mouth daily   3/31/2022 at AM     atorvastatin (LIPITOR) 10 MG tablet Take 10 mg by mouth At Bedtime   3/30/2022 at PM     cephALEXin (KEFLEX) 500 MG capsule Take 500 mg by mouth 2 times daily For 7 days starting 3/31/22.  3/31/2022 at AM, 1st dose     [employer] IRON 240 (27 Fe) MG TABS Take 1 tablet by mouth daily  3/31/2022 at AM     furosemide (LASIX) 40 MG tablet Take 80 mg by mouth daily   3/31/2022 at AM     gentamicin (GARAMYCIN) 0.1 % external ointment Apply topically every 72 hours Apply 5 grams every 72 hours to the leg ulcers  3/30/2022 at Unknown time     glipiZIDE (GLUCOTROL XL) 5 MG 24 hr tablet Take 5 mg by mouth 2 times daily (with meals)   3/31/2022 at AM     insulin glargine (LANTUS PEN) 100 UNIT/ML pen Inject 34 Units Subcutaneous At Bedtime  3/30/2022 at PM     metFORMIN (GLUCOPHAGE) 500 MG tablet Take 500 mg by mouth 2 times daily (with meals)   3/31/2022 at AM     metOLazone (ZAROXOLYN) 5 MG tablet Take 2.5 mg by mouth three times a week Monday, Wednesday, Friday  3/30/2022 at Unknown time     multivitamin with minerals tablet Take 1 tablet by mouth daily   3/31/2022 at AM     omeprazole (PRILOSEC) 20 MG capsule [OMEPRAZOLE (PRILOSEC) 20 MG CAPSULE] Take 20 mg by mouth daily.   3/31/2022 at AM     potassium chloride ER (KLOR-CON M) 20 MEQ CR tablet Take 20 mEq by mouth 2 times daily (with meals)  3/31/2022 at AM     warfarin ANTICOAGULANT (COUMADIN) 5 MG tablet Take 5-6 mg by mouth See Admin Instructions Take 4 mg on 3/30, then 6 mg  Tuesday and Thursday, 5 mg all other days of the week. 3/31/2022: Has several strengths of tablet 3/30/2022 at PM, 4 mg       Information source(s): Patient, Family member and CareEverywhere/SureScripts  Method of interview communication: in-person    Summary of Changes to PTA Med List  New: Keflex  Discontinued: N/A  Changed: Lasix dosing, glipizide dosing, Lantus dose, metformin freq, metolazone dosing, potassium freq, warfarin dosing    Patient was asked about OTC/herbal products specifically.  PTA med list reflects this.    In the past week, patient estimated taking medication this percent of the time:  greater than 90%.    Allergies were reviewed, assessed, and updated with the patient.      Patient does not use any multi-dose medications prior to admission.    The information provided in this note is only as accurate as the sources available at the time of the update(s).    Thank you for the opportunity to participate in the care of this patient.    Elmo Gibbs [facility]  3/31/2022 1:02 PM

## 2022-04-01 PROBLEM — E83.42 HYPOMAGNESEMIA: Status: ACTIVE | Noted: 2022-01-01

## 2022-04-01 NOTE — PLAN OF CARE
Problem: Risk for Delirium  Goal: Optimal Coping  Outcome: Ongoing, Progressing    Problem: Fluid Volume Excess  Goal: Fluid Balance  Outcome: Ongoing, Progressing    BP was low  in the morning and Hospitals updated and he had  250 bolus and  BP improved.  Pt is alert and oriented x4.. Tele is a- fib with HR controled. Perea is intact and patent and  he had 1000 red urine output.  Pt has been up in chair and walking in the hallway by PT.   Skin care is done as order. Leg wound care is don by Wound nurse.

## 2022-04-01 NOTE — PROGRESS NOTES
Care Management Follow Up    Length of Stay (days): 1    Expected Discharge Date: 04/03/2022       Concerns to be Addressed:   Alteration in cardiopulmonary status requiring IV Lasix every 8 hours. IV Rocephin, IV Gentamycin. Chronic wounds (lower extremity venous stasis and buttock), WOCN consulted.   Patient plan of care discussed at interdisciplinary rounds: Yes    Anticipated Discharge Disposition:  Home     Anticipated Discharge Services:  Home therapy  Anticipated Discharge DME:  Per therapy (if indicated). Uses a walker or cane at baseline.    Patient/family educated on Medicare website which has current facility and service quality ratings:  Yes  Education Provided on the Discharge Plan:  Per team  Patient/Family in Agreement with the Plan:  Yes     Referrals Placed by CM/SW:  [facility];   Private pay costs discussed: Not applicable     Additional Information:  Patient lives in a 1 level town house with his wife Virginia and adult daughter Wendy. He is independent with some activities of daily living but his wife and daughter help with others (housekeeping, laundry, meal prep, shopping, and transportation). Patient uses a walker or cane for mobility. He has 2 walkers and 4 canes at home he can use.  [facility] RN comes 3 times a week for wound cares and wrapping of his legs. Will make a referral to [facility] to determine if they could had home PT as well. Wife plans to transport at discharge.  11:37 AM:  [facility] can add home PT starting 4/5/2022. Initial referral sent for purposes of coordinating discharge planning.    Sabina Kwok RN

## 2022-04-01 NOTE — PLAN OF CARE
Problem: Fluid Volume Excess  Goal: Fluid Balance  Outcome: Ongoing, Progressing    Pt gets short of breath on exertion, maintaining saturation on RA.  Lungs diminished.  Pt continues on IV lasix.   Perea catheter draining pink urine; clearing up from red colored urine from previous shift.  Pt is down 7 lbs from previous day.    Problem: Impaired Wound Healing  Goal: Optimal Wound Healing  Outcome: Ongoing, Progressing  Intervention: Promote Wound Healing  Recent Flowsheet Documentation  Taken 3/31/2022 1950 by Mayte Naik RN  Activity Management: activity adjusted per tolerance     Pt with venous stasis ulcers and lymphedema to BLE covered with kerlix and ace wrap.  Pt complained ace wrap too tight around ankle and feet; loosened them up and Tylenol given for comfort. Pt appears much more comfortable.  Wound consult already ordered.

## 2022-04-01 NOTE — PROGRESS NOTES
Overnight study done. Scanned into patient chart. Patient remained on room air throughout whole study.     Favio Quinones, RT

## 2022-04-01 NOTE — CONSULTS
Cardiology Progress Note    Assessment/Plan:  Acute on chronic HFpEF  Severe Pulmonary hypertension   BNP elevated to 712 on admission.  Echo with evidence of elevated pulmonary and right sided pressures. UOP of 3L overnight. Dry weight #205, still at #213 today. Oximetry overnight >90%, makes KELLEN less likely.   - Continue diuresis- did have some lower BP this morning, however, as long as he is not symptomatic would continue 80mg lasix q8h  -Continue spironolactone  - K and Mag replacement as needed  -Plan to switch to PO Bumex daily before discharge  -Strict I&O, daily weights   -Low salt diet     Code Status/Goals of Care  Discussed with patient that while diuresis will likely help him to feel better, we do not have therapeutic interventions to improve his overall condition/prognosis.  He expressed understanding of this and clearly stated his desire to be DNR with no chest compressions.  He was unsure about intubation/changing to DNI status and will continue to consider this.        Principal Problem:    Acute on chronic diastolic congestive heart failure (H)  Active Problems:    Venous stasis dermatitis of both lower extremities    Atherosclerotic heart disease of native coronary artery without angina pectoris    Benign essential HTN    Gastroduodenal ulcer    Paroxysmal atrial fibrillation (H)    Gastroesophageal reflux disease with esophagitis    Type 2 diabetes mellitus without complications (H)    Bilateral hearing loss    Anticoagulated on Coumadin    Chronic kidney disease, stage 3a (H)    Hypokalemia     LOS: 1 day     Subjective:  Thinks his breathing is slightly better today.  Continues to be fatigued.      Objective:   Vital signs in last 24 hours:  Vitals:    04/01/22 0815 04/01/22 0817 04/01/22 0910 04/01/22 0944   BP: (!) 79/51 (!) 82/51 99/54 133/85   BP Location:  Right arm Right arm Right arm   Patient Position:    Sitting   Pulse:  57 56 69   Resp:  20     Temp:  97.3  F (36.3  C)     TempSrc:   Oral     SpO2:  94%     Weight:       Height:         Weight:   Wt Readings from Last 3 Encounters:   04/01/22 96.9 kg (213 lb 9.6 oz)   03/07/22 97.8 kg (215 lb 11.2 oz)   01/24/22 95.7 kg (211 lb)           PHYSICAL EXAM      Respiratory:  Normal breath sounds, No respiratory distress, No wheezing, No chest tenderness.   Cardiovascular:  Bradycardic, atrial fibrillation, No murmurs, No rubs, No gallops.   GI:  Bowel sounds normal, Soft, No tenderness, No masses  Extremities: bilateral 3+ edema to thighs, presacral edema        Cardiographics:   Telemetry:afib, intermittently bradycardic     ECG (personally reviewed): Afib, no significant changes from previous     Imaging:   CXR (3/31/22): Cardiac silhouette unchanged from previous.  Mild pulmonary venous congestion.    Cardiac diagnostics   Echo (3/31/2022):  1. Normal left ventricular size and systolic performance with a visually  estimated ejection fraction of 50-55%.  2. There is moderate concentric increase in left ventricular wall thickness.  3. There is trace aortic insufficiency.  4. There is mild-moderate right ventricular enlargement with mildly reduced  right ventricular systolic performance.  5. There is moderate-severe biatrial enlargement.  6. Right ventricular systolic pressure relative to right atrial pressure is  moderately increased. The pulmonary artery pressure is estimated to be 60-65  mmHg plus right atrial pressure. In addition, the IVC appears dilated with  decreased phasic variation in caval diameter consistent with elevated right  atrial pressure.    Lab Results:   Lab Results   Component Value Date    WBC 5.6 03/31/2022    HGB 13.0 (L) 03/31/2022    HCT 40.5 03/31/2022     03/31/2022    CHOL 94 11/23/2020    TRIG 64 11/23/2020    HDL 44 11/23/2020    ALT 62 (H) 11/21/2018    AST 40 11/21/2018     04/01/2022    BUN 30 (H) 04/01/2022    CO2 31 04/01/2022    INR 2.94 (H) 04/01/2022     Lab Results   Component Value Date     TROPONINI 0.10 03/31/2022       I have seen and examined the patient, reviewed the Resident s note, and agree with history, exam, and plan of care as we have outlined. MD Jaime Acharya MD [facility]  4/1/2022

## 2022-04-01 NOTE — CONSULTS
[facility]  WOC Nurse Inpatient Assessment     Today's Assessment: Initial consult    Patient History (according to provider note(s):    Patient followed by Mei Sampson at vascular clinic    AREAS ASSESSED:    focused bilateral legs and buttocks    Wound Location: Bilateral lower legs  LAST PHOTO: None  WOUND DUE TO: Venous Ulcer  WOUND HISTORY/PLAN OF CARE:   Chronic, followed by vascular clinic  WOUND BASE: 100 % granulation tissue     Palpation of the wound bed: normal      Drainage: scant     Description of drainage: serosanguinous     Measurements (length x width x depth, in cm) Left leg with 3 wounds, largest approximately 3x5x0.3cm              Right leg 2 open areas approximately 2x1x0.3cm each     Tunneling N/A     Undermining N/A  PERIWOUND SKIN: dry/scaly, edematous and hemosiderin staining      Color: pink      Temperature: normal   ODOR: none  PAIN: mild, tender  PAIN INTERVENTIONS PRIOR TO DRESSING CHANGE: Declined  WOUND CARE RATIONALE: Promote moist wound healing without tissue dehydration  and Provide protection   TREATMENT GOAL: Maintain or improve  STATUS: initial assessment  SUPPLIES ORDERED: Xeroform      Bilateral buttocks with CTI - purple disclooration and dry skin  Start Criticaid Clear twice daily    TREATMENT PLAN:     Bilateral legs:  1. Cleanse with warms soapy water and gently pat dry  2. Apply Xeroform to open wounds (used Adaptic 4/1 as no xerofrom available)  Apply Aquaphor to skin  Wrap with kerlix  Apply lymphedema wraps as ordered  Change Daily    RECOMMEND PRIMARY TEAM ORDER: None, at this time  Education provided to: plan of care  Discussed plan of care with: Patient, Nurse and lymphedema specialist  WOC Nurse follow-up plan:weekly  Notify WOC if wound(s) deteriorate.  Nursing to notify the Provider(s) and re-consult the WOC Nurse if new skin concern.    DATA:     Current support surface: Standard  Foam mattress  Containment of urine/stool: Brief  BMI:  Body mass index is 28.97 kg/m .   Active Diet Order: Orders Placed This Encounter      Combination Diet Moderate Consistent Carb (60 g CHO per Meal) Diet; 2 gm NA Diet; No Caffeine for 24 hours (Once tests completed, may have caffeine), Low Saturated Fat Diet     Output: I/O last 3 completed shifts:  In: 340 [P.O.:340]  Out: 3950 [Urine:3950]   Labs: Recent Labs   Lab Test 04/01/22  0611 03/31/22  1200 01/22/20  1526 11/21/18  1122   ALBUMIN  --   --   --  3.4*   HGB  --  13.0*   < >  --    INR 2.94* 2.87*   < >  --    WBC  --  5.6   < >  --    A1C  --  7.4*   < >  --     < > = values in this interval not displayed.      Pressure Injury Risk Assessment: Alec Risk Assessment  Sensory Perception: 3-->slightly limited    Moisture: 3-->occasionally moist   Activity: 3-->walks occasionally     Mobility: 3-->slightly limited   Nutrition: 3-->adequate   Friction and Shear: 2-->potential problem  Alec Score: 17     Natacha Feliciano, SABIHAN, RN, PHN, HNB-BC, CWOCN

## 2022-04-01 NOTE — PROGRESS NOTES
04/01/22 0915   Quick Adds   Type of Visit Initial PT Evaluation   Living Environment   People in Home spouse;child(yamilka), adult  (daughter)   Current Living Arrangements   ([facility])   Home Accessibility no concerns  (one level, no stairs to enter)   Transportation Anticipated family or friend will provide   Self-Care   Usual Activity Tolerance moderate   Current Activity Tolerance fair   Activity/Exercise/Self-Care Comment states he does some of his self cares but wife assists with most   General Information   Onset of Illness/Injury or Date of Surgery 03/31/22   Referring Physician SYEDA Finch   Patient/Family Therapy Goals Statement (PT) none stated   Pertinent History of Current Problem (include personal factors and/or comorbidities that impact the POC) admit with SOB, LE edema, CHF   Existing Precautions/Restrictions fall   General Observations very [place], agrees to PT   Cognition   Affect/Mental Status (Cognition) WFL   Orientation Status (Cognition) person;place;situation   Pain Assessment   Patient Currently in Pain No   Range of Motion (ROM)   ROM Comment LE ROM grossly WFL for mobility   Strength (Manual Muscle Testing)   Strength Comments LE strength 4/5, fatigues failry easily   Bed Mobility   Bed Mobility supine-sit   Supine-Sit [place] (Bed Mobility) minimum assist (75% patient effort);verbal cues   Assistive Device (Bed Mobility) bed rails   Comment, (Bed Mobility) pulls on PTs hand, able to sit/scoot to EOB indep   Transfers   Transfers sit-stand transfer   Sit-Stand Transfer   Sit-Stand [place] (Transfers) contact guard;2 person assist;verbal cues   Assistive Device (Sit-Stand Transfers) walker, front-wheeled   Comment, (Sit-Stand Transfer) flexed posture at baseline   Gait/Stairs (Locomotion)   [place] Level (Gait) minimum assist (75% patient effort);contact guard   Assistive Device (Gait) walker, front-wheeled   Distance in Feet (Required for LE Total Joints) 75'   Pattern (Gait)  step-through   Comment, (Gait/Stairs) slower pace, no LOB, flexed posture   Clinical Impression   Criteria for Skilled Therapeutic Intervention Yes, treatment indicated   PT Diagnosis (PT) decreased functional mobility   Influenced by the following impairments fatigue   Functional limitations due to impairments transfers and gt   Clinical Presentation (PT Evaluation Complexity) Stable/Uncomplicated   Clinical Presentation Rationale presents as diagnosed   Clinical Decision Making (Complexity) moderate complexity   Planned Therapy Interventions (PT) bed mobility training;transfer training;gait training;strengthening;balance training   Anticipated Equipment Needs at Discharge (PT) walker, rolling;cane, straight  (will cont to assess, uses a cane prior to admit)   Risk & Benefits of therapy have been explained care plan/treatment goals reviewed;patient   PT Discharge Planning   PT Discharge Recommendation (DC Rec) home with assist;home with home care physical therapy   PT Rationale for DC Rec assist of family as prior to admit, PT to increase strength and overall functional mobility   PT Brief overview of current status tolerated PT well   Total Evaluation Time   Total Evaluation Time (Minutes) 10   Physical Therapy Goals   PT Frequency Daily   PT Predicated Duration/Target Date for Goal Attainment 04/06/22   PT Goals Bed Mobility;Transfers;Gait   PT: Bed Mobility Supervision/stand-by assist   PT: Transfers Supervision/stand-by assist;Sit to/from stand;Bed to/from chair;Assistive device   PT: Gait Supervision/stand-by assist;Rolling walker;150 feet

## 2022-04-02 NOTE — PROGRESS NOTES
[facility]    PROGRESS NOTE - Hospitalist Service    Assessment and Plan    Principal Problem:    Acute on chronic diastolic congestive heart failure (H)  Active Problems:    Venous stasis dermatitis of both lower extremities    Atherosclerotic heart disease of native coronary artery without angina pectoris    Benign essential HTN    Gastroduodenal ulcer    Paroxysmal atrial fibrillation (H)    Gastroesophageal reflux disease with esophagitis    Type 2 diabetes mellitus without complications (H)    Bilateral hearing loss    Anticoagulated on Coumadin    Chronic kidney disease, stage 3a (H)    Hypokalemia    Hypomagnesemia    Robinson Medel is a 89 year old old male with h/o CHF, diabetes mellitus type II, PVD, hyperlipidemia, paroxysmal Afib, anemia, HTN, and cecal cancer s/p right hemicolectomy who presents to the ED by wheelchair with his wife for evaluation of leg swelling and shortness of breath.       Per chart review, the patient had an appointment with his cardiologist on 3/23/22 for follow-up of his heart failure and lower extremity edema. His Lasix dose was recently increased. He reported initial weight loss of 5 pounds, but at his appointment he was back up to 224 pounds. He laos had new redness and blisters to his legs. He endorses shortness of breath with exertion. Exam notable for 3+ pitting edema to bilateral legs and decreased breath sounds at lung bases. The patient is 20 pounds volume overloaded on exam. He declined hospitalization, wanting to diurese at home. Recommended continue 80 mg Lasix daily, restart metolazone 2.5 mg Monday, Wednesday, Friday, and repeat labs in 7-10 days. Goal weight of 205 pounds.       Acute on chronic diastolic congestive heart failure: Elevated pulmonary pressures noted on echo with normal wall motion abnormality.  - cardiology consulted IV lasix continue for now, cardiology to determine when to change to Oral  - continue spironolactone    - continue atenolol with hold parameters and lisinopril 5mg daily   -  Overnight oximetry ordered as may have undiagnosed sleep apnea  - Lyte replacement  - solitario in place will remove once changed from IV   - telemetry     Venous stasis dermatitis of both lower extremities:   - diuresing as above  - lymphedema care  - getting IV ceftriaxone for cellulitis but legs wrapped. 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:  - BG low this am on home regiment, but in a controlled environment   - decrease lantus to 25 units at bedtime and am BG stable continue for now at this dose and reassess in am   - hold glipizide and metformin for now   - novolog sliding scale     Hypomag   - 4gm IV mag and recheck today stable     Groin candidiasis: Miconazole powder ordered    Benign essential HTN:   - continue home meds with hold parameters     Gastroduodenal ulcer with GERD: Continue home PPI      Bilateral hearing loss: Patient comprehension good is speaking loudly and clearly    Chronic kidney disease, stage 3a: trend while diuresing      COVID-19 PCR Results    COVID-19 PCR Results 3/31/22   SARS CoV2 PCR Negative      Comments are available for some flowsheets but are not being displayed.         COVID-19 Antibody Results, Testing for Immunity    COVID-19 Antibody Results, Testing for Immunity   No data to display.            VTE prophylaxis:  Warfarin  DIET: Orders Placed This Encounter      Combination Diet Moderate Consistent Carb (60 g CHO per Meal) Diet; 2 gm NA Diet; No Caffeine for 24 hours (Once tests completed, may have caffeine), Low Saturated Fat Diet    Drains/Lines: none  Weight bearing status: WBAT  Disposition/Barriers to discharge: Pending diuresis and cards changing to oral diuretic   Code Status: No  CPR- Do NOT Intubate    Subjective:  Sitting up in chair  sleeping comfortable     PHYSICAL EXAM  Temp:  [97.3  F (36.3  C)-98.4  F (36.9  C)] 98.4  F (36.9  C)  Pulse:  [56-75] 64  Resp:  [18-22] 18  BP: ()/(51-85) 103/59  SpO2:  [94 %-96 %] 94 %  Wt Readings from Last 1 Encounters:   04/02/22 95.9 kg (211 lb 6.4 oz)       Intake/Output Summary (Last 24 hours) at 4/1/2022 0804  Last data filed at 4/1/2022 0342  Gross per 24 hour   Intake 340 ml   Output 3950 ml   Net -3610 ml      Body mass index is 28.67 kg/m .  Physical Exam  Constitutional:       Comments: Sleeping in chair, arousable, NAD Koi   HENT:      Head: Normocephalic and atraumatic.   Cardiovascular:      Rate and Rhythm: Rhythm irregular.      Comments: Rate controlled, 2/6 MARY  Pulmonary:      Effort: Pulmonary effort is normal.      Breath sounds: Normal breath sounds.   Abdominal:      General: Bowel sounds are normal.      Palpations: Abdomen is soft.   Musculoskeletal:      Comments: BLE edema 2-3+ both legs wrapped   Skin:     Capillary Refill: Capillary refill takes less than 2 seconds.   Neurological:      Mental Status: Mental status is at baseline.       PERTINENT LABS/IMAGING:  Results for orders placed or performed during the hospital encounter of 03/31/22   Chest XR,  PA & LAT    Impression    IMPRESSION: Heart is moderately enlarged, unchanged. Small bilateral effusions which appears similar to prior examination. Mild pulmonary venous congestion which is increased when compared to prior examination. Findings suggestive of mild congestive   heart failure.       Recent Labs   Lab 04/02/22  0536 04/01/22  2049 04/01/22  1706 04/01/22  0710 04/01/22  0611 04/01/22  0056 03/31/22  1348 03/31/22  1200   WBC  --   --   --   --   --   --   --  5.6   HGB  --   --   --   --   --   --   --  13.0*   MCV  --   --   --   --   --   --   --  94   PLT  --   --   --   --   --   --   --  204   INR 2.34*  --   --   --  2.94*  --   --  2.87*      --   --   --  142  --   --  139   POTASSIUM 3.6  --   --   --  3.6 3.3*   < > 3.6   CHLORIDE 98  --   --   --  103  --   --  100   CO2 31  --   --   --  31  --   --  28   BUN 35*  --   --   --  30*  --   --  30*   CR 1.51*  --   --   --  1.19  --   --  1.26   ANIONGAP 10  --   --   --  8  --   --  11   TY 8.8  --   --   --  8.6  --   --  9.1   * 127* 92   < > 64*  --    < > 223*    < > = values in this interval not displayed.     Recent Labs   Lab Test 11/23/20  1050   CHOL 94   HDL 44   LDL 37   TRIG 64     Recent Labs   Lab Test 11/23/20  1050 10/29/19  1053 10/29/19  1005   LDL 37 46 46     Recent Labs   Lab Test 04/01/22  0710 04/01/22  0611   NA  --  142   POTASSIUM  --  3.6   CHLORIDE  --  103   CO2  --  31   GLC 70 64*   BUN  --  30*   CR  --  1.19   GFRESTIMATED  --  57*   TY  --  8.6     Recent Labs   Lab Test 03/31/22  1200 07/14/21  1630 02/02/18  0614   A1C 7.4* 7.7* 7.7*     Recent Labs   Lab Test 03/31/22  1200 01/22/20  1526 02/22/18  0923   HGB 13.0* 14.5 12.4*     Recent Labs   Lab Test 03/31/22  1200 02/16/18  2306 02/01/18  1445   TROPONINI 0.10 0.03 0.08     Recent Labs   Lab Test 03/31/22  1200 02/14/19  1019 12/13/18  1033   * 309* 308*     No results for input(s): TSH in the last 38325 hours.  Recent Labs   Lab Test 04/01/22  0611 03/31/22  1200 01/22/20  1526   INR 2.94* 2.87* 2.79*       Nadege Polanco MD  [facility] Medicine Service  [phone]

## 2022-04-02 NOTE — PROGRESS NOTES
Cardiology Progress Note    Assessment/Plan:    1. Heart failure with preserved ejection fraction, acute exacerbation functional class III.  Left ventricular hypertrophy with impaired left ventricular diastolic relaxation.  Predominant right heart failure symptoms, with normal oxygen saturation on room air.  2. Anasarca.  Responding to IV furosemide.  Likely switch to Bumex or torsemide as oral maintenance therapy    Principal Problem:    Acute on chronic diastolic congestive heart failure (H)  Active Problems:    Venous stasis dermatitis of both lower extremities    Atherosclerotic heart disease of native coronary artery without angina pectoris    Benign essential HTN    Gastroduodenal ulcer    Paroxysmal atrial fibrillation (H)    Gastroesophageal reflux disease with esophagitis    Type 2 diabetes mellitus without complications (H)    Bilateral hearing loss    Anticoagulated on Coumadin    Chronic kidney disease, stage 3a (H)    Hypokalemia    Hypomagnesemia     LOS: 2 days     Subjective:  Leg discomfort much improved.  Still wonders if he needs to remain hospitalized.      Objective:   Vital signs in last 24 hours:  Vitals:    04/01/22 2321 04/02/22 0339 04/02/22 0630 04/02/22 0732   BP: 98/56 126/70 103/59 107/65   BP Location: Left arm Right arm Left arm Left arm   Patient Position:   Semi-Palacio's    Pulse: 70 73 64 72   Resp: 18 18  18   Temp: 98.4  F (36.9  C) 98.4  F (36.9  C)  98.8  F (37.1  C)   TempSrc: Oral Oral  Oral   SpO2: 94% 94%  95%   Weight:  95.9 kg (211 lb 6.4 oz)     Height:         Weight:   Wt Readings from Last 3 Encounters:   04/02/22 95.9 kg (211 lb 6.4 oz)   03/07/22 97.8 kg (215 lb 11.2 oz)   01/24/22 95.7 kg (211 lb)           PHYSICAL EXAM      Respiratory:  Normal breath sounds, No respiratory distress, No wheezing, No chest tenderness.   Cardiovascular: Irregular S1 and S2.  2/6 systolic murmur lower left sternal border radiating to the apex.  GI:  Bowel sounds normal, Soft, No  tenderness, No masses  Extremities: 3-4+ bilateral lower extremity edema extending into posterior thighs and presacral region.  Open lower extremity wounds, dressed currently.  Ace wraps in place bilaterally up to knees       Cardiographics:   Telemetry atrial fibrillation/accelerated idioventricular rhythm, 55 to 80 bpm.    Echo (3/31/2022): Personally reviewed  1. Normal left ventricular size and systolic performance with a visually  estimated ejection fraction of 50-55%.  2. There is moderate concentric increase in left ventricular wall thickness.  3. There is trace aortic insufficiency.  4. There is mild-moderate right ventricular enlargement with mildly reduced  right ventricular systolic performance.  5. There is moderate-severe biatrial enlargement.  6. Right ventricular systolic pressure relative to right atrial pressure is  moderately increased. The pulmonary artery pressure is estimated to be 60-65  mmHg plus right atrial pressure. In addition, the IVC appears dilated with  decreased phasic variation in caval diameter consistent with elevated right  atrial pressure.    Imaging:   EXAM: XR CHEST 2 VW  LOCATION: [facility]  DATE/TIME: 3/31/2022 1:44 PM  INDICATION: CHF  COMPARISON: 03/23/2022                                                          IMPRESSION: Heart is moderately enlarged, unchanged. Small bilateral effusions which appears similar to prior examination. Mild pulmonary venous congestion which is increased when compared to prior examination. Findings suggestive of mild congestive   heart failure.    Lab Results:   Lab Results   Component Value Date    WBC 5.6 03/31/2022    HGB 13.0 (L) 03/31/2022    HCT 40.5 03/31/2022     03/31/2022    CHOL 94 11/23/2020    TRIG 64 11/23/2020    HDL 44 11/23/2020    ALT 62 (H) 11/21/2018    AST 40 11/21/2018     04/02/2022    BUN 35 (H) 04/02/2022    CO2 31 04/02/2022    INR 2.34 (H) 04/02/2022     Lab Results   Component  Value Date    TROPONINI 0.10 03/31/2022         Jaime Warren MD [facility]  4/2/2022

## 2022-04-02 NOTE — PLAN OF CARE
"  Problem: Risk for Delirium  Goal: Optimal Coping  Outcome: Ongoing, Progressing  Goal: Improved Attention and Thought Clarity  Outcome: Ongoing, Progressing     Problem: Fluid Volume Excess  Goal: Fluid Balance  Outcome: Ongoing, Progressing     Problem: Impaired Wound Healing  Goal: Optimal Wound Healing  Outcome: Ongoing, Progressing  Intervention: Promote Wound Healing  Recent Flowsheet Documentation  Taken 4/1/2022 6892 by Jhonathan Jj RN  Activity Management: up ad derek   Goal Outcome Evaluation:        Alert. Oriented. Forgetful. Passively uncooperative. Patient doesn't want to be bothered, he wanted to sleep. Per wife, patient love to sleep day time and end up awake at night.     Denied pain.    Verbalized he gets SOB ,\" when I do something\". On Room air, O2 sat hi 90s. Lung sounds diminished.,    Bilateral lower extremity dressing, clean, dry and intact.     Encouraged patient to turn to sides.    Weak, unsteady. Assist of 2 with walker, gait belt. Falls precaution and interventions in placed.              "

## 2022-04-02 NOTE — CONSULTS
"CLINICAL NUTRITION SERVICES  -  ASSESSMENT NOTE      RECOMMENDATIONS FOR MD/PROVIDER TO ORDER:   None     Recommendations Ordered by Registered Dietitian (RD):   Glucerna BID w/meals     Future/Additional Recommendations:   Completed Heart Failure/Low sodium diet education w/ wife per pt request  Completed DM diet education w/ wife per wife request    Adjust supplements pending PO intake/patient pereference     Malnutrition:   Moderate in the context of social and environmental circumstances         REASON FOR ASSESSMENT  Robinson Medel is a 89 year old male seen by Registered Dietitian for Admission Nutrition Risk Screen for positive and Provider Order - Nutrition Education - Heart Failure - Dietitian to instruct patient on 2 gram sodium diet    Patient presents CHF, CAD, HTN, GERD, DM2, CKD3, gastroduodenal ulcer.     NUTRITION HISTORY  - Information obtained from patient's wife per pt request  - Unable to obtain nutrition history from patient as he was too sleepy to have discussion  - Patient is on a low sodium, low sugar diet at home  - Typical food/fluid intake is \"fine\" per wife  - Diet history  is grazing all day  - Supplements none per patient's wife and klor con, multivitamin per PTA med list     NKFA      CURRENT NUTRITION ORDERS  Diet Order:     2000 mg Sodium, Moderate Consistent Carbohydrate (60g CHO per meal) and Low Saturated Fat  No Caffeine for 24 hours (once tests completed may have caffeine)    Current Intake/Tolerance:  Pt eating 100% of meals per nursing records and received 1035kcals and 52g protein for 3 meals today.       NUTRITION FOCUSED PHYSICAL ASSESSMENT FOR DIAGNOSING MALNUTRITION)  Yes                Observed:    Muscle wasting (refer to documentation in Malnutrition section) and Subcutaneous fat loss (refer to documentation in Malnutrition section)    Obtained from Chart/Interdisciplinary Team:  Edema 1-2+, arm bruising noted    ANTHROPOMETRICS  Height: 6' 0\"  Weight: 213 lbs " 9.6 oz  Body mass index is 28.97 kg/m .  Weight Status:  Overweight BMI 25-29.9  IBW: 77.6kg  % IBW: 125%  Weight History:   Wt Readings from Last 30 Encounters:   04/01/22 96.9 kg (213 lb 9.6 oz)   03/07/22 97.8 kg (215 lb 11.2 oz)               0.9% wt loss in 1 month   01/24/22 95.7 kg (211 lb)   12/27/21 95.7 kg (211 lb)   11/29/21 96.1 kg (211 lb 14.4 oz)   08/26/21 100.2 kg (221 lb)   08/05/21 98.9 kg (218 lb)   12/15/20 98 kg (216 lb)                               1.1% wt loss in 13 months   11/30/20 96.6 kg (213 lb)   05/22/20 97.5 kg (215 lb)   11/26/19 100.1 kg (220 lb 11.2 oz)   11/20/19 105.6 kg (232 lb 12.8 oz)   10/08/19 103.7 kg (228 lb 9.6 oz)   10/04/19 103 kg (227 lb)   09/25/19 103.2 kg (227 lb 8 oz)   08/08/19 102.1 kg (225 lb 1.6 oz)   06/12/19 103.6 kg (228 lb 4.8 oz)   05/14/19 93.9 kg (207 lb)   04/12/19 93.9 kg (207 lb)   03/29/19 93.9 kg (207 lb)   03/22/19 100.2 kg (221 lb)   03/15/19 100.2 kg (221 lb)   03/08/19 100.2 kg (221 lb)   12/20/18 101.2 kg (223 lb)   12/04/18 102.5 kg (226 lb)   11/30/18 102.5 kg (226 lb)   11/29/18 102.5 kg (226 lb)   10/11/18 101.6 kg (224 lb)   10/04/18 101.6 kg (224 lb)   03/06/18 92.7 kg (204 lb 6.4 oz)       LABS  Labs reviewed: BG , UN 30, GFR 57, Mg 1.6, INR 2.94    MEDICATIONS  Medications reviewed: rocephin, lasix, novolog, lantus pen, magnesium sulfate, glucophage, thera-vit m, protonix, klor con m, aldactone, coumadin      ASSESSED NUTRITION NEEDS PER APPROVED PRACTICE GUIDELINES:    Dosing Weight 96.9 kg (213 lb 9.6 oz) and adjusted BW 82.4kg    Estimated Energy Needs: 8400-4124 kcals ([place] Jeor and stress factor 1-1.3)  Justification: maintenance  Estimated Protein Needs: 58-78 grams protein (0.6-0.8 g pro/Kg)  Justification: CKD  Estimated Fluid Needs: 4360-0259  mL (25-30 mL/kg)  Justification: maintenance    MALNUTRITION:  % Weight Loss:  Weight loss does not meet criteria for malnutrition as it is not significant  % Intake:   Decreased intake does not meet criteria for malnutrition as it has only been since admission that pt has been meeting < 75% estimated nutrition needs  Subcutaneous Fat Loss:  Upper arm region mild to moderate depletion  Muscle Loss:  Clavicle bone region moderate depletion  Fluid Retention:  Mild to moderate    Malnutrition Diagnosis: Moderate malnutrition  In Context of:  Environmental or social circumstances    NUTRITION DIAGNOSIS:  Malnutrition related to social and environmental circumstances as evidenced by mild to moderate subcutaneous fat loss, moderate muscle loss, and mild to moderate fluid accumulation      NUTRITION INTERVENTIONS  Recommendations / Nutrition Prescription  Glucerna BID w/ meals      Implementation  Nutrition education: Provided education on low sodium and consistent carb  Medical Food Supplement  .      Nutrition Goals  Meet estimated nutrition needs  BG         MONITORING AND EVALUATION:  Progress towards goals will be monitored and evaluated per protocol and Practice Guidelines, Diet Order, Liquid meal replacement or supplement, Vitamin intake, Weight, Biochemical data and Nutrition-focused physical findings

## 2022-04-02 NOTE — PLAN OF CARE
Problem: Fluid Volume Excess  Goal: Fluid Balance  Outcome: Ongoing, Progressing     Problem: Impaired Wound Healing  Goal: Optimal Wound Healing  Outcome: Ongoing, Progressing  LE edema 2-3+. Lymph edema wraps done by OT. IV lasix 80 mg as ordered. Up in chair, walked in martinez with PT. UO= 1350 ml bloody urine. Lungs clear & diminished.

## 2022-04-02 NOTE — PLAN OF CARE
Problem: Fluid Volume Excess  Goal: Fluid Balance  Outcome: Ongoing, Progressing    Pt is down 2 more lbs than previous day.  Pt still short of breath on exertion, maintaining saturation on RA.   Lungs diminished.  Pt continue on IV Lasix.    Problem: Impaired Wound Healing  Goal: Optimal Wound Healing  Outcome: Ongoing, Progressing  Intervention: Promote Wound Healing  Recent Flowsheet Documentation  Taken 4/2/2022 0000 by Mayte Naik RN  Activity Management:   activity adjusted per tolerance   up in chair    Pt has BLE venous stasis wounds covered with lymp wraps.  Dressing change to be due today.    Pt has pressure ulcer to R buttock. Pt sat in recliner half the shift; encouraged to reposition, but pt refused. Pt finally agreed to return back to bed mid shift, turned onto side, but pt turned himself back onto supine position. Educated multiple times regarding pressure ulcer healing and prevention.

## 2022-04-03 NOTE — PLAN OF CARE
Problem: Plan of Care - These are the overarching goals to be used throughout the patient stay.    Goal: Readiness for Transition of Care  Outcome: Ongoing, Progressing     Problem: Risk for Delirium  Goal: Optimal Coping  Outcome: Ongoing, Progressing  Goal: Improved Behavioral Control  Outcome: Ongoing, Progressing  Goal: Improved Attention and Thought Clarity  Outcome: Ongoing, Progressing     Problem: Fluid Volume Excess  Goal: Fluid Balance  Outcome: Ongoing, Progressing     Problem: Impaired Wound Healing  Goal: Optimal Wound Healing  Outcome: Ongoing, Progressing  Intervention: Promote Wound Healing  Recent Flowsheet Documentation  Taken 4/2/2022 1930 by Jhonathan Jj, RN  Activity Management: activity adjusted per tolerance  Taken 4/2/2022 1706 by Jhonathan Jj RN  Activity Management: activity adjusted per tolerance   Goal Outcome Evaluation:          Alert. Oriented. Forgetful.     Patient was more cooperative today than yesterday. Continued to inform patient prior to whatever cares, treatments and interventions given.    He was up in chair this afternoon, patient was assisted with weight shifting. He stayed up in chair till around 1900.     Denied pain.     Reported SOB with exertion and activity about the same. Lung sounds clear diminished. O2 sat in hi 90s room air.     Weak and unsteady. Assist of 2 with transfer. Falls precaution and interventions in placed.

## 2022-04-03 NOTE — PLAN OF CARE
Problem: Fluid Volume Excess  Goal: Fluid Balance  Outcome: Ongoing, Progressing     Problem: Impaired Wound Healing  Goal: Optimal Wound Healing  Outcome: Ongoing, Progressing  Wt down 3 lbs today. 2+LE edema. Lungs clear & decreased. Lymph edema wraps & ointment applied per OT. Sat in chair & walked in martinez. Iv lasix 80 mg given X 1 as ordered. [id number].

## 2022-04-03 NOTE — PLAN OF CARE
Problem: Fluid Volume Excess  Goal: Fluid Balance  Outcome: Ongoing, Progressing   Goal Outcome Evaluation:        Patient strict I and O due to CHF, diuresing with lasix.  Patient had large amount of urine.  Patient denied pain.  He has shortness of breath with activity.  Lymph wraps were on and in place.  Staff encouraged patient to elevate legs.  Patient transferred to chair during night.    Patient was awake most of night, declined offer for sleeping medication.  Patient went back and forth from being pleasant with staff, to some irritability when staff were completing cares.  Staff grouped care to promote rest.

## 2022-04-03 NOTE — PROGRESS NOTES
Cardiology Progress Note    Assessment/Plan:    1. Heart failure with preserved ejection fraction, acute exacerbation functional class III.  Left ventricular hypertrophy with impaired left ventricular diastolic relaxation.  Predominant right heart failure symptoms, with normal oxygen saturation on room air.  No significant desaturation on overnight oximetry  2. Anasarca.  Responding to IV furosemide.  Likely switch to Bumex or torsemide as oral maintenance therapy    No changes in management today.  Likely switch to oral meds in 1 to 2 days.    Principal Problem:    Acute on chronic diastolic congestive heart failure (H)  Active Problems:    Venous stasis dermatitis of both lower extremities    Atherosclerotic heart disease of native coronary artery without angina pectoris    Benign essential HTN    Gastroduodenal ulcer    Paroxysmal atrial fibrillation (H)    Gastroesophageal reflux disease with esophagitis    Type 2 diabetes mellitus without complications (H)    Bilateral hearing loss    Anticoagulated on Coumadin    Chronic kidney disease, stage 3a (H)    Hypokalemia    Hypomagnesemia     LOS: 2 days     Subjective:  Denies shortness of breath.  Leg pain improved.  Asks if he can call a cab to leave      Objective:   Vital signs in last 24 hours:  Vitals:    04/02/22 2335 04/03/22 0357 04/03/22 0634 04/03/22 0752   BP: 125/69 107/55 123/61 126/66   BP Location: Left arm Left arm  Left arm   Patient Position:       Pulse: 70 77  72   Resp: 18 18  20   Temp: 97.8  F (36.6  C) 98.7  F (37.1  C)  97.3  F (36.3  C)   TempSrc: Oral Oral  Oral   SpO2: 96% 94%  95%   Weight:       Height:         Weight:   Wt Readings from Last 3 Encounters:   04/02/22 95.9 kg (211 lb 6.4 oz)   03/07/22 97.8 kg (215 lb 11.2 oz)   01/24/22 95.7 kg (211 lb)           PHYSICAL EXAM      Respiratory:  Normal breath sounds, No respiratory distress, No wheezing, No chest tenderness.   Cardiovascular: Irregular S1 and S2.  2/6 systolic murmur  lower left sternal border radiating to the apex.  GI:  Bowel sounds normal, Soft, No tenderness, No masses  Extremities: 3-4+ bilateral lower extremity edema extending into posterior thighs and presacral region.  Open lower extremity wounds, dressed currently.  Ace wraps in place bilaterally up to knees       Cardiographics:   Telemetry atrial fibrillation, 60 to 80 bpm.    Echo (3/31/2022): Personally reviewed  1. Normal left ventricular size and systolic performance with a visually  estimated ejection fraction of 50-55%.  2. There is moderate concentric increase in left ventricular wall thickness.  3. There is trace aortic insufficiency.  4. There is mild-moderate right ventricular enlargement with mildly reduced  right ventricular systolic performance.  5. There is moderate-severe biatrial enlargement.  6. Right ventricular systolic pressure relative to right atrial pressure is  moderately increased. The pulmonary artery pressure is estimated to be 60-65  mmHg plus right atrial pressure. In addition, the IVC appears dilated with  decreased phasic variation in caval diameter consistent with elevated right  atrial pressure.    Imaging:   EXAM: XR CHEST 2 VW  LOCATION: [facility]  DATE/TIME: 3/31/2022 1:44 PM  INDICATION: CHF  COMPARISON: 03/23/2022                                                          IMPRESSION: Heart is moderately enlarged, unchanged. Small bilateral effusions which appears similar to prior examination. Mild pulmonary venous congestion which is increased when compared to prior examination. Findings suggestive of mild congestive   heart failure.    Lab Results:   Lab Results   Component Value Date    WBC 5.6 03/31/2022    HGB 13.0 (L) 03/31/2022    HCT 40.5 03/31/2022     03/31/2022    CHOL 94 11/23/2020    TRIG 64 11/23/2020    HDL 44 11/23/2020    ALT 62 (H) 11/21/2018    AST 40 11/21/2018     04/02/2022    BUN 35 (H) 04/02/2022    CO2 31 04/02/2022     INR 1.81 (H) 04/03/2022     Lab Results   Component Value Date    TROPONINI 0.10 03/31/2022         Jaime Warren MD FAC  4/2/2022

## 2022-04-03 NOTE — PROGRESS NOTES
[facility]    PROGRESS NOTE - Hospitalist Service    Assessment and Plan    Principal Problem:    Acute on chronic diastolic congestive heart failure (H)  Active Problems:    Venous stasis dermatitis of both lower extremities    Atherosclerotic heart disease of native coronary artery without angina pectoris    Benign essential HTN    Gastroduodenal ulcer    Paroxysmal atrial fibrillation (H)    Gastroesophageal reflux disease with esophagitis    Type 2 diabetes mellitus without complications (H)    Bilateral hearing loss    Anticoagulated on Coumadin    Chronic kidney disease, stage 3a (H)    Hypokalemia    Hypomagnesemia    Robinson Medel is a 89 year old old male with h/o CHF, diabetes mellitus type II, PVD, hyperlipidemia, paroxysmal Afib, anemia, HTN, and cecal cancer s/p right hemicolectomy who presents to the ED by wheelchair with his wife for evaluation of leg swelling and shortness of breath.       Per chart review, the patient had an appointment with his cardiologist on 3/23/22 for follow-up of his heart failure and lower extremity edema. His Lasix dose was recently increased. He reported initial weight loss of 5 pounds, but at his appointment he was back up to 224 pounds. He laos had new redness and blisters to his legs. He endorses shortness of breath with exertion. Exam notable for 3+ pitting edema to bilateral legs and decreased breath sounds at lung bases. The patient is 20 pounds volume overloaded on exam. He declined hospitalization, wanting to diurese at home. Recommended continue 80 mg Lasix daily, restart metolazone 2.5 mg Monday, Wednesday, Friday, and repeat labs in 7-10 days. Goal weight of 205 pounds.       Acute on chronic diastolic congestive heart failure: Elevated pulmonary pressures noted on echo with normal wall motion abnormality.  - cardiology consulted per note from today continue IV lasix today and switch to oral meds in 1-2 days   - continue  spironolactone   - continue atenolol with hold parameters and lisinopril 5mg daily   -  Overnight oximetry ordered no significant desats   - Lyte replacement  - solitario in place will remove once changed from IV   - telemetry     Venous stasis dermatitis of both lower extremities:   - diuresing as above  - lymphedema care and WOC seen on Friday and likely see tomorrow   - getting IV ceftriaxone for cellulitis but legs wrapped. 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 BG were stable and today now bumped up, likely eating more due to improvement?? Increase lantus 27 units at bedtime and reassess in am   - hold glipizide and metformin for now   - novolog sliding scale     Hypomag   - 4gm IV mag given previously yesterday mag recheck stable. Labs still pending today, lab is short unfortunately      Groin candidiasis: Miconazole powder ordered    Benign essential HTN:   - continue home meds with hold parameters     Gastroduodenal ulcer with GERD: Continue home PPI      Bilateral hearing loss: Patient comprehension good is speaking loudly and clearly    Chronic kidney disease, stage 3a: trend while diuresing      COVID-19 PCR Results    COVID-19 PCR Results 3/31/22   SARS CoV2 PCR Negative      Comments are available for some flowsheets but are not being displayed.         COVID-19 Antibody Results, Testing for Immunity    COVID-19 Antibody Results, Testing for Immunity   No data to display.            VTE prophylaxis:  Warfarin  DIET: Orders Placed This Encounter      Combination Diet Moderate Consistent Carb (60 g CHO per Meal) Diet; 2 gm NA Diet;  No Caffeine for 24 hours (Once tests completed, may have caffeine), Low Saturated Fat Diet    Drains/Lines: none  Weight bearing status: WBAT  Disposition/Barriers to discharge: Pending diuresis and cards changing to oral diuretic   Code Status: No CPR- Do NOT Intubate    Subjective:  denies any CP or SOB. Wondering if his wounds are better on his leg, but WOC does see on the weekend     PHYSICAL EXAM  Temp:  [97.2  F (36.2  C)-98.7  F (37.1  C)] 97.3  F (36.3  C)  Pulse:  [55-77] 72  Resp:  [18-20] 20  BP: (103-126)/(54-69) 126/66  SpO2:  [94 %-97 %] 95 %  Wt Readings from Last 1 Encounters:   04/03/22 94.5 kg (208 lb 4.8 oz)       Intake/Output Summary (Last 24 hours) at 4/1/2022 0804  Last data filed at 4/1/2022 0342  Gross per 24 hour   Intake 340 ml   Output 3950 ml   Net -3610 ml      Body mass index is 28.25 kg/m .  Physical Exam  Constitutional:       Appearance: Normal appearance.      Comments: [facility]   Cardiovascular:      Rate and Rhythm: Normal rate. Rhythm irregular.      Pulses: Normal pulses.      Comments: 2/6 MARY  Pulmonary:      Effort: Pulmonary effort is normal.      Breath sounds: Normal breath sounds.   Abdominal:      General: Bowel sounds are normal.      Palpations: Abdomen is soft.   Musculoskeletal:      Comments: Both legs wrapped and 2+ BLE   Skin:     Capillary Refill: Capillary refill takes less than 2 seconds.   Neurological:      General: No focal deficit present.      Mental Status: He is alert. Mental status is at baseline.         PERTINENT LABS/IMAGING:  Results for orders placed or performed during the hospital encounter of 03/31/22   Chest XR,  PA & LAT    Impression    IMPRESSION: Heart is moderately enlarged, unchanged. Small bilateral effusions which appears similar to prior examination. Mild pulmonary venous congestion which is increased when compared to prior examination. Findings suggestive of mild congestive   heart failure.       Recent Labs   Lab 04/03/22  0748  04/03/22  0525 04/02/22  2055 04/02/22  1652 04/02/22  0720 04/02/22  0536 04/01/22  0710 04/01/22  0611 03/31/22  1348 03/31/22  1200   WBC  --   --   --   --   --   --   --   --   --  5.6   HGB  --   --   --   --   --   --   --   --   --  13.0*   MCV  --   --   --   --   --   --   --   --   --  94   PLT  --   --   --   --   --   --   --   --   --  204   INR  --  1.81*  --   --   --  2.34*  --  2.94*  --  2.87*   NA  --   --   --   --   --  139  --  142  --  139   POTASSIUM  --  3.8  --   --   --  3.6  --  3.6   < > 3.6   CHLORIDE  --   --   --   --   --  98  --  103  --  100   CO2  --   --   --   --   --  31  --  31  --  28   BUN  --   --   --   --   --  35*  --  30*  --  30*   CR  --   --   --   --   --  1.51*  --  1.19  --  1.26   ANIONGAP  --   --   --   --   --  10  --  8  --  11   TY  --   --   --   --   --  8.8  --  8.6  --  9.1   *  --  149* 146*   < > 133*   < > 64*   < > 223*    < > = values in this interval not displayed.     Recent Labs   Lab Test 11/23/20  1050   CHOL 94   HDL 44   LDL 37   TRIG 64     Recent Labs   Lab Test 11/23/20  1050 10/29/19  1053 10/29/19  1005   LDL 37 46 46     Recent Labs   Lab Test 04/01/22  0710 04/01/22  0611   NA  --  142   POTASSIUM  --  3.6   CHLORIDE  --  103   CO2  --  31   GLC 70 64*   BUN  --  30*   CR  --  1.19   GFRESTIMATED  --  57*   TY  --  8.6     Recent Labs   Lab Test 03/31/22  1200 07/14/21  1630 02/02/18  0614   A1C 7.4* 7.7* 7.7*     Recent Labs   Lab Test 03/31/22  1200 01/22/20  1526 02/22/18  0923   HGB 13.0* 14.5 12.4*     Recent Labs   Lab Test 03/31/22  1200 02/16/18  2306 02/01/18  1445   TROPONINI 0.10 0.03 0.08     Recent Labs   Lab Test 03/31/22  1200 02/14/19  1019 12/13/18  1033   * 309* 308*     No results for input(s): TSH in the last 77541 hours.  Recent Labs   Lab Test 04/01/22  0611 03/31/22  1200 01/22/20  1526   INR 2.94* 2.87* 2.79*       Nadege Polanco MD  [facility] Medicine Service  [phone]

## 2022-04-04 NOTE — PROGRESS NOTES
HEART CARE NOTE          Assessment/Recommendations     1. HFpEF c/b ADHF  Assessment / Plan    Remains hypervolemic on physical exam; However, diuresing well on current regimen --> no changes at this time    BP adequately controlled on current regimen - no changes at this time    2. Paroxysmal Afib  Assessment / Plan    Continue warfarin per pharmacy management     Continue carvedilol    3. DM2  Assessment / Plan    Management per primary team     Currently on ISS    4. CKD stage 3a  Assessment / Plan    Continue to monitor closely with diuresis    5. Cecal cancer  Assessment / Plan    S/p right hemicolectomy    History of Present Illness/Subjective    Mr. Robinson Medel is a 89 year old male with (per Dr. Finch's note) a PMHx significant for CHF, diabetes mellitus type II, PVD, hyperlipidemia, paroxysmal Afib, anemia, HTN, and cecal cancer s/p right hemicolectomy who presents to the ED by wheelchair with his wife for evaluation of leg swelling and shortness of breath.     Today, Mr. Medel denies any acute cardiac events or complaints. Management plan as detailed above    ECG: Personally reviewed. atrial fibrillation.    ECHO (personnaly Reviewed):  1. Normal left ventricular size and systolic performance with a visually  estimated ejection fraction of 50-55%.  2. There is moderate concentric increase in left ventricular wall thickness.  3. There is trace aortic insufficiency.  4. There is mild-moderate right ventricular enlargement with mildly reduced  right ventricular systolic performance.  5. There is moderate-severe biatrial enlargement.  6. Right ventricular systolic pressure relative to right atrial pressure is  moderately increased. The pulmonary artery pressure is estimated to be 60-65  mmHg plus right atrial pressure. In addition, the IVC appears dilated with  decreased phasic variation in caval diameter consistent with elevated right  atrial pressure.        Physical Examination Review  of Systems   /61 (BP Location: Left arm)   Pulse 79   Temp 97.6  F (36.4  C) (Oral)   Resp 18   Ht 1.829 m (6')   Wt 94 kg (207 lb 4.8 oz)   SpO2 94%   BMI 28.11 kg/m    Body mass index is 28.11 kg/m .  Wt Readings from Last 3 Encounters:   04/04/22 94 kg (207 lb 4.8 oz)   03/07/22 97.8 kg (215 lb 11.2 oz)   01/24/22 95.7 kg (211 lb)     General Appearance:   no distress, normal body habitus   ENT/Mouth: membranes moist, no oral lesions or bleeding gums.      EYES:  no scleral icterus, normal conjunctivae   Neck: no carotid bruits or thyromegaly   Chest/Lungs:   lungs are clear to auscultation, no rales or wheezing, equal chest wall expansion    Cardiovascular:   Regular. Normal first and second heart sounds with no murmurs, rubs, or gallops; the carotid, radial and posterior tibial pulses are intact, + JVD and LE edema bilaterally    Abdomen:  no organomegaly, masses, bruits, or tenderness; bowel sounds are present   Extremities: no cyanosis or clubbing   Skin: no xanthelasma, warm.    Neurologic: alert and oriented x3, calm     Psychiatric: alert and oriented x3, calm     A complete 10 systems ROS was reviewed  And is negative except what is listed in the HPI.          Medical History  Surgical History Family History Social History   Past Medical History:   Diagnosis Date     Anemia      Coronary artery disease 9/25/2015    stents placed      Diabetes mellitus (H)      Duodenitis 5/11/2016     Dyslipidemia      Gastroduodenal ulcer 9/24/2015     GERD (gastroesophageal reflux disease)      GI bleed 07/08/2016     Gout      Hematuria      Hyperlipemia      Hypertension      Kidney stone 10/6/2015    Past Surgical History:   Procedure Laterality Date     ANGIOPLASTY  09/2015     COLONOSCOPY N/A 6/17/2016    Procedure: COLONOSCOPY with biopsies in cecum using biopsy forcep and a transverse colon polypectomy using a hot snare;  Surgeon: Steve Angel MD;  Location: [facility];  Service:       CYSTOSCOPY PROSTATE W/ LASER N/A 2/9/2016    Procedure: CYSTOSCOPY TRANSURETHRAL RESECTION PROSTATE;  Surgeon: Chano Smith MD;  Location: [facility] OR;  Service:      CYSTOSCOPY W/ LITHOLAPAXY / EHL N/A 2/9/2016    Procedure: CYSTOLITHOLAPAXY ;  Surgeon: Chano Smith MD;  Location: [facility] OR;  Service:      ESOPHAGOSCOPY, GASTROSCOPY, DUODENOSCOPY (EGD), COMBINED N/A 5/8/2016    Procedure: ESOPHAGOGASTRODUODENOSCOPY;  Surgeon: Kieran Beltran MD;  Location: [facility] GI;  Service:      ESOPHAGOSCOPY, GASTROSCOPY, DUODENOSCOPY (EGD), COMBINED N/A 7/8/2016    Procedure: ESOPHAGOGASTRODUODENOSCOPY;  Surgeon: Chano Wynn MD;  Location: [facility];  Service:      KIDNEY STONE SURGERY       OTHER SURGICAL HISTORY      nose polyps     MS LAP,SURG,COLECTOMY, PARTIAL, W/ANAST N/A 6/18/2016    Procedure: LAPAROSCOPIC ASSISTED RIGHT COLECTOMY ROOM 310;  Surgeon: Radha Bueno MD;  Location: [facility];  Service: General    no family history of premature coronary artery disease Social History     Socioeconomic History     Marital status:      Spouse name: Not on file     Number of children: Not on file     Years of education: Not on file     Highest education level: Not on file   Occupational History     Not on file   Tobacco Use     Smoking status: Never Smoker     Smokeless tobacco: Never Used     Tobacco comment: only smoked a little bit during high school   Substance and Sexual Activity     Alcohol use: No     Drug use: No     Sexual activity: Not on file   Other Topics Concern     Not on file   Social History Narrative    12/10/2015: .  Lives with his wife in a town home     Social Determinants of Health     Financial Resource Strain: Not on file   Food Insecurity: Not on file   Transportation Needs: Not on file   Physical Activity: Not on file   Stress: Not on file   Social Connections: Not on file   Intimate Partner Violence: Not on file   Housing Stability: Not  on file           Lab Results    Chemistry/lipid CBC Cardiac Enzymes/BNP/TSH/INR   Lab Results   Component Value Date    CHOL 94 11/23/2020    HDL 44 11/23/2020    TRIG 64 11/23/2020    BUN 37 (H) 04/04/2022     (L) 04/04/2022    CO2 27 04/04/2022    Lab Results   Component Value Date    WBC 5.6 03/31/2022    HGB 13.0 (L) 03/31/2022    HCT 40.5 03/31/2022    MCV 94 03/31/2022     03/31/2022    Lab Results   Component Value Date    TROPONINI 0.10 03/31/2022     (H) 03/31/2022    INR 1.80 (H) 04/04/2022     Lab Results   Component Value Date    TROPONINI 0.10 03/31/2022          Weight:    Wt Readings from Last 3 Encounters:   04/04/22 94 kg (207 lb 4.8 oz)   03/07/22 97.8 kg (215 lb 11.2 oz)   01/24/22 95.7 kg (211 lb)       Allergies  Allergies   Allergen Reactions     Aspirin Nausea and Vomiting     GI upset         Surgical History  Past Surgical History:   Procedure Laterality Date     ANGIOPLASTY  09/2015     COLONOSCOPY N/A 6/17/2016    Procedure: COLONOSCOPY with biopsies in cecum using biopsy forcep and a transverse colon polypectomy using a hot snare;  Surgeon: Steve Angel MD;  Location: [facility];  Service:      CYSTOSCOPY PROSTATE W/ LASER N/A 2/9/2016    Procedure: CYSTOSCOPY TRANSURETHRAL RESECTION PROSTATE;  Surgeon: Chano Smith MD;  Location: [facility];  Service:      CYSTOSCOPY W/ LITHOLAPAXY / EHL N/A 2/9/2016    Procedure: CYSTOLITHOLAPAXY ;  Surgeon: Chano Smith MD;  Location: [facility] OR;  Service:      ESOPHAGOSCOPY, GASTROSCOPY, DUODENOSCOPY (EGD), COMBINED N/A 5/8/2016    Procedure: ESOPHAGOGASTRODUODENOSCOPY;  Surgeon: Kieran Beltran MD;  Location: [facility];  Service:      ESOPHAGOSCOPY, GASTROSCOPY, DUODENOSCOPY (EGD), COMBINED N/A 7/8/2016    Procedure: ESOPHAGOGASTRODUODENOSCOPY;  Surgeon: Chano Wynn MD;  Location: [facility];  Service:      KIDNEY STONE SURGERY       OTHER SURGICAL HISTORY      nose polyps     MT  LAP,SURG,COLECTOMY, PARTIAL, W/ANAST N/A 6/18/2016    Procedure: LAPAROSCOPIC ASSISTED RIGHT COLECTOMY ROOM 310;  Surgeon: Radha Bueno MD;  Location: [facility];  Service: General       Social History  Tobacco:   History   Smoking Status     Never Smoker   Smokeless Tobacco     Never Used     Comment: only smoked a little bit during high school    Alcohol:   Social History    Substance and Sexual Activity      Alcohol use: No   Illicit Drugs:   History   Drug Use No       Family History  Family History   Problem Relation Age of Onset     Diabetes Mother      Heart Disease Father      Mental Illness Daughter         Depression          Brittany Cox MD on 4/4/2022      cc: Elizabeth Alexandre

## 2022-04-04 NOTE — PLAN OF CARE
Problem: Plan of Care - These are the overarching goals to be used throughout the patient stay.    Goal: Readiness for Transition of Care  Outcome: Ongoing, Progressing     Problem: Risk for Delirium  Goal: Optimal Coping  Outcome: Ongoing, Progressing  Goal: Improved Behavioral Control  Outcome: Ongoing, Progressing  Goal: Improved Attention and Thought Clarity  Outcome: Ongoing, Progressing     Problem: Fluid Volume Excess  Goal: Fluid Balance  Outcome: Ongoing, Progressing     Problem: Impaired Wound Healing  Goal: Optimal Wound Healing  Outcome: Ongoing, Progressing  Intervention: Promote Wound Healing  Recent Flowsheet Documentation  Taken 4/3/2022 2215 by Jhonathan Jj, RN  Activity Management: activity adjusted per tolerance  Taken 4/3/2022 1630 by Jhonathan Jj RN  Activity Management: activity adjusted per tolerance   Goal Outcome Evaluation:            Alert. Oriented. Forgetful. Reoriented as needed. Mood and disposition much better later at night time as observed from patient for the last 3 days.    Denied pain.    Dyspnea on exertion noted. Per patient his breathing is about the same. O2 sat mid 90s in room air. Lung sounds diminished.    HS snacks given.    Up in chair dinner time till bedtime. Weak and unsteady. Assist of 2. Walker, gait belt. Falls precaution and interventions in placed.

## 2022-04-04 NOTE — PLAN OF CARE
Problem: Plan of Care - These are the overarching goals to be used throughout the patient stay.    Goal: Absence of Hospital-Acquired Illness or Injury  Outcome: Ongoing, Progressing  Intervention: Identify and Manage Fall Risk  Recent Flowsheet Documentation  Taken 4/4/2022 0920 by Michelle Callahan RN  Safety Promotion/Fall Prevention:   activity supervised   assistive device/personal items within reach   chair alarm on   clutter free environment maintained   fall prevention program maintained   nonskid shoes/slippers when out of bed   patient and family education   room organization consistent   safety round/check completed   supervised activity   Goal Outcome Evaluation:    Patient is A & O x4, on room air, Afib rhythm.. Blood sugar and carb counts managed as ordered. Patient has a solitario inserted with normal drainage. Lymphedema wraps applied to lower extremities. Patient has mild to moderate swelling in both legs. Patient refused powder for his groin rash. Patient has no complaints of pain today. He has been sitting in chair throughout the day and dozing off in and out. Patient ambulated in the hallway with PT today.

## 2022-04-04 NOTE — PROGRESS NOTES
Care Management Follow Up    Length of Stay (days): 4    Expected Discharge Date:   To be determined.        Concerns to be Addressed:   Alteration in cardiopulmonary status requiring IV Lasix every 8 hours. IV Rocephin, IV Gentamycin. Chronic wounds (lower extremity venous stasis and buttock), WOCN consulted.   Patient plan of care discussed at interdisciplinary rounds: Yes    Anticipated Discharge Disposition:  Home vs transitional care (TCU).     Anticipated Discharge Services:  Home therapy vs continued daily therapy and skilled nursing in transitional care  Anticipated Discharge DME:  Per therapy (if indicated). Uses a walker or cane at baseline.    Patient/family educated on Medicare website which has current facility and service quality ratings:  Yes  Education Provided on the Discharge Plan:  Per team  Patient/Family in Agreement with the Plan:  Yes     Referrals Placed by CM/SW:  [facility];   Private pay costs discussed: Not applicable     Additional Information:  Patient lives in a single-level town house with his wife Virginia and adult daughter Wendy. He is independent with some activities of daily living but his wife and daughter help with others (housekeeping, laundry, meal prep, shopping, and transportation). Patient uses a walker or cane for mobility. He has 2 walkers and 4 canes at home he can use.  [facility] RN comes 3 times a week for wound cares and wrapping of his legs. Therapy is currently recommending transitional care. Writer provided a list of local transitional care units (which includes the medicare.gov website) for patient and family to review at the bedside and left a message for patient's wife, Virginia.  3:47 PM:  Met with patient's wife at the bedside (patient sleeping). She is hopeful patient will be able to return directly home at discharge but encouraged her to watch how patient does with nursing and therapy before deciding. She states that she does have  the TCU list along with writer's call back number. Will follow up tomorrow.     Sabina Kwok RN

## 2022-04-04 NOTE — PROGRESS NOTES
[facility]    Medicine Progress Note - Hospitalist Service    Date of Admission:  3/31/2022     Assessment & Plan   SUMMARY:  89 year old male with history of CHF, diabetes mellitus type II, PVD, hyperlipidemia, paroxysmal Afib, anemia, HTN, and cecal cancer s/p right hemicolectomy who presents to the ED by wheelchair with his wife for evaluation of leg swelling and shortness of breath.       Per chart review, the patient had an appointment with his cardiologist on 3/23/22 for follow-up of his heart failure and lower extremity edema. His Lasix dose was recently increased. He reported initial weight loss of 5 pounds, but at his appointment he was back up to 224 pounds. He laos had new redness and blisters to his legs. He endorses shortness of breath with exertion. Exam notable for 3+ pitting edema to bilateral legs and decreased breath sounds at lung bases. The patient is 20 pounds volume overloaded on exam. He declined hospitalization, wanting to diurese at home. Recommended continue 80 mg Lasix daily, restart metolazone 2.5 mg Monday, Wednesday, Friday, and repeat labs in 7-10 days. Goal weight of 205 pounds.     Assessment/Plan  Acute on chronic diastolic congestive heart failure: Elevated pulmonary pressures noted on echo with normal wall motion abnormality.  - cardiology consulted - continue IV lasix, renal function stable, pt diuresing well, still overloaded   - continue spironolactone   - continue atenolol with hold parameters and lisinopril 5mg daily   -  Overnight oximetry ordered no significant desats   - Lyte replacement  - solitario in place will remove once changed from IV diuretic  - telemetry      Venous stasis dermatitis of both lower extremities:   - diuresing as above  - lymphedema care and WOC has seen  - getting IV ceftriaxone for cellulitis but legs wrapped. Will de-escalate to keflex tomorrow for total 7 days and stop.      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 BG were stable and today now bumped up, likely eating more due to improvement?? Increased lantus 27 units at bedtime  - resume glipizide tomorrow, metformin tonight   - novolog sliding scale      Hypomag   - 4gm IV mag given - stable     Groin candidiasis: Miconazole powder ordered     Benign essential HTN:   - continue home meds with hold parameters      Gastroduodenal ulcer with GERD: Continue home PPI      Bilateral hearing loss: Patient comprehension good is speaking loudly and clearly     Chronic kidney disease, stage 3a: trend while diuresing      Principal Problem:    Acute on chronic diastolic congestive heart failure (H)  Active Problems:    Venous stasis dermatitis of both lower extremities    Atherosclerotic heart disease of native coronary artery without angina pectoris    Benign essential HTN    Gastroduodenal ulcer    Paroxysmal atrial fibrillation (H)    Gastroesophageal reflux disease with esophagitis    Type 2 diabetes mellitus without complications (H)    Bilateral hearing loss    Anticoagulated on Coumadin    Chronic kidney disease, stage 3a (H)    Hypokalemia    Hypomagnesemia    DVT prophylaxis:    Medical:  subcutaneous heparin    Mechanical:  Lower extremity condition precludes SCD's    Disposition Plan: Expected discharge: 04/07/2022   recommended to Pending once adequately diuresed.    Diet: Orders Placed This Encounter      Combination Diet Moderate Consistent Carb (60 g CHO per Meal) Diet; 2 gm NA Diet; No Caffeine for 24 hours (Once tests completed, may have caffeine), Low Saturated Fat Diet    Perea Catheter: Planning removal in 1-2 days  Central Lines/Port-a-cath: Not present  Drains: Not present      --------------------------------------------    The patient's care was discussed with the Patient.    Romaine Finch MD  Hospitalist Service  [facility]  Securely message with the Vocera Web Console (learn more here)  Text page via [employer] Paging/Directory    Clinically Significant Risk Factors Present on Admission            # Moderate Malnutrition: based on nutrition assessment  ______________________________________________________________________    Interval History   Patient feels his legs are getting less edematous, but still swollen     ROS: Denies chest pain, denies shortness of breath, Moving bowels well, passing urine well, slept well and good appetite    Data personally reviewed from the last 24 hours:   Reviewed Laboratory results, Consultant recommendations and medications     Physical Exam   BP 96/57 (BP Location: Left arm)   Pulse 69   Temp 97.6  F (36.4  C) (Oral)   Resp 18   Ht 1.829 m (6')   Wt 94 kg (207 lb 4.8 oz)   SpO2 97%   BMI 28.11 kg/m      Physical Exam    General Appearance:    HEENT:  Awake, Alert, Cooperative, in no distress and appears stated age   Normocephalic, atraumatic, conjunctiva clear without icterus and ears without discharge   Lungs:   reduced breath sounds at bases   Cardiovascular:  Regular Rate and Rythm, normal apical impulse, normal S1 and S2, 3+ lower extremity edema bilaterally   Abdomen: Soft, non-tender and Non-distended, active bowel sounds   Skin:  Skin color, texture normal and bruising or bleeding. No rashes or lesions over face, neck, arms and legs, turgor normal.   Neurologic:    Neuropsychiatric: Alert & Oriented X 3, Facial symmetry preserved and upper & lower extremities moving well with symmetry  Calm, normal eye contact, Affect normal     Data   Recent Labs   Lab 04/04/22  1222 04/04/22  0825 04/04/22  0457 04/03/22  0748 04/03/22  0525 04/02/22  0720 04/02/22  0536 03/31/22  1348 03/31/22  1200   WBC  --   --   --   --   --    --   --   --  5.6   HGB  --   --   --   --   --   --   --   --  13.0*   MCV  --   --   --   --   --   --   --   --  94   PLT  --   --   --   --   --   --   --   --  204   INR  --   --  1.80*  --  1.81*  --  2.34*   < > 2.87*   NA  --   --  133*  --  137  --  139   < > 139   POTASSIUM  --   --  3.9  --  3.9  3.8  --  3.6   < > 3.6   CHLORIDE  --   --  95*  --  96*  --  98   < > 100   CO2  --   --  27  --  29  --  31   < > 28   BUN  --   --  37*  --  33*  --  35*   < > 30*   CR  --   --  1.24  --  1.33*  --  1.51*   < > 1.26   ANIONGAP  --   --  11  --  12  --  10   < > 11   TY  --   --  8.9  --  8.8  --  8.8   < > 9.1   * 150* 202*   < > 184*   < > 133*   < > 223*    < > = values in this interval not displayed.

## 2022-04-04 NOTE — PLAN OF CARE
Problem: Fluid Volume Excess  Goal: Fluid Balance  Outcome: Ongoing, Progressing    Perea output excellent overnight. Urine is slightly pink tinged but otherwise machelle. Pt getting IV lasix.     Problem: Impaired Wound Healing  Goal: Optimal Wound Healing  Outcome: Ongoing, Progressing  Lymph wraps in place. Pt scratched left knee scabs open until bleeding throughout the night. Wound was cleaned, dressed with new dressing and lymph wraps reapplied.     Pt somewhat flat and irritable overnight. Other times was pleasant and cooperative. A. Fib with HR in 60s-70s. Pt on RA and denies sob at rest. Denies generalized pain overnight. VSS.     Will continue to monitor.

## 2022-04-05 NOTE — PLAN OF CARE
Problem: Plan of Care - These are the overarching goals to be used throughout the patient stay.    Goal: Readiness for Transition of Care  Outcome: Ongoing, Progressing     Problem: Risk for Delirium  Goal: Optimal Coping  Outcome: Ongoing, Progressing  Goal: Improved Behavioral Control  Outcome: Ongoing, Progressing  Goal: Improved Attention and Thought Clarity  Outcome: Ongoing, Progressing     Problem: Fluid Volume Excess  Goal: Fluid Balance  Outcome: Ongoing, Progressing     Problem: Impaired Wound Healing  Goal: Optimal Wound Healing  Outcome: Ongoing, Progressing  Intervention: Promote Wound Healing  Recent Flowsheet Documentation  Taken 4/4/2022 2030 by Jhonathan Jj RN  Activity Management: activity adjusted per tolerance  Taken 4/4/2022 1722 by Jhonathan Jj RN  Activity Management: activity adjusted per tolerance   Goal Outcome Evaluation:            Alert. Oriented. Pleasant and cooperative.    Denied pain.    Reported breathing about the same. Dyspnea on exertion noted. Lung sounds diminished. O2 sat high 90s in room air.    IV lasix, strict I/O. Indwelling solitario cath. Urine color improved, yellow from bloody.     Very weak and unsteady. Up on chair for dinner. Needed maximum assist from sitting to standing. Patient insisted at first of using toilet but bedside commode used for toileting for safety.  Assist of 2, walker, gait belt. Reinforced falls education and interventions in placed.     Lymph wraps and dressing intact. Encouraged patient in elevating legs while sitting up in recliner.

## 2022-04-05 NOTE — PROGRESS NOTES
[facility]    Medicine Progress Note - Hospitalist Service    Date of Admission:  3/31/2022     Assessment & Plan   SUMMARY:  89 year old male with history of CHF, insulin dependent diabetes mellitus type II, PVD, hyperlipidemia, paroxysmal Afib, anemia, HTN, and cecal cancer s/p right hemicolectomy who presents to the ED by wheelchair with his wife for evaluation of leg swelling and shortness of breath.       Per chart review, the patient had an appointment with his cardiologist on 3/23/22 for follow-up of his heart failure and lower extremity edema. His Lasix dose was recently increased. He reported initial weight loss of 5 pounds, but at his appointment he was back up to 224 pounds. He laos had new redness and blisters to his legs. He endorses shortness of breath with exertion. Exam notable for 3+ pitting edema to bilateral legs and decreased breath sounds at lung bases. The patient is 20 pounds volume overloaded on exam. He declined hospitalization, wanting to diurese at home. Recommended continue 80 mg Lasix daily, restart metolazone 2.5 mg Monday, Wednesday, Friday, and repeat labs in 7-10 days. Goal weight of 205 pounds.    Discharge orders completed for anticipated discharge tomorrow - await cardiology final recs for diuretics.    Assessment/Plan  Acute on chronic diastolic congestive heart failure:   - Elevated pulmonary pressures noted on echo with normal wall motion.  - cardiology consulted - transitioning from IV lasix to oral today, creatining rising, pt diuresed well   - continue spironolactone   - Cardiology changing from atenolol to carvedilol with hold parameters and lisinopril 5mg daily   -  Overnight oximetry ordered - no significant desats   - Lyte replacement  - remove solitario today  - telemetry stopped today     Venous stasis dermatitis of both lower extremities:   - diuresed as above  - lymphedema care and WOC has seen  - completed IV ceftriaxone x 6 doses for  cellulitis. Stop abx now.               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 BG were stable and today now bumped up, likely eating more due to improvement. Increased lantus 27 units at bedtime  - resume glipizide but lower dose. Resume metformin   - novolog sliding scale      Hypomagnesemia, hyponatremia  - diuresed  - 4gm IV mag given - stable     Groin candidiasis: Miconazole powder ordered     Benign essential HTN:   - continue home meds with hold parameters      Gastroduodenal ulcer with GERD: Continue home PPI      Bilateral hearing loss: Patient comprehension good is speaking loudly and clearly     Chronic kidney disease, stage 3a: trend while diuresing      DVT prophylaxis:    Medical:  subcutaneous heparin    Mechanical:  Lower extremity condition precludes SCD's    Disposition Plan: Expected discharge: 04/07/2022   recommended to TCU vs home once adequately diuresed.    Diet: Orders Placed This Encounter      Combination Diet Moderate Consistent Carb (60 g CHO per Meal) Diet; 2 gm NA Diet; No Caffeine for 24 hours (Once tests completed, may have caffeine), Low Saturated Fat Diet    Perea Catheter: Planning removal today  Central Lines/Port-a-cath: Not present  Drains: Not present    Principal Problem:    Acute on chronic diastolic congestive heart failure (H)  Active Problems:    Venous stasis dermatitis of both lower extremities    Atherosclerotic heart disease of native coronary artery without angina pectoris    Benign essential HTN    Gastroduodenal ulcer    Paroxysmal atrial fibrillation (H)    Gastroesophageal reflux disease with esophagitis    Type 2 diabetes mellitus without complications (H)    Bilateral  hearing loss    Anticoagulated on Coumadin    Chronic kidney disease, stage 3a (H)    Hypokalemia    Hypomagnesemia     --------------------------------------------    The patient's care was discussed with the Patient and lymphadema team*.    Romaine Finch MD  Hospitalist Service  [facility]  Securely message with the Vocera Web Console (learn more here)  Text page via [employer] Paging/Directory    Clinically Significant Risk Factors Present on Admission             ______________________________________________________________________    Interval History   Patient feels well, complains that leg wraps are too tight.     ROS: Denies chest pain, denies shortness of breath, Moving bowels well, passing urine well, slept well and good appetite    Data personally reviewed from the last 24 hours:   Reviewed Laboratory results, Consultant recommendations and medications     Physical Exam   /58 (BP Location: Left arm, Patient Position: Chair, Cuff Size: Adult Regular)   Pulse 75   Temp 97.6  F (36.4  C) (Oral)   Resp 20   Ht 1.829 m (6')   Wt 93.1 kg (205 lb 4.8 oz)   SpO2 96%   BMI 27.84 kg/m      Physical Exam    General Appearance:    HEENT:  Awake, Alert, Cooperative, in no distress and appears stated age   Normocephalic, atraumatic, conjunctiva clear without icterus and ears without discharge   Lungs:   Clear to auscultation bilaterally, no wheezing, good air exchange, normal work of breathing   Cardiovascular:  Regular Rate and Rythm, normal apical impulse, normal S1 and S2, 2+ lower extremity edema bilaterally   Abdomen: Soft, non-tender and Non-distended, active bowel sounds   Skin:  Skin color, texture normal and bruising or bleeding. No rashes or lesions over face, neck, arms and legs, turgor normal.   Neurologic:    Neuropsychiatric: Alert & Oriented X 3, Facial symmetry preserved and upper & lower extremities moving well with symmetry  Calm, normal eye contact, Affect normal      Data     Recent Labs   Lab 04/05/22  0749 04/05/22  0453 04/04/22  2108 04/04/22  0825 04/04/22  0457 04/03/22  0748 04/03/22  0525 03/31/22  1348 03/31/22  1200   WBC  --   --   --   --   --   --   --   --  5.6   HGB  --   --   --   --   --   --   --   --  13.0*   MCV  --   --   --   --   --   --   --   --  94   PLT  --   --   --   --   --   --   --   --  204   INR  --  1.89*  --   --  1.80*  --  1.81*   < > 2.87*   NA  --  136  --   --  133*  --  137   < > 139   POTASSIUM  --  3.8  --   --  3.9  --  3.9  3.8   < > 3.6   CHLORIDE  --  94*  --   --  95*  --  96*   < > 100   CO2  --  30  --   --  27  --  29   < > 28   BUN  --  37*  --   --  37*  --  33*   < > 30*   CR  --  1.35*  --   --  1.24  --  1.33*   < > 1.26   ANIONGAP  --  12  --   --  11  --  12   < > 11   TY  --  8.7  --   --  8.9  --  8.8   < > 9.1   * 130* 146*   < > 202*   < > 184*   < > 223*    < > = values in this interval not displayed.

## 2022-04-05 NOTE — PLAN OF CARE
Problem: Fluid Volume Excess  Goal: Fluid Balance  Outcome: Ongoing, Progressing     Problem: Impaired Wound Healing  Goal: Optimal Wound Healing  Outcome: Ongoing, Progressing  Intervention: Promote Wound Healing  Recent Flowsheet Documentation  Taken 4/5/2022 0412 by Aviva Lucas, RN  Activity Management: activity adjusted per tolerance  Taken 4/5/2022 0031 by Aviva Lucas, RN  Activity Management: activity adjusted per tolerance     Pt's vital signs were stable. He has been afib with a BBB on telemetry. He has denied pain throughout the night. He is on the potassium protocol and it will be redrawn this am. His solitario catheter is in place and draining appropriately. He has lymphedema wraps in place to his BLE that are managed by OT. He is able to make needs known. Call light is within reach.

## 2022-04-05 NOTE — PLAN OF CARE
Problem: Impaired Wound Healing  Goal: Optimal Wound Healing  Outcome: Ongoing, Progressing  Intervention: Promote Wound Healing  Recent Flowsheet Documentation  Taken 4/5/2022 0815 by Barbara Vaughan RN  Activity Management:   up in chair   activity adjusted per tolerance   Goal Outcome Evaluation:    Per Pt and OT, wounds appear to be improving.  Leg wraps in place while Pt up  in chair.  Remains afebrile.  IV antibiotics as ordered.         Problem: Fluid Volume Excess  Goal: Fluid Balance  Outcome: Ongoing, Progressing   Lungs clear to auscultation.  Denies shortness of breath.   Continues to have 2-3+ LE edema.  Perea cath dc'd as ordered.  Will continue to monitor I&O.

## 2022-04-05 NOTE — PROGRESS NOTES
HEART CARE NOTE          Assessment/Recommendations     1. HFpEF c/b ADHF  Assessment / Plan    Nearing euvolemia --> will transition to oral diuretic regimen; continue to monitor hemodynamic, UOP and renal function closely (noted to have LUCY today; will give IV albumin x 1)    BP adequately controlled on current regimen - no changes at this time     2. Paroxysmal Afib  Assessment / Plan    Continue warfarin per pharmacy management     Continue carvedilol     3. DM2  Assessment / Plan    Management per primary team     Currently on ISS     4. CKD stage 3a  Assessment / Plan    Continue to monitor closely with diuresis     5. Cecal cancer  Assessment / Plan    S/p right hemicolectomy      History of Present Illness/Subjective      Mr. Robinson Medel is a 89 year old male with (per Dr. Finch's note) a PMHx significant for CHF, diabetes mellitus type II, PVD, hyperlipidemia, paroxysmal Afib, anemia, HTN, and cecal cancer s/p right hemicolectomy who presents to the ED by wheelchair with his wife for evaluation of leg swelling and shortness of breath.      Today, Mr. Medel denies any acute cardiac events or complaints. Management plan as detailed above     ECG: Personally reviewed. atrial fibrillation.     ECHO (personnaly Reviewed):  1. Normal left ventricular size and systolic performance with a visually  estimated ejection fraction of 50-55%.  2. There is moderate concentric increase in left ventricular wall thickness.  3. There is trace aortic insufficiency.  4. There is mild-moderate right ventricular enlargement with mildly reduced  right ventricular systolic performance.  5. There is moderate-severe biatrial enlargement.  6. Right ventricular systolic pressure relative to right atrial pressure is  moderately increased. The pulmonary artery pressure is estimated to be 60-65  mmHg plus right atrial pressure. In addition, the IVC appears dilated with  decreased phasic variation in caval diameter  consistent with elevated right  atrial pressure.          Physical Examination Review of Systems   /66 (BP Location: Right arm)   Pulse 99   Temp 98.3  F (36.8  C) (Oral)   Resp 20   Ht 1.829 m (6')   Wt 93.1 kg (205 lb 4.8 oz)   SpO2 93%   BMI 27.84 kg/m    Body mass index is 27.84 kg/m .  Wt Readings from Last 3 Encounters:   04/05/22 93.1 kg (205 lb 4.8 oz)   03/07/22 97.8 kg (215 lb 11.2 oz)   01/24/22 95.7 kg (211 lb)     General Appearance:   no distress, normal body habitus   ENT/Mouth: membranes moist, no oral lesions or bleeding gums.      EYES:  no scleral icterus, normal conjunctivae   Neck: no carotid bruits or thyromegaly   Chest/Lungs:   lungs are clear to auscultation, no rales or wheezing, equal chest wall expansion    Cardiovascular:   Regular. Normal first and second heart sounds with no murmurs, rubs, or gallops; the carotid, radial and posterior tibial pulses are intact, no JVD or LE edema bilaterally    Abdomen:  no organomegaly, masses, bruits, or tenderness; bowel sounds are present   Extremities: no cyanosis or clubbing   Skin: no xanthelasma, warm.    Neurologic: alert and calm     Psychiatric: alert and calm     A complete 10 systems ROS was reviewed  And is negative except what is listed in the HPI.          Medical History  Surgical History Family History Social History   Past Medical History:   Diagnosis Date     Anemia      Coronary artery disease 9/25/2015    stents placed      Diabetes mellitus (H)      Duodenitis 5/11/2016     Dyslipidemia      Gastroduodenal ulcer 9/24/2015     GERD (gastroesophageal reflux disease)      GI bleed 07/08/2016     Gout      Hematuria      Hyperlipemia      Hypertension      Kidney stone 10/6/2015    Past Surgical History:   Procedure Laterality Date     ANGIOPLASTY  09/2015     COLONOSCOPY N/A 6/17/2016    Procedure: COLONOSCOPY with biopsies in cecum using biopsy forcep and a transverse colon polypectomy using a hot snare;  Surgeon:  Steve Angel MD;  Location: [facility];  Service:      CYSTOSCOPY PROSTATE W/ LASER N/A 2/9/2016    Procedure: CYSTOSCOPY TRANSURETHRAL RESECTION PROSTATE;  Surgeon: Chano Smith MD;  Location: [facility] OR;  Service:      CYSTOSCOPY W/ LITHOLAPAXY / EHL N/A 2/9/2016    Procedure: CYSTOLITHOLAPAXY ;  Surgeon: Chano Smith MD;  Location: [facility] OR;  Service:      ESOPHAGOSCOPY, GASTROSCOPY, DUODENOSCOPY (EGD), COMBINED N/A 5/8/2016    Procedure: ESOPHAGOGASTRODUODENOSCOPY;  Surgeon: Kieran Beltran MD;  Location: [facility];  Service:      ESOPHAGOSCOPY, GASTROSCOPY, DUODENOSCOPY (EGD), COMBINED N/A 7/8/2016    Procedure: ESOPHAGOGASTRODUODENOSCOPY;  Surgeon: Chano Wynn MD;  Location: [facility];  Service:      KIDNEY STONE SURGERY       OTHER SURGICAL HISTORY      nose polyps     VT LAP,SURG,COLECTOMY, PARTIAL, W/ANAST N/A 6/18/2016    Procedure: LAPAROSCOPIC ASSISTED RIGHT COLECTOMY ROOM 310;  Surgeon: Radha Bueno MD;  Location: [facility];  Service: General    no family history of premature coronary artery disease Social History     Socioeconomic History     Marital status:      Spouse name: Not on file     Number of children: Not on file     Years of education: Not on file     Highest education level: Not on file   Occupational History     Not on file   Tobacco Use     Smoking status: Never Smoker     Smokeless tobacco: Never Used     Tobacco comment: only smoked a little bit during high school   Substance and Sexual Activity     Alcohol use: No     Drug use: No     Sexual activity: Not on file   Other Topics Concern     Not on file   Social History Narrative    12/10/2015: .  Lives with his wife in a town home     Social Determinants of Health     Financial Resource Strain: Not on file   Food Insecurity: Not on file   Transportation Needs: Not on file   Physical Activity: Not on file   Stress: Not on file   Social Connections: Not on file    Intimate Partner Violence: Not on file   Housing Stability: Not on file           Lab Results    Chemistry/lipid CBC Cardiac Enzymes/BNP/TSH/INR   Lab Results   Component Value Date    CHOL 94 11/23/2020    HDL 44 11/23/2020    TRIG 64 11/23/2020    BUN 37 (H) 04/05/2022     04/05/2022    CO2 30 04/05/2022    Lab Results   Component Value Date    WBC 5.6 03/31/2022    HGB 13.0 (L) 03/31/2022    HCT 40.5 03/31/2022    MCV 94 03/31/2022     03/31/2022    Lab Results   Component Value Date    TROPONINI 0.10 03/31/2022     (H) 03/31/2022    INR 1.89 (H) 04/05/2022     Lab Results   Component Value Date    TROPONINI 0.10 03/31/2022          Weight:    Wt Readings from Last 3 Encounters:   04/05/22 93.1 kg (205 lb 4.8 oz)   03/07/22 97.8 kg (215 lb 11.2 oz)   01/24/22 95.7 kg (211 lb)       Allergies  Allergies   Allergen Reactions     Aspirin Nausea and Vomiting     GI upset         Surgical History  Past Surgical History:   Procedure Laterality Date     ANGIOPLASTY  09/2015     COLONOSCOPY N/A 6/17/2016    Procedure: COLONOSCOPY with biopsies in cecum using biopsy forcep and a transverse colon polypectomy using a hot snare;  Surgeon: Steve Angel MD;  Location: [facility];  Service:      CYSTOSCOPY PROSTATE W/ LASER N/A 2/9/2016    Procedure: CYSTOSCOPY TRANSURETHRAL RESECTION PROSTATE;  Surgeon: Chano Smith MD;  Location: [facility];  Service:      CYSTOSCOPY W/ LITHOLAPAXY / EHL N/A 2/9/2016    Procedure: CYSTOLITHOLAPAXY ;  Surgeon: Chano Smith MD;  Location: [facility] OR;  Service:      ESOPHAGOSCOPY, GASTROSCOPY, DUODENOSCOPY (EGD), COMBINED N/A 5/8/2016    Procedure: ESOPHAGOGASTRODUODENOSCOPY;  Surgeon: Kieran Beltran MD;  Location: [facility];  Service:      ESOPHAGOSCOPY, GASTROSCOPY, DUODENOSCOPY (EGD), COMBINED N/A 7/8/2016    Procedure: ESOPHAGOGASTRODUODENOSCOPY;  Surgeon: Chano Wynn MD;  Location: [facility];  Service:      KIDNEY  STONE SURGERY       OTHER SURGICAL HISTORY      nose polyps     AR LAP,SURG,COLECTOMY, PARTIAL, W/ANAST N/A 6/18/2016    Procedure: LAPAROSCOPIC ASSISTED RIGHT COLECTOMY ROOM 310;  Surgeon: Radha Bueno MD;  Location: [facility];  Service: General       Social History  Tobacco:   History   Smoking Status     Never Smoker   Smokeless Tobacco     Never Used     Comment: only smoked a little bit during high school    Alcohol:   Social History    Substance and Sexual Activity      Alcohol use: No   Illicit Drugs:   History   Drug Use No       Family History  Family History   Problem Relation Age of Onset     Diabetes Mother      Heart Disease Father      Mental Illness Daughter         Depression          Brittany Cox MD on 4/5/2022      cc: Elizabeth Alexandre

## 2022-04-05 NOTE — PROGRESS NOTES
Care Management Follow Up    Length of Stay (days): 5    Expected Discharge Date:  4/6/2022       Concerns to be Addressed:  IV Rocephin. Chronic wounds (lower extremity venous stasis and buttock).  Patient plan of care discussed at interdisciplinary rounds: Yes  Follow up from rounds/notes:   Transition to oral medications. Anticipate discharge on Wednesday 4/6/2022.     Anticipated Discharge Disposition:  Home      Anticipated Discharge Services:  Home care  Anticipated Discharge DME:  Per therapy (if indicated). Uses a walker or cane at baseline.    Patient/family educated on Medicare website which has current facility and service quality ratings:  Yes  Education Provided on the Discharge Plan:  Per team  Patient/Family in Agreement with the Plan:  Yes     Referrals Placed by CM/SW:  [facility];   Private pay costs discussed: Not applicable     Additional Information:  Patient lives in a single-level town house with his wife Virginia and adult daughter Wendy. He is independent with some activities of daily living but his wife and daughter help with others (housekeeping, laundry, meal prep, shopping, and transportation). Patient uses a walker or cane for mobility. He has 2 walkers and 4 canes at home he can use.  [facility] RN comes 3 times a week for wound cares and wrapping of his legs. Therapy is currently recommending transitional care. On 4/4/2022, writer met with patient's wife at the bedside (patient was sleeping). She is hopeful patient will be able to return directly home at discharge but encouraged her to watch how patient does with nursing and therapy before deciding. She states that she does have the TCU list along with writer's call back number. Will follow up today.   3:02 PM:  Per MD notes, likely discharge to home on Wednesday 4/6. Met with patient and his wife at the bedside (patient is hard of hearing and did not participate in conversation). She is pleased with the plan  and will plan to pick patient up once he is ready to go home. She notes that Thursday she would not be able to pick patient up until the evening (she has appointments in the morning and a class at 1230). Left a message for [facility] as well.     Sabina Kwok RN

## 2022-04-06 PROBLEM — Z79.01 ANTICOAGULATED ON COUMADIN: Status: RESOLVED | Noted: 2022-01-01 | Resolved: 2022-01-01

## 2022-04-06 NOTE — PLAN OF CARE
Problem: Impaired Wound Healing  Goal: Optimal Wound Healing  Outcome: Ongoing, Progressing     Problem: Fluid Volume Excess  Goal: Fluid Balance  Outcome: Ongoing, Progressing   Goal Outcome Evaluation:             Pt was bladder scanned this morning for 55 cc.   Pt was later scanned after urinating 150.  Bladder scan was 850.  Provider was called and solitario was inserted.       Pt is heavy assist of 2-3.  Pt yells out while attempting to transfer.  Pt was unable to take steps with walker.  Pt was pivot transferred to bed.    Pt was lying in bed and appeared very restless.  Pt wanted to sit back in chair.  Writer gave pt apap and had him sit on edge of bed.  Pt states his legs are very uncomfortable with wraps on.  Pt wife states he normally refuses wraps at home.      Pt wife talked wit  and is agreeable to TCU.    Pt will be transferred to P-1 this afternoon.

## 2022-04-06 NOTE — PROGRESS NOTES
HEART CARE NOTE          Assessment/Recommendations     1. HFpEF c/b ADHF  Assessment / Plan    Tolerating oral diuretic regimen --> no changes to regimen today; continue to monitor hemodynamic, UOP and renal function closely     BP adequately controlled on current regimen - no changes at this time     2. Paroxysmal Afib  Assessment / Plan    Continue warfarin per pharmacy management     Continue carvedilol     3. DM2  Assessment / Plan    Management per primary team     Currently on ISS     4. CKD stage 3a  Assessment / Plan    Continue to monitor closely with diuresis     5. Cecal cancer  Assessment / Plan    S/p right hemicolectomy      History of Present Illness/Subjective      Mr. Robinson Medel is a 89 year old male with (per Dr. Finch's note) a PMHx significant for CHF, diabetes mellitus type II, PVD, hyperlipidemia, paroxysmal Afib, anemia, HTN, and cecal cancer s/p right hemicolectomy who presents to the ED by wheelchair with his wife for evaluation of leg swelling and shortness of breath.      Today, Mr. Medel denies any acute cardiac events or complaints. Management plan as detailed above     ECG: Personally reviewed. atrial fibrillation.     ECHO (personnaly Reviewed):  1. Normal left ventricular size and systolic performance with a visually  estimated ejection fraction of 50-55%.  2. There is moderate concentric increase in left ventricular wall thickness.  3. There is trace aortic insufficiency.  4. There is mild-moderate right ventricular enlargement with mildly reduced  right ventricular systolic performance.  5. There is moderate-severe biatrial enlargement.  6. Right ventricular systolic pressure relative to right atrial pressure is  moderately increased. The pulmonary artery pressure is estimated to be 60-65  mmHg plus right atrial pressure. In addition, the IVC appears dilated with  decreased phasic variation in caval diameter consistent with elevated right  atrial pressure.           Physical Examination Review of Systems   /69 (BP Location: Left arm)   Pulse 83   Temp 98.9  F (37.2  C) (Oral)   Resp 18   Ht 1.829 m (6')   Wt 93.2 kg (205 lb 6.4 oz)   SpO2 95%   BMI 27.86 kg/m    Body mass index is 27.86 kg/m .  Wt Readings from Last 3 Encounters:   04/06/22 93.2 kg (205 lb 6.4 oz)   03/07/22 97.8 kg (215 lb 11.2 oz)   01/24/22 95.7 kg (211 lb)     General Appearance:   no distress, normal body habitus   ENT/Mouth: membranes moist, no oral lesions or bleeding gums.      EYES:  no scleral icterus, normal conjunctivae   Neck: no carotid bruits or thyromegaly   Chest/Lungs:   lungs are clear to auscultation, no rales or wheezing, equal chest wall expansion    Cardiovascular:   Regular. Normal first and second heart sounds with no murmurs, rubs, or gallops; the carotid, radial and posterior tibial pulses are intact, no JVD and mild LE edema bilaterally    Abdomen:  no organomegaly, masses, bruits, or tenderness; bowel sounds are present   Extremities: no cyanosis or clubbing   Skin: no xanthelasma, warm.    Neurologic: alert and oriented x3, calm     Psychiatric: alert and oriented x3, calm     A complete 10 systems ROS was reviewed  And is negative except what is listed in the HPI.          Medical History  Surgical History Family History Social History   Past Medical History:   Diagnosis Date     Anemia      Coronary artery disease 9/25/2015    stents placed      Diabetes mellitus (H)      Duodenitis 5/11/2016     Dyslipidemia      Gastroduodenal ulcer 9/24/2015     GERD (gastroesophageal reflux disease)      GI bleed 07/08/2016     Gout      Hematuria      Hyperlipemia      Hypertension      Kidney stone 10/6/2015    Past Surgical History:   Procedure Laterality Date     ANGIOPLASTY  09/2015     COLONOSCOPY N/A 6/17/2016    Procedure: COLONOSCOPY with biopsies in cecum using biopsy forcep and a transverse colon polypectomy using a hot snare;  Surgeon: Steve Angel MD;   Location: [facility];  Service:      CYSTOSCOPY PROSTATE W/ LASER N/A 2/9/2016    Procedure: CYSTOSCOPY TRANSURETHRAL RESECTION PROSTATE;  Surgeon: Chano Smith MD;  Location: [facility] OR;  Service:      CYSTOSCOPY W/ LITHOLAPAXY / EHL N/A 2/9/2016    Procedure: CYSTOLITHOLAPAXY ;  Surgeon: Chano Smith MD;  Location: [facility] OR;  Service:      ESOPHAGOSCOPY, GASTROSCOPY, DUODENOSCOPY (EGD), COMBINED N/A 5/8/2016    Procedure: ESOPHAGOGASTRODUODENOSCOPY;  Surgeon: Kieran Beltran MD;  Location: [facility];  Service:      ESOPHAGOSCOPY, GASTROSCOPY, DUODENOSCOPY (EGD), COMBINED N/A 7/8/2016    Procedure: ESOPHAGOGASTRODUODENOSCOPY;  Surgeon: Chano Wynn MD;  Location: [facility];  Service:      KIDNEY STONE SURGERY       OTHER SURGICAL HISTORY      nose polyps     IN LAP,SURG,COLECTOMY, PARTIAL, W/ANAST N/A 6/18/2016    Procedure: LAPAROSCOPIC ASSISTED RIGHT COLECTOMY ROOM 310;  Surgeon: Radha Bueno MD;  Location: [facility];  Service: General    no family history of premature coronary artery disease Social History     Socioeconomic History     Marital status:      Spouse name: Not on file     Number of children: Not on file     Years of education: Not on file     Highest education level: Not on file   Occupational History     Not on file   Tobacco Use     Smoking status: Never Smoker     Smokeless tobacco: Never Used     Tobacco comment: only smoked a little bit during high school   Substance and Sexual Activity     Alcohol use: No     Drug use: No     Sexual activity: Not on file   Other Topics Concern     Not on file   Social History Narrative    12/10/2015: .  Lives with his wife in a town home     Social Determinants of Health     Financial Resource Strain: Not on file   Food Insecurity: Not on file   Transportation Needs: Not on file   Physical Activity: Not on file   Stress: Not on file   Social Connections: Not on file   Intimate Partner  Violence: Not on file   Housing Stability: Not on file           Lab Results    Chemistry/lipid CBC Cardiac Enzymes/BNP/TSH/INR   Lab Results   Component Value Date    CHOL 94 11/23/2020    HDL 44 11/23/2020    TRIG 64 11/23/2020    BUN 37 (H) 04/05/2022     04/05/2022    CO2 30 04/05/2022    Lab Results   Component Value Date    WBC 5.6 03/31/2022    HGB 13.0 (L) 03/31/2022    HCT 40.5 03/31/2022    MCV 94 03/31/2022     03/31/2022    Lab Results   Component Value Date    TROPONINI 0.10 03/31/2022     (H) 03/31/2022    INR 1.89 (H) 04/05/2022     Lab Results   Component Value Date    TROPONINI 0.10 03/31/2022          Weight:    Wt Readings from Last 3 Encounters:   04/06/22 93.2 kg (205 lb 6.4 oz)   03/07/22 97.8 kg (215 lb 11.2 oz)   01/24/22 95.7 kg (211 lb)       Allergies  Allergies   Allergen Reactions     Aspirin Nausea and Vomiting     GI upset         Surgical History  Past Surgical History:   Procedure Laterality Date     ANGIOPLASTY  09/2015     COLONOSCOPY N/A 6/17/2016    Procedure: COLONOSCOPY with biopsies in cecum using biopsy forcep and a transverse colon polypectomy using a hot snare;  Surgeon: Steve Angel MD;  Location: [facility];  Service:      CYSTOSCOPY PROSTATE W/ LASER N/A 2/9/2016    Procedure: CYSTOSCOPY TRANSURETHRAL RESECTION PROSTATE;  Surgeon: Chano Smith MD;  Location: [facility];  Service:      CYSTOSCOPY W/ LITHOLAPAXY / EHL N/A 2/9/2016    Procedure: CYSTOLITHOLAPAXY ;  Surgeon: Chano Smith MD;  Location: [facility] OR;  Service:      ESOPHAGOSCOPY, GASTROSCOPY, DUODENOSCOPY (EGD), COMBINED N/A 5/8/2016    Procedure: ESOPHAGOGASTRODUODENOSCOPY;  Surgeon: Kieran Beltran MD;  Location: [facility];  Service:      ESOPHAGOSCOPY, GASTROSCOPY, DUODENOSCOPY (EGD), COMBINED N/A 7/8/2016    Procedure: ESOPHAGOGASTRODUODENOSCOPY;  Surgeon: Chano Wynn MD;  Location: [facility];  Service:      KIDNEY STONE SURGERY        OTHER SURGICAL HISTORY      nose polyps     NM LAP,SURG,COLECTOMY, PARTIAL, W/ANAST N/A 6/18/2016    Procedure: LAPAROSCOPIC ASSISTED RIGHT COLECTOMY ROOM 310;  Surgeon: Radha Bueno MD;  Location: [facility];  Service: General       Social History  Tobacco:   History   Smoking Status     Never Smoker   Smokeless Tobacco     Never Used     Comment: only smoked a little bit during high school    Alcohol:   Social History    Substance and Sexual Activity      Alcohol use: No   Illicit Drugs:   History   Drug Use No       Family History  Family History   Problem Relation Age of Onset     Diabetes Mother      Heart Disease Father      Mental Illness Daughter         Depression          Brittany Cox MD on 4/6/2022      cc: Elizabeth Alexandre

## 2022-04-06 NOTE — PROGRESS NOTES
"Care Management Follow Up    Length of Stay (days): 6    Expected Discharge Date: 04/06/2022     Concerns to be Addressed:     Discharge planning and disposition  Patient plan of care discussed at interdisciplinary rounds: Yes    Anticipated Discharge Disposition:  Transitional care     Anticipated Discharge Services:  Nursing PT and OT  Anticipated Discharge DME:  walker    Patient/family educated on Medicare website which has current facility and service quality ratings:    Education Provided on the Discharge Plan:  yes  Patient/Family in Agreement with the Plan:  Yes, spoke to wife today    Referrals Placed by CM/SW:  Post acute rehab facilities  Private pay costs discussed: Not applicable    Additional Information:  Chart reviewed. SW met with patient's wife in pt's room, pt sleeping.   Wife states she has been visiting most of the day and was able to watch therapy sessions, pt needing more physical assist which she is not able to provide and reports pt is not at his baseline.   Per wife, pt is able to ambulate around the home, states \"there is always something to grab on to\" and mostly uses a cane. (Has walker at home if needed). Wife reports pt has been independent with dressing and toileting with extra time. There are no steps to enter home.   Patient's wife is agreeable to TCU at this time, She would like referrals sent to [facility] as pt has been there before and [facility].   Wife reports pt has a daughter that lives in their home however is not able to provide support or assist due to disabilities.    Violeta Bass, LGSW        "

## 2022-04-06 NOTE — PLAN OF CARE
Problem: Urinary Retention  Goal: Effective Urinary Elimination  4/5/2022 2250 by Misty Navarrete, RN  Outcome: Ongoing, Not Progressing  4/5/2022 2247 by Misty Navarrete, RN  Outcome: Ongoing, Progressing   Goal Outcome Evaluation:    At bedtime bladder scan 600cc.  Straight cath 580cc at 2230.  Blood sugars 98 & 159.

## 2022-04-06 NOTE — PROGRESS NOTES
[facility]    Hospitalist Progress Note    Assessment & Plan   89 year old male who was admitted on 3/31/2022 with heart failure and venous stasis dermatitis:    Impression:   Principal Problem:    Acute on chronic diastolic congestive heart failure (H)  Active Problems:    Venous stasis dermatitis of both lower extremities    Atherosclerotic heart disease of native coronary artery without angina pectoris    Benign essential HTN    Gastroduodenal ulcer    Paroxysmal atrial fibrillation (H)    Gastroesophageal reflux disease with esophagitis    Type 2 diabetes mellitus without complications (H)    Bilateral hearing loss    Anticoagulated on Coumadin    Chronic kidney disease, stage 3a (H)    Hypokalemia    Hypomagnesemia      Plan:  Continue present care, plan for TCU tomorrow.     DVT Prophylaxis: Warfarin  Code Status: No CPR- Do NOT Intubate    Disposition: Expected discharge to TCU tomorrow.     Elbert Sandoval Mayra  Pager [phone]  Cell Phone [phone]  Text Page (7am to 6pm)    Interval History   No new complaints, other than unable to Urinate and PVR is >800 ml, this is a recurring problem, has been getting straight cathed so will place solitario.     Physical Exam   Temp: 98.4  F (36.9  C) Temp src: Oral BP: 100/58 Pulse: 88   Resp: 20 SpO2: 92 % O2 Device: None (Room air)    Vitals:    04/04/22 0312 04/05/22 0232 04/06/22 0443   Weight: 94 kg (207 lb 4.8 oz) 93.1 kg (205 lb 4.8 oz) 93.2 kg (205 lb 6.4 oz)     Vital Signs with Ranges  Temp:  [97.2  F (36.2  C)-98.9  F (37.2  C)] 98.4  F (36.9  C)  Pulse:  [71-88] 88  Resp:  [18-22] 20  BP: ()/(51-69) 100/58  SpO2:  [90 %-96 %] 92 %  I/O last 3 completed shifts:  In: 1203 [P.O.:1200; I.V.:3]  Out: 1830 [Urine:1830]    # Pain Assessment:  Current Pain Score 4/6/2022   Patient currently in pain? denies   Robinson s pain level was assessed and he currently denies pain.        Constitutional: Awake, alert, semi-cooperative, no  apparent distress, very [place]  Respiratory: Clear to auscultation bilaterally, no crackles or wheezing  Cardiovascular: Regular rate and rhythm, normal S1 and S2, and no murmur noted  GI: Normal bowel sounds, soft, non-distended, non-tender  Extrem: legs ace wrapped   Neuro: Ox3, no focal motor or sensory deficits    Medications     - MEDICATION INSTRUCTIONS -       - MEDICATION INSTRUCTIONS -       Warfarin Therapy Reminder         atorvastatin  10 mg Oral At Bedtime     bumetanide  4 mg Oral Daily     carvedilol  6.25 mg Oral BID w/meals     gentamicin   Topical Q72H     glipiZIDE  5 mg Oral Daily with breakfast     insulin aspart   Subcutaneous TID w/meals     insulin aspart  1-7 Units Subcutaneous TID AC     insulin glargine  27 Units Subcutaneous At Bedtime     lisinopril  5 mg Oral Daily     metFORMIN  500 mg Oral BID w/meals     miconazole   Topical BID     mineral oil-hydrophilic petrolatum   Topical Daily     multivitamin w/minerals  1 tablet Oral Daily     pantoprazole  40 mg Oral QAM AC     sodium chloride (PF)  3 mL Intracatheter Q8H     spironolactone  25 mg Oral Daily     warfarin ANTICOAGULANT  5 mg Oral ONCE at 18:00       Data   Recent Labs   Lab 04/06/22  1120 04/06/22  0818 04/06/22  0520 04/06/22  0446 04/05/22  0749 04/05/22  0453 04/04/22  0825 04/04/22  0457 04/03/22  0748 04/03/22  0525 03/31/22  1348 03/31/22  1200   WBC  --   --   --   --   --   --   --   --   --   --   --  5.6   HGB  --   --   --   --   --   --   --   --   --   --   --  13.0*   MCV  --   --   --   --   --   --   --   --   --   --   --  94   PLT  --   --   --   --   --   --   --   --   --   --   --  204   INR  --   --  2.08*  --   --  1.89*  --  1.80*  --  1.81*   < > 2.87*   NA  --   --   --   --   --  136  --  133*  --  137   < > 139   POTASSIUM  --   --  4.1  --   --  3.8  --  3.9  --  3.9  3.8   < > 3.6   CHLORIDE  --   --   --   --   --  94*  --  95*  --  96*   < > 100   CO2  --   --   --   --   --  30  --  27  --  29    < > 28   BUN  --   --   --   --   --  37*  --  37*  --  33*   < > 30*   CR  --   --   --   --   --  1.35*  --  1.24  --  1.33*   < > 1.26   ANIONGAP  --   --   --   --   --  12  --  11  --  12   < > 11   TY  --   --   --   --   --  8.7  --  8.9  --  8.8   < > 9.1   * 84  --  80   < > 130*   < > 202*   < > 184*   < > 223*    < > = values in this interval not displayed.       Imaging:   No results found for this or any previous visit (from the past 24 hour(s)).

## 2022-04-06 NOTE — PLAN OF CARE
Pt. is aox4. Pt. appeared weaker. Pt. up to chair overnight. Pt. needed assist x3 to pivot back to bed. Pt. using a walker and gait belt. Pt. denies dizziness and lightheadedness. But having SOB with activity. Vitals stable. /69 and SpO2 95%, on RA. Bladder scanned 615 ml. Straight cath 450 ml. BG 80. Snacks and apple juice given.     Problem: Urinary Retention  Goal: Effective Urinary Elimination  Outcome: Ongoing, Progressing     Problem: Impaired Wound Healing  Goal: Optimal Wound Healing  Outcome: Ongoing, Progressing  Intervention: Promote Wound Healing  Recent Flowsheet Documentation  Taken 4/6/2022 0037 by Dianne Benítez, RN  Activity Management: activity adjusted per tolerance

## 2022-04-07 PROBLEM — I27.20 PULMONARY HYPERTENSION (H): Status: ACTIVE | Noted: 2022-01-01

## 2022-04-07 NOTE — PROGRESS NOTES
"CLINICAL NUTRITION SERVICES  -  ASSESSMENT NOTE      RECOMMENDATIONS FOR MD/PROVIDER TO ORDER:   None     Recommendations Ordered by Registered Dietitian (RD):   None     Future/Additional Recommendations:   Adjust supplements pending intake/acceptance/weight     Malnutrition:   Moderate in the context of social and environmental circumstances       EVALUATION OF PROGRESS TOWARDS GOALS    CURRENT NUTRITION ORDERS  Diet Order:     2000 mg Sodium, Moderate Consistent Carbohydrate (60g CHO per meal) and Low Saturated Fat  No Caffeine for 24 hours (once tests completed may have caffeine)    Supplement: Glucerna bid    Current Intake/Tolerance:  Pt eating % of meals with most at 100%  Good fluid intake 1340 ml    No unopened supplements in pt room. No family in room    ANTHROPOMETRICS  Height: 6' 0\"  Weight: 205 lbs 6.4 oz 4/6, down 3 lb x 3 days with diuresis  Body mass index is 27.86 kg/m .  Weight Status:  Overweight BMI 25-29.9  IBW: 77.6kg  Weight History:   Wt Readings from Last 30 Encounters:   04/01/22 96.9 kg (213 lb 9.6 oz)   03/07/22 97.8 kg (215 lb 11.2 oz)               0.9% wt loss in 1 month   01/24/22 95.7 kg (211 lb)   12/27/21 95.7 kg (211 lb)   11/29/21 96.1 kg (211 lb 14.4 oz)   08/26/21 100.2 kg (221 lb)   08/05/21 98.9 kg (218 lb)   2.8% weight loss from more recent UBW 3 months ago    LABS  Labs reviewed:   Na 135  BUN/cr 45/1.3, increased  -167 mg/dl past 24 hours    MEDICATIONS  Medications reviewed: lasix daily, ssi, novolog 1u: 10 gCHO,  lantus pen, glucophage, thera-vit m, protonix, klor con m, aldactone, coumadin      ASSESSED NUTRITION NEEDS PER APPROVED PRACTICE GUIDELINES:    Dosing Weight 96.9 kg (213 lb 9.6 oz) and adjusted BW 82.4kg    Estimated Energy Needs: 2256-6218 kcals ([place] Jeor and stress factor 1-1.3)  Justification: maintenance  Estimated Protein Needs: 58-78 grams protein (0.6-0.8 g pro/Kg)  Justification: CKD  Estimated Fluid Needs: 5070-8884  mL (25-30 " mL/kg)  Justification: maintenance    NUTRITION DIAGNOSIS:  Malnutrition related to social and environmental circumstances as evidenced by mild to moderate subcutaneous fat loss, moderate muscle loss, and mild to moderate fluid accumulation      NUTRITION INTERVENTIONS  Recommendations / Nutrition   No new    Nutrition Goals  Meet estimated nutrition needs - in progress  BG - met    MONITORING AND EVALUATION:  Progress towards goals will be monitored and evaluated per protocol and Practice Guidelines, Diet Order, Liquid meal replacement or supplement, Vitamin intake, Weight, Biochemical data and Nutrition-focused physical findings

## 2022-04-07 NOTE — PLAN OF CARE
"  Problem: Plan of Care - These are the overarching goals to be used throughout the patient stay.    Goal: Patient-Specific Goal (Individualized)  Description: You can add care plan individualizations to a care plan. Examples of Individualization might be:  \"Parent requests to be called daily at 9am for status\", \"I have a hard time hearing out of my right ear\", or \"Do not touch me to wake me up as it startles me\".  Outcome: Ongoing, Progressing     Problem: Risk for Delirium  Goal: Improved Sleep  Outcome: Ongoing, Progressing     Problem: Hyperglycemia  Goal: Blood Glucose Level Within Targeted Range  Outcome: Ongoing, Progressing   Goal Outcome Evaluation:    Plan of Care Reviewed With: patient             Pt  is alert and oriented x3.pt's v/s stable.pt received all the discharge paperwork and belongings. Pt will transfer to TCU with the transporter at around 1845.      "

## 2022-04-07 NOTE — DISCHARGE SUMMARY
[facility]    Discharge Summary  Hospitalist    Date of Admission:  3/31/2022  Date of Discharge:  4/7/2022  Discharging Provider: Elbert Nunez MD    Discharge Diagnoses   Principal Problem:    Acute on chronic diastolic congestive heart failure       Severe Venous stasis dermatitis       Severe Pulm HTN -- PAP 60-65 mmHg plus RAP on Echo 3/31/22      Hypokalemia      Hypomagnesemia    Active Problems:    CAD       Benign essential HTN      Hx of PUD       GERD with esophagitis      Paroxysmal atrial fib -- on Warfarin       DM type 2, Hgb A1C 7.4 on 3/31/22      Severe bilateral hearing loss      Chronic kidney disease, stage 3      History of Present Illness   89 year old male with history of CHF, diabetes mellitus type II, PVD, hyperlipidemia, paroxysmal Afib on Warfarin, chronic anemia, HTN, and cecal cancer s/p right hemicolectomy -- who presents to the ED by wheelchair with his wife for evaluation of leg swelling and shortness of breath, with weight increased 20 lbs, wheezing -- and on Lasix 80 mg daily and Metolazone 2.5 mg 3 times a week.     In ER, temp 98.7, RR 24, WBC 5.6, Hgb 13.0, potassium 3.4, Creat 1.26, BNP elevated at 712, magnesium low at 1.7, INR 2.87, UA with 6 WBC's and >182 RBC's, and CXR with cardiomegaly and small bilateral effusions consistent with heart failure.      Hospital Course   Admitted to cardiac telemetry, seen by Cardiology, treated with diuretics with wt going from 216 to 205 lbs, and last on Bumex 4 mg daily.  Potassium and magnesium replaced.  Seen by wound care and had leg wraps and wound care.  Seen by PT and OT, and given his weakness advised TCU, and will check BMP and INR on 4/11/22    Cardiac echo showed:  1. Normal left ventricular size and systolic performance with a visually  estimated ejection fraction of 50-55%.  2. There is moderate concentric increase in left ventricular wall thickness.  3. There is trace aortic  insufficiency.  4. There is mild-moderate right ventricular enlargement with mildly reduced  right ventricular systolic performance.  5. There is moderate-severe biatrial enlargement.  6. Right ventricular systolic pressure relative to right atrial pressure is  moderately increased. The pulmonary artery pressure is estimated to be 60-65  mmHg plus right atrial pressure. In addition, the IVC appears dilated with  decreased phasic variation in caval diameter consistent with elevated right  atrial pressure.    While diuresed, creatinine did increase to 1.51, but was at 1.38 at time of discharge.  Lisinopril 2.5 mg daily added, and lasix switched to Bumex and Spironolactone, and Metolazone was discontinued. Will continue wound care to help with stasis dermatitis.     Also, intermittent problems voiding, straight cathed several times, the last time only able to void small amounts and PVR > 800 ml, so solitario placed and will try future voiding trial, and follow-up with [facility] if failed voiding trial.        Elbert Nunez MD  Pager: [phone]  Cell Phone:  [phone]       Significant Results and Procedures   As above    Pending Results   These results will be followed up by Dr. Nunez  Unresulted Labs Ordered in the Past 30 Days of this Admission     Date and Time Order Name Status Description    4/7/2022  2:17 PM Asymptomatic COVID-19 Virus (Coronavirus) by PCR Nasopharyngeal In process           Code Status   DNR / DNI       Primary Care Physician   Elizabeth Alexandre    Physical Exam   Temp: 98.1  F (36.7  C) Temp src: Oral BP: 103/55 Pulse: 75   Resp: 22 SpO2: 95 % O2 Device: None (Room air)    Vitals:    04/04/22 0312 04/05/22 0232 04/06/22 0443   Weight: 94 kg (207 lb 4.8 oz) 93.1 kg (205 lb 4.8 oz) 93.2 kg (205 lb 6.4 oz)     Vital Signs with Ranges  Temp:  [97.6  F (36.4  C)-98.1  F (36.7  C)] 98.1  F (36.7  C)  Pulse:  [59-84] 75  Resp:  [18-24] 22  BP: ()/(50-65) 103/55  SpO2:  [95 %-96  %] 95 %  I/O last 3 completed shifts:  In: 1182 [P.O.:1182]  Out: 3975 [Urine:3975]    Exam on discharge:   Lungs clear  CV with reg S1S2  Legs wrapped  Neuro -- alert, hard of hearing     Discharge Disposition   Discharged to short-term care facility  Condition at discharge: Fair    Consultations This Hospital Stay   SOCIAL WORK IP CONSULT  CARDIOLOGY IP CONSULT  PHARMACY TO DOSE WARFARIN  CORE CLINIC EVALUATION IP CONSULT  OCCUPATIONAL THERAPY ADULT IP CONSULT  NUTRITION SERVICES ADULT IP CONSULT  CARE MANAGEMENT / SOCIAL WORK IP CONSULT  LYMPHEDEMA THERAPY IP CONSULT  WOUND OSTOMY CONTINENCE NURSE  IP CONSULT  PHYSICAL THERAPY ADULT IP CONSULT  PHYSICAL THERAPY ADULT IP CONSULT  OCCUPATIONAL THERAPY ADULT IP CONSULT    Time Spent on this Encounter   I spent a total of 35 minutes discharging this patient.     Discharge Orders      Follow-Up with Cardiology [facility] Heart Failure Discharge      Primary Care - Care Coordination Referral      Reason for your hospital stay    Congestive heart failure     Monitor and record    Weigh yourself every morning     When to contact your care team    Contact your care team If symptoms get worse, If increased shortness of breath, If waking up at night with difficulty breathing, If unable to lie down for sleep due to symptoms, If weight gain of 2 pounds a day for 2 days in a row OR 5 pounds in 1 week., Increased swelling in your ankles or legs     Discharge Instructions    Call Dr. Cohn if any medical questions at Cell Phone [phone].     General info for SNF    Length of Stay Estimate: Short Term Care: Estimated # of Days <30  Condition at Discharge: Improving  Level of care:skilled   Rehabilitation Potential: Fair  Admission H&P remains valid and up-to-date: Yes  Recent Chemotherapy: N/A  Use Nursing Home Standing Orders: Yes     Mantoux instructions    Give two-step Mantoux (PPD) Per Facility Policy Yes     Glucose monitor nursing POCT    Before meals 3 times a day      Follow Up and recommended labs and tests    Follow up with Nursing home provider in 4 days, check INR and BP on 4/11/22 and adjust Warfarin dose at that time as needed for desired INR of 2.0 to 3.0.   Follow-up with [facility] in 2-3 weeks.     Activity - Up with nursing assistance    Weight bear as tolerated     Wound care    Bilateral legs:  1. Cleanse with warms soapy water and gently pat dry  2. Apply Xeroform to open wounds (used Adaptic 4/1 as no xerofrom available)  Apply Aquaphor to skin  Wrap with kerlix  Apply lymphedema wraps as ordered  Change Daily     Solitario catheter    Solitario to gravity drainage -- remove in 5 days, if unable to void or PVR >450 ml, then replace solitario and can make appt with [facility] in 2 weeks.     No CPR- Do NOT Intubate     Physical Therapy Adult Consult    Evaluate and treat as clinically indicated.    Reason:  Weakness     Occupational Therapy Adult Consult    Evaluate and treat as clinically indicated.    Reason:  Assist with ADL's     Diet    Combination Diet Moderate Consistent Carb (60 g CHO per Meal) Diet; 2 gm NA Diet; Low Saturated Fat Diet     Discharge Medications   Current Discharge Medication List      START taking these medications    Details   bumetanide (BUMEX) 2 MG tablet Take 2 tablets (4 mg) by mouth daily  Refills: 0    Associated Diagnoses: Acute on chronic diastolic congestive heart failure (H)      carvedilol (COREG) 6.25 MG tablet Take 1 tablet (6.25 mg) by mouth 2 times daily (with meals)  Qty: 60 tablet, Refills: 0    Associated Diagnoses: Acute on chronic diastolic congestive heart failure (H)      insulin aspart (NOVOLOG FLEXPEN) 100 UNIT/ML pen 3 times a day with meals, for glucometer 150-200 give 2 units SQ, 201-250 give 4 units, >250 give 6 units  Qty: 15 mL, Refills: 0    Associated Diagnoses: Type 2 diabetes mellitus without complication, unspecified whether long term insulin use (H)      lisinopril (ZESTRIL) 2.5 MG tablet Take 1 tablet  (2.5 mg) by mouth daily  Refills: 0    Associated Diagnoses: Acute on chronic diastolic congestive heart failure (H)      magnesium hydroxide (MILK OF MAGNESIA) 400 MG/5ML suspension Take 30 mLs by mouth daily as needed for constipation  Refills: 0    Associated Diagnoses: Constipation, unspecified constipation type      senna-docusate (SENOKOT-S/PERICOLACE) 8.6-50 MG tablet Take 1 tablet by mouth 2 times daily  Refills: 0    Associated Diagnoses: Constipation, unspecified constipation type      spironolactone (ALDACTONE) 25 MG tablet Take 1 tablet (25 mg) by mouth daily  Refills: 0    Associated Diagnoses: Acute on chronic diastolic heart failure (H)      traZODone (DESYREL) 50 MG tablet Take 0.5 tablets (25 mg) by mouth nightly as needed for sleep  Refills: 0    Associated Diagnoses: Insomnia, unspecified type         CONTINUE these medications which have CHANGED    Details   glipiZIDE (GLUCOTROL XL) 5 MG 24 hr tablet Take 1 tablet (5 mg) by mouth daily    Associated Diagnoses: Type 2 diabetes mellitus without complication, with long-term current use of insulin (H)      insulin glargine (LANTUS PEN) 100 UNIT/ML pen Inject 15 Units Subcutaneous At Bedtime  Qty: 15 mL, Refills: 0    Comments: If Lantus is not covered by insurance, may substitute Basaglar or Semglee or other insulin glargine product per insurance preference at same dose and frequency.    Associated Diagnoses: Type 2 diabetes mellitus without complication, with long-term current use of insulin (H)      warfarin ANTICOAGULANT (COUMADIN) 5 MG tablet 5 mg daily except 7.5 mg on Sundays, check INR on 4/11 and adjust dose for desired INR of 2.0 to 3.0.  Refills: 0    Associated Diagnoses: Atrial fibrillation, unspecified type (H)         CONTINUE these medications which have NOT CHANGED    Details   atorvastatin (LIPITOR) 10 MG tablet Take 10 mg by mouth At Bedtime       [employer] IRON 240 (27 Fe) MG TABS Take 1 tablet by mouth daily      metFORMIN (GLUCOPHAGE)  500 MG tablet Take 500 mg by mouth 2 times daily (with meals)   Refills: 0      multivitamin with minerals tablet Take 1 tablet by mouth daily       omeprazole (PRILOSEC) 20 MG capsule [OMEPRAZOLE (PRILOSEC) 20 MG CAPSULE] Take 20 mg by mouth daily.       potassium chloride ER (KLOR-CON M) 20 MEQ CR tablet Take 20 mEq by mouth 2 times daily (with meals)         STOP taking these medications       atenolol (TENORMIN) 50 MG tablet Comments:   Reason for Stopping:         cephALEXin (KEFLEX) 500 MG capsule Comments:   Reason for Stopping:         furosemide (LASIX) 40 MG tablet Comments:   Reason for Stopping:         gentamicin (GARAMYCIN) 0.1 % external ointment Comments:   Reason for Stopping:         metOLazone (ZAROXOLYN) 5 MG tablet Comments:   Reason for Stopping:             Allergies   Allergies   Allergen Reactions     Aspirin Nausea and Vomiting     GI upset     Data   Most Recent 3 CBC's:  Recent Labs   Lab Test 03/31/22  1200 01/22/20  1526 02/22/18  0923   WBC 5.6 7.0 6.6   HGB 13.0* 14.5 12.4*   MCV 94 94 95    155 178      Most Recent 3 BMP's:  Recent Labs   Lab Test 04/07/22  1211 04/07/22  0916 04/07/22  0738 04/06/22  1741 04/06/22  1218 04/06/22  0818 04/06/22  0520 04/05/22  0749 04/05/22  0453   NA  --  135*  --   --  137  --   --   --  136   POTASSIUM  --  3.9  --   --  4.5  --  4.1  --  3.8   CHLORIDE  --  95*  --   --  97*  --   --   --  94*   CO2  --  31  --   --  32*  --   --   --  30   BUN  --  45*  --   --  38*  --   --   --  37*   CR  --  1.38*  --   --  1.22  --   --   --  1.35*   ANIONGAP  --  9  --   --  8  --   --   --  12   TY  --  9.3  --   --  9.2  --   --   --  8.7   * 167* 137*   < > 114   < >  --    < > 130*    < > = values in this interval not displayed.     Most Recent 2 LFT's:  Recent Labs   Lab Test 11/21/18  1122 02/16/18  2306   AST 40 24   ALT 62* 45   ALKPHOS 82 78   BILITOTAL 1.3* 0.7     Most Recent INR's and Anticoagulation Dosing  History:  Anticoagulation Dose History     Recent Dosing and Labs Latest Ref Rng & Units 4/1/2022 4/2/2022 4/3/2022 4/4/2022 4/5/2022 4/6/2022 4/7/2022    NORMANORMA IMS TEMPLATE - - - - - 7.5 mg - -    Warfarin 2 mg - 2 mg - - - - - -    Warfarin 3 mg - - - 6 mg 6 mg - - -    Warfarin 5 mg - - 5 mg - - - 5 mg -    INR 0.85 - 1.15 2.94(H) 2.34(H) 1.81(H) 1.80(H) 1.89(H) 2.08(H) 2.26(H)        Most Recent 3 Troponin's:No lab results found.  Most Recent Cholesterol Panel:  Recent Labs   Lab Test 11/23/20  1050   CHOL 94   LDL 37   HDL 44   TRIG 64     Most Recent 6 Bacteria Isolates From Any Culture (See EPIC Reports for Culture Details):No lab results found.  Most Recent TSH, T4 and A1c Labs:  Recent Labs   Lab Test 03/31/22  1200   A1C 7.4*

## 2022-04-07 NOTE — PROGRESS NOTES
Care Management Discharge Note    Discharge Date: 04/07/2022       Discharge Disposition: Transitional Care    Discharge Services: None    Discharge DME: None    Discharge Transportation: agency ([facility])    Private pay costs discussed: transportation costs    PAS Confirmation Code:  [id number])  Patient/family educated on Medicare website which has current facility and service quality ratings: yes    Education Provided on the Discharge Plan:    Persons Notified of Discharge Plans: Patient, spouse, floor nurse, charge nurse, TCU admissions   Patient/Family in Agreement with the Plan: yes    Handoff Referral Completed: Yes    Additional Information:  Patient needing TCU placement due to heavy assistance x 2. [facility] Called and left multiple messages for spouse Virginia with call back extension [phone]. Concern about patient getting home from TCU due to location once ready for discharge; per admissions at [facility] they would be able to assist with transportation home. Met with patient in room and had patient call his spouse as writer was not able to get in touch with her. [facility]. Patient states she is old and not able to drive on freeways. Was really wanting [facility] or [facility]. Writer explained that [facility] Declined, and that [facility] was still reviewing. Writer stated that if not able to go to [facility] she would take patient home. Per spouse she felt she could manage as long as she got a commode for at home. Discussed that a commode could be purchased at [facility] or [facility]. Writer stated that patient was still needing heavy assist of 2 and that it was not safe to go home; spouse stated she would manage. [facility] spouse elected to return home at discharge. Spoke with  Fadi in admission at [facility] who wanted more information on patients ambulation status and if patient could use Easy Stand. Discussed with Physical Therapist would would update writer once she went to work with patient. Received updated that patient was heavy assistance of 2, could not move feet when standing, but was able utilize Easy Stand. Called Fadi at [facility] and they are able to accept today. Will need repeat Negative COVID test prior to discharge; was ordered and is in process. Spoke with spouse who is in agreement with plan. Set up [facility] Stretcher transport at 1830 due to safety concerns with wheelchair transport; patient needing heavy assistance of 2, lift support for transfers. PCS completed and faxed. PAS completed.         Belgica Rodríguez RN

## 2022-04-07 NOTE — PLAN OF CARE
Lymphedema Discharge Summary    Reason for therapy discharge:    Discharged to transitional care facility.    Progress towards therapy goal(s). See goals on Care Plan in [facility] electronic health record for goal details.  Goals partially met.  Barriers to achieving goals:   discharge from facility.    Therapy recommendation(s):    Continued therapy is recommended.  Rationale/Recommendations:  for lymphedema managment .

## 2022-04-07 NOTE — PLAN OF CARE
Occupational Therapy Discharge Summary    Reason for therapy discharge:    Discharged to transitional care facility.    Progress towards therapy goal(s). See goals on Care Plan in [facility] electronic health record for goal details.  Goals partially met.  Barriers to achieving goals:   limited tolerance for therapy.    Therapy recommendation(s):    Continued therapy is recommended.  Rationale/Recommendations:  for further endurance and ADL independence buildingb.

## 2022-04-07 NOTE — PLAN OF CARE
"  Problem: Plan of Care - These are the overarching goals to be used throughout the patient stay.    Goal: Plan of Care Review/Shift Note  Description: The Plan of Care Review/Shift note should be completed every shift.  The Outcome Evaluation is a brief statement about your assessment that the patient is improving, declining, or no change.  This information will be displayed automatically on your shift note.  Outcome: Ongoing, Progressing  Flowsheets (Taken 4/7/2022 1118)  Plan of Care Reviewed With: patient  Goal: Patient-Specific Goal (Individualized)  Description: You can add care plan individualizations to a care plan. Examples of Individualization might be:  \"Parent requests to be called daily at 9am for status\", \"I have a hard time hearing out of my right ear\", or \"Do not touch me to wake me up as it startles me\".  Outcome: Ongoing, Progressing  Goal: Absence of Hospital-Acquired Illness or Injury  Outcome: Ongoing, Progressing  Intervention: Identify and Manage Fall Risk  Recent Flowsheet Documentation  Taken 4/7/2022 1000 by Fernando Parra RN  Safety Promotion/Fall Prevention:   activity supervised   chair alarm on   assistive device/personal items within reach  Intervention: Prevent Skin Injury  Recent Flowsheet Documentation  Taken 4/7/2022 1000 by Fernando Parra RN  Body Position:   turned   weight shifting  Intervention: Prevent and Manage VTE (Venous Thromboembolism) Risk  Recent Flowsheet Documentation  Taken 4/7/2022 1000 by Fernando Parra RN  Activity Management: activity adjusted per tolerance  Goal: Optimal Comfort and Wellbeing  Outcome: Ongoing, Progressing  Goal: Readiness for Transition of Care  Outcome: Ongoing, Progressing     Problem: Risk for Delirium  Goal: Optimal Coping  Outcome: Ongoing, Progressing  Goal: Improved Behavioral Control  Outcome: Ongoing, Progressing  Goal: Improved Attention and Thought Clarity  Outcome: Ongoing, Progressing  Goal: Improved Sleep  Outcome: Ongoing, " Progressing     Problem: Fluid Volume Excess  Goal: Fluid Balance  Outcome: Ongoing, Progressing     Problem: Impaired Wound Healing  Goal: Optimal Wound Healing  Outcome: Ongoing, Progressing  Intervention: Promote Wound Healing  Recent Flowsheet Documentation  Taken 4/7/2022 1000 by Fernando Parra RN  Activity Management: activity adjusted per tolerance     Problem: Urinary Retention  Goal: Effective Urinary Elimination  Outcome: Ongoing, Progressing     Problem: Hyperglycemia  Goal: Blood Glucose Level Within Targeted Range  Outcome: Ongoing, Progressing   Goal Outcome Evaluation:    Plan of Care Reviewed With: patient        Pt is alert and oriented x3. Pt is med complaint. Pt denies pain. Pt is up on the chair this morning. Pt's v/s stable. Pt is very hard of hearing. Pt has good appetite. Continue to monitor pt.

## 2022-04-07 NOTE — PLAN OF CARE
Problem: Urinary Retention  Goal: Effective Urinary Elimination  Outcome: Ongoing, Not Progressing   Pe[place] catheter in place; patent and draining.    Problem: Risk for Delirium  Goal: Improved Attention and Thought Clarity  Outcome: Ongoing, Progressing   Patient alert and oriented x 4. Heavy assist of 2. Uses bedside commode. Prefers being up in chair. No acute changes noted.     Problem: Impaired Wound Healing  Goal: Optimal Wound Healing  Outcome: Ongoing, Progressing  Intervention: Promote Wound Healing  Recent Flowsheet Documentation  Taken 4/6/2022 2131 by Pooja Cedillo, RN  Activity Management: up in chair  Taken 4/6/2022 1902 by Pooja Cedillo, RN  Activity Management: up in chair  Taken 4/6/2022 1716 by Pooja Cedillo, RN  Activity Management: up to bedside commode  Taken 4/6/2022 1700 by Pooja Cedillo, RN  Activity Management: up in chair    New dressing applied on buttock. Scant drainage noted.     Problem: Hyperglycemia  Goal: Blood Glucose Level Within Targeted Range  Outcome: Ongoing, Progressing   Blood sugar not needing insulin coverage.     Goal Outcome Evaluation:

## 2022-04-07 NOTE — PROGRESS NOTES
HEART CARE NOTE          Assessment/Recommendations     1. HFpEF c/b ADHF  Assessment / Plan    Tolerating oral diuretic regimen --> no changes to regimen today; continue to monitor hemodynamic, UOP and renal function closely     BP adequately controlled on current regimen - no changes at this time     2. Paroxysmal Afib  Assessment / Plan    Continue warfarin per pharmacy management     Continue carvedilol     3. DM2  Assessment / Plan    Management per primary team     Currently on ISS     4. CKD stage 3a  Assessment / Plan    Continue to monitor closely with diuresis     5. Cecal cancer  Assessment / Plan    S/p right hemicolectomy    Ok for discharge from a cardiology standpoint. Cardiology team will sign-off for now. Please do not hesitate to consult us again if new questions or concerns arise. Follow-up appointment will be arranged by CORE/HF clinic.       History of Present Illness/Subjective      Mr. Robinson Medel is a 89 year old male with (per Dr. Finch's note) a PMHx significant for CHF, diabetes mellitus type II, PVD, hyperlipidemia, paroxysmal Afib, anemia, HTN, and cecal cancer s/p right hemicolectomy who presents to the ED by wheelchair with his wife for evaluation of leg swelling and shortness of breath.      Today, Mr. Medel denies any acute cardiac events or complaints. Management plan as detailed above     ECG: Personally reviewed. atrial fibrillation.     ECHO (personnaly Reviewed):  1. Normal left ventricular size and systolic performance with a visually  estimated ejection fraction of 50-55%.  2. There is moderate concentric increase in left ventricular wall thickness.  3. There is trace aortic insufficiency.  4. There is mild-moderate right ventricular enlargement with mildly reduced  right ventricular systolic performance.  5. There is moderate-severe biatrial enlargement.  6. Right ventricular systolic pressure relative to right atrial pressure is  moderately increased.  The pulmonary artery pressure is estimated to be 60-65  mmHg plus right atrial pressure. In addition, the IVC appears dilated with  decreased phasic variation in caval diameter consistent with elevated right  atrial pressure.          Physical Examination Review of Systems   /55 (BP Location: Right arm)   Pulse 64   Temp 98.1  F (36.7  C) (Oral)   Resp 20   Ht 1.829 m (6')   Wt 93.2 kg (205 lb 6.4 oz)   SpO2 96%   BMI 27.86 kg/m    Body mass index is 27.86 kg/m .  Wt Readings from Last 3 Encounters:   04/06/22 93.2 kg (205 lb 6.4 oz)   03/07/22 97.8 kg (215 lb 11.2 oz)   01/24/22 95.7 kg (211 lb)     General Appearance:   no distress, normal body habitus   ENT/Mouth: membranes moist, no oral lesions or bleeding gums.      EYES:  no scleral icterus, normal conjunctivae   Neck: no carotid bruits or thyromegaly   Chest/Lungs:   lungs are clear to auscultation, no rales or wheezing, equal chest wall expansion    Cardiovascular:   Regular. Normal first and second heart sounds with no murmurs, rubs, or gallops; the carotid, radial and posterior tibial pulses are intact, no JVD and trace LE edema bilaterally    Abdomen:  no organomegaly, masses, bruits, or tenderness; bowel sounds are present   Extremities: no cyanosis or clubbing   Skin: no xanthelasma, warm.    Neurologic: alert and calm     Psychiatric: alert and calm     A complete 10 systems ROS was reviewed  And is negative except what is listed in the HPI.          Medical History  Surgical History Family History Social History   Past Medical History:   Diagnosis Date     Anemia      Coronary artery disease 9/25/2015    stents placed      Diabetes mellitus (H)      Duodenitis 5/11/2016     Dyslipidemia      Gastroduodenal ulcer 9/24/2015     GERD (gastroesophageal reflux disease)      GI bleed 07/08/2016     Gout      Hematuria      Hyperlipemia      Hypertension      Kidney stone 10/6/2015    Past Surgical History:   Procedure Laterality Date      ANGIOPLASTY  09/2015     COLONOSCOPY N/A 6/17/2016    Procedure: COLONOSCOPY with biopsies in cecum using biopsy forcep and a transverse colon polypectomy using a hot snare;  Surgeon: Steve nAgel MD;  Location: [facility];  Service:      CYSTOSCOPY PROSTATE W/ LASER N/A 2/9/2016    Procedure: CYSTOSCOPY TRANSURETHRAL RESECTION PROSTATE;  Surgeon: Chano Smith MD;  Location: [facility] OR;  Service:      CYSTOSCOPY W/ LITHOLAPAXY / EHL N/A 2/9/2016    Procedure: CYSTOLITHOLAPAXY ;  Surgeon: Chano Smith MD;  Location: [facility] OR;  Service:      ESOPHAGOSCOPY, GASTROSCOPY, DUODENOSCOPY (EGD), COMBINED N/A 5/8/2016    Procedure: ESOPHAGOGASTRODUODENOSCOPY;  Surgeon: Kieran Beltran MD;  Location: [facility];  Service:      ESOPHAGOSCOPY, GASTROSCOPY, DUODENOSCOPY (EGD), COMBINED N/A 7/8/2016    Procedure: ESOPHAGOGASTRODUODENOSCOPY;  Surgeon: Chano Wynn MD;  Location: [facility];  Service:      KIDNEY STONE SURGERY       OTHER SURGICAL HISTORY      nose polyps     [place] LAP,SURG,COLECTOMY, PARTIAL, W/ANAST N/A 6/18/2016    Procedure: LAPAROSCOPIC ASSISTED RIGHT COLECTOMY ROOM 310;  Surgeon: Radha Bueno MD;  [place] [facility];  Service: General    no family history of premature coronary artery disease Social History     Socioeconomic History     Marital status:      Spouse name: Not on file     Number of children: Not on file     Years of education: Not on file     Highest education level: Not on file   Occupational History     Not on file   Tobacco Use     Smoking status: Never Smoker     Smokeless tobacco: Never Used     Tobacco comment: only smoked a little bit during high school   Substance and Sexual Activity     Alcohol use: No     Drug use: No     Sexual activity: Not on file   Other Topics Concern     Not on file   Social History Narrative    12/10/2015: .  Lives with his wife in a town home     Social Determinants of Health     Financial  Resource Strain: Not on file   Food Insecurity: Not on file   Transportation Needs: Not on file   Physical Activity: Not on file   Stress: Not on file   Social Connections: Not on file   Intimate Partner Violence: Not on file   Housing Stability: Not on file           Lab Results    Chemistry/lipid CBC Cardiac Enzymes/BNP/TSH/INR   Lab Results   Component Value Date    CHOL 94 11/23/2020    HDL 44 11/23/2020    TRIG 64 11/23/2020    BUN 38 (H) 04/06/2022     04/06/2022    CO2 32 (H) 04/06/2022    Lab Results   Component Value Date    WBC 5.6 03/31/2022    HGB 13.0 (L) 03/31/2022    HCT 40.5 03/31/2022    MCV 94 03/31/2022     03/31/2022    Lab Results   Component Value Date    TROPONINI 0.10 03/31/2022     (H) 03/31/2022    INR 2.08 (H) 04/06/2022     Lab Results   Component Value Date    TROPONINI 0.10 03/31/2022          Weight:    Wt Readings from Last 3 Encounters:   04/06/22 93.2 kg (205 lb 6.4 oz)   03/07/22 97.8 kg (215 lb 11.2 oz)   01/24/22 95.7 kg (211 lb)       Allergies  Allergies   Allergen Reactions     Aspirin Nausea and Vomiting     GI upset         Surgical History  Past Surgical History:   Procedure Laterality Date     ANGIOPLASTY  09/2015     COLONOSCOPY N/A 6/17/2016    Procedure: COLONOSCOPY with biopsies in cecum using biopsy forcep and a transverse colon polypectomy using a hot snare;  Surgeon: Steve Angel MD;  Location: [facility];  Service:      CYSTOSCOPY PROSTATE W/ LASER N/A 2/9/2016    Procedure: CYSTOSCOPY TRANSURETHRAL RESECTION PROSTATE;  Surgeon: Chano Smith MD;  Location: [facility] OR;  Service:      CYSTOSCOPY W/ LITHOLAPAXY / EHL N/A 2/9/2016    Procedure: CYSTOLITHOLAPAXY ;  Surgeon: Chano Smith MD;  Location: [facility] OR;  Service:      ESOPHAGOSCOPY, GASTROSCOPY, DUODENOSCOPY (EGD), COMBINED N/A 5/8/2016    Procedure: ESOPHAGOGASTRODUODENOSCOPY;  Surgeon: Kieran Beltran MD;  Location: [facility];  Service:       ESOPHAGOSCOPY, GASTROSCOPY, DUODENOSCOPY (EGD), COMBINED N/A 7/8/2016    Procedure: ESOPHAGOGASTRODUODENOSCOPY;  Surgeon: Chaon Wynn MD;  Location: [facility] GI;  Service:      KIDNEY STONE SURGERY       OTHER SURGICAL HISTORY      nose polyps     OR LAP,SURG,COLECTOMY, PARTIAL, W/ANAST N/A 6/18/2016    Procedure: LAPAROSCOPIC ASSISTED RIGHT COLECTOMY ROOM 310;  Surgeon: Radha Bueno MD;  Location: [facility] OR;  Service: General       Social History  Tobacco:   History   Smoking Status     Never Smoker   Smokeless Tobacco     Never Used     Comment: only smoked a little bit during high school    Alcohol:   Social History    Substance and Sexual Activity      Alcohol use: No   Illicit Drugs:   History   Drug Use No       Family History  Family History   Problem Relation Age of Onset     Diabetes Mother      Heart Disease Father      Mental Illness Daughter         Depression          Brittany Cox MD on 4/7/2022      cc: Elizabeth Alexandre

## 2022-04-07 NOTE — PROGRESS NOTES
CLINICAL NUTRITION SERVICES    Reviewed nutrition risk factors due to LOS. Pt is tolerating diet, eating well per nursing documentation. No nutrition issues identified at this time. RD will follow  peripherally at this time, unless consulted.

## 2022-04-14 PROBLEM — J18.9 CAP (COMMUNITY ACQUIRED PNEUMONIA): Status: RESOLVED | Noted: 2018-02-01 | Resolved: 2022-01-01

## 2022-04-14 PROBLEM — I50.30 (HFPEF) HEART FAILURE WITH PRESERVED EJECTION FRACTION (H): Status: ACTIVE | Noted: 2022-01-01

## 2022-04-14 NOTE — PATIENT INSTRUCTIONS
Robinson Medel,    It was a pleasure to see you today at the [facility].     My recommendations after this visit include:  - No changes to your medications.    - My nurse will call you next week and you can let us know if you want to follow up with our cardiology clinic or [facility].   - Continue to monitor for signs of retaining fluid (increasing weights, shortness of breath, swelling) and call with any concerns.   - If you have any questions or concerns, please call [phone] to talk with my nurse.    Mally Saxena, CNP

## 2022-04-14 NOTE — LETTER
4/14/2022    Elizabeth Alexandre MD  [facility] [address]  [place] [address]    RE: Robinson Medel       Dear Colleague,     I had the pleasure of seeing Robinson Medel in the [facility].  HEART CARE PROGRESS NOTE      [facility]  [phone]      Assessment/Recommendations   Assessment:    1.  Heart failure with preserved ejection fraction: He continues to have shortness of breath with activity but this is stable since hospital discharge.  Lower extremity edema has improved significantly.  His weight is down 6 pounds since discharge. We reviewed heart failure diagnosis, medications, treatment plan, low sodium diet, weight monitoring, and symptom monitoring.      2.  Hypertension: Blood pressure 102/50    3.  Paroxysmal atrial fibrillation: Heart rate regular today.  He continues to take warfarin and had an INR checked today.    4.  Coronary artery disease: History of stenting of proximal LAD and mid circumflex in 2015    Plan:  1.  Continue current medications  2.  BMP on April 11 reviewed and stable  3.  Low-sodium diet and daily weights    Robinson and his wife will discuss whether he wants to follow-up with Dr. Harrington at [facility] or change to [facility].  His wife will be contacted next week to find out their decision.        History of Present Illness/Subjective    Mr. Robinson Medel is a 89 year old male seen at [facility] today for hospital follow-up.  He has a history of heart failure with preserved ejection fraction, paroxysmal atrial fibrillation, hypertension, coronary artery disease, severe pulmonary hypertension, venous stasis, diabetes, chronic kidney disease, and cecal cancer with right hemicolectomy.  He was struggling with fluid retention prior to hospitalization and cardiologist at [facility] was adjusting diuretic.  He was hospitalized March 31 to April 7, 2022 with acute on  "chronic heart failure with preserved ejection fraction.  He had increased shortness of breath, edema, and a 20 pound weight gain.  He was diuresed during hospitalization.  Echocardiogram on March 31, 2022 showed ejection fraction of 50 to 55%.    He was discharged to [facility].  He feels that he is ready to go home.     He has occasional shortness of breath with activity but denies any worsening since hospital discharge.  He denies any orthopnea.  He has chronic lower extremity edema and has his legs wrapped every few days.  He follows up with the vascular clinic regularly.  He and his wife state that swelling has improved significantly.  He denies lightheadedness, chest pain and abdominal fullness/bloating.      His clinic weight is down 6 pounds since hospital discharge.  He does not recall any of his weights at TCU.  He is on a low-sodium diet at TCU and follows a low-sodium diet at home.      ECHO 3/31/2022:   Echo result w/o MOPS: Interpretation Summary 1. Normal left ventricular size and systolic performance with a visuallyestimated ejection fraction of 50-55%.2. There is moderate concentric increase in left ventricular wall thickness.3. There is trace aortic insufficiency.4. There is mild-moderate right ventricular enlargement with mildly reducedright ventricular systolic performance.5. There is moderate-severe biatrial enlargement.6. Right ventricular systolic pressure relative to right atrial pressure ismoderately increased. The pulmonary artery pressure is estimated to be 60-65mmHg plus right atrial pressure. In addition, the IVC appears dilated withdecreased phasic variation in caval diameter consistent with elevated rightatrial pressure.          Physical Examination Review of Systems   Vitals: /50 (BP Location: Right arm, Patient Position: Sitting, Cuff Size: Adult Regular)   Pulse 100   Resp 16   Ht 1.854 m (6' 1\")   Wt 90.5 kg (199 lb 9.6 oz)   BMI 26.33 kg/m    BMI= Body mass " index is 26.33 kg/m .  Wt Readings from Last 3 Encounters:   04/14/22 90.5 kg (199 lb 9.6 oz)   04/06/22 93.2 kg (205 lb 6.4 oz)   03/07/22 97.8 kg (215 lb 11.2 oz)       General Appearance:     Alert, cooperative and in no acute distress.   ENT/Mouth: membranes moist, no oral lesions or bleeding gums.      EYES:  no scleral icterus, normal conjunctivae   Chest/Lungs:   lungs are clear to auscultation, no rales or wheezing, respirations unlabored   Cardiovascular:   Regular. Normal first and second heart sounds, edema bilateral lower extremities (difficult to assess since legs are wrapped with ACE bandages.    Abdomen:  Soft, nontender, nondistended, bowel sounds present   Extremities: no cyanosis or clubbing   Skin: warm, dry.    Neurologic: mood and affect are appropriate, alert and oriented x3         Please refer above for cardiac ROS details.      Medical History  Surgical History Family History Social History   Past Medical History:   Diagnosis Date     Anemia      Coronary artery disease 9/25/2015    stents placed      Diabetes mellitus (H)      Duodenitis 5/11/2016     Dyslipidemia      Gastroduodenal ulcer 9/24/2015     GERD (gastroesophageal reflux disease)      GI bleed 07/08/2016     Gout      Hematuria      Hyperlipemia      Hypertension      Kidney stone 10/6/2015     Past Surgical History:   Procedure Laterality Date     ANGIOPLASTY  09/2015     COLONOSCOPY N/A 6/17/2016    Procedure: COLONOSCOPY with biopsies in cecum using biopsy forcep and a transverse colon polypectomy using a hot snare;  Surgeon: Steve Angel MD;  Location: [facility];  Service:      CYSTOSCOPY PROSTATE W/ LASER N/A 2/9/2016    Procedure: CYSTOSCOPY TRANSURETHRAL RESECTION PROSTATE;  Surgeon: Chano Smith MD;  Location: [facility];  Service:      CYSTOSCOPY W/ LITHOLAPAXY / EHL N/A 2/9/2016    Procedure: CYSTOLITHOLAPAXY ;  Surgeon: Chano Smith MD;  Location: [facility] OR;  Service:       ESOPHAGOSCOPY, GASTROSCOPY, DUODENOSCOPY (EGD), COMBINED N/A 5/8/2016    Procedure: ESOPHAGOGASTRODUODENOSCOPY;  Surgeon: Kieran Beltran MD;  Location: [facility];  Service:      ESOPHAGOSCOPY, GASTROSCOPY, DUODENOSCOPY (EGD), COMBINED N/A 7/8/2016    Procedure: ESOPHAGOGASTRODUODENOSCOPY;  Surgeon: Chano Wynn MD;  Location: [facility];  Service:      KIDNEY STONE SURGERY       OTHER SURGICAL HISTORY      nose polyps     [place] LAP,SURG,COLECTOMY, PARTIAL, W/ANAST N/A 6/18/2016    Procedure: LAPAROSCOPIC ASSISTED RIGHT COLECTOMY ROOM 310;  Surgeon: Radha Bueno MD;  Location: [facility];  Service: General     Family History   Problem Relation Age of Onset     Diabetes Mother      Heart Disease Father      Mental Illness Daughter         Depression    Social History     Socioeconomic History     Marital status:      Spouse name: Not on file     Number of children: Not on file     Years of education: Not on file     Highest education level: Not on file   Occupational History     Not on file   Tobacco Use     Smoking status: Never Smoker     Smokeless tobacco: Never Used     Tobacco comment: only smoked a little bit during high school   Substance and Sexual Activity     Alcohol use: No     Drug use: No     Sexual activity: Not on file   Other Topics Concern     Not on file   Social History Narrative    12/10/2015: .  Lives with his wife in a town home     Social Determinants of Health     Financial Resource Strain: Not on file   Food Insecurity: Not on file   Transportation Needs: Not on file   Physical Activity: Not on file   Stress: Not on file   Social Connections: Not on file   Intimate Partner Violence: Not on file   Housing Stability: Not on file          Medications  Allergies   Current Outpatient Medications   Medication Sig Dispense Refill     atorvastatin (LIPITOR) 10 MG tablet Take 10 mg by mouth At Bedtime        bumetanide (BUMEX) 2 MG tablet Take 2 tablets (4 mg) by mouth  daily  0     carvedilol (COREG) 6.25 MG tablet Take 1 tablet (6.25 mg) by mouth 2 times daily (with meals) 60 tablet 0     [employer] IRON 240 (27 Fe) MG TABS Take 1 tablet by mouth daily       glipiZIDE (GLUCOTROL XL) 5 MG 24 hr tablet Take 1 tablet (5 mg) by mouth daily       insulin aspart (NOVOLOG FLEXPEN) 100 UNIT/ML pen 3 times a day with meals, for glucometer 150-200 give 2 units SQ, 201-250 give 4 units, >250 give 6 units 15 mL 0     insulin glargine (LANTUS PEN) 100 UNIT/ML pen Inject 15 Units Subcutaneous At Bedtime 15 mL 0     lisinopril (ZESTRIL) 2.5 MG tablet Take 1 tablet (2.5 mg) by mouth daily  0     magnesium hydroxide (MILK OF MAGNESIA) 400 MG/5ML suspension Take 30 mLs by mouth daily as needed for constipation  0     metFORMIN (GLUCOPHAGE) 500 MG tablet Take 500 mg by mouth 2 times daily (with meals)   0     multivitamin with minerals tablet Take 1 tablet by mouth daily        omeprazole (PRILOSEC) 20 MG capsule [OMEPRAZOLE (PRILOSEC) 20 MG CAPSULE] Take 20 mg by mouth daily.        potassium chloride ER (KLOR-CON M) 20 MEQ CR tablet Take 20 mEq by mouth 2 times daily (with meals)       senna-docusate (SENOKOT-S/PERICOLACE) 8.6-50 MG tablet Take 1 tablet by mouth 2 times daily  0     spironolactone (ALDACTONE) 25 MG tablet Take 1 tablet (25 mg) by mouth daily  0     traZODone (DESYREL) 50 MG tablet Take 0.5 tablets (25 mg) by mouth nightly as needed for sleep  0     warfarin ANTICOAGULANT (COUMADIN) 5 MG tablet 5 mg daily except 7.5 mg on Sundays, check INR on 4/11 and adjust dose for desired INR of 2.0 to 3.0.  0    Allergies   Allergen Reactions     Aspirin Nausea and Vomiting     GI upset         Lab Results    Chemistry/lipid CBC Cardiac Enzymes/BNP/TSH/INR   Recent Labs   Lab Test 11/23/20  1050   CHOL 94   HDL 44   LDL 37   TRIG 64     Recent Labs   Lab Test 11/23/20  1050 10/29/19  1053 10/29/19  1005   LDL 37 46 46     Recent Labs   Lab Test 04/11/22  0510      POTASSIUM 4.0   CHLORIDE 101    CO2 30   *   BUN 45*   CR 1.29   GFRESTIMATED 52*   TY 9.6     Recent Labs   Lab Test 04/11/22  0510 04/07/22  0916 04/06/22  1218   CR 1.29 1.38* 1.22     Recent Labs   Lab Test 03/31/22  1200 07/14/21  1630 02/02/18  0614   A1C 7.4* 7.7* 7.7*    Recent Labs   Lab Test 03/31/22  1200   WBC 5.6   HGB 13.0*   HCT 40.5   MCV 94        Recent Labs   Lab Test 03/31/22  1200 01/22/20  1526 02/22/18  0923   HGB 13.0* 14.5 12.4*    Recent Labs   Lab Test 03/31/22  1200 02/16/18  2306 02/01/18  1445   TROPONINI 0.10 0.03 0.08     Recent Labs   Lab Test 03/31/22  1200 02/14/19  1019 12/13/18  1033   * 309* 308*     No results for input(s): TSH in the last 88788 hours.  Recent Labs   Lab Test 04/14/22  0446 04/11/22  0510 04/07/22  0916   INR 2.94* 2.85* 2.26*                  Thank you for allowing me to participate in the care of your patient.      Sincerely,     Mally Jordan NP     [facility]  cc:   Brittany Cox MD  [address] [place] [address]

## 2022-04-14 NOTE — PROGRESS NOTES
HEART CARE PROGRESS NOTE      [facility]  [phone]      Assessment/Recommendations   Assessment:    1.  Heart failure with preserved ejection fraction: He continues to have shortness of breath with activity but this is stable since hospital discharge.  Lower extremity edema has improved significantly.  His weight is down 6 pounds since discharge. We reviewed heart failure diagnosis, medications, treatment plan, low sodium diet, weight monitoring, and symptom monitoring.      2.  Hypertension: Blood pressure 102/50    3.  Paroxysmal atrial fibrillation: Heart rate regular today.  He continues to take warfarin and had an INR checked today.    4.  Coronary artery disease: History of stenting of proximal LAD and mid circumflex in 2015    Plan:  1.  Continue current medications  2.  BMP on April 11 reviewed and stable  3.  Low-sodium diet and daily weights    Robinson and his wife will discuss whether he wants to follow-up with Dr. Harrington at [facility] or change to [facility].  His wife will be contacted next week to find out their decision.        History of Present Illness/Subjective    Mr. Robinson Medel is a 89 year old male seen at [facility] today for hospital follow-up.  He has a history of heart failure with preserved ejection fraction, paroxysmal atrial fibrillation, hypertension, coronary artery disease, severe pulmonary hypertension, venous stasis, diabetes, chronic kidney disease, and cecal cancer with right hemicolectomy.  He was struggling with fluid retention prior to hospitalization and cardiologist at [facility] was adjusting diuretic.  He was hospitalized March 31 to April 7, 2022 with acute on chronic heart failure with preserved ejection fraction.  He had increased shortness of breath, edema, and a 20 pound weight gain.  He was diuresed during hospitalization.  Echocardiogram on March 31, 2022 showed ejection fraction of 50 to  "55%.    He was discharged to [facility].  He feels that he is ready to go home.     He has occasional shortness of breath with activity but denies any worsening since hospital discharge.  He denies any orthopnea.  He has chronic lower extremity edema and has his legs wrapped every few days.  He follows up with the vascular clinic regularly.  He and his wife state that swelling has improved significantly.  He denies lightheadedness, chest pain and abdominal fullness/bloating.      His clinic weight is down 6 pounds since hospital discharge.  He does not recall any of his weights at TCU.  He is on a low-sodium diet at TCU and follows a low-sodium diet at home.      ECHO 3/31/2022:   Echo result w/o MOPS: Interpretation Summary 1. Normal left ventricular size and systolic performance with a visuallyestimated ejection fraction of 50-55%.2. There is moderate concentric increase in left ventricular wall thickness.3. There is trace aortic insufficiency.4. There is mild-moderate right ventricular enlargement with mildly reducedright ventricular systolic performance.5. There is moderate-severe biatrial enlargement.6. Right ventricular systolic pressure relative to right atrial pressure ismoderately increased. The pulmonary artery pressure is estimated to be 60-65mmHg plus right atrial pressure. In addition, the IVC appears dilated withdecreased phasic variation in caval diameter consistent with elevated rightatrial pressure.          Physical Examination Review of Systems   Vitals: /50 (BP Location: Right arm, Patient Position: Sitting, Cuff Size: Adult Regular)   Pulse 100   Resp 16   Ht 1.854 m (6' 1\")   Wt 90.5 kg (199 lb 9.6 oz)   BMI 26.33 kg/m    BMI= Body mass index is 26.33 kg/m .  Wt Readings from Last 3 Encounters:   04/14/22 90.5 kg (199 lb 9.6 oz)   04/06/22 93.2 kg (205 lb 6.4 oz)   03/07/22 97.8 kg (215 lb 11.2 oz)       General Appearance:     Alert, cooperative and in no acute distress. "   ENT/Mouth: membranes moist, no oral lesions or bleeding gums.      EYES:  no scleral icterus, normal conjunctivae   Chest/Lungs:   lungs are clear to auscultation, no rales or wheezing, respirations unlabored   Cardiovascular:   Regular. Normal first and second heart sounds, edema bilateral lower extremities (difficult to assess since legs are wrapped with ACE bandages.    Abdomen:  Soft, nontender, nondistended, bowel sounds present   Extremities: no cyanosis or clubbing   Skin: warm, dry.    Neurologic: mood and affect are appropriate, alert and oriented x3         Please refer above for cardiac ROS details.      Medical History  Surgical History Family History Social History   Past Medical History:   Diagnosis Date     Anemia      Coronary artery disease 9/25/2015    stents placed      Diabetes mellitus (H)      Duodenitis 5/11/2016     Dyslipidemia      Gastroduodenal ulcer 9/24/2015     GERD (gastroesophageal reflux disease)      GI bleed 07/08/2016     Gout      Hematuria      Hyperlipemia      Hypertension      Kidney stone 10/6/2015     Past Surgical History:   Procedure Laterality Date     ANGIOPLASTY  09/2015     COLONOSCOPY N/A 6/17/2016    Procedure: COLONOSCOPY with biopsies in cecum using biopsy forcep and a transverse colon polypectomy using a hot snare;  Surgeon: Steve Angel MD;  Location: [facility];  Service:      CYSTOSCOPY PROSTATE W/ LASER N/A 2/9/2016    Procedure: CYSTOSCOPY TRANSURETHRAL RESECTION PROSTATE;  Surgeon: Chano Smith MD;  Location: [facility];  Service:      CYSTOSCOPY W/ LITHOLAPAXY / EHL N/A 2/9/2016    Procedure: CYSTOLITHOLAPAXY ;  Surgeon: Chano Smith MD;  Location: [facility];  Service:      ESOPHAGOSCOPY, GASTROSCOPY, DUODENOSCOPY (EGD), COMBINED N/A 5/8/2016    Procedure: ESOPHAGOGASTRODUODENOSCOPY;  Surgeon: Kieran Beltran MD;  Location: [facility];  Service:      ESOPHAGOSCOPY, GASTROSCOPY, DUODENOSCOPY (EGD), COMBINED N/A  7/8/2016    Procedure: ESOPHAGOGASTRODUODENOSCOPY;  Surgeon: Chano Wynn MD;  Location: [facility];  Service:      KIDNEY STONE SURGERY       OTHER SURGICAL HISTORY      nose polyps     WA LAP,SURG,COLECTOMY, PARTIAL, W/ANAST N/A 6/18/2016    Procedure: LAPAROSCOPIC ASSISTED RIGHT COLECTOMY ROOM 310;  Surgeon: Radha Bueno MD;  Location: [facility];  Service: General     Family History   Problem Relation Age of Onset     Diabetes Mother      Heart Disease Father      Mental Illness Daughter         Depression    Social History     Socioeconomic History     Marital status:      Spouse name: Not on file     Number of children: Not on file     Years of education: Not on file     Highest education level: Not on file   Occupational History     Not on file   Tobacco Use     Smoking status: Never Smoker     Smokeless tobacco: Never Used     Tobacco comment: only smoked a little bit during high school   Substance and Sexual Activity     Alcohol use: No     Drug use: No     Sexual activity: Not on file   Other Topics Concern     Not on file   Social History Narrative    12/10/2015: .  Lives with his wife in a town home     Social Determinants of Health     Financial Resource Strain: Not on file   Food Insecurity: Not on file   Transportation Needs: Not on file   Physical Activity: Not on file   Stress: Not on file   Social Connections: Not on file   Intimate Partner Violence: Not on file   Housing Stability: Not on file          Medications  Allergies   Current Outpatient Medications   Medication Sig Dispense Refill     atorvastatin (LIPITOR) 10 MG tablet Take 10 mg by mouth At Bedtime        bumetanide (BUMEX) 2 MG tablet Take 2 tablets (4 mg) by mouth daily  0     carvedilol (COREG) 6.25 MG tablet Take 1 tablet (6.25 mg) by mouth 2 times daily (with meals) 60 tablet 0     [employer] IRON 240 (27 Fe) MG TABS Take 1 tablet by mouth daily       glipiZIDE (GLUCOTROL XL) 5 MG 24 hr tablet Take 1 tablet  (5 mg) by mouth daily       insulin aspart (NOVOLOG FLEXPEN) 100 UNIT/ML pen 3 times a day with meals, for glucometer 150-200 give 2 units SQ, 201-250 give 4 units, >250 give 6 units 15 mL 0     insulin glargine (LANTUS PEN) 100 UNIT/ML pen Inject 15 Units Subcutaneous At Bedtime 15 mL 0     lisinopril (ZESTRIL) 2.5 MG tablet Take 1 tablet (2.5 mg) by mouth daily  0     magnesium hydroxide (MILK OF MAGNESIA) 400 MG/5ML suspension Take 30 mLs by mouth daily as needed for constipation  0     metFORMIN (GLUCOPHAGE) 500 MG tablet Take 500 mg by mouth 2 times daily (with meals)   0     multivitamin with minerals tablet Take 1 tablet by mouth daily        omeprazole (PRILOSEC) 20 MG capsule [OMEPRAZOLE (PRILOSEC) 20 MG CAPSULE] Take 20 mg by mouth daily.        potassium chloride ER (KLOR-CON M) 20 MEQ CR tablet Take 20 mEq by mouth 2 times daily (with meals)       senna-docusate (SENOKOT-S/PERICOLACE) 8.6-50 MG tablet Take 1 tablet by mouth 2 times daily  0     spironolactone (ALDACTONE) 25 MG tablet Take 1 tablet (25 mg) by mouth daily  0     traZODone (DESYREL) 50 MG tablet Take 0.5 tablets (25 mg) by mouth nightly as needed for sleep  0     warfarin ANTICOAGULANT (COUMADIN) 5 MG tablet 5 mg daily except 7.5 mg on Sundays, check INR on 4/11 and adjust dose for desired INR of 2.0 to 3.0.  0    Allergies   Allergen Reactions     Aspirin Nausea and Vomiting     GI upset         Lab Results    Chemistry/lipid CBC Cardiac Enzymes/BNP/TSH/INR   Recent Labs   Lab Test 11/23/20  1050   CHOL 94   HDL 44   LDL 37   TRIG 64     Recent Labs   Lab Test 11/23/20  1050 10/29/19  1053 10/29/19  1005   LDL 37 46 46     Recent Labs   Lab Test 04/11/22  0510      POTASSIUM 4.0   CHLORIDE 101   CO2 30   *   BUN 45*   CR 1.29   GFRESTIMATED 52*   TY 9.6     Recent Labs   Lab Test 04/11/22  0510 04/07/22  0916 04/06/22  1218   CR 1.29 1.38* 1.22     Recent Labs   Lab Test 03/31/22  1200 07/14/21  1630 02/02/18  0614   A1C 7.4*  7.7* 7.7*    Recent Labs   Lab Test 03/31/22  1200   WBC 5.6   HGB 13.0*   HCT 40.5   MCV 94        Recent Labs   Lab Test 03/31/22  1200 01/22/20  1526 02/22/18  0923   HGB 13.0* 14.5 12.4*    Recent Labs   Lab Test 03/31/22  1200 02/16/18  2306 02/01/18  1445   TROPONINI 0.10 0.03 0.08     Recent Labs   Lab Test 03/31/22  1200 02/14/19  1019 12/13/18  1033   * 309* 308*     No results for input(s): TSH in the last 68128 hours.  Recent Labs   Lab Test 04/14/22  0446 04/11/22  0510 04/07/22  0916   INR 2.94* 2.85* 2.26*

## 2022-04-18 NOTE — PROGRESS NOTES
Compression Applied to Bilateral  Tubigrip Size F    Compression Applied to Bilateral  Short Stretch

## 2022-04-18 NOTE — PROGRESS NOTES
Follow up Vascular Visit       Date of Service:04/18/22      Chief Complaint: BLE swelling; BLE ulcers      Pt returns to [facility] with regards to their BLE swelling and leg ulcers.  They arrive today with spouse. He was recently hospitalized for acute on chronic CHF; he was diuresed around 15 pounds and discharge to TCU now at [facility]; lasix was switched to bumex and spironolactone; metolazone was discontinued; he was having issues urinating and solitario catheter was placed; continues with this. They are currently using xeroform to the wounds. This is being done by staff at his facility 2-3 times per week. They are using ace wraps for compression. They are feeling well today. Denies fevers, chills. No shortness of breath. Pt was type 2 diabetes last a1c was 7.4% done 3/2022. He has afib on chronic anticoagulation with coumadin.    Allergies:   Allergies   Allergen Reactions     Aspirin Nausea and Vomiting     GI upset       Medications:   Current Outpatient Medications:      atorvastatin (LIPITOR) 10 MG tablet, Take 10 mg by mouth At Bedtime , Disp: , Rfl:      bumetanide (BUMEX) 2 MG tablet, Take 2 tablets (4 mg) by mouth daily, Disp: , Rfl: 0     carvedilol (COREG) 6.25 MG tablet, Take 1 tablet (6.25 mg) by mouth 2 times daily (with meals), Disp: 60 tablet, Rfl: 0     [employer] IRON 240 (27 Fe) MG TABS, Take 1 tablet by mouth daily, Disp: , Rfl:      glipiZIDE (GLUCOTROL XL) 5 MG 24 hr tablet, Take 1 tablet (5 mg) by mouth daily, Disp: , Rfl:      insulin aspart (NOVOLOG FLEXPEN) 100 UNIT/ML pen, 3 times a day with meals, for glucometer 150-200 give 2 units SQ, 201-250 give 4 units, >250 give 6 units, Disp: 15 mL, Rfl: 0     insulin glargine (LANTUS PEN) 100 UNIT/ML pen, Inject 15 Units Subcutaneous At Bedtime, Disp: 15 mL, Rfl: 0     lisinopril (ZESTRIL) 2.5 MG tablet, Take 1 tablet (2.5 mg) by mouth daily, Disp: , Rfl: 0     magnesium hydroxide (MILK OF MAGNESIA) 400 MG/5ML suspension,  Take 30 mLs by mouth daily as needed for constipation, Disp: , Rfl: 0     metFORMIN (GLUCOPHAGE) 500 MG tablet, Take 500 mg by mouth 2 times daily (with meals) , Disp: , Rfl: 0     multivitamin with minerals tablet, Take 1 tablet by mouth daily , Disp: , Rfl:      omeprazole (PRILOSEC) 20 MG capsule, [OMEPRAZOLE (PRILOSEC) 20 MG CAPSULE] Take 20 mg by mouth daily. , Disp: , Rfl:      potassium chloride ER (KLOR-CON M) 20 MEQ CR tablet, Take 20 mEq by mouth 2 times daily (with meals), Disp: , Rfl:      senna-docusate (SENOKOT-S/PERICOLACE) 8.6-50 MG tablet, Take 1 tablet by mouth 2 times daily, Disp: , Rfl: 0     spironolactone (ALDACTONE) 25 MG tablet, Take 1 tablet (25 mg) by mouth daily, Disp: , Rfl: 0     traZODone (DESYREL) 50 MG tablet, Take 0.5 tablets (25 mg) by mouth nightly as needed for sleep, Disp: , Rfl: 0     warfarin ANTICOAGULANT (COUMADIN) 5 MG tablet, 5 mg daily except 7.5 mg on Sundays, check INR on 4/11 and adjust dose for desired INR of 2.0 to 3.0., Disp: , Rfl: 0    History:   Past Medical History:   Diagnosis Date     Anemia      Coronary artery disease 9/25/2015    stents placed      Diabetes mellitus (H)      Duodenitis 5/11/2016     Dyslipidemia      Gastroduodenal ulcer 9/24/2015     GERD (gastroesophageal reflux disease)      GI bleed 07/08/2016     Gout      Hematuria      Hyperlipemia      Hypertension      Kidney stone 10/6/2015       Physical Exam:    /66   Pulse 60   Temp 98  F (36.7  C)   Resp 16     General:  Patient presents to clinic in no apparent distress.  Head: normocephalic atraumatic  Psychiatric:  Alert and oriented x3.   Respiratory: unlabored breathing; no cough  Integumentary:  Skin is uniformly warm, dry and pink.    Wound #1 Location: left medial leg  Size: 0.6L x 1W x 0.1depth.  No sinus tract present, Wound base: eschar and slough; red viable tissue underneath  No undermining present. Wound is full thickness. There is moderate drainage. Periwound: no  denudement, erythema, induration, maceration or warmth.      Wound #2 Location: left lateral leg  Size: 0.6L x 2W x 0.1depth.  No sinus tract present, Wound base: eschar and slough; red viable tissue underneath  No undermining present. Wound is full thickness. There is moderate drainage. Periwound: no denudement, erythema, induration, maceration or warmth.      Swelling bilaterally is down significantly; no active weeping; significant scaling on the legs    Right hallux tip; desiccated blood blister; no surrounding erythema    Wound (Active)   Wound Bed Red 04/07/22 1000   Alysha-wound Assessment Denuded 04/02/22 0800   Drainage Amount None 04/07/22 1000   Wound Care/Cleansing Wound cleanser 04/01/22 2000   Dressing Foam 04/07/22 1000   Dressing Status New dressing 04/06/22 1716   Number of days: 18       Wound Leg (Active)   Wound Bed Other (Comment) 04/07/22 1000   Alysha-wound Assessment Denuded 04/05/22 0915   Drainage Amount Small 04/06/22 0815   Drainage Color/Characteristics Serous 04/06/22 0815   Wound Care/Cleansing Other (Comment) 04/07/22 1000   Dressing Other (Comment) 04/07/22 1000   Dressing Status Clean, dry, intact 04/06/22 1547   Number of days:        Rash 03/31/22 groin (Active)   Distribution localized 04/07/22 1612   Color red;pink 04/07/22 1612   Characteristics moist 04/07/22 1612   Care, Rash open to air 04/07/22 1612   Number of days: 18       VASC Wound Left hallux (Active)   Pre Size Length 0.3 01/24/22 1000   Pre Size Width 0.2 01/24/22 1000   Pre Size Depth 0.1 01/24/22 1000   Pre Total Sq cm 0.06 01/24/22 1000   Number of days: 256       VASC Wound Right hallux (Active)   Pre Size Length 0.9 08/26/21 0900   Pre Size Width 1 08/26/21 0900   Pre Size Depth 0.1 08/26/21 0900   Pre Total Sq cm 0.9 08/26/21 0900   Number of days: 256       VASC Wound Right 2nd toe (Active)   Pre Size Length 0.4 08/26/21 0900   Pre Size Width 0.4 08/26/21 0900   Pre Size Depth 0.1 08/26/21 0900   Pre Total Sq cm  0.16 08/26/21 0900   Number of days: 256       VASC Wound Right lateral leg (Active)   Pre Size Length 0.8 03/07/22 1000   Pre Size Width 0.8 03/07/22 1000   Pre Size Depth 0.1 03/07/22 1000   Description weepy 08/26/21 0900   Number of days: 256       VASC Wound Left lateral leg (Active)   Pre Size Length 1.9 03/07/22 1000   Pre Size Width 1.5 03/07/22 1000   Pre Size Depth 0.1 03/07/22 1000   Pre Total Sq cm 2.85 03/07/22 1000   Description weeping 08/26/21 0900   Number of days: 256       VASC Wound Left Anterior Shin (Active)   Pre Size Length 1.1 01/24/22 1000   Pre Size Width 1.7 01/24/22 1000   Pre Size Depth 0.1 01/24/22 1000   Pre Total Sq cm 15.75 12/27/21 1000   [facility] 10/26/21 1100   Number of days: 174       VASC Wound Left Posterior Calf (Active)   Pre Size Length 0 11/29/21 1000   Pre Size Width 0 11/29/21 1000   Pre Size Depth 0 11/29/21 1000   Pre Total Sq cm 2 10/26/21 1100   Number of days: 174       VASC Wound Left Posterior Medial Calf (Active)   Pre Size Length 0 11/29/21 1000   Pre Size Width 0 11/29/21 1000   Pre Size Depth 0 11/29/21 1000   Pre Total Sq cm 2 10/26/21 1100   Number of days: 174       VASC Wound Lt dorsal foot (Active)   Pre Size Length 0.5 11/29/21 1000   Pre Size Width 0.8 11/29/21 1000   Pre Size Depth 0.1 11/29/21 1000   Pre Total Sq cm 0.4 11/29/21 1000   Number of days: 140       VASC Wound pressure ulcer right buttocks (Active)   Pre Size Length 0.4 01/24/22 1000   Pre Size Width 0.4 01/24/22 1000   Pre Size Depth 0.2 01/24/22 1000   Pre Total Sq cm 0.16 01/24/22 1000   Description [facility] open area 03/31/22 1719   Number of days: 140       VASC Wound right dorsal foot (Active)   Pre Size Length 1.5 01/24/22 1000   Pre Size Width 1.2 01/24/22 1000   Pre Size Depth 0.1 01/24/22 1000   Pre Total Sq cm 1.8 01/24/22 1000   Description blistered 12/27/21 1000   Number of days: 112       VASC Wound left shin (Active)   Pre Size Length 0 01/24/22 1000   Pre Size  Width 0 01/24/22 1000   Pre Size Depth 0 01/24/22 1000   Pre Total Sq cm 0 01/24/22 1000   Number of days: 112       VASC Wound left medial thigh  (Active)   Pre Size Length 3.5 01/24/22 1000   Pre Size Width 0.8 01/24/22 1000   Pre Size Depth 0.1 01/24/22 1000   Pre Total Sq cm 2.8 01/24/22 1000   Number of days: 84       VASC Wound Lt medial leg (Active)   Pre Size Length 0.6 04/18/22 1000   Pre Size Width 1 04/18/22 1000   Pre Size Depth 0.1 04/18/22 1000   Pre Total Sq cm 0.6 04/18/22 1000   Number of days: 0       VASC Wound Lt lateral leg (Active)   Pre Size Length 0.6 04/18/22 1000   Pre Size Width 2 04/18/22 1000   Pre Size Depth 0.1 04/18/22 1000   Pre Total Sq cm 1.2 04/18/22 1000   Number of days: 0            Circumferential volume measures:      Circumferential Measures 11/29/2021 12/27/2021 1/24/2022 3/7/2022 4/18/2022   Right just above MTP - 29.8 28.1 30 27   Right Ankle 28.6 29.2 29.2 28.1 23.5   Right Widest Calf 27.3 43.5 45.1 39 33.5   Right Thigh Up 10cm 37.5 - - - -   Right Knee to Ankle - - - - -   Left - just above MTP 27.4 29.3 26.4 29.2 25   Left Ankle 25 30.4 25 26.8 22.5   Left Widest Calf 40.6 42 36.2 36.8 32.7   Left Thigh Up 10cm - - - - -   Left Knee to Ankle 40 - - - -       Labs:    I personally reviewed the following lab results today and those on care everywhere    No results found for: CRP   No results found for: SED   Last Renal Panel:  Sodium   Date Value Ref Range Status   04/11/2022 141 136 - 145 mmol/L Final     Potassium   Date Value Ref Range Status   04/11/2022 4.0 3.5 - 5.0 mmol/L Final     Chloride   Date Value Ref Range Status   04/11/2022 101 98 - 107 mmol/L Final     Carbon Dioxide (CO2)   Date Value Ref Range Status   04/11/2022 30 22 - 31 mmol/L Final     Anion Gap   Date Value Ref Range Status   04/11/2022 10 5 - 18 mmol/L Final     Glucose   Date Value Ref Range Status   04/11/2022 126 (H) 70 - 125 mg/dL Final     Urea Nitrogen   Date Value Ref Range Status    04/11/2022 45 (H) 8 - 28 mg/dL Final     Creatinine   Date Value Ref Range Status   04/11/2022 1.29 0.70 - 1.30 mg/dL Final     GFR Estimate   Date Value Ref Range Status   04/11/2022 52 (L) >60 mL/min/1.73m2 Final     Comment:     Effective December 21, 2021 eGFRcr in adults is calculated using the 2021 CKD-EPI creatinine equation which includes age and gender (Dylan et al., [place], DOI: 10.[facility]6/WDSBay6326381)   12/30/2020 56 (L) >60 mL/min/1.73m2 Final     Calcium   Date Value Ref Range Status   04/11/2022 9.6 8.5 - 10.5 mg/dL Final     Albumin   Date Value Ref Range Status   11/21/2018 3.4 (L) 3.5 - 5.0 g/dL Final      Lab Results   Component Value Date    WBC 5.6 03/31/2022     Lab Results   Component Value Date    RBC 4.29 03/31/2022     Lab Results   Component Value Date    HGB 13.0 03/31/2022     Lab Results   Component Value Date    HCT 40.5 03/31/2022     No components found for: MCT  Lab Results   Component Value Date    MCV 94 03/31/2022     Lab Results   Component Value Date    MCH 30.3 03/31/2022     Lab Results   Component Value Date    MCHC 32.1 03/31/2022     Lab Results   Component Value Date    RDW 15.4 03/31/2022     Lab Results   Component Value Date     03/31/2022      Lab Results   Component Value Date    A1C 7.4 03/31/2022    A1C 7.7 07/14/2021    A1C 7.7 02/02/2018    A1C 6.6 07/08/2016    A1C 6.8 02/09/2016      No results found for: TSH   No results found for: VITDT                Impression:  Encounter Diagnoses   Name Primary?     Ulcer of left lower extremity, limited to breakdown of skin (H) Yes     Venous hypertension, chronic, bilateral      Stage 3a chronic kidney disease (H)      Long term current use of anticoagulant therapy      Scar condition and fibrosis of skin      Acquired lymphedema of leg      Diabetic peripheral neuropathy associated with type 2 diabetes mellitus (H)      Venous stasis dermatitis of both lower extremities      Ulcer of right lower extremity, limited  to breakdown of skin (H)           4/18/2022        3/6/22 LLE      4/18/2022 right lateral leg            Are any of these wounds new today: No; Location: na    Assessment/Plan:          1. Debridement: After discussion of risk factors and verbal consent was obtained 2% Lidocaine HCL jelly was applied, under clean conditions, the BLE ulceration(s) were debrided using currette. Devitalized and nonviable tissue, along with any fibrin and slough, was removed to improve granulation tissue formation, stimulate wound healing, decrease overall bacteria load, disrupt biofilm formation and decrease edge senescence.  Total excisional debridement was 1.8 sq cm from the epidermis/dermis area and into the subcutaneous tissue with a depth of 0.1 cm.   Ulcers were improved afterwards and .  Measures were unchanged after debridement.       2.  Wound treatment: wound treatment will include irrigation and dressings to promote autolytic debridement which will include:will continue to wash the legs with dilute hibiclens; lotion to intact skin; xeroform to any open areas; change every other day at his facility; when he gets home will need home care. We will preserve the eschar on the right hallux and paint with betadine; no dressing       If for some reason the patient is not able to get their dressing(s) changed as outlined above (due to illness, lack of supplies, lack of help) please do the following: remove old, soiled dressings; wash the wounds with saline; pat dry; apply ABD pad or other absorbant pad and secure with rolled gauze; avoid tape directly on your skin; patient instructed to call the clinic as soon as possible to let us know what the current issues are in receiving wound care. Stable            3. Edema: his swelling is down significantly today; we did not have his size in our clinic today for velcro wraps; He does have some older ones at home; was encouraged to have spouse bring to facility and transition into  these; we will wrap with tubular compression and short stretch; encouraged him to continue to sleep in bed even when he returns home; the elevation is really helping him. The compression wraps were applied today in clinic.     If a 2 layer or 4 layer compression wrap is being used; these are safe to have on for ONLY 7 days. If for some reason the patient is not able to get the wrap(s) changed (due to illness; lack of supplies, lack of help, lack of transportation) please do the following: unwrap the old 2 or 4 layer compression wrap; avoid using scissors as you could cut your skin and cause wounds; use tubular compression when available. Call to reschedule your home care or clinic visit appointment as soon as possible.  Improved            4. Nutrition: low sodium diet and diabetic diet           5. Offloading: wearing slippers           6. Deconditioning: will order PT to be done at facility for strengthening and endurance exercises.      Patient will follow up with me in 4 weeks for reevaluation. They were instructed to call the clinic sooner with any signs or symptoms of infection or any further questions/concerns. Answered all questions.          Mei Sampson DNP, RN, CNP, CWOCN, CFCN, CLT  [facility]   [phone]        This note was electronically signed by Mei Sampson NP

## 2022-04-18 NOTE — TELEPHONE ENCOUNTER
[facility] Triage Nurse INR     Provider: Mally Buckner MD  Facility: [facility] Type:  TCU    Caller: Ashley   Call Back Number: [phone]  Reason for call: INR  Diagnosis/Goal: A. Fib    Todays INR: 2.21  Last INR 4/14 2.94 3mg every day   4/11 2.85 4mg every day     Heparin/Lovenox:  No  Currently on ABX?: No  Other interacting medication:  None  Missed or refused doses: No    Verbal Order/Direction given by Provider: Warfarin 3.5mg every day next INR 4/21    Provider Giving Order:  Mally Buckner MD    Verbal Order given to: Ashley Luna RN

## 2022-04-18 NOTE — PATIENT INSTRUCTIONS
"Needs PT/OT to work on strengthening exercises and endurance    Low sodium diet 2.5 grams or less  Diabetic diet      If your supplies were ordered today through [employer] and you are not receiving your supplies or have a question on your bill please contact Marga Pathak at [phone]        Wound Care Instructions    EVERY OTHER DAY and as needed, Cleanse your Bilateral legs and leg wound(s) with Dilute hibiclens 30cc in 500cc NS.    Pat Dry with non-sterile gauze    Apply Lotion to the intact skin surrounding your wound and other dry skin locations. Some good lotions include: Remedy Skin Repair Cream, Sarna, Vanicream or Cetaphil    Primary Dressing: Apply xeroform into/onto the wounds    Paint the right big toe eschar with betadine; no dressings    Secondary dressing: Cover with gauze    Secure with non-sterile roll gauze (4\" x 75\" roll) and tape (1\" roll tape) as needed; avoid adhesive directly on the skin    Compression: tubular compression and short stretch OR velcro wraps bilaterally    Elevate you legs throughout the day    It is not ok to get your wound wet in the bath or shower      If for some reason you are not able to get your dressing(s) changed as outlined above (due to illness, lack of supplies, lack of help) please do the following: remove old, soiled dressings; wash the wounds with saline; pat dry; apply ABD pad or other absorbant pad and secure with rolled gauze; avoid tape directly on your skin; Call the clinic as soon as possible to let us know what the current issues are in receiving wound care [phone].      SEEK MEDICAL CARE IF:  You have an increase in swelling, pain, or redness around the wound.  You have an increase in the amount of pus coming from the wound.  There is a bad smell coming from the wound.  The wound appears to be worsening/enlarging  You have a fever greater than 101.5 F      It is ok to continue current wound care treatment/products for the next 2-3 days until new " wound care supplies are ordered and arrive. If longer than this please contact our office at [phone].    If you have a 2 layer or 4 layer compression wrap on these are safe to have on for ONLY 7 days. If for some reason you are not able to get the wrap(s) changed (due to illness; lack of supplies, lack of help, lack of transportation) please do the following: unwrap the old 2 or 4 layer compression wrap; avoid using scissors as you could cut your skin and cause wounds; use tubular compression when available. Call to reschedule your home care or clinic visit appointment as soon as possible.    Please NOTE: if you are 15 minutes late to your clinic appointment you will have to be rescheduled. Please call our clinic as soon as possible if you know you will not be able to get to your appointment at [phone].    If you fail to show up to 3 scheduled clinic appointments you will be dismissed from our clinic.              We want to hear from you!  In the next few weeks, you should receive a call or email to complete a survey about your visit at [facility]. Please help us improve your appointment experience by letting us know how we did today. We strive to make your experience good and value any ways in which we could do better.      We value your input and suggestions.    Thank you for choosing the [facility]!        It is recommended that you do not get your ulcer wet when showering.  Listed below are several ways of keeping it dry when you shower.     1. Wrap it with [employer] plastic wrap.  It can be found in the stores where the plastic wraps or tin foil is kept.               2.  Some people take a bath and hang their leg/foot out of the tub.                        3  Put your leg in a plastic bag and tape it on.           4. You can purchase a shower cover for casts at some pharmacies and through the Internet.            5. Take a Bed Bath or wash up at the  sink

## 2022-04-19 NOTE — LETTER
4/19/2022        RE: Robinson Medel  [address] [place] [address]   [facility] [id number]        No notes on file      Sincerely,        Mally Buckner MD

## 2022-04-21 NOTE — LETTER
4/21/2022        RE: Robinson Medel  [address] [place] [address]   [facility] [address]        No notes on file      Sincerely,        Mally Buckner MD

## 2022-04-27 NOTE — TELEPHONE ENCOUNTER
Anahy with [facility] requesting HC orders if applicable. Patient was discharged from TCU, and [facility] was told patient would have orders for skilled nursing, PT/OT, and/or a home health aide. Anahy is hoping 4/18 office note from [facility] can be addended to include HC orders if services still needed.   Ph: [phone]  Fax: [phone]

## 2022-04-27 NOTE — TELEPHONE ENCOUNTER
Placed home care referral order. [facility] states they have access to [facility].

## 2022-05-02 NOTE — TELEPHONE ENCOUNTER
Received fax documentation that home care was on hold at this time due to lack of patient participation to schedule an assessment. They have tried calling patient and have been unable to reach patient and patient has not called agency back. Home care agency will try to reach out again. Scanned fax into patients media tab

## 2022-05-02 NOTE — TELEPHONE ENCOUNTER
Verbal order given for home care to start tomorrow 5/3.  Also confirmed [facility] will be following provider.

## 2022-05-02 NOTE — TELEPHONE ENCOUNTER
[facility] requesting verbal orders to start HC and also need to confirm [facility] will be following pt for HC/signing future orders.   Ph: [phone]

## 2022-05-03 NOTE — TELEPHONE ENCOUNTER
Please call [facility] with verbal order for: skilled nursing, PT,OT and home health aide.    Skilled nursin times per week for 4 weeks,  Once a week for 4 weeks with 4 PRN.    PT once a week for one week  Twice a week for 3 weeks    OT once a week for one week   Twice a week for 2 weeks    Home health aide once per week for 5 weeks.

## 2022-05-03 NOTE — ED PROVIDER NOTES
EMERGENCY DEPARTMENT ENCOUNTER      NAME: Robinson Medel  AGE: 89 year old male  YOB: 1930  MRN: [id number]  EVALUATION DATE & TIME: 5/3/2022  2:58 PM    PCP: Elizabeth Alexandre    ED PROVIDER: Cyndi Anna M.D.      Chief Complaint   Patient presents with     Hypotension     FINAL IMPRESSION:  1. Hypotension, unspecified hypotension type    2. Dehydration      ED COURSE & MEDICAL DECISION MAKING:      3:23 PM I met with the patient for the initial interview and physical examination. Discussed plan for treatment and workup in the ED.    7:36 PM I discussed the plan for discharge with the patient, and patient is agreeable. We discussed supportive cares at home and reasons for return to the ER including new or worsening symptoms - all questions and concerns addressed. Patient to be discharged by RN.     Pertinent Labs & Imaging studies reviewed. (See chart for details)  ED Course as of 05/03/22 2202 Tue May 03, 2022   1520 Patient is a 89-year-old male who comes in today for evaluation of low blood pressure.  It sounds like the home health nurse checked his blood pressure at home and found it to be low so they sent him here for further evaluation.  His wife reports that he actually was more perky today than yesterday.  He was just released from a nursing home about a week ago after hospitalization.  He does have a history of heart failure so we will can go gentle with the fluids.  His initial blood pressure was low.  He really has no symptoms at this time.  We will give him some IV fluids and see if we can bring his pressure up.  I will check a lactate and check some basic labs stable can figure out why his pressure was low to begin with.  I would not just give him a gentle amount of fluid since is got a history of CHF.   1606 Patient's magnesium is little low and BNP is elevated to 86.  Creatinine is 1.83.  That looks elevated from previous.  He will continue to need fluids but will have to go  gently because of his CHF.  Lactic acid is normal at 2.   1635 Looks like and patient was admitted last time he was fluid overloaded and they had to pull fluid off of him.  His BNP was around 800 at that time and he had pleural effusions.  His creatinine has certainly bumped since then and I do wonder if he may be a little over diuresed at this time.  We gave 500 of fluid without a significant response.  I am getting give another 500 and see how he does.   1726 Blood pressure seems to have improved.  We will watch him for a little while and see if he maintains his blood pressure.  I may get a repeat BMP and a repeat troponin shortly and see if those are looking okay.   1807 Patient has maintained his blood pressure for an hour now.  We will get repeat troponin and BMP and if those look okay and he still remains stable he can discharge home.   1826 Just updated the patient and the plan family on the plan.  We will get a redraw the BMP and troponin right now and if that looks okay and he feels well and he can discharge home.  I think he may have been a little bit over diuresed.   1915 Patient's troponin is stable.  Creatinine is slightly improved.  We will ambulate the patient and see how he feels.   1924 I just went and checked on the patient.  He feels great.  He wants to go home so we will try to get him discharged.   1936 Patient walks well and ready for discharge home.       At the conclusion of the encounter I discussed  the results of all of the tests and the disposition with patient.   All questions were answered.  The patient acknowledged understanding and was involved in the decision making regarding the overall care plan.      I discussed with patient the utility, limitations and findings of the exam/interventions/studies done during this visit as well as the list of differential diagnosis and symptoms to monitor/return to ER for.  Additional verbal discharge instructions were provided.     MEDICATIONS  GIVEN IN THE EMERGENCY:  Medications   0.9% sodium chloride BOLUS (0 mLs Intravenous Stopped 5/3/22 1713)   0.9% sodium chloride BOLUS (0 mLs Intravenous Stopped 5/3/22 1811)     NEW PRESCRIPTIONS STARTED AT TODAY'S ER VISIT  Discharge Medication List as of 5/3/2022  7:35 PM        =================================================================    HPI    Triage Note:   Patient discharged from our hospital one week ago for hypotensiono. Home health nurse measured a BP systolic in the 90s and sent him back in today. Patient denies feeling any symptoms     Triage Assessment     Row Name 05/03/22 1506       Triage Assessment (Adult)    Airway WDL WDL       Respiratory WDL    Respiratory WDL WDL       Skin Circulation/Temperature WDL    Skin Circulation/Temperature WDL WDL       Cardiac WDL    Cardiac WDL WDL       Peripheral/Neurovascular WDL    Peripheral Neurovascular WDL WDL       Cognitive/Neuro/Behavioral WDL    Cognitive/Neuro/Behavioral WDL WDL                Patient information was obtained from: Patient and patient's wife    Use of : N/A         Robinson Medel is a 89 year old male with a pertinent medical history of CAD, CHF, atrial fibrillation, hypertension, long term anticoagulation, PVD, colon cancer, type II diabetes mellitus, CKD III, and LLE ulcer who presents to the ED via EMS for evaluation of low blood pressure.     Per EMS, patient was discharged from the hospital one week ago due to hypertension. Patient's home health care nurse measured low BP today and called EMS.     Patient denies any complaints other than a sore on his buttocks. He states that he is chronically weak. He denies chest pain, shortness of breath, vomiting, appetite changes, dizziness, fever, and any other symptoms or complaints at this time.     Patient's wife states that the patient has been doing relatively well at home since he was discharged. She denies a history of UTI's for the patient.       REVIEW OF  SYSTEMS   Except as stated in the HPI all other systems reviewed and are negative.      PAST MEDICAL HISTORY:  Past Medical History:   Diagnosis Date     Anemia      Coronary artery disease 9/25/2015    stents placed      Diabetes mellitus (H)      Duodenitis 5/11/2016     Dyslipidemia      Gastroduodenal ulcer 9/24/2015     GERD (gastroesophageal reflux disease)      GI bleed 07/08/2016     Gout      Hematuria      Hyperlipemia      Hypertension      Kidney stone 10/6/2015     PAST SURGICAL HISTORY:  Past Surgical History:   Procedure Laterality Date     ANGIOPLASTY  09/2015     COLONOSCOPY N/A 6/17/2016    Procedure: COLONOSCOPY with biopsies in cecum using biopsy forcep and a transverse colon polypectomy using a hot snare;  Surgeon: Steve Angel MD;  Location: [facility];  Service:      CYSTOSCOPY PROSTATE W/ LASER N/A 2/9/2016    Procedure: CYSTOSCOPY TRANSURETHRAL RESECTION PROSTATE;  Surgeon: Chano Smith MD;  Location: [facility];  Service:      CYSTOSCOPY W/ LITHOLAPAXY / EHL N/A 2/9/2016    Procedure: CYSTOLITHOLAPAXY ;  Surgeon: Chano Smith MD;  Location: [facility];  Service:      ESOPHAGOSCOPY, GASTROSCOPY, DUODENOSCOPY (EGD), COMBINED N/A 5/8/2016    Procedure: ESOPHAGOGASTRODUODENOSCOPY;  Surgeon: Kieran Beltran MD;  Location: [facility];  Service:      ESOPHAGOSCOPY, GASTROSCOPY, DUODENOSCOPY (EGD), COMBINED N/A 7/8/2016    Procedure: ESOPHAGOGASTRODUODENOSCOPY;  Surgeon: Chano Wynn MD;  Location: [facility];  Service:      KIDNEY STONE SURGERY       OTHER SURGICAL HISTORY      nose polyps     VA LAP,SURG,COLECTOMY, PARTIAL, W/ANAST N/A 6/18/2016    Procedure: LAPAROSCOPIC ASSISTED RIGHT COLECTOMY ROOM 310;  Surgeon: Radha Bueno MD;  Location: [facility];  Service: General     CURRENT MEDICATIONS:    No current facility-administered medications for this encounter.    Current Outpatient Medications:      atorvastatin (LIPITOR) 10 MG tablet,  Take 10 mg by mouth At Bedtime , Disp: , Rfl:      bumetanide (BUMEX) 2 MG tablet, Take 2 tablets (4 mg) by mouth daily, Disp: , Rfl: 0     carvedilol (COREG) 6.25 MG tablet, Take 1 tablet (6.25 mg) by mouth 2 times daily (with meals), Disp: 60 tablet, Rfl: 0     [employer] IRON 240 (27 Fe) MG TABS, Take 1 tablet by mouth daily, Disp: , Rfl:      glipiZIDE (GLUCOTROL XL) 5 MG 24 hr tablet, Take 1 tablet (5 mg) by mouth daily, Disp: , Rfl:      insulin aspart (NOVOLOG FLEXPEN) 100 UNIT/ML pen, 3 times a day with meals, for glucometer 150-200 give 2 units SQ, 201-250 give 4 units, >250 give 6 units, Disp: 15 mL, Rfl: 0     insulin glargine (LANTUS PEN) 100 UNIT/ML pen, Inject 15 Units Subcutaneous At Bedtime, Disp: 15 mL, Rfl: 0     lisinopril (ZESTRIL) 2.5 MG tablet, Take 1 tablet (2.5 mg) by mouth daily, Disp: , Rfl: 0     magnesium hydroxide (MILK OF MAGNESIA) 400 MG/5ML suspension, Take 30 mLs by mouth daily as needed for constipation, Disp: , Rfl: 0     metFORMIN (GLUCOPHAGE) 500 MG tablet, Take 500 mg by mouth 2 times daily (with meals) , Disp: , Rfl: 0     multivitamin with minerals tablet, Take 1 tablet by mouth daily , Disp: , Rfl:      omeprazole (PRILOSEC) 20 MG capsule, [OMEPRAZOLE (PRILOSEC) 20 MG CAPSULE] Take 20 mg by mouth daily. , Disp: , Rfl:      potassium chloride ER (KLOR-CON M) 20 MEQ CR tablet, Take 20 mEq by mouth 2 times daily (with meals), Disp: , Rfl:      senna-docusate (SENOKOT-S/PERICOLACE) 8.6-50 MG tablet, Take 1 tablet by mouth 2 times daily, Disp: , Rfl: 0     spironolactone (ALDACTONE) 25 MG tablet, Take 1 tablet (25 mg) by mouth daily, Disp: , Rfl: 0     traZODone (DESYREL) 50 MG tablet, Take 0.5 tablets (25 mg) by mouth nightly as needed for sleep, Disp: , Rfl: 0     warfarin ANTICOAGULANT (COUMADIN) 5 MG tablet, 5 mg daily except 7.5 mg on Sundays, check INR on 4/11 and adjust dose for desired INR of 2.0 to 3.0., Disp: , Rfl: 0    ALLERGIES:  Allergies   Allergen Reactions     Aspirin  Nausea and Vomiting     GI upset       FAMILY HISTORY:  Family History   Problem Relation Age of Onset     Diabetes Mother      Heart Disease Father      Mental Illness Daughter         Depression     SOCIAL HISTORY:   Social History     Socioeconomic History     Marital status:    Tobacco Use     Smoking status: Never Smoker     Smokeless tobacco: Never Used     Tobacco comment: only smoked a little bit during high school   Substance and Sexual Activity     Alcohol use: No     Drug use: No   Social History Narrative    12/10/2015: .  Lives with his wife in a town home       PHYSICAL EXAM    VITAL SIGNS: BP 92/63   Pulse 76   Temp (!) 96.1  F (35.6  C) (Axillary)   Resp 24   Wt 88.9 kg (196 lb)   SpO2 97%   BMI 25.86 kg/m     GENERAL: Awake, Alert, answering questions, No acute distress, frail appearing  HEENT: Normal cephalic, Atraumatic, bilateral external ears normal, No scleral icterus, mask in place  NECK: No obvious swelling or abnormality, No stridor  PULMONARY:Normal and symmetric breath sounds, No respiratory distress, Lungs clear to auscultation bilaterally. No wheezing  CARDIOVASCULAR: Regular rate and rhythm, Distal pulses present and normal.  ABDOMINAL: Soft, Nondistended, Nontender, No flank tenderness, No palpable masses  BACK: No bruising or tenderness.  EXTREMITIES: Moves all extremities spontaneously, warm, no edema, No major deformities  NEURO: No facial droop, normal motor function, Normal speech   PSYCH: Normal mood and affect  SKIN: No rashes on visualized skin, dry, warm     LAB:  All pertinent labs reviewed and interpreted.  Results for orders placed or performed during the hospital encounter of 05/03/22   XR Chest Port 1 View    Impression    IMPRESSION:     Small right pleural effusion with adjacent compressive atelectasis. No left pleural effusion. No pneumothorax. No focal airspace disease in the left lung.    Stable mild cardiomegaly.   Basic metabolic panel   Result  Value Ref Range    Sodium 133 (L) 136 - 145 mmol/L    Potassium 4.2 3.5 - 5.0 mmol/L    Chloride 97 (L) 98 - 107 mmol/L    Carbon Dioxide (CO2) 26 22 - 31 mmol/L    Anion Gap 10 5 - 18 mmol/L    Urea Nitrogen 81 (H) 8 - 28 mg/dL    Creatinine 1.83 (H) 0.70 - 1.30 mg/dL    Calcium 9.7 8.5 - 10.5 mg/dL    Glucose 78 70 - 125 mg/dL    GFR Estimate 34 (L) >60 mL/min/1.73m2   Lactic acid whole blood   Result Value Ref Range    Lactic Acid 2.0 0.7 - 2.0 mmol/L   Result Value Ref Range    Troponin I 0.14 0.00 - 0.29 ng/mL   Result Value Ref Range    Magnesium 1.7 (L) 1.8 - 2.6 mg/dL   B-Type Natriuretic Peptide ([facility] Only)   Result Value Ref Range     (H) 0 - 93 pg/mL   CBC with platelets and differential   Result Value Ref Range    WBC Count 6.9 4.0 - 11.0 10e3/uL    RBC Count 4.57 4.40 - 5.90 10e6/uL    Hemoglobin 13.8 13.3 - 17.7 g/dL    Hematocrit 40.2 40.0 - 53.0 %    MCV 88 78 - 100 fL    MCH 30.2 26.5 - 33.0 pg    MCHC 34.3 31.5 - 36.5 g/dL    RDW 15.0 10.0 - 15.0 %    Platelet Count 189 150 - 450 10e3/uL    % Neutrophils 79 %    % Lymphocytes 8 %    % Monocytes 9 %    % Eosinophils 3 %    % Basophils 0 %    % Immature Granulocytes 1 %    NRBCs per 100 WBC 0 <1 /100    Absolute Neutrophils 5.6 1.6 - 8.3 10e3/uL    Absolute Lymphocytes 0.5 (L) 0.8 - 5.3 10e3/uL    Absolute Monocytes 0.6 0.0 - 1.3 10e3/uL    Absolute Eosinophils 0.2 0.0 - 0.7 10e3/uL    Absolute Basophils 0.0 0.0 - 0.2 10e3/uL    Absolute Immature Granulocytes 0.0 <=0.4 10e3/uL    Absolute NRBCs 0.0 10e3/uL   Troponin I (now)   Result Value Ref Range    Troponin I 0.14 0.00 - 0.29 ng/mL   Basic metabolic panel   Result Value Ref Range    Sodium 134 (L) 136 - 145 mmol/L    Potassium 4.2 3.5 - 5.0 mmol/L    Chloride 99 98 - 107 mmol/L    Carbon Dioxide (CO2) 27 22 - 31 mmol/L    Anion Gap 8 5 - 18 mmol/L    Urea Nitrogen 76 (H) 8 - 28 mg/dL    Creatinine 1.71 (H) 0.70 - 1.30 mg/dL    Calcium 9.2 8.5 - 10.5 mg/dL    Glucose 69 (L) 70 - 125  mg/dL    GFR Estimate 37 (L) >60 mL/min/1.73m2       RADIOLOGY:  XR Chest Port 1 View   Final Result   IMPRESSION:       Small right pleural effusion with adjacent compressive atelectasis. No left pleural effusion. No pneumothorax. No focal airspace disease in the left lung.      Stable mild cardiomegaly.          EKG:    Date and time: May 3, 2022 at 1501  Rate: 86 bpm  Rhythm: Atrial fibrillation  QRS interval: 120 ms  QT/QTc: 386/461 ms  ST changes or T wave changes: No acute ST or T wave abnormality  Change from prior ECG: No significant change from prior  I have independently reviewed and interpreted this EKG.     I, Josey Prajapati, am serving as a scribe to document services personally performed by Dr. Anna based on my observation and the provider's statements to me. I, Cyndi Anna MD attest that Josey Prajapati is acting in a scribe capacity, has observed my performance of the services and has documented them in accordance with my direction.    Cyndi Anna M.D.  Emergency Medicine  [facility] EMERGENCY DEPARTMENT  [address] [place] [id number]  [phone]  Dept: [phone]      Cyndi Anna MD  05/03/22 2427

## 2022-05-03 NOTE — ED NOTES
Bed: JNED-04  Expected date: 5/3/22  Expected time:   Means of arrival: Ambulance  Comments:  Cortes  82 M  hypotensive

## 2022-05-03 NOTE — DISCHARGE INSTRUCTIONS
You were seen in the Emergency Department today for evaluation of some low blood pressure.  Your lab work showed little dehydration.  Your imaging studies showed no significant cause of your symptoms. Follow up with your primary care physician to ensure resolution of symptoms. Return if you have new or worsening symptoms.

## 2022-05-03 NOTE — ED TRIAGE NOTES
Patient discharged from our hospital one week ago for hypotensiono. Home health nurse measured a BP systolic in the 90s and sent him back in today. Patient denies feeling any symptoms     Triage Assessment     Row Name 05/03/22 1506       Triage Assessment (Adult)    Airway WDL WDL       Respiratory WDL    Respiratory WDL WDL       Skin Circulation/Temperature WDL    Skin Circulation/Temperature WDL WDL       Cardiac WDL    Cardiac WDL WDL       Peripheral/Neurovascular WDL    Peripheral Neurovascular WDL WDL       Cognitive/Neuro/Behavioral WDL    Cognitive/Neuro/Behavioral WDL WDL

## 2022-05-04 NOTE — ED NOTES
Pt discharged with wife to home in  at 1940. All questions answered. Expressed understanding of info

## 2022-05-19 PROBLEM — N17.9 ACUTE RENAL FAILURE, UNSPECIFIED ACUTE RENAL FAILURE TYPE (H): Status: ACTIVE | Noted: 2022-01-01

## 2022-05-19 PROBLEM — E87.20 LACTIC ACIDOSIS: Status: ACTIVE | Noted: 2022-01-01

## 2022-05-19 PROBLEM — E87.5 HYPERKALEMIA: Status: ACTIVE | Noted: 2018-12-04

## 2022-05-19 PROBLEM — I95.1 ORTHOSTATIC HYPOTENSION: Status: ACTIVE | Noted: 2022-01-01

## 2022-05-19 PROBLEM — R65.20 SEVERE SEPSIS (H): Status: ACTIVE | Noted: 2022-01-01

## 2022-05-19 PROBLEM — A41.9 SEVERE SEPSIS (H): Status: ACTIVE | Noted: 2022-01-01

## 2022-05-19 NOTE — ED PROVIDER NOTES
EMERGENCY DEPARTMENT ENCOUNTER      NAME: Robinson Medel  AGE: 89 year old male  YOB: 1930  MRN: [id number]  EVALUATION DATE & TIME: No admission date for patient encounter.    PCP: Elizabteh Alexandre    ED PROVIDER: Marita Aguirre M.D.      Chief Complaint   Patient presents with     Hypotension         FINAL IMPRESSION:  1. Severe sepsis (H)    2. Lactic acidosis    3. Orthostatic hypotension    4. Acute renal failure, unspecified acute renal failure type (H)    5. Hyperkalemia          ED COURSE & MEDICAL DECISION MAKING:    ED Course as of 05/19/22 1832   Thu May 19, 2022   1450 Patient with low BP with recurrent low BP episodes, head trauma 2 weeks ago, CT head and c-spine pending, CXR and UA pending with blood cultures and labs, IVF begun with zosyn antibiotic therapy and COVID19 pending to allow for likely admission, stat EKG underway.   1640 Patient with dramatic improvement to BP after IVF/abx, lactic acid elevated and repeat lactic pending for after IVF, pending imaging and with LUCY with hyperkalemia trated with insulin/dextrose   1732 CT head and c-spine WNL reassuringly   1732 CXR without infiltrate or acute pneumonia seen   1803 RN Shiv to do UA now   1825 Hospitalist paged for admission, all these findings could be secondary to severe hypovolemic dehydration with lactic acidosis/LUCY   1832 I spoke with hospitalist Dr Owens who recommends admit med surg obs       Pertinent Labs & Imaging studies reviewed. (See chart for details)    N95 worn  A face shield was worn also  COVID PPE      At the conclusion of the encounter I discussed the results of all of the tests and the disposition. The questions were answered. The patient or family acknowledged understanding and was agreeable with the care plan.     Critical Care time was 45 minutes for this patient excluding procedures.      MEDICATIONS GIVEN IN THE EMERGENCY:  Medications   glucose gel 15-30 g (has no administration in time range)      "Or   dextrose 50 % injection 25-50 mL (has no administration in time range)     Or   glucagon injection 1 mg (has no administration in time range)   dextrose 10% BOLUS (300 mLs Intravenous New Bag 5/19/22 1708)   0.9% sodium chloride BOLUS (0 mLs Intravenous Stopped 5/19/22 1730)   piperacillin-tazobactam (ZOSYN) 3.375 g vial to attach to  mL bag (0 g Intravenous Stopped 5/19/22 1615)   sodium bicarbonate 8.4 % injection 50 mEq (50 mEq Intravenous Given 5/19/22 1701)   dextrose 50 % injection 25 g (25 g Intravenous Given 5/19/22 1658)   insulin regular 1 unit/mL injection 8.89 Units (8.89 Units Intravenous Given 5/19/22 1657)       NEW PRESCRIPTIONS STARTED AT TODAY'S ER VISIT  New Prescriptions    No medications on file          =================================================================    HPI      Robinson Medel is a 89 year old male with PMHx of HTN, HLD,IDDM2, paroxysmal atrial fibrillation on coumadin, episodes of hypotension, known cecal cancer, constipation, GI bleed remotely, recurrent UTIs and pulmonary hypertension who presents to the ED today via EMS for low blood pressure. Home health nurse called 911 with low blood pressure readings of 50/30 at home, EMS reported . Patient with no chest pain, no shortness of breath, no cough, no fever, no abdominal pain, no nausea/vomiting/diarrhea, no rash. He fell 2 weeks ago and notes \"they had to pick me up off the floor and I hit my head\". No pain currently or falls since them. He says he feels normal.       REVIEW OF SYSTEMS   All other systems reviewed and are negative except as noted above in HPI.    PAST MEDICAL HISTORY:  Past Medical History:   Diagnosis Date     Anemia      Coronary artery disease 9/25/2015    stents placed      Diabetes mellitus (H)      Duodenitis 5/11/2016     Dyslipidemia      Gastroduodenal ulcer 9/24/2015     GERD (gastroesophageal reflux disease)      GI bleed 07/08/2016     Gout      Hematuria      " Hyperlipemia      Hypertension      Kidney stone 10/6/2015       PAST SURGICAL HISTORY:  Past Surgical History:   Procedure Laterality Date     ANGIOPLASTY  09/2015     COLONOSCOPY N/A 6/17/2016    Procedure: COLONOSCOPY with biopsies in cecum using biopsy forcep and a transverse colon polypectomy using a hot snare;  Surgeon: Steve Angel MD;  Location: [facility];  Service:      CYSTOSCOPY PROSTATE W/ LASER N/A 2/9/2016    Procedure: CYSTOSCOPY TRANSURETHRAL RESECTION PROSTATE;  Surgeon: Chano Smith MD;  Location: [facility] OR;  Service:      CYSTOSCOPY W/ LITHOLAPAXY / EHL N/A 2/9/2016    Procedure: CYSTOLITHOLAPAXY ;  Surgeon: Chano Smith MD;  Location: [facility];  Service:      ESOPHAGOSCOPY, GASTROSCOPY, DUODENOSCOPY (EGD), COMBINED N/A 5/8/2016    Procedure: ESOPHAGOGASTRODUODENOSCOPY;  Surgeon: Kieran Beltran MD;  Location: [facility];  Service:      ESOPHAGOSCOPY, GASTROSCOPY, DUODENOSCOPY (EGD), COMBINED N/A 7/8/2016    Procedure: ESOPHAGOGASTRODUODENOSCOPY;  Surgeon: Chano Wynn MD;  Location: [facility];  Service:      KIDNEY STONE SURGERY       OTHER SURGICAL HISTORY      nose polyps     MT LAP,SURG,COLECTOMY, PARTIAL, W/ANAST N/A 6/18/2016    Procedure: LAPAROSCOPIC ASSISTED RIGHT COLECTOMY ROOM 310;  Surgeon: Radha Bueno MD;  Location: [facility];  Service: General       CURRENT MEDICATIONS:    atorvastatin (LIPITOR) 10 MG tablet  bumetanide (BUMEX) 2 MG tablet  carvedilol (COREG) 6.25 MG tablet  [employer] IRON 240 (27 Fe) MG TABS  glipiZIDE (GLUCOTROL XL) 5 MG 24 hr tablet  insulin aspart (NOVOLOG FLEXPEN) 100 UNIT/ML pen  insulin glargine (LANTUS PEN) 100 UNIT/ML pen  lisinopril (ZESTRIL) 2.5 MG tablet  magnesium hydroxide (MILK OF MAGNESIA) 400 MG/5ML suspension  metFORMIN (GLUCOPHAGE) 500 MG tablet  multivitamin with minerals tablet  omeprazole (PRILOSEC) 20 MG capsule  potassium chloride ER (KLOR-CON M) 20 MEQ CR tablet  senna-docusate  (SENOKOT-S/PERICOLACE) 8.6-50 MG tablet  spironolactone (ALDACTONE) 25 MG tablet  traZODone (DESYREL) 50 MG tablet  warfarin ANTICOAGULANT (COUMADIN) 5 MG tablet        ALLERGIES:  Allergies   Allergen Reactions     Aspirin Nausea and Vomiting     GI upset       FAMILY HISTORY:  Family History   Problem Relation Age of Onset     Diabetes Mother      Heart Disease Father      Mental Illness Daughter         Depression       SOCIAL HISTORY:   Social History     Socioeconomic History     Marital status:    Tobacco Use     Smoking status: Never Smoker     Smokeless tobacco: Never Used     Tobacco comment: only smoked a little bit during high school   Substance and Sexual Activity     Alcohol use: No     Drug use: No   Social History Narrative    12/10/2015: .  Lives with his wife in a town home       VITALS:  Patient Vitals for the past 24 hrs:   BP Temp Temp src Pulse Resp SpO2 Weight   05/19/22 1745 105/66 -- -- 81 26 90 % --   05/19/22 1730 98/57 -- -- 74 21 97 % --   05/19/22 1715 125/58 -- -- 80 28 97 % --   05/19/22 1700 123/63 -- -- 77 27 99 % --   05/19/22 1630 113/56 -- -- 79 20 93 % --   05/19/22 1615 105/61 -- -- 80 24 98 % --   05/19/22 1600 -- 98.3  F (36.8  C) Oral -- -- -- --   05/19/22 1545 (!) 82/55 -- -- 77 24 99 % --   05/19/22 1530 (!) 89/55 -- -- 81 23 99 % --   05/19/22 1513 (!) 58/41 -- -- -- -- -- --   05/19/22 1443 (!) 76/48 97.6  F (36.4  C) Tympanic 91 12 97 % 88.9 kg (196 lb)       PHYSICAL EXAM    GENERAL: Awake, alert.  In no acute distress.   HEENT: Normocephalic, atraumatic.  Pupils equal, round and reactive.  Conjunctiva normal.  EOMI.  NECK: No stridor or apparent deformity.  PULMONARY: Symmetrical breath sounds without distress.  Lungs clear to auscultation bilaterally without wheezes, rhonchi or rales.  CARDIO: Regular rate and rhythm.  No significant murmur, rub or gallop.  Radial pulses strong and symmetrical.  ABDOMINAL: Abdomen soft, non-distended and non-tender to  palpation.  No CVAT, no palpable hepatosplenomegaly.  EXTREMITIES: No lower extremity swelling or edema.    NEURO: Alert and oriented to person, place and time.  Cranial nerves grossly intact.  No focal motor deficit.  PSYCH: Normal mood and affect  SKIN: No rashes      LAB:  All pertinent labs reviewed and interpreted.  Results for orders placed or performed during the hospital encounter of 05/19/22   CT Head w/o Contrast    Impression    IMPRESSION:  HEAD CT:  1.  Partial head trauma    CERVICAL SPINE CT:  1.  No CT evidence for acute fracture or post traumatic subluxation.   CT Cervical Spine w/o Contrast    Impression    IMPRESSION:  HEAD CT:  1.  Partial head trauma    CERVICAL SPINE CT:  1.  No CT evidence for acute fracture or post traumatic subluxation.   XR Chest 1 View    Impression    IMPRESSION: Cardiomegaly and borderline pulmonary venous congestion stable. Pleural fluid and/or thickening inferior right hemithorax with volume loss in the right middle and lower lobes unchanged. Previously seen left basilar consolidation and volume   loss has cleared.   Comprehensive metabolic panel   Result Value Ref Range    Sodium 133 (L) 136 - 145 mmol/L    Potassium 5.6 (H) 3.5 - 5.0 mmol/L    Chloride 96 (L) 98 - 107 mmol/L    Carbon Dioxide (CO2) 26 22 - 31 mmol/L    Anion Gap 11 5 - 18 mmol/L    Urea Nitrogen 71 (H) 8 - 28 mg/dL    Creatinine 2.14 (H) 0.70 - 1.30 mg/dL    Calcium 10.0 8.5 - 10.5 mg/dL    Glucose 142 (H) 70 - 125 mg/dL    Alkaline Phosphatase 88 45 - 120 U/L    AST 25 0 - 40 U/L    ALT 30 0 - 45 U/L    Protein Total 7.8 6.0 - 8.0 g/dL    Albumin 3.9 3.5 - 5.0 g/dL    Bilirubin Total 0.8 0.0 - 1.0 mg/dL    GFR Estimate 28 (L) >60 mL/min/1.73m2   Lactic acid whole blood   Result Value Ref Range    Lactic Acid 2.3 (H) 0.7 - 2.0 mmol/L   Result Value Ref Range    Troponin I 0.14 0.00 - 0.29 ng/mL   Result Value Ref Range    INR 2.04 (H) 0.85 - 1.15   UA with Microscopic reflex to Culture    Specimen:  Urine, Catheter   Result Value Ref Range    Color Urine Light Yellow Colorless, Straw, Light Yellow, Yellow    Appearance Urine Clear Clear    Glucose Urine Negative Negative mg/dL    Bilirubin Urine Negative Negative    Ketones Urine Negative Negative mg/dL    Specific Gravity Urine 1.009 1.001 - 1.030    Blood Urine Negative Negative    pH Urine 5.5 5.0 - 7.0    Protein Albumin Urine Negative Negative mg/dL    Urobilinogen Urine <2.0 <2.0 mg/dL    Nitrite Urine Negative Negative    Leukocyte Esterase Urine Negative Negative    RBC Urine 1 <=2 /HPF    WBC Urine 1 <=5 /HPF    Hyaline Casts Urine 1 <=2 /LPF   CBC with platelets and differential   Result Value Ref Range    WBC Count 6.4 4.0 - 11.0 10e3/uL    RBC Count 4.30 (L) 4.40 - 5.90 10e6/uL    Hemoglobin 13.3 13.3 - 17.7 g/dL    Hematocrit 38.1 (L) 40.0 - 53.0 %    MCV 89 78 - 100 fL    MCH 30.9 26.5 - 33.0 pg    MCHC 34.9 31.5 - 36.5 g/dL    RDW 15.9 (H) 10.0 - 15.0 %    Platelet Count 167 150 - 450 10e3/uL    % Neutrophils 84 %    % Lymphocytes 8 %    % Monocytes 7 %    % Eosinophils 1 %    % Basophils 0 %    % Immature Granulocytes 0 %    NRBCs per 100 WBC 0 <1 /100    Absolute Neutrophils 5.4 1.6 - 8.3 10e3/uL    Absolute Lymphocytes 0.5 (L) 0.8 - 5.3 10e3/uL    Absolute Monocytes 0.4 0.0 - 1.3 10e3/uL    Absolute Eosinophils 0.1 0.0 - 0.7 10e3/uL    Absolute Basophils 0.0 0.0 - 0.2 10e3/uL    Absolute Immature Granulocytes 0.0 <=0.4 10e3/uL    Absolute NRBCs 0.0 10e3/uL   Glucose by meter   Result Value Ref Range    GLUCOSE BY METER POCT 103 (H) 70 - 99 mg/dL   Glucose by meter   Result Value Ref Range    GLUCOSE BY METER POCT 111 (H) 70 - 99 mg/dL   Glucose by meter   Result Value Ref Range    GLUCOSE BY METER POCT 90 70 - 99 mg/dL   ECG 12-LEAD WITH MUSE (LHE)   Result Value Ref Range    Systolic Blood Pressure  mmHg    Diastolic Blood Pressure  mmHg    Ventricular Rate 90 BPM    Atrial Rate 96 BPM    IL Interval  ms    QRS Duration 116 ms     ms     QTc 440 ms    P Axis  degrees    R AXIS -72 degrees    T Axis 71 degrees    Interpretation ECG       Atrial fibrillation  Left axis deviation  Pulmonary disease pattern  Inferior infarct (cited on or before 31-MAR-2022)  Abnormal ECG  When compared with ECG of 03-MAY-2022 15:01,  No significant change was found  Confirmed by SEE ED PROVIDER NOTE FOR, ECG INTERPRETATION ([id number]),  EMMA YOUNG ([id number]) on 5/19/2022 3:20:10 PM     Adult Type and Screen   Result Value Ref Range    ABO/RH(D) A POS     Antibody Screen Negative Negative    SPECIMEN EXPIRATION DATE [id number]        RADIOLOGY:  Reviewed all pertinent imaging. Please see official radiology report.  CT Cervical Spine w/o Contrast   Final Result   IMPRESSION:   HEAD CT:   1.  Partial head trauma      CERVICAL SPINE CT:   1.  No CT evidence for acute fracture or post traumatic subluxation.      CT Head w/o Contrast   Final Result   IMPRESSION:   HEAD CT:   1.  Partial head trauma      CERVICAL SPINE CT:   1.  No CT evidence for acute fracture or post traumatic subluxation.      XR Chest 1 View   Final Result   IMPRESSION: Cardiomegaly and borderline pulmonary venous congestion stable. Pleural fluid and/or thickening inferior right hemithorax with volume loss in the right middle and lower lobes unchanged. Previously seen left basilar consolidation and volume    loss has cleared.            EKG:    Reviewed and interpreted as: 1452 IVCD with atrial fibrillation, HR 90, no ST changes, similar to prior EKG from May 3, 2022      I have independently reviewed and interpreted the EKG(s) documented above.             Marita Aguirre MD  05/19/22 4004

## 2022-05-19 NOTE — H&P
Admission History and Physical   Robinson Medel,  1930, MRN [id number]    [facility]  Hyperkalemia [E87.5]    PCP: Elizabeth Alexandre, [phone]   Code status:  Prior       Extended Emergency Contact Information  Primary Emergency Contact: Katie Medel  Address: [address] [place], [place] [id number] [place] [address]  Mobile Phone: [phone]  Relation: Spouse  Secondary Emergency Contact: Wendy Garcia  Address: [address] [place] [address] [address] [place], [place] [id number] [place] [address]  Home Phone: [phone]  Mobile Phone: [phone]  Relation: Other       Assessment and Plan   Robinson Medel is a 89 year old male with PMHx of HTN, HLD,IDDM2, paroxysmal atrial fibrillation on coumadin, episodes of hypotension, known cecal cancer, constipation, GI bleed remotely, recurrent UTIs and pulmonary hypertension who presents to the ED today via EMS for low blood pressure.    Hypotension  LUCY  Hyponatremia  -most likely dehydration  -Hold diuretics, ACEi  -IVF  -bmp in am    DM 2 on insulin  -in control  -hold metformin  -sliding scale insulin    Hyperkalemia  -due to lucy  -improved with ivf and insulin    Lactic acidosis  -hold metformin  -ivf  -recheck in am    P a fib  -on warfarin, parmacy to dose to keep inr 2-3      DVT Prophylaxis: on warfarin  Diet: ADA, cardiac     Central Lines: None     [place] Catheter: Not present       Disposition: Admitted inpatient. Anticipated Length of Stay in midnights (including a midnight in the Emergency Department after triage if applicable) is more than 2 nights          Chief Complaint: Low bp     HPI:    Robinson Medel is a 89 year old male with PMHx of HTN, HLD,IDDM2, paroxysmal atrial fibrillation on coumadin, episodes of hypotension, known cecal cancer, constipation, GI bleed remotely, recurrent UTIs and pulmonary hypertension who presents to the ED today via EMS for low blood pressure. Home health nurse called 911 with low blood pressure  "readings of 50/30 at home, EMS reported . Patient with no chest pain, no shortness of breath, no cough, no fever, no abdominal pain, no nausea/vomiting/diarrhea, no rash. He fell 2 weeks ago and notes \"they had to pick me up off the floor and I hit my head\". No pain currently or falls since them. He says he feels normal and wants to go home.     Wife concerns that his diuretics are too  Much.     History was obtained from pt/chart review.      Medical History  Past Medical History:   Diagnosis Date     Anemia      Coronary artery disease 9/25/2015    stents placed      Diabetes mellitus (H)      Duodenitis 5/11/2016     Dyslipidemia      Gastroduodenal ulcer 9/24/2015     GERD (gastroesophageal reflux disease)      GI bleed 07/08/2016     Gout      Hematuria      Hyperlipemia      Hypertension      Kidney stone 10/6/2015        Surgical History  He  has a past surgical history that includes Cystoscopy W/ Litholapaxy / Ehl (N/A, 2/9/2016); Cystoscopy Prostate W/ Laser (N/A, 2/9/2016); Kidney Stone Surgery; other surgical history; Angioplasty (09/2015); Esophagoscopy, gastroscopy, duodenoscopy (EGD), combined (N/A, 5/8/2016); Pr Lap,Surg,Colectomy, Partial, W/Anast (N/A, 6/18/2016); Colonoscopy (N/A, 6/17/2016); and Esophagoscopy, gastroscopy, duodenoscopy (EGD), combined (N/A, 7/8/2016).       Social History  Reviewed, and he  reports that he has never smoked. He has never used smokeless tobacco. He reports that he does not drink alcohol and does not use drugs.       Allergies  Allergies   Allergen Reactions     Aspirin Nausea and Vomiting     GI upset    Family History  Reviewed, and family history includes Diabetes in his mother; Heart Disease in his father; Mental Illness in his daughter.  Not pertinent to chief complaints or current medical problems.        Prior to Admission Medications   (Not in a hospital admission)    Await review     Review of Systems:      Review of Systems   Unable to perform ROS: " Age     Physical Exam:  Temp:  [97.6  F (36.4  C)-98.3  F (36.8  C)] 98.3  F (36.8  C)  Pulse:  [74-91] 81  Resp:  [12-28] 26  BP: ()/(41-66) 105/66  SpO2:  [90 %-99 %] 90 %    /66   Pulse 81   Temp 98.3  F (36.8  C) (Oral)   Resp 26   Wt 88.9 kg (196 lb)   SpO2 90%   BMI 25.86 kg/m      Physical Exam  Vitals and nursing note reviewed.   Constitutional:       General: He is not in acute distress.     Appearance: He is ill-appearing. He is not toxic-appearing.      Comments: Elderly frail   HENT:      Head: Normocephalic and atraumatic.      Right Ear: External ear normal.      Left Ear: External ear normal.      Nose: Nose normal.      Mouth/Throat:      Mouth: Mucous membranes are dry.   Eyes:      General:         Right eye: No discharge.         Left eye: No discharge.      Pupils: Pupils are equal, round, and reactive to light.   Cardiovascular:      Rate and Rhythm: Normal rate.      Pulses: Normal pulses.   Pulmonary:      Effort: Pulmonary effort is normal.   Abdominal:      General: Abdomen is flat.      Tenderness: There is no abdominal tenderness.   Musculoskeletal:      Cervical back: Neck supple. No rigidity.      Right lower leg: No edema.      Left lower leg: No edema.   Skin:     General: Skin is dry.      Comments: B/l legs chronic discoloration   Neurological:      Mental Status: He is alert. Mental status is at baseline.   Psychiatric:         Cognition and Memory: Memory is impaired.               Pertinent Labs  Lab Results: personally reviewed.    Latest Reference Range & Units 05/19/22 18:04 05/19/22 18:24 05/19/22 18:31   Lactic Acid 0.7 - 2.0 mmol/L  2.4 (H)    GLUCOSE BY METER POCT 70 - 99 mg/dL   91   Color Urine Colorless, Straw, Light Yellow, Yellow  Light Yellow     Appearance Urine Clear  Clear     Glucose Urine Negative mg/dL Negative     Bilirubin Urine Negative  Negative     Ketones Urine Negative mg/dL Negative     Specific Gravity Urine 1.001 - 1.030  1.009     PH  Urine 5.0 - 7.0  5.5     Protein Albumin Urine Negative mg/dL Negative     Urobilinogen mg/dL <2.0 mg/dL <2.0     Nitrite Urine Negative  Negative     Blood Urine Negative  Negative     Leukocyte Esterase Urine Negative  Negative     WBC Urine <=5 /HPF 1     RBC Urine <=2 /HPF 1     Hyaline Casts <=2 /LPF 1     (H): Data is abnormally high     Latest Reference Range & Units 05/19/22 15:13   Sodium 136 - 145 mmol/L 133 (L)   Potassium 3.5 - 5.0 mmol/L 5.6 (H)   Chloride 98 - 107 mmol/L 96 (L)   Carbon Dioxide 22 - 31 mmol/L 26   Urea Nitrogen 8 - 28 mg/dL 71 (H)   Creatinine 0.70 - 1.30 mg/dL 2.14 (H)   GFR Estimate >60 mL/min/1.73m2 28 (L) [1]   Calcium 8.5 - 10.5 mg/dL 10.0   Anion Gap 5 - 18 mmol/L 11   Albumin 3.5 - 5.0 g/dL 3.9   Protein Total 6.0 - 8.0 g/dL 7.8   Bilirubin Total 0.0 - 1.0 mg/dL 0.8   Alkaline Phosphatase 45 - 120 U/L 88   ALT 0 - 45 U/L 30   AST 0 - 40 U/L 25   Lactic Acid 0.7 - 2.0 mmol/L 2.3 (H)   Troponin I 0.00 - 0.29 ng/mL 0.14   Glucose 70 - 125 mg/dL 142 (H)   WBC 4.0 - 11.0 10e3/uL 6.4   Hemoglobin 13.3 - 17.7 g/dL 13.3   Hematocrit 40.0 - 53.0 % 38.1 (L)   Platelet Count 150 - 450 10e3/uL 167   RBC Count 4.40 - 5.90 10e6/uL 4.30 (L)   MCV 78 - 100 fL 89   MCH 26.5 - 33.0 pg 30.9   MCHC 31.5 - 36.5 g/dL 34.9   RDW 10.0 - 15.0 % 15.9 (H)   % Neutrophils % 84   % Lymphocytes % 8   % Monocytes % 7   (L): Data is abnormally low  (H): Data is abnormally high  [1] Effective December 21, 2021 eGFRcr in adults is calculated using the 2021 CKD-EPI creatinine equation which includes age and gender (Dylan et al., NEJM, DOI: 10.1056/IUCJzn3968166)  Pertinent Radiology  Radiology Results: Personally reviewed impression/s  EXAM: CT HEAD W/O CONTRAST, CT CERVICAL SPINE W/O CONTRAST  LOCATION: [facility]  DATE/TIME: 5/19/2022 4:40 PM     INDICATION: Cerebral hemorrhage suspected. Fall and hit head 2 weeks ago. On Coumadin.  COMPARISON: None.                                                           IMPRESSION:  HEAD CT:  1.  Partial head trauma     CERVICAL SPINE CT:  1.  No CT evidence for acute fracture or post traumatic subluxation.    EXAM: XR CHEST 1 VIEW  LOCATION: [facility]  DATE/TIME: 5/19/2022 4:48 PM     INDICATION: Low blood pressure.  COMPARISON: 03/31/2022.                                                                      IMPRESSION: Cardiomegaly and borderline pulmonary venous congestion stable. Pleural fluid and/or thickening inferior right hemithorax with volume loss in the right middle and lower lobes unchanged. Previously seen left basilar consolidation and volume   loss has cleared.    Advanced Care Planning  Discharge planning discussed with patient and wife        [facility]    Sue Cody MD  Office: [phone]

## 2022-05-19 NOTE — ED TRIAGE NOTES
Pts  Home health RN called 911 as she was getting low bps on pt--she got 5/30. When medis came the bp was better --in the  114 range.. bp  In triage was again 76/48.  Pt deniesany sx of dizziess or anything else.  Pt was here for same thing about 2 weeks ago.pt states he did fall and hit his head 2 weeks ago.

## 2022-05-20 NOTE — ED NOTES
89 year old male with PMHx of HTN, HLD,IDDM2, paroxysmal atrial fibrillation on coumadin, episodes of hypotension, known cecal cancer, constipation, GI bleed remotely, recurrent UTIs and pulmonary hypertension who presents to the ED today via EMS for low blood pressure.  Neuro: Oriented x3 some confusion on situation for being here  IV/drain: PIV and left arm  VSS: soft b/p's increased some with bolus  Resp: Clear, RR in 30's some grunting with breaths   Cardio: a-fib, rates 80'S-90'S   GI/: Voiding adequately with male external cath   Pain/Discomfort: Pt denies pain, no current nausea or abd discomfort  Activity: totals assist, L/E wrapped with gauze, toes discolored and dry.

## 2022-05-20 NOTE — PHARMACY-ANTICOAGULATION SERVICE
Clinical Pharmacy - Warfarin Dosing Consult     Pharmacy has been consulted to manage this patient s warfarin therapy.  Indication: Atrial Fibrillation  Therapy Goal: INR 2-3  Warfarin Prior to Admission: Yes  Warfarin PTA Regimen: 2mg Friday & 4mg other days  Dose Comments: INR 2.04, will continue home dose 4mg today    INR   Date Value Ref Range Status   05/19/2022 2.04 (H) 0.85 - 1.15 Final   04/21/2022 1.95 (H) 0.85 - 1.15 Final       Recommend warfarin 4 mg today.  Pharmacy will monitor Robinson Medel daily and order warfarin doses to achieve specified goal.      Please contact pharmacy as soon as possible if the warfarin needs to be held for a procedure or if the warfarin goals change.

## 2022-05-20 NOTE — PLAN OF CARE
Goal Outcome Evaluation:    PRIMARY DIAGNOSIS: SOFT TISSUE INFECTIONS  OUTPATIENT/OBSERVATION GOALS TO BE MET BEFORE DISCHARGE:  Vitals sign stable or return to baseline: Yes    Tolerating oral antibiotics or has home infusion set up if applicable: Yes    Pain status: Pain free.    Return to near baseline physical activity: No    Discharge Planner Nurse   Safe discharge environment identified: Yes  Barriers to discharge: Yes       Entered by: Betty Rowan RN 05/20/2022 3:22 PM     Please review provider order for any additional goals.     Nurse to notify provider when observation goals have been met and patient is ready for discharge.

## 2022-05-20 NOTE — ED NOTES
"When checking crash cart, pt states \"Something is leaking\". Noted left IV infiltrated and leaking from poke site. IV dc'd. IVFs moved to right ac IV. Pt changed and moved. Pt's RN aware.  "

## 2022-05-20 NOTE — PROGRESS NOTES
05/20/22 0725   Quick Adds   Quick Adds Certification   Type of Visit Initial PT Evaluation   Living Environment   People in Home spouse   Current Living Arrangements house   Home Accessibility no concerns  (Pt notes no stairs at home)   Self-Care   Usual Activity Tolerance moderate   Current Activity Tolerance poor   Equipment Currently Used at Home walker, rolling;cane, straight   Fall history within last six months yes   General Information   Onset of Illness/Injury or Date of Surgery 05/19/22   Referring Physician Sue Cody MD   Patient/Family Therapy Goals Statement (PT) go home   Pertinent History of Current Problem (include personal factors and/or comorbidities that impact the POC) 89 year old male with PMHx of HTN, HLD,IDDM2, paroxysmal atrial fibrillation on coumadin, episodes of hypotension, known cecal cancer, constipation, GI bleed remotely, recurrent UTIs and pulmonary hypertension who presents to the ED today via EMS for low blood pressure   Existing Precautions/Restrictions (S)  fall   Strength (Manual Muscle Testing)   Strength (Manual Muscle Testing) Deficits observed during functional mobility   Bed Mobility   Bed Mobility supine-sit;sit-supine   Supine-Sit [place] (Bed Mobility) moderate assist (50% patient effort);2 person assist   Sit-Supine [place] (Bed Mobility) moderate assist (50% patient effort);2 person assist   Bed Mobility Limitations decreased ability to use legs for bridging/pushing;impaired ability to control trunk for mobility   Assistive Device (Bed Mobility) bed rails   Transfers   Transfers sit-stand transfer   Sit-Stand Transfer   Sit-Stand [place] (Transfers) minimum assist (75% patient effort);2 person assist   Assistive Device (Sit-Stand Transfers) walker, front-wheeled   Gait/Stairs (Locomotion)   [place] Level (Gait) minimum assist (75% patient effort);2 person assist   Assistive Device (Gait) walker, front-wheeled   Distance in Feet (Required  for LE Total Joints) 90'   Pattern (Gait) step-through   Deviations/Abnormal Patterns (Gait) festinating/shuffling;gait speed decreased   Clinical Impression   Criteria for Skilled Therapeutic Intervention Yes, treatment indicated   PT Diagnosis (PT) Impaired fucntional mobility   Influenced by the following impairments Weakness, fatigue   Functional limitations due to impairments Impaired transfers, impaired gait   Clinical Presentation (PT Evaluation Complexity) Stable/Uncomplicated   Clinical Presentation Rationale Presents as diagnosed   Clinical Decision Making (Complexity) moderate complexity   Planned Therapy Interventions (PT) balance training;bed mobility training;gait training;strengthening;transfer training   Anticipated Equipment Needs at Discharge (PT) walker, rolling   Risk & Benefits of therapy have been explained patient   PT Discharge Planning   PT Discharge Recommendation (DC Rec) Transitional Care Facility;home with assist;home with home care physical therapy   PT Rationale for DC Rec Pt will need physical assist of 1 with mobility if to return home.   Therapy Certification   Start of care date 05/20/22   Certification date from 05/20/22   Certification date to 05/27/22   Medical Diagnosis Hypotension   Total Evaluation Time   Total Evaluation Time (Minutes) 10   Physical Therapy Goals   PT Frequency Daily   PT Predicted Duration/Target Date for Goal Attainment 05/27/22   PT Goals Bed Mobility;Transfers;Gait   PT: Bed Mobility Supervision/stand-by assist;Supine to/from sit   PT: Transfers Supervision/stand-by assist;Sit to/from stand;Bed to/from chair;Assistive device   PT: Gait Supervision/stand-by assist;Rolling walker;150 feet       Alise Ly, PT, DPT  5/20/2022

## 2022-05-20 NOTE — PHARMACY-ADMISSION MEDICATION HISTORY
Pharmacy Note - Admission Medication History    Pertinent Provider Information:     Admission Med Rec is only somewhat reliable. Patient goes to [facility] but hasn't had a visit in months. He also uses [facility]. He has been in and out of the hospital and TCUs and medications have changes frequently.    Med list compiled to the best of my ability. Wife has a medication list that she believes to be accurate but she is not familiar with warfarin dose (not written on list). Has Home Care but unknown what the company is.     ______________________________________________________________________    Prior To Admission (PTA) med list completed and updated in EMR.       PTA Med List   Medication Sig Last Dose     atorvastatin (LIPITOR) 10 MG tablet Take 10 mg by mouth At Bedtime  5/18/2022 at HS     bumetanide (BUMEX) 2 MG tablet Take 2 tablets (4 mg) by mouth daily 5/19/2022 at AM     carvedilol (COREG) 3.125 MG tablet Take 3.125 mg by mouth 2 times daily (with meals) 5/19/2022 at AM     [facility] IRON 240 (27 Fe) MG TABS Take 1 tablet by mouth daily 5/19/2022 at AM     glipiZIDE (GLUCOTROL XL) 5 MG 24 hr tablet Take 1 tablet (5 mg) by mouth daily 5/19/2022 at AM     insulin aspart (NOVOLOG FLEXPEN) 100 UNIT/ML pen 3 times a day with meals, for glucometer 150-200 give 2 units SQ, 201-250 give 4 units, >250 give 6 units Unknown at Unknown time     insulin glargine (LANTUS PEN) 100 UNIT/ML pen Inject 15 Units Subcutaneous At Bedtime 5/18/2022 at HS     lisinopril (ZESTRIL) 2.5 MG tablet Take 1 tablet (2.5 mg) by mouth daily 5/19/2022 at AM     magnesium hydroxide (MILK OF MAGNESIA) 400 MG/5ML suspension Take 30 mLs by mouth daily as needed for constipation More than a month at Unknown time     metFORMIN (GLUCOPHAGE) 500 MG tablet Take 500 mg by mouth 2 times daily (with meals)  5/19/2022 at AM     multivitamin with minerals tablet Take 1 tablet by mouth daily  5/19/2022 at AM     omeprazole (PRILOSEC) 20 MG capsule [OMEPRAZOLE  (PRILOSEC) 20 MG CAPSULE] Take 20 mg by mouth daily.  5/19/2022 at AM     potassium chloride ER (KLOR-CON M) 20 MEQ CR tablet Take 20 mEq by mouth daily 5/19/2022 at AM     senna-docusate (SENOKOT-S/PERICOLACE) 8.6-50 MG tablet Take 1 tablet by mouth 2 times daily 5/19/2022 at AM     spironolactone (ALDACTONE) 25 MG tablet Take 1 tablet (25 mg) by mouth daily 5/19/2022 at AM     traZODone (DESYREL) 50 MG tablet Take 0.5 tablets (25 mg) by mouth nightly as needed for sleep More than a month at Unknown time       Information source(s): Family member, Hospital records and [facility]  Method of interview communication: in-person    Summary of Changes to PTA Med List  New: none  Discontinued: none  Changed: potassium changed to daily; carvedilol changed to 3.125mg    Patient was asked about OTC/herbal products specifically.  PTA med list reflects this.    In the past week, patient estimated taking medication this percent of the time:  greater than 90%.    Allergies were reviewed, assessed, and updated with the patient.      Patient does not use any multi-dose medications prior to admission.    The information provided in this note is only as accurate as the sources available at the time of the update(s).    Thank you for the opportunity to participate in the care of this patient.    Belgica Contreras [facility]  5/19/2022 8:16 PM

## 2022-05-20 NOTE — PROGRESS NOTES
[facility]      OUTPATIENT OCCUPATIONAL THERAPY  EVALUATION  PLAN OF TREATMENT FOR OUTPATIENT REHABILITATION  (COMPLETE FOR INITIAL CLAIMS ONLY)  Patient's Last Name, First Name, M.I.  YOB: 1930  GianfrancoRobinson AREVALO                          Provider's Name  [facility] Medical Record No.  [id number]                               Onset Date:  05/20/22   Start of Care Date:  05/20/22     Type:     ___PT   _X_OT   ___SLP Medical Diagnosis:  hypotension                        OT Diagnosis:  decreased ADL independence   Visits from SOC:  1   _________________________________________________________________________________  Plan of Treatment/Functional Goals    Planned Interventions: ADL retraining, balance training, bed mobility training, cognition, transfer training   Goals: See Occupational Therapy Goals on Care Plan in [employer] electronic health record.    Therapy Frequency: Daily  Predicted Duration of Therapy Intervention: 05/27/22  _________________________________________________________________________________    I CERTIFY THE NEED FOR THESE SERVICES FURNISHED UNDER        THIS PLAN OF TREATMENT AND WHILE UNDER MY CARE     (Physician co-signature of this document indicates review and certification of the therapy plan).              Certification date from: 05/20/22, Certification date to: 05/27/22    Referring Physician: Deuce DIAZ MD            Initial Assessment        See Occupational Therapy evaluation dated 05/20/22 in Epic electronic health record.

## 2022-05-20 NOTE — CONSULTS
WOC Nurse Inpatient Wound Assessment   Reason for consultation: Evaluate and treat Left buttock wounds    Assessment  Left buttock wounds due to Pressure Injury and Incontinence Associated Dermatitis (IAD)  Status: initial assessment  Red base with macerated edges due to incontinence   Treatment Plan  Left buttock wounds: Every 3 days   Cleanse wound and periwound skin with NS and pat dry   Apply skin prep and let it dry   Apply mepilex sacral foam  Recommend when he gets up to the floor that he have a pulsate ordered   No brief  Limit layers  Side to side positioning q 2hours  Elevate heels   .   Orders Written  Recommended provider order: pulsate mattress   WOC Nurse follow-up plan:weekly  Nursing to notify the Provider(s) and re-consult the WOC Nurse if wound(s) deteriorates or new skin concern.    Patient History  According to provider note(s):  Robinson Medel is a 89 year old male with PMHx of HTN, HLD,IDDM2, paroxysmal atrial fibrillation on coumadin, episodes of hypotension, known cecal cancer, constipation, GI bleed remotely, recurrent UTIs and pulmonary hypertension who presents to the ED today via EMS for low blood pressure.       Objective Data  Containment of urine/stool: Incontinence Protocol, Incontinent pad in bed and Purewick external catheter     Active Diet Order  Orders Placed This Encounter      Combination Diet Moderate Consistent Carb (60 g CHO per Meal) Diet; Low Saturated Fat Na <2400mg Diet      Output:   I/O last 3 completed shifts:  In: 1000 [I.V.:1000]  Out: 1000 [Urine:1000]    Risk Assessment:                                                Labs: Recent Labs   Lab 05/20/22  0838 05/19/22  1513   ALBUMIN  --  3.9   HGB 11.9* 13.3   INR 1.99* 2.04*   WBC  --  6.4       Physical Exam  Areas of skin assessed: focused left buttock     Wound Location:  Left buttock   Date of last photo none   Wound History: admitted with it, likely had a fall as well     Wound Base: 100 % dermis      Palpation of the wound bed: normal      Drainage: small     Description of drainage: serosanguinous     Measurements (length x width x depth, in cm) 1.2  x 1.3  x  0.3 cm      Periwound skin: irritant dermatitis and macerated      Color: pink      Temperature: normal   Odor: none  Pain: denies , unable to hear me well enough to answer      Interventions  Visual inspection and assessment completed   Wound Care Rationale Provide protection  and Decrease bacterial load  Wound Care: completed by RN  Supplies: floor stock  Current off-loading measures: pillows  Current support surface: Standard  Foam mattress  Education provided to: RN was in another room  Discussed plan of care with Nurse and not in the room    Brooklyn Gutierres RN BS CWOCN

## 2022-05-20 NOTE — PLAN OF CARE
PRIMARY DIAGNOSIS: SYNCOPE/TIA  OUTPATIENT/OBSERVATION GOALS TO BE MET BEFORE DISCHARGE:  1. Orthostatic performed: No    2. Diagnostic testing complete & at baseline neurologic testing: Yes    3. Cleared by consultants (if involved): Yes    4. Interpretation of cardiac rhythm per telemetry tech: yes    5. Tolerating adequate PO diet and medications: No    6. Return to near baseline physical activity or neurologic status: No    Discharge Planner Nurse   Safe discharge environment identified: No  Barriers to discharge: Yes       Entered by: Betty Rowan RN 05/20/2022 3:00 PM     Please review provider order for any additional goals.   Nurse to notify provider when observation goals have been met and patient is ready for discharge.Goal Outcome Evaluation:    Pt denies pain, shortness of breath or light headedness  Sat at bedside for cares and tolerated sitting with no associated signs of hypotension noted.

## 2022-05-20 NOTE — PROGRESS NOTES
Daily Progress Note        CODE STATUS:  Prior    05/20/22  Assessment/Plan:  Robinson Medel is a 89 year old male with PMHx of HTN, HLD,IDDM2, PAF on coumadin, diastolic heart failure, cecal cancer s/p right hemicolectomy, recent visit to ED on 5/3/2022 for hypotension, thought to be due to dehydration, received fluid and discharged home who presented to ED again for evaluation of hypotension and subsequently admitted for further management on 5/19/2022.    Hypotension; likely due to medications and poor oral intake  -- On presentation, no reported febrile illness and no reported respiratory/GI/ symptoms.  Patient's wife reported poor oral intake  -- On admission, normal WBC count, UA unremarkable.  CXR reported improvement on previous left basilar consolidation.  -- Recent hospitalization on 4/2022 for heart failure and diuretics increased.  Recent ED visit on 5/3/2022 for hypotension and thought to be due to dehydration and improved with IV fluid  -- Blood pressure improved with fluid resuscitation, hold further IV fluid given CHF  -- Continue to hold home diuretics, lisinopril.  Blood pressure trending up, will consider resuming small dose home Coreg with holding parameters given history of A. fib and concern for RVR if not resumed    Acute kidney injury on CKD stage III; likely due to hypotensive episode, diuretics, poor oral intake  -- Creatinine improving with IV fluid.  Continue to hold home diuretics    Hyperkalemia; likely due to LUCY and medications  -- Improved with hyperkalemia protocol.  Continue to hold home lisinopril, Aldactone and potassium supplement for now    Mild lactic acidosis on admission; likely due to dehydration  -- Improved with IV fluid.  Basic infectious work-up unremarkable  -- Continue to hold home metformin, likely not resume on discharge given CKD    PAF;  -- Heart rate controlled.  On Coumadin, pharmacy dosing    Chronic diastolic heart failure;  -- Currently does not  "look volume overload  --Echocardiogram on 3/2022 reported EF 50 to 55%.  --Hold diuretics for now above issues with hypotension and acute kidney injury    IDDM;  -- Hold home metformin and glipizide  --Continue home Lantus  --Insulin sliding scale hypoglycemic protocol    Venous stasis dermatitis of both lower extremities;  -- Continue local care, wound care nurse consulted    Physical deconditioning due to medical illness;  -- PT OT consulted     Disposition; anticipate home with home care  Barrier to discharge; clinical progress, monitor vitals and labs     LOS: 0 days     Subjective:  Interval History: Patient is new to me.  Patient seen and examined. Notes, labs, imaging reports personally reviewed.  Patient very hard of hearing, upset that he is in the hospital and wanting to go home.  Patient currently denied any concerns or complaints, patient reports \"I am fine, I want to go home\".  Patient's wife at bedside and had detailed conversation with her and she is in agreement with patient being monitored in the hospital.  Discussed with nursing staffs.    Review of Systems:   As mentioned in subjective.    Patient Active Problem List   Diagnosis     Venous stasis dermatitis      Atherosclerotic heart disease of native coronary artery without angina pectoris     Benign essential HTN     Gastroduodenal ulcer     Peripheral blood vessel disorder (H)     Urinary bladder stone     Paroxysmal atrial fib -- on Warfarin      Lymphedema     Weakness     Hypotension due to drugs     Hypotension, unspecified hypotension type     Duodenitis     Anemia     Cecal cancer (H)     S/P right hemicolectomy     Gastrointestinal hemorrhage with melena     Blood in stool     Lactic acid acidosis     Venous hypertension, chronic, bilateral     Shortness of breath     Legionella infection (H)     UTI (urinary tract infection)     Ulcer of left lower extremity, limited to breakdown of skin (H)     Gastroesophageal reflux disease with " esophagitis     Hyperkalemia     Long term current use of anticoagulant therapy     Malignant tumor of ascending colon (H)     Mixed hyperlipidemia     Mitral valve disorder     Normocytic normochromic anemia     Venous hypertension     Peripheral vascular disease (H)     Hyperglycemia due to type 2 diabetes mellitus (H)     DM type 2, Hgb A1C 7.4 on 3/31/22     Venous stasis     Hearing loss     PVC's (premature ventricular contractions)     Malaise and fatigue     Abnormal EKG     Age-related cataract     Amnesia     Severe bilateral hearing loss     Chronic idiopathic constipation     Slow transit constipation     Chronic kidney disease, stage 3a (H)     Hypokalemia     Hypomagnesemia     Severe Pulm HTN -- PAP 60-65 mmHg plus RAP on Echo 3/31/22     (HFpEF) heart failure with preserved ejection fraction (H)     Orthostatic hypotension     Lactic acidosis     Severe sepsis (H)     Acute renal failure, unspecified acute renal failure type (H)       Scheduled Meds:    insulin aspart  1-7 Units Subcutaneous TID AC     insulin aspart  1-5 Units Subcutaneous At Bedtime     warfarin ANTICOAGULANT  4 mg Oral ONCE at 18:00     Continuous Infusions:    Warfarin Therapy Reminder       PRN Meds:.glucose **OR** dextrose **OR** glucagon, glucose **OR** dextrose **OR** glucagon, Warfarin Therapy Reminder    Objective:  Vital signs in last 24 hours:  Temp:  [97.6  F (36.4  C)-98.5  F (36.9  C)] 98.5  F (36.9  C)  Pulse:  [69-97] 75  Resp:  [12-38] 16  BP: ()/(41-71) 108/58  SpO2:  [90 %-100 %] 99 %      Intake/Output Summary (Last 24 hours) at 5/20/2022 1234  Last data filed at 5/20/2022 1214  Gross per 24 hour   Intake 1000 ml   Output 1800 ml   Net -800 ml       Physical Exam:  General: Not in obvious distress.  HEENT: Normocephalic, supple neck  Chest: Clear to auscultation bilateral anteriorly, no wheezing  Heart: S1S2 normal, irregular  Abdomen: Soft. NT, ND. Bowel sounds- active.  Extremities: Trace bilateral lower  extremity edema, significant venous stasis dermatitis on both lower extremities  Neuro: alert and awake, hard of hearing, moves all extremities but has generalized motor weakness    Lab Results:(I have personally reviewed the results)    Recent Results (from the past 24 hour(s))   ECG 12-LEAD WITH MUSE ([facility])    Collection Time: 05/19/22  2:52 PM   Result Value Ref Range    Systolic Blood Pressure  mmHg    Diastolic Blood Pressure  mmHg    Ventricular Rate 90 BPM    Atrial Rate 96 BPM    HI Interval  ms    QRS Duration 116 ms     ms    QTc 440 ms    P Axis  degrees    R AXIS -72 degrees    T Axis 71 degrees    Interpretation ECG       Atrial fibrillation  Left axis deviation  Pulmonary disease pattern  Inferior infarct (cited on or before 31-MAR-2022)  Abnormal ECG  When compared with ECG of 03-MAY-2022 15:01,  No significant change was found  Confirmed by SEE ED PROVIDER NOTE FOR, ECG INTERPRETATION ([id number]),  EMMA YOUNG ([id number]) on 5/19/2022 3:20:10 PM     Comprehensive metabolic panel    Collection Time: 05/19/22  3:13 PM   Result Value Ref Range    Sodium 133 (L) 136 - 145 mmol/L    Potassium 5.6 (H) 3.5 - 5.0 mmol/L    Chloride 96 (L) 98 - 107 mmol/L    Carbon Dioxide (CO2) 26 22 - 31 mmol/L    Anion Gap 11 5 - 18 mmol/L    Urea Nitrogen 71 (H) 8 - 28 mg/dL    Creatinine 2.14 (H) 0.70 - 1.30 mg/dL    Calcium 10.0 8.5 - 10.5 mg/dL    Glucose 142 (H) 70 - 125 mg/dL    Alkaline Phosphatase 88 45 - 120 U/L    AST 25 0 - 40 U/L    ALT 30 0 - 45 U/L    Protein Total 7.8 6.0 - 8.0 g/dL    Albumin 3.9 3.5 - 5.0 g/dL    Bilirubin Total 0.8 0.0 - 1.0 mg/dL    GFR Estimate 28 (L) >60 mL/min/1.73m2   Lactic acid whole blood    Collection Time: 05/19/22  3:13 PM   Result Value Ref Range    Lactic Acid 2.3 (H) 0.7 - 2.0 mmol/L   Troponin I    Collection Time: 05/19/22  3:13 PM   Result Value Ref Range    Troponin I 0.14 0.00 - 0.29 ng/mL   INR    Collection Time: 05/19/22  3:13 PM   Result Value Ref Range    INR  2.04 (H) 0.85 - 1.15   Blood Culture Line, arterial    Collection Time: 05/19/22  3:13 PM    Specimen: Line, arterial; Blood   Result Value Ref Range    Culture No growth after 12 hours    CBC with platelets and differential    Collection Time: 05/19/22  3:13 PM   Result Value Ref Range    WBC Count 6.4 4.0 - 11.0 10e3/uL    RBC Count 4.30 (L) 4.40 - 5.90 10e6/uL    Hemoglobin 13.3 13.3 - 17.7 g/dL    Hematocrit 38.1 (L) 40.0 - 53.0 %    MCV 89 78 - 100 fL    MCH 30.9 26.5 - 33.0 pg    MCHC 34.9 31.5 - 36.5 g/dL    RDW 15.9 (H) 10.0 - 15.0 %    Platelet Count 167 150 - 450 10e3/uL    % Neutrophils 84 %    % Lymphocytes 8 %    % Monocytes 7 %    % Eosinophils 1 %    % Basophils 0 %    % Immature Granulocytes 0 %    NRBCs per 100 WBC 0 <1 /100    Absolute Neutrophils 5.4 1.6 - 8.3 10e3/uL    Absolute Lymphocytes 0.5 (L) 0.8 - 5.3 10e3/uL    Absolute Monocytes 0.4 0.0 - 1.3 10e3/uL    Absolute Eosinophils 0.1 0.0 - 0.7 10e3/uL    Absolute Basophils 0.0 0.0 - 0.2 10e3/uL    Absolute Immature Granulocytes 0.0 <=0.4 10e3/uL    Absolute NRBCs 0.0 10e3/uL   Blood Culture Peripheral Blood    Collection Time: 05/19/22  3:41 PM    Specimen: Peripheral Blood   Result Value Ref Range    Culture No growth after 12 hours    Adult Type and Screen    Collection Time: 05/19/22  3:41 PM   Result Value Ref Range    ABO/RH(D) A POS     Antibody Screen Negative Negative    SPECIMEN EXPIRATION DATE [id number]    Asymptomatic COVID-19 Virus (Coronavirus) by PCR Nasopharyngeal    Collection Time: 05/19/22  4:33 PM    Specimen: Nasopharyngeal; Swab   Result Value Ref Range    SARS CoV2 PCR Negative Negative   Glucose by meter    Collection Time: 05/19/22  4:56 PM   Result Value Ref Range    GLUCOSE BY METER POCT 103 (H) 70 - 99 mg/dL   Glucose by meter    Collection Time: 05/19/22  5:33 PM   Result Value Ref Range    GLUCOSE BY METER POCT 111 (H) 70 - 99 mg/dL   Glucose by meter    Collection Time: 05/19/22  5:59 PM   Result Value Ref  Range    GLUCOSE BY METER POCT 90 70 - 99 mg/dL   UA with Microscopic reflex to Culture    Collection Time: 05/19/22  6:04 PM    Specimen: Urine, Catheter   Result Value Ref Range    Color Urine Light Yellow Colorless, Straw, Light Yellow, Yellow    Appearance Urine Clear Clear    Glucose Urine Negative Negative mg/dL    Bilirubin Urine Negative Negative    Ketones Urine Negative Negative mg/dL    Specific Gravity Urine 1.009 1.001 - 1.030    Blood Urine Negative Negative    pH Urine 5.5 5.0 - 7.0    Protein Albumin Urine Negative Negative mg/dL    Urobilinogen Urine <2.0 <2.0 mg/dL    Nitrite Urine Negative Negative    Leukocyte Esterase Urine Negative Negative    RBC Urine 1 <=2 /HPF    WBC Urine 1 <=5 /HPF    Hyaline Casts Urine 1 <=2 /LPF   Lactic acid whole blood    Collection Time: 05/19/22  6:24 PM   Result Value Ref Range    Lactic Acid 2.4 (H) 0.7 - 2.0 mmol/L   Potassium    Collection Time: 05/19/22  6:24 PM   Result Value Ref Range    Potassium 4.7 3.5 - 5.0 mmol/L   Troponin I    Collection Time: 05/19/22  6:24 PM   Result Value Ref Range    Troponin I 0.16 0.00 - 0.29 ng/mL   Glucose by meter    Collection Time: 05/19/22  6:31 PM   Result Value Ref Range    GLUCOSE BY METER POCT 91 70 - 99 mg/dL   Glucose by meter    Collection Time: 05/19/22  7:02 PM   Result Value Ref Range    GLUCOSE BY METER POCT 75 70 - 99 mg/dL   Glucose by meter    Collection Time: 05/19/22  7:59 PM   Result Value Ref Range    GLUCOSE BY METER POCT 91 70 - 99 mg/dL   Glucose by meter    Collection Time: 05/19/22  9:52 PM   Result Value Ref Range    GLUCOSE BY METER POCT 133 (H) 70 - 99 mg/dL   Potassium    Collection Time: 05/19/22 10:05 PM   Result Value Ref Range    Potassium 4.9 3.5 - 5.0 mmol/L   Glucose by meter    Collection Time: 05/20/22  7:50 AM   Result Value Ref Range    GLUCOSE BY METER POCT 164 (H) 70 - 99 mg/dL   Basic metabolic panel    Collection Time: 05/20/22  8:38 AM   Result Value Ref Range    Sodium 136 136 -  145 mmol/L    Potassium 4.8 3.5 - 5.0 mmol/L    Chloride 103 98 - 107 mmol/L    Carbon Dioxide (CO2) 25 22 - 31 mmol/L    Anion Gap 8 5 - 18 mmol/L    Urea Nitrogen 55 (H) 8 - 28 mg/dL    Creatinine 1.63 (H) 0.70 - 1.30 mg/dL    Calcium 9.0 8.5 - 10.5 mg/dL    Glucose 151 (H) 70 - 125 mg/dL    GFR Estimate 39 (L) >60 mL/min/1.73m2   Lactic acid whole blood    Collection Time: 05/20/22  8:38 AM   Result Value Ref Range    Lactic Acid 2.0 0.7 - 2.0 mmol/L   INR    Collection Time: 05/20/22  8:38 AM   Result Value Ref Range    INR 1.99 (H) 0.85 - 1.15   Hemoglobin    Collection Time: 05/20/22  8:38 AM   Result Value Ref Range    Hemoglobin 11.9 (L) 13.3 - 17.7 g/dL   Magnesium    Collection Time: 05/20/22  8:38 AM   Result Value Ref Range    Magnesium 1.8 1.8 - 2.6 mg/dL   Glucose by meter    Collection Time: 05/20/22 12:05 PM   Result Value Ref Range    GLUCOSE BY METER POCT 144 (H) 70 - 99 mg/dL         Serum Glucose range:   Recent Labs   Lab 05/20/22  1205 05/20/22  0838 05/20/22  0750 05/19/22  2152   * 151* 164* 133*     ABG: No lab results found in last 7 days.  CBC:   Recent Labs   Lab 05/20/22  0838 05/19/22  1513   WBC  --  6.4   HGB 11.9* 13.3   HCT  --  38.1*   MCV  --  89   PLT  --  167   NEUTROPHIL  --  84   LYMPH  --  8   MONOCYTE  --  7   EOSINOPHIL  --  1     Chemistry:   Recent Labs   Lab 05/20/22  0838 05/19/22  2205 05/19/22  1824 05/19/22  1513     --   --  133*   POTASSIUM 4.8 4.9 4.7 5.6*   CHLORIDE 103  --   --  96*   CO2 25  --   --  26   BUN 55*  --   --  71*   CR 1.63*  --   --  2.14*   GFRESTIMATED 39*  --   --  28*   TY 9.0  --   --  10.0   MAG 1.8  --   --   --    PROTTOTAL  --   --   --  7.8   ALBUMIN  --   --   --  3.9   AST  --   --   --  25   ALT  --   --   --  30   ALKPHOS  --   --   --  88   BILITOTAL  --   --   --  0.8     Coags:  Recent Labs   Lab 05/20/22  0838 05/19/22  1513   INR 1.99* 2.04*     Cardiac Markers:  Recent Labs   Lab 05/19/22  1824   TROPONINI 0.16         XR Chest 1 View    Result Date: 5/19/2022  EXAM: XR CHEST 1 VIEW LOCATION: [facility] DATE/TIME: 5/19/2022 4:48 PM INDICATION: Low blood pressure. COMPARISON: 03/31/2022.     IMPRESSION: Cardiomegaly and borderline pulmonary venous congestion stable. Pleural fluid and/or thickening inferior right hemithorax with volume loss in the right middle and lower lobes unchanged. Previously seen left basilar consolidation and volume loss has cleared.    XR Chest Port 1 View    Result Date: 5/3/2022  EXAM: XR CHEST PORT 1 VIEW LOCATION: [facility] DATE/TIME: 5/3/2022 4:46 PM INDICATION: Congestive heart failure. COMPARISON: 03/31/2022     IMPRESSION: Small right pleural effusion with adjacent compressive atelectasis. No left pleural effusion. No pneumothorax. No focal airspace disease in the left lung. Stable mild cardiomegaly.    CT Cervical Spine w/o Contrast    Result Date: 5/19/2022  EXAM: CT HEAD W/O CONTRAST, CT CERVICAL SPINE W/O CONTRAST LOCATION: [facility] DATE/TIME: 5/19/2022 4:40 PM INDICATION: Cerebral hemorrhage suspected. Fall and hit head 2 weeks ago. On Coumadin. COMPARISON: None. TECHNIQUE: 1) Routine CT Head without IV contrast. Multiplanar reformats. Dose reduction techniques were used. 2) Routine CT Cervical Spine without IV contrast. Multiplanar reformats. Dose reduction techniques were used. FINDINGS: HEAD CT: INTRACRANIAL CONTENTS: No intracranial hemorrhage, extraaxial collection, or mass effect.  No CT evidence of acute infarct. Unchanged left frontal encephalomalacia and gliosis. Mild presumed chronic small vessel ischemic changes. Mild generalized volume loss. No hydrocephalus. VISUALIZED ORBITS/SINUSES/MASTOIDS: Prior bilateral cataract surgery. Visualized portions of the orbits are otherwise unremarkable. No paranasal sinus mucosal disease. No middle ear or mastoid effusion. BONES/SOFT TISSUES: No scalp hematoma. No  skull fracture. CERVICAL SPINE CT: VERTEBRA: Normal vertebral body heights and alignment. No fracture or posttraumatic subluxation. Multilevel cervical spondylosis. CANAL/FORAMINA: No high-grade spinal canal or neural foraminal stenosis. PARASPINAL: No extraspinal abnormality. Visualized lung fields are clear.     IMPRESSION: HEAD CT: 1.  Partial head trauma CERVICAL SPINE CT: 1.  No CT evidence for acute fracture or post traumatic subluxation.    CT Head w/o Contrast    Result Date: 5/19/2022  EXAM: CT HEAD W/O CONTRAST, CT CERVICAL SPINE W/O CONTRAST LOCATION: [facility] DATE/TIME: 5/19/2022 4:40 PM INDICATION: Cerebral hemorrhage suspected. Fall and hit head 2 weeks ago. On Coumadin. COMPARISON: None. TECHNIQUE: 1) Routine CT Head without IV contrast. Multiplanar reformats. Dose reduction techniques were used. 2) Routine CT Cervical Spine without IV contrast. Multiplanar reformats. Dose reduction techniques were used. FINDINGS: HEAD CT: INTRACRANIAL CONTENTS: No intracranial hemorrhage, extraaxial collection, or mass effect.  No CT evidence of acute infarct. Unchanged left frontal encephalomalacia and gliosis. Mild presumed chronic small vessel ischemic changes. Mild generalized volume loss. No hydrocephalus. VISUALIZED ORBITS/SINUSES/MASTOIDS: Prior bilateral cataract surgery. Visualized portions of the orbits are otherwise unremarkable. No paranasal sinus mucosal disease. No middle ear or mastoid effusion. BONES/SOFT TISSUES: No scalp hematoma. No skull fracture. CERVICAL SPINE CT: VERTEBRA: Normal vertebral body heights and alignment. No fracture or posttraumatic subluxation. Multilevel cervical spondylosis. CANAL/FORAMINA: No high-grade spinal canal or neural foraminal stenosis. PARASPINAL: No extraspinal abnormality. Visualized lung fields are clear.     IMPRESSION: HEAD CT: 1.  Partial head trauma CERVICAL SPINE CT: 1.  No CT evidence for acute fracture or post traumatic  subluxation.          Latest radiology report personally reviewed.    Note created using dragon voice recognition software so sounds alike errors may have escaped editing.    05/20/2022   SHRUTHI JANE MD  HOSPITALIST, [facility]  PAGER NO. [phone]

## 2022-05-20 NOTE — PROGRESS NOTES
[facility]      OUTPATIENT PHYSICAL THERAPY EVALUATION  PLAN OF TREATMENT FOR OUTPATIENT REHABILITATION  (COMPLETE FOR INITIAL CLAIMS ONLY)  Patient's Last Name, First Name, M.I.  YOB: 1930  GianfrancoRobinson AREVALO                        Provider's Name  [facility] Medical Record No.  [id number]                               Onset Date:  05/19/22   Start of Care Date:  05/20/22      Type:     _X_PT   ___OT   ___SLP Medical Diagnosis:  Hypotension                        PT Diagnosis:  Impaired fucntional mobility   Visits from SOC:  1   _________________________________________________________________________________  Plan of Treatment/Functional Goals    Planned Interventions: balance training, bed mobility training, gait training, strengthening, transfer training     Goals: See Physical Therapy Goals on Care Plan in [employer] electronic health record.    Therapy Frequency: Daily  Predicted Duration of Therapy Intervention: 05/27/22  _________________________________________________________________________________    I CERTIFY THE NEED FOR THESE SERVICES FURNISHED UNDER        THIS PLAN OF TREATMENT AND WHILE UNDER MY CARE     (Physician co-signature of this document indicates review and certification of the therapy plan).              Certification date from: 05/20/22, Certification date to: 05/27/22    Referring Physician: Sue Cody MD            Initial Assessment        See Physical Therapy evaluation dated 05/20/22 in Epic electronic health record.

## 2022-05-20 NOTE — PROGRESS NOTES
Occupational Therapy       05/20/22 0830   Quick Adds   Quick Adds Certification   Type of Visit Initial Occupational Therapy Evaluation   Living Environment   People in Home spouse   Current Living Arrangements house   Living Environment Comments Patient has home health aide, unsure how much patient was doing at home, patient slightly agitated, unreliable historian   General Information   Onset of Illness/Injury or Date of Surgery 05/20/22   Referring Physician Deuce DIAZ MD   Existing Precautions/Restrictions fall   Cognitive Status Examination   Orientation Status place   Bed Mobility   Bed Mobility supine-sit;sit-supine   Supine-Sit [place] (Bed Mobility) moderate assist (50% patient effort);2 person assist   Transfers   Transfers sit-stand transfer   Sit-Stand Transfer   Sit-Stand [place] (Transfers) maximum assist (25% patient effort);2 person assist   Clinical Impression   Criteria for Skilled Therapeutic Interventions Met (OT) Yes, treatment indicated   OT Diagnosis decreased ADL independence   OT Problem List-Impairments impacting ADL problems related to;activity tolerance impaired;balance;strength   Assessment of Occupational Performance 3-5 Performance Deficits   Identified Performance Deficits dressing, trsfs, bed mob   Planned Therapy Interventions (OT) ADL retraining;balance training;bed mobility training;cognition;transfer training   Clinical Decision Making Complexity (OT) moderate complexity   Risk & Benefits of therapy have been explained evaluation/treatment results reviewed   OT Discharge Planning   OT Discharge Recommendation (DC Rec) Transitional Care Facility   Therapy Certification   Start of Care Date 05/20/22   Certification date from 05/20/22   Certification date to 05/27/22   Medical Diagnosis hypotension   Total Evaluation Time (Minutes)   Total Evaluation Time (Minutes) 15   OT Goals   Therapy Frequency (OT) Daily   OT Predicted Duration/Target Date for Goal Attainment  05/27/22   OT Goals Bed Mobility;Transfers;Toilet Transfer/Toileting;Cognition   OT: Bed Mobility Moderate assist   OT: Transfer Moderate assist   OT: Toilet Transfer/Toileting Moderate assist   OT: Cognitive Patient/caregiver will verbalize understanding of cognitive assessment results/recommendations as needed for safe discharge planning

## 2022-05-21 NOTE — PROGRESS NOTES
"PRIMARY DIAGNOSIS: \"GENERIC\" NURSING  OUTPATIENT/OBSERVATION GOALS TO BE MET BEFORE DISCHARGE:  1. ADLs back to baseline: Yes    2. Activity and level of assistance: Up with standby assistance.    3. Pain status: Pain free.    4. Return to near baseline physical activity: Yes     Discharge Planner Nurse   Safe discharge environment identified: Yes  Barriers to discharge: Yes        Entered by: Yasmany Mcconnell RN 05/21/2022 5:37 AM      Pt blood pressure has been stable in the low 100's over 60's. Pt denies pain. Pt alert and oriented x4. Turned and repositioned per fox scale.   Please review provider order for any additional goals.   Nurse to notify provider when observation goals have been met and patient is ready for discharge.  "

## 2022-05-21 NOTE — PROGRESS NOTES
"PRIMARY DIAGNOSIS: \"GENERIC\" NURSING  OUTPATIENT/OBSERVATION GOALS TO BE MET BEFORE DISCHARGE:  1. ADLs back to baseline: Yes    2. Activity and level of assistance: Up with standby assistance.    3. Pain status: Pain free.    4. Return to near baseline physical activity: Yes     Discharge Planner Nurse   Safe discharge environment identified: Yes  Barriers to discharge: Yes       Entered by: Yasmany Mcconnell RN 05/21/2022 6:59 AM      Pt had no complaints of pain. BP remains in the low 100's over 60's. Repositioned q2.  Please review provider order for any additional goals.   Nurse to notify provider when observation goals have been met and patient is ready for discharge.  " No

## 2022-05-21 NOTE — CONSULTS
Care Management Initial Consult    General Information  Assessment completed with: Spouse or significant other [place]  Type of CM/SW Visit: Initial Assessment    Primary Care Provider verified and updated as needed: Yes   Readmission within the last 30 days: unable to assess   Return Category: Exacerbation of disease     Advance Care Planning: Advance Care Planning Reviewed: no concerns identified, other (see comments) (HCD at home)          Communication Assessment  Patient's communication style: spoken language (English or Bilingual)    Hearing Difficulty or Deaf: yes        Cognitive  Cognitive/Neuro/Behavioral: WDL                      Living Environment:   People in home: spouse, child(yamilka), adult  Elba Bowers  Current living Arrangements:  ([facility])      Able to return to prior arrangements: yes       Family/Social Support:  Care provided by: self, spouse/significant other, homecare agency, child(yamilka)  Provides care for: no one, unable/limited ability to care for self  Marital Status:   Wife  [place]       Description of Support System: Supportive, Involved    Support Assessment: Adequate family and caregiver support, Adequate social supports, Patient communicates needs well met    Current Resources:   Patient receiving home care services: Yes  Skilled Home Care Services: Skilled Nursing, Physicial Therapy, Occupational Therapy  Community Resources: Home Care, DME  Equipment currently used at home: walker, rolling, cane, straight  Supplies currently used at home: Wound Care Supplies, Hearing Aid Batteries, Diabetic Supplies    Employment/Financial:  Employment Status: retired        Financial Concerns:             Lifestyle & Psychosocial Needs:  Social Determinants of Health     Tobacco Use: Low Risk      Smoking Tobacco Use: Never Smoker     Smokeless Tobacco Use: Never Used   Alcohol Use: Not on file   Financial Resource Strain: Not on file   Food Insecurity: Not on file   Transportation  "Needs: Not on file   Physical Activity: Not on file   Stress: Not on file   Social Connections: Not on file   Intimate Partner Violence: Not on file   Depression: Not on file   Housing Stability: Not on file       Functional Status:  Prior to admission patient needed assistance:   Dependent ADLs:: Ambulation-cane, Ambulation-walker, Incontinence, Bathing, Dressing  Dependent IADLs:: Cleaning, Cooking, Laundry, Shopping, Meal Preparation, Transportation  Assesssment of Functional Status: Not at  functional baseline    Mental Health Status:  Mental Health Status: No Current Concerns       Chemical Dependency Status:                Values/Beliefs:  Spiritual, Cultural Beliefs, Anabaptist Practices, Values that affect care:                 Additional Information:    CORINNA assessed.  Pt lives in [facility] with wife and adult daughter.  Pt has home care services in place.  Pt has HCD at home, wife states, \"DNR/DNI\".  Pt will have transport home at time of discharge.    [facility]      Brandie Bradley RN        "

## 2022-05-21 NOTE — PROGRESS NOTES
Daily Progress Note        CODE STATUS:  Full Code    05/20/22  Assessment/Plan:  Robinson Medel is a 89 year old male with PMHx of HTN, HLD,IDDM2, PAF on coumadin, diastolic heart failure, cecal cancer s/p right hemicolectomy, recent visit to ED on 5/3/2022 for hypotension, thought to be due to dehydration, received fluid and discharged home who presented to ED again for evaluation of hypotension and subsequently admitted for further management on 5/19/2022.    Hypotension; likely due to medications and poor oral intake  -- On presentation, no reported febrile illness and no reported respiratory/GI/ symptoms.  Patient's wife reported poor oral intake  -- On admission, normal WBC count, UA unremarkable.  CXR reported improvement on previous left basilar consolidation.  Mild lactic acidosis likely due to dehydration, improved with IV fluid.  -- Recent hospitalization on 4/2022 for heart failure and diuretics increased.  Recent ED visit on 5/3/2022 for hypotension and thought to be due to dehydration and improved with IV fluid and discharged home from ED  -- Blood pressure improved with IV fluid and holding home medications.  Oral intake improving, hold further IV fluid given CHF  -- Continue to hold home diuretics, lisinopril.  Resumed home Coreg given history of A. fib and concern for A. fib with RVR if not resumed.    Acute kidney injury on CKD stage III; likely due to hypotensive episode, diuretics, poor oral intake  --On admission creatinine 2.14, significantly improved, creatinine 1.2 on 5/21  -- Monitor labs, intake/output, weight closely.    Hyperkalemia; likely due to LUCY and medications  -- Improved with hyperkalemia protocol.    --Continue to hold home lisinopril, Aldactone and potassium supplement for now    PAF;  -- Heart rate controlled, continue Coreg.    --On Coumadin, pharmacy dosing    Chronic diastolic heart failure;  -- Currently does not look volume overload  --Echocardiogram on 3/2022  reported EF 50 to 55%.  --Hold diuretics for now above issues with hypotension and acute kidney injury.  Consider resuming at a smaller dose    IDDM;  -- Hold home metformin and glipizide  --Continue home Lantus  --Insulin sliding scale hypoglycemic protocol    Venous stasis dermatitis of both lower extremities;  -- Continue local care, wound care nurse consulted    Physical deconditioning due to medical illness;  -- PT OT      Disposition; anticipate TCU versus home with home care  Barrier to discharge; clinical progress     LOS: 0 days     Subjective:  Interval History: Patient seen and examined. Notes, labs, imaging reports personally reviewed.  No acute issues reported overnight.  Patient hard of hearing but follows simple commands appropriately.  Patient frustrated being in hospital.  Patient does not seems to comprehend about his ongoing medical issues.  Had detailed conversation with patient's wife at bedside who understand that patient may need TCU if continue to be weak.  Discussed with therapy team, recommending TCU.  Discussed with nursing staffs.    Review of Systems:   As mentioned in subjective.    Patient Active Problem List   Diagnosis     Venous stasis dermatitis      Atherosclerotic heart disease of native coronary artery without angina pectoris     Benign essential HTN     Gastroduodenal ulcer     Peripheral blood vessel disorder (H)     Urinary bladder stone     Paroxysmal atrial fib -- on Warfarin      Lymphedema     Weakness     Hypotension due to drugs     Hypotension, unspecified hypotension type     Duodenitis     Anemia     Cecal cancer (H)     S/P right hemicolectomy     Gastrointestinal hemorrhage with melena     Blood in stool     Lactic acid acidosis     Venous hypertension, chronic, bilateral     Shortness of breath     Legionella infection (H)     UTI (urinary tract infection)     Ulcer of left lower extremity, limited to breakdown of skin (H)     Gastroesophageal reflux disease with  esophagitis     Hyperkalemia     Long term current use of anticoagulant therapy     Malignant tumor of ascending colon (H)     Mixed hyperlipidemia     Mitral valve disorder     Normocytic normochromic anemia     Venous hypertension     Peripheral vascular disease (H)     Hyperglycemia due to type 2 diabetes mellitus (H)     DM type 2, Hgb A1C 7.4 on 3/31/22     Venous stasis     Hearing loss     PVC's (premature ventricular contractions)     Malaise and fatigue     Abnormal EKG     Age-related cataract     Amnesia     Severe bilateral hearing loss     Chronic idiopathic constipation     Slow transit constipation     Chronic kidney disease, stage 3a (H)     Hypokalemia     Hypomagnesemia     Severe Pulm HTN -- PAP 60-65 mmHg plus RAP on Echo 3/31/22     (HFpEF) heart failure with preserved ejection fraction (H)     Orthostatic hypotension     Lactic acidosis     Severe sepsis (H)     Acute renal failure, unspecified acute renal failure type (H)       Scheduled Meds:    atorvastatin  10 mg Oral At Bedtime     carvedilol  3.125 mg Oral BID w/meals     Ferrous Gluconate  1 tablet Oral Daily     insulin aspart  1-7 Units Subcutaneous TID AC     insulin aspart  1-5 Units Subcutaneous At Bedtime     insulin glargine  15 Units Subcutaneous At Bedtime     multivitamin w/minerals  1 tablet Oral Daily     pantoprazole  40 mg Oral QAM AC     senna-docusate  1 tablet Oral BID     warfarin ANTICOAGULANT  4 mg Oral ONCE at 18:00     Continuous Infusions:    Warfarin Therapy Reminder       PRN Meds:.acetaminophen **OR** acetaminophen, glucose **OR** dextrose **OR** glucagon, glucose **OR** dextrose **OR** glucagon, magnesium hydroxide, melatonin, ondansetron **OR** ondansetron, traZODone, Warfarin Therapy Reminder    Objective:  Vital signs in last 24 hours:  Temp:  [95.5  F (35.3  C)-98.2  F (36.8  C)] 98.2  F (36.8  C)  Pulse:  [66-76] 70  Resp:  [16-20] 18  BP: ()/(59-68) 120/68  SpO2:  [97 %-100 %] 98  %      Intake/Output Summary (Last 24 hours) at 5/20/2022 1234  Last data filed at 5/20/2022 1214  Gross per 24 hour   Intake 1000 ml   Output 1800 ml   Net -800 ml       Physical Exam:  General: Not in obvious distress.  HEENT: Normocephalic, supple neck  Chest: Clear to auscultation bilateral anteriorly, no wheezing  Heart: S1S2 normal, irregular  Abdomen: Soft. NT, ND. Bowel sounds- active.  Extremities: Trace bilateral lower extremity edema, significant venous stasis dermatitis on both lower extremities  Neuro: alert and awake, hard of hearing, moves all extremities but has generalized motor weakness    Lab Results:(I have personally reviewed the results)    Recent Results (from the past 24 hour(s))   Glucose by meter    Collection Time: 05/20/22  5:11 PM   Result Value Ref Range    GLUCOSE BY METER POCT 150 (H) 70 - 99 mg/dL   Glucose by meter    Collection Time: 05/20/22  9:27 PM   Result Value Ref Range    GLUCOSE BY METER POCT 168 (H) 70 - 99 mg/dL   Glucose by meter    Collection Time: 05/21/22  2:09 AM   Result Value Ref Range    GLUCOSE BY METER POCT 165 (H) 70 - 99 mg/dL   Basic metabolic panel    Collection Time: 05/21/22  7:25 AM   Result Value Ref Range    Sodium 138 136 - 145 mmol/L    Potassium 4.2 3.5 - 5.0 mmol/L    Chloride 103 98 - 107 mmol/L    Carbon Dioxide (CO2) 27 22 - 31 mmol/L    Anion Gap 8 5 - 18 mmol/L    Urea Nitrogen 45 (H) 8 - 28 mg/dL    Creatinine 1.22 0.70 - 1.30 mg/dL    Calcium 9.0 8.5 - 10.5 mg/dL    Glucose 116 70 - 125 mg/dL    GFR Estimate 56 (L) >60 mL/min/1.73m2   INR    Collection Time: 05/21/22  7:25 AM   Result Value Ref Range    INR 2.10 (H) 0.85 - 1.15   Extra Purple Top Tube    Collection Time: 05/21/22  7:25 AM   Result Value Ref Range    Hold Specimen JIC    Glucose by meter    Collection Time: 05/21/22  7:50 AM   Result Value Ref Range    GLUCOSE BY METER POCT 113 (H) 70 - 99 mg/dL   Glucose by meter    Collection Time: 05/21/22 12:39 PM   Result Value Ref Range     GLUCOSE BY METER POCT 167 (H) 70 - 99 mg/dL         Serum Glucose range:   Recent Labs   Lab 05/21/22  1239 05/21/22  0750 05/21/22  0725 05/21/22  0209   * 113* 116 165*     ABG: No lab results found in last 7 days.  CBC:   Recent Labs   Lab 05/20/22  0838 05/19/22  1513   WBC  --  6.4   HGB 11.9* 13.3   HCT  --  38.1*   MCV  --  89   PLT  --  167   NEUTROPHIL  --  84   LYMPH  --  8   MONOCYTE  --  7   EOSINOPHIL  --  1     Chemistry:   Recent Labs   Lab 05/21/22  0725 05/20/22  0838 05/19/22  2205 05/19/22  1824 05/19/22  1513    136  --   --  133*   POTASSIUM 4.2 4.8 4.9   < > 5.6*   CHLORIDE 103 103  --   --  96*   CO2 27 25  --   --  26   BUN 45* 55*  --   --  71*   CR 1.22 1.63*  --   --  2.14*   GFRESTIMATED 56* 39*  --   --  28*   TY 9.0 9.0  --   --  10.0   MAG  --  1.8  --   --   --    PROTTOTAL  --   --   --   --  7.8   ALBUMIN  --   --   --   --  3.9   AST  --   --   --   --  25   ALT  --   --   --   --  30   ALKPHOS  --   --   --   --  88   BILITOTAL  --   --   --   --  0.8    < > = values in this interval not displayed.     Coags:  Recent Labs   Lab 05/21/22  0725 05/20/22  0838 05/19/22  1513   INR 2.10* 1.99* 2.04*     Cardiac Markers:  Recent Labs   Lab 05/19/22  1824   TROPONINI 0.16        XR Chest 1 View    Result Date: 5/19/2022  EXAM: XR CHEST 1 VIEW LOCATION: [facility] DATE/TIME: 5/19/2022 4:48 PM INDICATION: Low blood pressure. COMPARISON: 03/31/2022.     IMPRESSION: Cardiomegaly and borderline pulmonary venous congestion stable. Pleural fluid and/or thickening inferior right hemithorax with volume loss in the right middle and lower lobes unchanged. Previously seen left basilar consolidation and volume loss has cleared.    XR Chest Port 1 View    Result Date: 5/3/2022  EXAM: XR CHEST PORT 1 VIEW LOCATION: [facility] DATE/TIME: 5/3/2022 4:46 PM INDICATION: Congestive heart failure. COMPARISON: 03/31/2022     IMPRESSION: Small  right pleural effusion with adjacent compressive atelectasis. No left pleural effusion. No pneumothorax. No focal airspace disease in the left lung. Stable mild cardiomegaly.    CT Cervical Spine w/o Contrast    Result Date: 5/19/2022  EXAM: CT HEAD W/O CONTRAST, CT CERVICAL SPINE W/O CONTRAST LOCATION: [facility] DATE/TIME: 5/19/2022 4:40 PM INDICATION: Cerebral hemorrhage suspected. Fall and hit head 2 weeks ago. On Coumadin. COMPARISON: None. TECHNIQUE: 1) Routine CT Head without IV contrast. Multiplanar reformats. Dose reduction techniques were used. 2) Routine CT Cervical Spine without IV contrast. Multiplanar reformats. Dose reduction techniques were used. FINDINGS: HEAD CT: INTRACRANIAL CONTENTS: No intracranial hemorrhage, extraaxial collection, or mass effect.  No CT evidence of acute infarct. Unchanged left frontal encephalomalacia and gliosis. Mild presumed chronic small vessel ischemic changes. Mild generalized volume loss. No hydrocephalus. VISUALIZED ORBITS/SINUSES/MASTOIDS: Prior bilateral cataract surgery. Visualized portions of the orbits are otherwise unremarkable. No paranasal sinus mucosal disease. No middle ear or mastoid effusion. BONES/SOFT TISSUES: No scalp hematoma. No skull fracture. CERVICAL SPINE CT: VERTEBRA: Normal vertebral body heights and alignment. No fracture or posttraumatic subluxation. Multilevel cervical spondylosis. CANAL/FORAMINA: No high-grade spinal canal or neural foraminal stenosis. PARASPINAL: No extraspinal abnormality. Visualized lung fields are clear.     IMPRESSION: HEAD CT: 1.  Partial head trauma CERVICAL SPINE CT: 1.  No CT evidence for acute fracture or post traumatic subluxation.    CT Head w/o Contrast    Result Date: 5/19/2022  EXAM: CT HEAD W/O CONTRAST, CT CERVICAL SPINE W/O CONTRAST LOCATION: [facility] DATE/TIME: 5/19/2022 4:40 PM INDICATION: Cerebral hemorrhage suspected. Fall and hit head 2 weeks ago. On  Coumadin. COMPARISON: None. TECHNIQUE: 1) Routine CT Head without IV contrast. Multiplanar reformats. Dose reduction techniques were used. 2) Routine CT Cervical Spine without IV contrast. Multiplanar reformats. Dose reduction techniques were used. FINDINGS: HEAD CT: INTRACRANIAL CONTENTS: No intracranial hemorrhage, extraaxial collection, or mass effect.  No CT evidence of acute infarct. Unchanged left frontal encephalomalacia and gliosis. Mild presumed chronic small vessel ischemic changes. Mild generalized volume loss. No hydrocephalus. VISUALIZED ORBITS/SINUSES/MASTOIDS: Prior bilateral cataract surgery. Visualized portions of the orbits are otherwise unremarkable. No paranasal sinus mucosal disease. No middle ear or mastoid effusion. BONES/SOFT TISSUES: No scalp hematoma. No skull fracture. CERVICAL SPINE CT: VERTEBRA: Normal vertebral body heights and alignment. No fracture or posttraumatic subluxation. Multilevel cervical spondylosis. CANAL/FORAMINA: No high-grade spinal canal or neural foraminal stenosis. PARASPINAL: No extraspinal abnormality. Visualized lung fields are clear.     IMPRESSION: HEAD CT: 1.  Partial head trauma CERVICAL SPINE CT: 1.  No CT evidence for acute fracture or post traumatic subluxation.          Latest radiology report personally reviewed.    Note created using dragon voice recognition software so sounds alike errors may have escaped editing.    05/21/2022   SHRUTHI JANE MD  HOSPITALIST, [facility]  PAGER NO. [phone]

## 2022-05-21 NOTE — PLAN OF CARE
Goal Outcome Evaluation:      Pt. Is alert and talkative with staff this morning. Washing up with assist of two nursing assistants. Awaiting physical therapy and occupational therapy.  Has been continent of urine and bowels. Uses the urinal. Blood pressures 103//50's coreg held this am as well.

## 2022-05-22 NOTE — PLAN OF CARE
"PRIMARY DIAGNOSIS: \"GENERIC\" NURSING  OUTPATIENT/OBSERVATION GOALS TO BE MET BEFORE DISCHARGE:  ADLs back to baseline: Yes    Activity and level of assistance: Up with standby assistance.    Pain status: Pain free.    Return to near baseline physical activity: Yes     Discharge Planner Nurse   Safe discharge environment identified: Yes  Barriers to discharge: Yes       Entered by: Rosa Morin RN 05/22/2022 2:34 AM   Finding a TCU  Please review provider order for any additional goals.   Nurse to notify provider when observation goals have been met and patient is ready for discharge.Goal Outcome Evaluation:                      "

## 2022-05-22 NOTE — CONSULTS
Care Management Follow Up    Length of Stay (days): 0    Expected Discharge Date: 5/23/2022     Concerns to be Addressed: denies needs/concerns at this time     Patient plan of care discussed at interdisciplinary rounds: Yes    Anticipated Discharge Disposition:  Transitional care- [facility]     Anticipated Discharge Services:  PT and OT  Anticipated Discharge DME:  renu Mai     Patient/family educated on Medicare website which has current facility and service quality ratings:    Education Provided on the Discharge Plan:  yes  Patient/Family in Agreement with the Plan:  Yes in agreement    Referrals Placed by CM/SW:  Post acute facilities  Private pay costs discussed: Not applicable    Additional Information:  Chart reviewed. SW met with patient and his wife in patient's room.   PT is currently recommending TCU. Wife in agreement, she reports first choice [facility], and would like to have pt in a nearby facility to their home in [place]. Additional referral sent to [facility]. CM following for placement.  3:03 PM SW received phone call from [facility] admissions ([facility]). [facility] states facility can accept for 5/23/2022. Need updated COVID test, JONATHAN alonzo MD, he will place COVID test order.   SW updated pt's wife [place] by phone. She states agreement to accept bed at [facility], can tentatively plan to pick pt up with her daughter at 12:00pm. Cm to verify in AM with [facility]. CM can call wife if time needs to change.       Violeta Bass, ROSYSW

## 2022-05-22 NOTE — PLAN OF CARE
Goal Outcome Evaluation:      Pt. Ambulated to the restroom with stand by assist. Denies pain. Sat up and stood independantly with stand by assist. With the use of a cane.

## 2022-05-22 NOTE — UTILIZATION REVIEW
Concurrent stay review; Secondary Review Determination     Under the authority of the Utilization Management Committee, the utilization review process indicated a secondary review on Robinson Medel.  The review outcome is based on review of the medical records, discussions with staff, and applying clinical experience noted on the date of the review.        (x) Observation Status Appropriate - Concurrent stay review    RATIONALE FOR DETERMINATION   89 yr old male with HTN, HLD, IDDM2, PAF on warfarin, HFpEF presented with hypotension.  Has been in hospital recently with diuretics adjusted.  Then had presented with some dehydration and meds adjusted again during ED visit.  Presented back about 2 weeks later with again return of HTN.  Appeared dehydrated thus meds held and small amount IVF given overnight and he medically improved but continues to be weak and thus placement is being sought. Continues as Observation as medically stable at this time awaiting safe disposition.    Patient is clinically improving and there is no clear indication to change patient's status to inpatient. The severity of illness, intensity of service provided, expected LOS and risk for adverse outcome make the care appropriate for observation.    The information on this document is developed by the utilization review team in order for the business office to ensure compliance.  This only denotes the appropriateness of proper admission status and does not reflect the quality of care rendered.         The definitions of Inpatient Status and Observation Status used in making the determination above are those provided in the CMS Coverage Manual, Chapter 1 and Chapter 6, section 70.4.      Sincerely,   Alexa Adams MD  Utilization Review  Physician Advisor  [facility]

## 2022-05-22 NOTE — PLAN OF CARE
Problem: Plan of Care - These are the overarching goals to be used throughout the patient stay.    Goal: Readiness for Transition of Care  Outcome: Ongoing, Progressing   Goal Outcome Evaluation:      Robinson's BP is stable: 96/57 & 104/60.  He is able to ambulate to the bathroom with assist of 1 & his cane.

## 2022-05-23 NOTE — PROGRESS NOTES
"PRIMARY DIAGNOSIS: \"GENERIC\" NURSING  OUTPATIENT/OBSERVATION GOALS TO BE MET BEFORE DISCHARGE:  1. ADLs back to baseline: Yes    2. Activity and level of assistance: Up with standby assistance.    3. Pain status: Pain free.    4. Return to near baseline physical activity: Yes     Discharge Planner Nurse   Safe discharge environment identified: No  Barriers to discharge: Yes       Entered by: Yasmany Mcconnell RN 05/22/2022 10:49 PM      Pt has no complaints of pain. Sacral dressing changed. No complaints of dizziness, blood pressure stable.   /57 (BP Location: Right arm)   Pulse 69   Temp 98.1  F (36.7  C) (Oral)   Resp 16   Ht 1.854 m (6' 0.99\")   Wt 83.1 kg (183 lb 4.8 oz)   SpO2 95%   BMI 24.19 kg/m      Please review provider order for any additional goals.   Nurse to notify provider when observation goals have been met and patient is ready for discharge.  "

## 2022-05-23 NOTE — PLAN OF CARE
"PRIMARY DIAGNOSIS: \"GENERIC\" NURSING  OUTPATIENT/OBSERVATION GOALS TO BE MET BEFORE DISCHARGE:  ADLs back to baseline: Yes    Activity and level of assistance: Up with standby assistance.    Pain status: Improved-controlled with oral pain medications.    Return to near baseline physical activity: Yes     Discharge Planner Nurse   Safe discharge environment identified: Yes  Barriers to discharge: No       Entered by: Saturnino Corona RN 05/23/2022 10:46 AM   Patient is waiting for transport to TCU.  Please review provider order for any additional goals.   Nurse to notify provider when observation goals have been met and patient is ready for discharge.Goal Outcome Evaluation:                      "

## 2022-05-23 NOTE — PLAN OF CARE
ONCOLOGY HEMATOLOGY CARE PROGRESS NOTE      SUBJECTIVE:     The patient looks a little better today. She is sitting on the side of the bed and talkative. ROS:     Constitutional:  No weight loss, No fever, No chills, No night sweats. Energy level good. Eyes:  No impairment or change in vision  ENT / Mouth:  No pain, abnormal ulceration, bleeding, nasal drip or change in voice or hearing  Cardiovascular:  No chest pain, palpitations, new edema, or calf discomfort  Respiratory:  No pain, hemoptysis, change to breathing  Breast:  No pain, discharge, change in appearance or texture  Gastrointestinal:  No pain, cramping, jaundice, change to eating and bowel habits  Urinary:  No pain, bleeding or change in continence  Genitalia: No pain, bleeding or discharge  Musculoskeletal:  No redness, pain, edema or weakness  Skin:  itchy , dry skin   Neurologic:  No discomfort, change in mental status, speech, sensory or motor activity  Psychiatric:  No change in concentration or change to affect or mood  Endocrine:  No hot flashes, increased thirst, or change to urine production  Hematologic: No petechiae, ecchymosis or bleeding  Lymphatic:  No lymphadenopathy or lymphedema  Allergy / Immunologic:  No eczema, hives, frequent or recurrent infections    OBJECTIVE        Physical    VITALS:  BP (!) 154/79   Pulse 100   Temp 98.6 °F (37 °C) (Oral)   Resp 15   Ht 5' 6.5\" (1.689 m)   Wt 191 lb (86.6 kg)   LMP  (LMP Unknown)   SpO2 95%   BMI 30.37 kg/m²   TEMPERATURE:  Current - Temp: 98.6 °F (37 °C);  Max - Temp  Av.6 °F (36.4 °C)  Min: 96.8 °F (36 °C)  Max: 98.6 °F (37 °C)  PULSE OXIMETRY RANGE: SpO2  Av.8 %  Min: 89 %  Max: 100 %  24HR INTAKE/OUTPUT:      Intake/Output Summary (Last 24 hours) at 2021 08  Last data filed at 2021 0112  Gross per 24 hour   Intake 1800 ml   Output 2675 ml   Net -875 ml       CONSTITUTIONAL: awake, alert, cooperative, no apparent Physical Therapy Discharge Summary    Reason for therapy discharge:    Discharged to TCU.    Progress towards therapy goal(s). See goals on Care Plan in [facility] electronic health record for goal details.  Goals partially met.  Barriers to achieving goals:   discharge from facility.    Therapy recommendation(s):    Further recommended therapy is related to documented deficits, and is necessary to maximize functional independence in order for patient to return to prior level of function.      Mei Billingsley, STEPHANIE 5/23/2022       distress   EYES: pupils equal, round and reactive to light, sclera icteric and conjunctiva normal  ENT: Normocephalic, without obvious abnormality, atraumatic  NECK: supple, symmetrical, no jugular venous distension and no carotid bruits   HEMATOLOGIC/LYMPHATIC: no cervical, supraclavicular or axillary lymphadenopathy   LUNGS: no increased work of breathing and clear to auscultation   CARDIOVASCULAR: regular rate and rhythm, normal S1 and S2, no murmur noted  ABDOMEN: normal bowel sounds x 4, soft, non-distended, non-tender, no masses palpated, no hepatosplenomgaly   MUSCULOSKELETAL: full range of motion noted, tone is normal  NEUROLOGIC: awake, alert, oriented to name, place and time. Motor skills grossly intact. SKIN: Normal skin color, texture, turgor and + jaundice. appears intact. 2 cm minimally erythematous raised mass on the right upper back. EXTREMITIES: no LE edema       Data      Recent Labs     06/30/21 0610 07/01/21 0625 07/02/21  0557   WBC 9.2 7.6 7.9   HGB 11.0* 10.9* 10.3*   HCT 33.0* 32.5* 31.5*    285 305   MCV 79.3* 79.4* 80.1        Recent Labs     06/30/21 0610 06/30/21 0610 06/30/21  0827 07/01/21 0625 07/02/21  0557     --   --  139 138   K 6.0*   < > 5.4* 4.9 4.0   *  --   --  106 103   CO2 22  --   --  24 26   BUN 17  --   --  12 12   CREATININE 1.2  --   --  1.1 1.1    < > = values in this interval not displayed.      Recent Labs     06/30/21 0610 07/01/21 0625 07/02/21  0557   * 113* 87*   * 194* 146*   BILITOT 4.6* 4.9* 2.9*   ALKPHOS 667* 679* 539*       Magnesium:    Lab Results   Component Value Date    MG 1.90 07/02/2021    MG 1.50 07/01/2021    MG 1.80 06/30/2021         Problem List  Patient Active Problem List   Diagnosis    CAD (coronary artery disease)    Sleep apnea    Generalized weakness    Nonalcoholic steatohepatitis    Rectocele    DM2 (diabetes mellitus, type 2) ([facility]    HTN (hypertension), benign    Cerebral infarction ([address]    Right shoulder pain    Insomnia    SUNIL (obstructive sleep apnea)    Coarse tremor    Dizziness    Palpitations    Essential tremor    Dyslipidemia    PNA (pneumonia)    Obesity    Bilateral primary osteoarthritis of knee    Chronic bilateral low back pain without sciatica    DDD (degenerative disc disease), lumbar    Facet arthritis of lumbar region    Primary osteoarthritis of right knee    Abnormal finding on thyroid function test    LANA (acute kidney injury) ([address]    Obstructive nephropathy    Metastatic malignant neoplasm (HCC)    Abdominal lymphadenopathy    Transaminitis    Elevated bilirubin    Nausea and vomiting    Bile duct obstruction    Moderate malnutrition (HCC)       ASSESSMENT AND PLAN:    Metastatic carcinoma (Possible pancreatic carcinoma):  -She appears to have choroidal mets. -She has bone lesions, possible intrahepatic lesion, and omental metastasis  -CA-125 is over thousand  -CA 19-9 is Over 54,000 on 6/28/2021 and then a repeat was 510,000.  -Initial myeloma studies are negative  -The differential is quite broad. -She is going to have an ERCP to look for pancreatic carcinoma. -If this is negative, I would consider a bone biopsy to help with the differential.  -She has a very poor prognosis. - Very widespread bone mets   -We are still waiting for the ERCP    Elevated liver functions:  -We are waiting for the ERCP  - check ammonia levels     Hydronephrosis   - Bilateral stents in place   - to be exchanged in 4 months     Back lesions:  -She has a 2 cm area in the upper back  -Is very difficult to tell whether this is a skin metastasis. It looks more like a mild subcutaneous abscess.     Left proximal femur:  -Possible pinning due to him pending a fracture        ONCOLOGIC DISPOSITION:    -Post diagnosis  - We are still waiting on the ERCP      Yenny Drew MD  May be reached through [address] [place] [address]

## 2022-05-23 NOTE — PROGRESS NOTES
"PRIMARY DIAGNOSIS: \"GENERIC\" NURSING  OUTPATIENT/OBSERVATION GOALS TO BE MET BEFORE DISCHARGE:  1. ADLs back to baseline: Yes    2. Activity and level of assistance: Up with standby assistance.    3. Pain status: Pain free.    4. Return to near baseline physical activity: Yes     Discharge Planner Nurse   Safe discharge environment identified: No  Barriers to discharge: Yes       Entered by: Yasmany Mcconnell RN 05/23/2022 7:05 AM      Pts last BP check was 115/67. No reports of pain, no reports of syncope.  Please review provider order for any additional goals.   Nurse to notify provider when observation goals have been met and patient is ready for discharge.  "

## 2022-05-23 NOTE — PLAN OF CARE
Occupational Therapy Discharge Summary    Reason for therapy discharge:    Discharged to transitional care facility.    Progress towards therapy goal(s). See goals on Care Plan in [facility] electronic health record for goal details.  Goals partially met.  Barriers to achieving goals:   discharge from facility.    Therapy recommendation(s):    Continued therapy is recommended.  Rationale/Recommendations:  recommend pt continue OT in TCU.

## 2022-05-23 NOTE — PROGRESS NOTES
"PRIMARY DIAGNOSIS: \"GENERIC\" NURSING  OUTPATIENT/OBSERVATION GOALS TO BE MET BEFORE DISCHARGE:  1. ADLs back to baseline: Yes    2. Activity and level of assistance: Up with standby assistance.    3. Pain status: Pain free.    4. Return to near baseline physical activity: Yes     Discharge Planner Nurse   Safe discharge environment identified: No  Barriers to discharge: Yes       Entered by: Yasmany Mcconnell RN 05/23/2022 5:02 AM      Vitals continue to be stable Bps of 101/57 and 107/57. No reports of syncope.  Please review provider order for any additional goals.   Nurse to notify provider when observation goals have been met and patient is ready for discharge.  "

## 2022-05-23 NOTE — PLAN OF CARE
Patient is discharge to TCU. Patient has all his belongings. Patient received discharge package. Patient is leaving via wheel chair to the front where he will be received by his family. Family will transport patient to TCU.

## 2022-05-23 NOTE — PROGRESS NOTES
Care Management Discharge Note    Discharge Date: 05/23/2022       Discharge Disposition:  [facility]    Discharge Transportation: family or friend will provide at 12:00 PM    Private pay costs discussed: Not applicable      Education Provided on the Discharge Plan:  yes  Persons Notified of Discharge Plans: facility, pts wife informed by JONATHAN on 5/22  Patient/Family in Agreement with the Plan:  yes    Handoff Referral Completed: Yes    Additional Information:  JONATHAN confirmed plan for pt to discharge [facility] with admissions [facility]. They are agreeable to plan for 12:00 discharge time. JONATHAN updated [facility] who will update KENDAL Gonzalez

## 2022-05-23 NOTE — DISCHARGE SUMMARY
[facility]  Hospitalist Discharge Summary      Date of Admission:  5/19/2022  Date of Discharge:  5/23/2022 12:23 PM  Discharging Provider: Megha Jade MD  Discharge Service: Hospitalist Service    Discharge Diagnoses       Hypotension; resolved.  Suspect related to hypovolemia/ recent increase in diuretics with poor oral intake.  Medications adjusted.    LUCY on CKD, suspect secondary to hypovolemia    Hyperkalemia, resolved.  Lisinopril and spironolactone stop    Paroxysmal atrial fibrillation    HFpEF; no exacerbation.  Discharged on decreased diuretics    Diabetes mellitus, type II    Chronic venous stasis    Follow-ups Needed After Discharge   Follow-up Appointments     Follow Up and recommended labs and tests      Follow up with group home physician.  The following labs/tests are   recommended: Cr or BMP in 4 days to assess renal function.  INR in 1-2   days to adjust warfarin  May need increase in diuretics pending weight/volume status as dose   decreased this hospital stay             Unresulted Labs Ordered in the Past 30 Days of this Admission     Date and Time Order Name Status Description    5/19/2022  2:45 PM Blood Culture Line, arterial Preliminary     5/19/2022  2:45 PM Blood Culture Peripheral Blood Preliminary           Discharge Disposition   Discharged to short-term care facility  Condition at discharge: Stable      Hospital Course      89-year-old gentleman with recent hospitalization last month for worsening CHF came into the emergency room department due to symptomatic hypotension.  They had admitted to taking diuretics as prescribed but patient with poor oral intake.  Was found to have LUCY and hypotension and was treated with holding of diuretic medications and IV fluids and resolved.  He was resumed on home Coreg and small amount of Bumex restarted.  Continue to hold lisinopril and Aldactone.  A. fib and other cardiac issues remained stable with patient appearing to  be in euvolemia while here.  PT OT recommended TCU and patient was discharged to transitional care unit in good condition.    Mild elevated lactic acid level noted on admission.  Unclear if this was secondary to dehydration versus metformin.      Med changes/reasoning  ---Metformin dose decreased due t elevated lactic acid  ---glipizide decreased due to a lot of normal glycemia observed while here and off medication  --- Diuretics decreased due to hypotension and LUCY  --- Lisinopril and spironolactone held at this time due to hyperkalemia    Consultations This Hospital Stay   PHARMACY TO DOSE WARFARIN  OCCUPATIONAL THERAPY ADULT IP CONSULT  PHYSICAL THERAPY ADULT IP CONSULT  CARE MANAGEMENT / SOCIAL WORK IP CONSULT  WOUND OSTOMY CONTINENCE NURSE  IP CONSULT  CARE MANAGEMENT / SOCIAL WORK IP CONSULT  PHYSICAL THERAPY ADULT IP CONSULT  OCCUPATIONAL THERAPY ADULT IP CONSULT  PHYSICAL THERAPY ADULT IP CONSULT  OCCUPATIONAL THERAPY ADULT IP CONSULT    Code Status   Prior    Time Spent on this Encounter   IMegha MD, personally saw the patient today and spent greater than 30 minutes discharging this patient.       Megha Jade MD  [address] [address]  Phone: [phone]  Fax: [phone]  ______________________________________________________________________    Physical Exam   Vital Signs: Temp: 98.1  F (36.7  C) Temp src: Oral BP: 103/61 Pulse: 70   Resp: 16 SpO2: 95 % O2 Device: None (Room air)    Weight: 183 lbs 4.8 oz  General Appearance: Pleasant, no apparent distress  Respiratory: Clear to auscultation bilaterally  Cardiovascular: Irregularly irregular  GI: Soft and nontender without hepatosplenomegaly  Skin: Marked venous stasis dermatitis changes bilaterally with trace to 1+ bilateral edema  Other: Neurologically grossly intact he is very hard of hearing but answers questions appropriately no focal deficits appreciated.       Primary Care  Physician   Elizabeth Alexandre    Discharge Orders      Primary Care - Care Coordination Referral      General info for SNF    Length of Stay Estimate: Short Term Care: Estimated # of Days <30  Condition at Discharge: Improving  Level of care:skilled   Rehabilitation Potential: Excellent  Admission H&P remains valid and up-to-date: Yes  Recent Chemotherapy: N/A  Use Nursing Home Standing Orders: Yes     Mantoux instructions    Give two-step Mantoux (PPD) Per Facility Policy Yes     Reason for your hospital stay    Dehydration, weakness, Acute kidney injury     Glucose monitor nursing POCT    Before meals and at bedtime     Daily weights    Call Provider for weight gain of more than 2 pounds per day or 5 pounds per week.     Activity - Up ad derek     Follow Up and recommended labs and tests    Follow up with halfway physician.  The following labs/tests are recommended: Cr or BMP in 4 days to assess renal function.  INR in 1-2 days to adjust warfarin  May need increase in diuretics pending weight/volume status as dose decreased this hospital stay     Physical Therapy Adult Consult    Evaluate and treat as clinically indicated.    Reason:  weakness     Occupational Therapy Adult Consult    Evaluate and treat as clinically indicated.    Reason:  weakness     Fall precautions     Diet    Follow this diet upon discharge: Orders Placed This Encounter      Combination Diet Moderate Consistent Carb (60 g CHO per Meal) Diet       Significant Results and Procedures   Most Recent 3 CBC's:Recent Labs   Lab Test 05/20/22  0838 05/19/22  1513 05/03/22  1531 03/31/22  1200   WBC  --  6.4 6.9 5.6   HGB 11.9* 13.3 13.8 13.0*   MCV  --  89 88 94   PLT  --  167 189 204     Most Recent 3 BMP's:Recent Labs   Lab Test 05/23/22  1206 05/23/22  0757 05/23/22  0658 05/22/22  2119 05/21/22  0750 05/21/22  0725 05/20/22  1205 05/20/22  0838 05/19/22  1656 05/19/22  1513   NA  --   --   --   --   --  138  --  136  --  133*   POTASSIUM  --   --   4.1  --   --  4.2  --  4.8   < > 5.6*   CHLORIDE  --   --   --   --   --  103  --  103  --  96*   CO2  --   --   --   --   --  27  --  25  --  26   BUN  --   --   --   --   --  45*  --  55*  --  71*   CR  --   --  1.14  --   --  1.22  --  1.63*  --  2.14*   ANIONGAP  --   --   --   --   --  8  --  8  --  11   TY  --   --   --   --   --  9.0  --  9.0  --  10.0   * 146*  --  171*   < > 116   < > 151*   < > 142*    < > = values in this interval not displayed.   ,   Results for orders placed or performed during the hospital encounter of 05/19/22   CT Head w/o Contrast    Narrative    EXAM: CT HEAD W/O CONTRAST, CT CERVICAL SPINE W/O CONTRAST  LOCATION: [facility]  DATE/TIME: 5/19/2022 4:40 PM    INDICATION: Cerebral hemorrhage suspected. Fall and hit head 2 weeks ago. On Coumadin.  COMPARISON: None.  TECHNIQUE:   1) Routine CT Head without IV contrast. Multiplanar reformats. Dose reduction techniques were used.  2) Routine CT Cervical Spine without IV contrast. Multiplanar reformats. Dose reduction techniques were used.    FINDINGS:   HEAD CT:   INTRACRANIAL CONTENTS: No intracranial hemorrhage, extraaxial collection, or mass effect.  No CT evidence of acute infarct. Unchanged left frontal encephalomalacia and gliosis. Mild presumed chronic small vessel ischemic changes. Mild generalized volume   loss. No hydrocephalus.     VISUALIZED ORBITS/SINUSES/MASTOIDS: Prior bilateral cataract surgery. Visualized portions of the orbits are otherwise unremarkable. No paranasal sinus mucosal disease. No middle ear or mastoid effusion.    BONES/SOFT TISSUES: No scalp hematoma. No skull fracture.    CERVICAL SPINE CT:   VERTEBRA: Normal vertebral body heights and alignment. No fracture or posttraumatic subluxation. Multilevel cervical spondylosis.    CANAL/FORAMINA: No high-grade spinal canal or neural foraminal stenosis.    PARASPINAL: No extraspinal abnormality. Visualized lung fields are clear.       Impression    IMPRESSION:  HEAD CT:  1.  Partial head trauma    CERVICAL SPINE CT:  1.  No CT evidence for acute fracture or post traumatic subluxation.   CT Cervical Spine w/o Contrast    Narrative    EXAM: CT HEAD W/O CONTRAST, CT CERVICAL SPINE W/O CONTRAST  LOCATION: [facility]  DATE/TIME: 5/19/2022 4:40 PM    INDICATION: Cerebral hemorrhage suspected. Fall and hit head 2 weeks ago. On Coumadin.  COMPARISON: None.  TECHNIQUE:   1) Routine CT Head without IV contrast. Multiplanar reformats. Dose reduction techniques were used.  2) Routine CT Cervical Spine without IV contrast. Multiplanar reformats. Dose reduction techniques were used.    FINDINGS:   HEAD CT:   INTRACRANIAL CONTENTS: No intracranial hemorrhage, extraaxial collection, or mass effect.  No CT evidence of acute infarct. Unchanged left frontal encephalomalacia and gliosis. Mild presumed chronic small vessel ischemic changes. Mild generalized volume   loss. No hydrocephalus.     VISUALIZED ORBITS/SINUSES/MASTOIDS: Prior bilateral cataract surgery. Visualized portions of the orbits are otherwise unremarkable. No paranasal sinus mucosal disease. No middle ear or mastoid effusion.    BONES/SOFT TISSUES: No scalp hematoma. No skull fracture.    CERVICAL SPINE CT:   VERTEBRA: Normal vertebral body heights and alignment. No fracture or posttraumatic subluxation. Multilevel cervical spondylosis.    CANAL/FORAMINA: No high-grade spinal canal or neural foraminal stenosis.    PARASPINAL: No extraspinal abnormality. Visualized lung fields are clear.      Impression    IMPRESSION:  HEAD CT:  1.  Partial head trauma    CERVICAL SPINE CT:  1.  No CT evidence for acute fracture or post traumatic subluxation.   XR Chest 1 View    Narrative    EXAM: XR CHEST 1 VIEW  LOCATION: [facility]  DATE/TIME: 5/19/2022 4:48 PM    INDICATION: Low blood pressure.  COMPARISON: 03/31/2022.      Impression    IMPRESSION:  Cardiomegaly and borderline pulmonary venous congestion stable. Pleural fluid and/or thickening inferior right hemithorax with volume loss in the right middle and lower lobes unchanged. Previously seen left basilar consolidation and volume   loss has cleared.       Discharge Medications   Discharge Medication List as of 5/23/2022  8:45 AM      CONTINUE these medications which have CHANGED    Details   bumetanide (BUMEX) 0.5 MG tablet Take 2 tablets (1 mg) by mouth daily, No Print Out      glipiZIDE (GLUCOTROL XL) 2.5 MG 24 hr tablet Take 2 tablets (5 mg) by mouth daily, No Print Out      metFORMIN (GLUCOPHAGE) 500 MG tablet Take 1 tablet (500 mg) by mouth daily (with breakfast), R-0, No Print Out         CONTINUE these medications which have NOT CHANGED    Details   atorvastatin (LIPITOR) 10 MG tablet Take 10 mg by mouth At Bedtime, Historical      carvedilol (COREG) 3.125 MG tablet Take 3.125 mg by mouth 2 times daily (with meals), Historical      [employer] IRON 240 (27 Fe) MG TABS Take 1 tablet by mouth daily, TANMAY, Historical      insulin aspart (NOVOLOG FLEXPEN) 100 UNIT/ML pen 3 times a day with meals, for glucometer 150-200 give 2 units SQ, 201-250 give 4 units, >250 give 6 units, Disp-15 mL, R-0, E-Prescribe      insulin glargine (LANTUS PEN) 100 UNIT/ML pen Inject 15 Units Subcutaneous At Bedtime, Disp-15 mL, R-0, No Print OutIf Lantus is not covered by insurance, may substitute Basaglar or Semglee or other insulin glargine product per insurance preference at same dose and frequency.        magnesium hydroxide (MILK OF MAGNESIA) 400 MG/5ML suspension Take 30 mLs by mouth daily as needed for constipation, R-0, No Print Out      multivitamin with minerals tablet Take 1 tablet by mouth daily, Historical      omeprazole (PRILOSEC) 20 MG capsule Take 20 mg by mouth daily, Historical      potassium chloride ER (KLOR-CON M) 20 MEQ CR tablet Take 20 mEq by mouth daily, Historical      senna-docusate (SENOKOT-S/PERICOLACE)  8.6-50 MG tablet Take 1 tablet by mouth 2 times daily, R-0, No Print Out      traZODone (DESYREL) 50 MG tablet Take 0.5 tablets (25 mg) by mouth nightly as needed for sleep, R-0, No Print Out      warfarin ANTICOAGULANT (COUMADIN) 5 MG tablet 5 mg daily except 7.5 mg on Sundays, check INR on 4/11 and adjust dose for desired INR of 2.0 to 3.0., R-0, No Print Out         STOP taking these medications       lisinopril (ZESTRIL) 2.5 MG tablet Comments:   Reason for Stopping:         spironolactone (ALDACTONE) 25 MG tablet Comments:   Reason for Stopping:             Allergies   Allergies   Allergen Reactions     Aspirin Nausea and Vomiting     GI upset

## 2022-05-23 NOTE — PROGRESS NOTES
[facility] GERIATRICS    [facility] Medical Record Number:  [id number]  Place of Service where encounter took place: [facility] ([facility]) [[id number]]   CODE STATUS:   Unknown    Chief Complaint/Reason for Visit:  Chief Complaint   Patient presents with     Hospital F/U     Admit note to TCU-CHF exacerbation. HTN, DM, Afib on warfarin.        HPI:    Robinson Medel is a 89 year old male with hx of CHF, DM, PVD with leg ulcers, afib on warfarin, admitted to the hospital on 3/31/2022 due to dyspnea and increased LE swelling. He was admitted, diuresed as noted below for CHF exacerbation.     [facility]  Discharge Summary  Date of Admission:  3/31/2022  Date of Discharge:  4/7/2022    History of Present Illness  89 year old male with history of CHF, diabetes mellitus type II, PVD, hyperlipidemia, paroxysmal Afib on Warfarin, chronic anemia, HTN, and cecal cancer s/p right hemicolectomy -- who presents to the ED by wheelchair with his wife for evaluation of leg swelling and shortness of breath, with weight increased 20 lbs, wheezing -- and on Lasix 80 mg daily and Metolazone 2.5 mg 3 times a week.      In ER, temp 98.7, RR 24, WBC 5.6, Hgb 13.0, potassium 3.4, Creat 1.26, BNP elevated at 712, magnesium low at 1.7, INR 2.87, UA with 6 WBC's and >182 RBC's, and CXR with cardiomegaly and small bilateral effusions consistent with heart failure.      Hospital Course  Admitted to cardiac telemetry, seen by Cardiology, treated with diuretics with wt going from 216 to 205 lbs, and last on Bumex 4 mg daily.  Potassium and magnesium replaced.  Seen by wound care and had leg wraps and wound care.  Seen by PT and OT, and given his weakness advised TCU, and will check BMP and INR on 4/11/22    While diuresed, creatinine did increase to 1.51, but was at 1.38 at time of discharge.  Lisinopril 2.5 mg daily added, and lasix switched to Bumex and Spironolactone, and Metolazone was  discontinued. Will continue wound care to help with stasis dermatitis.      Also, intermittent problems voiding, straight cathed several times, the last time only able to void small amounts and PVR > 800 ml, so solitario placed and will try future voiding trial, and follow-up with [facility] if failed voiding trial.      Overall stabilized and discharged to TCU on 4/7/2022 for PT, OT, nursing cares, medical management and monitoring.       Today:  He is requesting to have solitario catheter out, has been irritable and thinks he has not had any difficulty voiding. No hematuria. No abdominal pain. He is noted to be tired and weak yet he states he just wants to go home and he will be fine there. Will do trial of void, if fails he will need to see urology. Therapy is still in progress. He denies dizziness, shortness of breath or chest pain. He has not had a fever, no hypoxia. Appetite is pretty good. No diarrhea or constipation. No new vision or hearing concerns per his report. He has LE ulcers, follows with vascular clinic, seen yesterday, using compression and recommended treatments. On diuretics for edema, takes meds for HTN, is a diabetic on metformin, glipizide and novolog at meals. .       PAST MEDICAL HISTORY:  Past Medical History:   Diagnosis Date     Anemia      Coronary artery disease 9/25/2015    stents placed      Diabetes mellitus (H)      Duodenitis 5/11/2016     Dyslipidemia      Gastroduodenal ulcer 9/24/2015     GERD (gastroesophageal reflux disease)      GI bleed 07/08/2016     Gout      Hematuria      Hyperlipemia      Hypertension      Kidney stone 10/6/2015       PAST SURGICAL HISTORY:  Past Surgical History:   Procedure Laterality Date     ANGIOPLASTY  09/2015     COLONOSCOPY N/A 6/17/2016    Procedure: COLONOSCOPY with biopsies in cecum using biopsy forcep and a transverse colon polypectomy using a hot snare;  Surgeon: Steve Angel MD;  Location: [facility];  Service:       CYSTOSCOPY PROSTATE W/ LASER N/A 2/9/2016    Procedure: CYSTOSCOPY TRANSURETHRAL RESECTION PROSTATE;  Surgeon: Chano Smith MD;  Location: [facility] OR;  Service:      CYSTOSCOPY W/ LITHOLAPAXY / EHL N/A 2/9/2016    Procedure: CYSTOLITHOLAPAXY ;  Surgeon: Chano Smith MD;  Location: [facility] OR;  Service:      ESOPHAGOSCOPY, GASTROSCOPY, DUODENOSCOPY (EGD), COMBINED N/A 5/8/2016    Procedure: ESOPHAGOGASTRODUODENOSCOPY;  Surgeon: Kieran Beltran MD;  Location: [facility] GI;  Service:      ESOPHAGOSCOPY, GASTROSCOPY, DUODENOSCOPY (EGD), COMBINED N/A 7/8/2016    Procedure: ESOPHAGOGASTRODUODENOSCOPY;  Surgeon: Chano Wynn MD;  Location: [facility];  Service:      KIDNEY STONE SURGERY       OTHER SURGICAL HISTORY      nose polyps     [place] LAP,SURG,COLECTOMY, PARTIAL, W/ANAST N/A 6/18/2016    Procedure: LAPAROSCOPIC ASSISTED RIGHT COLECTOMY ROOM 310;  Surgeon: Radha Bueno MD;  Location: [facility];  Service: General       FAMILY HISTORY:  Family History   Problem Relation Age of Onset     Diabetes Mother      Heart Disease Father      Mental Illness Daughter         Depression       SOCIAL HISTORY:  Social History     Socioeconomic History     Marital status:      Spouse name: Not on file     Number of children: Not on file     Years of education: Not on file     Highest education level: Not on file   Occupational History     Not on file   Tobacco Use     Smoking status: Never Smoker     Smokeless tobacco: Never Used     Tobacco comment: only smoked a little bit during high school   Substance and Sexual Activity     Alcohol use: No     Drug use: No     Sexual activity: Not on file   Other Topics Concern     Not on file   Social History Narrative    12/10/2015: .  Lives with his wife in a town home     Social Determinants of Health     Financial Resource Strain: Not on file   Food Insecurity: Not on file   Transportation Needs: Not on file   Physical Activity: Not  "on file   Stress: Not on file   Social Connections: Not on file   Intimate Partner Violence: Not on file   Housing Stability: Not on file       MEDICATIONS:  Current Outpatient Medications   Medication Instructions     atorvastatin (LIPITOR) 10 mg, Oral, AT BEDTIME     bumetanide (BUMEX) 4 mg, Oral, DAILY     carvedilol (COREG) 3.125 mg, Oral, 2 TIMES DAILY WITH MEALS     [employer] IRON 240 (27 Fe) MG TABS 1 tablet, Oral, DAILY     glipiZIDE (GLUCOTROL XL) 5 mg, Oral, DAILY     insulin aspart (NOVOLOG FLEXPEN) 100 UNIT/ML pen 3 times a day with meals, for glucometer 150-200 give 2 units SQ, 201-250 give 4 units, >250 give 6 units     insulin glargine (LANTUS PEN) 15 Units, Subcutaneous, AT BEDTIME     lisinopril (ZESTRIL) 2.5 mg, Oral, DAILY     magnesium hydroxide (MILK OF MAGNESIA) 400 MG/5ML suspension 30 mLs, Oral, DAILY PRN     metFORMIN (GLUCOPHAGE) 500 mg, Oral, 2 TIMES DAILY WITH MEALS     multivitamin with minerals tablet 1 tablet, Oral, DAILY     omeprazole (PRILOSEC) 20 mg, DAILY     potassium chloride ER (KLOR-CON M) 20 MEQ CR tablet 20 mEq, Oral, DAILY     senna-docusate (SENOKOT-S/PERICOLACE) 8.6-50 MG tablet 1 tablet, Oral, 2 TIMES DAILY     spironolactone (ALDACTONE) 25 mg, Oral, DAILY     traZODone (DESYREL) 25 mg, Oral, AT BEDTIME PRN     warfarin ANTICOAGULANT (COUMADIN) 5 MG tablet 5 mg daily except 7.5 mg on Sundays, check INR on 4/11 and adjust dose for desired INR of 2.0 to 3.0.       ALLERGIES:  Allergies   Allergen Reactions     Aspirin Nausea and Vomiting     GI upset       REVIEW OF SYSTEMS:  Pertinent items as noted in HPI.      PHYSICAL EXAM:  General: Patient is alert male, no distress.    Vitals: BP 96/62   Pulse 82   Temp 98  F (36.7  C)   Resp 18   Ht 1.854 m (6' 1\")   Wt 88.5 kg (195 lb)   SpO2 95%   BMI 25.73 kg/m  \  HEENT: Head is NCAT. Eyes show no injection or icterus. Nares negative. Oropharynx well hydrated.  Neck: Supple. No tenderness or adenopathy. No JVD.  Lungs: Clear " bilaterally. No wheezes.  Cardiovascular: Regular rate and rhythm, normal S1. S2.  Back: No spinal or CVA tenderness.  Abdomen: Soft, no tenderness on exam. Bowel sounds present. No guarding rebound or rigidity.  : Deferred.  Extremities: Mild LE edema, legs are wrapped.  Musculoskeletal: Age related degen changes.   Skin: Bilateral ulcers LEs.  Psych: Mood seems irritable.      LABS/DIAGNOSTIC DATA:  Component      Latest Ref Rng & Units 3/31/2022 3/31/2022 4/1/2022 4/1/2022          12:00 PM  4:48 PM 12:56 AM  6:11 AM   Sodium      136 - 145 mmol/L 139   142   Potassium      3.5 - 5.0 mmol/L 3.6 3.4 (L) 3.3 (L) 3.6   Chloride      98 - 107 mmol/L 100   103   Carbon Dioxide      22 - 31 mmol/L 28   31   Anion Gap      5 - 18 mmol/L 11   8   Glucose      70 - 125 mg/dL 223 (H)   64 (L)   Calcium      8.5 - 10.5 mg/dL 9.1   8.6   Urea Nitrogen      8 - 28 mg/dL 30 (H)   30 (H)   Creatinine      0.70 - 1.30 mg/dL 1.26   1.19   GFR Estimate      >60 mL/min/1.73m2 54 (L)   57 (L)     Component      Latest Ref Rng & Units 4/2/2022 4/3/2022 4/3/2022 4/4/2022            5:25 AM  5:25 AM    Sodium      136 - 145 mmol/L 139  137 133 (L)   Potassium      3.5 - 5.0 mmol/L 3.6 3.8 3.9 3.9   Chloride      98 - 107 mmol/L 98  96 (L) 95 (L)   Carbon Dioxide      22 - 31 mmol/L 31  29 27   Anion Gap      5 - 18 mmol/L 10  12 11   Glucose      70 - 125 mg/dL 133 (H)  184 (H) 202 (H)   Calcium      8.5 - 10.5 mg/dL 8.8  8.8 8.9   Urea Nitrogen      8 - 28 mg/dL 35 (H)  33 (H) 37 (H)   Creatinine      0.70 - 1.30 mg/dL 1.51 (H)  1.33 (H) 1.24   GFR Estimate      >60 mL/min/1.73m2 43 (L)  50 (L) 55 (L)         ASSESSMENT/PLAN:  1. CHF. Treated for exacerbation, dyspnea, increased leg swelling. Seen by cardiology, diuretics adjusted, changed to Bumex from lasix, also on spironolactone. Monitor in TCU, weights, edema, clinical status. Follow up in heart failure clinic.  2. Afib. He is anticoagulated with warfarin. Monitor and adjust  per INR.  3. HTN. On lisinopril, coreg and diuretics. Bps overall acceptable, continue to monitor.  4. PVD. LE edema and ulcers, follows with vascular clinic, seen yesterday 4/18/2022. Continue with wraps.  5. DM. On scheduled mealtime insulin, metformin and glipizide. Follow accuchecks.  6. Anemia. Continue on iron. Labs noted, reviewed.  7. Urinary retention. Had difficulty voiding in the hospital. Insistent on solitario removal, orders written for trial of void in TCU.  8. HLD. Continue atorvastatin.  9. CKD. Stage 3. Labs above, as noted. With LUCY in the hospital. Trend in TCU.          Electronically signed by: Mally Buckner MD

## 2022-05-24 PROBLEM — I07.9 TRICUSPID VALVE DISORDER: Status: ACTIVE | Noted: 2018-12-04

## 2022-05-24 PROBLEM — I83.009 VARICOSE VEINS OF LOWER EXTREMITY WITH ULCER (H): Status: ACTIVE | Noted: 2018-05-17

## 2022-05-24 PROBLEM — I50.33 ACUTE ON CHRONIC DIASTOLIC HEART FAILURE (H): Status: ACTIVE | Noted: 2022-01-01

## 2022-05-24 PROBLEM — L97.909 VARICOSE VEINS OF LOWER EXTREMITY WITH ULCER (H): Status: ACTIVE | Noted: 2018-05-17

## 2022-05-24 PROBLEM — Z79.4 LONG TERM CURRENT USE OF INSULIN (H): Status: ACTIVE | Noted: 2018-12-04

## 2022-05-24 PROBLEM — I87.2 PERIPHERAL VENOUS INSUFFICIENCY: Status: ACTIVE | Noted: 2017-08-07

## 2022-05-24 NOTE — LETTER
5/24/2022        RE: Robinson Medel  [address] [place] [address]        [facility]    Code Status:  FULL CODE   Visit Type:   Chief Complaint   Patient presents with     Hospital F/U     TCU Admission     Facility:  [facility] ([facility]) [[id number]]           Transitional Care Course: Robinson Medel is a 89 year old male who I am seeing today for admit to the TCU.  Patient recently hospitalized on 5/19/2022 secondary to hypotension.  Past medical history includes hypertension, CKD, proximal atrial fib, HFpEF, diabetes mellitus type 2 and chronic venous stasis.  Patient presented with symptomatic hypotension.  Patient had been hospitalized the previous month for worsening CHF and had been taking diuretics.  Patient with poor oral intake.  He was found to have acute kidney injury on chronic kidney disease with severe hypotension.  His diuretics were held.  He was treated with IV fluids and this resolved.  He was discharged home on Coreg and Bumex was restarted.  His lisinopril and Aldactone were discontinued.  Atrial fib rate controlled with carvedilol.  He continues on chronic anticoagulation with Coumadin.  Diabetes mellitus type 2.  Continues on metformin and glipizide.  Chronic venous stasis with lower extremity edema.    On today's visit patient is lying in bed.  Patient continues with low blood pressure.  Today during therapy he was 87/56.  He denies any dizziness.  Patient was placed in bed feet were elevated and fluids were encouraged.  Blood pressure did improve to 107/69.  He continues on Bumex 1 mg daily.  We will decrease this.  Nursing staff reporting stage II pressure ulcer to his coccyx.  Topical treatment was applied.  Patient does have 2+ lower extremity edema.  Will order teds.  CHF.  Compensated.  Lungs clear.  Chronic kidney disease with recent acute kidney injury and hyperkalemia.  Creatinine 1.14.  Potassium 4.1.    Active Ambulatory Problems      Diagnosis Date Noted     Venous stasis dermatitis       Atherosclerotic heart disease of native coronary artery without angina pectoris 09/28/2015     Essential hypertension 09/24/2015     Gastroduodenal ulcer 09/24/2015     Peripheral blood vessel disorder (H) 09/24/2015     Paroxysmal atrial fib -- on Warfarin       Lymphedema 05/06/2016     Weakness 05/06/2016     Hypotension due to drugs 05/06/2016     Hypotension, unspecified hypotension type      Duodenitis 05/11/2016     Anemia 06/15/2016     Cecal cancer (H)      S/P right hemicolectomy      Gastrointestinal hemorrhage with melena 07/08/2016     Blood in stool      Lactic acid acidosis      Peripheral venous insufficiency 08/07/2017     Shortness of breath 02/01/2018     Legionella infection (H)      UTI (urinary tract infection)      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) 05/17/2018     Gastroesophageal reflux disease with esophagitis 12/04/2018     Hyperkalemia 12/04/2018     Long term current use of insulin (H) 12/04/2018     Malignant tumor of ascending colon (H) 12/04/2018     Mixed hyperlipidemia 12/04/2018     Tricuspid valve disorder 12/04/2018     Normocytic normochromic anemia 12/04/2018     Venous hypertension 12/04/2018     Peripheral vascular disease (H) 12/04/2018     Hyperglycemia due to type 2 diabetes mellitus (H) 09/24/2015     DM type 2, Hgb A1C 7.4 on 3/31/22 09/24/2015     Venous stasis 11/27/2019     Hearing loss 01/11/2016     PVC's (premature ventricular contractions) 01/28/2016     Malaise and fatigue 09/24/2015     Abnormal EKG 09/24/2015     Age-related cataract 10/26/2021     Amnesia 10/26/2021     Severe bilateral hearing loss 10/26/2021     Chronic idiopathic constipation 10/26/2021     Slow transit constipation 10/26/2021     Chronic kidney disease, stage 3a (H) 03/31/2022     Hypokalemia 03/31/2022     Hypomagnesemia 04/01/2022     Severe Pulm HTN -- PAP 60-65 mmHg plus RAP on Echo 3/31/22 04/07/2022     (HFpEF)  heart failure with preserved ejection fraction (H) 04/14/2022     Orthostatic hypotension 05/19/2022     Lactic acidosis 05/19/2022     Severe sepsis (H) 05/19/2022     Acute renal failure, unspecified acute renal failure type (H) 05/19/2022     Acute on chronic diastolic heart failure (H) 05/24/2022     Resolved Ambulatory Problems     Diagnosis Date Noted     Urinary bladder stone 02/10/2016     CAP (community acquired pneumonia) 02/01/2018     Anticoagulated on Coumadin 03/31/2022     Past Medical History:   Diagnosis Date     Coronary artery disease 9/25/2015     Diabetes mellitus (H)      Dyslipidemia      GERD (gastroesophageal reflux disease)      GI bleed 07/08/2016     Gout      Hematuria      Hyperlipemia      Hypertension      Kidney stone 10/6/2015       Current Outpatient Medications:      atorvastatin (LIPITOR) 10 MG tablet, Take 10 mg by mouth At Bedtime, Disp: , Rfl:      bumetanide (BUMEX) 0.5 MG tablet, Take 2 tablets (1 mg) by mouth daily (Patient taking differently: Take 0.5 mg by mouth daily), Disp: , Rfl:      carvedilol (COREG) 3.125 MG tablet, Take 3.125 mg by mouth 2 times daily (with meals), Disp: , Rfl:      [employer] IRON 240 (27 Fe) MG TABS, Take 1 tablet by mouth daily, Disp: , Rfl:      glipiZIDE (GLUCOTROL XL) 2.5 MG 24 hr tablet, Take 2 tablets (5 mg) by mouth daily, Disp: , Rfl:      insulin aspart (NOVOLOG FLEXPEN) 100 UNIT/ML pen, 3 times a day with meals, for glucometer 150-200 give 2 units SQ, 201-250 give 4 units, >250 give 6 units, Disp: 15 mL, Rfl: 0     insulin glargine (LANTUS PEN) 100 UNIT/ML pen, Inject 15 Units Subcutaneous At Bedtime (Patient taking differently: Inject 15 Units Subcutaneous At Bedtime), Disp: 15 mL, Rfl: 0     magnesium hydroxide (MILK OF MAGNESIA) 400 MG/5ML suspension, Take 30 mLs by mouth daily as needed for constipation, Disp: , Rfl: 0     metFORMIN (GLUCOPHAGE) 500 MG tablet, Take 1 tablet (500 mg) by mouth daily (with breakfast), Disp: , Rfl: 0      multivitamin with minerals tablet, Take 1 tablet by mouth daily, Disp: , Rfl:      omeprazole (PRILOSEC) 20 MG capsule, Take 20 mg by mouth daily, Disp: , Rfl:      potassium chloride ER (KLOR-CON M) 20 MEQ CR tablet, Take 20 mEq by mouth daily, Disp: , Rfl:      senna-docusate (SENOKOT-S/PERICOLACE) 8.6-50 MG tablet, Take 1 tablet by mouth 2 times daily (Patient taking differently: Take 1 tablet by mouth 2 times daily), Disp: , Rfl: 0     traZODone (DESYREL) 50 MG tablet, Take 0.5 tablets (25 mg) by mouth nightly as needed for sleep (Patient taking differently: Take 25 mg by mouth nightly as needed for sleep), Disp: , Rfl: 0     warfarin ANTICOAGULANT (COUMADIN) 5 MG tablet, 5 mg daily except 7.5 mg on Sundays, check INR on 4/11 and adjust dose for desired INR of 2.0 to 3.0. (Patient taking differently: 5 mg daily except 7.5 mg on Sundays, check INR on 4/11 and adjust dose for desired INR of 2.0 to 3.0.), Disp: , Rfl: 0  Allergies   Allergen Reactions     Aspirin Nausea and Vomiting     GI upset  Other reaction(s): GI upset       All Meds and Allergies reviewed in the record at the facility and is the most up-to-date.    Post Discharge Medication Reconciliation Status: discharge medications reconciled and changed, per note/orders  Current Outpatient Medications   Medication Sig     atorvastatin (LIPITOR) 10 MG tablet Take 10 mg by mouth At Bedtime     bumetanide (BUMEX) 0.5 MG tablet Take 2 tablets (1 mg) by mouth daily (Patient taking differently: Take 0.5 mg by mouth daily)     carvedilol (COREG) 3.125 MG tablet Take 3.125 mg by mouth 2 times daily (with meals)     CVS IRON 240 (27 Fe) MG TABS Take 1 tablet by mouth daily     glipiZIDE (GLUCOTROL XL) 2.5 MG 24 hr tablet Take 2 tablets (5 mg) by mouth daily     insulin aspart (NOVOLOG FLEXPEN) 100 UNIT/ML pen 3 times a day with meals, for glucometer 150-200 give 2 units SQ, 201-250 give 4 units, >250 give 6 units     insulin glargine (LANTUS PEN) 100 UNIT/ML pen  Inject 15 Units Subcutaneous At Bedtime (Patient taking differently: Inject 15 Units Subcutaneous At Bedtime)     magnesium hydroxide (MILK OF MAGNESIA) 400 MG/5ML suspension Take 30 mLs by mouth daily as needed for constipation     metFORMIN (GLUCOPHAGE) 500 MG tablet Take 1 tablet (500 mg) by mouth daily (with breakfast)     multivitamin with minerals tablet Take 1 tablet by mouth daily     omeprazole (PRILOSEC) 20 MG capsule Take 20 mg by mouth daily     potassium chloride ER (KLOR-CON M) 20 MEQ CR tablet Take 20 mEq by mouth daily     senna-docusate (SENOKOT-S/PERICOLACE) 8.6-50 MG tablet Take 1 tablet by mouth 2 times daily (Patient taking differently: Take 1 tablet by mouth 2 times daily)     traZODone (DESYREL) 50 MG tablet Take 0.5 tablets (25 mg) by mouth nightly as needed for sleep (Patient taking differently: Take 25 mg by mouth nightly as needed for sleep)     warfarin ANTICOAGULANT (COUMADIN) 5 MG tablet 5 mg daily except 7.5 mg on Sundays, check INR on 4/11 and adjust dose for desired INR of 2.0 to 3.0. (Patient taking differently: 5 mg daily except 7.5 mg on Sundays, check INR on 4/11 and adjust dose for desired INR of 2.0 to 3.0.)     No current facility-administered medications for this visit.       REVIEW OF SYSTEMS:   Review of Systems  No fevers or chills. No headache, lightheadedness or dizziness.  Denies SOB, chest pains or palpitations. Appetite is fair. No nausea, vomiting, constipation or diarrhea. No dysuria, frequency, burning or pain with urination.  Chronic lower extremity edema.    PHYSICAL EXAMINATION:  Physical Exam     Vital signs: /71   Pulse 74   Temp 97.3  F (36.3  C)   Resp 16   Wt 82.6 kg (182 lb 3.2 oz)   SpO2 95%   BMI 24.04 kg/m    General: Awake, Alert, oriented x3, follows simple commands  HEENT: Pink conjunctiva, anicteric sclerae, dry oral mucosa  NECK: Supple  CVS:  S1  S2, without murmur or gallop.   LUNG: Clear to auscultation, No wheezes, rales or  rhonci.  BACK: No kyphosis of the thoracic spine  ABDOMEN: Soft, nontender to palpation, with positive bowel sounds  EXTREMITIES: Moves both upper and lower extremities with generalized weakness, trace pedal edema, no calf tenderness  SKIN: Stage II pressure ulcer to his coccyx not observed.    NEUROLOGIC: Intact, pulses palpable  PSYCHIATRIC: Cognition intact.  Upset.  Requesting to go home.      Labs:  All labs reviewed in the nursing home record and Epic   @  Lab Results   Component Value Date    WBC 6.4 05/19/2022     Lab Results   Component Value Date    RBC 4.30 05/19/2022     Lab Results   Component Value Date    HGB 11.9 05/20/2022     Lab Results   Component Value Date    HCT 38.1 05/19/2022     Lab Results   Component Value Date    MCV 89 05/19/2022     Lab Results   Component Value Date    MCH 30.9 05/19/2022     Lab Results   Component Value Date    MCHC 34.9 05/19/2022     Lab Results   Component Value Date    RDW 15.9 05/19/2022     Lab Results   Component Value Date     05/19/2022        @Last Comprehensive Metabolic Panel:  Sodium   Date Value Ref Range Status   05/21/2022 138 136 - 145 mmol/L Final     Potassium   Date Value Ref Range Status   05/23/2022 4.1 3.5 - 5.0 mmol/L Final     Chloride   Date Value Ref Range Status   05/21/2022 103 98 - 107 mmol/L Final     Carbon Dioxide (CO2)   Date Value Ref Range Status   05/21/2022 27 22 - 31 mmol/L Final     Anion Gap   Date Value Ref Range Status   05/21/2022 8 5 - 18 mmol/L Final     Glucose   Date Value Ref Range Status   05/21/2022 116 70 - 125 mg/dL Final     GLUCOSE BY METER POCT   Date Value Ref Range Status   05/23/2022 250 (H) 70 - 99 mg/dL Final     Urea Nitrogen   Date Value Ref Range Status   05/21/2022 45 (H) 8 - 28 mg/dL Final     Creatinine   Date Value Ref Range Status   05/23/2022 1.14 0.70 - 1.30 mg/dL Final     GFR Estimate   Date Value Ref Range Status   05/23/2022 60 (L) >60 mL/min/1.73m2 Final     Comment:     Effective  December 21, 2021 eGFRcr in adults is calculated using the 2021 CKD-EPI creatinine equation which includes age and gender (Dylan et al., NEJ, DOI: 10.1056/BSDCfg8087809)   12/30/2020 56 (L) >60 mL/min/1.73m2 Final     Calcium   Date Value Ref Range Status   05/21/2022 9.0 8.5 - 10.5 mg/dL Final     Assessment/plan:      ICD-10-CM    1. Idiopathic hypotension  I95.0  Decrease Bumex to 0.5 daily.  Continue to monitor blood pressure every shift.  KEMI hose on in the a.m. to help with volume support.  Add additional 240 cc of fluid with each med Pass.   2. Acute kidney injury superimposed on chronic kidney disease (H)  N17.9  Creatinine 1.14.  Follow-up BMP on Thursday.    N18.9    3. Hyperkalemia  E87.5  Potassium 4.1.  Follow-up BMP on Thursday.   4. Paroxysmal atrial fibrillation (H)  I48.0  Rate controlled with Coreg.  Continues on chronic anticoagulation with Coumadin.  INRs managed per the Coumadin clinic.   5. Acute on chronic heart failure with preserved ejection fraction (H)  I50.33  Compensated.  Continue to weigh daily.  Decrease Bumex.  Currently off his lisinopril and Aldactone.   6. Type 2 diabetes mellitus with other specified complication, with long-term current use of insulin (H)  E11.69  Blood sugar satisfactory.  Continues on glipizide and metformin.  Continue to monitor 4 times daily.    Z79.4      Okay for PT and OT eval and treat.    35 minutes spent of which greater than 50% was face to face communication with the patient about treatment for hypotension, reviewing medications and follow-up laboratory as well as collaborating with nursing staff and therapy.    This note has been dictated using voice recognition software. Any grammatical or context distortions are unintentional and inherent to the software    Electronically signed by: Jaimie Joe CNP           Sincerely,        Jaimie Joe NP

## 2022-05-24 NOTE — PROGRESS NOTES
Incoming fax from [facility]    Date of result 5/24    INR result 2.3    New [facility] Patient

## 2022-05-24 NOTE — PROGRESS NOTES
ANTICOAGULATION MANAGEMENT     Robinson Medel 89 year old male is on warfarin with therapeutic INR result. (Goal INR 2.0-3.0)    Recent labs: (last 7 days)     05/24/22  0000   INR 2.3*       ASSESSMENT       Source(s): Chart Review and Home Care/Facility Nurse       Warfarin doses taken: While hospitalized on 5/19-5/23: less warfarin administered than maintenance regimen.  Discharged on: home regimen continued    Diet: No new diet changes identified    New illness, injury, or hospitalization: Yes: Recent hospitalization for hypotension    Medication/supplement changes: lisinopril and spironolactone discontinued    Signs or symptoms of bleeding or clotting: No    Previous INR: Therapeutic last 2(+) visits    Additional findings: Patient is a new admist to [facility]       PLAN     Recommended plan for no diet, medication or health factor changes affecting INR     Dosing Instructions: continue your current warfarin dose with next INR in 3 days       Summary  As of 5/24/2022    Full warfarin instructions:  7.5 mg every Sun; 5 mg all other days   Next INR check:  5/27/2022             Telephone call with [facility] home care/facility nurse who agrees to plan and repeated back plan correctly    Orders given to  Homecare nurse/facility to recheck    Education provided: Please call back if any changes to your diet, medications or how you've been taking warfarin    Plan made per [facility] anticoagulation protocol    Belgica Santamaria, RN  Anticoagulation Clinic  5/24/2022    _______________________________________________________________________     Anticoagulation Episode Summary     Current INR goal:  2.0-3.0   TTR:  --   Target end date:     Send INR reminders to:  [facility] (TCU/LTC/longterm)       Comments:  [facility] [phone]

## 2022-05-26 NOTE — LETTER
5/26/2022        RE: Robinson Medel  [address] [place] [address]        [facility]    Code Status:  FULL CODE   Visit Type:   Chief Complaint   Patient presents with     Nursing Home Acute     TCU Follow up     Facility:  [facility] ([facility]) [[id number]]           Transitional Care Course: Robinson Medel is a 89 year old male who I am seeing today for follow up on the TCU.  Patient recently hospitalized on 5/19/2022 secondary to hypotension.  Past medical history includes hypertension, CKD, proximal atrial fib, HFpEF, diabetes mellitus type 2 and chronic venous stasis.  Patient presented with symptomatic hypotension.  Patient had been hospitalized the previous month for worsening CHF and had been taking diuretics.  Patient with poor oral intake.  He was found to have acute kidney injury on chronic kidney disease with severe hypotension.  His diuretics were held.  He was treated with IV fluids and this resolved.  He was discharged home on Coreg and Bumex was restarted.  His lisinopril and Aldactone were discontinued.  Atrial fib rate controlled with carvedilol.  He continues on chronic anticoagulation with Coumadin.  Diabetes mellitus type 2.  Continues on metformin and glipizide.  Chronic venous stasis with lower extremity edema.    On today's visit patient sitting up in bedside chair. Pt wife and daughter present on visit. Hypotension. Bumex decreased to 0.5 mg. BP improved. Pt with underlying CHF. Pt weight stable. 1+ pedal  edema. Lungs CTA. Stage II PU on his coccyx with topical treatment. CKD. Creatine 1.21. Kt 4.1. A fib rate controlled with carvedilol. He continues on chronic anticoagulation with Coumadin. INRs managed per the coumadin clinic. DM type II controlled with metformin and glipizide.         Active Ambulatory Problems     Diagnosis Date Noted     Venous stasis dermatitis       Atherosclerotic heart disease of native coronary artery without angina  pectoris 09/28/2015     Essential hypertension 09/24/2015     Gastroduodenal ulcer 09/24/2015     Peripheral blood vessel disorder (H) 09/24/2015     Paroxysmal atrial fib -- on Warfarin       Lymphedema 05/06/2016     Weakness 05/06/2016     Hypotension due to drugs 05/06/2016     Hypotension, unspecified hypotension type      Duodenitis 05/11/2016     Anemia 06/15/2016     Cecal cancer (H)      S/P right hemicolectomy      Gastrointestinal hemorrhage with melena 07/08/2016     Blood in stool      Lactic acid acidosis      Peripheral venous insufficiency 08/07/2017     Shortness of breath 02/01/2018     Legionella infection (H)      UTI (urinary tract infection)      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) 05/17/2018     Gastroesophageal reflux disease with esophagitis 12/04/2018     Hyperkalemia 12/04/2018     Long term current use of insulin (H) 12/04/2018     Malignant tumor of ascending colon (H) 12/04/2018     Mixed hyperlipidemia 12/04/2018     Tricuspid valve disorder 12/04/2018     Normocytic normochromic anemia 12/04/2018     Venous hypertension 12/04/2018     Peripheral vascular disease (H) 12/04/2018     Hyperglycemia due to type 2 diabetes mellitus (H) 09/24/2015     DM type 2, Hgb A1C 7.4 on 3/31/22 09/24/2015     Venous stasis 11/27/2019     Hearing loss 01/11/2016     PVC's (premature ventricular contractions) 01/28/2016     Malaise and fatigue 09/24/2015     Abnormal EKG 09/24/2015     Age-related cataract 10/26/2021     Amnesia 10/26/2021     Severe bilateral hearing loss 10/26/2021     Chronic idiopathic constipation 10/26/2021     Slow transit constipation 10/26/2021     Chronic kidney disease, stage 3a (H) 03/31/2022     Hypokalemia 03/31/2022     Hypomagnesemia 04/01/2022     Severe Pulm HTN -- PAP 60-65 mmHg plus RAP on Echo 3/31/22 04/07/2022     (HFpEF) heart failure with preserved ejection fraction (H) 04/14/2022     Orthostatic hypotension 05/19/2022     Lactic acidosis 05/19/2022      Severe sepsis (H) 05/19/2022     Acute renal failure, unspecified acute renal failure type (H) 05/19/2022     Acute on chronic diastolic heart failure (H) 05/24/2022     Resolved Ambulatory Problems     Diagnosis Date Noted     Urinary bladder stone 02/10/2016     CAP (community acquired pneumonia) 02/01/2018     Anticoagulated on Coumadin 03/31/2022     Past Medical History:   Diagnosis Date     Coronary artery disease 9/25/2015     Diabetes mellitus (H)      Dyslipidemia      GERD (gastroesophageal reflux disease)      GI bleed 07/08/2016     Gout      Hematuria      Hyperlipemia      Hypertension      Kidney stone 10/6/2015       Current Outpatient Medications:      atorvastatin (LIPITOR) 10 MG tablet, Take 10 mg by mouth At Bedtime, Disp: , Rfl:      bumetanide (BUMEX) 0.5 MG tablet, Take 2 tablets (1 mg) by mouth daily (Patient taking differently: Take 0.5 mg by mouth daily), Disp: , Rfl:      carvedilol (COREG) 3.125 MG tablet, Take 3.125 mg by mouth 2 times daily (with meals), Disp: , Rfl:      [employer] IRON 240 (27 Fe) MG TABS, Take 1 tablet by mouth daily, Disp: , Rfl:      glipiZIDE (GLUCOTROL XL) 2.5 MG 24 hr tablet, Take 2 tablets (5 mg) by mouth daily, Disp: , Rfl:      insulin aspart (NOVOLOG FLEXPEN) 100 UNIT/ML pen, 3 times a day with meals, for glucometer 150-200 give 2 units SQ, 201-250 give 4 units, >250 give 6 units, Disp: 15 mL, Rfl: 0     insulin glargine (LANTUS PEN) 100 UNIT/ML pen, Inject 15 Units Subcutaneous At Bedtime (Patient taking differently: Inject 15 Units Subcutaneous At Bedtime), Disp: 15 mL, Rfl: 0     magnesium hydroxide (MILK OF MAGNESIA) 400 MG/5ML suspension, Take 30 mLs by mouth daily as needed for constipation, Disp: , Rfl: 0     metFORMIN (GLUCOPHAGE) 500 MG tablet, Take 1 tablet (500 mg) by mouth daily (with breakfast), Disp: , Rfl: 0     multivitamin with minerals tablet, Take 1 tablet by mouth daily, Disp: , Rfl:      omeprazole (PRILOSEC) 20 MG capsule, Take 20 mg by  mouth daily, Disp: , Rfl:      potassium chloride ER (KLOR-CON M) 20 MEQ CR tablet, Take 20 mEq by mouth daily, Disp: , Rfl:      senna-docusate (SENOKOT-S/PERICOLACE) 8.6-50 MG tablet, Take 1 tablet by mouth 2 times daily (Patient taking differently: Take 1 tablet by mouth 2 times daily), Disp: , Rfl: 0     traZODone (DESYREL) 50 MG tablet, Take 0.5 tablets (25 mg) by mouth nightly as needed for sleep (Patient taking differently: Take 25 mg by mouth nightly as needed for sleep), Disp: , Rfl: 0     warfarin ANTICOAGULANT (COUMADIN) 5 MG tablet, 5 mg daily except 7.5 mg on Sundays, check INR on 4/11 and adjust dose for desired INR of 2.0 to 3.0. (Patient taking differently: 5 mg daily except 7.5 mg on Sundays, check INR on 4/11 and adjust dose for desired INR of 2.0 to 3.0.), Disp: , Rfl: 0     Allergies   Allergen Reactions     Aspirin Nausea and Vomiting     GI upset  Other reaction(s): GI upset       All Meds and Allergies reviewed in the record at the facility and is the most up-to-date.      Current Outpatient Medications   Medication Sig     atorvastatin (LIPITOR) 10 MG tablet Take 10 mg by mouth At Bedtime     bumetanide (BUMEX) 0.5 MG tablet Take 2 tablets (1 mg) by mouth daily (Patient taking differently: Take 0.5 mg by mouth daily)     carvedilol (COREG) 3.125 MG tablet Take 3.125 mg by mouth 2 times daily (with meals)     CVS IRON 240 (27 Fe) MG TABS Take 1 tablet by mouth daily     glipiZIDE (GLUCOTROL XL) 2.5 MG 24 hr tablet Take 2 tablets (5 mg) by mouth daily     insulin aspart (NOVOLOG FLEXPEN) 100 UNIT/ML pen 3 times a day with meals, for glucometer 150-200 give 2 units SQ, 201-250 give 4 units, >250 give 6 units     insulin glargine (LANTUS PEN) 100 UNIT/ML pen Inject 15 Units Subcutaneous At Bedtime (Patient taking differently: Inject 15 Units Subcutaneous At Bedtime)     magnesium hydroxide (MILK OF MAGNESIA) 400 MG/5ML suspension Take 30 mLs by mouth daily as needed for constipation     metFORMIN  "(GLUCOPHAGE) 500 MG tablet Take 1 tablet (500 mg) by mouth daily (with breakfast)     multivitamin with minerals tablet Take 1 tablet by mouth daily     omeprazole (PRILOSEC) 20 MG capsule Take 20 mg by mouth daily     potassium chloride ER (KLOR-CON M) 20 MEQ CR tablet Take 20 mEq by mouth daily     senna-docusate (SENOKOT-S/PERICOLACE) 8.6-50 MG tablet Take 1 tablet by mouth 2 times daily (Patient taking differently: Take 1 tablet by mouth 2 times daily)     traZODone (DESYREL) 50 MG tablet Take 0.5 tablets (25 mg) by mouth nightly as needed for sleep (Patient taking differently: Take 25 mg by mouth nightly as needed for sleep)     warfarin ANTICOAGULANT (COUMADIN) 5 MG tablet 5 mg daily except 7.5 mg on Sundays, check INR on 4/11 and adjust dose for desired INR of 2.0 to 3.0. (Patient taking differently: 5 mg daily except 7.5 mg on Sundays, check INR on 4/11 and adjust dose for desired INR of 2.0 to 3.0.)     No current facility-administered medications for this visit.       REVIEW OF SYSTEMS:   Review of Systems  No fevers or chills. No headache, lightheadedness or dizziness.  Denies SOB, chest pains or palpitations. Appetite is fair. No nausea, vomiting, constipation or diarrhea. No dysuria, frequency, burning or pain with urination.  Chronic lower extremity edema.    PHYSICAL EXAMINATION:  Physical Exam     Vital signs: /87   Pulse 56   Temp 97.8  F (36.6  C)   Resp 16   Ht 1.854 m (6' 1\")   Wt 84.1 kg (185 lb 6.4 oz)   SpO2 97%   BMI 24.46 kg/m    General: Awake, Alert, oriented x3, follows simple commands  HEENT: Pink conjunctiva, anicteric sclerae, dry oral mucosa  NECK: Supple  CVS:  S1  S2, without murmur or gallop.   LUNG: Clear to auscultation, No wheezes, rales or rhonci.  BACK: No kyphosis of the thoracic spine  ABDOMEN: Soft, nontender to palpation, with positive bowel sounds  EXTREMITIES: Moves both upper and lower extremities with generalized weakness, 1+ pedal edema, no calf " tenderness  SKIN: Stage II pressure ulcer to his coccyx not observed.    NEUROLOGIC: Intact, pulses palpable  PSYCHIATRIC: Cognitive impairment noted.       Labs:  All labs reviewed in the nursing home record and Epic   @  Lab Results   Component Value Date    WBC 6.4 05/19/2022     Lab Results   Component Value Date    RBC 4.30 05/19/2022     Lab Results   Component Value Date    HGB 11.9 05/20/2022     Lab Results   Component Value Date    HCT 38.1 05/19/2022     Lab Results   Component Value Date    MCV 89 05/19/2022     Lab Results   Component Value Date    MCH 30.9 05/19/2022     Lab Results   Component Value Date    MCHC 34.9 05/19/2022     Lab Results   Component Value Date    RDW 15.9 05/19/2022     Lab Results   Component Value Date     05/19/2022        @Last Comprehensive Metabolic Panel:  Sodium   Date Value Ref Range Status   05/26/2022 137 136 - 145 mmol/L Final     Potassium   Date Value Ref Range Status   05/26/2022 4.1 3.5 - 5.0 mmol/L Final     Chloride   Date Value Ref Range Status   05/26/2022 101 98 - 107 mmol/L Final     Carbon Dioxide (CO2)   Date Value Ref Range Status   05/26/2022 28 22 - 31 mmol/L Final     Anion Gap   Date Value Ref Range Status   05/26/2022 8 5 - 18 mmol/L Final     Glucose   Date Value Ref Range Status   05/26/2022 217 (H) 70 - 125 mg/dL Final     Urea Nitrogen   Date Value Ref Range Status   05/26/2022 24 8 - 28 mg/dL Final     Creatinine   Date Value Ref Range Status   05/26/2022 1.21 0.70 - 1.30 mg/dL Final     GFR Estimate   Date Value Ref Range Status   05/26/2022 56 (L) >60 mL/min/1.73m2 Final     Comment:     Effective December 21, 2021 eGFRcr in adults is calculated using the 2021 CKD-EPI creatinine equation which includes age and gender (Dylan et al., NEJ, DOI: 10.1056/HCMSvv2248111)   12/30/2020 56 (L) >60 mL/min/1.73m2 Final     Calcium   Date Value Ref Range Status   05/26/2022 9.5 8.5 - 10.5 mg/dL Final     Assessment/plan:      ICD-10-CM    1.  Idiopathic hypotension  I95.0 Bumex reduced to 0.5 daily.  BP improved.   Continue to monitor blood pressure every shift.  KEMI hose on in the a.m. to help with volume support.  Add additional 240 cc of fluid with each med Pass.   2. Acute kidney injury superimposed on chronic kidney disease (H)  N17.9  Creatinine now 1.21.       N18.9    3. Hyperkalemia  E87.5  Potassium 4.1.    4. Paroxysmal atrial fibrillation (H)  I48.0  Rate controlled with Coreg.  Continues on chronic anticoagulation with Coumadin.  INRs managed per the Coumadin clinic.   5. Acute on chronic heart failure with preserved ejection fraction (H)  I50.33  Compensated.  Continue to weigh daily.  Bumex decreased.    Currently off his lisinopril and Aldactone.   6. Type 2 diabetes mellitus with other specified complication, with long-term current use of insulin (H)  E11.69  Blood sugar satisfactory.  Continues on glipizide and metformin.  Continue to monitor 4 times daily.    Z79.4        This note has been dictated using voice recognition software. Any grammatical or context distortions are unintentional and inherent to the software    Electronically signed by: Jaimie Joe, CHAVA           Sincerely,        Jaimie Joe, NP

## 2022-05-26 NOTE — PROGRESS NOTES
[facility] GERIATRIC SERVICES    Code Status:  FULL CODE   Visit Type:   Chief Complaint   Patient presents with     Hospital F/U     TCU Admission     Facility:  [facility] ([facility]) [[id number]]           Transitional Care Course: Robinson Medel is a 89 year old male who I am seeing today for admit to the TCU.  Patient recently hospitalized on 5/19/2022 secondary to hypotension.  Past medical history includes hypertension, CKD, proximal atrial fib, HFpEF, diabetes mellitus type 2 and chronic venous stasis.  Patient presented with symptomatic hypotension.  Patient had been hospitalized the previous month for worsening CHF and had been taking diuretics.  Patient with poor oral intake.  He was found to have acute kidney injury on chronic kidney disease with severe hypotension.  His diuretics were held.  He was treated with IV fluids and this resolved.  He was discharged home on Coreg and Bumex was restarted.  His lisinopril and Aldactone were discontinued.  Atrial fib rate controlled with carvedilol.  He continues on chronic anticoagulation with Coumadin.  Diabetes mellitus type 2.  Continues on metformin and glipizide.  Chronic venous stasis with lower extremity edema.    On today's visit patient is lying in bed.  Patient continues with low blood pressure.  Today during therapy he was 87/56.  He denies any dizziness.  Patient was placed in bed feet were elevated and fluids were encouraged.  Blood pressure did improve to 107/69.  He continues on Bumex 1 mg daily.  We will decrease this.  Nursing staff reporting stage II pressure ulcer to his coccyx.  Topical treatment was applied.  Patient does have 2+ lower extremity edema.  Will order teds.  CHF.  Compensated.  Lungs clear.  Chronic kidney disease with recent acute kidney injury and hyperkalemia.  Creatinine 1.14.  Potassium 4.1.    Active Ambulatory Problems     Diagnosis Date Noted     Venous stasis dermatitis       Atherosclerotic heart disease of  native coronary artery without angina pectoris 09/28/2015     Essential hypertension 09/24/2015     Gastroduodenal ulcer 09/24/2015     Peripheral blood vessel disorder (H) 09/24/2015     Paroxysmal atrial fib -- on Warfarin       Lymphedema 05/06/2016     Weakness 05/06/2016     Hypotension due to drugs 05/06/2016     Hypotension, unspecified hypotension type      Duodenitis 05/11/2016     Anemia 06/15/2016     Cecal cancer (H)      S/P right hemicolectomy      Gastrointestinal hemorrhage with melena 07/08/2016     Blood in stool      Lactic acid acidosis      Peripheral venous insufficiency 08/07/2017     Shortness of breath 02/01/2018     Legionella infection (H)      UTI (urinary tract infection)      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) 05/17/2018     Gastroesophageal reflux disease with esophagitis 12/04/2018     Hyperkalemia 12/04/2018     Long term current use of insulin (H) 12/04/2018     Malignant tumor of ascending colon (H) 12/04/2018     Mixed hyperlipidemia 12/04/2018     Tricuspid valve disorder 12/04/2018     Normocytic normochromic anemia 12/04/2018     Venous hypertension 12/04/2018     Peripheral vascular disease (H) 12/04/2018     Hyperglycemia due to type 2 diabetes mellitus (H) 09/24/2015     DM type 2, Hgb A1C 7.4 on 3/31/22 09/24/2015     Venous stasis 11/27/2019     Hearing loss 01/11/2016     PVC's (premature ventricular contractions) 01/28/2016     Malaise and fatigue 09/24/2015     Abnormal EKG 09/24/2015     Age-related cataract 10/26/2021     Amnesia 10/26/2021     Severe bilateral hearing loss 10/26/2021     Chronic idiopathic constipation 10/26/2021     Slow transit constipation 10/26/2021     Chronic kidney disease, stage 3a (H) 03/31/2022     Hypokalemia 03/31/2022     Hypomagnesemia 04/01/2022     Severe Pulm HTN -- PAP 60-65 mmHg plus RAP on Echo 3/31/22 04/07/2022     (HFpEF) heart failure with preserved ejection fraction (H) 04/14/2022     Orthostatic hypotension  05/19/2022     Lactic acidosis 05/19/2022     Severe sepsis (H) 05/19/2022     Acute renal failure, unspecified acute renal failure type (H) 05/19/2022     Acute on chronic diastolic heart failure (H) 05/24/2022     Resolved Ambulatory Problems     Diagnosis Date Noted     Urinary bladder stone 02/10/2016     CAP (community acquired pneumonia) 02/01/2018     Anticoagulated on Coumadin 03/31/2022     Past Medical History:   Diagnosis Date     Coronary artery disease 9/25/2015     Diabetes mellitus (H)      Dyslipidemia      GERD (gastroesophageal reflux disease)      GI bleed 07/08/2016     Gout      Hematuria      Hyperlipemia      Hypertension      Kidney stone 10/6/2015       Current Outpatient Medications:      atorvastatin (LIPITOR) 10 MG tablet, Take 10 mg by mouth At Bedtime, Disp: , Rfl:      bumetanide (BUMEX) 0.5 MG tablet, Take 2 tablets (1 mg) by mouth daily (Patient taking differently: Take 0.5 mg by mouth daily), Disp: , Rfl:      carvedilol (COREG) 3.125 MG tablet, Take 3.125 mg by mouth 2 times daily (with meals), Disp: , Rfl:      [employer] IRON 240 (27 Fe) MG TABS, Take 1 tablet by mouth daily, Disp: , Rfl:      glipiZIDE (GLUCOTROL XL) 2.5 MG 24 hr tablet, Take 2 tablets (5 mg) by mouth daily, Disp: , Rfl:      insulin aspart (NOVOLOG FLEXPEN) 100 UNIT/ML pen, 3 times a day with meals, for glucometer 150-200 give 2 units SQ, 201-250 give 4 units, >250 give 6 units, Disp: 15 mL, Rfl: 0     insulin glargine (LANTUS PEN) 100 UNIT/ML pen, Inject 15 Units Subcutaneous At Bedtime (Patient taking differently: Inject 15 Units Subcutaneous At Bedtime), Disp: 15 mL, Rfl: 0     magnesium hydroxide (MILK OF MAGNESIA) 400 MG/5ML suspension, Take 30 mLs by mouth daily as needed for constipation, Disp: , Rfl: 0     metFORMIN (GLUCOPHAGE) 500 MG tablet, Take 1 tablet (500 mg) by mouth daily (with breakfast), Disp: , Rfl: 0     multivitamin with minerals tablet, Take 1 tablet by mouth daily, Disp: , Rfl:      omeprazole  (PRILOSEC) 20 MG capsule, Take 20 mg by mouth daily, Disp: , Rfl:      potassium chloride ER (KLOR-CON M) 20 MEQ CR tablet, Take 20 mEq by mouth daily, Disp: , Rfl:      senna-docusate (SENOKOT-S/PERICOLACE) 8.6-50 MG tablet, Take 1 tablet by mouth 2 times daily (Patient taking differently: Take 1 tablet by mouth 2 times daily), Disp: , Rfl: 0     traZODone (DESYREL) 50 MG tablet, Take 0.5 tablets (25 mg) by mouth nightly as needed for sleep (Patient taking differently: Take 25 mg by mouth nightly as needed for sleep), Disp: , Rfl: 0     warfarin ANTICOAGULANT (COUMADIN) 5 MG tablet, 5 mg daily except 7.5 mg on Sundays, check INR on 4/11 and adjust dose for desired INR of 2.0 to 3.0. (Patient taking differently: 5 mg daily except 7.5 mg on Sundays, check INR on 4/11 and adjust dose for desired INR of 2.0 to 3.0.), Disp: , Rfl: 0  Allergies   Allergen Reactions     Aspirin Nausea and Vomiting     GI upset  Other reaction(s): GI upset       All Meds and Allergies reviewed in the record at the facility and is the most up-to-date.    Post Discharge Medication Reconciliation Status: discharge medications reconciled and changed, per note/orders  Current Outpatient Medications   Medication Sig     atorvastatin (LIPITOR) 10 MG tablet Take 10 mg by mouth At Bedtime     bumetanide (BUMEX) 0.5 MG tablet Take 2 tablets (1 mg) by mouth daily (Patient taking differently: Take 0.5 mg by mouth daily)     carvedilol (COREG) 3.125 MG tablet Take 3.125 mg by mouth 2 times daily (with meals)     CVS IRON 240 (27 Fe) MG TABS Take 1 tablet by mouth daily     glipiZIDE (GLUCOTROL XL) 2.5 MG 24 hr tablet Take 2 tablets (5 mg) by mouth daily     insulin aspart (NOVOLOG FLEXPEN) 100 UNIT/ML pen 3 times a day with meals, for glucometer 150-200 give 2 units SQ, 201-250 give 4 units, >250 give 6 units     insulin glargine (LANTUS PEN) 100 UNIT/ML pen Inject 15 Units Subcutaneous At Bedtime (Patient taking differently: Inject 15 Units  Subcutaneous At Bedtime)     magnesium hydroxide (MILK OF MAGNESIA) 400 MG/5ML suspension Take 30 mLs by mouth daily as needed for constipation     metFORMIN (GLUCOPHAGE) 500 MG tablet Take 1 tablet (500 mg) by mouth daily (with breakfast)     multivitamin with minerals tablet Take 1 tablet by mouth daily     omeprazole (PRILOSEC) 20 MG capsule Take 20 mg by mouth daily     potassium chloride ER (KLOR-CON M) 20 MEQ CR tablet Take 20 mEq by mouth daily     senna-docusate (SENOKOT-S/PERICOLACE) 8.6-50 MG tablet Take 1 tablet by mouth 2 times daily (Patient taking differently: Take 1 tablet by mouth 2 times daily)     traZODone (DESYREL) 50 MG tablet Take 0.5 tablets (25 mg) by mouth nightly as needed for sleep (Patient taking differently: Take 25 mg by mouth nightly as needed for sleep)     warfarin ANTICOAGULANT (COUMADIN) 5 MG tablet 5 mg daily except 7.5 mg on Sundays, check INR on 4/11 and adjust dose for desired INR of 2.0 to 3.0. (Patient taking differently: 5 mg daily except 7.5 mg on Sundays, check INR on 4/11 and adjust dose for desired INR of 2.0 to 3.0.)     No current facility-administered medications for this visit.       REVIEW OF SYSTEMS:   Review of Systems  No fevers or chills. No headache, lightheadedness or dizziness.  Denies SOB, chest pains or palpitations. Appetite is fair. No nausea, vomiting, constipation or diarrhea. No dysuria, frequency, burning or pain with urination.  Chronic lower extremity edema.    PHYSICAL EXAMINATION:  Physical Exam     Vital signs: /71   Pulse 74   Temp 97.3  F (36.3  C)   Resp 16   Wt 82.6 kg (182 lb 3.2 oz)   SpO2 95%   BMI 24.04 kg/m    General: Awake, Alert, oriented x3, follows simple commands  HEENT: Pink conjunctiva, anicteric sclerae, dry oral mucosa  NECK: Supple  CVS:  S1  S2, without murmur or gallop.   LUNG: Clear to auscultation, No wheezes, rales or rhonci.  BACK: No kyphosis of the thoracic spine  ABDOMEN: Soft, nontender to palpation, with  positive bowel sounds  EXTREMITIES: Moves both upper and lower extremities with generalized weakness, trace pedal edema, no calf tenderness  SKIN: Stage II pressure ulcer to his coccyx not observed.    NEUROLOGIC: Intact, pulses palpable  PSYCHIATRIC: Cognition intact.  Upset.  Requesting to go home.      Labs:  All labs reviewed in the nursing home record and Epic   @  Lab Results   Component Value Date    WBC 6.4 05/19/2022     Lab Results   Component Value Date    RBC 4.30 05/19/2022     Lab Results   Component Value Date    HGB 11.9 05/20/2022     Lab Results   Component Value Date    HCT 38.1 05/19/2022     Lab Results   Component Value Date    MCV 89 05/19/2022     Lab Results   Component Value Date    MCH 30.9 05/19/2022     Lab Results   Component Value Date    MCHC 34.9 05/19/2022     Lab Results   Component Value Date    RDW 15.9 05/19/2022     Lab Results   Component Value Date     05/19/2022        @Last Comprehensive Metabolic Panel:  Sodium   Date Value Ref Range Status   05/21/2022 138 136 - 145 mmol/L Final     Potassium   Date Value Ref Range Status   05/23/2022 4.1 3.5 - 5.0 mmol/L Final     Chloride   Date Value Ref Range Status   05/21/2022 103 98 - 107 mmol/L Final     Carbon Dioxide (CO2)   Date Value Ref Range Status   05/21/2022 27 22 - 31 mmol/L Final     Anion Gap   Date Value Ref Range Status   05/21/2022 8 5 - 18 mmol/L Final     Glucose   Date Value Ref Range Status   05/21/2022 116 70 - 125 mg/dL Final     GLUCOSE BY METER POCT   Date Value Ref Range Status   05/23/2022 250 (H) 70 - 99 mg/dL Final     Urea Nitrogen   Date Value Ref Range Status   05/21/2022 45 (H) 8 - 28 mg/dL Final     Creatinine   Date Value Ref Range Status   05/23/2022 1.14 0.70 - 1.30 mg/dL Final     GFR Estimate   Date Value Ref Range Status   05/23/2022 60 (L) >60 mL/min/1.73m2 Final     Comment:     Effective December 21, 2021 eGFRcr in adults is calculated using the 2021 CKD-EPI creatinine equation which  includes age and gender (Dylan maldonado al., NEJ, DOI: 10.1056/RXDHyv5905093)   12/30/2020 56 (L) >60 mL/min/1.73m2 Final     Calcium   Date Value Ref Range Status   05/21/2022 9.0 8.5 - 10.5 mg/dL Final     Assessment/plan:      ICD-10-CM    1. Idiopathic hypotension  I95.0  Decrease Bumex to 0.5 daily.  Continue to monitor blood pressure every shift.  KEMI hose on in the a.m. to help with volume support.  Add additional 240 cc of fluid with each med Pass.   2. Acute kidney injury superimposed on chronic kidney disease (H)  N17.9  Creatinine 1.14.  Follow-up BMP on Thursday.    N18.9    3. Hyperkalemia  E87.5  Potassium 4.1.  Follow-up BMP on Thursday.   4. Paroxysmal atrial fibrillation (H)  I48.0  Rate controlled with Coreg.  Continues on chronic anticoagulation with Coumadin.  INRs managed per the Coumadin clinic.   5. Acute on chronic heart failure with preserved ejection fraction (H)  I50.33  Compensated.  Continue to weigh daily.  Decrease Bumex.  Currently off his lisinopril and Aldactone.   6. Type 2 diabetes mellitus with other specified complication, with long-term current use of insulin (H)  E11.69  Blood sugar satisfactory.  Continues on glipizide and metformin.  Continue to monitor 4 times daily.    Z79.4      Okay for PT and OT eval and treat.    35 minutes spent of which greater than 50% was face to face communication with the patient about treatment for hypotension, reviewing medications and follow-up laboratory as well as collaborating with nursing staff and therapy.    This note has been dictated using voice recognition software. Any grammatical or context distortions are unintentional and inherent to the software    Electronically signed by: Jaimie Joe CNP

## 2022-05-27 NOTE — PROGRESS NOTES
[facility] GERIATRIC SERVICES    Code Status:  FULL CODE   Visit Type:   Chief Complaint   Patient presents with     Nursing Home Acute     TCU Follow up     Facility:  [facility] ([facility]) [[id number]]           Transitional Care Course: Robinson Medel is a 89 year old male who I am seeing today for follow up on the TCU.  Patient recently hospitalized on 5/19/2022 secondary to hypotension.  Past medical history includes hypertension, CKD, proximal atrial fib, HFpEF, diabetes mellitus type 2 and chronic venous stasis.  Patient presented with symptomatic hypotension.  Patient had been hospitalized the previous month for worsening CHF and had been taking diuretics.  Patient with poor oral intake.  He was found to have acute kidney injury on chronic kidney disease with severe hypotension.  His diuretics were held.  He was treated with IV fluids and this resolved.  He was discharged home on Coreg and Bumex was restarted.  His lisinopril and Aldactone were discontinued.  Atrial fib rate controlled with carvedilol.  He continues on chronic anticoagulation with Coumadin.  Diabetes mellitus type 2.  Continues on metformin and glipizide.  Chronic venous stasis with lower extremity edema.    On today's visit patient sitting up in bedside chair. Pt wife and daughter present on visit. Hypotension. Bumex decreased to 0.5 mg. BP improved. Pt with underlying CHF. Pt weight stable. 1+ pedal  edema. Lungs CTA. Stage II PU on his coccyx with topical treatment. CKD. Creatine 1.21. Kt 4.1. A fib rate controlled with carvedilol. He continues on chronic anticoagulation with Coumadin. INRs managed per the coumadin clinic. DM type II controlled with metformin and glipizide.         Active Ambulatory Problems     Diagnosis Date Noted     Venous stasis dermatitis       Atherosclerotic heart disease of native coronary artery without angina pectoris 09/28/2015     Essential hypertension 09/24/2015     Gastroduodenal ulcer 09/24/2015      Peripheral blood vessel disorder (H) 09/24/2015     Paroxysmal atrial fib -- on Warfarin       Lymphedema 05/06/2016     Weakness 05/06/2016     Hypotension due to drugs 05/06/2016     Hypotension, unspecified hypotension type      Duodenitis 05/11/2016     Anemia 06/15/2016     Cecal cancer (H)      S/P right hemicolectomy      Gastrointestinal hemorrhage with melena 07/08/2016     Blood in stool      Lactic acid acidosis      Peripheral venous insufficiency 08/07/2017     Shortness of breath 02/01/2018     Legionella infection (H)      UTI (urinary tract infection)      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) 05/17/2018     Gastroesophageal reflux disease with esophagitis 12/04/2018     Hyperkalemia 12/04/2018     Long term current use of insulin (H) 12/04/2018     Malignant tumor of ascending colon (H) 12/04/2018     Mixed hyperlipidemia 12/04/2018     Tricuspid valve disorder 12/04/2018     Normocytic normochromic anemia 12/04/2018     Venous hypertension 12/04/2018     Peripheral vascular disease (H) 12/04/2018     Hyperglycemia due to type 2 diabetes mellitus (H) 09/24/2015     DM type 2, Hgb A1C 7.4 on 3/31/22 09/24/2015     Venous stasis 11/27/2019     Hearing loss 01/11/2016     PVC's (premature ventricular contractions) 01/28/2016     Malaise and fatigue 09/24/2015     Abnormal EKG 09/24/2015     Age-related cataract 10/26/2021     Amnesia 10/26/2021     Severe bilateral hearing loss 10/26/2021     Chronic idiopathic constipation 10/26/2021     Slow transit constipation 10/26/2021     Chronic kidney disease, stage 3a (H) 03/31/2022     Hypokalemia 03/31/2022     Hypomagnesemia 04/01/2022     Severe Pulm HTN -- PAP 60-65 mmHg plus RAP on Echo 3/31/22 04/07/2022     (HFpEF) heart failure with preserved ejection fraction (H) 04/14/2022     Orthostatic hypotension 05/19/2022     Lactic acidosis 05/19/2022     Severe sepsis (H) 05/19/2022     Acute renal failure, unspecified acute renal failure  type (H) 05/19/2022     Acute on chronic diastolic heart failure (H) 05/24/2022     Resolved Ambulatory Problems     Diagnosis Date Noted     Urinary bladder stone 02/10/2016     CAP (community acquired pneumonia) 02/01/2018     Anticoagulated on Coumadin 03/31/2022     Past Medical History:   Diagnosis Date     Coronary artery disease 9/25/2015     Diabetes mellitus (H)      Dyslipidemia      GERD (gastroesophageal reflux disease)      GI bleed 07/08/2016     Gout      Hematuria      Hyperlipemia      Hypertension      Kidney stone 10/6/2015       Current Outpatient Medications:      atorvastatin (LIPITOR) 10 MG tablet, Take 10 mg by mouth At Bedtime, Disp: , Rfl:      bumetanide (BUMEX) 0.5 MG tablet, Take 2 tablets (1 mg) by mouth daily (Patient taking differently: Take 0.5 mg by mouth daily), Disp: , Rfl:      carvedilol (COREG) 3.125 MG tablet, Take 3.125 mg by mouth 2 times daily (with meals), Disp: , Rfl:      [employer] IRON 240 (27 Fe) MG TABS, Take 1 tablet by mouth daily, Disp: , Rfl:      glipiZIDE (GLUCOTROL XL) 2.5 MG 24 hr tablet, Take 2 tablets (5 mg) by mouth daily, Disp: , Rfl:      insulin aspart (NOVOLOG FLEXPEN) 100 UNIT/ML pen, 3 times a day with meals, for glucometer 150-200 give 2 units SQ, 201-250 give 4 units, >250 give 6 units, Disp: 15 mL, Rfl: 0     insulin glargine (LANTUS PEN) 100 UNIT/ML pen, Inject 15 Units Subcutaneous At Bedtime (Patient taking differently: Inject 15 Units Subcutaneous At Bedtime), Disp: 15 mL, Rfl: 0     magnesium hydroxide (MILK OF MAGNESIA) 400 MG/5ML suspension, Take 30 mLs by mouth daily as needed for constipation, Disp: , Rfl: 0     metFORMIN (GLUCOPHAGE) 500 MG tablet, Take 1 tablet (500 mg) by mouth daily (with breakfast), Disp: , Rfl: 0     multivitamin with minerals tablet, Take 1 tablet by mouth daily, Disp: , Rfl:      omeprazole (PRILOSEC) 20 MG capsule, Take 20 mg by mouth daily, Disp: , Rfl:      potassium chloride ER (KLOR-CON M) 20 MEQ CR tablet, Take 20  mEq by mouth daily, Disp: , Rfl:      senna-docusate (SENOKOT-S/PERICOLACE) 8.6-50 MG tablet, Take 1 tablet by mouth 2 times daily (Patient taking differently: Take 1 tablet by mouth 2 times daily), Disp: , Rfl: 0     traZODone (DESYREL) 50 MG tablet, Take 0.5 tablets (25 mg) by mouth nightly as needed for sleep (Patient taking differently: Take 25 mg by mouth nightly as needed for sleep), Disp: , Rfl: 0     warfarin ANTICOAGULANT (COUMADIN) 5 MG tablet, 5 mg daily except 7.5 mg on Sundays, check INR on 4/11 and adjust dose for desired INR of 2.0 to 3.0. (Patient taking differently: 5 mg daily except 7.5 mg on Sundays, check INR on 4/11 and adjust dose for desired INR of 2.0 to 3.0.), Disp: , Rfl: 0     Allergies   Allergen Reactions     Aspirin Nausea and Vomiting     GI upset  Other reaction(s): GI upset       All Meds and Allergies reviewed in the record at the facility and is the most up-to-date.      Current Outpatient Medications   Medication Sig     atorvastatin (LIPITOR) 10 MG tablet Take 10 mg by mouth At Bedtime     bumetanide (BUMEX) 0.5 MG tablet Take 2 tablets (1 mg) by mouth daily (Patient taking differently: Take 0.5 mg by mouth daily)     carvedilol (COREG) 3.125 MG tablet Take 3.125 mg by mouth 2 times daily (with meals)     CVS IRON 240 (27 Fe) MG TABS Take 1 tablet by mouth daily     glipiZIDE (GLUCOTROL XL) 2.5 MG 24 hr tablet Take 2 tablets (5 mg) by mouth daily     insulin aspart (NOVOLOG FLEXPEN) 100 UNIT/ML pen 3 times a day with meals, for glucometer 150-200 give 2 units SQ, 201-250 give 4 units, >250 give 6 units     insulin glargine (LANTUS PEN) 100 UNIT/ML pen Inject 15 Units Subcutaneous At Bedtime (Patient taking differently: Inject 15 Units Subcutaneous At Bedtime)     magnesium hydroxide (MILK OF MAGNESIA) 400 MG/5ML suspension Take 30 mLs by mouth daily as needed for constipation     metFORMIN (GLUCOPHAGE) 500 MG tablet Take 1 tablet (500 mg) by mouth daily (with breakfast)      "multivitamin with minerals tablet Take 1 tablet by mouth daily     omeprazole (PRILOSEC) 20 MG capsule Take 20 mg by mouth daily     potassium chloride ER (KLOR-CON M) 20 MEQ CR tablet Take 20 mEq by mouth daily     senna-docusate (SENOKOT-S/PERICOLACE) 8.6-50 MG tablet Take 1 tablet by mouth 2 times daily (Patient taking differently: Take 1 tablet by mouth 2 times daily)     traZODone (DESYREL) 50 MG tablet Take 0.5 tablets (25 mg) by mouth nightly as needed for sleep (Patient taking differently: Take 25 mg by mouth nightly as needed for sleep)     warfarin ANTICOAGULANT (COUMADIN) 5 MG tablet 5 mg daily except 7.5 mg on Sundays, check INR on 4/11 and adjust dose for desired INR of 2.0 to 3.0. (Patient taking differently: 5 mg daily except 7.5 mg on Sundays, check INR on 4/11 and adjust dose for desired INR of 2.0 to 3.0.)     No current facility-administered medications for this visit.       REVIEW OF SYSTEMS:   Review of Systems  No fevers or chills. No headache, lightheadedness or dizziness.  Denies SOB, chest pains or palpitations. Appetite is fair. No nausea, vomiting, constipation or diarrhea. No dysuria, frequency, burning or pain with urination.  Chronic lower extremity edema.    PHYSICAL EXAMINATION:  Physical Exam     Vital signs: /87   Pulse 56   Temp 97.8  F (36.6  C)   Resp 16   Ht 1.854 m (6' 1\")   Wt 84.1 kg (185 lb 6.4 oz)   SpO2 97%   BMI 24.46 kg/m    General: Awake, Alert, oriented x3, follows simple commands  HEENT: Pink conjunctiva, anicteric sclerae, dry oral mucosa  NECK: Supple  CVS:  S1  S2, without murmur or gallop.   LUNG: Clear to auscultation, No wheezes, rales or rhonci.  BACK: No kyphosis of the thoracic spine  ABDOMEN: Soft, nontender to palpation, with positive bowel sounds  EXTREMITIES: Moves both upper and lower extremities with generalized weakness, 1+ pedal edema, no calf tenderness  SKIN: Stage II pressure ulcer to his coccyx not observed.    NEUROLOGIC: Intact, " pulses palpable  PSYCHIATRIC: Cognitive impairment noted.       Labs:  All labs reviewed in the nursing home record and Epic   @  Lab Results   Component Value Date    WBC 6.4 05/19/2022     Lab Results   Component Value Date    RBC 4.30 05/19/2022     Lab Results   Component Value Date    HGB 11.9 05/20/2022     Lab Results   Component Value Date    HCT 38.1 05/19/2022     Lab Results   Component Value Date    MCV 89 05/19/2022     Lab Results   Component Value Date    MCH 30.9 05/19/2022     Lab Results   Component Value Date    MCHC 34.9 05/19/2022     Lab Results   Component Value Date    RDW 15.9 05/19/2022     Lab Results   Component Value Date     05/19/2022        @Last Comprehensive Metabolic Panel:  Sodium   Date Value Ref Range Status   05/26/2022 137 136 - 145 mmol/L Final     Potassium   Date Value Ref Range Status   05/26/2022 4.1 3.5 - 5.0 mmol/L Final     Chloride   Date Value Ref Range Status   05/26/2022 101 98 - 107 mmol/L Final     Carbon Dioxide (CO2)   Date Value Ref Range Status   05/26/2022 28 22 - 31 mmol/L Final     Anion Gap   Date Value Ref Range Status   05/26/2022 8 5 - 18 mmol/L Final     Glucose   Date Value Ref Range Status   05/26/2022 217 (H) 70 - 125 mg/dL Final     Urea Nitrogen   Date Value Ref Range Status   05/26/2022 24 8 - 28 mg/dL Final     Creatinine   Date Value Ref Range Status   05/26/2022 1.21 0.70 - 1.30 mg/dL Final     GFR Estimate   Date Value Ref Range Status   05/26/2022 56 (L) >60 mL/min/1.73m2 Final     Comment:     Effective December 21, 2021 eGFRcr in adults is calculated using the 2021 CKD-EPI creatinine equation which includes age and gender (Dylan maldonado al., NEJM, DOI: 10.1056/KPPWeb5056114)   12/30/2020 56 (L) >60 mL/min/1.73m2 Final     Calcium   Date Value Ref Range Status   05/26/2022 9.5 8.5 - 10.5 mg/dL Final     Assessment/plan:      ICD-10-CM    1. Idiopathic hypotension  I95.0 Bumex reduced to 0.5 daily.  BP improved.   Continue to monitor  blood pressure every shift.  KEMI hose on in the a.m. to help with volume support.  Add additional 240 cc of fluid with each med Pass.   2. Acute kidney injury superimposed on chronic kidney disease (H)  N17.9  Creatinine now 1.21.       N18.9    3. Hyperkalemia  E87.5  Potassium 4.1.    4. Paroxysmal atrial fibrillation (H)  I48.0  Rate controlled with Coreg.  Continues on chronic anticoagulation with Coumadin.  INRs managed per the Coumadin clinic.   5. Acute on chronic heart failure with preserved ejection fraction (H)  I50.33  Compensated.  Continue to weigh daily.  Bumex decreased.    Currently off his lisinopril and Aldactone.   6. Type 2 diabetes mellitus with other specified complication, with long-term current use of insulin (H)  E11.69  Blood sugar satisfactory.  Continues on glipizide and metformin.  Continue to monitor 4 times daily.    Z79.4        This note has been dictated using voice recognition software. Any grammatical or context distortions are unintentional and inherent to the software    Electronically signed by: Jaimie Joe, CNP

## 2022-05-27 NOTE — PROGRESS NOTES
ANTICOAGULATION MANAGEMENT     Robinson Medel 89 year old male is on warfarin with supratherapeutic INR result. (Goal INR 2.0-3.0)    Recent labs: (last 7 days)     05/27/22  0000   INR 3.1*       ASSESSMENT       Source(s): Chart Review and Home Care/Facility Nurse       Warfarin doses taken: Warfarin taken as instructed    Diet: No new diet changes identified    New illness, injury, or hospitalization: Yes: 5/19-5/23; hypotension, sepsis, recurrent UTI    Medication/supplement changes: None noted    Signs or symptoms of bleeding or clotting: No    Previous INR: Therapeutic last 2(+) visits    Additional findings: None       PLAN     Recommended plan for no diet, medication or health factor changes affecting INR     Dosing Instructions: partial hold then decrease your warfarin dose (6.7% change) with next INR in 4 days       Summary  As of 5/27/2022    Full warfarin instructions:  5/27: 2.5 mg; Otherwise 5 mg every day   Next INR check:  5/31/2022             Telephone call with [facility] home care/facility nurse who agrees to plan and repeated back plan correctly    Orders given to  Homecare nurse/facility to recheck    Education provided: Please call back if any changes to your diet, medications or how you've been taking warfarin    Plan made per [facility] anticoagulation protocol    Belgica Santamaria, RN  Anticoagulation Clinic  5/27/2022    _______________________________________________________________________     Anticoagulation Episode Summary     Current INR goal:  2.0-3.0   TTR:  --   Target end date:  Indefinite   Send INR reminders to:  [facility] (TCU/LTC/assisted)    Indications    Paroxysmal atrial fib -- on Warfarin  [I48.0]           Comments:  [facility] [phone]         Anticoagulation Care Providers     Provider Role Specialty Phone number    Praneeth Marina DO Referring Family Medicine [phone]

## 2022-05-31 NOTE — PROGRESS NOTES
ANTICOAGULATION MANAGEMENT     Robinson Medel 89 year old male is on warfarin with therapeutic INR result. (Goal INR 2.0-3.0)    Recent labs: (last 7 days)     05/31/22  0000   INR 2.6*       ASSESSMENT       Source(s): Chart Review and Home Care/Facility Nurse       Warfarin doses taken: Warfarin taken as instructed    Diet: No new diet changes identified    New illness, injury, or hospitalization: No    Medication/supplement changes: None noted    Signs or symptoms of bleeding or clotting: No    Previous INR: Supratherapeutic    Additional findings: None       PLAN     Recommended plan for no diet, medication or health factor changes affecting INR     Dosing Instructions: continue your current warfarin dose with next INR in 3 days       Summary  As of 5/31/2022    Full warfarin instructions:  5 mg every day   Next INR check:  6/3/2022             Telephone call with [facility] home care/facility nurse who agrees to plan and repeated back plan correctly    Orders given to  Homecare nurse/facility to recheck    Education provided: Please call back if any changes to your diet, medications or how you've been taking warfarin    Plan made per [facility] anticoagulation protocol    Belgica Santamaria RN  Anticoagulation Clinic  5/31/2022    _______________________________________________________________________     Anticoagulation Episode Summary     Current INR goal:  2.0-3.0   TTR:  --   Target end date:  Indefinite   Send INR reminders to:  [facility] (TCU/LTC/[facility])    Indications    Paroxysmal atrial fib -- on Warfarin  [I48.0]           Comments:  [facility] [phone]         Anticoagulation Care Providers     Provider Role Specialty Phone number    Praneeth Marina DO Referring Family Medicine [phone]

## 2022-05-31 NOTE — LETTER
5/31/2022        RE: Robinson Medel  [address] [place] [address]        [facility]    Code Status:  FULL CODE   Visit Type:   Chief Complaint   Patient presents with     Nursing Home Acute     TCU Follow up     Facility:  [facility] ([facility]) [[id number]]           Transitional Care Course: Robinson Medel is a 89 year old male who I am seeing today for follow up on the TCU.  Patient recently hospitalized on 5/19/2022 secondary to hypotension.  Past medical history includes hypertension, CKD, proximal atrial fib, HFpEF, diabetes mellitus type 2 and chronic venous stasis.  Patient presented with symptomatic hypotension.  Patient had been hospitalized the previous month for worsening CHF and had been taking diuretics.  Patient with poor oral intake.  He was found to have acute kidney injury on chronic kidney disease with severe hypotension.  His diuretics were held.  He was treated with IV fluids and this resolved.  He was discharged home on Coreg and Bumex was restarted.  His lisinopril and Aldactone were discontinued.  Atrial fib rate controlled with carvedilol.  He continues on chronic anticoagulation with Coumadin.  Diabetes mellitus type 2.  Continues on metformin and glipizide.  Chronic venous stasis with lower extremity edema.    On today's visit patient sitting up in bedside chair.  Patient with underlying acute on chronic congestive heart failure.  Patient is up about 10 pounds from admit.  He does report some shortness of breath with exertion.  Edema 2-3+ in his lower extremities.  Continues on Bumex 0.5 mg.  His Bumex was decreased during hospitalization secondary to hypotension.  Patient also previously on lisinopril and Aldactone.  BMP obtained today which was 433.  Appetite has greatly improved.  Blood pressure satisfactory.  Acute on chronic kidney disease.  BMP is pending.  Previous creatinine 1.21 with a potassium of 4.1.  Atrial fib rate controlled  with carvedilol.  He continues on Coumadin for anticoagulant.  INR 2.6.  Diabetes mellitus type 2.  Blood sugars occasionally elevated.  Again appetite improving.  Continues on metformin and glipizide.      Active Ambulatory Problems     Diagnosis Date Noted     Venous stasis dermatitis       Atherosclerotic heart disease of native coronary artery without angina pectoris 09/28/2015     Essential hypertension 09/24/2015     Gastroduodenal ulcer 09/24/2015     Peripheral blood vessel disorder (H) 09/24/2015     Paroxysmal atrial fib -- on Warfarin       Lymphedema 05/06/2016     Weakness 05/06/2016     Hypotension due to drugs 05/06/2016     Hypotension, unspecified hypotension type      Duodenitis 05/11/2016     Anemia 06/15/2016     Cecal cancer (H)      S/P right hemicolectomy      Gastrointestinal hemorrhage with melena 07/08/2016     Blood in stool      Lactic acid acidosis      Peripheral venous insufficiency 08/07/2017     Shortness of breath 02/01/2018     Legionella infection (H)      UTI (urinary tract infection)      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) 05/17/2018     Gastroesophageal reflux disease with esophagitis 12/04/2018     Hyperkalemia 12/04/2018     Long term current use of insulin (H) 12/04/2018     Malignant tumor of ascending colon (H) 12/04/2018     Mixed hyperlipidemia 12/04/2018     Tricuspid valve disorder 12/04/2018     Normocytic normochromic anemia 12/04/2018     Venous hypertension 12/04/2018     Peripheral vascular disease (H) 12/04/2018     Hyperglycemia due to type 2 diabetes mellitus (H) 09/24/2015     DM type 2, Hgb A1C 7.4 on 3/31/22 09/24/2015     Venous stasis 11/27/2019     Hearing loss 01/11/2016     PVC's (premature ventricular contractions) 01/28/2016     Malaise and fatigue 09/24/2015     Abnormal EKG 09/24/2015     Age-related cataract 10/26/2021     Amnesia 10/26/2021     Severe bilateral hearing loss 10/26/2021     Chronic idiopathic constipation 10/26/2021      Slow transit constipation 10/26/2021     Chronic kidney disease, stage 3a (H) 03/31/2022     Hypokalemia 03/31/2022     Hypomagnesemia 04/01/2022     Severe Pulm HTN -- PAP 60-65 mmHg plus RAP on Echo 3/31/22 04/07/2022     (HFpEF) heart failure with preserved ejection fraction (H) 04/14/2022     Orthostatic hypotension 05/19/2022     Lactic acidosis 05/19/2022     Severe sepsis (H) 05/19/2022     Acute renal failure, unspecified acute renal failure type (H) 05/19/2022     Acute on chronic diastolic heart failure (H) 05/24/2022     Resolved Ambulatory Problems     Diagnosis Date Noted     Urinary bladder stone 02/10/2016     CAP (community acquired pneumonia) 02/01/2018     Anticoagulated on Coumadin 03/31/2022     Past Medical History:   Diagnosis Date     Coronary artery disease 9/25/2015     Diabetes mellitus (H)      Dyslipidemia      GERD (gastroesophageal reflux disease)      GI bleed 07/08/2016     Gout      Hematuria      Hyperlipemia      Hypertension      Kidney stone 10/6/2015       Current Outpatient Medications:      atorvastatin (LIPITOR) 10 MG tablet, Take 10 mg by mouth At Bedtime, Disp: , Rfl:      bumetanide (BUMEX) 0.5 MG tablet, Take 2 tablets (1 mg) by mouth daily (Patient taking differently: Take 0.5 mg by mouth daily), Disp: , Rfl:      carvedilol (COREG) 3.125 MG tablet, Take 3.125 mg by mouth 2 times daily (with meals), Disp: , Rfl:      [employer] IRON 240 (27 Fe) MG TABS, Take 1 tablet by mouth daily, Disp: , Rfl:      glipiZIDE (GLUCOTROL XL) 2.5 MG 24 hr tablet, Take 2 tablets (5 mg) by mouth daily, Disp: , Rfl:      insulin aspart (NOVOLOG FLEXPEN) 100 UNIT/ML pen, 3 times a day with meals, for glucometer 150-200 give 2 units SQ, 201-250 give 4 units, >250 give 6 units, Disp: 15 mL, Rfl: 0     insulin glargine (LANTUS PEN) 100 UNIT/ML pen, Inject 15 Units Subcutaneous At Bedtime (Patient taking differently: Inject 15 Units Subcutaneous At Bedtime), Disp: 15 mL, Rfl: 0     magnesium  hydroxide (MILK OF MAGNESIA) 400 MG/5ML suspension, Take 30 mLs by mouth daily as needed for constipation, Disp: , Rfl: 0     metFORMIN (GLUCOPHAGE) 500 MG tablet, Take 1 tablet (500 mg) by mouth daily (with breakfast), Disp: , Rfl: 0     multivitamin with minerals tablet, Take 1 tablet by mouth daily, Disp: , Rfl:      omeprazole (PRILOSEC) 20 MG capsule, Take 20 mg by mouth daily, Disp: , Rfl:      potassium chloride ER (KLOR-CON M) 20 MEQ CR tablet, Take 20 mEq by mouth daily, Disp: , Rfl:      senna-docusate (SENOKOT-S/PERICOLACE) 8.6-50 MG tablet, Take 1 tablet by mouth 2 times daily (Patient taking differently: Take 1 tablet by mouth 2 times daily), Disp: , Rfl: 0     traZODone (DESYREL) 50 MG tablet, Take 0.5 tablets (25 mg) by mouth nightly as needed for sleep (Patient taking differently: Take 25 mg by mouth nightly as needed for sleep), Disp: , Rfl: 0     warfarin ANTICOAGULANT (COUMADIN) 5 MG tablet, 5 mg daily except 7.5 mg on Sundays, check INR on 4/11 and adjust dose for desired INR of 2.0 to 3.0. (Patient taking differently: 5 mg daily except 7.5 mg on Sundays, check INR on 4/11 and adjust dose for desired INR of 2.0 to 3.0.), Disp: , Rfl: 0     Allergies   Allergen Reactions     Aspirin Nausea and Vomiting     GI upset  Other reaction(s): GI upset       All Meds and Allergies reviewed in the record at the facility and is the most up-to-date.      Current Outpatient Medications   Medication Sig     atorvastatin (LIPITOR) 10 MG tablet Take 10 mg by mouth At Bedtime     bumetanide (BUMEX) 0.5 MG tablet Take 2 tablets (1 mg) by mouth daily (Patient taking differently: Take 0.5 mg by mouth daily)     carvedilol (COREG) 3.125 MG tablet Take 3.125 mg by mouth 2 times daily (with meals)     [employer]S IRON 240 (27 Fe) MG TABS Take 1 tablet by mouth daily     glipiZIDE (GLUCOTROL XL) 2.5 MG 24 hr tablet Take 2 tablets (5 mg) by mouth daily     insulin aspart (NOVOLOG FLEXPEN) 100 UNIT/ML pen 3 times a day with meals,  "for glucometer 150-200 give 2 units SQ, 201-250 give 4 units, >250 give 6 units     insulin glargine (LANTUS PEN) 100 UNIT/ML pen Inject 15 Units Subcutaneous At Bedtime (Patient taking differently: Inject 15 Units Subcutaneous At Bedtime)     magnesium hydroxide (MILK OF MAGNESIA) 400 MG/5ML suspension Take 30 mLs by mouth daily as needed for constipation     metFORMIN (GLUCOPHAGE) 500 MG tablet Take 1 tablet (500 mg) by mouth daily (with breakfast)     multivitamin with minerals tablet Take 1 tablet by mouth daily     omeprazole (PRILOSEC) 20 MG capsule Take 20 mg by mouth daily     potassium chloride ER (KLOR-CON M) 20 MEQ CR tablet Take 20 mEq by mouth daily     senna-docusate (SENOKOT-S/PERICOLACE) 8.6-50 MG tablet Take 1 tablet by mouth 2 times daily (Patient taking differently: Take 1 tablet by mouth 2 times daily)     traZODone (DESYREL) 50 MG tablet Take 0.5 tablets (25 mg) by mouth nightly as needed for sleep (Patient taking differently: Take 25 mg by mouth nightly as needed for sleep)     warfarin ANTICOAGULANT (COUMADIN) 5 MG tablet 5 mg daily except 7.5 mg on Sundays, check INR on 4/11 and adjust dose for desired INR of 2.0 to 3.0. (Patient taking differently: 5 mg daily except 7.5 mg on Sundays, check INR on 4/11 and adjust dose for desired INR of 2.0 to 3.0.)     No current facility-administered medications for this visit.       REVIEW OF SYSTEMS:   Review of Systems  No fevers or chills. No headache, lightheadedness or dizziness.  Occ. SOB, no chest pains or palpitations. Appetite is improving. No nausea, vomiting, constipation or diarrhea. No dysuria, frequency, burning or pain with urination.  Chronic lower extremity edema.    PHYSICAL EXAMINATION:  Physical Exam     Vital signs: /81   Pulse 77   Temp 97.3  F (36.3  C)   Resp 16   Ht 1.854 m (6' 1\")   Wt 87.8 kg (193 lb 9.6 oz)   SpO2 95%   BMI 25.54 kg/m    General: Awake, Alert, oriented x3, follows simple commands  HEENT: Pink " conjunctiva, anicteric sclerae, dry oral mucosa  NECK: Supple  CVS:  S1  S2, without murmur or gallop.   LUNG: Clear to auscultation, No wheezes, rales or rhonci.  BACK: No kyphosis of the thoracic spine  ABDOMEN: Soft, nontender to palpation, with positive bowel sounds  EXTREMITIES: Moves both upper and lower extremities with generalized weakness, 2-3+ pedal edema, no calf tenderness  SKIN: Stage II pressure ulcer to his coccyx not observed.    NEUROLOGIC: Intact, pulses palpable  PSYCHIATRIC: Cognitive impairment noted.       Labs:  All labs reviewed in the nursing home record and Epic   @  Lab Results   Component Value Date    WBC 6.4 05/19/2022     Lab Results   Component Value Date    RBC 4.30 05/19/2022     Lab Results   Component Value Date    HGB 11.9 05/20/2022     Lab Results   Component Value Date    HCT 38.1 05/19/2022     Lab Results   Component Value Date    MCV 89 05/19/2022     Lab Results   Component Value Date    MCH 30.9 05/19/2022     Lab Results   Component Value Date    MCHC 34.9 05/19/2022     Lab Results   Component Value Date    RDW 15.9 05/19/2022     Lab Results   Component Value Date     05/19/2022        @Last Comprehensive Metabolic Panel:  Sodium   Date Value Ref Range Status   05/26/2022 137 136 - 145 mmol/L Final     Potassium   Date Value Ref Range Status   05/26/2022 4.1 3.5 - 5.0 mmol/L Final     Chloride   Date Value Ref Range Status   05/26/2022 101 98 - 107 mmol/L Final     Carbon Dioxide (CO2)   Date Value Ref Range Status   05/26/2022 28 22 - 31 mmol/L Final     Anion Gap   Date Value Ref Range Status   05/26/2022 8 5 - 18 mmol/L Final     Glucose   Date Value Ref Range Status   05/26/2022 217 (H) 70 - 125 mg/dL Final     Urea Nitrogen   Date Value Ref Range Status   05/26/2022 24 8 - 28 mg/dL Final     Creatinine   Date Value Ref Range Status   05/26/2022 1.21 0.70 - 1.30 mg/dL Final     GFR Estimate   Date Value Ref Range Status   05/26/2022 56 (L) >60 mL/min/1.73m2  Final     Comment:     Effective December 21, 2021 eGFRcr in adults is calculated using the 2021 CKD-EPI creatinine equation which includes age and gender (Dylan et al., NEJ, DOI: 10.1056/HAJIkl5010541)   12/30/2020 56 (L) >60 mL/min/1.73m2 Final     Calcium   Date Value Ref Range Status   05/26/2022 9.5 8.5 - 10.5 mg/dL Final     Assessment/plan:      ICD-10-CM    1. Acute on chronic heart failure with preserved ejection fraction (H)  I50.33  Weight up 10 pounds.  Occasional shortness of breath with exertion.  Patient previously on Bumex, lisinopril and Aldactone.  Additional 0.5 mg of Bumex given today to equal 1 mg.  BNP in the 400s.  Continue daily weights.  DC additional to 40 cc of fluids with med Pass.     2. Acute kidney injury superimposed on chronic kidney disease (H)  N17.9  BMP pending today.  Previous creatinine 1.21.  Potassium 4.1.    N18.9    3. Paroxysmal atrial fibrillation (H)  I48.0  Rate controlled with carvedilol.  INRs managed per the Coumadin clinic.  INR today 2.6.   4. Type 2 diabetes mellitus with other specified complication, with long-term current use of insulin (H)  E11.69  Appetite improving.  Blood sugars occasionally elevated.  Continues on metformin and glimepiride.    Z79.4    5. Essential hypertension  I10  overall blood pressures improved.       This note has been dictated using voice recognition software. Any grammatical or context distortions are unintentional and inherent to the software    Electronically signed by: Jaimie Joe CNP           Sincerely,        Jaimie Joe NP

## 2022-06-01 NOTE — LETTER
6/1/2022        RE: Robinson Medel  [address] [place] [address]        [facility]    Code Status:  FULL CODE   Visit Type:   Chief Complaint   Patient presents with     Nursing Home Acute     TCU Follow up     Facility:  [facility] ([facility]) [[id number]]           Transitional Care Course: Robinson Medel is a 89 year old male who I am seeing today for follow up on the TCU.  Patient recently hospitalized on 5/19/2022 secondary to hypotension.  Past medical history includes hypertension, CKD, proximal atrial fib, HFpEF, diabetes mellitus type 2 and chronic venous stasis.  Patient presented with symptomatic hypotension.  Patient had been hospitalized the previous month for worsening CHF and had been taking diuretics.  Patient with poor oral intake.  He was found to have acute kidney injury on chronic kidney disease with severe hypotension.  His diuretics were held.  He was treated with IV fluids and this resolved.  He was discharged home on Coreg and Bumex was restarted.  His lisinopril and Aldactone were discontinued.  Atrial fib rate controlled with carvedilol.  He continues on chronic anticoagulation with Coumadin.  Diabetes mellitus type 2.  Continues on metformin and glipizide.  Chronic venous stasis with lower extremity edema.    On today's visit patient sitting up in bedside chair. Patient with underlying acute on chronic congestive heart failure. .  Patient is up about 10 pounds from admit. Occ. shortness of breath with exertion.  Edema 3+ in his lower extremities now with weeping. Yesterday his Bumex was increased to 1 mg. Lungs are clear. Look back pt with previously on Bumex 2 mg daily and spironolactone 25 mg daily. He was also on Lisinopril 2.5 mg. This was recently discontinued due to LUCY due to poor nutrition.  Creatinine 1.21 with a potassium of 4.1. Appetite greatly improved.  Atrial fib rate controlled with carvedilol.  He continues on Coumadin  for anticoagulant. Diabetes mellitus type 2.  Blood sugars occasionally elevated. Continues on metformin and glipizide.      Active Ambulatory Problems     Diagnosis Date Noted     Venous stasis dermatitis       Atherosclerotic heart disease of native coronary artery without angina pectoris 09/28/2015     Essential hypertension 09/24/2015     Gastroduodenal ulcer 09/24/2015     Peripheral blood vessel disorder (H) 09/24/2015     Paroxysmal atrial fib -- on Warfarin       Lymphedema 05/06/2016     Weakness 05/06/2016     Hypotension due to drugs 05/06/2016     Hypotension, unspecified hypotension type      Duodenitis 05/11/2016     Anemia 06/15/2016     Cecal cancer (H)      S/P right hemicolectomy      Gastrointestinal hemorrhage with melena 07/08/2016     Blood in stool      Lactic acid acidosis      Peripheral venous insufficiency 08/07/2017     Shortness of breath 02/01/2018     Legionella infection (H)      UTI (urinary tract infection)      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) 05/17/2018     Gastroesophageal reflux disease with esophagitis 12/04/2018     Hyperkalemia 12/04/2018     Long term current use of insulin (H) 12/04/2018     Malignant tumor of ascending colon (H) 12/04/2018     Mixed hyperlipidemia 12/04/2018     Tricuspid valve disorder 12/04/2018     Normocytic normochromic anemia 12/04/2018     Venous hypertension 12/04/2018     Peripheral vascular disease (H) 12/04/2018     Hyperglycemia due to type 2 diabetes mellitus (H) 09/24/2015     DM type 2, Hgb A1C 7.4 on 3/31/22 09/24/2015     Venous stasis 11/27/2019     Hearing loss 01/11/2016     PVC's (premature ventricular contractions) 01/28/2016     Malaise and fatigue 09/24/2015     Abnormal EKG 09/24/2015     Age-related cataract 10/26/2021     Amnesia 10/26/2021     Severe bilateral hearing loss 10/26/2021     Chronic idiopathic constipation 10/26/2021     Slow transit constipation 10/26/2021     Chronic kidney disease, stage 3a (H)  03/31/2022     Hypokalemia 03/31/2022     Hypomagnesemia 04/01/2022     Severe Pulm HTN -- PAP 60-65 mmHg plus RAP on Echo 3/31/22 04/07/2022     (HFpEF) heart failure with preserved ejection fraction (H) 04/14/2022     Orthostatic hypotension 05/19/2022     Lactic acidosis 05/19/2022     Severe sepsis (H) 05/19/2022     Acute renal failure, unspecified acute renal failure type (H) 05/19/2022     Acute on chronic diastolic heart failure (H) 05/24/2022     Resolved Ambulatory Problems     Diagnosis Date Noted     Urinary bladder stone 02/10/2016     CAP (community acquired pneumonia) 02/01/2018     Anticoagulated on Coumadin 03/31/2022     Past Medical History:   Diagnosis Date     Coronary artery disease 9/25/2015     Diabetes mellitus (H)      Dyslipidemia      GERD (gastroesophageal reflux disease)      GI bleed 07/08/2016     Gout      Hematuria      Hyperlipemia      Hypertension      Kidney stone 10/6/2015       Current Outpatient Medications:      spironolactone (ALDACTONE) 25 MG tablet, Take 25 mg by mouth daily, Disp: , Rfl:      atorvastatin (LIPITOR) 10 MG tablet, Take 10 mg by mouth At Bedtime, Disp: , Rfl:      bumetanide (BUMEX) 0.5 MG tablet, Take 2 tablets (1 mg) by mouth daily, Disp: , Rfl:      carvedilol (COREG) 3.125 MG tablet, Take 3.125 mg by mouth 2 times daily (with meals), Disp: , Rfl:      [employer] IRON 240 (27 Fe) MG TABS, Take 1 tablet by mouth daily, Disp: , Rfl:      glipiZIDE (GLUCOTROL XL) 2.5 MG 24 hr tablet, Take 2 tablets (5 mg) by mouth daily, Disp: , Rfl:      insulin aspart (NOVOLOG FLEXPEN) 100 UNIT/ML pen, 3 times a day with meals, for glucometer 150-200 give 2 units SQ, 201-250 give 4 units, >250 give 6 units, Disp: 15 mL, Rfl: 0     insulin glargine (LANTUS PEN) 100 UNIT/ML pen, Inject 15 Units Subcutaneous At Bedtime (Patient taking differently: Inject 20 Units Subcutaneous At Bedtime), Disp: 15 mL, Rfl: 0     magnesium hydroxide (MILK OF MAGNESIA) 400 MG/5ML suspension, Take 30  mLs by mouth daily as needed for constipation, Disp: , Rfl: 0     metFORMIN (GLUCOPHAGE) 500 MG tablet, Take 1 tablet (500 mg) by mouth daily (with breakfast), Disp: , Rfl: 0     multivitamin with minerals tablet, Take 1 tablet by mouth daily, Disp: , Rfl:      omeprazole (PRILOSEC) 20 MG capsule, Take 20 mg by mouth daily, Disp: , Rfl:      potassium chloride ER (KLOR-CON M) 20 MEQ CR tablet, Take 20 mEq by mouth daily, Disp: , Rfl:      senna-docusate (SENOKOT-S/PERICOLACE) 8.6-50 MG tablet, Take 1 tablet by mouth 2 times daily (Patient taking differently: Take 1 tablet by mouth 2 times daily), Disp: , Rfl: 0     traZODone (DESYREL) 50 MG tablet, Take 0.5 tablets (25 mg) by mouth nightly as needed for sleep (Patient taking differently: Take 25 mg by mouth nightly as needed for sleep), Disp: , Rfl: 0     warfarin ANTICOAGULANT (COUMADIN) 5 MG tablet, 5 mg daily except 7.5 mg on Sundays, check INR on 4/11 and adjust dose for desired INR of 2.0 to 3.0. (Patient taking differently: 5 mg daily except 7.5 mg on Sundays, check INR on 4/11 and adjust dose for desired INR of 2.0 to 3.0.), Disp: , Rfl: 0     Allergies   Allergen Reactions     Aspirin Nausea and Vomiting     GI upset  Other reaction(s): GI upset       All Meds and Allergies reviewed in the record at the facility and is the most up-to-date.      Current Outpatient Medications   Medication Sig     spironolactone (ALDACTONE) 25 MG tablet Take 25 mg by mouth daily     atorvastatin (LIPITOR) 10 MG tablet Take 10 mg by mouth At Bedtime     bumetanide (BUMEX) 0.5 MG tablet Take 2 tablets (1 mg) by mouth daily     carvedilol (COREG) 3.125 MG tablet Take 3.125 mg by mouth 2 times daily (with meals)     CVS IRON 240 (27 Fe) MG TABS Take 1 tablet by mouth daily     glipiZIDE (GLUCOTROL XL) 2.5 MG 24 hr tablet Take 2 tablets (5 mg) by mouth daily     insulin aspart (NOVOLOG FLEXPEN) 100 UNIT/ML pen 3 times a day with meals, for glucometer 150-200 give 2 units SQ,  "201-250 give 4 units, >250 give 6 units     insulin glargine (LANTUS PEN) 100 UNIT/ML pen Inject 15 Units Subcutaneous At Bedtime (Patient taking differently: Inject 20 Units Subcutaneous At Bedtime)     magnesium hydroxide (MILK OF MAGNESIA) 400 MG/5ML suspension Take 30 mLs by mouth daily as needed for constipation     metFORMIN (GLUCOPHAGE) 500 MG tablet Take 1 tablet (500 mg) by mouth daily (with breakfast)     multivitamin with minerals tablet Take 1 tablet by mouth daily     omeprazole (PRILOSEC) 20 MG capsule Take 20 mg by mouth daily     potassium chloride ER (KLOR-CON M) 20 MEQ CR tablet Take 20 mEq by mouth daily     senna-docusate (SENOKOT-S/PERICOLACE) 8.6-50 MG tablet Take 1 tablet by mouth 2 times daily (Patient taking differently: Take 1 tablet by mouth 2 times daily)     traZODone (DESYREL) 50 MG tablet Take 0.5 tablets (25 mg) by mouth nightly as needed for sleep (Patient taking differently: Take 25 mg by mouth nightly as needed for sleep)     warfarin ANTICOAGULANT (COUMADIN) 5 MG tablet 5 mg daily except 7.5 mg on Sundays, check INR on 4/11 and adjust dose for desired INR of 2.0 to 3.0. (Patient taking differently: 5 mg daily except 7.5 mg on Sundays, check INR on 4/11 and adjust dose for desired INR of 2.0 to 3.0.)     No current facility-administered medications for this visit.       REVIEW OF SYSTEMS:   Review of Systems  No fevers or chills. No headache, lightheadedness or dizziness.  Occ. SOB, no chest pains or palpitations. Appetite is improving. No nausea, vomiting, constipation or diarrhea. No dysuria, frequency, burning or pain with urination.  Chronic lower extremity edema with increase.     PHYSICAL EXAMINATION:  Physical Exam     Vital signs: /75   Pulse 66   Temp 97  F (36.1  C)   Resp 12   Ht 1.854 m (6' 1\")   Wt 89.4 kg (197 lb 3.2 oz)   SpO2 96%   BMI 26.02 kg/m    General: Awake, Alert, oriented x3, follows simple commands  HEENT: Pink conjunctiva, anicteric sclerae, " dry oral mucosa  NECK: Supple  CVS:  S1  S2, without murmur or gallop.   LUNG: Clear to auscultation, No wheezes, rales or rhonci.  BACK: No kyphosis of the thoracic spine  ABDOMEN: Soft, nontender to palpation, with positive bowel sounds  EXTREMITIES: Moves both upper and lower extremities with generalized weakness, 3+ pedal edema with weeping, no calf tenderness  SKIN: Stage II pressure ulcer to his coccyx not observed.    NEUROLOGIC: Intact, pulses palpable  PSYCHIATRIC: Cognitive impairment noted.       Labs:  All labs reviewed in the nursing home record and Epic   @  Lab Results   Component Value Date    WBC 6.4 05/19/2022     Lab Results   Component Value Date    RBC 4.30 05/19/2022     Lab Results   Component Value Date    HGB 11.9 05/20/2022     Lab Results   Component Value Date    HCT 38.1 05/19/2022     Lab Results   Component Value Date    MCV 89 05/19/2022     Lab Results   Component Value Date    MCH 30.9 05/19/2022     Lab Results   Component Value Date    MCHC 34.9 05/19/2022     Lab Results   Component Value Date    RDW 15.9 05/19/2022     Lab Results   Component Value Date     05/19/2022        @Last Comprehensive Metabolic Panel:  Sodium   Date Value Ref Range Status   05/31/2022 136 136 - 145 mmol/L Final     Potassium   Date Value Ref Range Status   05/31/2022 4.4 3.5 - 5.0 mmol/L Final     Chloride   Date Value Ref Range Status   05/31/2022 102 98 - 107 mmol/L Final     Carbon Dioxide (CO2)   Date Value Ref Range Status   05/31/2022 24 22 - 31 mmol/L Final     Anion Gap   Date Value Ref Range Status   05/31/2022 10 5 - 18 mmol/L Final     Glucose   Date Value Ref Range Status   05/31/2022 208 (H) 70 - 125 mg/dL Final     Urea Nitrogen   Date Value Ref Range Status   05/31/2022 21 8 - 28 mg/dL Final     Creatinine   Date Value Ref Range Status   05/31/2022 1.15 0.70 - 1.30 mg/dL Final     GFR Estimate   Date Value Ref Range Status   05/31/2022 60 (L) >60 mL/min/1.73m2 Final     Comment:      Effective December 21, 2021 eGFRcr in adults is calculated using the 2021 CKD-EPI creatinine equation which includes age and gender (Dylan et al., NEJ, DOI: 10.1056/UKPYwp4151742)   12/30/2020 56 (L) >60 mL/min/1.73m2 Final     Calcium   Date Value Ref Range Status   05/31/2022 8.6 8.5 - 10.5 mg/dL Final     Assessment/plan:      ICD-10-CM    1. Acute on chronic heart failure with preserved ejection fraction (H)  I50.33  Weight up 10 pounds.  Occasional shortness of breath with exertion.  Patient previously on Bumex, lisinopril and Aldactone.  Bumex 1 mg BID today.   Resume Aldactone at 25 mg every day.   Repeat BNP and BMP in am.   Continue daily weights.  Apply dry dressing to LE. Continue in tubi .      2. Acute kidney injury superimposed on chronic kidney disease (H)  N17.9 creatinine 1.15.  Potassium 4.4.    N18.9    3. Paroxysmal atrial fibrillation (H)  I48.0  Rate controlled with carvedilol.  INRs managed per the Coumadin clinic.   4. Type 2 diabetes mellitus with other specified complication, with long-term current use of insulin (H)  E11.69  Appetite improving.  Blood sugars occasionally elevated.  Continues on metformin and glimepiride.  Increase Lantus to 20 units.     Z79.4    5. Essential hypertension  I10  overall blood pressures improved.       This note has been dictated using voice recognition software. Any grammatical or context distortions are unintentional and inherent to the software    Electronically signed by: Jaimie Joe CNP           Sincerely,        Jaimie Joe NP

## 2022-06-01 NOTE — PROGRESS NOTES
[facility] GERIATRIC SERVICES    Code Status:  FULL CODE   Visit Type:   Chief Complaint   Patient presents with     Nursing Home Acute     TCU Follow up     Facility:  [facility] ([facility]) [[id number]]           Transitional Care Course: Robinson Medel is a 89 year old male who I am seeing today for follow up on the TCU.  Patient recently hospitalized on 5/19/2022 secondary to hypotension.  Past medical history includes hypertension, CKD, proximal atrial fib, HFpEF, diabetes mellitus type 2 and chronic venous stasis.  Patient presented with symptomatic hypotension.  Patient had been hospitalized the previous month for worsening CHF and had been taking diuretics.  Patient with poor oral intake.  He was found to have acute kidney injury on chronic kidney disease with severe hypotension.  His diuretics were held.  He was treated with IV fluids and this resolved.  He was discharged home on Coreg and Bumex was restarted.  His lisinopril and Aldactone were discontinued.  Atrial fib rate controlled with carvedilol.  He continues on chronic anticoagulation with Coumadin.  Diabetes mellitus type 2.  Continues on metformin and glipizide.  Chronic venous stasis with lower extremity edema.    On today's visit patient sitting up in bedside chair.  Patient with underlying acute on chronic congestive heart failure.  Patient is up about 10 pounds from admit.  He does report some shortness of breath with exertion.  Edema 2-3+ in his lower extremities.  Continues on Bumex 0.5 mg.  His Bumex was decreased during hospitalization secondary to hypotension.  Patient also previously on lisinopril and Aldactone.  BMP obtained today which was 433.  Appetite has greatly improved.  Blood pressure satisfactory.  Acute on chronic kidney disease.  BMP is pending.  Previous creatinine 1.21 with a potassium of 4.1.  Atrial fib rate controlled with carvedilol.  He continues on Coumadin for anticoagulant.  INR 2.6.  Diabetes mellitus type  2.  Blood sugars occasionally elevated.  Again appetite improving.  Continues on metformin and glipizide.      Active Ambulatory Problems     Diagnosis Date Noted     Venous stasis dermatitis       Atherosclerotic heart disease of native coronary artery without angina pectoris 09/28/2015     Essential hypertension 09/24/2015     Gastroduodenal ulcer 09/24/2015     Peripheral blood vessel disorder (H) 09/24/2015     Paroxysmal atrial fib -- on Warfarin       Lymphedema 05/06/2016     Weakness 05/06/2016     Hypotension due to drugs 05/06/2016     Hypotension, unspecified hypotension type      Duodenitis 05/11/2016     Anemia 06/15/2016     Cecal cancer (H)      S/P right hemicolectomy      Gastrointestinal hemorrhage with melena 07/08/2016     Blood in stool      Lactic acid acidosis      Peripheral venous insufficiency 08/07/2017     Shortness of breath 02/01/2018     Legionella infection (H)      UTI (urinary tract infection)      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) 05/17/2018     Gastroesophageal reflux disease with esophagitis 12/04/2018     Hyperkalemia 12/04/2018     Long term current use of insulin (H) 12/04/2018     Malignant tumor of ascending colon (H) 12/04/2018     Mixed hyperlipidemia 12/04/2018     Tricuspid valve disorder 12/04/2018     Normocytic normochromic anemia 12/04/2018     Venous hypertension 12/04/2018     Peripheral vascular disease (H) 12/04/2018     Hyperglycemia due to type 2 diabetes mellitus (H) 09/24/2015     DM type 2, Hgb A1C 7.4 on 3/31/22 09/24/2015     Venous stasis 11/27/2019     Hearing loss 01/11/2016     PVC's (premature ventricular contractions) 01/28/2016     Malaise and fatigue 09/24/2015     Abnormal EKG 09/24/2015     Age-related cataract 10/26/2021     Amnesia 10/26/2021     Severe bilateral hearing loss 10/26/2021     Chronic idiopathic constipation 10/26/2021     Slow transit constipation 10/26/2021     Chronic kidney disease, stage 3a (H) 03/31/2022      Hypokalemia 03/31/2022     Hypomagnesemia 04/01/2022     Severe Pulm HTN -- PAP 60-65 mmHg plus RAP on Echo 3/31/22 04/07/2022     (HFpEF) heart failure with preserved ejection fraction (H) 04/14/2022     Orthostatic hypotension 05/19/2022     Lactic acidosis 05/19/2022     Severe sepsis (H) 05/19/2022     Acute renal failure, unspecified acute renal failure type (H) 05/19/2022     Acute on chronic diastolic heart failure (H) 05/24/2022     Resolved Ambulatory Problems     Diagnosis Date Noted     Urinary bladder stone 02/10/2016     CAP (community acquired pneumonia) 02/01/2018     Anticoagulated on Coumadin 03/31/2022     Past Medical History:   Diagnosis Date     Coronary artery disease 9/25/2015     Diabetes mellitus (H)      Dyslipidemia      GERD (gastroesophageal reflux disease)      GI bleed 07/08/2016     Gout      Hematuria      Hyperlipemia      Hypertension      Kidney stone 10/6/2015       Current Outpatient Medications:      atorvastatin (LIPITOR) 10 MG tablet, Take 10 mg by mouth At Bedtime, Disp: , Rfl:      bumetanide (BUMEX) 0.5 MG tablet, Take 2 tablets (1 mg) by mouth daily (Patient taking differently: Take 0.5 mg by mouth daily), Disp: , Rfl:      carvedilol (COREG) 3.125 MG tablet, Take 3.125 mg by mouth 2 times daily (with meals), Disp: , Rfl:      [employer] IRON 240 (27 Fe) MG TABS, Take 1 tablet by mouth daily, Disp: , Rfl:      glipiZIDE (GLUCOTROL XL) 2.5 MG 24 hr tablet, Take 2 tablets (5 mg) by mouth daily, Disp: , Rfl:      insulin aspart (NOVOLOG FLEXPEN) 100 UNIT/ML pen, 3 times a day with meals, for glucometer 150-200 give 2 units SQ, 201-250 give 4 units, >250 give 6 units, Disp: 15 mL, Rfl: 0     insulin glargine (LANTUS PEN) 100 UNIT/ML pen, Inject 15 Units Subcutaneous At Bedtime (Patient taking differently: Inject 15 Units Subcutaneous At Bedtime), Disp: 15 mL, Rfl: 0     magnesium hydroxide (MILK OF MAGNESIA) 400 MG/5ML suspension, Take 30 mLs by mouth daily as needed for  constipation, Disp: , Rfl: 0     metFORMIN (GLUCOPHAGE) 500 MG tablet, Take 1 tablet (500 mg) by mouth daily (with breakfast), Disp: , Rfl: 0     multivitamin with minerals tablet, Take 1 tablet by mouth daily, Disp: , Rfl:      omeprazole (PRILOSEC) 20 MG capsule, Take 20 mg by mouth daily, Disp: , Rfl:      potassium chloride ER (KLOR-CON M) 20 MEQ CR tablet, Take 20 mEq by mouth daily, Disp: , Rfl:      senna-docusate (SENOKOT-S/PERICOLACE) 8.6-50 MG tablet, Take 1 tablet by mouth 2 times daily (Patient taking differently: Take 1 tablet by mouth 2 times daily), Disp: , Rfl: 0     traZODone (DESYREL) 50 MG tablet, Take 0.5 tablets (25 mg) by mouth nightly as needed for sleep (Patient taking differently: Take 25 mg by mouth nightly as needed for sleep), Disp: , Rfl: 0     warfarin ANTICOAGULANT (COUMADIN) 5 MG tablet, 5 mg daily except 7.5 mg on Sundays, check INR on 4/11 and adjust dose for desired INR of 2.0 to 3.0. (Patient taking differently: 5 mg daily except 7.5 mg on Sundays, check INR on 4/11 and adjust dose for desired INR of 2.0 to 3.0.), Disp: , Rfl: 0     Allergies   Allergen Reactions     Aspirin Nausea and Vomiting     GI upset  Other reaction(s): GI upset       All Meds and Allergies reviewed in the record at the facility and is the most up-to-date.      Current Outpatient Medications   Medication Sig     atorvastatin (LIPITOR) 10 MG tablet Take 10 mg by mouth At Bedtime     bumetanide (BUMEX) 0.5 MG tablet Take 2 tablets (1 mg) by mouth daily (Patient taking differently: Take 0.5 mg by mouth daily)     carvedilol (COREG) 3.125 MG tablet Take 3.125 mg by mouth 2 times daily (with meals)     [employer] IRON 240 (27 Fe) MG TABS Take 1 tablet by mouth daily     glipiZIDE (GLUCOTROL XL) 2.5 MG 24 hr tablet Take 2 tablets (5 mg) by mouth daily     insulin aspart (NOVOLOG FLEXPEN) 100 UNIT/ML pen 3 times a day with meals, for glucometer 150-200 give 2 units SQ, 201-250 give 4 units, >250 give 6 units     insulin  "glargine (LANTUS PEN) 100 UNIT/ML pen Inject 15 Units Subcutaneous At Bedtime (Patient taking differently: Inject 15 Units Subcutaneous At Bedtime)     magnesium hydroxide (MILK OF MAGNESIA) 400 MG/5ML suspension Take 30 mLs by mouth daily as needed for constipation     metFORMIN (GLUCOPHAGE) 500 MG tablet Take 1 tablet (500 mg) by mouth daily (with breakfast)     multivitamin with minerals tablet Take 1 tablet by mouth daily     omeprazole (PRILOSEC) 20 MG capsule Take 20 mg by mouth daily     potassium chloride ER (KLOR-CON M) 20 MEQ CR tablet Take 20 mEq by mouth daily     senna-docusate (SENOKOT-S/PERICOLACE) 8.6-50 MG tablet Take 1 tablet by mouth 2 times daily (Patient taking differently: Take 1 tablet by mouth 2 times daily)     traZODone (DESYREL) 50 MG tablet Take 0.5 tablets (25 mg) by mouth nightly as needed for sleep (Patient taking differently: Take 25 mg by mouth nightly as needed for sleep)     warfarin ANTICOAGULANT (COUMADIN) 5 MG tablet 5 mg daily except 7.5 mg on Sundays, check INR on 4/11 and adjust dose for desired INR of 2.0 to 3.0. (Patient taking differently: 5 mg daily except 7.5 mg on Sundays, check INR on 4/11 and adjust dose for desired INR of 2.0 to 3.0.)     No current facility-administered medications for this visit.       REVIEW OF SYSTEMS:   Review of Systems  No fevers or chills. No headache, lightheadedness or dizziness.  Occ. SOB, no chest pains or palpitations. Appetite is improving. No nausea, vomiting, constipation or diarrhea. No dysuria, frequency, burning or pain with urination.  Chronic lower extremity edema.    PHYSICAL EXAMINATION:  Physical Exam     Vital signs: /81   Pulse 77   Temp 97.3  F (36.3  C)   Resp 16   Ht 1.854 m (6' 1\")   Wt 87.8 kg (193 lb 9.6 oz)   SpO2 95%   BMI 25.54 kg/m    General: Awake, Alert, oriented x3, follows simple commands  HEENT: Pink conjunctiva, anicteric sclerae, dry oral mucosa  NECK: Supple  CVS:  S1  S2, without murmur or " gallop.   LUNG: Clear to auscultation, No wheezes, rales or rhonci.  BACK: No kyphosis of the thoracic spine  ABDOMEN: Soft, nontender to palpation, with positive bowel sounds  EXTREMITIES: Moves both upper and lower extremities with generalized weakness, 2-3+ pedal edema, no calf tenderness  SKIN: Stage II pressure ulcer to his coccyx not observed.    NEUROLOGIC: Intact, pulses palpable  PSYCHIATRIC: Cognitive impairment noted.       Labs:  All labs reviewed in the nursing home record and Epic   @  Lab Results   Component Value Date    WBC 6.4 05/19/2022     Lab Results   Component Value Date    RBC 4.30 05/19/2022     Lab Results   Component Value Date    HGB 11.9 05/20/2022     Lab Results   Component Value Date    HCT 38.1 05/19/2022     Lab Results   Component Value Date    MCV 89 05/19/2022     Lab Results   Component Value Date    MCH 30.9 05/19/2022     Lab Results   Component Value Date    MCHC 34.9 05/19/2022     Lab Results   Component Value Date    RDW 15.9 05/19/2022     Lab Results   Component Value Date     05/19/2022        @Last Comprehensive Metabolic Panel:  Sodium   Date Value Ref Range Status   05/26/2022 137 136 - 145 mmol/L Final     Potassium   Date Value Ref Range Status   05/26/2022 4.1 3.5 - 5.0 mmol/L Final     Chloride   Date Value Ref Range Status   05/26/2022 101 98 - 107 mmol/L Final     Carbon Dioxide (CO2)   Date Value Ref Range Status   05/26/2022 28 22 - 31 mmol/L Final     Anion Gap   Date Value Ref Range Status   05/26/2022 8 5 - 18 mmol/L Final     Glucose   Date Value Ref Range Status   05/26/2022 217 (H) 70 - 125 mg/dL Final     Urea Nitrogen   Date Value Ref Range Status   05/26/2022 24 8 - 28 mg/dL Final     Creatinine   Date Value Ref Range Status   05/26/2022 1.21 0.70 - 1.30 mg/dL Final     GFR Estimate   Date Value Ref Range Status   05/26/2022 56 (L) >60 mL/min/1.73m2 Final     Comment:     Effective December 21, 2021 eGFRcr in adults is calculated using the  2021 CKD-EPI creatinine equation which includes age and gender (Dylan et al., NEJ, DOI: 10.1056/TKJNgv6253287)   12/30/2020 56 (L) >60 mL/min/1.73m2 Final     Calcium   Date Value Ref Range Status   05/26/2022 9.5 8.5 - 10.5 mg/dL Final     Assessment/plan:      ICD-10-CM    1. Acute on chronic heart failure with preserved ejection fraction (H)  I50.33  Weight up 10 pounds.  Occasional shortness of breath with exertion.  Patient previously on Bumex, lisinopril and Aldactone.  Additional 0.5 mg of Bumex given today to equal 1 mg.  BNP in the 400s.  Continue daily weights.  DC additional to 40 cc of fluids with med Pass.     2. Acute kidney injury superimposed on chronic kidney disease (H)  N17.9  BMP pending today.  Previous creatinine 1.21.  Potassium 4.1.    N18.9    3. Paroxysmal atrial fibrillation (H)  I48.0  Rate controlled with carvedilol.  INRs managed per the Coumadin clinic.  INR today 2.6.   4. Type 2 diabetes mellitus with other specified complication, with long-term current use of insulin (H)  E11.69  Appetite improving.  Blood sugars occasionally elevated.  Continues on metformin and glimepiride.    Z79.4    5. Essential hypertension  I10  overall blood pressures improved.       This note has been dictated using voice recognition software. Any grammatical or context distortions are unintentional and inherent to the software    Electronically signed by: Jaimie Joe, CNP

## 2022-06-02 NOTE — PROGRESS NOTES
[facility] GERIATRIC SERVICES    Code Status:  FULL CODE   Visit Type:   Chief Complaint   Patient presents with     Nursing Home Acute     TCU Follow up     Facility:  [facility] ([facility]) [[id number]]           Transitional Care Course: Robinson Medel is a 89 year old male who I am seeing today for follow up on the TCU.  Patient recently hospitalized on 5/19/2022 secondary to hypotension.  Past medical history includes hypertension, CKD, proximal atrial fib, HFpEF, diabetes mellitus type 2 and chronic venous stasis.  Patient presented with symptomatic hypotension.  Patient had been hospitalized the previous month for worsening CHF and had been taking diuretics.  Patient with poor oral intake.  He was found to have acute kidney injury on chronic kidney disease with severe hypotension.  His diuretics were held.  He was treated with IV fluids and this resolved.  He was discharged home on Coreg and Bumex was restarted.  His lisinopril and Aldactone were discontinued.  Atrial fib rate controlled with carvedilol.  He continues on chronic anticoagulation with Coumadin.  Diabetes mellitus type 2.  Continues on metformin and glipizide.  Chronic venous stasis with lower extremity edema.    On today's visit patient sitting up in bedside chair. Patient with underlying acute on chronic congestive heart failure. .  Patient is up about 10 pounds from admit. Occ. shortness of breath with exertion.  Edema 3+ in his lower extremities now with weeping. Yesterday his Bumex was increased to 1 mg. Lungs are clear. Look back pt with previously on Bumex 2 mg daily and spironolactone 25 mg daily. He was also on Lisinopril 2.5 mg. This was recently discontinued due to LUCY due to poor nutrition.  Creatinine 1.21 with a potassium of 4.1. Appetite greatly improved.  Atrial fib rate controlled with carvedilol.  He continues on Coumadin for anticoagulant. Diabetes mellitus type 2.  Blood sugars occasionally elevated. Continues  on metformin and glipizide.      Active Ambulatory Problems     Diagnosis Date Noted     Venous stasis dermatitis       Atherosclerotic heart disease of native coronary artery without angina pectoris 09/28/2015     Essential hypertension 09/24/2015     Gastroduodenal ulcer 09/24/2015     Peripheral blood vessel disorder (H) 09/24/2015     Paroxysmal atrial fib -- on Warfarin       Lymphedema 05/06/2016     Weakness 05/06/2016     Hypotension due to drugs 05/06/2016     Hypotension, unspecified hypotension type      Duodenitis 05/11/2016     Anemia 06/15/2016     Cecal cancer (H)      S/P right hemicolectomy      Gastrointestinal hemorrhage with melena 07/08/2016     Blood in stool      Lactic acid acidosis      Peripheral venous insufficiency 08/07/2017     Shortness of breath 02/01/2018     Legionella infection (H)      UTI (urinary tract infection)      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) 05/17/2018     Gastroesophageal reflux disease with esophagitis 12/04/2018     Hyperkalemia 12/04/2018     Long term current use of insulin (H) 12/04/2018     Malignant tumor of ascending colon (H) 12/04/2018     Mixed hyperlipidemia 12/04/2018     Tricuspid valve disorder 12/04/2018     Normocytic normochromic anemia 12/04/2018     Venous hypertension 12/04/2018     Peripheral vascular disease (H) 12/04/2018     Hyperglycemia due to type 2 diabetes mellitus (H) 09/24/2015     DM type 2, Hgb A1C 7.4 on 3/31/22 09/24/2015     Venous stasis 11/27/2019     Hearing loss 01/11/2016     PVC's (premature ventricular contractions) 01/28/2016     Malaise and fatigue 09/24/2015     Abnormal EKG 09/24/2015     Age-related cataract 10/26/2021     Amnesia 10/26/2021     Severe bilateral hearing loss 10/26/2021     Chronic idiopathic constipation 10/26/2021     Slow transit constipation 10/26/2021     Chronic kidney disease, stage 3a (H) 03/31/2022     Hypokalemia 03/31/2022     Hypomagnesemia 04/01/2022     Severe Pulm HTN -- PAP  60-65 mmHg plus RAP on Echo 3/31/22 04/07/2022     (HFpEF) heart failure with preserved ejection fraction (H) 04/14/2022     Orthostatic hypotension 05/19/2022     Lactic acidosis 05/19/2022     Severe sepsis (H) 05/19/2022     Acute renal failure, unspecified acute renal failure type (H) 05/19/2022     Acute on chronic diastolic heart failure (H) 05/24/2022     Resolved Ambulatory Problems     Diagnosis Date Noted     Urinary bladder stone 02/10/2016     CAP (community acquired pneumonia) 02/01/2018     Anticoagulated on Coumadin 03/31/2022     Past Medical History:   Diagnosis Date     Coronary artery disease 9/25/2015     Diabetes mellitus (H)      Dyslipidemia      GERD (gastroesophageal reflux disease)      GI bleed 07/08/2016     Gout      Hematuria      Hyperlipemia      Hypertension      Kidney stone 10/6/2015       Current Outpatient Medications:      spironolactone (ALDACTONE) 25 MG tablet, Take 25 mg by mouth daily, Disp: , Rfl:      atorvastatin (LIPITOR) 10 MG tablet, Take 10 mg by mouth At Bedtime, Disp: , Rfl:      bumetanide (BUMEX) 0.5 MG tablet, Take 2 tablets (1 mg) by mouth daily, Disp: , Rfl:      carvedilol (COREG) 3.125 MG tablet, Take 3.125 mg by mouth 2 times daily (with meals), Disp: , Rfl:      [employer] IRON 240 (27 Fe) MG TABS, Take 1 tablet by mouth daily, Disp: , Rfl:      glipiZIDE (GLUCOTROL XL) 2.5 MG 24 hr tablet, Take 2 tablets (5 mg) by mouth daily, Disp: , Rfl:      insulin aspart (NOVOLOG FLEXPEN) 100 UNIT/ML pen, 3 times a day with meals, for glucometer 150-200 give 2 units SQ, 201-250 give 4 units, >250 give 6 units, Disp: 15 mL, Rfl: 0     insulin glargine (LANTUS PEN) 100 UNIT/ML pen, Inject 15 Units Subcutaneous At Bedtime (Patient taking differently: Inject 20 Units Subcutaneous At Bedtime), Disp: 15 mL, Rfl: 0     magnesium hydroxide (MILK OF MAGNESIA) 400 MG/5ML suspension, Take 30 mLs by mouth daily as needed for constipation, Disp: , Rfl: 0     metFORMIN (GLUCOPHAGE) 500 MG  tablet, Take 1 tablet (500 mg) by mouth daily (with breakfast), Disp: , Rfl: 0     multivitamin with minerals tablet, Take 1 tablet by mouth daily, Disp: , Rfl:      omeprazole (PRILOSEC) 20 MG capsule, Take 20 mg by mouth daily, Disp: , Rfl:      potassium chloride ER (KLOR-CON M) 20 MEQ CR tablet, Take 20 mEq by mouth daily, Disp: , Rfl:      senna-docusate (SENOKOT-S/PERICOLACE) 8.6-50 MG tablet, Take 1 tablet by mouth 2 times daily (Patient taking differently: Take 1 tablet by mouth 2 times daily), Disp: , Rfl: 0     traZODone (DESYREL) 50 MG tablet, Take 0.5 tablets (25 mg) by mouth nightly as needed for sleep (Patient taking differently: Take 25 mg by mouth nightly as needed for sleep), Disp: , Rfl: 0     warfarin ANTICOAGULANT (COUMADIN) 5 MG tablet, 5 mg daily except 7.5 mg on Sundays, check INR on 4/11 and adjust dose for desired INR of 2.0 to 3.0. (Patient taking differently: 5 mg daily except 7.5 mg on Sundays, check INR on 4/11 and adjust dose for desired INR of 2.0 to 3.0.), Disp: , Rfl: 0     Allergies   Allergen Reactions     Aspirin Nausea and Vomiting     GI upset  Other reaction(s): GI upset       All Meds and Allergies reviewed in the record at the facility and is the most up-to-date.      Current Outpatient Medications   Medication Sig     spironolactone (ALDACTONE) 25 MG tablet Take 25 mg by mouth daily     atorvastatin (LIPITOR) 10 MG tablet Take 10 mg by mouth At Bedtime     bumetanide (BUMEX) 0.5 MG tablet Take 2 tablets (1 mg) by mouth daily     carvedilol (COREG) 3.125 MG tablet Take 3.125 mg by mouth 2 times daily (with meals)     CVS IRON 240 (27 Fe) MG TABS Take 1 tablet by mouth daily     glipiZIDE (GLUCOTROL XL) 2.5 MG 24 hr tablet Take 2 tablets (5 mg) by mouth daily     insulin aspart (NOVOLOG FLEXPEN) 100 UNIT/ML pen 3 times a day with meals, for glucometer 150-200 give 2 units SQ, 201-250 give 4 units, >250 give 6 units     insulin glargine (LANTUS PEN) 100 UNIT/ML pen Inject 15  "Units Subcutaneous At Bedtime (Patient taking differently: Inject 20 Units Subcutaneous At Bedtime)     magnesium hydroxide (MILK OF MAGNESIA) 400 MG/5ML suspension Take 30 mLs by mouth daily as needed for constipation     metFORMIN (GLUCOPHAGE) 500 MG tablet Take 1 tablet (500 mg) by mouth daily (with breakfast)     multivitamin with minerals tablet Take 1 tablet by mouth daily     omeprazole (PRILOSEC) 20 MG capsule Take 20 mg by mouth daily     potassium chloride ER (KLOR-CON M) 20 MEQ CR tablet Take 20 mEq by mouth daily     senna-docusate (SENOKOT-S/PERICOLACE) 8.6-50 MG tablet Take 1 tablet by mouth 2 times daily (Patient taking differently: Take 1 tablet by mouth 2 times daily)     traZODone (DESYREL) 50 MG tablet Take 0.5 tablets (25 mg) by mouth nightly as needed for sleep (Patient taking differently: Take 25 mg by mouth nightly as needed for sleep)     warfarin ANTICOAGULANT (COUMADIN) 5 MG tablet 5 mg daily except 7.5 mg on Sundays, check INR on 4/11 and adjust dose for desired INR of 2.0 to 3.0. (Patient taking differently: 5 mg daily except 7.5 mg on Sundays, check INR on 4/11 and adjust dose for desired INR of 2.0 to 3.0.)     No current facility-administered medications for this visit.       REVIEW OF SYSTEMS:   Review of Systems  No fevers or chills. No headache, lightheadedness or dizziness.  Occ. SOB, no chest pains or palpitations. Appetite is improving. No nausea, vomiting, constipation or diarrhea. No dysuria, frequency, burning or pain with urination.  Chronic lower extremity edema with increase.     PHYSICAL EXAMINATION:  Physical Exam     Vital signs: /75   Pulse 66   Temp 97  F (36.1  C)   Resp 12   Ht 1.854 m (6' 1\")   Wt 89.4 kg (197 lb 3.2 oz)   SpO2 96%   BMI 26.02 kg/m    General: Awake, Alert, oriented x3, follows simple commands  HEENT: Pink conjunctiva, anicteric sclerae, dry oral mucosa  NECK: Supple  CVS:  S1  S2, without murmur or gallop.   LUNG: Clear to " auscultation, No wheezes, rales or rhonci.  BACK: No kyphosis of the thoracic spine  ABDOMEN: Soft, nontender to palpation, with positive bowel sounds  EXTREMITIES: Moves both upper and lower extremities with generalized weakness, 3+ pedal edema with weeping, no calf tenderness  SKIN: Stage II pressure ulcer to his coccyx not observed.    NEUROLOGIC: Intact, pulses palpable  PSYCHIATRIC: Cognitive impairment noted.       Labs:  All labs reviewed in the nursing home record and Epic   @  Lab Results   Component Value Date    WBC 6.4 05/19/2022     Lab Results   Component Value Date    RBC 4.30 05/19/2022     Lab Results   Component Value Date    HGB 11.9 05/20/2022     Lab Results   Component Value Date    HCT 38.1 05/19/2022     Lab Results   Component Value Date    MCV 89 05/19/2022     Lab Results   Component Value Date    MCH 30.9 05/19/2022     Lab Results   Component Value Date    MCHC 34.9 05/19/2022     Lab Results   Component Value Date    RDW 15.9 05/19/2022     Lab Results   Component Value Date     05/19/2022        @Last Comprehensive Metabolic Panel:  Sodium   Date Value Ref Range Status   05/31/2022 136 136 - 145 mmol/L Final     Potassium   Date Value Ref Range Status   05/31/2022 4.4 3.5 - 5.0 mmol/L Final     Chloride   Date Value Ref Range Status   05/31/2022 102 98 - 107 mmol/L Final     Carbon Dioxide (CO2)   Date Value Ref Range Status   05/31/2022 24 22 - 31 mmol/L Final     Anion Gap   Date Value Ref Range Status   05/31/2022 10 5 - 18 mmol/L Final     Glucose   Date Value Ref Range Status   05/31/2022 208 (H) 70 - 125 mg/dL Final     Urea Nitrogen   Date Value Ref Range Status   05/31/2022 21 8 - 28 mg/dL Final     Creatinine   Date Value Ref Range Status   05/31/2022 1.15 0.70 - 1.30 mg/dL Final     GFR Estimate   Date Value Ref Range Status   05/31/2022 60 (L) >60 mL/min/1.73m2 Final     Comment:     Effective December 21, 2021 eGFRcr in adults is calculated using the 2021 CKD-EPI  creatinine equation which includes age and gender (Dylan maldonado al., NEJ, DOI: 10.1056/AFZTfw5796232)   12/30/2020 56 (L) >60 mL/min/1.73m2 Final     Calcium   Date Value Ref Range Status   05/31/2022 8.6 8.5 - 10.5 mg/dL Final     Assessment/plan:      ICD-10-CM    1. Acute on chronic heart failure with preserved ejection fraction (H)  I50.33  Weight up 10 pounds.  Occasional shortness of breath with exertion.  Patient previously on Bumex, lisinopril and Aldactone.  Bumex 1 mg BID today.   Resume Aldactone at 25 mg every day.   Repeat BNP and BMP in am.   Continue daily weights.  Apply dry dressing to LE. Continue in tubi .      2. Acute kidney injury superimposed on chronic kidney disease (H)  N17.9 creatinine 1.15.  Potassium 4.4.    N18.9    3. Paroxysmal atrial fibrillation (H)  I48.0  Rate controlled with carvedilol.  INRs managed per the Coumadin clinic.   4. Type 2 diabetes mellitus with other specified complication, with long-term current use of insulin (H)  E11.69  Appetite improving.  Blood sugars occasionally elevated.  Continues on metformin and glimepiride.  Increase Lantus to 20 units.     Z79.4    5. Essential hypertension  I10  overall blood pressures improved.       This note has been dictated using voice recognition software. Any grammatical or context distortions are unintentional and inherent to the software    Electronically signed by: Jaimie Joe, CNP

## 2022-06-02 NOTE — LETTER
6/2/2022        RE: Robinson Medel  [address] [place] [address]        [facility]    Code Status:  FULL CODE   Visit Type:   Chief Complaint   Patient presents with     Nursing Home Acute     TCU Follow up     Facility:  [facility] ([facility]) [[id number]]           Transitional Care Course: Robinson Medel is a 89 year old male who I am seeing today for follow up on the TCU.  Patient recently hospitalized on 5/19/2022 secondary to hypotension.  Past medical history includes hypertension, CKD, proximal atrial fib, HFpEF, diabetes mellitus type 2 and chronic venous stasis.  Patient presented with symptomatic hypotension.  Patient had been hospitalized the previous month for worsening CHF and had been taking diuretics.  Patient with poor oral intake.  He was found to have acute kidney injury on chronic kidney disease with severe hypotension.  His diuretics were held.  He was treated with IV fluids and this resolved.  He was discharged home on Coreg and Bumex was restarted.  His lisinopril and Aldactone were discontinued.  Atrial fib rate controlled with carvedilol.  He continues on chronic anticoagulation with Coumadin.  Diabetes mellitus type 2.  Continues on metformin and glipizide.  Chronic venous stasis with lower extremity edema.    On today's visit patient sitting up in bedside chair.  His wife is present on exam.  Patient with underlying acute on chronic congestive heart failure. BNP today 507.  Patient is up about 10 pounds from admit.  3+ lower extremity edema with weeping from his left lower extremity.  Patient had been taken off his diuretics during recent hospitalization.  He admitted on 0.5 mg of Bumex.  His home dose is 2 mg daily with 25 mg of spironolactone.  This has been resumed.  He is down 0.6 pound from's yesterday.  Occasional shortness of breath with exertion.  Lung sounds clear.  BMP unremarkable. Atrial fib rate controlled with carvedilol.  He  continues on Coumadin for anticoagulant. Diabetes mellitus type 2.  Blood sugars occasionally elevated. Continues on metformin and glipizide.      Active Ambulatory Problems     Diagnosis Date Noted     Venous stasis dermatitis       Atherosclerotic heart disease of native coronary artery without angina pectoris 09/28/2015     Essential hypertension 09/24/2015     Gastroduodenal ulcer 09/24/2015     Peripheral blood vessel disorder (H) 09/24/2015     Paroxysmal atrial fib -- on Warfarin       Lymphedema 05/06/2016     Weakness 05/06/2016     Hypotension due to drugs 05/06/2016     Hypotension, unspecified hypotension type      Duodenitis 05/11/2016     Anemia 06/15/2016     Cecal cancer (H)      S/P right hemicolectomy      Gastrointestinal hemorrhage with melena 07/08/2016     Blood in stool      Lactic acid acidosis      Peripheral venous insufficiency 08/07/2017     Shortness of breath 02/01/2018     Legionella infection (H)      UTI (urinary tract infection)      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) 05/17/2018     Gastroesophageal reflux disease with esophagitis 12/04/2018     Hyperkalemia 12/04/2018     Long term current use of insulin (H) 12/04/2018     Malignant tumor of ascending colon (H) 12/04/2018     Mixed hyperlipidemia 12/04/2018     Tricuspid valve disorder 12/04/2018     Normocytic normochromic anemia 12/04/2018     Venous hypertension 12/04/2018     Peripheral vascular disease (H) 12/04/2018     Hyperglycemia due to type 2 diabetes mellitus (H) 09/24/2015     DM type 2, Hgb A1C 7.4 on 3/31/22 09/24/2015     Venous stasis 11/27/2019     Hearing loss 01/11/2016     PVC's (premature ventricular contractions) 01/28/2016     Malaise and fatigue 09/24/2015     Abnormal EKG 09/24/2015     Age-related cataract 10/26/2021     Amnesia 10/26/2021     Severe bilateral hearing loss 10/26/2021     Chronic idiopathic constipation 10/26/2021     Slow transit constipation 10/26/2021     Chronic kidney  disease, stage 3a (H) 03/31/2022     Hypokalemia 03/31/2022     Hypomagnesemia 04/01/2022     Severe Pulm HTN -- PAP 60-65 mmHg plus RAP on Echo 3/31/22 04/07/2022     (HFpEF) heart failure with preserved ejection fraction (H) 04/14/2022     Orthostatic hypotension 05/19/2022     Lactic acidosis 05/19/2022     Severe sepsis (H) 05/19/2022     Acute renal failure, unspecified acute renal failure type (H) 05/19/2022     Acute on chronic diastolic heart failure (H) 05/24/2022     Resolved Ambulatory Problems     Diagnosis Date Noted     Urinary bladder stone 02/10/2016     CAP (community acquired pneumonia) 02/01/2018     Anticoagulated on Coumadin 03/31/2022     Past Medical History:   Diagnosis Date     Coronary artery disease 9/25/2015     Diabetes mellitus (H)      Dyslipidemia      GERD (gastroesophageal reflux disease)      GI bleed 07/08/2016     Gout      Hematuria      Hyperlipemia      Hypertension      Kidney stone 10/6/2015       Current Outpatient Medications:      atorvastatin (LIPITOR) 10 MG tablet, Take 10 mg by mouth At Bedtime, Disp: , Rfl:      bumetanide (BUMEX) 0.5 MG tablet, Take 2 tablets (1 mg) by mouth daily (Patient taking differently: Take 1 mg by mouth 2 times daily), Disp: , Rfl:      carvedilol (COREG) 3.125 MG tablet, Take 3.125 mg by mouth 2 times daily (with meals), Disp: , Rfl:      [employer] IRON 240 (27 Fe) MG TABS, Take 1 tablet by mouth daily, Disp: , Rfl:      glipiZIDE (GLUCOTROL XL) 2.5 MG 24 hr tablet, Take 2 tablets (5 mg) by mouth daily, Disp: , Rfl:      insulin aspart (NOVOLOG FLEXPEN) 100 UNIT/ML pen, 3 times a day with meals, for glucometer 150-200 give 2 units SQ, 201-250 give 4 units, >250 give 6 units, Disp: 15 mL, Rfl: 0     insulin glargine (LANTUS PEN) 100 UNIT/ML pen, Inject 15 Units Subcutaneous At Bedtime (Patient taking differently: Inject 20 Units Subcutaneous At Bedtime), Disp: 15 mL, Rfl: 0     magnesium hydroxide (MILK OF MAGNESIA) 400 MG/5ML suspension, Take 30  mLs by mouth daily as needed for constipation, Disp: , Rfl: 0     metFORMIN (GLUCOPHAGE) 500 MG tablet, Take 1 tablet (500 mg) by mouth daily (with breakfast), Disp: , Rfl: 0     multivitamin with minerals tablet, Take 1 tablet by mouth daily, Disp: , Rfl:      omeprazole (PRILOSEC) 20 MG capsule, Take 20 mg by mouth daily, Disp: , Rfl:      potassium chloride ER (KLOR-CON M) 20 MEQ CR tablet, Take 20 mEq by mouth daily, Disp: , Rfl:      senna-docusate (SENOKOT-S/PERICOLACE) 8.6-50 MG tablet, Take 1 tablet by mouth 2 times daily (Patient taking differently: Take 1 tablet by mouth 2 times daily), Disp: , Rfl: 0     spironolactone (ALDACTONE) 25 MG tablet, Take 25 mg by mouth daily, Disp: , Rfl:      traZODone (DESYREL) 50 MG tablet, Take 0.5 tablets (25 mg) by mouth nightly as needed for sleep (Patient taking differently: Take 25 mg by mouth nightly as needed for sleep), Disp: , Rfl: 0     warfarin ANTICOAGULANT (COUMADIN) 5 MG tablet, 5 mg daily except 7.5 mg on Sundays, check INR on 4/11 and adjust dose for desired INR of 2.0 to 3.0. (Patient taking differently: 5 mg daily except 7.5 mg on Sundays, check INR on 4/11 and adjust dose for desired INR of 2.0 to 3.0.), Disp: , Rfl: 0     Allergies   Allergen Reactions     Aspirin Nausea and Vomiting     GI upset  Other reaction(s): GI upset       All Meds and Allergies reviewed in the record at the facility and is the most up-to-date.      Current Outpatient Medications   Medication Sig     atorvastatin (LIPITOR) 10 MG tablet Take 10 mg by mouth At Bedtime     bumetanide (BUMEX) 0.5 MG tablet Take 2 tablets (1 mg) by mouth daily (Patient taking differently: Take 1 mg by mouth 2 times daily)     carvedilol (COREG) 3.125 MG tablet Take 3.125 mg by mouth 2 times daily (with meals)     CVS IRON 240 (27 Fe) MG TABS Take 1 tablet by mouth daily     glipiZIDE (GLUCOTROL XL) 2.5 MG 24 hr tablet Take 2 tablets (5 mg) by mouth daily     insulin aspart (NOVOLOG FLEXPEN) 100  "UNIT/ML pen 3 times a day with meals, for glucometer 150-200 give 2 units SQ, 201-250 give 4 units, >250 give 6 units     insulin glargine (LANTUS PEN) 100 UNIT/ML pen Inject 15 Units Subcutaneous At Bedtime (Patient taking differently: Inject 20 Units Subcutaneous At Bedtime)     magnesium hydroxide (MILK OF MAGNESIA) 400 MG/5ML suspension Take 30 mLs by mouth daily as needed for constipation     metFORMIN (GLUCOPHAGE) 500 MG tablet Take 1 tablet (500 mg) by mouth daily (with breakfast)     multivitamin with minerals tablet Take 1 tablet by mouth daily     omeprazole (PRILOSEC) 20 MG capsule Take 20 mg by mouth daily     potassium chloride ER (KLOR-CON M) 20 MEQ CR tablet Take 20 mEq by mouth daily     senna-docusate (SENOKOT-S/PERICOLACE) 8.6-50 MG tablet Take 1 tablet by mouth 2 times daily (Patient taking differently: Take 1 tablet by mouth 2 times daily)     spironolactone (ALDACTONE) 25 MG tablet Take 25 mg by mouth daily     traZODone (DESYREL) 50 MG tablet Take 0.5 tablets (25 mg) by mouth nightly as needed for sleep (Patient taking differently: Take 25 mg by mouth nightly as needed for sleep)     warfarin ANTICOAGULANT (COUMADIN) 5 MG tablet 5 mg daily except 7.5 mg on Sundays, check INR on 4/11 and adjust dose for desired INR of 2.0 to 3.0. (Patient taking differently: 5 mg daily except 7.5 mg on Sundays, check INR on 4/11 and adjust dose for desired INR of 2.0 to 3.0.)     No current facility-administered medications for this visit.       REVIEW OF SYSTEMS:   Review of Systems  No fevers or chills. No headache, lightheadedness or dizziness.  Occ. SOB, no chest pains or palpitations. Appetite is improving. No nausea, vomiting, constipation or diarrhea. No dysuria, frequency, burning or pain with urination.  Chronic lower extremity edema with increase.     PHYSICAL EXAMINATION:  Physical Exam     Vital signs: /69   Pulse 75   Temp 96.8  F (36  C)   Resp 16   Ht 1.854 m (6' 1\")   Wt 89.2 kg (196 " lb 9.6 oz)   SpO2 96%   BMI 25.94 kg/m    General: Awake, Alert, oriented x3, follows simple commands  HEENT: Pink conjunctiva, anicteric sclerae, dry oral mucosa  NECK: Supple  CVS:  S1  S2, without murmur or gallop.   LUNG: Clear to auscultation, No wheezes, rales or rhonci.  BACK: No kyphosis of the thoracic spine  ABDOMEN: Soft, nontender to palpation, with positive bowel sounds  EXTREMITIES: Moves both upper and lower extremities with generalized weakness, 3+ pedal edema with weeping, no calf tenderness  SKIN: Stage II pressure ulcer to his coccyx not observed.    NEUROLOGIC: Intact, pulses palpable  PSYCHIATRIC: Cognitive impairment noted.       Labs:  All labs reviewed in the nursing home record and Epic   @  Lab Results   Component Value Date    WBC 6.4 05/19/2022     Lab Results   Component Value Date    RBC 4.30 05/19/2022     Lab Results   Component Value Date    HGB 11.9 05/20/2022     Lab Results   Component Value Date    HCT 38.1 05/19/2022     Lab Results   Component Value Date    MCV 89 05/19/2022     Lab Results   Component Value Date    MCH 30.9 05/19/2022     Lab Results   Component Value Date    MCHC 34.9 05/19/2022     Lab Results   Component Value Date    RDW 15.9 05/19/2022     Lab Results   Component Value Date     05/19/2022        @Last Comprehensive Metabolic Panel:  Sodium   Date Value Ref Range Status   06/02/2022 140 136 - 145 mmol/L Final     Potassium   Date Value Ref Range Status   06/02/2022 4.4 3.5 - 5.0 mmol/L Final     Chloride   Date Value Ref Range Status   06/02/2022 102 98 - 107 mmol/L Final     Carbon Dioxide (CO2)   Date Value Ref Range Status   06/02/2022 29 22 - 31 mmol/L Final     Anion Gap   Date Value Ref Range Status   06/02/2022 9 5 - 18 mmol/L Final     Glucose   Date Value Ref Range Status   06/02/2022 123 70 - 125 mg/dL Final     Urea Nitrogen   Date Value Ref Range Status   06/02/2022 22 8 - 28 mg/dL Final     Creatinine   Date Value Ref Range Status    06/02/2022 1.16 0.70 - 1.30 mg/dL Final     GFR Estimate   Date Value Ref Range Status   06/02/2022 59 (L) >60 mL/min/1.73m2 Final     Comment:     Effective December 21, 2021 eGFRcr in adults is calculated using the 2021 CKD-EPI creatinine equation which includes age and gender (Dylan et al., NE, DOI: 10.1056/FVNQhw8462079)   12/30/2020 56 (L) >60 mL/min/1.73m2 Final     Calcium   Date Value Ref Range Status   06/02/2022 9.1 8.5 - 10.5 mg/dL Final     Assessment/plan:      ICD-10-CM    1. Acute on chronic heart failure with preserved ejection fraction (H)  I50.33  Weight up 10 pounds.  He did lose 0.6 pounds overnight.  Occasional shortness of breath with exertion.  .  Patient previously on Bumex, lisinopril and Aldactone.  Bumex 1 mg BID today.    Aldactone at 25 mg every day.   Repeat BNP and BMP on Monday.  Continue daily weights.  Apply dry dressing to LE. Continue in tubi .      2. Acute kidney injury superimposed on chronic kidney disease (H)  N17.9  BMP unremarkable.  Follow-up on Monday.    N18.9    3. Paroxysmal atrial fibrillation (H)  I48.0  Rate controlled with carvedilol.  INRs managed per the Coumadin clinic.   4. Type 2 diabetes mellitus with other specified complication, with long-term current use of insulin (H)  E11.69  Appetite improving.  Blood sugars occasionally elevated.  Continues on metformin and glimepiride.  Lantus to 20 units.     Z79.4    5. Essential hypertension  I10  overall blood pressures improved.       This note has been dictated using voice recognition software. Any grammatical or context distortions are unintentional and inherent to the software    Electronically signed by: Jaimie Joe CNP           Sincerely,        Jaimie Joe NP

## 2022-06-03 NOTE — PROGRESS NOTES
ANTICOAGULATION MANAGEMENT     Robinson Medel 89 year old male is on warfarin with supratherapeutic INR result. (Goal INR 2.0-3.0)    Recent labs: (last 7 days)     06/03/22  0000   INR 3.5*       ASSESSMENT       Source(s): Chart Review and Home Care/Facility Nurse       Warfarin doses taken: Warfarin taken as instructed    Diet: Glucerna drinks once daily    New illness, injury, or hospitalization: No    Medication/supplement changes: Bumex dose increased on 6/1/2022 No interaction anticipated    Signs or symptoms of bleeding or clotting: No    Previous INR: Therapeutic last visit; previously outside of goal range    Additional findings: edema in BLE       PLAN     Recommended plan for no diet, medication or health factor changes affecting INR     Dosing Instructions: decrease your warfarin dose (7.1% change) with next INR in 4 days       Summary  As of 6/3/2022    Full warfarin instructions:  2.5 mg every Fri; 5 mg all other days   Next INR check:  6/7/2022             Telephone call with [facility] home care/facility nurse who agrees to plan and repeated back plan correctly    Orders given to  Homecare nurse/facility to recheck    Education provided: Please call back if any changes to your diet, medications or how you've been taking warfarin    Plan made per [facility] anticoagulation protocol    Belgica Santamaria, RN  Anticoagulation Clinic  6/3/2022    _______________________________________________________________________     Anticoagulation Episode Summary     Current INR goal:  2.0-3.0   TTR:  --   Target end date:  Indefinite   Send INR reminders to:  [facility] (TCU/LTC/USP)    Indications    Paroxysmal atrial fib -- on Warfarin  [I48.0]           Comments:  [facility] [phone]         Anticoagulation Care Providers     Provider Role Specialty Phone number    Praneeth Marina DO Referring Family Medicine [phone]

## 2022-06-03 NOTE — PROGRESS NOTES
Incoming fax    Receieved on 06/03/22    From [facility]   INR 3.5    - no changes       Breanna Kilpatrick RN, BSN, PHN

## 2022-06-03 NOTE — PROGRESS NOTES
[facility] GERIATRIC SERVICES    Code Status:  FULL CODE   Visit Type:   Chief Complaint   Patient presents with     Nursing Home Acute     TCU Follow up     Facility:  [facility] ([facility]) [[id number]]           Transitional Care Course: Robinson Medel is a 89 year old male who I am seeing today for follow up on the TCU.  Patient recently hospitalized on 5/19/2022 secondary to hypotension.  Past medical history includes hypertension, CKD, proximal atrial fib, HFpEF, diabetes mellitus type 2 and chronic venous stasis.  Patient presented with symptomatic hypotension.  Patient had been hospitalized the previous month for worsening CHF and had been taking diuretics.  Patient with poor oral intake.  He was found to have acute kidney injury on chronic kidney disease with severe hypotension.  His diuretics were held.  He was treated with IV fluids and this resolved.  He was discharged home on Coreg and Bumex was restarted.  His lisinopril and Aldactone were discontinued.  Atrial fib rate controlled with carvedilol.  He continues on chronic anticoagulation with Coumadin.  Diabetes mellitus type 2.  Continues on metformin and glipizide.  Chronic venous stasis with lower extremity edema.    On today's visit patient sitting up in bedside chair.  His wife is present on exam.  Patient with underlying acute on chronic congestive heart failure. BNP today 507.  Patient is up about 10 pounds from admit.  3+ lower extremity edema with weeping from his left lower extremity.  Patient had been taken off his diuretics during recent hospitalization.  He admitted on 0.5 mg of Bumex.  His home dose is 2 mg daily with 25 mg of spironolactone.  This has been resumed.  He is down 0.6 pound from's yesterday.  Occasional shortness of breath with exertion.  Lung sounds clear.  BMP unremarkable. Atrial fib rate controlled with carvedilol.  He continues on Coumadin for anticoagulant. Diabetes mellitus type 2.  Blood sugars occasionally  elevated. Continues on metformin and glipizide.      Active Ambulatory Problems     Diagnosis Date Noted     Venous stasis dermatitis       Atherosclerotic heart disease of native coronary artery without angina pectoris 09/28/2015     Essential hypertension 09/24/2015     Gastroduodenal ulcer 09/24/2015     Peripheral blood vessel disorder (H) 09/24/2015     Paroxysmal atrial fib -- on Warfarin       Lymphedema 05/06/2016     Weakness 05/06/2016     Hypotension due to drugs 05/06/2016     Hypotension, unspecified hypotension type      Duodenitis 05/11/2016     Anemia 06/15/2016     Cecal cancer (H)      S/P right hemicolectomy      Gastrointestinal hemorrhage with melena 07/08/2016     Blood in stool      Lactic acid acidosis      Peripheral venous insufficiency 08/07/2017     Shortness of breath 02/01/2018     Legionella infection (H)      UTI (urinary tract infection)      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) 05/17/2018     Gastroesophageal reflux disease with esophagitis 12/04/2018     Hyperkalemia 12/04/2018     Long term current use of insulin (H) 12/04/2018     Malignant tumor of ascending colon (H) 12/04/2018     Mixed hyperlipidemia 12/04/2018     Tricuspid valve disorder 12/04/2018     Normocytic normochromic anemia 12/04/2018     Venous hypertension 12/04/2018     Peripheral vascular disease (H) 12/04/2018     Hyperglycemia due to type 2 diabetes mellitus (H) 09/24/2015     DM type 2, Hgb A1C 7.4 on 3/31/22 09/24/2015     Venous stasis 11/27/2019     Hearing loss 01/11/2016     PVC's (premature ventricular contractions) 01/28/2016     Malaise and fatigue 09/24/2015     Abnormal EKG 09/24/2015     Age-related cataract 10/26/2021     Amnesia 10/26/2021     Severe bilateral hearing loss 10/26/2021     Chronic idiopathic constipation 10/26/2021     Slow transit constipation 10/26/2021     Chronic kidney disease, stage 3a (H) 03/31/2022     Hypokalemia 03/31/2022     Hypomagnesemia 04/01/2022      Severe Pulm HTN -- PAP 60-65 mmHg plus RAP on Echo 3/31/22 04/07/2022     (HFpEF) heart failure with preserved ejection fraction (H) 04/14/2022     Orthostatic hypotension 05/19/2022     Lactic acidosis 05/19/2022     Severe sepsis (H) 05/19/2022     Acute renal failure, unspecified acute renal failure type (H) 05/19/2022     Acute on chronic diastolic heart failure (H) 05/24/2022     Resolved Ambulatory Problems     Diagnosis Date Noted     Urinary bladder stone 02/10/2016     CAP (community acquired pneumonia) 02/01/2018     Anticoagulated on Coumadin 03/31/2022     Past Medical History:   Diagnosis Date     Coronary artery disease 9/25/2015     Diabetes mellitus (H)      Dyslipidemia      GERD (gastroesophageal reflux disease)      GI bleed 07/08/2016     Gout      Hematuria      Hyperlipemia      Hypertension      Kidney stone 10/6/2015       Current Outpatient Medications:      atorvastatin (LIPITOR) 10 MG tablet, Take 10 mg by mouth At Bedtime, Disp: , Rfl:      bumetanide (BUMEX) 0.5 MG tablet, Take 2 tablets (1 mg) by mouth daily (Patient taking differently: Take 1 mg by mouth 2 times daily), Disp: , Rfl:      carvedilol (COREG) 3.125 MG tablet, Take 3.125 mg by mouth 2 times daily (with meals), Disp: , Rfl:      [employer] IRON 240 (27 Fe) MG TABS, Take 1 tablet by mouth daily, Disp: , Rfl:      glipiZIDE (GLUCOTROL XL) 2.5 MG 24 hr tablet, Take 2 tablets (5 mg) by mouth daily, Disp: , Rfl:      insulin aspart (NOVOLOG FLEXPEN) 100 UNIT/ML pen, 3 times a day with meals, for glucometer 150-200 give 2 units SQ, 201-250 give 4 units, >250 give 6 units, Disp: 15 mL, Rfl: 0     insulin glargine (LANTUS PEN) 100 UNIT/ML pen, Inject 15 Units Subcutaneous At Bedtime (Patient taking differently: Inject 20 Units Subcutaneous At Bedtime), Disp: 15 mL, Rfl: 0     magnesium hydroxide (MILK OF MAGNESIA) 400 MG/5ML suspension, Take 30 mLs by mouth daily as needed for constipation, Disp: , Rfl: 0     metFORMIN (GLUCOPHAGE) 500 MG  tablet, Take 1 tablet (500 mg) by mouth daily (with breakfast), Disp: , Rfl: 0     multivitamin with minerals tablet, Take 1 tablet by mouth daily, Disp: , Rfl:      omeprazole (PRILOSEC) 20 MG capsule, Take 20 mg by mouth daily, Disp: , Rfl:      potassium chloride ER (KLOR-CON M) 20 MEQ CR tablet, Take 20 mEq by mouth daily, Disp: , Rfl:      senna-docusate (SENOKOT-S/PERICOLACE) 8.6-50 MG tablet, Take 1 tablet by mouth 2 times daily (Patient taking differently: Take 1 tablet by mouth 2 times daily), Disp: , Rfl: 0     spironolactone (ALDACTONE) 25 MG tablet, Take 25 mg by mouth daily, Disp: , Rfl:      traZODone (DESYREL) 50 MG tablet, Take 0.5 tablets (25 mg) by mouth nightly as needed for sleep (Patient taking differently: Take 25 mg by mouth nightly as needed for sleep), Disp: , Rfl: 0     warfarin ANTICOAGULANT (COUMADIN) 5 MG tablet, 5 mg daily except 7.5 mg on Sundays, check INR on 4/11 and adjust dose for desired INR of 2.0 to 3.0. (Patient taking differently: 5 mg daily except 7.5 mg on Sundays, check INR on 4/11 and adjust dose for desired INR of 2.0 to 3.0.), Disp: , Rfl: 0     Allergies   Allergen Reactions     Aspirin Nausea and Vomiting     GI upset  Other reaction(s): GI upset       All Meds and Allergies reviewed in the record at the facility and is the most up-to-date.      Current Outpatient Medications   Medication Sig     atorvastatin (LIPITOR) 10 MG tablet Take 10 mg by mouth At Bedtime     bumetanide (BUMEX) 0.5 MG tablet Take 2 tablets (1 mg) by mouth daily (Patient taking differently: Take 1 mg by mouth 2 times daily)     carvedilol (COREG) 3.125 MG tablet Take 3.125 mg by mouth 2 times daily (with meals)     CVS IRON 240 (27 Fe) MG TABS Take 1 tablet by mouth daily     glipiZIDE (GLUCOTROL XL) 2.5 MG 24 hr tablet Take 2 tablets (5 mg) by mouth daily     insulin aspart (NOVOLOG FLEXPEN) 100 UNIT/ML pen 3 times a day with meals, for glucometer 150-200 give 2 units SQ, 201-250 give 4 units,  ">250 give 6 units     insulin glargine (LANTUS PEN) 100 UNIT/ML pen Inject 15 Units Subcutaneous At Bedtime (Patient taking differently: Inject 20 Units Subcutaneous At Bedtime)     magnesium hydroxide (MILK OF MAGNESIA) 400 MG/5ML suspension Take 30 mLs by mouth daily as needed for constipation     metFORMIN (GLUCOPHAGE) 500 MG tablet Take 1 tablet (500 mg) by mouth daily (with breakfast)     multivitamin with minerals tablet Take 1 tablet by mouth daily     omeprazole (PRILOSEC) 20 MG capsule Take 20 mg by mouth daily     potassium chloride ER (KLOR-CON M) 20 MEQ CR tablet Take 20 mEq by mouth daily     senna-docusate (SENOKOT-S/PERICOLACE) 8.6-50 MG tablet Take 1 tablet by mouth 2 times daily (Patient taking differently: Take 1 tablet by mouth 2 times daily)     spironolactone (ALDACTONE) 25 MG tablet Take 25 mg by mouth daily     traZODone (DESYREL) 50 MG tablet Take 0.5 tablets (25 mg) by mouth nightly as needed for sleep (Patient taking differently: Take 25 mg by mouth nightly as needed for sleep)     warfarin ANTICOAGULANT (COUMADIN) 5 MG tablet 5 mg daily except 7.5 mg on Sundays, check INR on 4/11 and adjust dose for desired INR of 2.0 to 3.0. (Patient taking differently: 5 mg daily except 7.5 mg on Sundays, check INR on 4/11 and adjust dose for desired INR of 2.0 to 3.0.)     No current facility-administered medications for this visit.       REVIEW OF SYSTEMS:   Review of Systems  No fevers or chills. No headache, lightheadedness or dizziness.  Occ. SOB, no chest pains or palpitations. Appetite is improving. No nausea, vomiting, constipation or diarrhea. No dysuria, frequency, burning or pain with urination.  Chronic lower extremity edema with increase.     PHYSICAL EXAMINATION:  Physical Exam     Vital signs: /69   Pulse 75   Temp 96.8  F (36  C)   Resp 16   Ht 1.854 m (6' 1\")   Wt 89.2 kg (196 lb 9.6 oz)   SpO2 96%   BMI 25.94 kg/m    General: Awake, Alert, oriented x3, follows simple " commands  HEENT: Pink conjunctiva, anicteric sclerae, dry oral mucosa  NECK: Supple  CVS:  S1  S2, without murmur or gallop.   LUNG: Clear to auscultation, No wheezes, rales or rhonci.  BACK: No kyphosis of the thoracic spine  ABDOMEN: Soft, nontender to palpation, with positive bowel sounds  EXTREMITIES: Moves both upper and lower extremities with generalized weakness, 3+ pedal edema with weeping, no calf tenderness  SKIN: Stage II pressure ulcer to his coccyx not observed.    NEUROLOGIC: Intact, pulses palpable  PSYCHIATRIC: Cognitive impairment noted.       Labs:  All labs reviewed in the nursing home record and Epic   @  Lab Results   Component Value Date    WBC 6.4 05/19/2022     Lab Results   Component Value Date    RBC 4.30 05/19/2022     Lab Results   Component Value Date    HGB 11.9 05/20/2022     Lab Results   Component Value Date    HCT 38.1 05/19/2022     Lab Results   Component Value Date    MCV 89 05/19/2022     Lab Results   Component Value Date    MCH 30.9 05/19/2022     Lab Results   Component Value Date    MCHC 34.9 05/19/2022     Lab Results   Component Value Date    RDW 15.9 05/19/2022     Lab Results   Component Value Date     05/19/2022        @Last Comprehensive Metabolic Panel:  Sodium   Date Value Ref Range Status   06/02/2022 140 136 - 145 mmol/L Final     Potassium   Date Value Ref Range Status   06/02/2022 4.4 3.5 - 5.0 mmol/L Final     Chloride   Date Value Ref Range Status   06/02/2022 102 98 - 107 mmol/L Final     Carbon Dioxide (CO2)   Date Value Ref Range Status   06/02/2022 29 22 - 31 mmol/L Final     Anion Gap   Date Value Ref Range Status   06/02/2022 9 5 - 18 mmol/L Final     Glucose   Date Value Ref Range Status   06/02/2022 123 70 - 125 mg/dL Final     Urea Nitrogen   Date Value Ref Range Status   06/02/2022 22 8 - 28 mg/dL Final     Creatinine   Date Value Ref Range Status   06/02/2022 1.16 0.70 - 1.30 mg/dL Final     GFR Estimate   Date Value Ref Range Status    06/02/2022 59 (L) >60 mL/min/1.73m2 Final     Comment:     Effective December 21, 2021 eGFRcr in adults is calculated using the 2021 CKD-EPI creatinine equation which includes age and gender (Dylan et al., NEJM, DOI: 10.1056/VQHRny7346284)   12/30/2020 56 (L) >60 mL/min/1.73m2 Final     Calcium   Date Value Ref Range Status   06/02/2022 9.1 8.5 - 10.5 mg/dL Final     Assessment/plan:      ICD-10-CM    1. Acute on chronic heart failure with preserved ejection fraction (H)  I50.33  Weight up 10 pounds.  He did lose 0.6 pounds overnight.  Occasional shortness of breath with exertion.  .  Patient previously on Bumex, lisinopril and Aldactone.  Bumex 1 mg BID today.    Aldactone at 25 mg every day.   Repeat BNP and BMP on Monday.  Continue daily weights.  Apply dry dressing to LE. Continue in tubi .      2. Acute kidney injury superimposed on chronic kidney disease (H)  N17.9  BMP unremarkable.  Follow-up on Monday.    N18.9    3. Paroxysmal atrial fibrillation (H)  I48.0  Rate controlled with carvedilol.  INRs managed per the Coumadin clinic.   4. Type 2 diabetes mellitus with other specified complication, with long-term current use of insulin (H)  E11.69  Appetite improving.  Blood sugars occasionally elevated.  Continues on metformin and glimepiride.  Lantus to 20 units.     Z79.4    5. Essential hypertension  I10  overall blood pressures improved.       This note has been dictated using voice recognition software. Any grammatical or context distortions are unintentional and inherent to the software    Electronically signed by: Jaimie Joe, CNP

## 2022-06-06 NOTE — LETTER
6/6/2022        RE: Robinson Medel  [address] [place] [address]        [facility]    Code Status:  FULL CODE   Visit Type:   Chief Complaint   Patient presents with     Nursing Home Acute     TCU Follow up     Facility:  [facility] ([facility]) [[id number]]           Transitional Care Course: Robinson Medel is a 89 year old male who I am seeing today for follow up on the TCU.  Patient recently hospitalized on 5/19/2022 secondary to hypotension.  Past medical history includes hypertension, CKD, proximal atrial fib, HFpEF, diabetes mellitus type 2 and chronic venous stasis.  Patient presented with symptomatic hypotension.  Patient had been hospitalized the previous month for worsening CHF and had been taking diuretics.  Patient with poor oral intake.  He was found to have acute kidney injury on chronic kidney disease with severe hypotension.  His diuretics were held.  He was treated with IV fluids and this resolved.  He was discharged home on Coreg and Bumex was restarted.  His lisinopril and Aldactone were discontinued.  Atrial fib rate controlled with carvedilol.  He continues on chronic anticoagulation with Coumadin.  Diabetes mellitus type 2.  Continues on metformin and glipizide.  Chronic venous stasis with lower extremity edema.    On today's visit patient sitting up in bedside chair.  His wife is present on exam.  Patient with underlying acute on chronic congestive heart failure. Pt continues with increasing LE edema. His weight is up ~10 lbs. He was started back on his Lasix and spironolactone. He was down 2 lbs but today he is up 4 lbs. He has 3+ LE edema. He reports very little SOB. Lungs are CTA. He has LE ulcers weeping to BLE. He continues with topical treatment. Today  down from 507. Pt has been noncompliant with 2 gm/diabetic diet. His blood sugars continue to be elevated in the 200s. Today I reviewed with his diet with both pt, wife and nursing. Pt  self transferring about in his room over the weekend and fell. He sustained skin tears to both great toes. Atrial fib rate controlled with carvedilol.  He continues on Coumadin for anticoagulant. Diabetes mellitus type 2. Continues on Lantus, metformin and glipizide.      Active Ambulatory Problems     Diagnosis Date Noted     Venous stasis dermatitis       Atherosclerotic heart disease of native coronary artery without angina pectoris 09/28/2015     Essential hypertension 09/24/2015     Gastroduodenal ulcer 09/24/2015     Peripheral blood vessel disorder (H) 09/24/2015     Paroxysmal atrial fib -- on Warfarin       Lymphedema 05/06/2016     Weakness 05/06/2016     Hypotension due to drugs 05/06/2016     Hypotension, unspecified hypotension type      Duodenitis 05/11/2016     Anemia 06/15/2016     Cecal cancer (H)      S/P right hemicolectomy      Gastrointestinal hemorrhage with melena 07/08/2016     Blood in stool      Lactic acid acidosis      Peripheral venous insufficiency 08/07/2017     Shortness of breath 02/01/2018     Legionella infection (H)      UTI (urinary tract infection)      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) 05/17/2018     Gastroesophageal reflux disease with esophagitis 12/04/2018     Hyperkalemia 12/04/2018     Long term current use of insulin (H) 12/04/2018     Malignant tumor of ascending colon (H) 12/04/2018     Mixed hyperlipidemia 12/04/2018     Tricuspid valve disorder 12/04/2018     Normocytic normochromic anemia 12/04/2018     Venous hypertension 12/04/2018     Peripheral vascular disease (H) 12/04/2018     Hyperglycemia due to type 2 diabetes mellitus (H) 09/24/2015     DM type 2, Hgb A1C 7.4 on 3/31/22 09/24/2015     Venous stasis 11/27/2019     Hearing loss 01/11/2016     PVC's (premature ventricular contractions) 01/28/2016     Malaise and fatigue 09/24/2015     Abnormal EKG 09/24/2015     Age-related cataract 10/26/2021     Amnesia 10/26/2021     Severe bilateral hearing  loss 10/26/2021     Chronic idiopathic constipation 10/26/2021     Slow transit constipation 10/26/2021     Chronic kidney disease, stage 3a (H) 03/31/2022     Hypokalemia 03/31/2022     Hypomagnesemia 04/01/2022     Severe Pulm HTN -- PAP 60-65 mmHg plus RAP on Echo 3/31/22 04/07/2022     (HFpEF) heart failure with preserved ejection fraction (H) 04/14/2022     Orthostatic hypotension 05/19/2022     Lactic acidosis 05/19/2022     Severe sepsis (H) 05/19/2022     Acute renal failure, unspecified acute renal failure type (H) 05/19/2022     Acute on chronic diastolic heart failure (H) 05/24/2022     Resolved Ambulatory Problems     Diagnosis Date Noted     Urinary bladder stone 02/10/2016     CAP (community acquired pneumonia) 02/01/2018     Anticoagulated on Coumadin 03/31/2022     Past Medical History:   Diagnosis Date     Coronary artery disease 9/25/2015     Diabetes mellitus (H)      Dyslipidemia      GERD (gastroesophageal reflux disease)      GI bleed 07/08/2016     Gout      Hematuria      Hyperlipemia      Hypertension      Kidney stone 10/6/2015     Allergies   Allergen Reactions     Aspirin Nausea and Vomiting     GI upset  Other reaction(s): GI upset       All Meds and Allergies reviewed in the record at the facility and is the most up-to-date.      Current Outpatient Medications   Medication Sig     atorvastatin (LIPITOR) 10 MG tablet Take 10 mg by mouth At Bedtime     bumetanide (BUMEX) 0.5 MG tablet Take 2 tablets (1 mg) by mouth daily (Patient taking differently: Take 1 mg by mouth 2 times daily)     carvedilol (COREG) 3.125 MG tablet Take 3.125 mg by mouth 2 times daily (with meals)     [employer] IRON 240 (27 Fe) MG TABS Take 1 tablet by mouth daily     glipiZIDE (GLUCOTROL XL) 2.5 MG 24 hr tablet Take 2 tablets (5 mg) by mouth daily     insulin aspart (NOVOLOG FLEXPEN) 100 UNIT/ML pen 3 times a day with meals, for glucometer 150-200 give 2 units SQ, 201-250 give 4 units, >250 give 6 units     insulin  "glargine (LANTUS PEN) 100 UNIT/ML pen Inject 15 Units Subcutaneous At Bedtime (Patient taking differently: Inject 20 Units Subcutaneous At Bedtime)     magnesium hydroxide (MILK OF MAGNESIA) 400 MG/5ML suspension Take 30 mLs by mouth daily as needed for constipation     metFORMIN (GLUCOPHAGE) 500 MG tablet Take 1 tablet (500 mg) by mouth daily (with breakfast)     multivitamin with minerals tablet Take 1 tablet by mouth daily     omeprazole (PRILOSEC) 20 MG capsule Take 20 mg by mouth daily     potassium chloride ER (KLOR-CON M) 20 MEQ CR tablet Take 20 mEq by mouth daily     senna-docusate (SENOKOT-S/PERICOLACE) 8.6-50 MG tablet Take 1 tablet by mouth 2 times daily (Patient taking differently: Take 1 tablet by mouth 2 times daily)     spironolactone (ALDACTONE) 25 MG tablet Take 25 mg by mouth daily     traZODone (DESYREL) 50 MG tablet Take 0.5 tablets (25 mg) by mouth nightly as needed for sleep (Patient taking differently: Take 25 mg by mouth nightly as needed for sleep)     warfarin ANTICOAGULANT (COUMADIN) 5 MG tablet 5 mg daily except 7.5 mg on Sundays, check INR on 4/11 and adjust dose for desired INR of 2.0 to 3.0. (Patient taking differently: 5 mg daily except 7.5 mg on Sundays, check INR on 4/11 and adjust dose for desired INR of 2.0 to 3.0.)     No current facility-administered medications for this visit.       REVIEW OF SYSTEMS:   Review of Systems  No fevers or chills. No headache, lightheadedness or dizziness.  Occ. SOB, no chest pains or palpitations. Appetite is improving. No nausea, vomiting, constipation or diarrhea. No dysuria, frequency, burning or pain with urination.  Chronic lower extremity edema with increase.     PHYSICAL EXAMINATION:  Physical Exam     Vital signs: /80   Pulse 101   Temp 98.6  F (37  C)   Resp 21   Ht 1.854 m (6' 1\")   Wt 89.2 kg (196 lb 9.6 oz)   SpO2 96%   BMI 25.94 kg/m    General: Awake, Alert, oriented x3, follows simple commands  HEENT: Pink conjunctiva, " anicteric sclerae, dry oral mucosa  NECK: Supple  CVS:  S1  S2, without murmur or gallop.   LUNG: Clear to auscultation, No wheezes, rales or rhonci.  BACK: No kyphosis of the thoracic spine  ABDOMEN: Soft, nontender to palpation, with positive bowel sounds  EXTREMITIES: Moves both upper and lower extremities with generalized weakness, 3+ pedal edema with weeping, no calf tenderness  SKIN: Stage II pressure ulcer to his coccyx not observed. Open areas to both LE with topical treatment. Skin tears to both great toes. No redness or warmth.   NEUROLOGIC: Intact, pulses palpable  PSYCHIATRIC: Cognitive impairment noted.       Labs:  All labs reviewed in the nursing home record and Epic   @  Lab Results   Component Value Date    WBC 6.4 05/19/2022     Lab Results   Component Value Date    RBC 4.30 05/19/2022     Lab Results   Component Value Date    HGB 11.9 05/20/2022     Lab Results   Component Value Date    HCT 38.1 05/19/2022     Lab Results   Component Value Date    MCV 89 05/19/2022     Lab Results   Component Value Date    MCH 30.9 05/19/2022     Lab Results   Component Value Date    MCHC 34.9 05/19/2022     Lab Results   Component Value Date    RDW 15.9 05/19/2022     Lab Results   Component Value Date     05/19/2022        @Last Comprehensive Metabolic Panel:  Sodium   Date Value Ref Range Status   06/06/2022 137 136 - 145 mmol/L Final     Potassium   Date Value Ref Range Status   06/06/2022 3.6 3.5 - 5.0 mmol/L Final     Chloride   Date Value Ref Range Status   06/06/2022 101 98 - 107 mmol/L Final     Carbon Dioxide (CO2)   Date Value Ref Range Status   06/06/2022 29 22 - 31 mmol/L Final     Anion Gap   Date Value Ref Range Status   06/06/2022 7 5 - 18 mmol/L Final     Glucose   Date Value Ref Range Status   06/06/2022 167 (H) 70 - 125 mg/dL Final     Urea Nitrogen   Date Value Ref Range Status   06/06/2022 22 8 - 28 mg/dL Final     Creatinine   Date Value Ref Range Status   06/06/2022 1.23 0.70 - 1.30  mg/dL Final     GFR Estimate   Date Value Ref Range Status   06/06/2022 55 (L) >60 mL/min/1.73m2 Final     Comment:     Effective December 21, 2021 eGFRcr in adults is calculated using the 2021 CKD-EPI creatinine equation which includes age and gender (Dylan et al., NE, DOI: 10.1056/JRSBoh5323031)   12/30/2020 56 (L) >60 mL/min/1.73m2 Final     Calcium   Date Value Ref Range Status   06/06/2022 8.8 8.5 - 10.5 mg/dL Final     Assessment/plan:      ICD-10-CM    1. Acute on chronic heart failure with preserved ejection fraction (H)  I50.33  Weight up 10 pounds.   BNP prev 507 today 460.   Patient continues on Bumex 1 mg BID and   Aldactone at 25 mg every day.   Give metolazone 5 mg every day 30 min prior to Bumex in the am.   Repeat BNP and BMP on Thursday.   Continue daily weights.      2. Acute kidney injury superimposed on chronic kidney disease (H)  N17.9  BMP unremarkable.  Follow-up on Monday.    N18.9    3. Paroxysmal atrial fibrillation (H)  I48.0  Rate controlled with carvedilol.  INRs managed per the Coumadin clinic.   4. Type 2 diabetes mellitus with other specified complication, with long-term current use of insulin (H)  E11.69  Appetite improving.  Blood sugars occasionally elevated.  Continues on metformin and glimepiride.  Increase Lantus to 25 units.     Z79.4    5. Lower extremity ulcers L97.901 Continues with calcium alginate AG dressings.    6. Skin tear to toes  L97.915 Apply calcium alginate with sliver    7. Essential hypertension  I10  overall blood pressures improved.       This note has been dictated using voice recognition software. Any grammatical or context distortions are unintentional and inherent to the software    Electronically signed by: Jaimie Joe CNP           Sincerely,        Jaimie Joe NP

## 2022-06-07 NOTE — PROGRESS NOTES
Received a fax INR result for Robinson from [facility]    INR result dated 6/7/2022 is 3.8.    2.5 mg on 6/3 and 5 mg 6/4-6/6.  No changes noted on fax.    Everett Messer RN, BSN  Antico[facility] Clinic

## 2022-06-07 NOTE — PROGRESS NOTES
[facility]    Code Status:  FULL CODE   Visit Type:   Chief Complaint   Patient presents with     Nursing Home Acute     TCU Follow up     Facility:  [facility] ([facility]) [[id number]]           Transitional Care Course: Robinson Medel is a 89 year old male who I am seeing today for follow up on the TCU.  Patient recently hospitalized on 5/19/2022 secondary to hypotension.  Past medical history includes hypertension, CKD, proximal atrial fib, HFpEF, diabetes mellitus type 2 and chronic venous stasis.  Patient presented with symptomatic hypotension.  Patient had been hospitalized the previous month for worsening CHF and had been taking diuretics.  Patient with poor oral intake.  He was found to have acute kidney injury on chronic kidney disease with severe hypotension.  His diuretics were held.  He was treated with IV fluids and this resolved.  He was discharged home on Coreg and Bumex was restarted.  His lisinopril and Aldactone were discontinued.  Atrial fib rate controlled with carvedilol.  He continues on chronic anticoagulation with Coumadin.  Diabetes mellitus type 2.  Continues on metformin and glipizide.  Chronic venous stasis with lower extremity edema.    On today's visit patient sitting up in bedside chair.  His wife is present on exam.  Patient with underlying acute on chronic congestive heart failure. Pt continues with increasing LE edema. His weight is up ~10 lbs. He was started back on his Lasix and spironolactone. He was down 2 lbs but today he is up 4 lbs. He has 3+ LE edema. He reports very little SOB. Lungs are CTA. He has LE ulcers weeping to BLE. He continues with topical treatment. Today  down from 507. Pt has been noncompliant with 2 gm/diabetic diet. His blood sugars continue to be elevated in the 200s. Today I reviewed with his diet with both pt, wife and nursing. Pt self transferring about in his room over the weekend and fell. He sustained skin tears to  both great toes. Atrial fib rate controlled with carvedilol.  He continues on Coumadin for anticoagulant. Diabetes mellitus type 2. Continues on Lantus, metformin and glipizide.      Active Ambulatory Problems     Diagnosis Date Noted     Venous stasis dermatitis       Atherosclerotic heart disease of native coronary artery without angina pectoris 09/28/2015     Essential hypertension 09/24/2015     Gastroduodenal ulcer 09/24/2015     Peripheral blood vessel disorder (H) 09/24/2015     Paroxysmal atrial fib -- on Warfarin       Lymphedema 05/06/2016     Weakness 05/06/2016     Hypotension due to drugs 05/06/2016     Hypotension, unspecified hypotension type      Duodenitis 05/11/2016     Anemia 06/15/2016     Cecal cancer (H)      S/P right hemicolectomy      Gastrointestinal hemorrhage with melena 07/08/2016     Blood in stool      Lactic acid acidosis      Peripheral venous insufficiency 08/07/2017     Shortness of breath 02/01/2018     Legionella infection (H)      UTI (urinary tract infection)      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) 05/17/2018     Gastroesophageal reflux disease with esophagitis 12/04/2018     Hyperkalemia 12/04/2018     Long term current use of insulin (H) 12/04/2018     Malignant tumor of ascending colon (H) 12/04/2018     Mixed hyperlipidemia 12/04/2018     Tricuspid valve disorder 12/04/2018     Normocytic normochromic anemia 12/04/2018     Venous hypertension 12/04/2018     Peripheral vascular disease (H) 12/04/2018     Hyperglycemia due to type 2 diabetes mellitus (H) 09/24/2015     DM type 2, Hgb A1C 7.4 on 3/31/22 09/24/2015     Venous stasis 11/27/2019     Hearing loss 01/11/2016     PVC's (premature ventricular contractions) 01/28/2016     Malaise and fatigue 09/24/2015     Abnormal EKG 09/24/2015     Age-related cataract 10/26/2021     Amnesia 10/26/2021     Severe bilateral hearing loss 10/26/2021     Chronic idiopathic constipation 10/26/2021     Slow transit  constipation 10/26/2021     Chronic kidney disease, stage 3a (H) 03/31/2022     Hypokalemia 03/31/2022     Hypomagnesemia 04/01/2022     Severe Pulm HTN -- PAP 60-65 mmHg plus RAP on Echo 3/31/22 04/07/2022     (HFpEF) heart failure with preserved ejection fraction (H) 04/14/2022     Orthostatic hypotension 05/19/2022     Lactic acidosis 05/19/2022     Severe sepsis (H) 05/19/2022     Acute renal failure, unspecified acute renal failure type (H) 05/19/2022     Acute on chronic diastolic heart failure (H) 05/24/2022     Resolved Ambulatory Problems     Diagnosis Date Noted     Urinary bladder stone 02/10/2016     CAP (community acquired pneumonia) 02/01/2018     Anticoagulated on Coumadin 03/31/2022     Past Medical History:   Diagnosis Date     Coronary artery disease 9/25/2015     Diabetes mellitus (H)      Dyslipidemia      GERD (gastroesophageal reflux disease)      GI bleed 07/08/2016     Gout      Hematuria      Hyperlipemia      Hypertension      Kidney stone 10/6/2015     Allergies   Allergen Reactions     Aspirin Nausea and Vomiting     GI upset  Other reaction(s): GI upset       All Meds and Allergies reviewed in the record at the facility and is the most up-to-date.      Current Outpatient Medications   Medication Sig     atorvastatin (LIPITOR) 10 MG tablet Take 10 mg by mouth At Bedtime     bumetanide (BUMEX) 0.5 MG tablet Take 2 tablets (1 mg) by mouth daily (Patient taking differently: Take 1 mg by mouth 2 times daily)     carvedilol (COREG) 3.125 MG tablet Take 3.125 mg by mouth 2 times daily (with meals)     CVS IRON 240 (27 Fe) MG TABS Take 1 tablet by mouth daily     glipiZIDE (GLUCOTROL XL) 2.5 MG 24 hr tablet Take 2 tablets (5 mg) by mouth daily     insulin aspart (NOVOLOG FLEXPEN) 100 UNIT/ML pen 3 times a day with meals, for glucometer 150-200 give 2 units SQ, 201-250 give 4 units, >250 give 6 units     insulin glargine (LANTUS PEN) 100 UNIT/ML pen Inject 15 Units Subcutaneous At Bedtime  "(Patient taking differently: Inject 20 Units Subcutaneous At Bedtime)     magnesium hydroxide (MILK OF MAGNESIA) 400 MG/5ML suspension Take 30 mLs by mouth daily as needed for constipation     metFORMIN (GLUCOPHAGE) 500 MG tablet Take 1 tablet (500 mg) by mouth daily (with breakfast)     multivitamin with minerals tablet Take 1 tablet by mouth daily     omeprazole (PRILOSEC) 20 MG capsule Take 20 mg by mouth daily     potassium chloride ER (KLOR-CON M) 20 MEQ CR tablet Take 20 mEq by mouth daily     senna-docusate (SENOKOT-S/PERICOLACE) 8.6-50 MG tablet Take 1 tablet by mouth 2 times daily (Patient taking differently: Take 1 tablet by mouth 2 times daily)     spironolactone (ALDACTONE) 25 MG tablet Take 25 mg by mouth daily     traZODone (DESYREL) 50 MG tablet Take 0.5 tablets (25 mg) by mouth nightly as needed for sleep (Patient taking differently: Take 25 mg by mouth nightly as needed for sleep)     warfarin ANTICOAGULANT (COUMADIN) 5 MG tablet 5 mg daily except 7.5 mg on Sundays, check INR on 4/11 and adjust dose for desired INR of 2.0 to 3.0. (Patient taking differently: 5 mg daily except 7.5 mg on Sundays, check INR on 4/11 and adjust dose for desired INR of 2.0 to 3.0.)     No current facility-administered medications for this visit.       REVIEW OF SYSTEMS:   Review of Systems  No fevers or chills. No headache, lightheadedness or dizziness.  Occ. SOB, no chest pains or palpitations. Appetite is improving. No nausea, vomiting, constipation or diarrhea. No dysuria, frequency, burning or pain with urination.  Chronic lower extremity edema with increase.     PHYSICAL EXAMINATION:  Physical Exam     Vital signs: /80   Pulse 101   Temp 98.6  F (37  C)   Resp 21   Ht 1.854 m (6' 1\")   Wt 89.2 kg (196 lb 9.6 oz)   SpO2 96%   BMI 25.94 kg/m    General: Awake, Alert, oriented x3, follows simple commands  HEENT: Pink conjunctiva, anicteric sclerae, dry oral mucosa  NECK: Supple  CVS:  S1  S2, without murmur " or gallop.   LUNG: Clear to auscultation, No wheezes, rales or rhonci.  BACK: No kyphosis of the thoracic spine  ABDOMEN: Soft, nontender to palpation, with positive bowel sounds  EXTREMITIES: Moves both upper and lower extremities with generalized weakness, 3+ pedal edema with weeping, no calf tenderness  SKIN: Stage II pressure ulcer to his coccyx not observed. Open areas to both LE with topical treatment. Skin tears to both great toes. No redness or warmth.   NEUROLOGIC: Intact, pulses palpable  PSYCHIATRIC: Cognitive impairment noted.       Labs:  All labs reviewed in the nursing home record and Epic   @  Lab Results   Component Value Date    WBC 6.4 05/19/2022     Lab Results   Component Value Date    RBC 4.30 05/19/2022     Lab Results   Component Value Date    HGB 11.9 05/20/2022     Lab Results   Component Value Date    HCT 38.1 05/19/2022     Lab Results   Component Value Date    MCV 89 05/19/2022     Lab Results   Component Value Date    MCH 30.9 05/19/2022     Lab Results   Component Value Date    MCHC 34.9 05/19/2022     Lab Results   Component Value Date    RDW 15.9 05/19/2022     Lab Results   Component Value Date     05/19/2022        @Last Comprehensive Metabolic Panel:  Sodium   Date Value Ref Range Status   06/06/2022 137 136 - 145 mmol/L Final     Potassium   Date Value Ref Range Status   06/06/2022 3.6 3.5 - 5.0 mmol/L Final     Chloride   Date Value Ref Range Status   06/06/2022 101 98 - 107 mmol/L Final     Carbon Dioxide (CO2)   Date Value Ref Range Status   06/06/2022 29 22 - 31 mmol/L Final     Anion Gap   Date Value Ref Range Status   06/06/2022 7 5 - 18 mmol/L Final     Glucose   Date Value Ref Range Status   06/06/2022 167 (H) 70 - 125 mg/dL Final     Urea Nitrogen   Date Value Ref Range Status   06/06/2022 22 8 - 28 mg/dL Final     Creatinine   Date Value Ref Range Status   06/06/2022 1.23 0.70 - 1.30 mg/dL Final     GFR Estimate   Date Value Ref Range Status   06/06/2022 55 (L)  >60 mL/min/1.73m2 Final     Comment:     Effective December 21, 2021 eGFRcr in adults is calculated using the 2021 CKD-EPI creatinine equation which includes age and gender (Dylan et al., NEJ, DOI: 10.1056/AZMVof6706973)   12/30/2020 56 (L) >60 mL/min/1.73m2 Final     Calcium   Date Value Ref Range Status   06/06/2022 8.8 8.5 - 10.5 mg/dL Final     Assessment/plan:      ICD-10-CM    1. Acute on chronic heart failure with preserved ejection fraction (H)  I50.33  Weight up 10 pounds.   BNP prev 507 today 460.   Patient continues on Bumex 1 mg BID and   Aldactone at 25 mg every day.   Give metolazone 5 mg every day 30 min prior to Bumex in the am.   Repeat BNP and BMP on Thursday.   Continue daily weights.      2. Acute kidney injury superimposed on chronic kidney disease (H)  N17.9  BMP unremarkable.  Follow-up on Monday.    N18.9    3. Paroxysmal atrial fibrillation (H)  I48.0  Rate controlled with carvedilol.  INRs managed per the Coumadin clinic.   4. Type 2 diabetes mellitus with other specified complication, with long-term current use of insulin (H)  E11.69  Appetite improving.  Blood sugars occasionally elevated.  Continues on metformin and glimepiride.  Increase Lantus to 25 units.     Z79.4    5. Lower extremity ulcers L97.901 Continues with calcium alginate AG dressings.    6. Skin tear to toes  L97.915 Apply calcium alginate with sliver    7. Essential hypertension  I10  overall blood pressures improved.       This note has been dictated using voice recognition software. Any grammatical or context distortions are unintentional and inherent to the software    Electronically signed by: Jaimie Joe, CNP

## 2022-06-07 NOTE — PROGRESS NOTES
ANTICOAGULATION MANAGEMENT     Robinson Medel 89 year old male is on warfarin with subtherapeutic INR result. (Goal INR 2.0-3.0)    Recent labs: (last 7 days)     06/07/22  0000   INR 3.8*       ASSESSMENT       Source(s): Chart Review and Home Care/Facility Nurse       Warfarin doses taken: Warfarin taken as instructed    Diet: patient has been non-compliant with 2 gm sodium/diabetic diet    New illness, injury, or hospitalization: No    Medication/supplement changes: None noted    Signs or symptoms of bleeding or clotting: No    Previous INR: Supratherapeutic    Additional findings: Swelling in BLE moderate to severe     Per notes from Geriatrics on 6/6/22 the patient's weight is up 4 lbs.       PLAN     Recommended plan for ongoing change(s) affecting INR     Dosing Instructions: decrease your warfarin dose (7.7% change) with next INR in 3 days       Summary  As of 6/7/2022    Full warfarin instructions:  2.5 mg every Tue, Fri; 5 mg all other days   Next INR check:  6/10/2022             Telephone call with [facility] home care/facility nurse who agrees to plan and repeated back plan correctly    Orders given to  Homecare nurse/facility to recheck    Education provided: Monitoring for bleeding signs and symptoms and When to seek medical attention/emergency care    Plan made per [facility] anticoagulation protocol    Belgica Santamaria, RN  Anticoagulation Clinic  6/7/2022    _______________________________________________________________________     Anticoagulation Episode Summary     Current INR goal:  2.0-3.0   TTR:  0.0 % (4 d)   Target end date:  Indefinite   Send INR reminders to:  [facility] (TCU/LTC/[facility])    Indications    Paroxysmal atrial fib -- on Warfarin  [I48.0]           Comments:  [facility] [phone]         Anticoagulation Care Providers     Provider Role Specialty Phone number    Praneeth Marina DO Referring Family Medicine [phone]

## 2022-06-07 NOTE — LETTER
6/7/2022        RE: Robinson Medel  [address] [place] [address]        [facility]    Code Status:  FULL CODE   Visit Type:   Chief Complaint   Patient presents with     Nursing Home Acute     TCU Follow up     Facility:  [facility] ([facility]) [[id number]]           Transitional Care Course: Robinson Medel is a 89 year old male who I am seeing today for follow up on the TCU.  Patient recently hospitalized on 5/19/2022 secondary to hypotension.  Past medical history includes hypertension, CKD, proximal atrial fib, HFpEF, diabetes mellitus type 2 and chronic venous stasis.  Patient presented with symptomatic hypotension.  Patient had been hospitalized the previous month for worsening CHF and had been taking diuretics.  Patient with poor oral intake.  He was found to have acute kidney injury on chronic kidney disease with severe hypotension.  His diuretics were held.  He was treated with IV fluids and this resolved.  He was discharged home on Coreg and Bumex was restarted.  His lisinopril and Aldactone were discontinued.  Atrial fib rate controlled with carvedilol.  He continues on chronic anticoagulation with Coumadin.  Diabetes mellitus type 2.  Continues on metformin and glipizide.  Chronic venous stasis with lower extremity edema.    On today's visit patient sitting up in bedside chair. Patient with underlying acute on chronic congestive heart failure. Pt continues with increasing LE edema. His weight is up ~10 lbs. occasional shortness of breath.  Lung sounds are clear.  He was started back on his Lasix and spironolactone.  Yesterday I added Bumex to be given 30 minutes prior to Lasix in the a.m.  He is down 1 pound today.  He does have some open ulcers to the lower extremities that he is receiving topical treatment 2.  BNP down from 507 to 460.  He has been noncompliant with his 2 g sodium/diabetic diet.  He did meet with the dietitian today.  He is reporting he is  always hungry.  They did review some snack alternatives which would be compliance.  Atrial fibrate controlled with carvedilol.  He is also on chronic anticoagulation with Coumadin.  INR is managed per the Coumadin clinic.  Diabetes mellitus type 2. Increase in his Lantus yesterday.  Blood sugars continue to be elevated in the 200s.  He also continues on metformin and glipizide.    Active Ambulatory Problems     Diagnosis Date Noted     Venous stasis dermatitis       Atherosclerotic heart disease of native coronary artery without angina pectoris 09/28/2015     Essential hypertension 09/24/2015     Gastroduodenal ulcer 09/24/2015     Peripheral blood vessel disorder (H) 09/24/2015     Paroxysmal atrial fib -- on Warfarin       Lymphedema 05/06/2016     Weakness 05/06/2016     Hypotension due to drugs 05/06/2016     Hypotension, unspecified hypotension type      Duodenitis 05/11/2016     Anemia 06/15/2016     Cecal cancer (H)      S/P right hemicolectomy      Gastrointestinal hemorrhage with melena 07/08/2016     Blood in stool      Lactic acid acidosis      Peripheral venous insufficiency 08/07/2017     Shortness of breath 02/01/2018     Legionella infection (H)      UTI (urinary tract infection)      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) 05/17/2018     Gastroesophageal reflux disease with esophagitis 12/04/2018     Hyperkalemia 12/04/2018     Long term current use of insulin (H) 12/04/2018     Malignant tumor of ascending colon (H) 12/04/2018     Mixed hyperlipidemia 12/04/2018     Tricuspid valve disorder 12/04/2018     Normocytic normochromic anemia 12/04/2018     Venous hypertension 12/04/2018     Peripheral vascular disease (H) 12/04/2018     Hyperglycemia due to type 2 diabetes mellitus (H) 09/24/2015     DM type 2, Hgb A1C 7.4 on 3/31/22 09/24/2015     Venous stasis 11/27/2019     Hearing loss 01/11/2016     PVC's (premature ventricular contractions) 01/28/2016     Malaise and fatigue 09/24/2015      Abnormal EKG 09/24/2015     Age-related cataract 10/26/2021     Amnesia 10/26/2021     Severe bilateral hearing loss 10/26/2021     Chronic idiopathic constipation 10/26/2021     Slow transit constipation 10/26/2021     Chronic kidney disease, stage 3a (H) 03/31/2022     Hypokalemia 03/31/2022     Hypomagnesemia 04/01/2022     Severe Pulm HTN -- PAP 60-65 mmHg plus RAP on Echo 3/31/22 04/07/2022     (HFpEF) heart failure with preserved ejection fraction (H) 04/14/2022     Orthostatic hypotension 05/19/2022     Lactic acidosis 05/19/2022     Severe sepsis (H) 05/19/2022     Acute renal failure, unspecified acute renal failure type (H) 05/19/2022     Acute on chronic diastolic heart failure (H) 05/24/2022     Resolved Ambulatory Problems     Diagnosis Date Noted     Urinary bladder stone 02/10/2016     CAP (community acquired pneumonia) 02/01/2018     Anticoagulated on Coumadin 03/31/2022     Past Medical History:   Diagnosis Date     Coronary artery disease 9/25/2015     Diabetes mellitus (H)      Dyslipidemia      GERD (gastroesophageal reflux disease)      GI bleed 07/08/2016     Gout      Hematuria      Hyperlipemia      Hypertension      Kidney stone 10/6/2015     Allergies   Allergen Reactions     Aspirin Nausea and Vomiting     GI upset  Other reaction(s): GI upset       All Meds and Allergies reviewed in the record at the facility and is the most up-to-date.      Current Outpatient Medications   Medication Sig     metolazone (ZAROXOLYN) 5 MG tablet Take 5 mg by mouth daily Give 30 minutes prior to Bumex in the a.m.     atorvastatin (LIPITOR) 10 MG tablet Take 10 mg by mouth At Bedtime     bumetanide (BUMEX) 0.5 MG tablet Take 2 tablets (1 mg) by mouth daily (Patient taking differently: Take 1 mg by mouth 2 times daily)     carvedilol (COREG) 3.125 MG tablet Take 3.125 mg by mouth 2 times daily (with meals)     [employer] IRON 240 (27 Fe) MG TABS Take 1 tablet by mouth daily     glipiZIDE (GLUCOTROL XL) 2.5 MG 24 hr  tablet Take 2 tablets (5 mg) by mouth daily     insulin aspart (NOVOLOG FLEXPEN) 100 UNIT/ML pen 3 times a day with meals, for glucometer 150-200 give 2 units SQ, 201-250 give 4 units, >250 give 6 units     insulin glargine (LANTUS PEN) 100 UNIT/ML pen Inject 15 Units Subcutaneous At Bedtime (Patient taking differently: Inject 20 Units Subcutaneous At Bedtime)     magnesium hydroxide (MILK OF MAGNESIA) 400 MG/5ML suspension Take 30 mLs by mouth daily as needed for constipation     metFORMIN (GLUCOPHAGE) 500 MG tablet Take 1 tablet (500 mg) by mouth daily (with breakfast)     multivitamin with minerals tablet Take 1 tablet by mouth daily     omeprazole (PRILOSEC) 20 MG capsule Take 20 mg by mouth daily     potassium chloride ER (KLOR-CON M) 20 MEQ CR tablet Take 20 mEq by mouth daily     senna-docusate (SENOKOT-S/PERICOLACE) 8.6-50 MG tablet Take 1 tablet by mouth 2 times daily (Patient taking differently: Take 1 tablet by mouth 2 times daily)     spironolactone (ALDACTONE) 25 MG tablet Take 25 mg by mouth daily     traZODone (DESYREL) 50 MG tablet Take 0.5 tablets (25 mg) by mouth nightly as needed for sleep (Patient taking differently: Take 25 mg by mouth nightly as needed for sleep)     warfarin ANTICOAGULANT (COUMADIN) 5 MG tablet 5 mg daily except 7.5 mg on Sundays, check INR on 4/11 and adjust dose for desired INR of 2.0 to 3.0. (Patient taking differently: 5 mg daily except 7.5 mg on Sundays, check INR on 4/11 and adjust dose for desired INR of 2.0 to 3.0.)     No current facility-administered medications for this visit.       REVIEW OF SYSTEMS:   Review of Systems  No fevers or chills. No headache, lightheadedness or dizziness.  Occ. SOB, no chest pains or palpitations. Appetite is improving. No nausea, vomiting, constipation or diarrhea. No dysuria, frequency, burning or pain with urination.  Chronic lower extremity edema with increase.     PHYSICAL EXAMINATION:  Physical Exam     Vital signs: /70    "Pulse 74   Temp 96.8  F (36  C)   Resp 16   Ht 1.854 m (6' 1\")   Wt 91.2 kg (201 lb)   SpO2 94%   BMI 26.52 kg/m    General: Awake, Alert, oriented x3, follows simple commands  HEENT: Pink conjunctiva, anicteric sclerae, dry oral mucosa  NECK: Supple  CVS:  S1  S2, without murmur or gallop.   LUNG: Clear to auscultation, No wheezes, rales or rhonci.  BACK: No kyphosis of the thoracic spine  ABDOMEN: Soft, nontender to palpation, with positive bowel sounds  EXTREMITIES: Moves both upper and lower extremities with generalized weakness, 3+ pedal edema with weeping, no calf tenderness  SKIN: Stage II pressure ulcer to his coccyx not observed. Open areas to both LE with topical treatment. Skin tears to both great toes. No redness or warmth.   NEUROLOGIC: Intact, pulses palpable  PSYCHIATRIC: Cognitive impairment noted.       Labs:  All labs reviewed in the nursing home record and Epic   @  Lab Results   Component Value Date    WBC 6.4 05/19/2022     Lab Results   Component Value Date    RBC 4.30 05/19/2022     Lab Results   Component Value Date    HGB 11.9 05/20/2022     Lab Results   Component Value Date    HCT 38.1 05/19/2022     Lab Results   Component Value Date    MCV 89 05/19/2022     Lab Results   Component Value Date    MCH 30.9 05/19/2022     Lab Results   Component Value Date    MCHC 34.9 05/19/2022     Lab Results   Component Value Date    RDW 15.9 05/19/2022     Lab Results   Component Value Date     05/19/2022        @Last Comprehensive Metabolic Panel:  Sodium   Date Value Ref Range Status   06/06/2022 137 136 - 145 mmol/L Final     Potassium   Date Value Ref Range Status   06/06/2022 3.6 3.5 - 5.0 mmol/L Final     Chloride   Date Value Ref Range Status   06/06/2022 101 98 - 107 mmol/L Final     Carbon Dioxide (CO2)   Date Value Ref Range Status   06/06/2022 29 22 - 31 mmol/L Final     Anion Gap   Date Value Ref Range Status   06/06/2022 7 5 - 18 mmol/L Final     Glucose   Date Value Ref Range " Status   06/06/2022 167 (H) 70 - 125 mg/dL Final     Urea Nitrogen   Date Value Ref Range Status   06/06/2022 22 8 - 28 mg/dL Final     Creatinine   Date Value Ref Range Status   06/06/2022 1.23 0.70 - 1.30 mg/dL Final     GFR Estimate   Date Value Ref Range Status   06/06/2022 55 (L) >60 mL/min/1.73m2 Final     Comment:     Effective December 21, 2021 eGFRcr in adults is calculated using the 2021 CKD-EPI creatinine equation which includes age and gender (Dylan et al., NEJ, DOI: 10.1056/SNLLcm0861587)   12/30/2020 56 (L) >60 mL/min/1.73m2 Final     Calcium   Date Value Ref Range Status   06/06/2022 8.8 8.5 - 10.5 mg/dL Final     Assessment/plan:      ICD-10-CM    1. Acute on chronic heart failure with preserved ejection fraction (H)  I50.33  Weight up 10 pounds.   BNP prev 507 today 460.   Patient continues on Bumex 1 mg BID and   Aldactone at 25 mg every day.   Give metolazone 5 mg every day 30 min prior to Bumex in the am.   Repeat BNP and BMP on Thursday.   Continue daily weights.      2. Acute kidney injury superimposed on chronic kidney disease (H)  N17.9  BMP unremarkable.  Follow-up on Monday.    N18.9    3. Paroxysmal atrial fibrillation (H)  I48.0  Rate controlled with carvedilol.  INRs managed per the Coumadin clinic.   4. Type 2 diabetes mellitus with other specified complication, with long-term current use of insulin (H)  E11.69  Appetite improving.  Blood sugars occasionally elevated.  Continues on metformin and glimepiride.  Increase Lantus to 25 units.     Z79.4    5. Lower extremity ulcers L97.901 Continues with calcium alginate AG dressings.    6. Skin tear to toes  L97.915 Apply calcium alginate with sliver    7. Essential hypertension  I10  overall blood pressures improved.       This note has been dictated using voice recognition software. Any grammatical or context distortions are unintentional and inherent to the software    Electronically signed by: Jaimie Joe, CNP            Sincerely,        Jaimie Joe NP

## 2022-06-08 NOTE — PROGRESS NOTES
[facility] GERIATRIC SERVICES    Code Status:  FULL CODE   Visit Type:   Chief Complaint   Patient presents with     Nursing Home Acute     TCU Follow up     Facility:  [facility] ([facility]) [[id number]]           Transitional Care Course: Robinson Medel is a 89 year old male who I am seeing today for follow up on the TCU.  Patient recently hospitalized on 5/19/2022 secondary to hypotension.  Past medical history includes hypertension, CKD, proximal atrial fib, HFpEF, diabetes mellitus type 2 and chronic venous stasis.  Patient presented with symptomatic hypotension.  Patient had been hospitalized the previous month for worsening CHF and had been taking diuretics.  Patient with poor oral intake.  He was found to have acute kidney injury on chronic kidney disease with severe hypotension.  His diuretics were held.  He was treated with IV fluids and this resolved.  He was discharged home on Coreg and Bumex was restarted.  His lisinopril and Aldactone were discontinued.  Atrial fib rate controlled with carvedilol.  He continues on chronic anticoagulation with Coumadin.  Diabetes mellitus type 2.  Continues on metformin and glipizide.  Chronic venous stasis with lower extremity edema.    On today's visit patient sitting up in bedside chair. Patient with underlying acute on chronic congestive heart failure. Pt continues with increasing LE edema. His weight is up ~10 lbs. occasional shortness of breath.  Lung sounds are clear.  He was started back on his Lasix and spironolactone.  Yesterday I added Bumex to be given 30 minutes prior to Lasix in the a.m.  He is down 1 pound today.  He does have some open ulcers to the lower extremities that he is receiving topical treatment 2.  BNP down from 507 to 460.  He has been noncompliant with his 2 g sodium/diabetic diet.  He did meet with the dietitian today.  He is reporting he is always hungry.  They did review some snack alternatives which would be compliance.  Atrial  fibrate controlled with carvedilol.  He is also on chronic anticoagulation with Coumadin.  INR is managed per the Coumadin clinic.  Diabetes mellitus type 2. Increase in his Lantus yesterday.  Blood sugars continue to be elevated in the 200s.  He also continues on metformin and glipizide.    Active Ambulatory Problems     Diagnosis Date Noted     Venous stasis dermatitis       Atherosclerotic heart disease of native coronary artery without angina pectoris 09/28/2015     Essential hypertension 09/24/2015     Gastroduodenal ulcer 09/24/2015     Peripheral blood vessel disorder (H) 09/24/2015     Paroxysmal atrial fib -- on Warfarin       Lymphedema 05/06/2016     Weakness 05/06/2016     Hypotension due to drugs 05/06/2016     Hypotension, unspecified hypotension type      Duodenitis 05/11/2016     Anemia 06/15/2016     Cecal cancer (H)      S/P right hemicolectomy      Gastrointestinal hemorrhage with melena 07/08/2016     Blood in stool      Lactic acid acidosis      Peripheral venous insufficiency 08/07/2017     Shortness of breath 02/01/2018     Legionella infection (H)      UTI (urinary tract infection)      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) 05/17/2018     Gastroesophageal reflux disease with esophagitis 12/04/2018     Hyperkalemia 12/04/2018     Long term current use of insulin (H) 12/04/2018     Malignant tumor of ascending colon (H) 12/04/2018     Mixed hyperlipidemia 12/04/2018     Tricuspid valve disorder 12/04/2018     Normocytic normochromic anemia 12/04/2018     Venous hypertension 12/04/2018     Peripheral vascular disease (H) 12/04/2018     Hyperglycemia due to type 2 diabetes mellitus (H) 09/24/2015     DM type 2, Hgb A1C 7.4 on 3/31/22 09/24/2015     Venous stasis 11/27/2019     Hearing loss 01/11/2016     PVC's (premature ventricular contractions) 01/28/2016     Malaise and fatigue 09/24/2015     Abnormal EKG 09/24/2015     Age-related cataract 10/26/2021     Amnesia 10/26/2021      Severe bilateral hearing loss 10/26/2021     Chronic idiopathic constipation 10/26/2021     Slow transit constipation 10/26/2021     Chronic kidney disease, stage 3a (H) 03/31/2022     Hypokalemia 03/31/2022     Hypomagnesemia 04/01/2022     Severe Pulm HTN -- PAP 60-65 mmHg plus RAP on Echo 3/31/22 04/07/2022     (HFpEF) heart failure with preserved ejection fraction (H) 04/14/2022     Orthostatic hypotension 05/19/2022     Lactic acidosis 05/19/2022     Severe sepsis (H) 05/19/2022     Acute renal failure, unspecified acute renal failure type (H) 05/19/2022     Acute on chronic diastolic heart failure (H) 05/24/2022     Resolved Ambulatory Problems     Diagnosis Date Noted     Urinary bladder stone 02/10/2016     CAP (community acquired pneumonia) 02/01/2018     Anticoagulated on Coumadin 03/31/2022     Past Medical History:   Diagnosis Date     Coronary artery disease 9/25/2015     Diabetes mellitus (H)      Dyslipidemia      GERD (gastroesophageal reflux disease)      GI bleed 07/08/2016     Gout      Hematuria      Hyperlipemia      Hypertension      Kidney stone 10/6/2015     Allergies   Allergen Reactions     Aspirin Nausea and Vomiting     GI upset  Other reaction(s): GI upset       All Meds and Allergies reviewed in the record at the facility and is the most up-to-date.      Current Outpatient Medications   Medication Sig     metolazone (ZAROXOLYN) 5 MG tablet Take 5 mg by mouth daily Give 30 minutes prior to Bumex in the a.m.     atorvastatin (LIPITOR) 10 MG tablet Take 10 mg by mouth At Bedtime     bumetanide (BUMEX) 0.5 MG tablet Take 2 tablets (1 mg) by mouth daily (Patient taking differently: Take 1 mg by mouth 2 times daily)     carvedilol (COREG) 3.125 MG tablet Take 3.125 mg by mouth 2 times daily (with meals)     [employer] IRON 240 (27 Fe) MG TABS Take 1 tablet by mouth daily     glipiZIDE (GLUCOTROL XL) 2.5 MG 24 hr tablet Take 2 tablets (5 mg) by mouth daily     insulin aspart (NOVOLOG FLEXPEN) 100  "UNIT/ML pen 3 times a day with meals, for glucometer 150-200 give 2 units SQ, 201-250 give 4 units, >250 give 6 units     insulin glargine (LANTUS PEN) 100 UNIT/ML pen Inject 15 Units Subcutaneous At Bedtime (Patient taking differently: Inject 20 Units Subcutaneous At Bedtime)     magnesium hydroxide (MILK OF MAGNESIA) 400 MG/5ML suspension Take 30 mLs by mouth daily as needed for constipation     metFORMIN (GLUCOPHAGE) 500 MG tablet Take 1 tablet (500 mg) by mouth daily (with breakfast)     multivitamin with minerals tablet Take 1 tablet by mouth daily     omeprazole (PRILOSEC) 20 MG capsule Take 20 mg by mouth daily     potassium chloride ER (KLOR-CON M) 20 MEQ CR tablet Take 20 mEq by mouth daily     senna-docusate (SENOKOT-S/PERICOLACE) 8.6-50 MG tablet Take 1 tablet by mouth 2 times daily (Patient taking differently: Take 1 tablet by mouth 2 times daily)     spironolactone (ALDACTONE) 25 MG tablet Take 25 mg by mouth daily     traZODone (DESYREL) 50 MG tablet Take 0.5 tablets (25 mg) by mouth nightly as needed for sleep (Patient taking differently: Take 25 mg by mouth nightly as needed for sleep)     warfarin ANTICOAGULANT (COUMADIN) 5 MG tablet 5 mg daily except 7.5 mg on Sundays, check INR on 4/11 and adjust dose for desired INR of 2.0 to 3.0. (Patient taking differently: 5 mg daily except 7.5 mg on Sundays, check INR on 4/11 and adjust dose for desired INR of 2.0 to 3.0.)     No current facility-administered medications for this visit.       REVIEW OF SYSTEMS:   Review of Systems  No fevers or chills. No headache, lightheadedness or dizziness.  Occ. SOB, no chest pains or palpitations. Appetite is improving. No nausea, vomiting, constipation or diarrhea. No dysuria, frequency, burning or pain with urination.  Chronic lower extremity edema with increase.     PHYSICAL EXAMINATION:  Physical Exam     Vital signs: /70   Pulse 74   Temp 96.8  F (36  C)   Resp 16   Ht 1.854 m (6' 1\")   Wt 91.2 kg (201 " lb)   SpO2 94%   BMI 26.52 kg/m    General: Awake, Alert, oriented x3, follows simple commands  HEENT: Pink conjunctiva, anicteric sclerae, dry oral mucosa  NECK: Supple  CVS:  S1  S2, without murmur or gallop.   LUNG: Clear to auscultation, No wheezes, rales or rhonci.  BACK: No kyphosis of the thoracic spine  ABDOMEN: Soft, nontender to palpation, with positive bowel sounds  EXTREMITIES: Moves both upper and lower extremities with generalized weakness, 3+ pedal edema with weeping, no calf tenderness  SKIN: Stage II pressure ulcer to his coccyx not observed. Open areas to both LE with topical treatment. Skin tears to both great toes. No redness or warmth.   NEUROLOGIC: Intact, pulses palpable  PSYCHIATRIC: Cognitive impairment noted.       Labs:  All labs reviewed in the nursing home record and Epic   @  Lab Results   Component Value Date    WBC 6.4 05/19/2022     Lab Results   Component Value Date    RBC 4.30 05/19/2022     Lab Results   Component Value Date    HGB 11.9 05/20/2022     Lab Results   Component Value Date    HCT 38.1 05/19/2022     Lab Results   Component Value Date    MCV 89 05/19/2022     Lab Results   Component Value Date    MCH 30.9 05/19/2022     Lab Results   Component Value Date    MCHC 34.9 05/19/2022     Lab Results   Component Value Date    RDW 15.9 05/19/2022     Lab Results   Component Value Date     05/19/2022        @Last Comprehensive Metabolic Panel:  Sodium   Date Value Ref Range Status   06/06/2022 137 136 - 145 mmol/L Final     Potassium   Date Value Ref Range Status   06/06/2022 3.6 3.5 - 5.0 mmol/L Final     Chloride   Date Value Ref Range Status   06/06/2022 101 98 - 107 mmol/L Final     Carbon Dioxide (CO2)   Date Value Ref Range Status   06/06/2022 29 22 - 31 mmol/L Final     Anion Gap   Date Value Ref Range Status   06/06/2022 7 5 - 18 mmol/L Final     Glucose   Date Value Ref Range Status   06/06/2022 167 (H) 70 - 125 mg/dL Final     Urea Nitrogen   Date Value Ref  Range Status   06/06/2022 22 8 - 28 mg/dL Final     Creatinine   Date Value Ref Range Status   06/06/2022 1.23 0.70 - 1.30 mg/dL Final     GFR Estimate   Date Value Ref Range Status   06/06/2022 55 (L) >60 mL/min/1.73m2 Final     Comment:     Effective December 21, 2021 eGFRcr in adults is calculated using the 2021 CKD-EPI creatinine equation which includes age and gender (Dylan et al., [place], DOI: 10.[facility]6/HORQny7446892)   12/30/2020 56 (L) >60 mL/min/1.73m2 Final     Calcium   Date Value Ref Range Status   06/06/2022 8.8 8.5 - 10.5 mg/dL Final     Assessment/plan:      ICD-10-CM    1. Acute on chronic heart failure with preserved ejection fraction (H)  I50.33  Weight up 10 pounds.   BNP prev 507 today 460.   Patient continues on Bumex 1 mg BID and   Aldactone at 25 mg every day.   Give metolazone 5 mg every day 30 min prior to Bumex in the am.   Repeat BNP and BMP on Thursday.   Continue daily weights.      2. Acute kidney injury superimposed on chronic kidney disease (H)  N17.9  BMP unremarkable.  Follow-up on Monday.    N18.9    3. Paroxysmal atrial fibrillation (H)  I48.0  Rate controlled with carvedilol.  INRs managed per the Coumadin clinic.   4. Type 2 diabetes mellitus with other specified complication, with long-term current use of insulin (H)  E11.69  Appetite improving.  Blood sugars occasionally elevated.  Continues on metformin and glimepiride.  Increase Lantus to 25 units.     Z79.4    5. Lower extremity ulcers L97.901 Continues with calcium alginate AG dressings.    6. Skin tear to toes  L97.915 Apply calcium alginate with sliver    7. Essential hypertension  I10  overall blood pressures improved.       This note has been dictated using voice recognition software. Any grammatical or context distortions are unintentional and inherent to the software    Electronically signed by: Jaimie Joe, CNP

## 2022-06-09 NOTE — PROGRESS NOTES
Please clarify that INR is from 6/9 not 6/10 as noted.    Cathryn Miles sent in a 2.3 INR. Template is negative for all the questions.    Dunia Oakley RN    [facility]

## 2022-06-09 NOTE — PROGRESS NOTES
ANTICOAGULATION MANAGEMENT     Robinson Medel 89 year old male is on warfarin with therapeutic INR result. (Goal INR 2.0-3.0)    Recent labs: (last 7 days)     06/09/22  0000   INR 2.3*       ASSESSMENT       Source(s): Chart Review and Home Care/Facility Nurse       Warfarin doses taken: Warfarin taken as instructed    Diet: Pt has been non-compliant with 2 gm sodium/diabetic diet    New illness, injury, or hospitalization:  pt's weight on 6/6/22 was 201,weight on 6/7/22 was 201.2,weight on 6/8/22 was 202.3. BLE swelling-on bumex    Medication/supplement changes: None noted    Signs or symptoms of bleeding or clotting: No    Previous INR:supratherapeutic    Additional findings: Hospitalization 5/19/22 to 5/23/22-admitted to [facility] . Nurse confirmed that the INR was done a day early domi on 6/9/22 instead of Friday 6/10/22    Diagnoses:    Hypotension; resolved.  Suspect related to hypovolemia/ recent increase in diuretics with poor oral intake.  Medications adjusted.    LUCY on CKD, suspect secondary to hypovolemia    Hyperkalemia, resolved.  Lisinopril and spironolactone stop    Paroxysmal atrial fibrillation    HFpEF; no exacerbation.  Discharged on decreased diuretics    Diabetes mellitus, type II    Chronic venous stasis          PLAN     Recommended plan for ongoing change(s) affecting INR     Dosing Instructions: continue your current warfarin dose with next INR in 4 days       Summary  As of 6/9/2022    Full warfarin instructions:  2.5 mg every Tue, Fri; 5 mg all other days   Next INR check:  6/13/2022             Telephone call with Kimberly at [facility] [phone] home care/facility nurse who agrees to plan and repeated back plan correctly    Orders given to  Homecare nurse/facility to recheck    Education provided: Importance of notifying clinic for changes in medications; a sooner lab recheck maybe needed. and Contact [phone]  with any changes, questions or concerns.     Plan made with [facility]  Pharmacist Meghan Longoria, RN  Anticoagulation Clinic  6/9/2022    _______________________________________________________________________     Anticoagulation Episode Summary     Current INR goal:  2.0-3.0   TTR:  15.6 % (6 d)   Target end date:  Indefinite   Send INR reminders to:  [facility] (TCU/LTC/shelter)    Indications    Paroxysmal atrial fib -- on Warfarin  [I48.0]           Comments:  Cathryn [facility] [phone]         Anticoagulation Care Providers     Provider Role Specialty Phone number    Praneeth Marina DO Referring Family Medicine [phone]

## 2022-06-09 NOTE — PROGRESS NOTES
[facility] GERIATRIC SERVICES    Code Status:  FULL CODE   Visit Type:   Chief Complaint   Patient presents with     Nursing Home Acute     TCU Follow up     Facility:  [facility] ([facility]) [[id number]]           Transitional Care Course: Robinson Medel is a 89 year old male who I am seeing today for follow up on the TCU.  Patient recently hospitalized on 5/19/2022 secondary to hypotension.  Past medical history includes hypertension, CKD, proximal atrial fib, HFpEF, diabetes mellitus type 2 and chronic venous stasis.  Patient presented with symptomatic hypotension.  Patient had been hospitalized the previous month for worsening CHF and had been taking diuretics.  Patient with poor oral intake.  He was found to have acute kidney injury on chronic kidney disease with severe hypotension.  His diuretics were held.  He was treated with IV fluids and this resolved.  He was discharged home on Coreg and Bumex was restarted.  His lisinopril and Aldactone were discontinued.  Atrial fib rate controlled with carvedilol.  He continues on chronic anticoagulation with Coumadin.  Diabetes mellitus type 2.  Continues on metformin and glipizide.  Chronic venous stasis with lower extremity edema.    On today's visit patient sitting up in bedside chair. His wife and daughter are present on exam. Patient with underlying acute on chronic congestive heart failure. Pt continues with increasing LE edema 3+.  His weight is up ~10 lbs since admit however down 1 lb today. Since at the TCU his Bumex has been increased to 1 mg BID. His spironolactone has been resumed and recent addition of Metolazone 5 mg every day. He is reporting less SOB today. Lungs are CTA. BNP down from 507 to 341. His blood pressure is on the soft side. He is asymptomatic. BMP unremarkable. His blood sugars continue to be elevated. I have increased his insulin mx times. Pt did meet with the RD to discuss diet. He continues on 2 g sodium/diabetic diet. He is  not always compliant with this. He continues on Lantus, Metformin and glipizide. Atrial fib rate controlled with carvedilol.  He is also on chronic anticoagulation with Coumadin.  INR is managed per the Coumadin clinic. Today I talked with family about staying until Monday to stabilize his blood pressure and continue to diuresis. Also discussed long term management of CHF including palliative, hospice and LTC.     Active Ambulatory Problems     Diagnosis Date Noted     Venous stasis dermatitis       Atherosclerotic heart disease of native coronary artery without angina pectoris 09/28/2015     Essential hypertension 09/24/2015     Gastroduodenal ulcer 09/24/2015     Peripheral blood vessel disorder (H) 09/24/2015     Paroxysmal atrial fib -- on Warfarin       Lymphedema 05/06/2016     Weakness 05/06/2016     Hypotension due to drugs 05/06/2016     Hypotension, unspecified hypotension type      Duodenitis 05/11/2016     Anemia 06/15/2016     Cecal cancer (H)      S/P right hemicolectomy      Gastrointestinal hemorrhage with melena 07/08/2016     Blood in stool      Lactic acid acidosis      Peripheral venous insufficiency 08/07/2017     Shortness of breath 02/01/2018     Legionella infection (H)      UTI (urinary tract infection)      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) 05/17/2018     Gastroesophageal reflux disease with esophagitis 12/04/2018     Hyperkalemia 12/04/2018     Long term current use of insulin (H) 12/04/2018     Malignant tumor of ascending colon (H) 12/04/2018     Mixed hyperlipidemia 12/04/2018     Tricuspid valve disorder 12/04/2018     Normocytic normochromic anemia 12/04/2018     Venous hypertension 12/04/2018     Peripheral vascular disease (H) 12/04/2018     Hyperglycemia due to type 2 diabetes mellitus (H) 09/24/2015     DM type 2, Hgb A1C 7.4 on 3/31/22 09/24/2015     Venous stasis 11/27/2019     Hearing loss 01/11/2016     PVC's (premature ventricular contractions) 01/28/2016      Malaise and fatigue 09/24/2015     Abnormal EKG 09/24/2015     Age-related cataract 10/26/2021     Amnesia 10/26/2021     Severe bilateral hearing loss 10/26/2021     Chronic idiopathic constipation 10/26/2021     Slow transit constipation 10/26/2021     Chronic kidney disease, stage 3a (H) 03/31/2022     Hypokalemia 03/31/2022     Hypomagnesemia 04/01/2022     Severe Pulm HTN -- PAP 60-65 mmHg plus RAP on Echo 3/31/22 04/07/2022     (HFpEF) heart failure with preserved ejection fraction (H) 04/14/2022     Orthostatic hypotension 05/19/2022     Lactic acidosis 05/19/2022     Severe sepsis (H) 05/19/2022     Acute renal failure, unspecified acute renal failure type (H) 05/19/2022     Acute on chronic diastolic heart failure (H) 05/24/2022     Resolved Ambulatory Problems     Diagnosis Date Noted     Urinary bladder stone 02/10/2016     CAP (community acquired pneumonia) 02/01/2018     Anticoagulated on Coumadin 03/31/2022     Past Medical History:   Diagnosis Date     Coronary artery disease 9/25/2015     Diabetes mellitus (H)      Dyslipidemia      GERD (gastroesophageal reflux disease)      GI bleed 07/08/2016     Gout      Hematuria      Hyperlipemia      Hypertension      Kidney stone 10/6/2015     Allergies   Allergen Reactions     Aspirin Nausea and Vomiting     GI upset  Other reaction(s): GI upset       All Meds and Allergies reviewed in the record at the facility and is the most up-to-date.      Current Outpatient Medications   Medication Sig     atorvastatin (LIPITOR) 10 MG tablet Take 10 mg by mouth At Bedtime     bumetanide (BUMEX) 0.5 MG tablet Take 2 tablets (1 mg) by mouth daily (Patient taking differently: Take 1 mg by mouth 2 times daily)     carvedilol (COREG) 3.125 MG tablet Take 3.125 mg by mouth 2 times daily (with meals)     [employer] IRON 240 (27 Fe) MG TABS Take 1 tablet by mouth daily     glipiZIDE (GLUCOTROL XL) 2.5 MG 24 hr tablet Take 2 tablets (5 mg) by mouth daily     insulin aspart (NOVOLOG  FLEXPEN) 100 UNIT/ML pen 3 times a day with meals, for glucometer 150-200 give 2 units SQ, 201-250 give 4 units, >250 give 6 units     insulin glargine (LANTUS PEN) 100 UNIT/ML pen Inject 15 Units Subcutaneous At Bedtime (Patient taking differently: Inject 20 Units Subcutaneous At Bedtime)     magnesium hydroxide (MILK OF MAGNESIA) 400 MG/5ML suspension Take 30 mLs by mouth daily as needed for constipation     metFORMIN (GLUCOPHAGE) 500 MG tablet Take 1 tablet (500 mg) by mouth daily (with breakfast)     metolazone (ZAROXOLYN) 5 MG tablet Take 5 mg by mouth daily Give 30 minutes prior to Bumex in the a.m.     multivitamin with minerals tablet Take 1 tablet by mouth daily     omeprazole (PRILOSEC) 20 MG capsule Take 20 mg by mouth daily     potassium chloride ER (KLOR-CON M) 20 MEQ CR tablet Take 20 mEq by mouth daily     senna-docusate (SENOKOT-S/PERICOLACE) 8.6-50 MG tablet Take 1 tablet by mouth 2 times daily (Patient taking differently: Take 1 tablet by mouth 2 times daily)     spironolactone (ALDACTONE) 25 MG tablet Take 25 mg by mouth daily     traZODone (DESYREL) 50 MG tablet Take 0.5 tablets (25 mg) by mouth nightly as needed for sleep (Patient taking differently: Take 25 mg by mouth nightly as needed for sleep)     warfarin ANTICOAGULANT (COUMADIN) 5 MG tablet 5 mg daily except 7.5 mg on Sundays, check INR on 4/11 and adjust dose for desired INR of 2.0 to 3.0. (Patient taking differently: 5 mg daily except 7.5 mg on Sundays, check INR on 4/11 and adjust dose for desired INR of 2.0 to 3.0.)     No current facility-administered medications for this visit.       REVIEW OF SYSTEMS:   Review of Systems  No fevers or chills. No headache, lightheadedness or dizziness.  Denies  SOB today , no chest pains or palpitations. Appetite is good. No nausea, vomiting, constipation or diarrhea. No dysuria, frequency, burning or pain with urination.  Chronic lower extremity edema. .     PHYSICAL EXAMINATION:  Physical Exam  "    Vital signs: /79   Pulse 84   Temp 97.2  F (36.2  C)   Resp 16   Ht 1.854 m (6' 1\")   Wt 91.8 kg (202 lb 4.8 oz)   SpO2 97%   BMI 26.69 kg/m    General: Awake, Alert, oriented x3, follows simple commands  HEENT: Pink conjunctiva, anicteric sclerae, dry oral mucosa  NECK: Supple  CVS:  S1  S2, without murmur or gallop.   LUNG: Clear to auscultation, No wheezes, rales or rhonci.  BACK: No kyphosis of the thoracic spine  ABDOMEN: Soft, nontender to palpation, with positive bowel sounds  EXTREMITIES: Moves both upper and lower extremities with generalized weakness, 3+ pedal edema with weeping, no calf tenderness  SKIN: Stage II pressure ulcer to his coccyx not observed. Open areas to both LE with topical treatment- improving.  Skin tears to both great toes. No redness or warmth.   NEUROLOGIC: Intact, pulses palpable  PSYCHIATRIC: Cognitive impairment noted.       Labs:  All labs reviewed in the nursing home record and Epic   @  Lab Results   Component Value Date    WBC 6.4 05/19/2022     Lab Results   Component Value Date    RBC 4.30 05/19/2022     Lab Results   Component Value Date    HGB 11.9 05/20/2022     Lab Results   Component Value Date    HCT 38.1 05/19/2022     Lab Results   Component Value Date    MCV 89 05/19/2022     Lab Results   Component Value Date    MCH 30.9 05/19/2022     Lab Results   Component Value Date    MCHC 34.9 05/19/2022     Lab Results   Component Value Date    RDW 15.9 05/19/2022     Lab Results   Component Value Date     05/19/2022        @Last Comprehensive Metabolic Panel:  Sodium   Date Value Ref Range Status   06/09/2022 139 136 - 145 mmol/L Final     Potassium   Date Value Ref Range Status   06/09/2022 3.5 3.5 - 5.0 mmol/L Final     Chloride   Date Value Ref Range Status   06/09/2022 99 98 - 107 mmol/L Final     Carbon Dioxide (CO2)   Date Value Ref Range Status   06/09/2022 29 22 - 31 mmol/L Final     Anion Gap   Date Value Ref Range Status   06/09/2022 11 5 - " 18 mmol/L Final     Glucose   Date Value Ref Range Status   06/09/2022 82 70 - 125 mg/dL Final     Urea Nitrogen   Date Value Ref Range Status   06/09/2022 23 8 - 28 mg/dL Final     Creatinine   Date Value Ref Range Status   06/09/2022 1.27 0.70 - 1.30 mg/dL Final     GFR Estimate   Date Value Ref Range Status   06/09/2022 53 (L) >60 mL/min/1.73m2 Final     Comment:     Effective December 21, 2021 eGFRcr in adults is calculated using the 2021 CKD-EPI creatinine equation which includes age and gender (Dylan et al., NEJ, DOI: 10.1056/QOCOif0212557)   12/30/2020 56 (L) >60 mL/min/1.73m2 Final     Calcium   Date Value Ref Range Status   06/09/2022 9.2 8.5 - 10.5 mg/dL Final     Assessment/plan:      ICD-10-CM    1. Acute on chronic heart failure with preserved ejection fraction (H)  I50.33  Weight up 10 pounds since admit. Down 1 lb today.   BNP prev 507 today 341.   Patient continues on Bumex 1 mg BID and   Aldactone at 25 mg every day.   metolazone 5 mg every day 30 min prior to Bumex in the am.   Repeat BNP and BMP on Monday.    Continue daily weights.      2. Acute kidney injury superimposed on chronic kidney disease (H)  N17.9  BMP unremarkable.  Follow-up on Monday.    N18.9    3. Paroxysmal atrial fibrillation (H)  I48.0  Rate controlled with carvedilol.  INRs managed per the Coumadin clinic.   4. Type 2 diabetes mellitus with other specified complication, with long-term current use of insulin (H)  E11.69  Appetite improving.  Blood sugars occasionally elevated.  Continues on metformin and glimepiride.  Recent increase in  Lantus to 25 units.     Z79.4    5. Lower extremity ulcers L97.901 Continues with calcium alginate AG dressings.    6. Skin tear to toes  L97.915 Apply calcium alginate with sliver    7. Essential hypertension  I10  bp on the soft side. Pt asymptomatic.        This note has been dictated using voice recognition software. Any grammatical or context distortions are unintentional and inherent to  the software    Electronically signed by: Jaimie Joe, CNP

## 2022-06-09 NOTE — LETTER
6/9/2022        RE: Robinson Medel  [address] [place] [address]        [facility] GERIATRIC SERVICES    Code Status:  FULL CODE   Visit Type:   Chief Complaint   Patient presents with     Nursing Home Acute     TCU Follow up     Facility:  [facility] ([facility]) [[id number]]           Transitional Care Course: Robinson Medel is a 89 year old male who I am seeing today for follow up on the TCU.  Patient recently hospitalized on 5/19/2022 secondary to hypotension.  Past medical history includes hypertension, CKD, proximal atrial fib, HFpEF, diabetes mellitus type 2 and chronic venous stasis.  Patient presented with symptomatic hypotension.  Patient had been hospitalized the previous month for worsening CHF and had been taking diuretics.  Patient with poor oral intake.  He was found to have acute kidney injury on chronic kidney disease with severe hypotension.  His diuretics were held.  He was treated with IV fluids and this resolved.  He was discharged home on Coreg and Bumex was restarted.  His lisinopril and Aldactone were discontinued.  Atrial fib rate controlled with carvedilol.  He continues on chronic anticoagulation with Coumadin.  Diabetes mellitus type 2.  Continues on metformin and glipizide.  Chronic venous stasis with lower extremity edema.    On today's visit patient sitting up in bedside chair. His wife and daughter are present on exam. Patient with underlying acute on chronic congestive heart failure. Pt continues with increasing LE edema 3+.  His weight is up ~10 lbs since admit however down 1 lb today. Since at the TCU his Bumex has been increased to 1 mg BID. His spironolactone has been resumed and recent addition of Metolazone 5 mg every day. He is reporting less SOB today. Lungs are CTA. BNP down from 507 to 341. His blood pressure is on the soft side. He is asymptomatic. BMP unremarkable. His blood sugars continue to be elevated. I have increased his insulin mx times.  Pt did meet with the RD to discuss diet. He continues on 2 g sodium/diabetic diet. He is not always compliant with this. He continues on Lantus, Metformin and glipizide. Atrial fib rate controlled with carvedilol.  He is also on chronic anticoagulation with Coumadin.  INR is managed per the Coumadin clinic. Today I talked with family about staying until Monday to stabilize his blood pressure and continue to diuresis. Also discussed long term management of CHF including palliative, hospice and LTC.     Active Ambulatory Problems     Diagnosis Date Noted     Venous stasis dermatitis       Atherosclerotic heart disease of native coronary artery without angina pectoris 09/28/2015     Essential hypertension 09/24/2015     Gastroduodenal ulcer 09/24/2015     Peripheral blood vessel disorder (H) 09/24/2015     Paroxysmal atrial fib -- on Warfarin       Lymphedema 05/06/2016     Weakness 05/06/2016     Hypotension due to drugs 05/06/2016     Hypotension, unspecified hypotension type      Duodenitis 05/11/2016     Anemia 06/15/2016     Cecal cancer (H)      S/P right hemicolectomy      Gastrointestinal hemorrhage with melena 07/08/2016     Blood in stool      Lactic acid acidosis      Peripheral venous insufficiency 08/07/2017     Shortness of breath 02/01/2018     Legionella infection (H)      UTI (urinary tract infection)      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) 05/17/2018     Gastroesophageal reflux disease with esophagitis 12/04/2018     Hyperkalemia 12/04/2018     Long term current use of insulin (H) 12/04/2018     Malignant tumor of ascending colon (H) 12/04/2018     Mixed hyperlipidemia 12/04/2018     Tricuspid valve disorder 12/04/2018     Normocytic normochromic anemia 12/04/2018     Venous hypertension 12/04/2018     Peripheral vascular disease (H) 12/04/2018     Hyperglycemia due to type 2 diabetes mellitus (H) 09/24/2015     DM type 2, Hgb A1C 7.4 on 3/31/22 09/24/2015     Venous stasis 11/27/2019      Hearing loss 01/11/2016     PVC's (premature ventricular contractions) 01/28/2016     Malaise and fatigue 09/24/2015     Abnormal EKG 09/24/2015     Age-related cataract 10/26/2021     Amnesia 10/26/2021     Severe bilateral hearing loss 10/26/2021     Chronic idiopathic constipation 10/26/2021     Slow transit constipation 10/26/2021     Chronic kidney disease, stage 3a (H) 03/31/2022     Hypokalemia 03/31/2022     Hypomagnesemia 04/01/2022     Severe Pulm HTN -- PAP 60-65 mmHg plus RAP on Echo 3/31/22 04/07/2022     (HFpEF) heart failure with preserved ejection fraction (H) 04/14/2022     Orthostatic hypotension 05/19/2022     Lactic acidosis 05/19/2022     Severe sepsis (H) 05/19/2022     Acute renal failure, unspecified acute renal failure type (H) 05/19/2022     Acute on chronic diastolic heart failure (H) 05/24/2022     Resolved Ambulatory Problems     Diagnosis Date Noted     Urinary bladder stone 02/10/2016     CAP (community acquired pneumonia) 02/01/2018     Anticoagulated on Coumadin 03/31/2022     Past Medical History:   Diagnosis Date     Coronary artery disease 9/25/2015     Diabetes mellitus (H)      Dyslipidemia      GERD (gastroesophageal reflux disease)      GI bleed 07/08/2016     Gout      Hematuria      Hyperlipemia      Hypertension      Kidney stone 10/6/2015     Allergies   Allergen Reactions     Aspirin Nausea and Vomiting     GI upset  Other reaction(s): GI upset       All Meds and Allergies reviewed in the record at the facility and is the most up-to-date.      Current Outpatient Medications   Medication Sig     atorvastatin (LIPITOR) 10 MG tablet Take 10 mg by mouth At Bedtime     bumetanide (BUMEX) 0.5 MG tablet Take 2 tablets (1 mg) by mouth daily (Patient taking differently: Take 1 mg by mouth 2 times daily)     carvedilol (COREG) 3.125 MG tablet Take 3.125 mg by mouth 2 times daily (with meals)     CVS IRON 240 (27 Fe) MG TABS Take 1 tablet by mouth daily     glipiZIDE (GLUCOTROL  XL) 2.5 MG 24 hr tablet Take 2 tablets (5 mg) by mouth daily     insulin aspart (NOVOLOG FLEXPEN) 100 UNIT/ML pen 3 times a day with meals, for glucometer 150-200 give 2 units SQ, 201-250 give 4 units, >250 give 6 units     insulin glargine (LANTUS PEN) 100 UNIT/ML pen Inject 15 Units Subcutaneous At Bedtime (Patient taking differently: Inject 20 Units Subcutaneous At Bedtime)     magnesium hydroxide (MILK OF MAGNESIA) 400 MG/5ML suspension Take 30 mLs by mouth daily as needed for constipation     metFORMIN (GLUCOPHAGE) 500 MG tablet Take 1 tablet (500 mg) by mouth daily (with breakfast)     metolazone (ZAROXOLYN) 5 MG tablet Take 5 mg by mouth daily Give 30 minutes prior to Bumex in the a.m.     multivitamin with minerals tablet Take 1 tablet by mouth daily     omeprazole (PRILOSEC) 20 MG capsule Take 20 mg by mouth daily     potassium chloride ER (KLOR-CON M) 20 MEQ CR tablet Take 20 mEq by mouth daily     senna-docusate (SENOKOT-S/PERICOLACE) 8.6-50 MG tablet Take 1 tablet by mouth 2 times daily (Patient taking differently: Take 1 tablet by mouth 2 times daily)     spironolactone (ALDACTONE) 25 MG tablet Take 25 mg by mouth daily     traZODone (DESYREL) 50 MG tablet Take 0.5 tablets (25 mg) by mouth nightly as needed for sleep (Patient taking differently: Take 25 mg by mouth nightly as needed for sleep)     warfarin ANTICOAGULANT (COUMADIN) 5 MG tablet 5 mg daily except 7.5 mg on Sundays, check INR on 4/11 and adjust dose for desired INR of 2.0 to 3.0. (Patient taking differently: 5 mg daily except 7.5 mg on Sundays, check INR on 4/11 and adjust dose for desired INR of 2.0 to 3.0.)     No current facility-administered medications for this visit.       REVIEW OF SYSTEMS:   Review of Systems  No fevers or chills. No headache, lightheadedness or dizziness.  Denies  SOB today , no chest pains or palpitations. Appetite is good. No nausea, vomiting, constipation or diarrhea. No dysuria, frequency, burning or pain with  "urination.  Chronic lower extremity edema. .     PHYSICAL EXAMINATION:  Physical Exam     Vital signs: /79   Pulse 84   Temp 97.2  F (36.2  C)   Resp 16   Ht 1.854 m (6' 1\")   Wt 91.8 kg (202 lb 4.8 oz)   SpO2 97%   BMI 26.69 kg/m    General: Awake, Alert, oriented x3, follows simple commands  HEENT: Pink conjunctiva, anicteric sclerae, dry oral mucosa  NECK: Supple  CVS:  S1  S2, without murmur or gallop.   LUNG: Clear to auscultation, No wheezes, rales or rhonci.  BACK: No kyphosis of the thoracic spine  ABDOMEN: Soft, nontender to palpation, with positive bowel sounds  EXTREMITIES: Moves both upper and lower extremities with generalized weakness, 3+ pedal edema with weeping, no calf tenderness  SKIN: Stage II pressure ulcer to his coccyx not observed. Open areas to both LE with topical treatment- improving.  Skin tears to both great toes. No redness or warmth.   NEUROLOGIC: Intact, pulses palpable  PSYCHIATRIC: Cognitive impairment noted.       Labs:  All labs reviewed in the nursing home record and Epic   @  Lab Results   Component Value Date    WBC 6.4 05/19/2022     Lab Results   Component Value Date    RBC 4.30 05/19/2022     Lab Results   Component Value Date    HGB 11.9 05/20/2022     Lab Results   Component Value Date    HCT 38.1 05/19/2022     Lab Results   Component Value Date    MCV 89 05/19/2022     Lab Results   Component Value Date    MCH 30.9 05/19/2022     Lab Results   Component Value Date    MCHC 34.9 05/19/2022     Lab Results   Component Value Date    RDW 15.9 05/19/2022     Lab Results   Component Value Date     05/19/2022        @Last Comprehensive Metabolic Panel:  Sodium   Date Value Ref Range Status   06/09/2022 139 136 - 145 mmol/L Final     Potassium   Date Value Ref Range Status   06/09/2022 3.5 3.5 - 5.0 mmol/L Final     Chloride   Date Value Ref Range Status   06/09/2022 99 98 - 107 mmol/L Final     Carbon Dioxide (CO2)   Date Value Ref Range Status   06/09/2022 29 " 22 - 31 mmol/L Final     Anion Gap   Date Value Ref Range Status   06/09/2022 11 5 - 18 mmol/L Final     Glucose   Date Value Ref Range Status   06/09/2022 82 70 - 125 mg/dL Final     Urea Nitrogen   Date Value Ref Range Status   06/09/2022 23 8 - 28 mg/dL Final     Creatinine   Date Value Ref Range Status   06/09/2022 1.27 0.70 - 1.30 mg/dL Final     GFR Estimate   Date Value Ref Range Status   06/09/2022 53 (L) >60 mL/min/1.73m2 Final     Comment:     Effective December 21, 2021 eGFRcr in adults is calculated using the 2021 CKD-EPI creatinine equation which includes age and gender (Dylan et al., NEJM, DOI: 10.1056/JDXNnm3527981)   12/30/2020 56 (L) >60 mL/min/1.73m2 Final     Calcium   Date Value Ref Range Status   06/09/2022 9.2 8.5 - 10.5 mg/dL Final     Assessment/plan:      ICD-10-CM    1. Acute on chronic heart failure with preserved ejection fraction (H)  I50.33  Weight up 10 pounds since admit. Down 1 lb today.   BNP prev 507 today 341.   Patient continues on Bumex 1 mg BID and   Aldactone at 25 mg every day.   metolazone 5 mg every day 30 min prior to Bumex in the am.   Repeat BNP and BMP on Monday.    Continue daily weights.      2. Acute kidney injury superimposed on chronic kidney disease (H)  N17.9  BMP unremarkable.  Follow-up on Monday.    N18.9    3. Paroxysmal atrial fibrillation (H)  I48.0  Rate controlled with carvedilol.  INRs managed per the Coumadin clinic.   4. Type 2 diabetes mellitus with other specified complication, with long-term current use of insulin (H)  E11.69  Appetite improving.  Blood sugars occasionally elevated.  Continues on metformin and glimepiride.  Recent increase in  Lantus to 25 units.     Z79.4    5. Lower extremity ulcers L97.901 Continues with calcium alginate AG dressings.    6. Skin tear to toes  L97.915 Apply calcium alginate with sliver    7. Essential hypertension  I10  bp on the soft side. Pt asymptomatic.        This note has been dictated using voice  recognition software. Any grammatical or context distortions are unintentional and inherent to the software    Electronically signed by: Jaimie Joe CNP           Sincerely,        Jaimie Joe, NP

## 2022-06-10 NOTE — PROGRESS NOTES
Called [facility] and spoke with Xiomy who stated the order was put in incorrectly and the INR should not have been checked today.    SABIHA BillingsleyN, RN  [facility]tico[facility]

## 2022-06-10 NOTE — PROGRESS NOTES
Incoming fax from [employer].    Appears an INR was drawn today, though the date reads 06/13. Pt had INR draw yesterday and it was not supposed to be drawn again until 06/13.     INR: 2.9    When [facility] calls, will need to clarify why an INR was drawn today and what dose the patient received last night.

## 2022-06-13 NOTE — PROGRESS NOTES
[facility]    Code Status:  FULL CODE   Visit Type:   Chief Complaint   Patient presents with     Discharge Summary Nursing Home     Facility:  [facility] ([facility]) [[id number]]           Transitional Care Course: Robinson Medel is a 89 year old male who I am seeing today for discharge from the TCU.  Patient recently hospitalized on 5/19/2022 secondary to hypotension.  Past medical history includes hypertension, CKD, proximal atrial fib, HFpEF, diabetes mellitus type 2 and chronic venous stasis.  Patient presented with symptomatic hypotension.  Patient had been hospitalized the previous month for worsening CHF and had been taking diuretics.  Patient with poor oral intake.  He was found to have acute kidney injury on chronic kidney disease with severe hypotension.  His diuretics were held.  He was treated with IV fluids and this resolved.  He was discharged home on Coreg and Bumex was restarted.  His lisinopril and Aldactone were discontinued.  Atrial fib rate controlled with carvedilol.  He continues on chronic anticoagulation with Coumadin.  Diabetes mellitus type 2.  Continues on metformin and glipizide.  Chronic venous stasis with lower extremity edema.    On today's visit patient sitting up in bedside chair. Pt initially admitted from hospital due to hypotension. This has resolved. Acute on chronic congestive heart failure.. His diuretics had been discontinued during hospitalization.  His weight was up ~ 10 lbs during his TCU stay. However over the weekend he came down 6 lbs. He reports occ. SOB which has been stable. He continues with 2-3+ edema to LE. Wounds to RLE now healed. He continues with pinpoint area X 2 to LLE. He has open areas on both great toes. He is receiving topical treatment. He is being wrapped daily with ace wraps. He was getting this down at home previously. Today  down from 341. BMP unremarkable. He continues on Bumex and spironolactone which were  resumed at the TCU. He was receiving Metolazone however this has been changed to prn. BP on the low side of normal. DM type II. BS occ. In the 200s. Insulin increased during his TCU stay. He also continues on Metformin and glipizide. Atrial fib rate controlled with carvedilol.  He is also on chronic anticoagulation with Coumadin.  INR is managed per the Coumadin clinic. INR today 2.1. During TCU stay palliative care discussed.     Active Ambulatory Problems     Diagnosis Date Noted     Venous stasis dermatitis       Atherosclerotic heart disease of native coronary artery without angina pectoris 09/28/2015     Essential hypertension 09/24/2015     Gastroduodenal ulcer 09/24/2015     Peripheral blood vessel disorder (H) 09/24/2015     Paroxysmal atrial fib -- on Warfarin       Lymphedema 05/06/2016     Weakness 05/06/2016     Hypotension due to drugs 05/06/2016     Hypotension, unspecified hypotension type      Duodenitis 05/11/2016     Anemia 06/15/2016     Cecal cancer (H)      S/P right hemicolectomy      Gastrointestinal hemorrhage with melena 07/08/2016     Blood in stool      Lactic acid acidosis      Peripheral venous insufficiency 08/07/2017     Shortness of breath 02/01/2018     Legionella infection (H)      UTI (urinary tract infection)      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) 05/17/2018     Gastroesophageal reflux disease with esophagitis 12/04/2018     Hyperkalemia 12/04/2018     Long term current use of insulin (H) 12/04/2018     Malignant tumor of ascending colon (H) 12/04/2018     Mixed hyperlipidemia 12/04/2018     Tricuspid valve disorder 12/04/2018     Normocytic normochromic anemia 12/04/2018     Venous hypertension 12/04/2018     Peripheral vascular disease (H) 12/04/2018     Hyperglycemia due to type 2 diabetes mellitus (H) 09/24/2015     DM type 2, Hgb A1C 7.4 on 3/31/22 09/24/2015     Venous stasis 11/27/2019     Hearing loss 01/11/2016     PVC's (premature ventricular contractions)  01/28/2016     Malaise and fatigue 09/24/2015     Abnormal EKG 09/24/2015     Age-related cataract 10/26/2021     Amnesia 10/26/2021     Severe bilateral hearing loss 10/26/2021     Chronic idiopathic constipation 10/26/2021     Slow transit constipation 10/26/2021     Chronic kidney disease, stage 3a (H) 03/31/2022     Hypokalemia 03/31/2022     Hypomagnesemia 04/01/2022     Severe Pulm HTN -- PAP 60-65 mmHg plus RAP on Echo 3/31/22 04/07/2022     (HFpEF) heart failure with preserved ejection fraction (H) 04/14/2022     Orthostatic hypotension 05/19/2022     Lactic acidosis 05/19/2022     Severe sepsis (H) 05/19/2022     Acute renal failure, unspecified acute renal failure type (H) 05/19/2022     Acute on chronic diastolic heart failure (H) 05/24/2022     Resolved Ambulatory Problems     Diagnosis Date Noted     Urinary bladder stone 02/10/2016     CAP (community acquired pneumonia) 02/01/2018     Anticoagulated on Coumadin 03/31/2022     Past Medical History:   Diagnosis Date     Coronary artery disease 9/25/2015     Diabetes mellitus (H)      Dyslipidemia      GERD (gastroesophageal reflux disease)      GI bleed 07/08/2016     Gout      Hematuria      Hyperlipemia      Hypertension      Kidney stone 10/6/2015     Allergies   Allergen Reactions     Aspirin Nausea and Vomiting     GI upset  Other reaction(s): GI upset       All Meds and Allergies reviewed in the record at the facility and is the most up-to-date.      Current Outpatient Medications   Medication Sig     atorvastatin (LIPITOR) 10 MG tablet Take 10 mg by mouth At Bedtime     bumetanide (BUMEX) 0.5 MG tablet Take 2 tablets (1 mg) by mouth daily (Patient taking differently: Take 1 mg by mouth 2 times daily)     carvedilol (COREG) 3.125 MG tablet Take 3.125 mg by mouth 2 times daily (with meals)     [employer] IRON 240 (27 Fe) MG TABS Take 1 tablet by mouth daily     glipiZIDE (GLUCOTROL XL) 2.5 MG 24 hr tablet Take 2 tablets (5 mg) by mouth daily     insulin  aspart (NOVOLOG FLEXPEN) 100 UNIT/ML pen 3 times a day with meals, for glucometer 150-200 give 2 units SQ, 201-250 give 4 units, >250 give 6 units     insulin glargine (LANTUS PEN) 100 UNIT/ML pen Inject 15 Units Subcutaneous At Bedtime (Patient taking differently: Inject 20 Units Subcutaneous At Bedtime)     magnesium hydroxide (MILK OF MAGNESIA) 400 MG/5ML suspension Take 30 mLs by mouth daily as needed for constipation     metFORMIN (GLUCOPHAGE) 500 MG tablet Take 1 tablet (500 mg) by mouth daily (with breakfast)     metolazone (ZAROXOLYN) 5 MG tablet Take 2.5 mg by mouth daily as needed Give 30 minutes prior to Bumex in the a.m.  Take 2.5mg if weight gain of 2 lbs in a day or 5 lbs in a week.     multivitamin with minerals tablet Take 1 tablet by mouth daily     omeprazole (PRILOSEC) 20 MG capsule Take 20 mg by mouth daily     potassium chloride ER (KLOR-CON M) 20 MEQ CR tablet Take 20 mEq by mouth daily     senna-docusate (SENOKOT-S/PERICOLACE) 8.6-50 MG tablet Take 1 tablet by mouth 2 times daily (Patient taking differently: Take 1 tablet by mouth 2 times daily)     spironolactone (ALDACTONE) 25 MG tablet Take 25 mg by mouth daily     traZODone (DESYREL) 50 MG tablet Take 0.5 tablets (25 mg) by mouth nightly as needed for sleep (Patient taking differently: Take 25 mg by mouth nightly as needed for sleep)     warfarin ANTICOAGULANT (COUMADIN) 5 MG tablet 5 mg daily except 7.5 mg on Sundays, check INR on 4/11 and adjust dose for desired INR of 2.0 to 3.0. (Patient taking differently: 5 mg daily except 7.5 mg on Sundays, check INR on 4/11 and adjust dose for desired INR of 2.0 to 3.0.)     No current facility-administered medications for this visit.       REVIEW OF SYSTEMS:   Review of Systems  No fevers or chills. No headache, lightheadedness or dizziness.  Denies  SOB today , no chest pains or palpitations. Appetite is good. No nausea, vomiting, constipation or diarrhea. No dysuria, frequency, burning or pain  "with urination.  Chronic lower extremity edema.     PHYSICAL EXAMINATION:  Physical Exam     Vital signs: /65   Pulse 71   Temp 97.8  F (36.6  C)   Resp 14   Ht 1.854 m (6' 1\")   Wt 90 kg (198 lb 6.4 oz)   SpO2 99%   BMI 26.18 kg/m    General: Awake, Alert, oriented x3, follows simple commands  HEENT: Pink conjunctiva, anicteric sclerae, dry oral mucosa  NECK: Supple  CVS:  S1  S2, without murmur or gallop.   LUNG: Clear to auscultation, No wheezes, rales or rhonci.  BACK: No kyphosis of the thoracic spine  ABDOMEN: Soft, nontender to palpation, with positive bowel sounds  EXTREMITIES: Moves both upper and lower extremities with generalized weakness, 3+ pedal edema with weeping, no calf tenderness  SKIN: Stage II pressure ulcer to his coccyx not observed. Open areas to both LE with topical treatment- improving.  Skin tears to both great toes. No redness or warmth.   NEUROLOGIC: Intact, pulses palpable  PSYCHIATRIC: Cognitive impairment noted.       Labs:  All labs reviewed in the nursing home record and Epic   @  Lab Results   Component Value Date    WBC 6.4 05/19/2022     Lab Results   Component Value Date    RBC 4.30 05/19/2022     Lab Results   Component Value Date    HGB 11.9 05/20/2022     Lab Results   Component Value Date    HCT 38.1 05/19/2022     Lab Results   Component Value Date    MCV 89 05/19/2022     Lab Results   Component Value Date    MCH 30.9 05/19/2022     Lab Results   Component Value Date    MCHC 34.9 05/19/2022     Lab Results   Component Value Date    RDW 15.9 05/19/2022     Lab Results   Component Value Date     05/19/2022        @Last Comprehensive Metabolic Panel:  Sodium   Date Value Ref Range Status   06/13/2022 137 136 - 145 mmol/L Final     Potassium   Date Value Ref Range Status   06/13/2022 3.5 3.5 - 5.0 mmol/L Final     Chloride   Date Value Ref Range Status   06/13/2022 95 (L) 98 - 107 mmol/L Final     Carbon Dioxide (CO2)   Date Value Ref Range Status "   06/13/2022 29 22 - 31 mmol/L Final     Anion Gap   Date Value Ref Range Status   06/13/2022 13 5 - 18 mmol/L Final     Glucose   Date Value Ref Range Status   06/13/2022 121 70 - 125 mg/dL Final     Urea Nitrogen   Date Value Ref Range Status   06/13/2022 29 (H) 8 - 28 mg/dL Final     Creatinine   Date Value Ref Range Status   06/13/2022 1.24 0.70 - 1.30 mg/dL Final     GFR Estimate   Date Value Ref Range Status   06/13/2022 55 (L) >60 mL/min/1.73m2 Final     Comment:     Effective December 21, 2021 eGFRcr in adults is calculated using the 2021 CKD-EPI creatinine equation which includes age and gender (Dylan et al., NEJ, DOI: 10.1056/KSXJbd5492118)   12/30/2020 56 (L) >60 mL/min/1.73m2 Final     Calcium   Date Value Ref Range Status   06/13/2022 9.1 8.5 - 10.5 mg/dL Final     Assessment/plan:      ICD-10-CM    1. Acute on chronic heart failure with preserved ejection fraction (H)  I50.33  Weight up 10 pounds since admit. Down 6 lb today.   BNP prev 507 today 298.   Patient continues on Bumex 1 mg BID and   Aldactone at 25 mg every day.   metolazone 2.5 mg prn for weight gain of 2 lb daily or 5 lbs in a week.    Continue daily weights.   Follow up with cardiology out pt.      2. Acute kidney injury superimposed on chronic kidney disease (H)  N17.9  BMP unremarkable.      N18.9    3. Paroxysmal atrial fibrillation (H)  I48.0  Rate controlled with carvedilol.  INRs managed per the Coumadin clinic. Today INR 2.1.    4. Type 2 diabetes mellitus with other specified complication, with long-term current use of insulin (H)  E11.69  Appetite improving.  Blood sugars occasionally elevated.  Continues on metformin and glimepiride.  Recent increase in  Lantus to 25 units.     Z79.4    5. Lower extremity ulcers L97.901 Continues with calcium alginate AG dressings.    6. Skin tear to toes  L97.915 Apply caloseptine with DSD.    7. Essential hypertension  I10 Bp on the soft side. Metolazone changed to prn.      Ok to discharge  home with current meds and treatments. Home PT, OT, HHA and RN for medication management, INR draw and Wound care. Follow up with PCP in 1 week. Follow up with cardiology out pt.     DISCHARGE PLAN/FACE TO FACE:  I certify that this patient is under my care and that I, or a nurse practitioner or physician's assistant working with me, had a face-to-face encounter that meets the physician face-to-face encounter requirements with this patient.       I certify that, based on my findings, the following services are medically necessary home health services.    My clinical findings support the need for the above skilled services.    This patient is homebound because: recent hospitalization for hypotension with underlying CHF.     The patient is, or has been, under my care and I have initiated the establishment of the plan of care. This patient will be followed by a physician who will periodically review the plan of care.    Total time spent for this visit was 35 minutes with > 50% of time spent face to face with pt as well as collaborating with nursing and .     This note has been dictated using voice recognition software. Any grammatical or context distortions are unintentional and inherent to the software    Electronically signed by: Jaimie Joe, CNP

## 2022-06-13 NOTE — LETTER
6/13/2022        RE: Robinson Medel  [address] [place] [address]        [facility]    Code Status:  FULL CODE   Visit Type:   Chief Complaint   Patient presents with     Discharge Summary Nursing Home     Facility:  [facility] ([facility]) [[id number]]           Transitional Care Course: Robinson Medel is a 89 year old male who I am seeing today for discharge from the TCU.  Patient recently hospitalized on 5/19/2022 secondary to hypotension.  Past medical history includes hypertension, CKD, proximal atrial fib, HFpEF, diabetes mellitus type 2 and chronic venous stasis.  Patient presented with symptomatic hypotension.  Patient had been hospitalized the previous month for worsening CHF and had been taking diuretics.  Patient with poor oral intake.  He was found to have acute kidney injury on chronic kidney disease with severe hypotension.  His diuretics were held.  He was treated with IV fluids and this resolved.  He was discharged home on Coreg and Bumex was restarted.  His lisinopril and Aldactone were discontinued.  Atrial fib rate controlled with carvedilol.  He continues on chronic anticoagulation with Coumadin.  Diabetes mellitus type 2.  Continues on metformin and glipizide.  Chronic venous stasis with lower extremity edema.    On today's visit patient sitting up in bedside chair. Pt initially admitted from hospital due to hypotension. This has resolved. Acute on chronic congestive heart failure.. His diuretics had been discontinued during hospitalization.  His weight was up ~ 10 lbs during his TCU stay. However over the weekend he came down 6 lbs. He reports occ. SOB which has been stable. He continues with 2-3+ edema to LE. Wounds to RLE now healed. He continues with pinpoint area X 2 to LLE. He has open areas on both great toes. He is receiving topical treatment. He is being wrapped daily with ace wraps. He was getting this down at home previously. Today BNP  298 down from 341. BMP unremarkable. He continues on Bumex and spironolactone which were resumed at the TCU. He was receiving Metolazone however this has been changed to prn. BP on the low side of normal. DM type II. BS occ. In the 200s. Insulin increased during his TCU stay. He also continues on Metformin and glipizide. Atrial fib rate controlled with carvedilol.  He is also on chronic anticoagulation with Coumadin.  INR is managed per the Coumadin clinic. INR today 2.1. During TCU stay palliative care discussed.     Active Ambulatory Problems     Diagnosis Date Noted     Venous stasis dermatitis       Atherosclerotic heart disease of native coronary artery without angina pectoris 09/28/2015     Essential hypertension 09/24/2015     Gastroduodenal ulcer 09/24/2015     Peripheral blood vessel disorder (H) 09/24/2015     Paroxysmal atrial fib -- on Warfarin       Lymphedema 05/06/2016     Weakness 05/06/2016     Hypotension due to drugs 05/06/2016     Hypotension, unspecified hypotension type      Duodenitis 05/11/2016     Anemia 06/15/2016     Cecal cancer (H)      S/P right hemicolectomy      Gastrointestinal hemorrhage with melena 07/08/2016     Blood in stool      Lactic acid acidosis      Peripheral venous insufficiency 08/07/2017     Shortness of breath 02/01/2018     Legionella infection (H)      UTI (urinary tract infection)      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) 05/17/2018     Gastroesophageal reflux disease with esophagitis 12/04/2018     Hyperkalemia 12/04/2018     Long term current use of insulin (H) 12/04/2018     Malignant tumor of ascending colon (H) 12/04/2018     Mixed hyperlipidemia 12/04/2018     Tricuspid valve disorder 12/04/2018     Normocytic normochromic anemia 12/04/2018     Venous hypertension 12/04/2018     Peripheral vascular disease (H) 12/04/2018     Hyperglycemia due to type 2 diabetes mellitus (H) 09/24/2015     DM type 2, Hgb A1C 7.4 on 3/31/22 09/24/2015     Venous  stasis 11/27/2019     Hearing loss 01/11/2016     PVC's (premature ventricular contractions) 01/28/2016     Malaise and fatigue 09/24/2015     Abnormal EKG 09/24/2015     Age-related cataract 10/26/2021     Amnesia 10/26/2021     Severe bilateral hearing loss 10/26/2021     Chronic idiopathic constipation 10/26/2021     Slow transit constipation 10/26/2021     Chronic kidney disease, stage 3a (H) 03/31/2022     Hypokalemia 03/31/2022     Hypomagnesemia 04/01/2022     Severe Pulm HTN -- PAP 60-65 mmHg plus RAP on Echo 3/31/22 04/07/2022     (HFpEF) heart failure with preserved ejection fraction (H) 04/14/2022     Orthostatic hypotension 05/19/2022     Lactic acidosis 05/19/2022     Severe sepsis (H) 05/19/2022     Acute renal failure, unspecified acute renal failure type (H) 05/19/2022     Acute on chronic diastolic heart failure (H) 05/24/2022     Resolved Ambulatory Problems     Diagnosis Date Noted     Urinary bladder stone 02/10/2016     CAP (community acquired pneumonia) 02/01/2018     Anticoagulated on Coumadin 03/31/2022     Past Medical History:   Diagnosis Date     Coronary artery disease 9/25/2015     Diabetes mellitus (H)      Dyslipidemia      GERD (gastroesophageal reflux disease)      GI bleed 07/08/2016     Gout      Hematuria      Hyperlipemia      Hypertension      Kidney stone 10/6/2015     Allergies   Allergen Reactions     Aspirin Nausea and Vomiting     GI upset  Other reaction(s): GI upset       All Meds and Allergies reviewed in the record at the facility and is the most up-to-date.      Current Outpatient Medications   Medication Sig     atorvastatin (LIPITOR) 10 MG tablet Take 10 mg by mouth At Bedtime     bumetanide (BUMEX) 0.5 MG tablet Take 2 tablets (1 mg) by mouth daily (Patient taking differently: Take 1 mg by mouth 2 times daily)     carvedilol (COREG) 3.125 MG tablet Take 3.125 mg by mouth 2 times daily (with meals)     [employer] IRON 240 (27 Fe) MG TABS Take 1 tablet by mouth daily      glipiZIDE (GLUCOTROL XL) 2.5 MG 24 hr tablet Take 2 tablets (5 mg) by mouth daily     insulin aspart (NOVOLOG FLEXPEN) 100 UNIT/ML pen 3 times a day with meals, for glucometer 150-200 give 2 units SQ, 201-250 give 4 units, >250 give 6 units     insulin glargine (LANTUS PEN) 100 UNIT/ML pen Inject 15 Units Subcutaneous At Bedtime (Patient taking differently: Inject 20 Units Subcutaneous At Bedtime)     magnesium hydroxide (MILK OF MAGNESIA) 400 MG/5ML suspension Take 30 mLs by mouth daily as needed for constipation     metFORMIN (GLUCOPHAGE) 500 MG tablet Take 1 tablet (500 mg) by mouth daily (with breakfast)     metolazone (ZAROXOLYN) 5 MG tablet Take 2.5 mg by mouth daily as needed Give 30 minutes prior to Bumex in the a.m.  Take 2.5mg if weight gain of 2 lbs in a day or 5 lbs in a week.     multivitamin with minerals tablet Take 1 tablet by mouth daily     omeprazole (PRILOSEC) 20 MG capsule Take 20 mg by mouth daily     potassium chloride ER (KLOR-CON M) 20 MEQ CR tablet Take 20 mEq by mouth daily     senna-docusate (SENOKOT-S/PERICOLACE) 8.6-50 MG tablet Take 1 tablet by mouth 2 times daily (Patient taking differently: Take 1 tablet by mouth 2 times daily)     spironolactone (ALDACTONE) 25 MG tablet Take 25 mg by mouth daily     traZODone (DESYREL) 50 MG tablet Take 0.5 tablets (25 mg) by mouth nightly as needed for sleep (Patient taking differently: Take 25 mg by mouth nightly as needed for sleep)     warfarin ANTICOAGULANT (COUMADIN) 5 MG tablet 5 mg daily except 7.5 mg on Sundays, check INR on 4/11 and adjust dose for desired INR of 2.0 to 3.0. (Patient taking differently: 5 mg daily except 7.5 mg on Sundays, check INR on 4/11 and adjust dose for desired INR of 2.0 to 3.0.)     No current facility-administered medications for this visit.       REVIEW OF SYSTEMS:   Review of Systems  No fevers or chills. No headache, lightheadedness or dizziness.  Denies  SOB today , no chest pains or palpitations. Appetite is  "good. No nausea, vomiting, constipation or diarrhea. No dysuria, frequency, burning or pain with urination.  Chronic lower extremity edema.     PHYSICAL EXAMINATION:  Physical Exam     Vital signs: /65   Pulse 71   Temp 97.8  F (36.6  C)   Resp 14   Ht 1.854 m (6' 1\")   Wt 90 kg (198 lb 6.4 oz)   SpO2 99%   BMI 26.18 kg/m    General: Awake, Alert, oriented x3, follows simple commands  HEENT: Pink conjunctiva, anicteric sclerae, dry oral mucosa  NECK: Supple  CVS:  S1  S2, without murmur or gallop.   LUNG: Clear to auscultation, No wheezes, rales or rhonci.  BACK: No kyphosis of the thoracic spine  ABDOMEN: Soft, nontender to palpation, with positive bowel sounds  EXTREMITIES: Moves both upper and lower extremities with generalized weakness, 3+ pedal edema with weeping, no calf tenderness  SKIN: Stage II pressure ulcer to his coccyx not observed. Open areas to both LE with topical treatment- improving.  Skin tears to both great toes. No redness or warmth.   NEUROLOGIC: Intact, pulses palpable  PSYCHIATRIC: Cognitive impairment noted.       Labs:  All labs reviewed in the nursing home record and Epic   @  Lab Results   Component Value Date    WBC 6.4 05/19/2022     Lab Results   Component Value Date    RBC 4.30 05/19/2022     Lab Results   Component Value Date    HGB 11.9 05/20/2022     Lab Results   Component Value Date    HCT 38.1 05/19/2022     Lab Results   Component Value Date    MCV 89 05/19/2022     Lab Results   Component Value Date    MCH 30.9 05/19/2022     Lab Results   Component Value Date    MCHC 34.9 05/19/2022     Lab Results   Component Value Date    RDW 15.9 05/19/2022     Lab Results   Component Value Date     05/19/2022        @Last Comprehensive Metabolic Panel:  Sodium   Date Value Ref Range Status   06/13/2022 137 136 - 145 mmol/L Final     Potassium   Date Value Ref Range Status   06/13/2022 3.5 3.5 - 5.0 mmol/L Final     Chloride   Date Value Ref Range Status   06/13/2022 95 " (L) 98 - 107 mmol/L Final     Carbon Dioxide (CO2)   Date Value Ref Range Status   06/13/2022 29 22 - 31 mmol/L Final     Anion Gap   Date Value Ref Range Status   06/13/2022 13 5 - 18 mmol/L Final     Glucose   Date Value Ref Range Status   06/13/2022 121 70 - 125 mg/dL Final     Urea Nitrogen   Date Value Ref Range Status   06/13/2022 29 (H) 8 - 28 mg/dL Final     Creatinine   Date Value Ref Range Status   06/13/2022 1.24 0.70 - 1.30 mg/dL Final     GFR Estimate   Date Value Ref Range Status   06/13/2022 55 (L) >60 mL/min/1.73m2 Final     Comment:     Effective December 21, 2021 eGFRcr in adults is calculated using the 2021 CKD-EPI creatinine equation which includes age and gender (Dylan et al., NEJ, DOI: 10.1056/JJHXkv4551046)   12/30/2020 56 (L) >60 mL/min/1.73m2 Final     Calcium   Date Value Ref Range Status   06/13/2022 9.1 8.5 - 10.5 mg/dL Final     Assessment/plan:      ICD-10-CM    1. Acute on chronic heart failure with preserved ejection fraction (H)  I50.33  Weight up 10 pounds since admit. Down 6 lb today.   BNP prev 507 today 298.   Patient continues on Bumex 1 mg BID and   Aldactone at 25 mg every day.   metolazone 2.5 mg prn for weight gain of 2 lb daily or 5 lbs in a week.    Continue daily weights.   Follow up with cardiology out pt.      2. Acute kidney injury superimposed on chronic kidney disease (H)  N17.9  BMP unremarkable.      N18.9    3. Paroxysmal atrial fibrillation (H)  I48.0  Rate controlled with carvedilol.  INRs managed per the Coumadin clinic. Today INR 2.1.    4. Type 2 diabetes mellitus with other specified complication, with long-term current use of insulin (H)  E11.69  Appetite improving.  Blood sugars occasionally elevated.  Continues on metformin and glimepiride.  Recent increase in  Lantus to 25 units.     Z79.4    5. Lower extremity ulcers L97.901 Continues with calcium alginate AG dressings.    6. Skin tear to toes  L97.915 Apply caloseptine with DSD.    7. Essential  hypertension  I10 Bp on the soft side. Metolazone changed to prn.      Ok to discharge home with current meds and treatments. Home PT, OT, HHA and RN for medication management, INR draw and Wound care. Follow up with PCP in 1 week. Follow up with cardiology out pt.     DISCHARGE PLAN/FACE TO FACE:  I certify that this patient is under my care and that I, or a nurse practitioner or physician's assistant working with me, had a face-to-face encounter that meets the physician face-to-face encounter requirements with this patient.       I certify that, based on my findings, the following services are medically necessary home health services.    My clinical findings support the need for the above skilled services.    This patient is homebound because: recent hospitalization for hypotension with underlying CHF.     The patient is, or has been, under my care and I have initiated the establishment of the plan of care. This patient will be followed by a physician who will periodically review the plan of care.    Total time spent for this visit was 35 minutes with > 50% of time spent face to face with pt as well as collaborating with nursing and .     This note has been dictated using voice recognition software. Any grammatical or context distortions are unintentional and inherent to the software    Electronically signed by: Jaimie Joe CNP           Sincerely,        Jaimie Joe NP

## 2022-06-13 NOTE — PROGRESS NOTES
ANTICOAGULATION MANAGEMENT     Robinson Medel 89 year old male is on warfarin with therapeutic INR result. (Goal INR 2.0-3.0)    Recent labs: (last 7 days)     06/13/22  0000   INR 2.1*       ASSESSMENT       Source(s): Chart Review and Home Care/Facility Nurse       Warfarin doses taken: Warfarin taken as instructed    Diet: No new diet changes identified    New illness, injury, or hospitalization: No    Medication/supplement changes: None noted    Signs or symptoms of bleeding or clotting: No    Previous INR: Therapeutic last visit; previously outside of goal range    Additional findings: None       PLAN     Recommended plan for no diet, medication or health factor changes affecting INR     Dosing Instructions: continue your current warfarin dose with next INR in 4 days       Summary  As of 6/13/2022    Full warfarin instructions:  2.5 mg every Tue, Fri; 5 mg all other days   Next INR check:  6/17/2022             Telephone call with [facility] home care/facility nurse who agrees to plan and repeated back plan correctly    Orders given to  Homecare nurse/facility to recheck    Education provided: Please call back if any changes to your diet, medications or how you've been taking warfarin    Plan made per [facility] anticoagulation protocol    Belgica Santamaria, RN  Anticoagulation Clinic  6/13/2022    _______________________________________________________________________     Anticoagulation Episode Summary     Current INR goal:  2.0-3.0   TTR:  49.3 % (1.4 wk)   Target end date:  Indefinite   Send INR reminders to:  [facility] (TCU/LTC/long-term)    Indications    Paroxysmal atrial fib -- on Warfarin  [I48.0]           Comments:  [facility] [phone]         Anticoagulation Care Providers     Provider Role Specialty Phone number    Praneeth Marina DO Referring Family Medicine [phone]

## 2022-06-17 NOTE — PROGRESS NOTES
ANTICOAGULATION  MANAGEMENT    Robinson Medel is being discharged from the [facility] ([facility]).    Reason for discharge: discharged from [facility]; returning to pre-admission warfarin management    Anticoagulation episode resolved, ACC referral closed and Standing order discontinued    If patient needs warfarin management in the future, please send a new referral    Jose Apple RN

## 2022-07-25 NOTE — PROGRESS NOTES
Medication Therapy Management (MTM) Encounter    ASSESSMENT:                            Medication Adherence/Access: No issues identified    Mental Status changes:  Spoke to Dr. Alexandre after meeting with patient.  Unable to have him seen by provider today for further assessment of mental status.  We are unable to get urine sample today to screen for possible UTI, and he was not answering questions about urinary symptoms.  We will review BMP results for possible causes of confusion/behavior changes, but may also need to consider possible dementia.  Other causes could be confusion/delirium from not being able to use hearing aids.  May need to consider a higher level of car for patient, but primary care provider will discuss with family at future visit.  Do not feel these symptoms are related to hypoglycemia, no evidence of hypoglycemia recently.  May be beneficial to have further assessment of his mental status, and sign an advanced care directive if able to/not done yet.    Type 2 Diabetes: Patient is meeting A1c goal of < 8%. Self monitoring of blood glucose is at goal of fasting  mg/dL. Patient would benefit from trying a continuous glucose monitor for home blood glucose monitoring.  He does not like using he regular glucometer, and his family often struggle to monitor appropriately.  His A1c is far below goal, so would also be helpful to monitor for hypoglycemia.  Tried explaining how the blood glucose monitor works with the patient, but he was acutely confused during our appointment so I don't feel he full understood.  His wife would like try it, and if he doesn't like the sensor on his arm they can just take it off/stop using the monitor.  For simplicity of his medication regimen/lower risk of hypoglycemia we could consider stopping his meal time insulin and glipizide, then changing to a once weekly GLP1 agonist.  Likely would help manage his blood glucose well and would be easier to manage regimen.   Deferred at this time due to other acute concerns and A1c well controlled.    Atrial Fibrillation/HFpEF/Hypertension/Edema: Patient is meeting blood pressure goal of < 140/90mmHg.  Edema is stable.  Rechecking BMP today to see if his GFR has improved.  INR has been within goal 2-3.  Patient declines checking home weights, but would benefit from this.    Hypokalemia:  Last potassium level was within normal limits.  Rechecking BMP today.    Hyperlipidemia: Stable. Patient is on moderate intensity statin which is indicated based on 2019 ACC/AHA guidelines for lipid management.      GERD: Stable. Current treatment is effective.  Not worried about long term PPI risks given patient's age.  Is beneficial to continue medication.    Constipation: Stable.    Anemia: Stable.  Last hgb level was normal.    Supplements: Stable.      PLAN:                            1.  Start prescription for Freestyle Mandy 2 monitor.  Sent prescription to [facility].    2.  Continue current medications.  Rechecking BMP today.  3.  I sent in a refill for the Glipizide ER 5 mg tablets since you are on the higher dose.    Future Considerations:  4.  Could consider stopping Novolog and glipizide, starting once weekly GLP1 agonist to simplify medication regimen.    Follow-up: Return in about 2 weeks (around 8/9/2022) for MTM Follow Up.     7/27/22 Update:  Got BMP results back and GFR has improved a lot with the dose decrease of bumetanide.  Potassium was very elevated, recommend stopping potassium but checking with Dr. Alexandre on this.    SUBJECTIVE/OBJECTIVE:                          Robinson Medel is a 89 year old male coming in for an initial visit. He was referred to me from Dr. Alexandre. Patient was accompanied by his wife and daughter.     Reason for visit: Initial medication review.  Referred for help with medications given recent hospitalization.    Allergies/ADRs: Reviewed in chart  Past Medical History: Reviewed in  chart  Tobacco: He reports that he has never smoked. He has never used smokeless tobacco.  Alcohol: none  Social: Patient lives at home with his wife.  She is his primary care taker.  She sometimes has difficulty helping him with certain things or getting him to do certain things (check daily weights, blood glucose, etc).    Medication Adherence/Access: no issues reported, is taking medications as prescribed.    HPI:  After patient's last appointment with Dr. Alexandre she wanted us to meet to do medication reconciliation, and confirm which diuretics he is currently taking.  He also seemed very confused during this appointment.  I do not know if this is his baseline mental status, but our MA Nancy said hi to him and he didn't recognize her which he usually dose.  He didn't talk or speak full sentences while we were talking.  He often made hand gestures at me during the conversation where he pointed at him and then the door, like he was gesturing for me to leave. Unable to get urine sample today - concern for possible UTI. He declined weight and cannot get a urine sample on his own.  He hasn't been wearing his hearing aids which could contribute to acute confusion or mental status changes.  After meeting with patient, spoke with his daughter outside of the exam room where she indicated he seems more acutely confused than normal.    When our  was trying to get a blood sample he tried grabbing her shirt.    Type 2 Diabetes:  Currently taking Lantus 25 units daily, Novolog three times daily before meals per sliding scale, Metformin 500 mg daily, and glipizide ER 5 mg daily in the morning.  Patient is not experiencing side effects.  Blood sugar monitoring: 3 time(s) daily. Ranges (from patient's glucose log): See below  Am- 189, 177, 144, 85, 176, 176, 113,   Pre-meal- 116, 151,   Symptoms of low blood sugar? none, Frequency of lows- unknown  Symptoms of high blood sugar? none  Eye exam: due, defer due to  other acute concerns and patient age.  Foot exam: up to date  Diet/Exercise: patient is up all night and often snacks all night.  He sleeps most of the day.  Aspirin: Not taking due to on warfarin  Statin: Yes   ACEi/ARB: No. Not on due to recent renal issues and high potassium.  Urine Albumin: No results found for: UMALCR -none on file  Last A1c was 6.5%  Lab Results   Component Value Date    A1C 7.4 03/31/2022    A1C 7.7 07/14/2021    A1C 7.7 02/02/2018    A1C 6.6 07/08/2016    A1C 6.8 02/09/2016     Atrial Fibrillation/HFpEF/Hypertension/Edema:  Current medications include Carvedilol 3.125 mg twice daily, spironolactone 25 mg daily, Metolazone 2.5 mg as needed for weight gain (2.5 lbs in one day or 5 lbs in one week), and bumetanide 1 mg once daily.  His bumetanide was decreased last week due to Also taking warfarin 5 mg daily  Except for 7.5 mg on Sundays for anticoagulation therapy.  Patient does bruise more easily.  Patient does not self-monitor blood pressure.  Patient reports no current medication side effects.  BP Readings from Last 3 Encounters:   06/13/22 103/65   06/09/22 117/79   06/07/22 121/70     Hypokalemia:  Taking Potassium chloride ER 20 meq once daily.  Last potassium level was within normal limits.  Rechecking BMP today.    Hyperlipidemia: Current therapy includes Atorvastatin 10 mg daily.  Patient reports no significant myalgias or other side effects.    Recent Labs   Lab Test 11/23/20  1050 10/29/19  1053   CHOL 94 105   HDL 44 46   LDL 37 46   TRIG 64 63     GERD: Current medications include: Prilosec (omeprazole) 20 mg once daily. Patient reports no current symptoms. The patient does notice symptoms if they miss a dose.  Patient feels that current regimen is effective.    Constipation: Takes senna-docusate 8.6-50 mg twice daily as needed, and Milk of magnesia as needed.  Not discussed in detail due to time.    Anemia: taking Ferrous sulfate 27 mg daily.  Last hgb level was within normal  limits.    Supplements:  Taking multivitamin daily.  No issues.    Today's Vitals: There were no vitals taken for this visit.   - unable to get vitals.  He declined getting out of his wheelchair to check height/weight.  When I asked him if I could check his blood pressure it seemed like he didn't understand, and gestured for me to leave the room.  Tried putting the blood pressure cuff on his arm but he became agitated so I didn't proceed with checking blood pressure.    MA staff were able to get blood sample, but took quite awhile for patient to allow this and required multiple staff members.    ----------------  Post Discharge Medication Reconciliation Status: discharge medications reconciled and changed, per note/orders.    I spent 45 minutes with this patient today (an extra 15 minutes was spent creating the Medication Action Plan). All changes were made via collaborative practice agreement with Elizabeth Alexandre MD. A copy of the visit note was provided to the patient's provider(s).    The patient was mailed a summary of these recommendations.     Neomy Cast, PharmD  Medication Therapy Management Pharmacist  Pager: [phone]       Medication Therapy Recommendations  Hyperglycemia due to type 2 diabetes mellitus (H)    Current Medication: blood glucose monitoring (NO BRAND SPECIFIED) meter device kit   Rationale: More effective medication available - Ineffective medication - Effectiveness   Recommendation: Change Medication   Status: Accepted per CPA   Note: Mandy monitor

## 2022-07-26 NOTE — LETTER
_  Medication List        Prepared on: 7/27/2022     Bring your Medication List when you go to the doctor, hospital, or   emergency room. And, share it with your family or caregivers.     Note any changes to how you take your medications.  Cross out medications when you no longer use them.    Medication How I take it Why I use it Prescriber   atorvastatin (LIPITOR) 10 MG tablet Take 10 mg by mouth At Bedtime Cardiac Failure Elizabeth Alexandre MD   bumetanide (BUMEX) 1 MG tablet Take 1 tablet (1 mg) by mouth daily Acute on chronic diastolic congestive heart failure (H) Elizabeth Alexandre MD   carvedilol (COREG) 3.125 MG tablet Take 3.125 mg by mouth 2 times daily (with meals) Atrial Fibrillation Elizabeth Alexandre MD   [employer] IRON 240 (27 Fe) MG TABS Take 1 tablet by mouth daily Iron Deficiency Elizabeth Alexandre MD   glipiZIDE (GLUCOTROL XL) 5 MG 24 hr tablet Take 1 tablet (5 mg) by mouth daily Type 2 diabetes mellitus without complication, with long-term current use of insulin (H) Elizabeth Alexandre MD   insulin aspart (NOVOLOG FLEXPEN) 100 UNIT/ML pen 3 times a day with meals, for glucometer 150-200 give 2 units SQ, 201-250 give 4 units, >250 give 6 units Type 2 diabetes mellitus without complication, unspecified whether long term insulin use (H) Elizabeth Alexandre MD   insulin glargine (LANTUS PEN) 100 UNIT/ML pen Inject 25 Units Subcutaneous At Bedtime Type 2 diabetes mellitus without complication, with long-term current use of insulin (H) Elizabeth Alexandre MD   magnesium hydroxide (MILK OF MAGNESIA) 400 MG/5ML suspension Take 30 mLs by mouth daily as needed for constipation Constipation, unspecified constipation type Elbert Cohn MD   metFORMIN (GLUCOPHAGE) 500 MG tablet Take 1 tablet (500 mg) by mouth daily (with breakfast) Chronic kidney disease, stage 3a Megha Jade MD   metolazone (ZAROXOLYN) 5 MG tablet Take 2.5 mg by mouth daily as needed Give 30 minutes prior to Bumex in the a.m.  Take 2.5mg if weight gain of 2 lbs in a  day or 5 lbs in a week. Edema Elizabeth Alexandre MD   multivitamin with minerals tablet Take 1 tablet by mouth daily General health Elizabeth Alexandre MD   omeprazole (PRILOSEC) 20 MG capsule Take 20 mg by mouth daily Gastroesophageal Reflux Disease Elizabeth Alexandre MD   potassium chloride ER (KLOR-CON M) 20 MEQ CR tablet Take 20 mEq by mouth daily Low Amount of Potassium in the Blood Elizabeth Alexandre MD   senna-docusate (SENOKOT-S/PERICOLACE) 8.6-50 MG tablet Take 1 tablet by mouth 2 times daily Constipation, unspecified constipation type Elbert Cohn MD   spironolactone (ALDACTONE) 25 MG tablet Take 25 mg by mouth daily High Blood Pressure Disorder Elizabeth Alexandre MD   warfarin ANTICOAGULANT (COUMADIN) 5 MG tablet 5 mg daily except 7.5 mg on Sundays, check INR on 4/11 and adjust dose for desired INR of 2.0 to 3.0. Atrial fibrillation, unspecified type (H) Elbert Cohn MD         Add new medications, over-the-counter drugs, herbals, vitamins, or  minerals in the blank rows below.    Medication How I take it Why I use it Prescriber                          Allergies:      aspirin        Side effects I have had:               Other Information:              My notes and questions:

## 2022-07-26 NOTE — LETTER
July 27, 2022  Robinson Medel  [address] [place] [address] [facility] [address]    Dear Mr. Medel, [facility]        Thank you for talking with me on Jul 26, 2022 about your health and medications. As a follow-up to our conversation, I have included two documents:      1. Your Recommended To-Do List has steps you should take to get the best results from your medications.  2. Your Medication List will help you keep track of your medications and how to take them.    If you want to talk about these documents, please call Noemy Cast RPH at phone: [phone], Monday-Friday 8-4:30pm.    I look forward to working with you and your doctors to make sure your medications work well for you.    Sincerely,    NESTOR Bentley Pharmacist, [facility]

## 2022-07-26 NOTE — LETTER
"Recommended To-Do List      Prepared on: 7/27/2022     You can get the best results from your medications by completing the items on this \"To-Do List.\"      Bring your To-Do List when you go to your doctor. And, share it with your family or caregivers.    My To-Do List:  What we talked about: What I should do:   Trying a newer kind of glucose monitor.    Start prescription for Freestyle Mandy 2 monitor.  Sent prescription to [facility].            What we talked about: What I should do:                       "

## 2024-04-17 NOTE — PROGRESS NOTES
Daily Progress Note        CODE STATUS:  Full Code    05/20/22  Assessment/Plan:  Robinson Medel is a 89 year old male with PMHx of HTN, HLD,IDDM2, PAF on coumadin, diastolic heart failure, cecal cancer s/p right hemicolectomy, recent visit to ED on 5/3/2022 for hypotension, thought to be due to dehydration, received fluid and discharged home who presented to ED again for evaluation of hypotension and subsequently admitted for further management on 5/19/2022.    Hypotension; likely due to medications and poor oral intake-improved  -- On presentation, no reported febrile illness and no reported respiratory/GI/ symptoms.  Patient's wife reported poor oral intake  -- On admission, normal WBC count, UA unremarkable.  CXR reported improvement on previous left basilar consolidation.  Mild lactic acidosis likely due to dehydration, improved with IV fluid.  -- Recent hospitalization on 4/2022 for heart failure and diuretics increased.  Recent ED visit on 5/3/2022 for hypotension and thought to be due to dehydration and improved with IV fluid and discharged home from ED  -- Blood pressure improved with IV fluid and holding home medications.  Oral intake improving, hold further IV fluid given CHF  --Resumed home Coreg given history of A. fib and concern for A. fib with RVR if not resumed.  Patient on 4 mg Bumex at home, start at 0.5 mg daily given history of CHF.  Continue to hold lisinopril    Acute kidney injury on CKD stage III; likely due to hypotensive episode, diuretics, poor oral intake  --On admission creatinine 2.14, significantly improved, creatinine 1.2 on 5/21.  -- Monitor labs, intake/output, weight closely.    Hyperkalemia; likely due to LUCY and medications  -- Improved with hyperkalemia protocol.    --Continue to hold home lisinopril, Aldactone and potassium supplement for now    PAF;  -- Heart rate controlled, continue Coreg.    --On Coumadin, pharmacy dosing    Chronic diastolic heart failure;  --  Name: Dameon Birmingham      : 1966      MRN: [id number]  Encounter Provider: Tono Richardson MD  Encounter Date: 2024   Encounter department: [facility]    Assessment & Plan     1. Well adult exam    2. Class 2 obesity due to excess calories without serious comorbidity with body mass index (BMI) of 36.0 to 36.9 in adult  Assessment & Plan:  Patient to increase exercise and partake of a diet with less calories to promote  weight loss       3. Essential hypertension  Assessment & Plan:  Patient is stable with current anti-hypertensive medicine and continue to follow a low sodium diet and take current medication. All questions about this condition were answered today.       4. Gout, unspecified cause, unspecified chronicity, unspecified site  Assessment & Plan:  Pt to continue with current gout meds and avoid ETOH, foods with lots of purines and increase water intake. All patient questions about this condition answered today     Orders:  -     Uric acid; Future    5. Mixed hyperlipidemia  Assessment & Plan:  Patient  is stable with current medication and we discussed a low fat low cholesterol diet. Weight loss also discussed for this will help lower cholesterol also. Recheck lipids in 6 months.     Orders:  -     Comprehensive metabolic panel; Future  -     Lipid Panel with Direct LDL reflex; Future    6. Arthritis  -     XR hand 3+ vw left; Future; Expected date: 2024  -     XR hand 3+ vw right; Future; Expected date: 2024  -     AGUS Screen w/ Reflex to Titer/Pattern; Future  -     RF Screen w/ Reflex to Titer; Future  -     Sedimentation rate, automated; Future  -     C-reactive protein; Future  -     Lyme Total AB W Reflex to IGM/IGG; Future    7. Chronic bronchitis, unspecified chronic bronchitis type (HCC)           Subjective     HPI  Review of Systems    Past Medical History:   Diagnosis Date   • Gout 2021     Past Surgical History:   Procedure Laterality  "Date   • KNEE SURGERY Left      Family History   Problem Relation Age of Onset   • Hypertension Mother    • Stroke Mother    • Brain cancer Mother    • Hypertension Father      Social History     Socioeconomic History   • Marital status: /Civil Union     Spouse name: None   • Number of children: None   • Years of education: None   • Highest education level: None   Occupational History   • None   Tobacco Use   • Smoking status: Never     Passive exposure: Never   • Smokeless tobacco: Never   Vaping Use   • Vaping status: Never Used   Substance and Sexual Activity   • Alcohol use: Never   • Drug use: Never   • Sexual activity: Yes     Partners: Female   Other Topics Concern   • None   Social History Narrative    · Most recent tobacco use screenin2019      · Marital status:         · Exercise level:   Occasional      · Diet:   Vegetarian      · General stress level:   Low      Social Determinants of Health     Financial Resource Strain: Not on file   Food Insecurity: Not on file   Transportation Needs: Not on file   Physical Activity: Not on file   Stress: Not on file   Social Connections: Not on file   Intimate Partner Violence: Not on file   Housing Stability: Not on file     Current Outpatient Medications on File Prior to Visit   Medication Sig   • allopurinol (ZYLOPRIM) 300 mg tablet Take 1 tablet (300 mg total) by mouth daily   • atorvastatin (LIPITOR) 20 mg tablet Take 1 tablet (20 mg total) by mouth daily   • valsartan-hydrochlorothiazide (DIOVAN-HCT) 160-12.5 MG per tablet Take 1 tablet by mouth daily     No Known Allergies  Immunization History   Administered Date(s) Administered   • COVID-19 MODERNA VACC 0.5 ML IM 2021, 2021, 2021       Objective     /76 (BP Location: Left arm, Patient Position: Sitting, Cuff Size: Large)   Pulse 85   Temp 97.9 °F (36.6 °C) (Temporal)   Resp 16   Ht 5' 11\" (1.803 m)   Wt 117 kg (257 lb)   SpO2 96%   BMI 35.84 kg/m² " Currently does not look volume overload  --Echocardiogram on 3/2022 reported EF 50 to 55%.  -- Held diuretics due to LUCY and hypotension now both improved.  At home patient on 4 mg daily Bumex, start at 0.5 mg daily on 5/22    IDDM;  --Hold home metformin and glipizide while inpatient  --Continue home Lantus  --Insulin sliding scale hypoglycemic protocol    Venous stasis dermatitis of both lower extremities;  -- Continue local care, wound care nurse consulted    Physical deconditioning due to medical illness;  -- PT OT recommending TCU     Disposition; anticipate TCU  Barrier to discharge; medically stable, awaiting placement     LOS: 0 days     Subjective:  Interval History: Patient seen and examined. Notes, labs, imaging reports personally reviewed.  No acute issues reported overnight.  Patient hard of hearing, follows simple commands, denied short of breath, chest pain, dizziness, abdomen pain, nausea, vomiting.  Patient does have good appetite now.  Discussed with nursing staffs.  Discussed with therapy team, recommended TCU.  Discussed with care manager/.  Addendum  --Detailed plan of care discussed with patient's wife at bedside.    Review of Systems:   As mentioned in subjective.    Patient Active Problem List   Diagnosis     Venous stasis dermatitis      Atherosclerotic heart disease of native coronary artery without angina pectoris     Benign essential HTN     Gastroduodenal ulcer     Peripheral blood vessel disorder (H)     Urinary bladder stone     Paroxysmal atrial fib -- on Warfarin      Lymphedema     Weakness     Hypotension due to drugs     Hypotension, unspecified hypotension type     Duodenitis     Anemia     Cecal cancer (H)     S/P right hemicolectomy     Gastrointestinal hemorrhage with melena     Blood in stool     Lactic acid acidosis     Venous hypertension, chronic, bilateral     Shortness of breath     Legionella infection (H)     UTI (urinary tract infection)     Ulcer of left      Physical Exam  Tono Richardson MD     lower extremity, limited to breakdown of skin (H)     Gastroesophageal reflux disease with esophagitis     Hyperkalemia     Long term current use of anticoagulant therapy     Malignant tumor of ascending colon (H)     Mixed hyperlipidemia     Mitral valve disorder     Normocytic normochromic anemia     Venous hypertension     Peripheral vascular disease (H)     Hyperglycemia due to type 2 diabetes mellitus (H)     DM type 2, Hgb A1C 7.4 on 3/31/22     Venous stasis     Hearing loss     PVC's (premature ventricular contractions)     Malaise and fatigue     Abnormal EKG     Age-related cataract     Amnesia     Severe bilateral hearing loss     Chronic idiopathic constipation     Slow transit constipation     Chronic kidney disease, stage 3a (H)     Hypokalemia     Hypomagnesemia     Severe Pulm HTN -- PAP 60-65 mmHg plus RAP on Echo 3/31/22     (HFpEF) heart failure with preserved ejection fraction (H)     Orthostatic hypotension     Lactic acidosis     Severe sepsis (H)     Acute renal failure, unspecified acute renal failure type (H)       Scheduled Meds:    atorvastatin  10 mg Oral At Bedtime     carvedilol  3.125 mg Oral BID w/meals     Ferrous Gluconate  1 tablet Oral Daily     insulin aspart  1-7 Units Subcutaneous TID AC     insulin aspart  1-5 Units Subcutaneous At Bedtime     insulin glargine  15 Units Subcutaneous At Bedtime     multivitamin w/minerals  1 tablet Oral Daily     pantoprazole  40 mg Oral QAM AC     senna-docusate  1 tablet Oral BID     warfarin ANTICOAGULANT  4 mg Oral Q24H     Continuous Infusions:    Warfarin Therapy Reminder       PRN Meds:.acetaminophen **OR** acetaminophen, glucose **OR** dextrose **OR** glucagon, magnesium hydroxide, melatonin, ondansetron **OR** ondansetron, traZODone, Warfarin Therapy Reminder    Objective:  Vital signs in last 24 hours:  Temp:  [97.8  F (36.6  C)-99.1  F (37.3  C)] 98  F (36.7  C)  Pulse:  [65-85] 82  Resp:  [16-18] 16  BP: ()/(57-79)  110/62  SpO2:  [95 %-98 %] 98 %      Intake/Output Summary (Last 24 hours) at 5/20/2022 1234  Last data filed at 5/20/2022 1214  Gross per 24 hour   Intake 1000 ml   Output 1800 ml   Net -800 ml       Physical Exam:  General: Not in obvious distress.  HEENT: Normocephalic, supple neck  Chest: Clear to auscultation bilateral anteriorly, no wheezing  Heart: S1S2 normal, irregular  Abdomen: Soft. NT, ND. Bowel sounds- active.  Extremities: Trace bilateral lower extremity edema, significant venous stasis dermatitis on both lower extremities  Neuro: alert and awake, hard of hearing, moves all extremities but has generalized motor weakness    Lab Results:(I have personally reviewed the results)    Recent Results (from the past 24 hour(s))   Glucose by meter    Collection Time: 05/21/22 12:39 PM   Result Value Ref Range    GLUCOSE BY METER POCT 167 (H) 70 - 99 mg/dL   Glucose by meter    Collection Time: 05/21/22  4:55 PM   Result Value Ref Range    GLUCOSE BY METER POCT 188 (H) 70 - 99 mg/dL   Glucose by meter    Collection Time: 05/21/22  9:29 PM   Result Value Ref Range    GLUCOSE BY METER POCT 170 (H) 70 - 99 mg/dL   Glucose by meter    Collection Time: 05/22/22  8:23 AM   Result Value Ref Range    GLUCOSE BY METER POCT 129 (H) 70 - 99 mg/dL   Glucose by meter    Collection Time: 05/22/22 11:43 AM   Result Value Ref Range    GLUCOSE BY METER POCT 166 (H) 70 - 99 mg/dL         Serum Glucose range:   Recent Labs   Lab 05/22/22  1143 05/22/22  0823 05/21/22  2129 05/21/22  1655   * 129* 170* 188*     ABG: No lab results found in last 7 days.  CBC:   Recent Labs   Lab 05/20/22  0838 05/19/22  1513   WBC  --  6.4   HGB 11.9* 13.3   HCT  --  38.1*   MCV  --  89   PLT  --  167   NEUTROPHIL  --  84   LYMPH  --  8   MONOCYTE  --  7   EOSINOPHIL  --  1     Chemistry:   Recent Labs   Lab 05/21/22  0725 05/20/22  0838 05/19/22  2205 05/19/22  1824 05/19/22  1513    136  --   --  133*   POTASSIUM 4.2 4.8 4.9   < > 5.6*    CHLORIDE 103 103  --   --  96*   CO2 27 25  --   --  26   BUN 45* 55*  --   --  71*   CR 1.22 1.63*  --   --  2.14*   GFRESTIMATED 56* 39*  --   --  28*   TY 9.0 9.0  --   --  10.0   MAG  --  1.8  --   --   --    PROTTOTAL  --   --   --   --  7.8   ALBUMIN  --   --   --   --  3.9   AST  --   --   --   --  25   ALT  --   --   --   --  30   ALKPHOS  --   --   --   --  88   BILITOTAL  --   --   --   --  0.8    < > = values in this interval not displayed.     Coags:  Recent Labs   Lab 05/21/22  0725 05/20/22  0838 05/19/22  1513   INR 2.10* 1.99* 2.04*     Cardiac Markers:  Recent Labs   Lab 05/19/22  1824   TROPONINI 0.16        XR Chest 1 View    Result Date: 5/19/2022  EXAM: XR CHEST 1 VIEW LOCATION: [facility] DATE/TIME: 5/19/2022 4:48 PM INDICATION: Low blood pressure. COMPARISON: 03/31/2022.     IMPRESSION: Cardiomegaly and borderline pulmonary venous congestion stable. Pleural fluid and/or thickening inferior right hemithorax with volume loss in the right middle and lower lobes unchanged. Previously seen left basilar consolidation and volume loss has cleared.    CT Cervical Spine w/o Contrast  Result Date: 5/19/2022  IMPRESSION: HEAD CT: 1.  Partial head trauma CERVICAL SPINE CT: 1.  No CT evidence for acute fracture or post traumatic subluxation.    CT Head w/o Contrast  Result Date: 5/19/2022  IMPRESSION: HEAD CT: 1.  Partial head trauma CERVICAL SPINE CT: 1.  No CT evidence for acute fracture or post traumatic subluxation.      Latest radiology report personally reviewed.    Note created using dragon voice recognition software so sounds alike errors may have escaped editing.    05/22/2022   SHRUTHI JANE MD  HOSPITALIST, [facility]  PAGER NO. [phone]